# Patient Record
Sex: FEMALE | Race: WHITE
[De-identification: names, ages, dates, MRNs, and addresses within clinical notes are randomized per-mention and may not be internally consistent; named-entity substitution may affect disease eponyms.]

---

## 2018-03-19 ENCOUNTER — HOSPITAL ENCOUNTER (OUTPATIENT)
Dept: HOSPITAL 22 - LAB | Age: 38
End: 2018-03-19
Payer: MEDICAID

## 2018-03-19 DIAGNOSIS — R53.83: Primary | ICD-10-CM

## 2018-03-19 DIAGNOSIS — Z79.899: ICD-10-CM

## 2018-03-19 LAB
ALBUMIN LEVEL: 3.7 GM/DL (ref 3.4–5)
ALBUMIN/GLOB SERPL: 1 {RATIO} (ref 1.1–1.8)
ALP ISO SERPL-ACNC: 77 U/L (ref 46–116)
ALT SERPLBLD-CCNC: 54 U/L (ref 12–78)
ANION GAP SERPL CALC-SCNC: 14.5 MEQ/L (ref 5–15)
AST SERPL QL: 40 U/L (ref 15–37)
BILIRUBIN,TOTAL: 0.2 MG/DL (ref 0.2–1)
BUN SERPL-MCNC: 3 MG/DL (ref 7–18)
CALCIUM SPEC-MCNC: 9.1 MG/DL (ref 8.5–10.1)
CHLORIDE SPEC-SCNC: 107 MMOL/L (ref 98–107)
CO2 SERPL-SCNC: 24 MMOL/L (ref 21–32)
CREAT BLD-SCNC: 0.56 MG/DL (ref 0.55–1.02)
ESTIMATED GLOMERULAR FILT RATE: 121 ML/MIN (ref 60–?)
GFR (AFRICAN AMERICAN): 147 ML/MIN (ref 60–?)
GLOBULIN SER CALC-MCNC: 3.8 GM/DL (ref 1.3–3.2)
GLUCOSE: 88 MG/DL (ref 74–106)
HCT VFR BLD CALC: 38.4 % (ref 37–47)
HGB BLD-MCNC: 11.8 G/DL (ref 12.2–16.2)
MCHC RBC-ENTMCNC: 30.9 G/DL (ref 31.8–35.4)
MCV RBC: 94.4 FL (ref 81–99)
MEAN CORPUSCULAR HEMOGLOBIN: 29.1 PG (ref 27–31.2)
PLATELET # BLD: 333 K/MM3 (ref 142–424)
POTASSIUM: 4.5 MMOL/L (ref 3.5–5.1)
PROT SERPL-MCNC: 7.5 GM/DL (ref 6.4–8.2)
RBC # BLD AUTO: 4.06 M/MM3 (ref 4.2–5.4)
SODIUM SPEC-SCNC: 141 MMOL/L (ref 136–145)
T4 FREE SERPL-MCNC: 1.09 NG/DL (ref 0.76–1.46)
TSH SERPL-ACNC: 0.92 UIU/ML (ref 0.36–3.74)
WBC # BLD AUTO: 6 K/MM3 (ref 4.8–10.8)

## 2018-03-19 PROCEDURE — 80305 DRUG TEST PRSMV DIR OPT OBS: CPT

## 2018-03-19 PROCEDURE — 84439 ASSAY OF FREE THYROXINE: CPT

## 2018-03-19 PROCEDURE — 80053 COMPREHEN METABOLIC PANEL: CPT

## 2018-03-19 PROCEDURE — 84443 ASSAY THYROID STIM HORMONE: CPT

## 2018-03-19 PROCEDURE — 85025 COMPLETE CBC W/AUTO DIFF WBC: CPT

## 2018-03-19 PROCEDURE — 80074 ACUTE HEPATITIS PANEL: CPT

## 2018-03-22 LAB — HCV AB SER IA-ACNC: >11 S/CO RATIO (ref 0–0.9)

## 2018-07-16 ENCOUNTER — APPOINTMENT (OUTPATIENT)
Dept: GENERAL RADIOLOGY | Facility: HOSPITAL | Age: 38
End: 2018-07-16

## 2018-07-16 ENCOUNTER — APPOINTMENT (OUTPATIENT)
Dept: CARDIOLOGY | Facility: HOSPITAL | Age: 38
End: 2018-07-16
Attending: INTERNAL MEDICINE

## 2018-07-16 ENCOUNTER — APPOINTMENT (OUTPATIENT)
Dept: NEUROLOGY | Facility: HOSPITAL | Age: 38
End: 2018-07-16

## 2018-07-16 ENCOUNTER — APPOINTMENT (OUTPATIENT)
Dept: MRI IMAGING | Facility: HOSPITAL | Age: 38
End: 2018-07-16
Attending: INTERNAL MEDICINE

## 2018-07-16 ENCOUNTER — HOSPITAL ENCOUNTER (INPATIENT)
Facility: HOSPITAL | Age: 38
LOS: 39 days | Discharge: NURSING, REHAB OR LONG-TERM CARE | End: 2018-08-24
Attending: INTERNAL MEDICINE | Admitting: INTERNAL MEDICINE

## 2018-07-16 ENCOUNTER — APPOINTMENT (OUTPATIENT)
Dept: MRI IMAGING | Facility: HOSPITAL | Age: 38
End: 2018-07-16

## 2018-07-16 DIAGNOSIS — Z74.09 IMPAIRED MOBILITY AND ADLS: ICD-10-CM

## 2018-07-16 DIAGNOSIS — Z74.09 IMPAIRED FUNCTIONAL MOBILITY, BALANCE, GAIT, AND ENDURANCE: ICD-10-CM

## 2018-07-16 DIAGNOSIS — R77.8 ELEVATED TROPONIN: ICD-10-CM

## 2018-07-16 DIAGNOSIS — R09.2 RESPIRATORY ARREST (HCC): Primary | ICD-10-CM

## 2018-07-16 DIAGNOSIS — Z78.9 IMPAIRED MOBILITY AND ADLS: ICD-10-CM

## 2018-07-16 DIAGNOSIS — G93.40 ENCEPHALOPATHY: ICD-10-CM

## 2018-07-16 PROBLEM — F10.929 ALCOHOLIC INTOXICATION WITH COMPLICATION (HCC): Status: ACTIVE | Noted: 2018-07-16

## 2018-07-16 PROBLEM — G93.1 ANOXIC BRAIN DAMAGE (HCC): Status: ACTIVE | Noted: 2018-07-16

## 2018-07-16 PROBLEM — F10.10 ETOH ABUSE: Status: ACTIVE | Noted: 2018-07-16

## 2018-07-16 PROBLEM — F19.11 H/O DRUG ABUSE (HCC): Status: ACTIVE | Noted: 2018-07-16

## 2018-07-16 PROBLEM — R91.8 PULMONARY INFILTRATES: Status: ACTIVE | Noted: 2018-07-16

## 2018-07-16 LAB
ALBUMIN SERPL-MCNC: 2.86 G/DL (ref 3.2–4.8)
ALBUMIN SERPL-MCNC: 3.92 G/DL (ref 3.2–4.8)
ALBUMIN/GLOB SERPL: 1.4 G/DL (ref 1.5–2.5)
ALP SERPL-CCNC: 109 U/L (ref 25–100)
ALT SERPL W P-5'-P-CCNC: 39 U/L (ref 7–40)
AMPHET+METHAMPHET UR QL: NEGATIVE
AMPHETAMINES UR QL: NEGATIVE
ANION GAP SERPL CALCULATED.3IONS-SCNC: 6 MMOL/L (ref 3–11)
ANION GAP SERPL CALCULATED.3IONS-SCNC: 9 MMOL/L (ref 3–11)
APPEARANCE CSF: CLEAR
ARTERIAL PATENCY WRIST A: ABNORMAL
AST SERPL-CCNC: 72 U/L (ref 0–33)
ATMOSPHERIC PRESS: ABNORMAL MMHG
BARBITURATES UR QL SCN: NEGATIVE
BASE EXCESS BLDA CALC-SCNC: -7.2 MMOL/L (ref 0–2)
BASOPHILS # BLD AUTO: 0.01 10*3/MM3 (ref 0–0.2)
BASOPHILS NFR BLD AUTO: 0 % (ref 0–1)
BDY SITE: ABNORMAL
BENZODIAZ UR QL SCN: NEGATIVE
BH CV ECHO MEAS - AO MAX PG (FULL): 0.11 MMHG
BH CV ECHO MEAS - AO MAX PG: 3 MMHG
BH CV ECHO MEAS - AO MEAN PG (FULL): 0.24 MMHG
BH CV ECHO MEAS - AO MEAN PG: 1.7 MMHG
BH CV ECHO MEAS - AO ROOT AREA (BSA CORRECTED): 2.5
BH CV ECHO MEAS - AO ROOT AREA: 12.4 CM^2
BH CV ECHO MEAS - AO ROOT DIAM: 4 CM
BH CV ECHO MEAS - AO V2 MAX: 87.3 CM/SEC
BH CV ECHO MEAS - AO V2 MEAN: 61.9 CM/SEC
BH CV ECHO MEAS - AO V2 VTI: 18.2 CM
BH CV ECHO MEAS - BSA(HAYCOCK): 1.6 M^2
BH CV ECHO MEAS - BSA: 1.6 M^2
BH CV ECHO MEAS - BZI_BMI: 30.9 KILOGRAMS/M^2
BH CV ECHO MEAS - BZI_METRIC_HEIGHT: 144.8 CM
BH CV ECHO MEAS - BZI_METRIC_WEIGHT: 64.9 KG
BH CV ECHO MEAS - CONTRAST EF (2CH): 47.9 ML/M^2
BH CV ECHO MEAS - CONTRAST EF 4CH: 50.5 ML/M^2
BH CV ECHO MEAS - EDV(CUBED): 111.7 ML
BH CV ECHO MEAS - EDV(MOD-SP2): 94 ML
BH CV ECHO MEAS - EDV(MOD-SP4): 107 ML
BH CV ECHO MEAS - EDV(TEICH): 108.4 ML
BH CV ECHO MEAS - EF(CUBED): 73.6 %
BH CV ECHO MEAS - EF(MOD-BP): 50 %
BH CV ECHO MEAS - EF(MOD-SP2): 47.9 %
BH CV ECHO MEAS - EF(MOD-SP4): 50.5 %
BH CV ECHO MEAS - EF(TEICH): 65.2 %
BH CV ECHO MEAS - ESV(CUBED): 29.5 ML
BH CV ECHO MEAS - ESV(MOD-SP2): 49 ML
BH CV ECHO MEAS - ESV(MOD-SP4): 53 ML
BH CV ECHO MEAS - ESV(TEICH): 37.7 ML
BH CV ECHO MEAS - FS: 35.8 %
BH CV ECHO MEAS - IVS/LVPW: 1.1
BH CV ECHO MEAS - IVSD: 0.78 CM
BH CV ECHO MEAS - LA DIMENSION: 3.1 CM
BH CV ECHO MEAS - LA/AO: 0.79
BH CV ECHO MEAS - LAT PEAK E' VEL: 8.2 CM/SEC
BH CV ECHO MEAS - LV DIASTOLIC VOL/BSA (35-75): 68.6 ML/M^2
BH CV ECHO MEAS - LV MASS(C)D: 117.9 GRAMS
BH CV ECHO MEAS - LV MASS(C)DI: 75.6 GRAMS/M^2
BH CV ECHO MEAS - LV MAX PG: 2.9 MMHG
BH CV ECHO MEAS - LV MEAN PG: 1.5 MMHG
BH CV ECHO MEAS - LV SYSTOLIC VOL/BSA (12-30): 34 ML/M^2
BH CV ECHO MEAS - LV V1 MAX: 85.7 CM/SEC
BH CV ECHO MEAS - LV V1 MEAN: 55.3 CM/SEC
BH CV ECHO MEAS - LV V1 VTI: 16.5 CM
BH CV ECHO MEAS - LVIDD: 4.8 CM
BH CV ECHO MEAS - LVIDS: 3.1 CM
BH CV ECHO MEAS - LVLD AP2: 8.5 CM
BH CV ECHO MEAS - LVLD AP4: 7.8 CM
BH CV ECHO MEAS - LVLS AP2: 6.9 CM
BH CV ECHO MEAS - LVLS AP4: 6.4 CM
BH CV ECHO MEAS - LVPWD: 0.73 CM
BH CV ECHO MEAS - MED PEAK E' VEL: 6.35 CM/SEC
BH CV ECHO MEAS - MV A MAX VEL: 68.9 CM/SEC
BH CV ECHO MEAS - MV DEC TIME: 0.14 SEC
BH CV ECHO MEAS - MV E MAX VEL: 79.2 CM/SEC
BH CV ECHO MEAS - MV E/A: 1.1
BH CV ECHO MEAS - MV MAX PG: 2.5 MMHG
BH CV ECHO MEAS - MV MEAN PG: 1 MMHG
BH CV ECHO MEAS - MV V2 MAX: 77.9 CM/SEC
BH CV ECHO MEAS - MV V2 MEAN: 48.6 CM/SEC
BH CV ECHO MEAS - MV V2 VTI: 14.9 CM
BH CV ECHO MEAS - PA ACC SLOPE: 478.7 CM/SEC^2
BH CV ECHO MEAS - PA ACC TIME: 0.11 SEC
BH CV ECHO MEAS - PA MAX PG: 2.3 MMHG
BH CV ECHO MEAS - PA PR(ACCEL): 30.3 MMHG
BH CV ECHO MEAS - PA V2 MAX: 75.4 CM/SEC
BH CV ECHO MEAS - RAP SYSTOLE: 8 MMHG
BH CV ECHO MEAS - RVSP: 27.1 MMHG
BH CV ECHO MEAS - SI(AO): 144.1 ML/M^2
BH CV ECHO MEAS - SI(CUBED): 52.7 ML/M^2
BH CV ECHO MEAS - SI(MOD-SP2): 28.9 ML/M^2
BH CV ECHO MEAS - SI(MOD-SP4): 34.6 ML/M^2
BH CV ECHO MEAS - SI(TEICH): 45.3 ML/M^2
BH CV ECHO MEAS - SV(AO): 224.7 ML
BH CV ECHO MEAS - SV(CUBED): 82.2 ML
BH CV ECHO MEAS - SV(MOD-SP2): 45 ML
BH CV ECHO MEAS - SV(MOD-SP4): 54 ML
BH CV ECHO MEAS - SV(TEICH): 70.7 ML
BH CV ECHO MEAS - TAPSE (>1.6): 1.5 CM2
BH CV ECHO MEAS - TR MAX VEL: 218.7 CM/SEC
BH CV ECHO MEASUREMENTS AVERAGE E/E' RATIO: 10.89
BH CV VAS BP LEFT ARM: NORMAL MMHG
BH CV XLRA - RV BASE: 3.5 CM
BH CV XLRA - RV LENGTH: 5.5 CM
BH CV XLRA - RV MID: 2.6 CM
BH CV XLRA - TDI S': 9.53 CM/SEC
BILIRUB SERPL-MCNC: 0.2 MG/DL (ref 0.3–1.2)
BUN BLD-MCNC: 10 MG/DL (ref 9–23)
BUN BLD-MCNC: 7 MG/DL (ref 9–23)
BUN/CREAT SERPL: 11.1 (ref 7–25)
BUN/CREAT SERPL: 14.9 (ref 7–25)
BUPRENORPHINE SERPL-MCNC: NEGATIVE NG/ML
C GATTII+NEOFOR DNA CSF QL NAA+NON-PROBE: NOT DETECTED
CA-I SERPL ISE-MCNC: 1.16 MMOL/L (ref 1.12–1.32)
CALCIUM SPEC-SCNC: 7.1 MG/DL (ref 8.7–10.4)
CALCIUM SPEC-SCNC: 7.7 MG/DL (ref 8.7–10.4)
CANNABINOIDS SERPL QL: NEGATIVE
CHLORIDE SERPL-SCNC: 110 MMOL/L (ref 99–109)
CHLORIDE SERPL-SCNC: 112 MMOL/L (ref 99–109)
CMV DNA CSF QL NAA+PROBE: NOT DETECTED
CO2 BLDA-SCNC: 18.6 MMOL/L (ref 22–33)
CO2 SERPL-SCNC: 20 MMOL/L (ref 20–31)
CO2 SERPL-SCNC: 23 MMOL/L (ref 20–31)
COCAINE UR QL: NEGATIVE
COHGB MFR BLD: 0.3 % (ref 0–2)
COLOR CSF: COLORLESS
COLOR SPUN CSF: COLORLESS
CREAT BLD-MCNC: 0.63 MG/DL (ref 0.6–1.3)
CREAT BLD-MCNC: 0.67 MG/DL (ref 0.6–1.3)
D-LACTATE SERPL-SCNC: 1.3 MMOL/L (ref 0.5–2)
D-LACTATE SERPL-SCNC: 4.1 MMOL/L (ref 0.5–2)
DEPRECATED RDW RBC AUTO: 55.2 FL (ref 37–54)
E COLI K1 DNA CSF QL NAA+NON-PROBE: NOT DETECTED
EOSINOPHIL # BLD AUTO: 0 10*3/MM3 (ref 0–0.3)
EOSINOPHIL NFR BLD AUTO: 0 % (ref 0–3)
ERYTHROCYTE [DISTWIDTH] IN BLOOD BY AUTOMATED COUNT: 15.4 % (ref 11.3–14.5)
EV RNA CSF QL NAA+PROBE: NOT DETECTED
GFR SERPL CREATININE-BSD FRML MDRD: 106 ML/MIN/1.73
GFR SERPL CREATININE-BSD FRML MDRD: 99 ML/MIN/1.73
GLOBULIN UR ELPH-MCNC: 2.8 GM/DL
GLUCOSE BLD-MCNC: 118 MG/DL (ref 70–100)
GLUCOSE BLD-MCNC: 130 MG/DL (ref 70–100)
GLUCOSE BLDC GLUCOMTR-MCNC: 117 MG/DL (ref 70–130)
GLUCOSE BLDC GLUCOMTR-MCNC: 119 MG/DL (ref 70–130)
GLUCOSE BLDC GLUCOMTR-MCNC: 119 MG/DL (ref 70–130)
GLUCOSE BLDC GLUCOMTR-MCNC: 126 MG/DL (ref 70–130)
GLUCOSE BLDC GLUCOMTR-MCNC: 134 MG/DL (ref 70–130)
GLUCOSE BLDC GLUCOMTR-MCNC: 136 MG/DL (ref 70–130)
GLUCOSE BLDC GLUCOMTR-MCNC: 144 MG/DL (ref 70–130)
GLUCOSE CSF-MCNC: 113 MG/DL (ref 40–70)
GP B STREP DNA SPEC QL NAA+PROBE: NOT DETECTED
HAEM INFLU SEROTYP DNA SPEC NAA+PROBE: NOT DETECTED
HAV IGM SERPL QL IA: ABNORMAL
HBV CORE IGM SERPL QL IA: ABNORMAL
HBV SURFACE AG SERPL QL IA: ABNORMAL
HCG INTACT+B SERPL-ACNC: <5 MIU/ML
HCO3 BLDA-SCNC: 17.6 MMOL/L (ref 20–26)
HCT VFR BLD AUTO: 39.6 % (ref 34.5–44)
HCT VFR BLD CALC: 38.9 %
HCV AB SER DONR QL: REACTIVE
HGB BLD-MCNC: 12.7 G/DL (ref 11.5–15.5)
HGB BLDA-MCNC: 12.7 G/DL (ref 14–18)
HHV6 DNA CSF QL NAA+PROBE: NOT DETECTED
HOLD SPECIMEN: NORMAL
HOROWITZ INDEX BLD+IHG-RTO: 40 %
HSV1 DNA CSF QL NAA+PROBE: NOT DETECTED
HSV2 DNA CSF QL NAA+PROBE: NOT DETECTED
IMM GRANULOCYTES # BLD: 0.08 10*3/MM3 (ref 0–0.03)
IMM GRANULOCYTES NFR BLD: 0.3 % (ref 0–0.6)
L MONOCYTOG RRNA SPEC QL PROBE: NOT DETECTED
LEFT ATRIUM VOLUME INDEX: 19.2 ML/M2
LV EF 2D ECHO EST: 50 %
LYMPHOCYTES # BLD AUTO: 1.05 10*3/MM3 (ref 0.6–4.8)
LYMPHOCYTES NFR BLD AUTO: 4.4 % (ref 24–44)
MAGNESIUM SERPL-MCNC: 1.6 MG/DL (ref 1.3–2.7)
MAGNESIUM SERPL-MCNC: 2.4 MG/DL (ref 1.3–2.7)
MCH RBC QN AUTO: 31.3 PG (ref 27–31)
MCHC RBC AUTO-ENTMCNC: 32.1 G/DL (ref 32–36)
MCV RBC AUTO: 97.5 FL (ref 80–99)
METHADONE UR QL SCN: NEGATIVE
METHGB BLD QL: 1.4 % (ref 0–1.5)
MODALITY: ABNORMAL
MONOCYTES # BLD AUTO: 1.47 10*3/MM3 (ref 0–1)
MONOCYTES NFR BLD AUTO: 6.2 % (ref 0–12)
N MEN DNA SPEC QL NAA+PROBE: NOT DETECTED
NEUTROPHILS # BLD AUTO: 21.2 10*3/MM3 (ref 1.5–8.3)
NEUTROPHILS NFR BLD AUTO: 89.1 % (ref 41–71)
OPIATES UR QL: NEGATIVE
OXYCODONE UR QL SCN: NEGATIVE
OXYHGB MFR BLDV: 96.6 % (ref 94–99)
PARECHOVIRUS A RNA CSF QL NAA+NON-PROBE: NOT DETECTED
PCO2 BLDA: 32.7 MM HG (ref 35–45)
PCP UR QL SCN: NEGATIVE
PH BLDA: 7.34 PH UNITS (ref 7.35–7.45)
PHOSPHATE SERPL-MCNC: 1.8 MG/DL (ref 2.4–5.1)
PHOSPHATE SERPL-MCNC: 1.8 MG/DL (ref 2.4–5.1)
PLATELET # BLD AUTO: 372 10*3/MM3 (ref 150–450)
PMV BLD AUTO: 9.8 FL (ref 6–12)
PO2 BLDA: 125 MM HG (ref 83–108)
POTASSIUM BLD-SCNC: 4 MMOL/L (ref 3.5–5.5)
POTASSIUM BLD-SCNC: 4.1 MMOL/L (ref 3.5–5.5)
PROCALCITONIN SERPL-MCNC: 0.07 NG/ML
PROPOXYPH UR QL: NEGATIVE
PROT CSF-MCNC: 26 MG/DL (ref 15–45)
PROT SERPL-MCNC: 6.7 G/DL (ref 5.7–8.2)
RBC # BLD AUTO: 4.06 10*6/MM3 (ref 3.89–5.14)
RBC # CSF MANUAL: 0 /MM3 (ref 0–5)
S PNEUM DNA CSF QL NAA+NON-PROBE: NOT DETECTED
SODIUM BLD-SCNC: 139 MMOL/L (ref 132–146)
SODIUM BLD-SCNC: 141 MMOL/L (ref 132–146)
SPECIMEN VOL CSF: 17 ML
TRICYCLICS UR QL SCN: NEGATIVE
TSH SERPL DL<=0.05 MIU/L-ACNC: 0.42 MIU/ML (ref 0.35–5.35)
TUBE # CSF: 4
VZV DNA SPEC QL NAA+PROBE: NOT DETECTED
WBC # CSF MANUAL: 0 /MM3 (ref 0–5)
WBC NRBC COR # BLD: 23.81 10*3/MM3 (ref 3.5–10.8)

## 2018-07-16 PROCEDURE — 80307 DRUG TEST PRSMV CHEM ANLYZR: CPT | Performed by: NURSE PRACTITIONER

## 2018-07-16 PROCEDURE — 02HV33Z INSERTION OF INFUSION DEVICE INTO SUPERIOR VENA CAVA, PERCUTANEOUS APPROACH: ICD-10-PCS | Performed by: NURSE PRACTITIONER

## 2018-07-16 PROCEDURE — 83735 ASSAY OF MAGNESIUM: CPT | Performed by: NURSE PRACTITIONER

## 2018-07-16 PROCEDURE — 87483 CNS DNA AMP PROBE TYPE 12-25: CPT | Performed by: PSYCHIATRY & NEUROLOGY

## 2018-07-16 PROCEDURE — 93005 ELECTROCARDIOGRAM TRACING: CPT | Performed by: INTERNAL MEDICINE

## 2018-07-16 PROCEDURE — 80074 ACUTE HEPATITIS PANEL: CPT | Performed by: NURSE PRACTITIONER

## 2018-07-16 PROCEDURE — 87040 BLOOD CULTURE FOR BACTERIA: CPT | Performed by: INTERNAL MEDICINE

## 2018-07-16 PROCEDURE — 25010000003 CEFTRIAXONE PER 250 MG: Performed by: INTERNAL MEDICINE

## 2018-07-16 PROCEDURE — 94799 UNLISTED PULMONARY SVC/PX: CPT

## 2018-07-16 PROCEDURE — 93010 ELECTROCARDIOGRAM REPORT: CPT | Performed by: INTERNAL MEDICINE

## 2018-07-16 PROCEDURE — 80069 RENAL FUNCTION PANEL: CPT | Performed by: INTERNAL MEDICINE

## 2018-07-16 PROCEDURE — 99222 1ST HOSP IP/OBS MODERATE 55: CPT | Performed by: PSYCHIATRY & NEUROLOGY

## 2018-07-16 PROCEDURE — 87070 CULTURE OTHR SPECIMN AEROBIC: CPT | Performed by: INTERNAL MEDICINE

## 2018-07-16 PROCEDURE — 80306 DRUG TEST PRSMV INSTRMNT: CPT | Performed by: INTERNAL MEDICINE

## 2018-07-16 PROCEDURE — 84145 PROCALCITONIN (PCT): CPT | Performed by: NURSE PRACTITIONER

## 2018-07-16 PROCEDURE — 25010000002 THIAMINE PER 100 MG: Performed by: INTERNAL MEDICINE

## 2018-07-16 PROCEDURE — 71045 X-RAY EXAM CHEST 1 VIEW: CPT

## 2018-07-16 PROCEDURE — 25010000003 LEVETIRACETAM IN NACL 0.75% 1000 MG/100ML SOLUTION: Performed by: INTERNAL MEDICINE

## 2018-07-16 PROCEDURE — 31500 INSERT EMERGENCY AIRWAY: CPT | Performed by: INTERNAL MEDICINE

## 2018-07-16 PROCEDURE — 70553 MRI BRAIN STEM W/O & W/DYE: CPT

## 2018-07-16 PROCEDURE — 87147 CULTURE TYPE IMMUNOLOGIC: CPT | Performed by: INTERNAL MEDICINE

## 2018-07-16 PROCEDURE — 25010000002 PIPERACILLIN SOD-TAZOBACTAM PER 1 G: Performed by: INTERNAL MEDICINE

## 2018-07-16 PROCEDURE — 82330 ASSAY OF CALCIUM: CPT | Performed by: INTERNAL MEDICINE

## 2018-07-16 PROCEDURE — 25810000003 SODIUM CHLORIDE 0.9 % WITH KCL 20 MEQ 20-0.9 MEQ/L-% SOLUTION: Performed by: NURSE PRACTITIONER

## 2018-07-16 PROCEDURE — 89050 BODY FLUID CELL COUNT: CPT | Performed by: PSYCHIATRY & NEUROLOGY

## 2018-07-16 PROCEDURE — 25010000002 THIAMINE PER 100 MG: Performed by: PSYCHIATRY & NEUROLOGY

## 2018-07-16 PROCEDURE — 82945 GLUCOSE OTHER FLUID: CPT | Performed by: PSYCHIATRY & NEUROLOGY

## 2018-07-16 PROCEDURE — 87070 CULTURE OTHR SPECIMN AEROBIC: CPT | Performed by: PSYCHIATRY & NEUROLOGY

## 2018-07-16 PROCEDURE — 70548 MR ANGIOGRAPHY NECK W/DYE: CPT

## 2018-07-16 PROCEDURE — 0 GADOBENATE DIMEGLUMINE 529 MG/ML SOLUTION: Performed by: INTERNAL MEDICINE

## 2018-07-16 PROCEDURE — 36556 INSERT NON-TUNNEL CV CATH: CPT | Performed by: NURSE PRACTITIONER

## 2018-07-16 PROCEDURE — 83605 ASSAY OF LACTIC ACID: CPT | Performed by: NURSE PRACTITIONER

## 2018-07-16 PROCEDURE — 25010000002 VANCOMYCIN 10 G RECONSTITUTED SOLUTION: Performed by: INTERNAL MEDICINE

## 2018-07-16 PROCEDURE — 009U3ZX DRAINAGE OF SPINAL CANAL, PERCUTANEOUS APPROACH, DIAGNOSTIC: ICD-10-PCS | Performed by: INTERNAL MEDICINE

## 2018-07-16 PROCEDURE — 84443 ASSAY THYROID STIM HORMONE: CPT | Performed by: NURSE PRACTITIONER

## 2018-07-16 PROCEDURE — 87205 SMEAR GRAM STAIN: CPT | Performed by: INTERNAL MEDICINE

## 2018-07-16 PROCEDURE — 84157 ASSAY OF PROTEIN OTHER: CPT | Performed by: PSYCHIATRY & NEUROLOGY

## 2018-07-16 PROCEDURE — 86592 SYPHILIS TEST NON-TREP QUAL: CPT | Performed by: PSYCHIATRY & NEUROLOGY

## 2018-07-16 PROCEDURE — 99291 CRITICAL CARE FIRST HOUR: CPT | Performed by: INTERNAL MEDICINE

## 2018-07-16 PROCEDURE — 25010000002 LORAZEPAM PER 2 MG: Performed by: INTERNAL MEDICINE

## 2018-07-16 PROCEDURE — 25010000002 LORAZEPAM PER 2 MG

## 2018-07-16 PROCEDURE — 82805 BLOOD GASES W/O2 SATURATION: CPT | Performed by: NURSE PRACTITIONER

## 2018-07-16 PROCEDURE — 83605 ASSAY OF LACTIC ACID: CPT | Performed by: INTERNAL MEDICINE

## 2018-07-16 PROCEDURE — 84702 CHORIONIC GONADOTROPIN TEST: CPT | Performed by: INTERNAL MEDICINE

## 2018-07-16 PROCEDURE — 87522 HEPATITIS C REVRS TRNSCRPJ: CPT | Performed by: NURSE PRACTITIONER

## 2018-07-16 PROCEDURE — 85025 COMPLETE CBC W/AUTO DIFF WBC: CPT | Performed by: NURSE PRACTITIONER

## 2018-07-16 PROCEDURE — 25010000002 PROPOFOL 1000 MG/ML EMULSION: Performed by: INTERNAL MEDICINE

## 2018-07-16 PROCEDURE — 70544 MR ANGIOGRAPHY HEAD W/O DYE: CPT

## 2018-07-16 PROCEDURE — 25010000002 ACYCLOVIR PER 5 MG: Performed by: INTERNAL MEDICINE

## 2018-07-16 PROCEDURE — 82962 GLUCOSE BLOOD TEST: CPT

## 2018-07-16 PROCEDURE — 87205 SMEAR GRAM STAIN: CPT | Performed by: PSYCHIATRY & NEUROLOGY

## 2018-07-16 PROCEDURE — 0BH17EZ INSERTION OF ENDOTRACHEAL AIRWAY INTO TRACHEA, VIA NATURAL OR ARTIFICIAL OPENING: ICD-10-PCS | Performed by: INTERNAL MEDICINE

## 2018-07-16 PROCEDURE — 94002 VENT MGMT INPAT INIT DAY: CPT

## 2018-07-16 PROCEDURE — 25010000002 ENOXAPARIN PER 10 MG: Performed by: NURSE PRACTITIONER

## 2018-07-16 PROCEDURE — 87015 SPECIMEN INFECT AGNT CONCNTJ: CPT | Performed by: PSYCHIATRY & NEUROLOGY

## 2018-07-16 PROCEDURE — 5A1955Z RESPIRATORY VENTILATION, GREATER THAN 96 CONSECUTIVE HOURS: ICD-10-PCS | Performed by: INTERNAL MEDICINE

## 2018-07-16 PROCEDURE — 80053 COMPREHEN METABOLIC PANEL: CPT | Performed by: NURSE PRACTITIONER

## 2018-07-16 PROCEDURE — 36600 WITHDRAWAL OF ARTERIAL BLOOD: CPT | Performed by: NURSE PRACTITIONER

## 2018-07-16 PROCEDURE — 62270 DX LMBR SPI PNXR: CPT | Performed by: INTERNAL MEDICINE

## 2018-07-16 PROCEDURE — 95819 EEG AWAKE AND ASLEEP: CPT

## 2018-07-16 PROCEDURE — 83735 ASSAY OF MAGNESIUM: CPT | Performed by: INTERNAL MEDICINE

## 2018-07-16 PROCEDURE — 84100 ASSAY OF PHOSPHORUS: CPT | Performed by: NURSE PRACTITIONER

## 2018-07-16 PROCEDURE — A9577 INJ MULTIHANCE: HCPCS | Performed by: INTERNAL MEDICINE

## 2018-07-16 PROCEDURE — 93306 TTE W/DOPPLER COMPLETE: CPT | Performed by: INTERNAL MEDICINE

## 2018-07-16 PROCEDURE — 93306 TTE W/DOPPLER COMPLETE: CPT

## 2018-07-16 RX ORDER — VANCOMYCIN HYDROCHLORIDE 1 G/200ML
15 INJECTION, SOLUTION INTRAVENOUS EVERY 12 HOURS
Status: DISCONTINUED | OUTPATIENT
Start: 2018-07-17 | End: 2018-07-16

## 2018-07-16 RX ORDER — LEVETIRACETAM 10 MG/ML
1000 INJECTION INTRAVASCULAR EVERY 12 HOURS SCHEDULED
Status: DISCONTINUED | OUTPATIENT
Start: 2018-07-16 | End: 2018-07-23

## 2018-07-16 RX ORDER — CHLORHEXIDINE GLUCONATE 0.12 MG/ML
15 RINSE ORAL EVERY 12 HOURS SCHEDULED
Status: DISCONTINUED | OUTPATIENT
Start: 2018-07-16 | End: 2018-08-01

## 2018-07-16 RX ORDER — SODIUM CHLORIDE, SODIUM LACTATE, POTASSIUM CHLORIDE, CALCIUM CHLORIDE 600; 310; 30; 20 MG/100ML; MG/100ML; MG/100ML; MG/100ML
1000 INJECTION, SOLUTION INTRAVENOUS ONCE
Status: COMPLETED | OUTPATIENT
Start: 2018-07-16 | End: 2018-07-16

## 2018-07-16 RX ORDER — POTASSIUM CHLORIDE 1.5 G/1.77G
40 POWDER, FOR SOLUTION ORAL AS NEEDED
Status: DISCONTINUED | OUTPATIENT
Start: 2018-07-16 | End: 2018-08-25 | Stop reason: HOSPADM

## 2018-07-16 RX ORDER — MAGNESIUM SULFATE HEPTAHYDRATE 40 MG/ML
2 INJECTION, SOLUTION INTRAVENOUS AS NEEDED
Status: DISCONTINUED | OUTPATIENT
Start: 2018-07-16 | End: 2018-08-25 | Stop reason: HOSPADM

## 2018-07-16 RX ORDER — FAMOTIDINE 10 MG/ML
20 INJECTION, SOLUTION INTRAVENOUS EVERY 12 HOURS SCHEDULED
Status: DISCONTINUED | OUTPATIENT
Start: 2018-07-16 | End: 2018-07-16

## 2018-07-16 RX ORDER — POTASSIUM CHLORIDE 29.8 MG/ML
20 INJECTION INTRAVENOUS
Status: DISCONTINUED | OUTPATIENT
Start: 2018-07-16 | End: 2018-07-31

## 2018-07-16 RX ORDER — IPRATROPIUM BROMIDE AND ALBUTEROL SULFATE 2.5; .5 MG/3ML; MG/3ML
3 SOLUTION RESPIRATORY (INHALATION)
Status: DISCONTINUED | OUTPATIENT
Start: 2018-07-16 | End: 2018-07-25

## 2018-07-16 RX ORDER — LORAZEPAM 2 MG/ML
INJECTION INTRAMUSCULAR
Status: COMPLETED
Start: 2018-07-16 | End: 2018-07-16

## 2018-07-16 RX ORDER — SODIUM CHLORIDE AND POTASSIUM CHLORIDE 150; 900 MG/100ML; MG/100ML
125 INJECTION, SOLUTION INTRAVENOUS CONTINUOUS
Status: DISCONTINUED | OUTPATIENT
Start: 2018-07-16 | End: 2018-07-16

## 2018-07-16 RX ORDER — LEVETIRACETAM 10 MG/ML
1000 INJECTION INTRAVASCULAR ONCE
Status: COMPLETED | OUTPATIENT
Start: 2018-07-16 | End: 2018-07-16

## 2018-07-16 RX ORDER — MAGNESIUM SULFATE HEPTAHYDRATE 40 MG/ML
4 INJECTION, SOLUTION INTRAVENOUS AS NEEDED
Status: DISCONTINUED | OUTPATIENT
Start: 2018-07-16 | End: 2018-08-25 | Stop reason: HOSPADM

## 2018-07-16 RX ORDER — FOLIC ACID 1 MG/1
1 TABLET ORAL DAILY
Status: DISCONTINUED | OUTPATIENT
Start: 2018-07-16 | End: 2018-07-16

## 2018-07-16 RX ORDER — LORAZEPAM 2 MG/ML
4 INJECTION INTRAMUSCULAR ONCE
Status: DISCONTINUED | OUTPATIENT
Start: 2018-07-16 | End: 2018-07-18

## 2018-07-16 RX ORDER — SODIUM CHLORIDE 0.9 % (FLUSH) 0.9 %
1-10 SYRINGE (ML) INJECTION AS NEEDED
Status: DISCONTINUED | OUTPATIENT
Start: 2018-07-16 | End: 2018-08-25 | Stop reason: HOSPADM

## 2018-07-16 RX ORDER — LORAZEPAM 2 MG/ML
1 INJECTION INTRAMUSCULAR
Status: DISCONTINUED | OUTPATIENT
Start: 2018-07-16 | End: 2018-07-19

## 2018-07-16 RX ORDER — VECURONIUM BROMIDE 1 MG/ML
10 INJECTION, POWDER, LYOPHILIZED, FOR SOLUTION INTRAVENOUS ONCE
Status: COMPLETED | OUTPATIENT
Start: 2018-07-16 | End: 2018-07-16

## 2018-07-16 RX ORDER — VANCOMYCIN HYDROCHLORIDE 1 G/200ML
15 INJECTION, SOLUTION INTRAVENOUS EVERY 12 HOURS
Status: DISCONTINUED | OUTPATIENT
Start: 2018-07-17 | End: 2018-07-17 | Stop reason: ALTCHOICE

## 2018-07-16 RX ORDER — SODIUM CHLORIDE, SODIUM LACTATE, POTASSIUM CHLORIDE, CALCIUM CHLORIDE 600; 310; 30; 20 MG/100ML; MG/100ML; MG/100ML; MG/100ML
150 INJECTION, SOLUTION INTRAVENOUS CONTINUOUS
Status: DISCONTINUED | OUTPATIENT
Start: 2018-07-16 | End: 2018-07-17

## 2018-07-16 RX ORDER — POTASSIUM CHLORIDE 750 MG/1
40 CAPSULE, EXTENDED RELEASE ORAL AS NEEDED
Status: DISCONTINUED | OUTPATIENT
Start: 2018-07-16 | End: 2018-08-25 | Stop reason: HOSPADM

## 2018-07-16 RX ORDER — LIDOCAINE HYDROCHLORIDE 20 MG/ML
10 INJECTION, SOLUTION INFILTRATION; PERINEURAL ONCE
Status: COMPLETED | OUTPATIENT
Start: 2018-07-16 | End: 2018-07-16

## 2018-07-16 RX ORDER — CEFTRIAXONE SODIUM 2 G/50ML
2 INJECTION, SOLUTION INTRAVENOUS EVERY 12 HOURS SCHEDULED
Status: DISCONTINUED | OUTPATIENT
Start: 2018-07-16 | End: 2018-07-17 | Stop reason: ALTCHOICE

## 2018-07-16 RX ORDER — FAMOTIDINE 10 MG/ML
20 INJECTION, SOLUTION INTRAVENOUS EVERY 12 HOURS SCHEDULED
Status: DISCONTINUED | OUTPATIENT
Start: 2018-07-16 | End: 2018-07-26

## 2018-07-16 RX ORDER — FAMOTIDINE 20 MG/1
20 TABLET, FILM COATED ORAL 2 TIMES DAILY
Status: DISCONTINUED | OUTPATIENT
Start: 2018-07-16 | End: 2018-07-16

## 2018-07-16 RX ADMIN — PROPOFOL 70 MCG/KG/MIN: 10 INJECTION, EMULSION INTRAVENOUS at 09:26

## 2018-07-16 RX ADMIN — THIAMINE HYDROCHLORIDE: 100 INJECTION, SOLUTION INTRAMUSCULAR; INTRAVENOUS at 21:45

## 2018-07-16 RX ADMIN — ENOXAPARIN SODIUM 40 MG: 100 INJECTION SUBCUTANEOUS at 08:47

## 2018-07-16 RX ADMIN — IPRATROPIUM BROMIDE AND ALBUTEROL SULFATE 3 ML: 2.5; .5 SOLUTION RESPIRATORY (INHALATION) at 19:37

## 2018-07-16 RX ADMIN — NOREPINEPHRINE BITARTRATE 0.02 MCG/KG/MIN: 8 SOLUTION at 10:42

## 2018-07-16 RX ADMIN — SODIUM BICARBONATE 50 MEQ: 84 INJECTION INTRAVENOUS at 07:52

## 2018-07-16 RX ADMIN — FOLIC ACID 1 MG: 5 INJECTION, SOLUTION INTRAMUSCULAR; INTRAVENOUS; SUBCUTANEOUS at 15:57

## 2018-07-16 RX ADMIN — TAZOBACTAM SODIUM AND PIPERACILLIN SODIUM 4.5 G: 500; 4 INJECTION, SOLUTION INTRAVENOUS at 08:48

## 2018-07-16 RX ADMIN — CEFTRIAXONE SODIUM 2 G: 2 INJECTION, SOLUTION INTRAVENOUS at 20:31

## 2018-07-16 RX ADMIN — METRONIDAZOLE 500 MG: 500 INJECTION, SOLUTION INTRAVENOUS at 20:30

## 2018-07-16 RX ADMIN — LORAZEPAM 1 MG: 2 INJECTION INTRAMUSCULAR; INTRAVENOUS at 16:42

## 2018-07-16 RX ADMIN — LORAZEPAM 1 MG: 2 INJECTION INTRAMUSCULAR; INTRAVENOUS at 18:29

## 2018-07-16 RX ADMIN — MAGNESIUM SULFATE IN WATER 4 G: 40 INJECTION, SOLUTION INTRAVENOUS at 07:52

## 2018-07-16 RX ADMIN — VANCOMYCIN HYDROCHLORIDE 1750 MG: 10 INJECTION, POWDER, LYOPHILIZED, FOR SOLUTION INTRAVENOUS at 14:30

## 2018-07-16 RX ADMIN — CEFTRIAXONE SODIUM 2 G: 2 INJECTION, SOLUTION INTRAVENOUS at 10:21

## 2018-07-16 RX ADMIN — ACYCLOVIR SODIUM 465 MG: 50 INJECTION, SOLUTION INTRAVENOUS at 11:24

## 2018-07-16 RX ADMIN — VECURONIUM BROMIDE 10 MG: 1 INJECTION, POWDER, LYOPHILIZED, FOR SOLUTION INTRAVENOUS at 11:29

## 2018-07-16 RX ADMIN — SODIUM CHLORIDE, POTASSIUM CHLORIDE, SODIUM LACTATE AND CALCIUM CHLORIDE 150 ML/HR: 600; 310; 30; 20 INJECTION, SOLUTION INTRAVENOUS at 15:44

## 2018-07-16 RX ADMIN — PROPOFOL 65 MCG/KG/MIN: 10 INJECTION, EMULSION INTRAVENOUS at 15:45

## 2018-07-16 RX ADMIN — CHLORHEXIDINE GLUCONATE 15 ML: 1.2 RINSE ORAL at 21:31

## 2018-07-16 RX ADMIN — LORAZEPAM 1 MG: 2 INJECTION INTRAMUSCULAR; INTRAVENOUS at 21:32

## 2018-07-16 RX ADMIN — LORAZEPAM 4 MG: 2 INJECTION INTRAMUSCULAR; INTRAVENOUS at 08:31

## 2018-07-16 RX ADMIN — POTASSIUM CHLORIDE AND SODIUM CHLORIDE 125 ML/HR: 900; 150 INJECTION, SOLUTION INTRAVENOUS at 07:48

## 2018-07-16 RX ADMIN — POTASSIUM PHOSPHATE, MONOBASIC AND POTASSIUM PHOSPHATE, DIBASIC 15 MMOL: 224; 236 INJECTION, SOLUTION INTRAVENOUS at 09:59

## 2018-07-16 RX ADMIN — PROPOFOL 30 MCG/KG/MIN: 10 INJECTION, EMULSION INTRAVENOUS at 21:46

## 2018-07-16 RX ADMIN — LEVETIRACETAM 1000 MG: 10 INJECTION INTRAVENOUS at 20:31

## 2018-07-16 RX ADMIN — LEVETIRACETAM 1000 MG: 10 INJECTION INTRAVENOUS at 08:48

## 2018-07-16 RX ADMIN — CHLORHEXIDINE GLUCONATE 15 ML: 1.2 RINSE ORAL at 08:48

## 2018-07-16 RX ADMIN — SODIUM CHLORIDE, POTASSIUM CHLORIDE, SODIUM LACTATE AND CALCIUM CHLORIDE 1000 ML/HR: 600; 310; 30; 20 INJECTION, SOLUTION INTRAVENOUS at 14:31

## 2018-07-16 RX ADMIN — PROPOFOL 65 MCG/KG/MIN: 10 INJECTION, EMULSION INTRAVENOUS at 12:28

## 2018-07-16 RX ADMIN — THIAMINE HYDROCHLORIDE 200 MG: 100 INJECTION, SOLUTION INTRAMUSCULAR; INTRAVENOUS at 12:00

## 2018-07-16 RX ADMIN — GADOBENATE DIMEGLUMINE 13 ML: 529 INJECTION, SOLUTION INTRAVENOUS at 14:30

## 2018-07-16 RX ADMIN — LIDOCAINE HYDROCHLORIDE 10 ML: 20 INJECTION, SOLUTION INFILTRATION; PERINEURAL at 11:35

## 2018-07-16 RX ADMIN — METRONIDAZOLE 500 MG: 500 INJECTION, SOLUTION INTRAVENOUS at 12:02

## 2018-07-16 RX ADMIN — FAMOTIDINE 20 MG: 10 INJECTION INTRAVENOUS at 10:23

## 2018-07-16 RX ADMIN — FAMOTIDINE 20 MG: 10 INJECTION INTRAVENOUS at 21:31

## 2018-07-16 RX ADMIN — ACYCLOVIR SODIUM 465 MG: 50 INJECTION, SOLUTION INTRAVENOUS at 20:32

## 2018-07-17 ENCOUNTER — APPOINTMENT (OUTPATIENT)
Dept: GENERAL RADIOLOGY | Facility: HOSPITAL | Age: 38
End: 2018-07-17

## 2018-07-17 ENCOUNTER — APPOINTMENT (OUTPATIENT)
Dept: NEUROLOGY | Facility: HOSPITAL | Age: 38
End: 2018-07-17
Attending: PSYCHIATRY & NEUROLOGY

## 2018-07-17 LAB
ALBUMIN SERPL-MCNC: 2.96 G/DL (ref 3.2–4.8)
ALBUMIN/GLOB SERPL: 1.4 G/DL (ref 1.5–2.5)
ALP SERPL-CCNC: 82 U/L (ref 25–100)
ALT SERPL W P-5'-P-CCNC: 33 U/L (ref 7–40)
ANION GAP SERPL CALCULATED.3IONS-SCNC: 7 MMOL/L (ref 3–11)
APTT PPP: 55.5 SECONDS (ref 55–70)
ARTERIAL PATENCY WRIST A: ABNORMAL
AST SERPL-CCNC: 108 U/L (ref 0–33)
ATMOSPHERIC PRESS: ABNORMAL MMHG
BASE EXCESS BLDA CALC-SCNC: -1.9 MMOL/L (ref 0–2)
BASOPHILS # BLD AUTO: 0.04 10*3/MM3 (ref 0–0.2)
BASOPHILS NFR BLD AUTO: 0.2 % (ref 0–1)
BDY SITE: ABNORMAL
BILIRUB SERPL-MCNC: 0.4 MG/DL (ref 0.3–1.2)
BUN BLD-MCNC: 8 MG/DL (ref 9–23)
BUN/CREAT SERPL: 14.5 (ref 7–25)
CALCIUM SPEC-SCNC: 7 MG/DL (ref 8.7–10.4)
CHLORIDE SERPL-SCNC: 111 MMOL/L (ref 99–109)
CO2 BLDA-SCNC: 23.5 MMOL/L (ref 22–33)
CO2 SERPL-SCNC: 22 MMOL/L (ref 20–31)
COHGB MFR BLD: 1 % (ref 0–2)
CREAT BLD-MCNC: 0.55 MG/DL (ref 0.6–1.3)
DEPRECATED RDW RBC AUTO: 59.5 FL (ref 37–54)
EOSINOPHIL # BLD AUTO: 0.01 10*3/MM3 (ref 0–0.3)
EOSINOPHIL NFR BLD AUTO: 0 % (ref 0–3)
ERYTHROCYTE [DISTWIDTH] IN BLOOD BY AUTOMATED COUNT: 16.3 % (ref 11.3–14.5)
GFR SERPL CREATININE-BSD FRML MDRD: 124 ML/MIN/1.73
GLOBULIN UR ELPH-MCNC: 2.1 GM/DL
GLUCOSE BLD-MCNC: 142 MG/DL (ref 70–100)
GLUCOSE BLDC GLUCOMTR-MCNC: 105 MG/DL (ref 70–130)
GLUCOSE BLDC GLUCOMTR-MCNC: 116 MG/DL (ref 70–130)
GLUCOSE BLDC GLUCOMTR-MCNC: 118 MG/DL (ref 70–130)
GLUCOSE BLDC GLUCOMTR-MCNC: 87 MG/DL (ref 70–130)
GLUCOSE BLDC GLUCOMTR-MCNC: 96 MG/DL (ref 70–130)
HCO3 BLDA-SCNC: 22.4 MMOL/L (ref 20–26)
HCT VFR BLD AUTO: 33.3 % (ref 34.5–44)
HCT VFR BLD CALC: 31.7 %
HGB BLD-MCNC: 10.4 G/DL (ref 11.5–15.5)
HGB BLDA-MCNC: 10.3 G/DL (ref 14–18)
HOROWITZ INDEX BLD+IHG-RTO: 40 %
IMM GRANULOCYTES # BLD: 0.09 10*3/MM3 (ref 0–0.03)
IMM GRANULOCYTES NFR BLD: 0.4 % (ref 0–0.6)
LYMPHOCYTES # BLD AUTO: 2.13 10*3/MM3 (ref 0.6–4.8)
LYMPHOCYTES NFR BLD AUTO: 9.3 % (ref 24–44)
MAGNESIUM SERPL-MCNC: 2.3 MG/DL (ref 1.3–2.7)
MCH RBC QN AUTO: 31.1 PG (ref 27–31)
MCHC RBC AUTO-ENTMCNC: 31.2 G/DL (ref 32–36)
MCV RBC AUTO: 99.7 FL (ref 80–99)
METHGB BLD QL: 1.2 % (ref 0–1.5)
MODALITY: ABNORMAL
MONOCYTES # BLD AUTO: 1.53 10*3/MM3 (ref 0–1)
MONOCYTES NFR BLD AUTO: 6.7 % (ref 0–12)
NEUTROPHILS # BLD AUTO: 19.1 10*3/MM3 (ref 1.5–8.3)
NEUTROPHILS NFR BLD AUTO: 83.8 % (ref 41–71)
OXYHGB MFR BLDV: 96.8 % (ref 94–99)
PCO2 BLDA: 35.6 MM HG (ref 35–45)
PH BLDA: 7.41 PH UNITS (ref 7.35–7.45)
PHOSPHATE SERPL-MCNC: 2.5 MG/DL (ref 2.4–5.1)
PLATELET # BLD AUTO: 260 10*3/MM3 (ref 150–450)
PMV BLD AUTO: 10.7 FL (ref 6–12)
PO2 BLDA: 119 MM HG (ref 83–108)
POTASSIUM BLD-SCNC: 4 MMOL/L (ref 3.5–5.5)
PROT SERPL-MCNC: 5.1 G/DL (ref 5.7–8.2)
RBC # BLD AUTO: 3.34 10*6/MM3 (ref 3.89–5.14)
SODIUM BLD-SCNC: 140 MMOL/L (ref 132–146)
TROPONIN I SERPL-MCNC: 1.58 NG/ML
TROPONIN I SERPL-MCNC: 2.64 NG/ML
TROPONIN I SERPL-MCNC: 3.29 NG/ML
WBC NRBC COR # BLD: 22.81 10*3/MM3 (ref 3.5–10.8)

## 2018-07-17 PROCEDURE — 82465 ASSAY BLD/SERUM CHOLESTEROL: CPT

## 2018-07-17 PROCEDURE — 25010000003 AMPICILLIN-SULBACTAM PER 1.5 G: Performed by: INTERNAL MEDICINE

## 2018-07-17 PROCEDURE — 99232 SBSQ HOSP IP/OBS MODERATE 35: CPT | Performed by: PSYCHIATRY & NEUROLOGY

## 2018-07-17 PROCEDURE — 25010000003 CEFTRIAXONE PER 250 MG: Performed by: INTERNAL MEDICINE

## 2018-07-17 PROCEDURE — 80053 COMPREHEN METABOLIC PANEL: CPT

## 2018-07-17 PROCEDURE — 94799 UNLISTED PULMONARY SVC/PX: CPT

## 2018-07-17 PROCEDURE — 80053 COMPREHEN METABOLIC PANEL: CPT | Performed by: INTERNAL MEDICINE

## 2018-07-17 PROCEDURE — 99223 1ST HOSP IP/OBS HIGH 75: CPT | Performed by: INTERNAL MEDICINE

## 2018-07-17 PROCEDURE — 95819 EEG AWAKE AND ASLEEP: CPT

## 2018-07-17 PROCEDURE — 25010000002 PROPOFOL 1000 MG/ML EMULSION: Performed by: INTERNAL MEDICINE

## 2018-07-17 PROCEDURE — 93005 ELECTROCARDIOGRAM TRACING: CPT | Performed by: INTERNAL MEDICINE

## 2018-07-17 PROCEDURE — 83735 ASSAY OF MAGNESIUM: CPT | Performed by: INTERNAL MEDICINE

## 2018-07-17 PROCEDURE — 25010000002 VANCOMYCIN PER 500 MG

## 2018-07-17 PROCEDURE — 84478 ASSAY OF TRIGLYCERIDES: CPT

## 2018-07-17 PROCEDURE — 84100 ASSAY OF PHOSPHORUS: CPT

## 2018-07-17 PROCEDURE — 94640 AIRWAY INHALATION TREATMENT: CPT

## 2018-07-17 PROCEDURE — 93010 ELECTROCARDIOGRAM REPORT: CPT | Performed by: INTERNAL MEDICINE

## 2018-07-17 PROCEDURE — 25010000002 HEPARIN (PORCINE) PER 1000 UNITS

## 2018-07-17 PROCEDURE — 85730 THROMBOPLASTIN TIME PARTIAL: CPT

## 2018-07-17 PROCEDURE — 83735 ASSAY OF MAGNESIUM: CPT

## 2018-07-17 PROCEDURE — 84484 ASSAY OF TROPONIN QUANT: CPT | Performed by: INTERNAL MEDICINE

## 2018-07-17 PROCEDURE — 25010000002 LORAZEPAM PER 2 MG: Performed by: INTERNAL MEDICINE

## 2018-07-17 PROCEDURE — 82962 GLUCOSE BLOOD TEST: CPT

## 2018-07-17 PROCEDURE — 85730 THROMBOPLASTIN TIME PARTIAL: CPT | Performed by: INTERNAL MEDICINE

## 2018-07-17 PROCEDURE — 84100 ASSAY OF PHOSPHORUS: CPT | Performed by: INTERNAL MEDICINE

## 2018-07-17 PROCEDURE — 71045 X-RAY EXAM CHEST 1 VIEW: CPT

## 2018-07-17 PROCEDURE — 25010000003 LEVETIRACETAM IN NACL 0.75% 1000 MG/100ML SOLUTION: Performed by: INTERNAL MEDICINE

## 2018-07-17 PROCEDURE — 25010000002 ACYCLOVIR PER 5 MG: Performed by: INTERNAL MEDICINE

## 2018-07-17 PROCEDURE — 25010000002 ENOXAPARIN PER 10 MG: Performed by: NURSE PRACTITIONER

## 2018-07-17 PROCEDURE — 36600 WITHDRAWAL OF ARTERIAL BLOOD: CPT | Performed by: NURSE PRACTITIONER

## 2018-07-17 PROCEDURE — 99291 CRITICAL CARE FIRST HOUR: CPT | Performed by: INTERNAL MEDICINE

## 2018-07-17 PROCEDURE — 82805 BLOOD GASES W/O2 SATURATION: CPT | Performed by: NURSE PRACTITIONER

## 2018-07-17 PROCEDURE — 25010000002 THIAMINE PER 100 MG: Performed by: INTERNAL MEDICINE

## 2018-07-17 PROCEDURE — 85025 COMPLETE CBC W/AUTO DIFF WBC: CPT | Performed by: INTERNAL MEDICINE

## 2018-07-17 RX ORDER — ACETAMINOPHEN 325 MG/1
650 TABLET ORAL EVERY 4 HOURS PRN
Status: DISCONTINUED | OUTPATIENT
Start: 2018-07-17 | End: 2018-08-14

## 2018-07-17 RX ORDER — HEPARIN SODIUM 1000 [USP'U]/ML
3900 INJECTION, SOLUTION INTRAVENOUS; SUBCUTANEOUS ONCE
Status: COMPLETED | OUTPATIENT
Start: 2018-07-17 | End: 2018-07-17

## 2018-07-17 RX ORDER — CLOPIDOGREL BISULFATE 75 MG/1
75 TABLET ORAL DAILY
Status: DISCONTINUED | OUTPATIENT
Start: 2018-07-18 | End: 2018-08-18

## 2018-07-17 RX ORDER — ATORVASTATIN CALCIUM 40 MG/1
80 TABLET, FILM COATED ORAL NIGHTLY
Status: DISCONTINUED | OUTPATIENT
Start: 2018-07-18 | End: 2018-08-18

## 2018-07-17 RX ORDER — ATORVASTATIN CALCIUM 40 MG/1
80 TABLET, FILM COATED ORAL ONCE
Status: COMPLETED | OUTPATIENT
Start: 2018-07-17 | End: 2018-07-17

## 2018-07-17 RX ORDER — NICOTINE 21 MG/24HR
1 PATCH, TRANSDERMAL 24 HOURS TRANSDERMAL EVERY 24 HOURS
Status: DISCONTINUED | OUTPATIENT
Start: 2018-07-17 | End: 2018-07-20

## 2018-07-17 RX ORDER — ASPIRIN 325 MG
325 TABLET ORAL DAILY
Status: DISCONTINUED | OUTPATIENT
Start: 2018-07-17 | End: 2018-07-21

## 2018-07-17 RX ORDER — CLOPIDOGREL BISULFATE 75 MG/1
600 TABLET ORAL ONCE
Status: COMPLETED | OUTPATIENT
Start: 2018-07-17 | End: 2018-07-17

## 2018-07-17 RX ADMIN — IPRATROPIUM BROMIDE AND ALBUTEROL SULFATE 3 ML: 2.5; .5 SOLUTION RESPIRATORY (INHALATION) at 19:31

## 2018-07-17 RX ADMIN — IPRATROPIUM BROMIDE AND ALBUTEROL SULFATE 3 ML: 2.5; .5 SOLUTION RESPIRATORY (INHALATION) at 12:41

## 2018-07-17 RX ADMIN — HEPARIN SODIUM 3900 UNITS: 1000 INJECTION, SOLUTION INTRAVENOUS; SUBCUTANEOUS at 10:46

## 2018-07-17 RX ADMIN — THIAMINE HYDROCHLORIDE: 100 INJECTION, SOLUTION INTRAMUSCULAR; INTRAVENOUS at 22:07

## 2018-07-17 RX ADMIN — ENOXAPARIN SODIUM 40 MG: 100 INJECTION SUBCUTANEOUS at 05:06

## 2018-07-17 RX ADMIN — IPRATROPIUM BROMIDE AND ALBUTEROL SULFATE 3 ML: 2.5; .5 SOLUTION RESPIRATORY (INHALATION) at 00:22

## 2018-07-17 RX ADMIN — LEVETIRACETAM 1000 MG: 10 INJECTION INTRAVENOUS at 21:20

## 2018-07-17 RX ADMIN — AMPICILLIN SODIUM AND SULBACTAM SODIUM 3 G: 2; 1 INJECTION, POWDER, FOR SOLUTION INTRAMUSCULAR; INTRAVENOUS at 17:21

## 2018-07-17 RX ADMIN — ATORVASTATIN CALCIUM 80 MG: 40 TABLET, FILM COATED ORAL at 10:48

## 2018-07-17 RX ADMIN — AMPICILLIN SODIUM AND SULBACTAM SODIUM 3 G: 2; 1 INJECTION, POWDER, FOR SOLUTION INTRAMUSCULAR; INTRAVENOUS at 10:45

## 2018-07-17 RX ADMIN — CHLORHEXIDINE GLUCONATE 15 ML: 1.2 RINSE ORAL at 21:20

## 2018-07-17 RX ADMIN — NOREPINEPHRINE BITARTRATE 0.08 MCG/KG/MIN: 8 SOLUTION at 06:46

## 2018-07-17 RX ADMIN — LEVETIRACETAM 1000 MG: 10 INJECTION INTRAVENOUS at 08:20

## 2018-07-17 RX ADMIN — ASPIRIN 325 MG ORAL TABLET 325 MG: 325 PILL ORAL at 10:47

## 2018-07-17 RX ADMIN — HEPARIN SODIUM 12 UNITS/KG/HR: 10000 INJECTION, SOLUTION INTRAVENOUS at 10:46

## 2018-07-17 RX ADMIN — FAMOTIDINE 20 MG: 10 INJECTION INTRAVENOUS at 21:20

## 2018-07-17 RX ADMIN — CHLORHEXIDINE GLUCONATE 15 ML: 1.2 RINSE ORAL at 08:20

## 2018-07-17 RX ADMIN — CEFTRIAXONE SODIUM 2 G: 2 INJECTION, SOLUTION INTRAVENOUS at 08:20

## 2018-07-17 RX ADMIN — VANCOMYCIN HYDROCHLORIDE 1000 MG: 1 INJECTION, SOLUTION INTRAVENOUS at 02:39

## 2018-07-17 RX ADMIN — FOLIC ACID 1 MG: 5 INJECTION, SOLUTION INTRAMUSCULAR; INTRAVENOUS; SUBCUTANEOUS at 08:20

## 2018-07-17 RX ADMIN — ACYCLOVIR SODIUM 465 MG: 50 INJECTION, SOLUTION INTRAVENOUS at 04:00

## 2018-07-17 RX ADMIN — CLOPIDOGREL BISULFATE 600 MG: 75 TABLET ORAL at 10:45

## 2018-07-17 RX ADMIN — ACETAMINOPHEN 650 MG: 325 TABLET, FILM COATED ORAL at 14:36

## 2018-07-17 RX ADMIN — THIAMINE HYDROCHLORIDE: 100 INJECTION, SOLUTION INTRAMUSCULAR; INTRAVENOUS at 13:05

## 2018-07-17 RX ADMIN — LORAZEPAM 1 MG: 2 INJECTION INTRAMUSCULAR; INTRAVENOUS at 01:50

## 2018-07-17 RX ADMIN — IPRATROPIUM BROMIDE AND ALBUTEROL SULFATE 3 ML: 2.5; .5 SOLUTION RESPIRATORY (INHALATION) at 06:32

## 2018-07-17 RX ADMIN — THIAMINE HYDROCHLORIDE: 100 INJECTION, SOLUTION INTRAMUSCULAR; INTRAVENOUS at 05:06

## 2018-07-17 RX ADMIN — SODIUM PHOSPHATE, MONOBASIC, MONOHYDRATE AND SODIUM PHOSPHATE, DIBASIC, ANHYDROUS 15 MMOL: 276; 142 INJECTION, SOLUTION INTRAVENOUS at 02:16

## 2018-07-17 RX ADMIN — METRONIDAZOLE 500 MG: 500 INJECTION, SOLUTION INTRAVENOUS at 04:00

## 2018-07-17 RX ADMIN — METOPROLOL TARTRATE 12.5 MG: 25 TABLET, FILM COATED ORAL at 10:46

## 2018-07-17 RX ADMIN — NICOTINE 1 PATCH: 21 PATCH, EXTENDED RELEASE TRANSDERMAL at 10:48

## 2018-07-17 RX ADMIN — FAMOTIDINE 20 MG: 10 INJECTION INTRAVENOUS at 08:20

## 2018-07-18 ENCOUNTER — APPOINTMENT (OUTPATIENT)
Dept: GENERAL RADIOLOGY | Facility: HOSPITAL | Age: 38
End: 2018-07-18

## 2018-07-18 LAB
ALBUMIN SERPL-MCNC: 2.85 G/DL (ref 3.2–4.8)
ALBUMIN SERPL-MCNC: 2.9 G/DL (ref 3.2–4.8)
ALBUMIN/GLOB SERPL: 1.3 G/DL (ref 1.5–2.5)
ALP SERPL-CCNC: 74 U/L (ref 25–100)
ALP SERPL-CCNC: 76 U/L (ref 25–100)
ALT SERPL W P-5'-P-CCNC: 34 U/L (ref 7–40)
ALT SERPL W P-5'-P-CCNC: 35 U/L (ref 7–40)
ANION GAP SERPL CALCULATED.3IONS-SCNC: 3 MMOL/L (ref 3–11)
ANION GAP SERPL CALCULATED.3IONS-SCNC: 4 MMOL/L (ref 3–11)
APTT PPP: 44.9 SECONDS (ref 55–70)
APTT PPP: 46.4 SECONDS (ref 55–70)
AST SERPL-CCNC: 85 U/L (ref 0–33)
AST SERPL-CCNC: 99 U/L (ref 0–33)
BACTERIA SPEC RESP CULT: ABNORMAL
BACTERIA SPEC RESP CULT: ABNORMAL
BASOPHILS # BLD AUTO: 0.03 10*3/MM3 (ref 0–0.2)
BASOPHILS NFR BLD AUTO: 0.2 % (ref 0–1)
BILIRUB SERPL-MCNC: 0.4 MG/DL (ref 0.3–1.2)
BILIRUB SERPL-MCNC: 0.6 MG/DL (ref 0.3–1.2)
BNP SERPL-MCNC: 357 PG/ML (ref 0–100)
BUN BLD-MCNC: 6 MG/DL (ref 9–23)
BUN BLD-MCNC: 7 MG/DL (ref 9–23)
BUN/CREAT SERPL: 13.3 (ref 7–25)
CALCIUM SPEC-SCNC: 7.7 MG/DL (ref 8.7–10.4)
CALCIUM SPEC-SCNC: 7.8 MG/DL (ref 8.7–10.4)
CHLORIDE SERPL-SCNC: 107 MMOL/L (ref 99–109)
CHLORIDE SERPL-SCNC: 110 MMOL/L (ref 99–109)
CHOLEST SERPL-MCNC: 134 MG/DL (ref 0–200)
CO2 SERPL-SCNC: 24 MMOL/L (ref 20–31)
CO2 SERPL-SCNC: 24 MMOL/L (ref 20–31)
CREAT BLD-MCNC: 0.43 MG/DL (ref 0.6–1.3)
CREAT BLD-MCNC: 0.45 MG/DL (ref 0.6–1.3)
DEPRECATED RDW RBC AUTO: 59.1 FL (ref 37–54)
EOSINOPHIL # BLD AUTO: 0.04 10*3/MM3 (ref 0–0.3)
EOSINOPHIL NFR BLD AUTO: 0.2 % (ref 0–3)
ERYTHROCYTE [DISTWIDTH] IN BLOOD BY AUTOMATED COUNT: 16.2 % (ref 11.3–14.5)
GFR SERPL CREATININE-BSD FRML MDRD: >150 ML/MIN/1.73
GLOBULIN UR ELPH-MCNC: 2.3 GM/DL
GLUCOSE BLD-MCNC: 112 MG/DL (ref 70–100)
GLUCOSE BLD-MCNC: 136 MG/DL (ref 70–100)
GLUCOSE BLDC GLUCOMTR-MCNC: 102 MG/DL (ref 70–130)
GLUCOSE BLDC GLUCOMTR-MCNC: 103 MG/DL (ref 70–130)
GLUCOSE BLDC GLUCOMTR-MCNC: 108 MG/DL (ref 70–130)
GLUCOSE BLDC GLUCOMTR-MCNC: 115 MG/DL (ref 70–130)
GRAM STN SPEC: ABNORMAL
HCT VFR BLD AUTO: 29.3 % (ref 34.5–44)
HCV RNA SERPL NAA+PROBE-ACNC: NORMAL IU/ML
HCV RNA SERPL NAA+PROBE-LOG IU: 6.34 LOG10 IU/ML
HGB BLD-MCNC: 9.3 G/DL (ref 11.5–15.5)
IMM GRANULOCYTES # BLD: 0.06 10*3/MM3 (ref 0–0.03)
IMM GRANULOCYTES NFR BLD: 0.4 % (ref 0–0.6)
LYMPHOCYTES # BLD AUTO: 2.26 10*3/MM3 (ref 0.6–4.8)
LYMPHOCYTES NFR BLD AUTO: 13.7 % (ref 24–44)
MAGNESIUM SERPL-MCNC: 2 MG/DL (ref 1.3–2.7)
MAGNESIUM SERPL-MCNC: 2.1 MG/DL (ref 1.3–2.7)
MCH RBC QN AUTO: 31.4 PG (ref 27–31)
MCHC RBC AUTO-ENTMCNC: 31.7 G/DL (ref 32–36)
MCV RBC AUTO: 99 FL (ref 80–99)
MONOCYTES # BLD AUTO: 1.31 10*3/MM3 (ref 0–1)
MONOCYTES NFR BLD AUTO: 7.9 % (ref 0–12)
NEUTROPHILS # BLD AUTO: 12.84 10*3/MM3 (ref 1.5–8.3)
NEUTROPHILS NFR BLD AUTO: 78 % (ref 41–71)
PHOSPHATE SERPL-MCNC: 1.1 MG/DL (ref 2.4–5.1)
PHOSPHATE SERPL-MCNC: 1.5 MG/DL (ref 2.4–5.1)
PHOSPHATE SERPL-MCNC: 2.1 MG/DL (ref 2.4–5.1)
PLATELET # BLD AUTO: 195 10*3/MM3 (ref 150–450)
PMV BLD AUTO: 10.3 FL (ref 6–12)
POTASSIUM BLD-SCNC: 3.7 MMOL/L (ref 3.5–5.5)
POTASSIUM BLD-SCNC: 3.8 MMOL/L (ref 3.5–5.5)
PROCALCITONIN SERPL-MCNC: 0.14 NG/ML
PROT SERPL-MCNC: 5.1 G/DL (ref 5.7–8.2)
PROT SERPL-MCNC: 5.4 G/DL (ref 5.7–8.2)
RBC # BLD AUTO: 2.96 10*6/MM3 (ref 3.89–5.14)
SODIUM BLD-SCNC: 135 MMOL/L (ref 132–146)
SODIUM BLD-SCNC: 137 MMOL/L (ref 132–146)
STREP GROUPING: ABNORMAL
TEST INFORMATION: NORMAL
TRIGL SERPL-MCNC: 123 MG/DL (ref 0–150)
TROPONIN I SERPL-MCNC: 0.69 NG/ML
TROPONIN I SERPL-MCNC: 1.05 NG/ML
WBC NRBC COR # BLD: 16.48 10*3/MM3 (ref 3.5–10.8)

## 2018-07-18 PROCEDURE — 93005 ELECTROCARDIOGRAM TRACING: CPT | Performed by: INTERNAL MEDICINE

## 2018-07-18 PROCEDURE — 83735 ASSAY OF MAGNESIUM: CPT | Performed by: INTERNAL MEDICINE

## 2018-07-18 PROCEDURE — 84100 ASSAY OF PHOSPHORUS: CPT | Performed by: INTERNAL MEDICINE

## 2018-07-18 PROCEDURE — 94640 AIRWAY INHALATION TREATMENT: CPT

## 2018-07-18 PROCEDURE — 99291 CRITICAL CARE FIRST HOUR: CPT | Performed by: INTERNAL MEDICINE

## 2018-07-18 PROCEDURE — 94799 UNLISTED PULMONARY SVC/PX: CPT

## 2018-07-18 PROCEDURE — 25010000002 ENOXAPARIN PER 10 MG

## 2018-07-18 PROCEDURE — 25010000002 THIAMINE PER 100 MG: Performed by: INTERNAL MEDICINE

## 2018-07-18 PROCEDURE — 82962 GLUCOSE BLOOD TEST: CPT

## 2018-07-18 PROCEDURE — 85025 COMPLETE CBC W/AUTO DIFF WBC: CPT | Performed by: INTERNAL MEDICINE

## 2018-07-18 PROCEDURE — 71045 X-RAY EXAM CHEST 1 VIEW: CPT

## 2018-07-18 PROCEDURE — 94003 VENT MGMT INPAT SUBQ DAY: CPT

## 2018-07-18 PROCEDURE — 83880 ASSAY OF NATRIURETIC PEPTIDE: CPT | Performed by: INTERNAL MEDICINE

## 2018-07-18 PROCEDURE — 25010000002 LORAZEPAM PER 2 MG: Performed by: INTERNAL MEDICINE

## 2018-07-18 PROCEDURE — 85730 THROMBOPLASTIN TIME PARTIAL: CPT | Performed by: INTERNAL MEDICINE

## 2018-07-18 PROCEDURE — 99232 SBSQ HOSP IP/OBS MODERATE 35: CPT | Performed by: INTERNAL MEDICINE

## 2018-07-18 PROCEDURE — 84145 PROCALCITONIN (PCT): CPT | Performed by: INTERNAL MEDICINE

## 2018-07-18 PROCEDURE — 25010000003 LEVETIRACETAM IN NACL 0.75% 1000 MG/100ML SOLUTION: Performed by: INTERNAL MEDICINE

## 2018-07-18 PROCEDURE — 25010000002 HEPARIN (PORCINE) PER 1000 UNITS: Performed by: INTERNAL MEDICINE

## 2018-07-18 PROCEDURE — 80053 COMPREHEN METABOLIC PANEL: CPT | Performed by: INTERNAL MEDICINE

## 2018-07-18 PROCEDURE — 93010 ELECTROCARDIOGRAM REPORT: CPT | Performed by: INTERNAL MEDICINE

## 2018-07-18 PROCEDURE — 84484 ASSAY OF TROPONIN QUANT: CPT | Performed by: INTERNAL MEDICINE

## 2018-07-18 PROCEDURE — 25010000003 AMPICILLIN-SULBACTAM PER 1.5 G: Performed by: INTERNAL MEDICINE

## 2018-07-18 RX ORDER — HEPARIN SODIUM 1000 [USP'U]/ML
2000 INJECTION, SOLUTION INTRAVENOUS; SUBCUTANEOUS ONCE
Status: COMPLETED | OUTPATIENT
Start: 2018-07-18 | End: 2018-07-18

## 2018-07-18 RX ORDER — OXYMETAZOLINE HYDROCHLORIDE 0.05 G/100ML
2 SPRAY NASAL ONCE
Status: COMPLETED | OUTPATIENT
Start: 2018-07-18 | End: 2018-07-18

## 2018-07-18 RX ADMIN — CHLORHEXIDINE GLUCONATE 15 ML: 1.2 RINSE ORAL at 21:26

## 2018-07-18 RX ADMIN — OXYMETAZOLINE HYDROCHLORIDE 2 SPRAY: 5 SPRAY NASAL at 15:30

## 2018-07-18 RX ADMIN — AMPICILLIN SODIUM AND SULBACTAM SODIUM 3 G: 2; 1 INJECTION, POWDER, FOR SOLUTION INTRAMUSCULAR; INTRAVENOUS at 05:48

## 2018-07-18 RX ADMIN — IPRATROPIUM BROMIDE AND ALBUTEROL SULFATE 3 ML: 2.5; .5 SOLUTION RESPIRATORY (INHALATION) at 18:47

## 2018-07-18 RX ADMIN — THIAMINE HYDROCHLORIDE: 100 INJECTION, SOLUTION INTRAMUSCULAR; INTRAVENOUS at 05:48

## 2018-07-18 RX ADMIN — ENOXAPARIN SODIUM 70 MG: 80 INJECTION SUBCUTANEOUS at 11:31

## 2018-07-18 RX ADMIN — CLOPIDOGREL BISULFATE 75 MG: 75 TABLET ORAL at 08:05

## 2018-07-18 RX ADMIN — LORAZEPAM 1 MG: 2 INJECTION INTRAMUSCULAR; INTRAVENOUS at 01:15

## 2018-07-18 RX ADMIN — POTASSIUM PHOSPHATE, MONOBASIC AND POTASSIUM PHOSPHATE, DIBASIC 45 MMOL: 224; 236 INJECTION, SOLUTION INTRAVENOUS at 08:42

## 2018-07-18 RX ADMIN — FOLIC ACID 1 MG: 5 INJECTION, SOLUTION INTRAMUSCULAR; INTRAVENOUS; SUBCUTANEOUS at 08:04

## 2018-07-18 RX ADMIN — LORAZEPAM 1 MG: 2 INJECTION INTRAMUSCULAR; INTRAVENOUS at 17:33

## 2018-07-18 RX ADMIN — AMPICILLIN SODIUM AND SULBACTAM SODIUM 3 G: 2; 1 INJECTION, POWDER, FOR SOLUTION INTRAMUSCULAR; INTRAVENOUS at 00:00

## 2018-07-18 RX ADMIN — METOPROLOL TARTRATE 12.5 MG: 25 TABLET, FILM COATED ORAL at 21:27

## 2018-07-18 RX ADMIN — AMPICILLIN SODIUM AND SULBACTAM SODIUM 3 G: 2; 1 INJECTION, POWDER, FOR SOLUTION INTRAMUSCULAR; INTRAVENOUS at 17:03

## 2018-07-18 RX ADMIN — METOPROLOL TARTRATE 12.5 MG: 25 TABLET, FILM COATED ORAL at 08:04

## 2018-07-18 RX ADMIN — ASPIRIN 325 MG ORAL TABLET 325 MG: 325 PILL ORAL at 08:04

## 2018-07-18 RX ADMIN — THIAMINE HYDROCHLORIDE: 100 INJECTION, SOLUTION INTRAMUSCULAR; INTRAVENOUS at 14:03

## 2018-07-18 RX ADMIN — IPRATROPIUM BROMIDE AND ALBUTEROL SULFATE 3 ML: 2.5; .5 SOLUTION RESPIRATORY (INHALATION) at 00:25

## 2018-07-18 RX ADMIN — IPRATROPIUM BROMIDE AND ALBUTEROL SULFATE 3 ML: 2.5; .5 SOLUTION RESPIRATORY (INHALATION) at 12:02

## 2018-07-18 RX ADMIN — LORAZEPAM 1 MG: 2 INJECTION INTRAMUSCULAR; INTRAVENOUS at 22:27

## 2018-07-18 RX ADMIN — CHLORHEXIDINE GLUCONATE 15 ML: 1.2 RINSE ORAL at 08:05

## 2018-07-18 RX ADMIN — ATORVASTATIN CALCIUM 80 MG: 40 TABLET, FILM COATED ORAL at 21:26

## 2018-07-18 RX ADMIN — FAMOTIDINE 20 MG: 10 INJECTION INTRAVENOUS at 08:05

## 2018-07-18 RX ADMIN — LEVETIRACETAM 1000 MG: 10 INJECTION INTRAVENOUS at 08:04

## 2018-07-18 RX ADMIN — FAMOTIDINE 20 MG: 10 INJECTION INTRAVENOUS at 21:26

## 2018-07-18 RX ADMIN — HEPARIN SODIUM 2000 UNITS: 1000 INJECTION, SOLUTION INTRAVENOUS; SUBCUTANEOUS at 01:59

## 2018-07-18 RX ADMIN — IPRATROPIUM BROMIDE AND ALBUTEROL SULFATE 3 ML: 2.5; .5 SOLUTION RESPIRATORY (INHALATION) at 06:20

## 2018-07-18 RX ADMIN — AMPICILLIN SODIUM AND SULBACTAM SODIUM 3 G: 2; 1 INJECTION, POWDER, FOR SOLUTION INTRAMUSCULAR; INTRAVENOUS at 11:41

## 2018-07-18 RX ADMIN — NICOTINE 1 PATCH: 21 PATCH, EXTENDED RELEASE TRANSDERMAL at 11:30

## 2018-07-19 ENCOUNTER — APPOINTMENT (OUTPATIENT)
Dept: GENERAL RADIOLOGY | Facility: HOSPITAL | Age: 38
End: 2018-07-19

## 2018-07-19 ENCOUNTER — APPOINTMENT (OUTPATIENT)
Dept: NEUROLOGY | Facility: HOSPITAL | Age: 38
End: 2018-07-19
Attending: INTERNAL MEDICINE

## 2018-07-19 LAB
ALBUMIN SERPL-MCNC: 2.69 G/DL (ref 3.2–4.8)
ALBUMIN/GLOB SERPL: 1.3 G/DL (ref 1.5–2.5)
ALP SERPL-CCNC: 75 U/L (ref 25–100)
ALT SERPL W P-5'-P-CCNC: 29 U/L (ref 7–40)
ANION GAP SERPL CALCULATED.3IONS-SCNC: 6 MMOL/L (ref 3–11)
ARTERIAL PATENCY WRIST A: ABNORMAL
AST SERPL-CCNC: 60 U/L (ref 0–33)
ATMOSPHERIC PRESS: ABNORMAL MMHG
BASE EXCESS BLDA CALC-SCNC: 1.5 MMOL/L (ref 0–2)
BASOPHILS # BLD AUTO: 0.02 10*3/MM3 (ref 0–0.2)
BASOPHILS NFR BLD AUTO: 0.2 % (ref 0–1)
BDY SITE: ABNORMAL
BILIRUB SERPL-MCNC: 0.3 MG/DL (ref 0.3–1.2)
BUN BLD-MCNC: 6 MG/DL (ref 9–23)
BUN/CREAT SERPL: 17.1 (ref 7–25)
CALCIUM SPEC-SCNC: 7.6 MG/DL (ref 8.7–10.4)
CHLORIDE SERPL-SCNC: 108 MMOL/L (ref 99–109)
CO2 BLDA-SCNC: 26.1 MMOL/L (ref 22–33)
CO2 SERPL-SCNC: 23 MMOL/L (ref 20–31)
COHGB MFR BLD: 0.8 % (ref 0–2)
CREAT BLD-MCNC: 0.35 MG/DL (ref 0.6–1.3)
DEPRECATED RDW RBC AUTO: 59.2 FL (ref 37–54)
EOSINOPHIL # BLD AUTO: 0.05 10*3/MM3 (ref 0–0.3)
EOSINOPHIL NFR BLD AUTO: 0.4 % (ref 0–3)
ERYTHROCYTE [DISTWIDTH] IN BLOOD BY AUTOMATED COUNT: 15.8 % (ref 11.3–14.5)
GFR SERPL CREATININE-BSD FRML MDRD: >150 ML/MIN/1.73
GLOBULIN UR ELPH-MCNC: 2.1 GM/DL
GLUCOSE BLD-MCNC: 147 MG/DL (ref 70–100)
GLUCOSE BLDC GLUCOMTR-MCNC: 110 MG/DL (ref 70–130)
HCO3 BLDA-SCNC: 25.1 MMOL/L (ref 20–26)
HCT VFR BLD AUTO: 30.4 % (ref 34.5–44)
HCT VFR BLD CALC: 26.8 %
HGB BLD-MCNC: 9.3 G/DL (ref 11.5–15.5)
HGB BLDA-MCNC: 8.8 G/DL (ref 14–18)
HOROWITZ INDEX BLD+IHG-RTO: 30 %
IMM GRANULOCYTES # BLD: 0.03 10*3/MM3 (ref 0–0.03)
IMM GRANULOCYTES NFR BLD: 0.3 % (ref 0–0.6)
LYMPHOCYTES # BLD AUTO: 1.3 10*3/MM3 (ref 0.6–4.8)
LYMPHOCYTES NFR BLD AUTO: 11.4 % (ref 24–44)
MAGNESIUM SERPL-MCNC: 1.9 MG/DL (ref 1.3–2.7)
MCH RBC QN AUTO: 31.3 PG (ref 27–31)
MCHC RBC AUTO-ENTMCNC: 30.6 G/DL (ref 32–36)
MCV RBC AUTO: 102.4 FL (ref 80–99)
METHGB BLD QL: 1 % (ref 0–1.5)
MODALITY: ABNORMAL
MONOCYTES # BLD AUTO: 0.94 10*3/MM3 (ref 0–1)
MONOCYTES NFR BLD AUTO: 8.2 % (ref 0–12)
NEUTROPHILS # BLD AUTO: 9.1 10*3/MM3 (ref 1.5–8.3)
NEUTROPHILS NFR BLD AUTO: 79.5 % (ref 41–71)
OXYHGB MFR BLDV: 96.8 % (ref 94–99)
PCO2 BLDA: 34.3 MM HG (ref 35–45)
PH BLDA: 7.47 PH UNITS (ref 7.35–7.45)
PHOSPHATE SERPL-MCNC: 2.5 MG/DL (ref 2.4–5.1)
PLATELET # BLD AUTO: 192 10*3/MM3 (ref 150–450)
PMV BLD AUTO: 10 FL (ref 6–12)
PO2 BLDA: 98.6 MM HG (ref 83–108)
POTASSIUM BLD-SCNC: 3.8 MMOL/L (ref 3.5–5.5)
PROT SERPL-MCNC: 4.8 G/DL (ref 5.7–8.2)
RBC # BLD AUTO: 2.97 10*6/MM3 (ref 3.89–5.14)
REAGIN AB CSF QL: NON REACTIVE
SODIUM BLD-SCNC: 137 MMOL/L (ref 132–146)
WBC NRBC COR # BLD: 11.44 10*3/MM3 (ref 3.5–10.8)

## 2018-07-19 PROCEDURE — 25010000002 ENOXAPARIN PER 10 MG

## 2018-07-19 PROCEDURE — 94799 UNLISTED PULMONARY SVC/PX: CPT

## 2018-07-19 PROCEDURE — 80053 COMPREHEN METABOLIC PANEL: CPT | Performed by: INTERNAL MEDICINE

## 2018-07-19 PROCEDURE — 36600 WITHDRAWAL OF ARTERIAL BLOOD: CPT | Performed by: NURSE PRACTITIONER

## 2018-07-19 PROCEDURE — 25010000002 THIAMINE PER 100 MG: Performed by: INTERNAL MEDICINE

## 2018-07-19 PROCEDURE — 82805 BLOOD GASES W/O2 SATURATION: CPT | Performed by: NURSE PRACTITIONER

## 2018-07-19 PROCEDURE — 82962 GLUCOSE BLOOD TEST: CPT

## 2018-07-19 PROCEDURE — 71045 X-RAY EXAM CHEST 1 VIEW: CPT

## 2018-07-19 PROCEDURE — 99291 CRITICAL CARE FIRST HOUR: CPT | Performed by: INTERNAL MEDICINE

## 2018-07-19 PROCEDURE — 25010000003 LEVETIRACETAM IN NACL 0.75% 1000 MG/100ML SOLUTION: Performed by: INTERNAL MEDICINE

## 2018-07-19 PROCEDURE — 94003 VENT MGMT INPAT SUBQ DAY: CPT

## 2018-07-19 PROCEDURE — 95951 HC EEG WITH VIDEO RECORDING EACH 24 HRS: CPT

## 2018-07-19 PROCEDURE — 84100 ASSAY OF PHOSPHORUS: CPT | Performed by: INTERNAL MEDICINE

## 2018-07-19 PROCEDURE — 94640 AIRWAY INHALATION TREATMENT: CPT

## 2018-07-19 PROCEDURE — 25010000003 AMPICILLIN-SULBACTAM PER 1.5 G: Performed by: INTERNAL MEDICINE

## 2018-07-19 PROCEDURE — 83735 ASSAY OF MAGNESIUM: CPT | Performed by: INTERNAL MEDICINE

## 2018-07-19 PROCEDURE — 25010000002 LORAZEPAM PER 2 MG: Performed by: INTERNAL MEDICINE

## 2018-07-19 PROCEDURE — 85025 COMPLETE CBC W/AUTO DIFF WBC: CPT | Performed by: INTERNAL MEDICINE

## 2018-07-19 RX ORDER — LORAZEPAM 2 MG/ML
2 INJECTION INTRAMUSCULAR
Status: DISCONTINUED | OUTPATIENT
Start: 2018-07-19 | End: 2018-07-19

## 2018-07-19 RX ORDER — LORAZEPAM 2 MG/ML
2 INJECTION INTRAMUSCULAR
Status: DISCONTINUED | OUTPATIENT
Start: 2018-07-19 | End: 2018-07-23

## 2018-07-19 RX ADMIN — CHLORHEXIDINE GLUCONATE 15 ML: 1.2 RINSE ORAL at 09:27

## 2018-07-19 RX ADMIN — LEVETIRACETAM 1000 MG: 10 INJECTION INTRAVENOUS at 00:23

## 2018-07-19 RX ADMIN — LEVETIRACETAM 1000 MG: 10 INJECTION INTRAVENOUS at 09:34

## 2018-07-19 RX ADMIN — THIAMINE HYDROCHLORIDE: 100 INJECTION, SOLUTION INTRAMUSCULAR; INTRAVENOUS at 00:23

## 2018-07-19 RX ADMIN — LORAZEPAM 1 MG: 2 INJECTION INTRAMUSCULAR; INTRAVENOUS at 11:28

## 2018-07-19 RX ADMIN — LEVETIRACETAM 1000 MG: 10 INJECTION INTRAVENOUS at 20:12

## 2018-07-19 RX ADMIN — THIAMINE HYDROCHLORIDE: 100 INJECTION, SOLUTION INTRAMUSCULAR; INTRAVENOUS at 05:48

## 2018-07-19 RX ADMIN — FAMOTIDINE 20 MG: 10 INJECTION INTRAVENOUS at 09:27

## 2018-07-19 RX ADMIN — IPRATROPIUM BROMIDE AND ALBUTEROL SULFATE 3 ML: 2.5; .5 SOLUTION RESPIRATORY (INHALATION) at 00:05

## 2018-07-19 RX ADMIN — IPRATROPIUM BROMIDE AND ALBUTEROL SULFATE 3 ML: 2.5; .5 SOLUTION RESPIRATORY (INHALATION) at 18:53

## 2018-07-19 RX ADMIN — METOPROLOL TARTRATE 12.5 MG: 25 TABLET, FILM COATED ORAL at 20:12

## 2018-07-19 RX ADMIN — AMPICILLIN SODIUM AND SULBACTAM SODIUM 3 G: 2; 1 INJECTION, POWDER, FOR SOLUTION INTRAMUSCULAR; INTRAVENOUS at 12:24

## 2018-07-19 RX ADMIN — IPRATROPIUM BROMIDE AND ALBUTEROL SULFATE 3 ML: 2.5; .5 SOLUTION RESPIRATORY (INHALATION) at 07:55

## 2018-07-19 RX ADMIN — ASPIRIN 325 MG ORAL TABLET 325 MG: 325 PILL ORAL at 09:27

## 2018-07-19 RX ADMIN — LORAZEPAM 1 MG: 2 INJECTION INTRAMUSCULAR; INTRAVENOUS at 00:19

## 2018-07-19 RX ADMIN — IPRATROPIUM BROMIDE AND ALBUTEROL SULFATE 3 ML: 2.5; .5 SOLUTION RESPIRATORY (INHALATION) at 13:04

## 2018-07-19 RX ADMIN — AMPICILLIN SODIUM AND SULBACTAM SODIUM 3 G: 2; 1 INJECTION, POWDER, FOR SOLUTION INTRAMUSCULAR; INTRAVENOUS at 18:01

## 2018-07-19 RX ADMIN — AMPICILLIN SODIUM AND SULBACTAM SODIUM 3 G: 2; 1 INJECTION, POWDER, FOR SOLUTION INTRAMUSCULAR; INTRAVENOUS at 00:24

## 2018-07-19 RX ADMIN — NICOTINE 1 PATCH: 21 PATCH, EXTENDED RELEASE TRANSDERMAL at 10:26

## 2018-07-19 RX ADMIN — AMPICILLIN SODIUM AND SULBACTAM SODIUM 3 G: 2; 1 INJECTION, POWDER, FOR SOLUTION INTRAMUSCULAR; INTRAVENOUS at 05:48

## 2018-07-19 RX ADMIN — ENOXAPARIN SODIUM 70 MG: 80 INJECTION SUBCUTANEOUS at 09:34

## 2018-07-19 RX ADMIN — ENOXAPARIN SODIUM 70 MG: 80 INJECTION SUBCUTANEOUS at 20:12

## 2018-07-19 RX ADMIN — CLOPIDOGREL BISULFATE 75 MG: 75 TABLET ORAL at 09:27

## 2018-07-19 RX ADMIN — POTASSIUM PHOSPHATE, MONOBASIC AND POTASSIUM PHOSPHATE, DIBASIC 15 MMOL: 224; 236 INJECTION, SOLUTION INTRAVENOUS at 10:22

## 2018-07-19 RX ADMIN — METOPROLOL TARTRATE 12.5 MG: 25 TABLET, FILM COATED ORAL at 09:27

## 2018-07-19 RX ADMIN — FOLIC ACID 1 MG: 5 INJECTION, SOLUTION INTRAMUSCULAR; INTRAVENOUS; SUBCUTANEOUS at 10:22

## 2018-07-19 RX ADMIN — ATORVASTATIN CALCIUM 80 MG: 40 TABLET, FILM COATED ORAL at 20:12

## 2018-07-19 RX ADMIN — FAMOTIDINE 20 MG: 10 INJECTION INTRAVENOUS at 20:13

## 2018-07-19 RX ADMIN — CHLORHEXIDINE GLUCONATE 15 ML: 1.2 RINSE ORAL at 20:12

## 2018-07-19 RX ADMIN — MAGNESIUM SULFATE IN WATER 4 G: 40 INJECTION, SOLUTION INTRAVENOUS at 05:48

## 2018-07-20 ENCOUNTER — APPOINTMENT (OUTPATIENT)
Dept: GENERAL RADIOLOGY | Facility: HOSPITAL | Age: 38
End: 2018-07-20

## 2018-07-20 ENCOUNTER — APPOINTMENT (OUTPATIENT)
Dept: MRI IMAGING | Facility: HOSPITAL | Age: 38
End: 2018-07-20

## 2018-07-20 LAB
ALBUMIN SERPL-MCNC: 3.03 G/DL (ref 3.2–4.8)
ALBUMIN/GLOB SERPL: 1.2 G/DL (ref 1.5–2.5)
ALP SERPL-CCNC: 75 U/L (ref 25–100)
ALT SERPL W P-5'-P-CCNC: 33 U/L (ref 7–40)
ANION GAP SERPL CALCULATED.3IONS-SCNC: 6 MMOL/L (ref 3–11)
ARTERIAL PATENCY WRIST A: POSITIVE
AST SERPL-CCNC: 51 U/L (ref 0–33)
ATMOSPHERIC PRESS: ABNORMAL MMHG
BASE EXCESS BLDA CALC-SCNC: 0.5 MMOL/L (ref 0–2)
BASOPHILS # BLD AUTO: 0.03 10*3/MM3 (ref 0–0.2)
BASOPHILS NFR BLD AUTO: 0.3 % (ref 0–1)
BDY SITE: ABNORMAL
BILIRUB SERPL-MCNC: 0.4 MG/DL (ref 0.3–1.2)
BUN BLD-MCNC: 6 MG/DL (ref 9–23)
BUN/CREAT SERPL: 14.6 (ref 7–25)
CALCIUM SPEC-SCNC: 8.1 MG/DL (ref 8.7–10.4)
CHLORIDE SERPL-SCNC: 108 MMOL/L (ref 99–109)
CO2 BLDA-SCNC: 25.3 MMOL/L (ref 22–33)
CO2 SERPL-SCNC: 25 MMOL/L (ref 20–31)
COHGB MFR BLD: 0.8 % (ref 0–2)
CREAT BLD-MCNC: 0.41 MG/DL (ref 0.6–1.3)
DEPRECATED RDW RBC AUTO: 57.8 FL (ref 37–54)
EOSINOPHIL # BLD AUTO: 0.25 10*3/MM3 (ref 0–0.3)
EOSINOPHIL NFR BLD AUTO: 2.7 % (ref 0–3)
ERYTHROCYTE [DISTWIDTH] IN BLOOD BY AUTOMATED COUNT: 15.8 % (ref 11.3–14.5)
GFR SERPL CREATININE-BSD FRML MDRD: >150 ML/MIN/1.73
GLOBULIN UR ELPH-MCNC: 2.5 GM/DL
GLUCOSE BLD-MCNC: 108 MG/DL (ref 70–100)
HCO3 BLDA-SCNC: 24.2 MMOL/L (ref 20–26)
HCT VFR BLD AUTO: 28.5 % (ref 34.5–44)
HCT VFR BLD CALC: 28 %
HGB BLD-MCNC: 9.1 G/DL (ref 11.5–15.5)
HGB BLDA-MCNC: 9.1 G/DL (ref 14–18)
HOROWITZ INDEX BLD+IHG-RTO: 30 %
IMM GRANULOCYTES # BLD: 0.02 10*3/MM3 (ref 0–0.03)
IMM GRANULOCYTES NFR BLD: 0.2 % (ref 0–0.6)
LYMPHOCYTES # BLD AUTO: 2.16 10*3/MM3 (ref 0.6–4.8)
LYMPHOCYTES NFR BLD AUTO: 23.4 % (ref 24–44)
MAGNESIUM SERPL-MCNC: 2.1 MG/DL (ref 1.3–2.7)
MCH RBC QN AUTO: 31.8 PG (ref 27–31)
MCHC RBC AUTO-ENTMCNC: 31.9 G/DL (ref 32–36)
MCV RBC AUTO: 99.7 FL (ref 80–99)
METHGB BLD QL: 1 % (ref 0–1.5)
MODALITY: ABNORMAL
MONOCYTES # BLD AUTO: 0.91 10*3/MM3 (ref 0–1)
MONOCYTES NFR BLD AUTO: 9.8 % (ref 0–12)
NEUTROPHILS # BLD AUTO: 5.9 10*3/MM3 (ref 1.5–8.3)
NEUTROPHILS NFR BLD AUTO: 63.8 % (ref 41–71)
OXYHGB MFR BLDV: 96 % (ref 94–99)
PCO2 BLDA: 34.3 MM HG (ref 35–45)
PH BLDA: 7.46 PH UNITS (ref 7.35–7.45)
PHOSPHATE SERPL-MCNC: 3 MG/DL (ref 2.4–5.1)
PLATELET # BLD AUTO: 228 10*3/MM3 (ref 150–450)
PMV BLD AUTO: 10.3 FL (ref 6–12)
PO2 BLDA: 92.5 MM HG (ref 83–108)
POTASSIUM BLD-SCNC: 3.6 MMOL/L (ref 3.5–5.5)
PROT SERPL-MCNC: 5.5 G/DL (ref 5.7–8.2)
RBC # BLD AUTO: 2.86 10*6/MM3 (ref 3.89–5.14)
SODIUM BLD-SCNC: 139 MMOL/L (ref 132–146)
WBC NRBC COR # BLD: 9.25 10*3/MM3 (ref 3.5–10.8)

## 2018-07-20 PROCEDURE — 25010000003 LEVETIRACETAM IN NACL 0.75% 1000 MG/100ML SOLUTION: Performed by: INTERNAL MEDICINE

## 2018-07-20 PROCEDURE — 80053 COMPREHEN METABOLIC PANEL: CPT | Performed by: INTERNAL MEDICINE

## 2018-07-20 PROCEDURE — 25010000003 AMPICILLIN-SULBACTAM PER 1.5 G: Performed by: INTERNAL MEDICINE

## 2018-07-20 PROCEDURE — 71045 X-RAY EXAM CHEST 1 VIEW: CPT

## 2018-07-20 PROCEDURE — 36600 WITHDRAWAL OF ARTERIAL BLOOD: CPT | Performed by: INTERNAL MEDICINE

## 2018-07-20 PROCEDURE — 85025 COMPLETE CBC W/AUTO DIFF WBC: CPT | Performed by: INTERNAL MEDICINE

## 2018-07-20 PROCEDURE — 94640 AIRWAY INHALATION TREATMENT: CPT

## 2018-07-20 PROCEDURE — 84100 ASSAY OF PHOSPHORUS: CPT | Performed by: INTERNAL MEDICINE

## 2018-07-20 PROCEDURE — 74018 RADEX ABDOMEN 1 VIEW: CPT

## 2018-07-20 PROCEDURE — 94003 VENT MGMT INPAT SUBQ DAY: CPT

## 2018-07-20 PROCEDURE — 25010000002 ENOXAPARIN PER 10 MG

## 2018-07-20 PROCEDURE — 82805 BLOOD GASES W/O2 SATURATION: CPT | Performed by: INTERNAL MEDICINE

## 2018-07-20 PROCEDURE — 94799 UNLISTED PULMONARY SVC/PX: CPT

## 2018-07-20 PROCEDURE — 25010000002 LORAZEPAM PER 2 MG: Performed by: INTERNAL MEDICINE

## 2018-07-20 PROCEDURE — 99232 SBSQ HOSP IP/OBS MODERATE 35: CPT | Performed by: PSYCHIATRY & NEUROLOGY

## 2018-07-20 PROCEDURE — 83735 ASSAY OF MAGNESIUM: CPT | Performed by: INTERNAL MEDICINE

## 2018-07-20 PROCEDURE — 99291 CRITICAL CARE FIRST HOUR: CPT | Performed by: INTERNAL MEDICINE

## 2018-07-20 PROCEDURE — 25010000003 POTASSIUM CHLORIDE PER 2 MEQ: Performed by: NURSE PRACTITIONER

## 2018-07-20 PROCEDURE — 25010000002 METOCLOPRAMIDE PER 10 MG: Performed by: INTERNAL MEDICINE

## 2018-07-20 RX ORDER — METOCLOPRAMIDE HYDROCHLORIDE 5 MG/ML
10 INJECTION INTRAMUSCULAR; INTRAVENOUS EVERY 6 HOURS
Status: DISPENSED | OUTPATIENT
Start: 2018-07-20 | End: 2018-07-22

## 2018-07-20 RX ADMIN — ASPIRIN 325 MG ORAL TABLET 325 MG: 325 PILL ORAL at 09:54

## 2018-07-20 RX ADMIN — METOPROLOL TARTRATE 12.5 MG: 25 TABLET, FILM COATED ORAL at 09:54

## 2018-07-20 RX ADMIN — LEVETIRACETAM 1000 MG: 10 INJECTION INTRAVENOUS at 20:32

## 2018-07-20 RX ADMIN — FAMOTIDINE 20 MG: 10 INJECTION INTRAVENOUS at 20:32

## 2018-07-20 RX ADMIN — IPRATROPIUM BROMIDE AND ALBUTEROL SULFATE 3 ML: 2.5; .5 SOLUTION RESPIRATORY (INHALATION) at 07:52

## 2018-07-20 RX ADMIN — CHLORHEXIDINE GLUCONATE 15 ML: 1.2 RINSE ORAL at 09:54

## 2018-07-20 RX ADMIN — AMPICILLIN SODIUM AND SULBACTAM SODIUM 3 G: 2; 1 INJECTION, POWDER, FOR SOLUTION INTRAMUSCULAR; INTRAVENOUS at 02:26

## 2018-07-20 RX ADMIN — AMPICILLIN SODIUM AND SULBACTAM SODIUM 3 G: 2; 1 INJECTION, POWDER, FOR SOLUTION INTRAMUSCULAR; INTRAVENOUS at 23:46

## 2018-07-20 RX ADMIN — IPRATROPIUM BROMIDE AND ALBUTEROL SULFATE 3 ML: 2.5; .5 SOLUTION RESPIRATORY (INHALATION) at 12:17

## 2018-07-20 RX ADMIN — ATORVASTATIN CALCIUM 80 MG: 40 TABLET, FILM COATED ORAL at 20:32

## 2018-07-20 RX ADMIN — LORAZEPAM 2 MG: 2 INJECTION INTRAMUSCULAR; INTRAVENOUS at 05:42

## 2018-07-20 RX ADMIN — METOCLOPRAMIDE 10 MG: 5 INJECTION, SOLUTION INTRAMUSCULAR; INTRAVENOUS at 21:22

## 2018-07-20 RX ADMIN — POTASSIUM CHLORIDE 20 MEQ: 400 INJECTION, SOLUTION INTRAVENOUS at 05:42

## 2018-07-20 RX ADMIN — CLOPIDOGREL BISULFATE 75 MG: 75 TABLET ORAL at 09:54

## 2018-07-20 RX ADMIN — AMPICILLIN SODIUM AND SULBACTAM SODIUM 3 G: 2; 1 INJECTION, POWDER, FOR SOLUTION INTRAMUSCULAR; INTRAVENOUS at 18:34

## 2018-07-20 RX ADMIN — IPRATROPIUM BROMIDE AND ALBUTEROL SULFATE 3 ML: 2.5; .5 SOLUTION RESPIRATORY (INHALATION) at 19:09

## 2018-07-20 RX ADMIN — FAMOTIDINE 20 MG: 10 INJECTION INTRAVENOUS at 09:54

## 2018-07-20 RX ADMIN — IPRATROPIUM BROMIDE AND ALBUTEROL SULFATE 3 ML: 2.5; .5 SOLUTION RESPIRATORY (INHALATION) at 00:11

## 2018-07-20 RX ADMIN — POTASSIUM CHLORIDE 20 MEQ: 400 INJECTION, SOLUTION INTRAVENOUS at 09:54

## 2018-07-20 RX ADMIN — METOCLOPRAMIDE 10 MG: 5 INJECTION, SOLUTION INTRAMUSCULAR; INTRAVENOUS at 12:04

## 2018-07-20 RX ADMIN — AMPICILLIN SODIUM AND SULBACTAM SODIUM 3 G: 2; 1 INJECTION, POWDER, FOR SOLUTION INTRAMUSCULAR; INTRAVENOUS at 06:56

## 2018-07-20 RX ADMIN — METOPROLOL TARTRATE 12.5 MG: 25 TABLET, FILM COATED ORAL at 20:32

## 2018-07-20 RX ADMIN — LEVETIRACETAM 1000 MG: 10 INJECTION INTRAVENOUS at 09:54

## 2018-07-20 RX ADMIN — ENOXAPARIN SODIUM 70 MG: 80 INJECTION SUBCUTANEOUS at 20:32

## 2018-07-20 RX ADMIN — CHLORHEXIDINE GLUCONATE 15 ML: 1.2 RINSE ORAL at 20:32

## 2018-07-20 RX ADMIN — ENOXAPARIN SODIUM 70 MG: 80 INJECTION SUBCUTANEOUS at 09:54

## 2018-07-20 RX ADMIN — AMPICILLIN SODIUM AND SULBACTAM SODIUM 3 G: 2; 1 INJECTION, POWDER, FOR SOLUTION INTRAMUSCULAR; INTRAVENOUS at 11:59

## 2018-07-21 ENCOUNTER — APPOINTMENT (OUTPATIENT)
Dept: CT IMAGING | Facility: HOSPITAL | Age: 38
End: 2018-07-21

## 2018-07-21 LAB
ALBUMIN SERPL-MCNC: 3.22 G/DL (ref 3.2–4.8)
ALBUMIN/GLOB SERPL: 1.2 G/DL (ref 1.5–2.5)
ALP SERPL-CCNC: 83 U/L (ref 25–100)
ALT SERPL W P-5'-P-CCNC: 34 U/L (ref 7–40)
AMMONIA BLD-SCNC: 23 UMOL/L (ref 19–60)
ANION GAP SERPL CALCULATED.3IONS-SCNC: 6 MMOL/L (ref 3–11)
AST SERPL-CCNC: 48 U/L (ref 0–33)
BACTERIA SPEC AEROBE CULT: NORMAL
BACTERIA SPEC AEROBE CULT: NORMAL
BASOPHILS # BLD AUTO: 0.05 10*3/MM3 (ref 0–0.2)
BASOPHILS NFR BLD AUTO: 0.4 % (ref 0–1)
BILIRUB SERPL-MCNC: 0.3 MG/DL (ref 0.3–1.2)
BUN BLD-MCNC: 8 MG/DL (ref 9–23)
BUN/CREAT SERPL: 16.3 (ref 7–25)
CALCIUM SPEC-SCNC: 8.6 MG/DL (ref 8.7–10.4)
CHLORIDE SERPL-SCNC: 107 MMOL/L (ref 99–109)
CO2 SERPL-SCNC: 25 MMOL/L (ref 20–31)
CREAT BLD-MCNC: 0.49 MG/DL (ref 0.6–1.3)
DEPRECATED RDW RBC AUTO: 56.2 FL (ref 37–54)
EOSINOPHIL # BLD AUTO: 0.26 10*3/MM3 (ref 0–0.3)
EOSINOPHIL NFR BLD AUTO: 2.2 % (ref 0–3)
ERYTHROCYTE [DISTWIDTH] IN BLOOD BY AUTOMATED COUNT: 15.4 % (ref 11.3–14.5)
GFR SERPL CREATININE-BSD FRML MDRD: 141 ML/MIN/1.73
GLOBULIN UR ELPH-MCNC: 2.8 GM/DL
GLUCOSE BLD-MCNC: 143 MG/DL (ref 70–100)
HCT VFR BLD AUTO: 30.4 % (ref 34.5–44)
HGB BLD-MCNC: 9.5 G/DL (ref 11.5–15.5)
IMM GRANULOCYTES # BLD: 0.03 10*3/MM3 (ref 0–0.03)
IMM GRANULOCYTES NFR BLD: 0.2 % (ref 0–0.6)
LYMPHOCYTES # BLD AUTO: 2.16 10*3/MM3 (ref 0.6–4.8)
LYMPHOCYTES NFR BLD AUTO: 18 % (ref 24–44)
MAGNESIUM SERPL-MCNC: 2.1 MG/DL (ref 1.3–2.7)
MCH RBC QN AUTO: 31.1 PG (ref 27–31)
MCHC RBC AUTO-ENTMCNC: 31.3 G/DL (ref 32–36)
MCV RBC AUTO: 99.7 FL (ref 80–99)
MONOCYTES # BLD AUTO: 1.24 10*3/MM3 (ref 0–1)
MONOCYTES NFR BLD AUTO: 10.3 % (ref 0–12)
NEUTROPHILS # BLD AUTO: 8.3 10*3/MM3 (ref 1.5–8.3)
NEUTROPHILS NFR BLD AUTO: 69.1 % (ref 41–71)
PHOSPHATE SERPL-MCNC: 3.4 MG/DL (ref 2.4–5.1)
PLATELET # BLD AUTO: 269 10*3/MM3 (ref 150–450)
PMV BLD AUTO: 10.5 FL (ref 6–12)
POTASSIUM BLD-SCNC: 4 MMOL/L (ref 3.5–5.5)
PROT SERPL-MCNC: 6 G/DL (ref 5.7–8.2)
RBC # BLD AUTO: 3.05 10*6/MM3 (ref 3.89–5.14)
SODIUM BLD-SCNC: 138 MMOL/L (ref 132–146)
WBC NRBC COR # BLD: 12.01 10*3/MM3 (ref 3.5–10.8)

## 2018-07-21 PROCEDURE — 94003 VENT MGMT INPAT SUBQ DAY: CPT

## 2018-07-21 PROCEDURE — 94640 AIRWAY INHALATION TREATMENT: CPT

## 2018-07-21 PROCEDURE — 94799 UNLISTED PULMONARY SVC/PX: CPT

## 2018-07-21 PROCEDURE — 25010000002 ENOXAPARIN PER 10 MG

## 2018-07-21 PROCEDURE — 99233 SBSQ HOSP IP/OBS HIGH 50: CPT | Performed by: PSYCHIATRY & NEUROLOGY

## 2018-07-21 PROCEDURE — 99291 CRITICAL CARE FIRST HOUR: CPT | Performed by: INTERNAL MEDICINE

## 2018-07-21 PROCEDURE — 70450 CT HEAD/BRAIN W/O DYE: CPT

## 2018-07-21 PROCEDURE — 25010000002 LORAZEPAM PER 2 MG: Performed by: INTERNAL MEDICINE

## 2018-07-21 PROCEDURE — 80053 COMPREHEN METABOLIC PANEL: CPT | Performed by: INTERNAL MEDICINE

## 2018-07-21 PROCEDURE — 84100 ASSAY OF PHOSPHORUS: CPT | Performed by: INTERNAL MEDICINE

## 2018-07-21 PROCEDURE — 85025 COMPLETE CBC W/AUTO DIFF WBC: CPT | Performed by: INTERNAL MEDICINE

## 2018-07-21 PROCEDURE — 25010000003 AMPICILLIN-SULBACTAM PER 1.5 G: Performed by: INTERNAL MEDICINE

## 2018-07-21 PROCEDURE — 25010000002 THIAMINE PER 100 MG: Performed by: PSYCHIATRY & NEUROLOGY

## 2018-07-21 PROCEDURE — 25010000003 LEVETIRACETAM IN NACL 0.75% 1000 MG/100ML SOLUTION: Performed by: INTERNAL MEDICINE

## 2018-07-21 PROCEDURE — 83735 ASSAY OF MAGNESIUM: CPT | Performed by: INTERNAL MEDICINE

## 2018-07-21 PROCEDURE — 25010000002 METOCLOPRAMIDE PER 10 MG: Performed by: INTERNAL MEDICINE

## 2018-07-21 PROCEDURE — 82140 ASSAY OF AMMONIA: CPT | Performed by: PSYCHIATRY & NEUROLOGY

## 2018-07-21 RX ORDER — ASPIRIN 81 MG/1
81 TABLET, CHEWABLE ORAL DAILY
Status: DISCONTINUED | OUTPATIENT
Start: 2018-07-22 | End: 2018-08-18

## 2018-07-21 RX ADMIN — ENOXAPARIN SODIUM 70 MG: 80 INJECTION SUBCUTANEOUS at 09:10

## 2018-07-21 RX ADMIN — IPRATROPIUM BROMIDE AND ALBUTEROL SULFATE 3 ML: 2.5; .5 SOLUTION RESPIRATORY (INHALATION) at 07:06

## 2018-07-21 RX ADMIN — LORAZEPAM 2 MG: 2 INJECTION INTRAMUSCULAR; INTRAVENOUS at 16:13

## 2018-07-21 RX ADMIN — THIAMINE HYDROCHLORIDE 100 MG: 100 INJECTION, SOLUTION INTRAMUSCULAR; INTRAVENOUS at 18:24

## 2018-07-21 RX ADMIN — FAMOTIDINE 20 MG: 10 INJECTION INTRAVENOUS at 20:11

## 2018-07-21 RX ADMIN — LORAZEPAM 2 MG: 2 INJECTION INTRAMUSCULAR; INTRAVENOUS at 12:20

## 2018-07-21 RX ADMIN — FAMOTIDINE 20 MG: 10 INJECTION INTRAVENOUS at 09:07

## 2018-07-21 RX ADMIN — METOCLOPRAMIDE 10 MG: 5 INJECTION, SOLUTION INTRAMUSCULAR; INTRAVENOUS at 09:07

## 2018-07-21 RX ADMIN — AMPICILLIN SODIUM AND SULBACTAM SODIUM 3 G: 2; 1 INJECTION, POWDER, FOR SOLUTION INTRAMUSCULAR; INTRAVENOUS at 11:53

## 2018-07-21 RX ADMIN — LORAZEPAM 2 MG: 2 INJECTION INTRAMUSCULAR; INTRAVENOUS at 19:45

## 2018-07-21 RX ADMIN — CHLORHEXIDINE GLUCONATE 15 ML: 1.2 RINSE ORAL at 20:11

## 2018-07-21 RX ADMIN — AMPICILLIN SODIUM AND SULBACTAM SODIUM 3 G: 2; 1 INJECTION, POWDER, FOR SOLUTION INTRAMUSCULAR; INTRAVENOUS at 05:26

## 2018-07-21 RX ADMIN — CHLORHEXIDINE GLUCONATE 15 ML: 1.2 RINSE ORAL at 09:07

## 2018-07-21 RX ADMIN — LORAZEPAM 2 MG: 2 INJECTION INTRAMUSCULAR; INTRAVENOUS at 23:44

## 2018-07-21 RX ADMIN — LORAZEPAM 2 MG: 2 INJECTION INTRAMUSCULAR; INTRAVENOUS at 22:29

## 2018-07-21 RX ADMIN — METOPROLOL TARTRATE 12.5 MG: 25 TABLET, FILM COATED ORAL at 20:10

## 2018-07-21 RX ADMIN — IPRATROPIUM BROMIDE AND ALBUTEROL SULFATE 3 ML: 2.5; .5 SOLUTION RESPIRATORY (INHALATION) at 12:27

## 2018-07-21 RX ADMIN — AMPICILLIN SODIUM AND SULBACTAM SODIUM 3 G: 2; 1 INJECTION, POWDER, FOR SOLUTION INTRAMUSCULAR; INTRAVENOUS at 18:24

## 2018-07-21 RX ADMIN — LORAZEPAM 2 MG: 2 INJECTION INTRAMUSCULAR; INTRAVENOUS at 20:18

## 2018-07-21 RX ADMIN — LORAZEPAM 2 MG: 2 INJECTION INTRAMUSCULAR; INTRAVENOUS at 00:45

## 2018-07-21 RX ADMIN — ASPIRIN 325 MG ORAL TABLET 325 MG: 325 PILL ORAL at 09:07

## 2018-07-21 RX ADMIN — CLOPIDOGREL BISULFATE 75 MG: 75 TABLET ORAL at 09:07

## 2018-07-21 RX ADMIN — LORAZEPAM 2 MG: 2 INJECTION INTRAMUSCULAR; INTRAVENOUS at 21:58

## 2018-07-21 RX ADMIN — IPRATROPIUM BROMIDE AND ALBUTEROL SULFATE 3 ML: 2.5; .5 SOLUTION RESPIRATORY (INHALATION) at 19:04

## 2018-07-21 RX ADMIN — METOPROLOL TARTRATE 12.5 MG: 25 TABLET, FILM COATED ORAL at 09:07

## 2018-07-21 RX ADMIN — IPRATROPIUM BROMIDE AND ALBUTEROL SULFATE 3 ML: 2.5; .5 SOLUTION RESPIRATORY (INHALATION) at 00:47

## 2018-07-21 RX ADMIN — LEVETIRACETAM 1000 MG: 10 INJECTION INTRAVENOUS at 09:11

## 2018-07-21 RX ADMIN — LEVETIRACETAM 1000 MG: 10 INJECTION INTRAVENOUS at 20:10

## 2018-07-21 RX ADMIN — METOCLOPRAMIDE 10 MG: 5 INJECTION, SOLUTION INTRAMUSCULAR; INTRAVENOUS at 04:12

## 2018-07-21 RX ADMIN — ATORVASTATIN CALCIUM 80 MG: 40 TABLET, FILM COATED ORAL at 20:10

## 2018-07-22 LAB
ALBUMIN SERPL-MCNC: 3.2 G/DL (ref 3.2–4.8)
ALBUMIN/GLOB SERPL: 1.4 G/DL (ref 1.5–2.5)
ALP SERPL-CCNC: 80 U/L (ref 25–100)
ALT SERPL W P-5'-P-CCNC: 49 U/L (ref 7–40)
ANION GAP SERPL CALCULATED.3IONS-SCNC: 8 MMOL/L (ref 3–11)
AST SERPL-CCNC: 66 U/L (ref 0–33)
BASOPHILS # BLD AUTO: 0.05 10*3/MM3 (ref 0–0.2)
BASOPHILS NFR BLD AUTO: 0.4 % (ref 0–1)
BILIRUB SERPL-MCNC: 0.2 MG/DL (ref 0.3–1.2)
BUN BLD-MCNC: 10 MG/DL (ref 9–23)
BUN/CREAT SERPL: 23.8 (ref 7–25)
CALCIUM SPEC-SCNC: 7.9 MG/DL (ref 8.7–10.4)
CHLORIDE SERPL-SCNC: 107 MMOL/L (ref 99–109)
CO2 SERPL-SCNC: 24 MMOL/L (ref 20–31)
CREAT BLD-MCNC: 0.42 MG/DL (ref 0.6–1.3)
DEPRECATED RDW RBC AUTO: 53.9 FL (ref 37–54)
EOSINOPHIL # BLD AUTO: 0.23 10*3/MM3 (ref 0–0.3)
EOSINOPHIL NFR BLD AUTO: 1.8 % (ref 0–3)
ERYTHROCYTE [DISTWIDTH] IN BLOOD BY AUTOMATED COUNT: 15 % (ref 11.3–14.5)
GFR SERPL CREATININE-BSD FRML MDRD: >150 ML/MIN/1.73
GLOBULIN UR ELPH-MCNC: 2.3 GM/DL
GLUCOSE BLD-MCNC: 110 MG/DL (ref 70–100)
HCT VFR BLD AUTO: 30.2 % (ref 34.5–44)
HGB BLD-MCNC: 9.6 G/DL (ref 11.5–15.5)
IMM GRANULOCYTES # BLD: 0.03 10*3/MM3 (ref 0–0.03)
IMM GRANULOCYTES NFR BLD: 0.2 % (ref 0–0.6)
LYMPHOCYTES # BLD AUTO: 1.86 10*3/MM3 (ref 0.6–4.8)
LYMPHOCYTES NFR BLD AUTO: 14.4 % (ref 24–44)
MAGNESIUM SERPL-MCNC: 1.9 MG/DL (ref 1.3–2.7)
MCH RBC QN AUTO: 31.3 PG (ref 27–31)
MCHC RBC AUTO-ENTMCNC: 31.8 G/DL (ref 32–36)
MCV RBC AUTO: 98.4 FL (ref 80–99)
MONOCYTES # BLD AUTO: 1.55 10*3/MM3 (ref 0–1)
MONOCYTES NFR BLD AUTO: 12 % (ref 0–12)
NEUTROPHILS # BLD AUTO: 9.24 10*3/MM3 (ref 1.5–8.3)
NEUTROPHILS NFR BLD AUTO: 71.4 % (ref 41–71)
PHOSPHATE SERPL-MCNC: 4.1 MG/DL (ref 2.4–5.1)
PLATELET # BLD AUTO: 305 10*3/MM3 (ref 150–450)
PMV BLD AUTO: 10.5 FL (ref 6–12)
POTASSIUM BLD-SCNC: 4 MMOL/L (ref 3.5–5.5)
PROT SERPL-MCNC: 5.5 G/DL (ref 5.7–8.2)
RBC # BLD AUTO: 3.07 10*6/MM3 (ref 3.89–5.14)
SODIUM BLD-SCNC: 139 MMOL/L (ref 132–146)
VIT B12 BLD-MCNC: 242 PG/ML (ref 211–911)
WBC NRBC COR # BLD: 12.93 10*3/MM3 (ref 3.5–10.8)

## 2018-07-22 PROCEDURE — 25010000003 LEVETIRACETAM IN NACL 0.75% 1000 MG/100ML SOLUTION: Performed by: INTERNAL MEDICINE

## 2018-07-22 PROCEDURE — 99231 SBSQ HOSP IP/OBS SF/LOW 25: CPT | Performed by: PSYCHIATRY & NEUROLOGY

## 2018-07-22 PROCEDURE — 83735 ASSAY OF MAGNESIUM: CPT | Performed by: INTERNAL MEDICINE

## 2018-07-22 PROCEDURE — 94640 AIRWAY INHALATION TREATMENT: CPT

## 2018-07-22 PROCEDURE — 80053 COMPREHEN METABOLIC PANEL: CPT | Performed by: INTERNAL MEDICINE

## 2018-07-22 PROCEDURE — 99291 CRITICAL CARE FIRST HOUR: CPT | Performed by: INTERNAL MEDICINE

## 2018-07-22 PROCEDURE — 85025 COMPLETE CBC W/AUTO DIFF WBC: CPT | Performed by: INTERNAL MEDICINE

## 2018-07-22 PROCEDURE — 25010000002 ENOXAPARIN PER 10 MG: Performed by: INTERNAL MEDICINE

## 2018-07-22 PROCEDURE — 25010000003 AMPICILLIN-SULBACTAM PER 1.5 G: Performed by: INTERNAL MEDICINE

## 2018-07-22 PROCEDURE — 25010000002 THIAMINE PER 100 MG: Performed by: PSYCHIATRY & NEUROLOGY

## 2018-07-22 PROCEDURE — 82607 VITAMIN B-12: CPT | Performed by: PSYCHIATRY & NEUROLOGY

## 2018-07-22 PROCEDURE — 94799 UNLISTED PULMONARY SVC/PX: CPT

## 2018-07-22 PROCEDURE — 84100 ASSAY OF PHOSPHORUS: CPT | Performed by: INTERNAL MEDICINE

## 2018-07-22 PROCEDURE — 25010000002 LORAZEPAM PER 2 MG: Performed by: INTERNAL MEDICINE

## 2018-07-22 PROCEDURE — 94003 VENT MGMT INPAT SUBQ DAY: CPT

## 2018-07-22 RX ADMIN — AMPICILLIN SODIUM AND SULBACTAM SODIUM 3 G: 2; 1 INJECTION, POWDER, FOR SOLUTION INTRAMUSCULAR; INTRAVENOUS at 06:03

## 2018-07-22 RX ADMIN — CHLORHEXIDINE GLUCONATE 15 ML: 1.2 RINSE ORAL at 20:23

## 2018-07-22 RX ADMIN — ATORVASTATIN CALCIUM 80 MG: 40 TABLET, FILM COATED ORAL at 20:23

## 2018-07-22 RX ADMIN — IPRATROPIUM BROMIDE AND ALBUTEROL SULFATE 3 ML: 2.5; .5 SOLUTION RESPIRATORY (INHALATION) at 18:46

## 2018-07-22 RX ADMIN — LORAZEPAM 2 MG: 2 INJECTION INTRAMUSCULAR; INTRAVENOUS at 22:30

## 2018-07-22 RX ADMIN — IPRATROPIUM BROMIDE AND ALBUTEROL SULFATE 3 ML: 2.5; .5 SOLUTION RESPIRATORY (INHALATION) at 06:55

## 2018-07-22 RX ADMIN — LEVETIRACETAM 1000 MG: 10 INJECTION INTRAVENOUS at 20:23

## 2018-07-22 RX ADMIN — ENOXAPARIN SODIUM 40 MG: 100 INJECTION SUBCUTANEOUS at 08:52

## 2018-07-22 RX ADMIN — CLOPIDOGREL BISULFATE 75 MG: 75 TABLET ORAL at 08:51

## 2018-07-22 RX ADMIN — LORAZEPAM 2 MG: 2 INJECTION INTRAMUSCULAR; INTRAVENOUS at 23:31

## 2018-07-22 RX ADMIN — LORAZEPAM 2 MG: 2 INJECTION INTRAMUSCULAR; INTRAVENOUS at 16:44

## 2018-07-22 RX ADMIN — LORAZEPAM 2 MG: 2 INJECTION INTRAMUSCULAR; INTRAVENOUS at 06:03

## 2018-07-22 RX ADMIN — CHLORHEXIDINE GLUCONATE 15 ML: 1.2 RINSE ORAL at 08:51

## 2018-07-22 RX ADMIN — FAMOTIDINE 20 MG: 10 INJECTION INTRAVENOUS at 08:51

## 2018-07-22 RX ADMIN — METOPROLOL TARTRATE 12.5 MG: 25 TABLET, FILM COATED ORAL at 20:23

## 2018-07-22 RX ADMIN — FAMOTIDINE 20 MG: 10 INJECTION INTRAVENOUS at 20:23

## 2018-07-22 RX ADMIN — AMPICILLIN SODIUM AND SULBACTAM SODIUM 3 G: 2; 1 INJECTION, POWDER, FOR SOLUTION INTRAMUSCULAR; INTRAVENOUS at 11:52

## 2018-07-22 RX ADMIN — IPRATROPIUM BROMIDE AND ALBUTEROL SULFATE 3 ML: 2.5; .5 SOLUTION RESPIRATORY (INHALATION) at 00:58

## 2018-07-22 RX ADMIN — MAGNESIUM SULFATE IN WATER 4 G: 40 INJECTION, SOLUTION INTRAVENOUS at 07:09

## 2018-07-22 RX ADMIN — ASPIRIN 81 MG CHEWABLE TABLET 81 MG: 81 TABLET CHEWABLE at 08:51

## 2018-07-22 RX ADMIN — LEVETIRACETAM 1000 MG: 10 INJECTION INTRAVENOUS at 08:51

## 2018-07-22 RX ADMIN — AMPICILLIN SODIUM AND SULBACTAM SODIUM 3 G: 2; 1 INJECTION, POWDER, FOR SOLUTION INTRAMUSCULAR; INTRAVENOUS at 00:53

## 2018-07-22 RX ADMIN — LORAZEPAM 2 MG: 2 INJECTION INTRAMUSCULAR; INTRAVENOUS at 00:53

## 2018-07-22 RX ADMIN — METOPROLOL TARTRATE 12.5 MG: 25 TABLET, FILM COATED ORAL at 08:51

## 2018-07-22 RX ADMIN — AMPICILLIN SODIUM AND SULBACTAM SODIUM 3 G: 2; 1 INJECTION, POWDER, FOR SOLUTION INTRAMUSCULAR; INTRAVENOUS at 17:55

## 2018-07-22 RX ADMIN — THIAMINE HYDROCHLORIDE 100 MG: 100 INJECTION, SOLUTION INTRAMUSCULAR; INTRAVENOUS at 08:51

## 2018-07-22 RX ADMIN — LORAZEPAM 2 MG: 2 INJECTION INTRAMUSCULAR; INTRAVENOUS at 16:05

## 2018-07-23 ENCOUNTER — APPOINTMENT (OUTPATIENT)
Dept: NUCLEAR MEDICINE | Facility: HOSPITAL | Age: 38
End: 2018-07-23

## 2018-07-23 LAB
ALBUMIN SERPL-MCNC: 3.74 G/DL (ref 3.2–4.8)
ALP SERPL-CCNC: 87 U/L (ref 25–100)
ALT SERPL W P-5'-P-CCNC: 50 U/L (ref 7–40)
ANION GAP SERPL CALCULATED.3IONS-SCNC: 8 MMOL/L (ref 3–11)
AST SERPL-CCNC: 50 U/L (ref 0–33)
BACTERIA SPEC AEROBE CULT: NORMAL
BILIRUB SERPL-MCNC: 0.3 MG/DL (ref 0.3–1.2)
BUN BLD-MCNC: 10 MG/DL (ref 9–23)
CALCIUM SPEC-SCNC: 9 MG/DL (ref 8.7–10.4)
CHLORIDE SERPL-SCNC: 104 MMOL/L (ref 99–109)
CHOLEST SERPL-MCNC: 141 MG/DL (ref 0–200)
CO2 SERPL-SCNC: 25 MMOL/L (ref 20–31)
CREAT BLD-MCNC: 0.47 MG/DL (ref 0.6–1.3)
CRP SERPL-MCNC: 0.53 MG/DL (ref 0–1)
GLUCOSE BLD-MCNC: 104 MG/DL (ref 70–100)
GRAM STN SPEC: NORMAL
MAGNESIUM SERPL-MCNC: 2.3 MG/DL (ref 1.3–2.7)
PHOSPHATE SERPL-MCNC: 3.9 MG/DL (ref 2.4–5.1)
POTASSIUM BLD-SCNC: 4.1 MMOL/L (ref 3.5–5.5)
PREALB SERPL-MCNC: 13.4 MG/DL (ref 10–40)
PROT SERPL-MCNC: 6.7 G/DL (ref 5.7–8.2)
SODIUM BLD-SCNC: 137 MMOL/L (ref 132–146)
TRIGL SERPL-MCNC: 111 MG/DL (ref 0–150)

## 2018-07-23 PROCEDURE — 25010000002 ENOXAPARIN PER 10 MG: Performed by: INTERNAL MEDICINE

## 2018-07-23 PROCEDURE — 25010000002 LORAZEPAM PER 2 MG: Performed by: INTERNAL MEDICINE

## 2018-07-23 PROCEDURE — 25010000003 AMPICILLIN-SULBACTAM PER 1.5 G: Performed by: INTERNAL MEDICINE

## 2018-07-23 PROCEDURE — 94003 VENT MGMT INPAT SUBQ DAY: CPT

## 2018-07-23 PROCEDURE — 0 TECHNETIUM EXAMETAZIME KIT: Performed by: INTERNAL MEDICINE

## 2018-07-23 PROCEDURE — 83735 ASSAY OF MAGNESIUM: CPT

## 2018-07-23 PROCEDURE — A9521 TC99M EXAMETAZIME: HCPCS | Performed by: INTERNAL MEDICINE

## 2018-07-23 PROCEDURE — 99291 CRITICAL CARE FIRST HOUR: CPT | Performed by: INTERNAL MEDICINE

## 2018-07-23 PROCEDURE — 94799 UNLISTED PULMONARY SVC/PX: CPT

## 2018-07-23 PROCEDURE — 84134 ASSAY OF PREALBUMIN: CPT

## 2018-07-23 PROCEDURE — 78606 BRAIN IMAGE W/FLOW 4 + VIEWS: CPT

## 2018-07-23 PROCEDURE — 82465 ASSAY BLD/SERUM CHOLESTEROL: CPT

## 2018-07-23 PROCEDURE — 25010000002 THIAMINE PER 100 MG: Performed by: PSYCHIATRY & NEUROLOGY

## 2018-07-23 PROCEDURE — 84478 ASSAY OF TRIGLYCERIDES: CPT

## 2018-07-23 PROCEDURE — 25010000002 LEVETIRACETAM IN NACL 0.82% 500 MG/100ML SOLUTION: Performed by: PSYCHIATRY & NEUROLOGY

## 2018-07-23 PROCEDURE — 84100 ASSAY OF PHOSPHORUS: CPT

## 2018-07-23 PROCEDURE — 80053 COMPREHEN METABOLIC PANEL: CPT

## 2018-07-23 PROCEDURE — 94640 AIRWAY INHALATION TREATMENT: CPT

## 2018-07-23 PROCEDURE — 25010000003 LEVETIRACETAM IN NACL 0.75% 1000 MG/100ML SOLUTION: Performed by: INTERNAL MEDICINE

## 2018-07-23 PROCEDURE — 86140 C-REACTIVE PROTEIN: CPT

## 2018-07-23 RX ORDER — LEVETIRACETAM 5 MG/ML
500 INJECTION INTRAVASCULAR EVERY 12 HOURS SCHEDULED
Status: DISCONTINUED | OUTPATIENT
Start: 2018-07-23 | End: 2018-07-25

## 2018-07-23 RX ORDER — LORAZEPAM 2 MG/ML
2 INJECTION INTRAMUSCULAR
Status: DISCONTINUED | OUTPATIENT
Start: 2018-07-23 | End: 2018-07-27

## 2018-07-23 RX ADMIN — CLOPIDOGREL BISULFATE 75 MG: 75 TABLET ORAL at 08:29

## 2018-07-23 RX ADMIN — LORAZEPAM 2 MG: 2 INJECTION INTRAMUSCULAR; INTRAVENOUS at 02:37

## 2018-07-23 RX ADMIN — FAMOTIDINE 20 MG: 10 INJECTION INTRAVENOUS at 20:08

## 2018-07-23 RX ADMIN — LORAZEPAM 2 MG: 2 INJECTION INTRAMUSCULAR; INTRAVENOUS at 20:09

## 2018-07-23 RX ADMIN — AMPICILLIN SODIUM AND SULBACTAM SODIUM 3 G: 2; 1 INJECTION, POWDER, FOR SOLUTION INTRAMUSCULAR; INTRAVENOUS at 05:31

## 2018-07-23 RX ADMIN — METOPROLOL TARTRATE 12.5 MG: 25 TABLET, FILM COATED ORAL at 08:29

## 2018-07-23 RX ADMIN — LEVETIRACETAM 1000 MG: 10 INJECTION INTRAVENOUS at 08:29

## 2018-07-23 RX ADMIN — ASPIRIN 81 MG CHEWABLE TABLET 81 MG: 81 TABLET CHEWABLE at 08:29

## 2018-07-23 RX ADMIN — ENOXAPARIN SODIUM 40 MG: 100 INJECTION SUBCUTANEOUS at 08:29

## 2018-07-23 RX ADMIN — FAMOTIDINE 20 MG: 10 INJECTION INTRAVENOUS at 08:29

## 2018-07-23 RX ADMIN — LORAZEPAM 2 MG: 2 INJECTION INTRAMUSCULAR; INTRAVENOUS at 03:49

## 2018-07-23 RX ADMIN — LEVETIRACETAM 500 MG: 5 INJECTION INTRAVENOUS at 20:25

## 2018-07-23 RX ADMIN — AMPICILLIN SODIUM AND SULBACTAM SODIUM 3 G: 2; 1 INJECTION, POWDER, FOR SOLUTION INTRAMUSCULAR; INTRAVENOUS at 01:09

## 2018-07-23 RX ADMIN — IPRATROPIUM BROMIDE AND ALBUTEROL SULFATE 3 ML: 2.5; .5 SOLUTION RESPIRATORY (INHALATION) at 19:06

## 2018-07-23 RX ADMIN — METOPROLOL TARTRATE 12.5 MG: 25 TABLET, FILM COATED ORAL at 20:08

## 2018-07-23 RX ADMIN — CHLORHEXIDINE GLUCONATE 15 ML: 1.2 RINSE ORAL at 08:29

## 2018-07-23 RX ADMIN — IPRATROPIUM BROMIDE AND ALBUTEROL SULFATE 3 ML: 2.5; .5 SOLUTION RESPIRATORY (INHALATION) at 07:18

## 2018-07-23 RX ADMIN — EXAMETAZIME 1 DOSE: KIT at 09:20

## 2018-07-23 RX ADMIN — THIAMINE HYDROCHLORIDE 100 MG: 100 INJECTION, SOLUTION INTRAMUSCULAR; INTRAVENOUS at 08:29

## 2018-07-23 RX ADMIN — LORAZEPAM 2 MG: 2 INJECTION INTRAMUSCULAR; INTRAVENOUS at 09:05

## 2018-07-23 RX ADMIN — IPRATROPIUM BROMIDE AND ALBUTEROL SULFATE 3 ML: 2.5; .5 SOLUTION RESPIRATORY (INHALATION) at 01:23

## 2018-07-23 RX ADMIN — ATORVASTATIN CALCIUM 80 MG: 40 TABLET, FILM COATED ORAL at 20:09

## 2018-07-23 RX ADMIN — LORAZEPAM 2 MG: 2 INJECTION INTRAMUSCULAR; INTRAVENOUS at 15:57

## 2018-07-23 RX ADMIN — CHLORHEXIDINE GLUCONATE 15 ML: 1.2 RINSE ORAL at 20:09

## 2018-07-23 RX ADMIN — IPRATROPIUM BROMIDE AND ALBUTEROL SULFATE 3 ML: 2.5; .5 SOLUTION RESPIRATORY (INHALATION) at 13:16

## 2018-07-24 PROCEDURE — 99291 CRITICAL CARE FIRST HOUR: CPT | Performed by: INTERNAL MEDICINE

## 2018-07-24 PROCEDURE — 25010000002 ENOXAPARIN PER 10 MG: Performed by: INTERNAL MEDICINE

## 2018-07-24 PROCEDURE — 94799 UNLISTED PULMONARY SVC/PX: CPT

## 2018-07-24 PROCEDURE — 94640 AIRWAY INHALATION TREATMENT: CPT

## 2018-07-24 PROCEDURE — 25010000002 THIAMINE PER 100 MG: Performed by: PSYCHIATRY & NEUROLOGY

## 2018-07-24 PROCEDURE — 94003 VENT MGMT INPAT SUBQ DAY: CPT

## 2018-07-24 PROCEDURE — 25010000002 LORAZEPAM PER 2 MG: Performed by: INTERNAL MEDICINE

## 2018-07-24 PROCEDURE — 25010000002 LEVETIRACETAM IN NACL 0.82% 500 MG/100ML SOLUTION: Performed by: PSYCHIATRY & NEUROLOGY

## 2018-07-24 RX ORDER — VECURONIUM BROMIDE 1 MG/ML
10 INJECTION, POWDER, LYOPHILIZED, FOR SOLUTION INTRAVENOUS ONCE
Status: DISCONTINUED | OUTPATIENT
Start: 2018-07-24 | End: 2018-07-25

## 2018-07-24 RX ADMIN — LEVETIRACETAM 500 MG: 5 INJECTION INTRAVENOUS at 21:10

## 2018-07-24 RX ADMIN — FAMOTIDINE 20 MG: 10 INJECTION INTRAVENOUS at 08:02

## 2018-07-24 RX ADMIN — FAMOTIDINE 20 MG: 10 INJECTION INTRAVENOUS at 21:06

## 2018-07-24 RX ADMIN — IPRATROPIUM BROMIDE AND ALBUTEROL SULFATE 3 ML: 2.5; .5 SOLUTION RESPIRATORY (INHALATION) at 19:26

## 2018-07-24 RX ADMIN — METOPROLOL TARTRATE 12.5 MG: 25 TABLET, FILM COATED ORAL at 08:02

## 2018-07-24 RX ADMIN — LORAZEPAM 2 MG: 2 INJECTION INTRAMUSCULAR; INTRAVENOUS at 21:15

## 2018-07-24 RX ADMIN — ATORVASTATIN CALCIUM 80 MG: 40 TABLET, FILM COATED ORAL at 21:06

## 2018-07-24 RX ADMIN — IPRATROPIUM BROMIDE AND ALBUTEROL SULFATE 3 ML: 2.5; .5 SOLUTION RESPIRATORY (INHALATION) at 06:34

## 2018-07-24 RX ADMIN — IPRATROPIUM BROMIDE AND ALBUTEROL SULFATE 3 ML: 2.5; .5 SOLUTION RESPIRATORY (INHALATION) at 13:26

## 2018-07-24 RX ADMIN — ENOXAPARIN SODIUM 40 MG: 100 INJECTION SUBCUTANEOUS at 08:02

## 2018-07-24 RX ADMIN — CHLORHEXIDINE GLUCONATE 15 ML: 1.2 RINSE ORAL at 21:06

## 2018-07-24 RX ADMIN — LORAZEPAM 2 MG: 2 INJECTION INTRAMUSCULAR; INTRAVENOUS at 14:06

## 2018-07-24 RX ADMIN — CHLORHEXIDINE GLUCONATE 15 ML: 1.2 RINSE ORAL at 08:03

## 2018-07-24 RX ADMIN — THIAMINE HYDROCHLORIDE 100 MG: 100 INJECTION, SOLUTION INTRAMUSCULAR; INTRAVENOUS at 08:05

## 2018-07-24 RX ADMIN — LORAZEPAM 2 MG: 2 INJECTION INTRAMUSCULAR; INTRAVENOUS at 00:25

## 2018-07-24 RX ADMIN — LORAZEPAM 2 MG: 2 INJECTION INTRAMUSCULAR; INTRAVENOUS at 15:55

## 2018-07-24 RX ADMIN — ASPIRIN 81 MG CHEWABLE TABLET 81 MG: 81 TABLET CHEWABLE at 08:02

## 2018-07-24 RX ADMIN — IPRATROPIUM BROMIDE AND ALBUTEROL SULFATE 3 ML: 2.5; .5 SOLUTION RESPIRATORY (INHALATION) at 00:52

## 2018-07-24 RX ADMIN — LEVETIRACETAM 500 MG: 5 INJECTION INTRAVENOUS at 08:02

## 2018-07-24 RX ADMIN — CLOPIDOGREL BISULFATE 75 MG: 75 TABLET ORAL at 08:02

## 2018-07-25 ENCOUNTER — ANESTHESIA EVENT (OUTPATIENT)
Dept: PERIOP | Facility: HOSPITAL | Age: 38
End: 2018-07-25

## 2018-07-25 ENCOUNTER — APPOINTMENT (OUTPATIENT)
Dept: MRI IMAGING | Facility: HOSPITAL | Age: 38
End: 2018-07-25

## 2018-07-25 ENCOUNTER — APPOINTMENT (OUTPATIENT)
Dept: GENERAL RADIOLOGY | Facility: HOSPITAL | Age: 38
End: 2018-07-25

## 2018-07-25 LAB
ALBUMIN SERPL-MCNC: 4.3 G/DL (ref 3.2–4.8)
ALBUMIN/GLOB SERPL: 1.2 G/DL (ref 1.5–2.5)
ALP SERPL-CCNC: 96 U/L (ref 25–100)
ALT SERPL W P-5'-P-CCNC: 57 U/L (ref 7–40)
ANION GAP SERPL CALCULATED.3IONS-SCNC: 10 MMOL/L (ref 3–11)
AST SERPL-CCNC: 50 U/L (ref 0–33)
BASOPHILS # BLD AUTO: 0.1 10*3/MM3 (ref 0–0.2)
BASOPHILS NFR BLD AUTO: 0.6 % (ref 0–1)
BILIRUB SERPL-MCNC: 0.4 MG/DL (ref 0.3–1.2)
BUN BLD-MCNC: 16 MG/DL (ref 9–23)
BUN/CREAT SERPL: 29.6 (ref 7–25)
CALCIUM SPEC-SCNC: 10.2 MG/DL (ref 8.7–10.4)
CHLORIDE SERPL-SCNC: 104 MMOL/L (ref 99–109)
CO2 SERPL-SCNC: 25 MMOL/L (ref 20–31)
CREAT BLD-MCNC: 0.54 MG/DL (ref 0.6–1.3)
CRP SERPL-MCNC: 1.1 MG/DL (ref 0–1)
DEPRECATED RDW RBC AUTO: 53.9 FL (ref 37–54)
EOSINOPHIL # BLD AUTO: 0.35 10*3/MM3 (ref 0–0.3)
EOSINOPHIL NFR BLD AUTO: 1.9 % (ref 0–3)
ERYTHROCYTE [DISTWIDTH] IN BLOOD BY AUTOMATED COUNT: 14.8 % (ref 11.3–14.5)
ERYTHROCYTE [SEDIMENTATION RATE] IN BLOOD: 80 MM/HR (ref 0–20)
GFR SERPL CREATININE-BSD FRML MDRD: 126 ML/MIN/1.73
GLOBULIN UR ELPH-MCNC: 3.5 GM/DL
GLUCOSE BLD-MCNC: 134 MG/DL (ref 70–100)
HCT VFR BLD AUTO: 37.2 % (ref 34.5–44)
HGB BLD-MCNC: 11.7 G/DL (ref 11.5–15.5)
IMM GRANULOCYTES # BLD: 0.04 10*3/MM3 (ref 0–0.03)
IMM GRANULOCYTES NFR BLD: 0.2 % (ref 0–0.6)
LYMPHOCYTES # BLD AUTO: 2.59 10*3/MM3 (ref 0.6–4.8)
LYMPHOCYTES NFR BLD AUTO: 14.4 % (ref 24–44)
MAGNESIUM SERPL-MCNC: 2.1 MG/DL (ref 1.3–2.7)
MCH RBC QN AUTO: 31 PG (ref 27–31)
MCHC RBC AUTO-ENTMCNC: 31.5 G/DL (ref 32–36)
MCV RBC AUTO: 98.4 FL (ref 80–99)
MONOCYTES # BLD AUTO: 1.5 10*3/MM3 (ref 0–1)
MONOCYTES NFR BLD AUTO: 8.3 % (ref 0–12)
NEUTROPHILS # BLD AUTO: 13.45 10*3/MM3 (ref 1.5–8.3)
NEUTROPHILS NFR BLD AUTO: 74.8 % (ref 41–71)
PHOSPHATE SERPL-MCNC: 4.2 MG/DL (ref 2.4–5.1)
PLATELET # BLD AUTO: 566 10*3/MM3 (ref 150–450)
PMV BLD AUTO: 10 FL (ref 6–12)
POTASSIUM BLD-SCNC: 4 MMOL/L (ref 3.5–5.5)
PROT SERPL-MCNC: 7.8 G/DL (ref 5.7–8.2)
RBC # BLD AUTO: 3.78 10*6/MM3 (ref 3.89–5.14)
SODIUM BLD-SCNC: 139 MMOL/L (ref 132–146)
WBC NRBC COR # BLD: 17.99 10*3/MM3 (ref 3.5–10.8)

## 2018-07-25 PROCEDURE — 83735 ASSAY OF MAGNESIUM: CPT | Performed by: INTERNAL MEDICINE

## 2018-07-25 PROCEDURE — 94799 UNLISTED PULMONARY SVC/PX: CPT

## 2018-07-25 PROCEDURE — 83520 IMMUNOASSAY QUANT NOS NONAB: CPT | Performed by: INTERNAL MEDICINE

## 2018-07-25 PROCEDURE — 70553 MRI BRAIN STEM W/O & W/DYE: CPT

## 2018-07-25 PROCEDURE — 25010000002 LEVETIRACETAM IN NACL 0.82% 500 MG/100ML SOLUTION: Performed by: PSYCHIATRY & NEUROLOGY

## 2018-07-25 PROCEDURE — 86256 FLUORESCENT ANTIBODY TITER: CPT | Performed by: INTERNAL MEDICINE

## 2018-07-25 PROCEDURE — 25010000002 THIAMINE PER 100 MG: Performed by: PSYCHIATRY & NEUROLOGY

## 2018-07-25 PROCEDURE — 80053 COMPREHEN METABOLIC PANEL: CPT | Performed by: INTERNAL MEDICINE

## 2018-07-25 PROCEDURE — 99291 CRITICAL CARE FIRST HOUR: CPT | Performed by: INTERNAL MEDICINE

## 2018-07-25 PROCEDURE — 86200 CCP ANTIBODY: CPT | Performed by: INTERNAL MEDICINE

## 2018-07-25 PROCEDURE — 25010000003 LEVETIRACETAM IN NACL 0.75% 1000 MG/100ML SOLUTION: Performed by: INTERNAL MEDICINE

## 2018-07-25 PROCEDURE — 25010000002 LORAZEPAM PER 2 MG: Performed by: INTERNAL MEDICINE

## 2018-07-25 PROCEDURE — 25010000002 PROPOFOL 1000 MG/ML EMULSION: Performed by: INTERNAL MEDICINE

## 2018-07-25 PROCEDURE — A9577 INJ MULTIHANCE: HCPCS | Performed by: INTERNAL MEDICINE

## 2018-07-25 PROCEDURE — 86140 C-REACTIVE PROTEIN: CPT | Performed by: INTERNAL MEDICINE

## 2018-07-25 PROCEDURE — 84100 ASSAY OF PHOSPHORUS: CPT | Performed by: INTERNAL MEDICINE

## 2018-07-25 PROCEDURE — 86038 ANTINUCLEAR ANTIBODIES: CPT | Performed by: INTERNAL MEDICINE

## 2018-07-25 PROCEDURE — 94640 AIRWAY INHALATION TREATMENT: CPT

## 2018-07-25 PROCEDURE — 94760 N-INVAS EAR/PLS OXIMETRY 1: CPT

## 2018-07-25 PROCEDURE — 25010000002 ENOXAPARIN PER 10 MG: Performed by: INTERNAL MEDICINE

## 2018-07-25 PROCEDURE — 94003 VENT MGMT INPAT SUBQ DAY: CPT

## 2018-07-25 PROCEDURE — 0 GADOBENATE DIMEGLUMINE 529 MG/ML SOLUTION: Performed by: INTERNAL MEDICINE

## 2018-07-25 PROCEDURE — 86431 RHEUMATOID FACTOR QUANT: CPT | Performed by: INTERNAL MEDICINE

## 2018-07-25 PROCEDURE — 85025 COMPLETE CBC W/AUTO DIFF WBC: CPT | Performed by: INTERNAL MEDICINE

## 2018-07-25 PROCEDURE — 71045 X-RAY EXAM CHEST 1 VIEW: CPT

## 2018-07-25 PROCEDURE — 86430 RHEUMATOID FACTOR TEST QUAL: CPT | Performed by: INTERNAL MEDICINE

## 2018-07-25 RX ORDER — LORAZEPAM 2 MG/ML
2 INJECTION INTRAMUSCULAR
Status: DISPENSED | OUTPATIENT
Start: 2018-07-25 | End: 2018-08-04

## 2018-07-25 RX ORDER — CEFAZOLIN SODIUM 2 G/100ML
2 INJECTION, SOLUTION INTRAVENOUS ONCE
Status: COMPLETED | OUTPATIENT
Start: 2018-07-26 | End: 2018-07-26

## 2018-07-25 RX ORDER — LEVETIRACETAM 10 MG/ML
1000 INJECTION INTRAVASCULAR EVERY 12 HOURS SCHEDULED
Status: DISCONTINUED | OUTPATIENT
Start: 2018-07-25 | End: 2018-07-27

## 2018-07-25 RX ORDER — LIDOCAINE HYDROCHLORIDE 10 MG/ML
0.5 INJECTION, SOLUTION EPIDURAL; INFILTRATION; INTRACAUDAL; PERINEURAL ONCE AS NEEDED
Status: DISCONTINUED | OUTPATIENT
Start: 2018-07-25 | End: 2018-07-26 | Stop reason: HOSPADM

## 2018-07-25 RX ORDER — SODIUM CHLORIDE, SODIUM LACTATE, POTASSIUM CHLORIDE, CALCIUM CHLORIDE 600; 310; 30; 20 MG/100ML; MG/100ML; MG/100ML; MG/100ML
9 INJECTION, SOLUTION INTRAVENOUS CONTINUOUS
Status: DISCONTINUED | OUTPATIENT
Start: 2018-07-25 | End: 2018-07-26

## 2018-07-25 RX ORDER — IPRATROPIUM BROMIDE AND ALBUTEROL SULFATE 2.5; .5 MG/3ML; MG/3ML
3 SOLUTION RESPIRATORY (INHALATION) EVERY 6 HOURS PRN
Status: DISCONTINUED | OUTPATIENT
Start: 2018-07-25 | End: 2018-08-12

## 2018-07-25 RX ORDER — FAMOTIDINE 10 MG/ML
20 INJECTION, SOLUTION INTRAVENOUS ONCE
Status: COMPLETED | OUTPATIENT
Start: 2018-07-25 | End: 2018-07-26

## 2018-07-25 RX ORDER — LORAZEPAM 2 MG/ML
4 INJECTION INTRAMUSCULAR ONCE
Status: COMPLETED | OUTPATIENT
Start: 2018-07-25 | End: 2018-07-25

## 2018-07-25 RX ORDER — FAMOTIDINE 20 MG/1
20 TABLET, FILM COATED ORAL ONCE
Status: DISCONTINUED | OUTPATIENT
Start: 2018-07-25 | End: 2018-07-26

## 2018-07-25 RX ORDER — SODIUM CHLORIDE 0.9 % (FLUSH) 0.9 %
1-10 SYRINGE (ML) INJECTION AS NEEDED
Status: DISCONTINUED | OUTPATIENT
Start: 2018-07-25 | End: 2018-07-26 | Stop reason: HOSPADM

## 2018-07-25 RX ADMIN — CLOPIDOGREL BISULFATE 75 MG: 75 TABLET ORAL at 09:18

## 2018-07-25 RX ADMIN — PROPOFOL 50 MCG/KG/MIN: 10 INJECTION, EMULSION INTRAVENOUS at 15:18

## 2018-07-25 RX ADMIN — LORAZEPAM 2 MG: 2 INJECTION INTRAMUSCULAR; INTRAVENOUS at 15:16

## 2018-07-25 RX ADMIN — LORAZEPAM 2 MG: 2 INJECTION INTRAMUSCULAR; INTRAVENOUS at 11:56

## 2018-07-25 RX ADMIN — LEVETIRACETAM 1000 MG: 10 INJECTION INTRAVENOUS at 20:13

## 2018-07-25 RX ADMIN — IPRATROPIUM BROMIDE AND ALBUTEROL SULFATE 3 ML: 2.5; .5 SOLUTION RESPIRATORY (INHALATION) at 07:03

## 2018-07-25 RX ADMIN — LORAZEPAM 2 MG: 2 INJECTION INTRAMUSCULAR; INTRAVENOUS at 21:20

## 2018-07-25 RX ADMIN — ATORVASTATIN CALCIUM 80 MG: 40 TABLET, FILM COATED ORAL at 20:14

## 2018-07-25 RX ADMIN — THIAMINE HYDROCHLORIDE 100 MG: 100 INJECTION, SOLUTION INTRAMUSCULAR; INTRAVENOUS at 09:18

## 2018-07-25 RX ADMIN — VALPROATE SODIUM 500 MG: 100 INJECTION, SOLUTION INTRAVENOUS at 18:07

## 2018-07-25 RX ADMIN — LEVETIRACETAM 500 MG: 5 INJECTION INTRAVENOUS at 09:18

## 2018-07-25 RX ADMIN — CHLORHEXIDINE GLUCONATE 15 ML: 1.2 RINSE ORAL at 21:30

## 2018-07-25 RX ADMIN — FAMOTIDINE 20 MG: 10 INJECTION INTRAVENOUS at 09:18

## 2018-07-25 RX ADMIN — ENOXAPARIN SODIUM 40 MG: 100 INJECTION SUBCUTANEOUS at 09:17

## 2018-07-25 RX ADMIN — IPRATROPIUM BROMIDE AND ALBUTEROL SULFATE 3 ML: 2.5; .5 SOLUTION RESPIRATORY (INHALATION) at 01:27

## 2018-07-25 RX ADMIN — LORAZEPAM 2 MG: 2 INJECTION INTRAMUSCULAR; INTRAVENOUS at 13:01

## 2018-07-25 RX ADMIN — FAMOTIDINE 20 MG: 10 INJECTION INTRAVENOUS at 20:14

## 2018-07-25 RX ADMIN — ASPIRIN 81 MG CHEWABLE TABLET 81 MG: 81 TABLET CHEWABLE at 09:18

## 2018-07-25 RX ADMIN — GADOBENATE DIMEGLUMINE 13 ML: 529 INJECTION, SOLUTION INTRAVENOUS at 13:26

## 2018-07-25 RX ADMIN — CHLORHEXIDINE GLUCONATE 15 ML: 1.2 RINSE ORAL at 09:18

## 2018-07-25 RX ADMIN — METOPROLOL TARTRATE 25 MG: 25 TABLET ORAL at 20:13

## 2018-07-25 RX ADMIN — METOPROLOL TARTRATE 12.5 MG: 25 TABLET, FILM COATED ORAL at 11:03

## 2018-07-25 RX ADMIN — LORAZEPAM 4 MG: 2 INJECTION INTRAMUSCULAR; INTRAVENOUS at 12:14

## 2018-07-26 ENCOUNTER — ANESTHESIA (OUTPATIENT)
Dept: PERIOP | Facility: HOSPITAL | Age: 38
End: 2018-07-26

## 2018-07-26 ENCOUNTER — APPOINTMENT (OUTPATIENT)
Dept: NEUROLOGY | Facility: HOSPITAL | Age: 38
End: 2018-07-26
Attending: PSYCHIATRY & NEUROLOGY

## 2018-07-26 LAB
ANION GAP SERPL CALCULATED.3IONS-SCNC: 10 MMOL/L (ref 3–11)
APTT PPP: 26.3 SECONDS (ref 24–31)
BUN BLD-MCNC: 22 MG/DL (ref 9–23)
BUN/CREAT SERPL: 37.9 (ref 7–25)
CALCIUM SPEC-SCNC: 10 MG/DL (ref 8.7–10.4)
CHLORIDE SERPL-SCNC: 104 MMOL/L (ref 99–109)
CO2 SERPL-SCNC: 26 MMOL/L (ref 20–31)
CORTIS SERPL-MCNC: 24.1 MCG/DL
CREAT BLD-MCNC: 0.58 MG/DL (ref 0.6–1.3)
DEPRECATED RDW RBC AUTO: 53.6 FL (ref 37–54)
ERYTHROCYTE [DISTWIDTH] IN BLOOD BY AUTOMATED COUNT: 14.7 % (ref 11.3–14.5)
GFR SERPL CREATININE-BSD FRML MDRD: 116 ML/MIN/1.73
GLUCOSE BLD-MCNC: 94 MG/DL (ref 70–100)
HCT VFR BLD AUTO: 35.4 % (ref 34.5–44)
HGB BLD-MCNC: 11.1 G/DL (ref 11.5–15.5)
INR PPP: 1.05 (ref 0.91–1.09)
MCH RBC QN AUTO: 31 PG (ref 27–31)
MCHC RBC AUTO-ENTMCNC: 31.4 G/DL (ref 32–36)
MCV RBC AUTO: 98.9 FL (ref 80–99)
PLATELET # BLD AUTO: 581 10*3/MM3 (ref 150–450)
PMV BLD AUTO: 10.1 FL (ref 6–12)
POTASSIUM BLD-SCNC: 3.9 MMOL/L (ref 3.5–5.5)
PROTHROMBIN TIME: 11 SECONDS (ref 9.6–11.5)
RBC # BLD AUTO: 3.58 10*6/MM3 (ref 3.89–5.14)
RHEUMATOID FACT SERPL-ACNC: POSITIVE [IU]/ML
RHEUMATOID FACT TITR SER: NORMAL IU/ML
SODIUM BLD-SCNC: 140 MMOL/L (ref 132–146)
TSH SERPL DL<=0.05 MIU/L-ACNC: 1.14 MIU/ML (ref 0.35–5.35)
WBC NRBC COR # BLD: 12.65 10*3/MM3 (ref 3.5–10.8)

## 2018-07-26 PROCEDURE — 94799 UNLISTED PULMONARY SVC/PX: CPT

## 2018-07-26 PROCEDURE — 25010000002 PROPOFOL 1000 MG/ML EMULSION: Performed by: INTERNAL MEDICINE

## 2018-07-26 PROCEDURE — 80048 BASIC METABOLIC PNL TOTAL CA: CPT | Performed by: SURGERY

## 2018-07-26 PROCEDURE — 85730 THROMBOPLASTIN TIME PARTIAL: CPT | Performed by: SURGERY

## 2018-07-26 PROCEDURE — 25010000003 CEFAZOLIN IN DEXTROSE 2-4 GM/100ML-% SOLUTION: Performed by: SURGERY

## 2018-07-26 PROCEDURE — 94760 N-INVAS EAR/PLS OXIMETRY 1: CPT

## 2018-07-26 PROCEDURE — 25010000002 THIAMINE PER 100 MG: Performed by: PSYCHIATRY & NEUROLOGY

## 2018-07-26 PROCEDURE — 25010000002 ONDANSETRON PER 1 MG: Performed by: NURSE ANESTHETIST, CERTIFIED REGISTERED

## 2018-07-26 PROCEDURE — 85027 COMPLETE CBC AUTOMATED: CPT | Performed by: SURGERY

## 2018-07-26 PROCEDURE — 25010000003 LEVETIRACETAM IN NACL 0.75% 1000 MG/100ML SOLUTION: Performed by: INTERNAL MEDICINE

## 2018-07-26 PROCEDURE — 25010000002 ENOXAPARIN PER 10 MG: Performed by: INTERNAL MEDICINE

## 2018-07-26 PROCEDURE — 25010000002 LORAZEPAM PER 2 MG: Performed by: INTERNAL MEDICINE

## 2018-07-26 PROCEDURE — 85610 PROTHROMBIN TIME: CPT | Performed by: SURGERY

## 2018-07-26 PROCEDURE — 0B110F4 BYPASS TRACHEA TO CUTANEOUS WITH TRACHEOSTOMY DEVICE, OPEN APPROACH: ICD-10-PCS | Performed by: SURGERY

## 2018-07-26 PROCEDURE — 0DH63UZ INSERTION OF FEEDING DEVICE INTO STOMACH, PERCUTANEOUS APPROACH: ICD-10-PCS | Performed by: SURGERY

## 2018-07-26 PROCEDURE — 25010000002 PHENYLEPHRINE PER 1 ML: Performed by: NURSE ANESTHETIST, CERTIFIED REGISTERED

## 2018-07-26 PROCEDURE — 99231 SBSQ HOSP IP/OBS SF/LOW 25: CPT | Performed by: PSYCHIATRY & NEUROLOGY

## 2018-07-26 PROCEDURE — 25010000003 CEFAZOLIN IN DEXTROSE 2-4 GM/100ML-% SOLUTION: Performed by: NURSE ANESTHETIST, CERTIFIED REGISTERED

## 2018-07-26 PROCEDURE — 25010000002 THIAMINE PER 100 MG: Performed by: INTERNAL MEDICINE

## 2018-07-26 PROCEDURE — 25010000002 MIDAZOLAM PER 1 MG: Performed by: NURSE ANESTHETIST, CERTIFIED REGISTERED

## 2018-07-26 PROCEDURE — 94003 VENT MGMT INPAT SUBQ DAY: CPT

## 2018-07-26 PROCEDURE — 84443 ASSAY THYROID STIM HORMONE: CPT | Performed by: INTERNAL MEDICINE

## 2018-07-26 PROCEDURE — 82533 TOTAL CORTISOL: CPT | Performed by: INTERNAL MEDICINE

## 2018-07-26 PROCEDURE — 25010000002 FENTANYL CITRATE (PF) 100 MCG/2ML SOLUTION: Performed by: NURSE ANESTHETIST, CERTIFIED REGISTERED

## 2018-07-26 PROCEDURE — 99291 CRITICAL CARE FIRST HOUR: CPT | Performed by: INTERNAL MEDICINE

## 2018-07-26 PROCEDURE — 95819 EEG AWAKE AND ASLEEP: CPT

## 2018-07-26 RX ORDER — SODIUM CHLORIDE 0.9 % (FLUSH) 0.9 %
1-10 SYRINGE (ML) INJECTION AS NEEDED
Status: DISCONTINUED | OUTPATIENT
Start: 2018-07-26 | End: 2018-07-28

## 2018-07-26 RX ORDER — SODIUM CHLORIDE, SODIUM LACTATE, POTASSIUM CHLORIDE, CALCIUM CHLORIDE 600; 310; 30; 20 MG/100ML; MG/100ML; MG/100ML; MG/100ML
INJECTION, SOLUTION INTRAVENOUS CONTINUOUS PRN
Status: DISCONTINUED | OUTPATIENT
Start: 2018-07-26 | End: 2018-07-26 | Stop reason: SURG

## 2018-07-26 RX ORDER — FAMOTIDINE 20 MG/1
20 TABLET, FILM COATED ORAL 2 TIMES DAILY
Status: DISCONTINUED | OUTPATIENT
Start: 2018-07-26 | End: 2018-08-18

## 2018-07-26 RX ORDER — FAMOTIDINE 10 MG/ML
20 INJECTION, SOLUTION INTRAVENOUS ONCE
Status: DISCONTINUED | OUTPATIENT
Start: 2018-07-26 | End: 2018-07-26

## 2018-07-26 RX ORDER — LIDOCAINE HYDROCHLORIDE 10 MG/ML
0.5 INJECTION, SOLUTION EPIDURAL; INFILTRATION; INTRACAUDAL; PERINEURAL ONCE AS NEEDED
Status: DISCONTINUED | OUTPATIENT
Start: 2018-07-26 | End: 2018-07-28

## 2018-07-26 RX ORDER — GLYCOPYRROLATE 1 MG/1
2 TABLET ORAL 3 TIMES DAILY
Status: DISCONTINUED | OUTPATIENT
Start: 2018-07-26 | End: 2018-08-05

## 2018-07-26 RX ORDER — CEFAZOLIN SODIUM 2 G/100ML
INJECTION, SOLUTION INTRAVENOUS AS NEEDED
Status: DISCONTINUED | OUTPATIENT
Start: 2018-07-26 | End: 2018-07-26 | Stop reason: SURG

## 2018-07-26 RX ORDER — MIDAZOLAM HYDROCHLORIDE 1 MG/ML
INJECTION INTRAMUSCULAR; INTRAVENOUS AS NEEDED
Status: DISCONTINUED | OUTPATIENT
Start: 2018-07-26 | End: 2018-07-26 | Stop reason: SURG

## 2018-07-26 RX ORDER — FAMOTIDINE 20 MG/1
20 TABLET, FILM COATED ORAL ONCE
Status: DISCONTINUED | OUTPATIENT
Start: 2018-07-26 | End: 2018-07-26

## 2018-07-26 RX ORDER — VECURONIUM BROMIDE 1 MG/ML
INJECTION, POWDER, LYOPHILIZED, FOR SOLUTION INTRAVENOUS AS NEEDED
Status: DISCONTINUED | OUTPATIENT
Start: 2018-07-26 | End: 2018-07-26 | Stop reason: SURG

## 2018-07-26 RX ORDER — THIAMINE MONONITRATE (VIT B1) 100 MG
100 TABLET ORAL DAILY
Status: DISCONTINUED | OUTPATIENT
Start: 2018-07-26 | End: 2018-07-26

## 2018-07-26 RX ORDER — BUPIVACAINE HYDROCHLORIDE AND EPINEPHRINE 2.5; 5 MG/ML; UG/ML
INJECTION, SOLUTION EPIDURAL; INFILTRATION; INTRACAUDAL; PERINEURAL AS NEEDED
Status: DISCONTINUED | OUTPATIENT
Start: 2018-07-26 | End: 2018-07-26 | Stop reason: HOSPADM

## 2018-07-26 RX ORDER — FENTANYL CITRATE 50 UG/ML
INJECTION, SOLUTION INTRAMUSCULAR; INTRAVENOUS AS NEEDED
Status: DISCONTINUED | OUTPATIENT
Start: 2018-07-26 | End: 2018-07-26 | Stop reason: SURG

## 2018-07-26 RX ORDER — SODIUM CHLORIDE, SODIUM LACTATE, POTASSIUM CHLORIDE, CALCIUM CHLORIDE 600; 310; 30; 20 MG/100ML; MG/100ML; MG/100ML; MG/100ML
9 INJECTION, SOLUTION INTRAVENOUS CONTINUOUS
Status: DISCONTINUED | OUTPATIENT
Start: 2018-07-26 | End: 2018-07-26

## 2018-07-26 RX ORDER — THIAMINE MONONITRATE (VIT B1) 100 MG
100 TABLET ORAL DAILY
Status: DISCONTINUED | OUTPATIENT
Start: 2018-07-27 | End: 2018-07-26

## 2018-07-26 RX ORDER — ONDANSETRON 2 MG/ML
INJECTION INTRAMUSCULAR; INTRAVENOUS AS NEEDED
Status: DISCONTINUED | OUTPATIENT
Start: 2018-07-26 | End: 2018-07-26 | Stop reason: SURG

## 2018-07-26 RX ADMIN — SODIUM CHLORIDE, POTASSIUM CHLORIDE, SODIUM LACTATE AND CALCIUM CHLORIDE: 600; 310; 30; 20 INJECTION, SOLUTION INTRAVENOUS at 11:24

## 2018-07-26 RX ADMIN — CEFAZOLIN SODIUM 2 G: 2 INJECTION, SOLUTION INTRAVENOUS at 09:28

## 2018-07-26 RX ADMIN — PHENYLEPHRINE HYDROCHLORIDE 200 MCG: 10 INJECTION INTRAVENOUS at 11:32

## 2018-07-26 RX ADMIN — LEVETIRACETAM 1000 MG: 10 INJECTION INTRAVENOUS at 09:20

## 2018-07-26 RX ADMIN — ATORVASTATIN CALCIUM 80 MG: 40 TABLET, FILM COATED ORAL at 20:18

## 2018-07-26 RX ADMIN — PHENYLEPHRINE HYDROCHLORIDE 200 MCG: 10 INJECTION INTRAVENOUS at 11:28

## 2018-07-26 RX ADMIN — FAMOTIDINE 20 MG: 10 INJECTION INTRAVENOUS at 09:27

## 2018-07-26 RX ADMIN — VALPROATE SODIUM 500 MG: 100 INJECTION, SOLUTION INTRAVENOUS at 23:08

## 2018-07-26 RX ADMIN — MIDAZOLAM HYDROCHLORIDE 2 MG: 1 INJECTION, SOLUTION INTRAMUSCULAR; INTRAVENOUS at 11:19

## 2018-07-26 RX ADMIN — LEVETIRACETAM 1000 MG: 10 INJECTION INTRAVENOUS at 20:18

## 2018-07-26 RX ADMIN — ENOXAPARIN SODIUM 40 MG: 100 INJECTION SUBCUTANEOUS at 09:28

## 2018-07-26 RX ADMIN — CEFAZOLIN SODIUM 2 G: 2 INJECTION, SOLUTION INTRAVENOUS at 11:31

## 2018-07-26 RX ADMIN — PHENYLEPHRINE HYDROCHLORIDE 200 MCG: 10 INJECTION INTRAVENOUS at 11:45

## 2018-07-26 RX ADMIN — THIAMINE HYDROCHLORIDE 500 MG: 100 INJECTION, SOLUTION INTRAMUSCULAR; INTRAVENOUS at 20:18

## 2018-07-26 RX ADMIN — FAMOTIDINE 20 MG: 10 INJECTION INTRAVENOUS at 09:00

## 2018-07-26 RX ADMIN — METOPROLOL TARTRATE 25 MG: 25 TABLET ORAL at 09:00

## 2018-07-26 RX ADMIN — CHLORHEXIDINE GLUCONATE 15 ML: 1.2 RINSE ORAL at 09:27

## 2018-07-26 RX ADMIN — LEVETIRACETAM 1000 MG: 10 INJECTION INTRAVENOUS at 18:23

## 2018-07-26 RX ADMIN — LORAZEPAM 2 MG: 2 INJECTION INTRAMUSCULAR; INTRAVENOUS at 18:23

## 2018-07-26 RX ADMIN — THIAMINE HYDROCHLORIDE 100 MG: 100 INJECTION, SOLUTION INTRAMUSCULAR; INTRAVENOUS at 09:27

## 2018-07-26 RX ADMIN — LORAZEPAM 2 MG: 2 INJECTION INTRAMUSCULAR; INTRAVENOUS at 16:42

## 2018-07-26 RX ADMIN — ONDANSETRON 4 MG: 2 INJECTION INTRAMUSCULAR; INTRAVENOUS at 12:00

## 2018-07-26 RX ADMIN — PROPOFOL 10 MCG/KG/MIN: 10 INJECTION, EMULSION INTRAVENOUS at 05:51

## 2018-07-26 RX ADMIN — PHENYLEPHRINE HYDROCHLORIDE 200 MCG: 10 INJECTION INTRAVENOUS at 11:48

## 2018-07-26 RX ADMIN — LORAZEPAM 2 MG: 2 INJECTION INTRAMUSCULAR; INTRAVENOUS at 01:40

## 2018-07-26 RX ADMIN — FENTANYL CITRATE 100 MCG: 50 INJECTION, SOLUTION INTRAMUSCULAR; INTRAVENOUS at 11:19

## 2018-07-26 RX ADMIN — GLYCOPYRROLATE 2 MG: 1 TABLET ORAL at 20:18

## 2018-07-26 RX ADMIN — ASPIRIN 81 MG CHEWABLE TABLET 81 MG: 81 TABLET CHEWABLE at 09:00

## 2018-07-26 RX ADMIN — LORAZEPAM 2 MG: 2 INJECTION INTRAMUSCULAR; INTRAVENOUS at 23:11

## 2018-07-26 RX ADMIN — METOPROLOL TARTRATE 25 MG: 25 TABLET ORAL at 20:25

## 2018-07-26 RX ADMIN — CHLORHEXIDINE GLUCONATE 15 ML: 1.2 RINSE ORAL at 20:18

## 2018-07-26 RX ADMIN — FAMOTIDINE 20 MG: 20 TABLET ORAL at 20:18

## 2018-07-26 RX ADMIN — PHENYLEPHRINE HYDROCHLORIDE 200 MCG: 10 INJECTION INTRAVENOUS at 11:37

## 2018-07-26 RX ADMIN — GLYCOPYRROLATE 2 MG: 1 TABLET ORAL at 18:23

## 2018-07-26 RX ADMIN — VALPROATE SODIUM 500 MG: 100 INJECTION, SOLUTION INTRAVENOUS at 05:52

## 2018-07-26 RX ADMIN — VECURONIUM BROMIDE 5 MG: 1 INJECTION, POWDER, LYOPHILIZED, FOR SOLUTION INTRAVENOUS at 11:30

## 2018-07-27 ENCOUNTER — APPOINTMENT (OUTPATIENT)
Dept: NEUROLOGY | Facility: HOSPITAL | Age: 38
End: 2018-07-27
Attending: PSYCHIATRY & NEUROLOGY

## 2018-07-27 ENCOUNTER — APPOINTMENT (OUTPATIENT)
Dept: GENERAL RADIOLOGY | Facility: HOSPITAL | Age: 38
End: 2018-07-27

## 2018-07-27 LAB
ALBUMIN SERPL-MCNC: 3.79 G/DL (ref 3.2–4.8)
ALBUMIN/GLOB SERPL: 1.1 G/DL (ref 1.5–2.5)
ALP SERPL-CCNC: 82 U/L (ref 25–100)
ALT SERPL W P-5'-P-CCNC: 56 U/L (ref 7–40)
AMMONIA BLD-SCNC: 40 UMOL/L (ref 19–60)
ANA SER QL IA: NEGATIVE
ANION GAP SERPL CALCULATED.3IONS-SCNC: 11 MMOL/L (ref 3–11)
ARTERIAL PATENCY WRIST A: ABNORMAL
AST SERPL-CCNC: 65 U/L (ref 0–33)
ATMOSPHERIC PRESS: ABNORMAL MMHG
BASE EXCESS BLDA CALC-SCNC: 5.3 MMOL/L (ref 0–2)
BASOPHILS # BLD AUTO: 0.1 10*3/MM3 (ref 0–0.2)
BASOPHILS NFR BLD AUTO: 0.7 % (ref 0–1)
BDY SITE: ABNORMAL
BILIRUB SERPL-MCNC: 0.5 MG/DL (ref 0.3–1.2)
BUN BLD-MCNC: 27 MG/DL (ref 9–23)
BUN/CREAT SERPL: 37 (ref 7–25)
C-ANCA TITR SER IF: NORMAL TITER
CALCIUM SPEC-SCNC: 9.6 MG/DL (ref 8.7–10.4)
CCP IGA+IGG SERPL IA-ACNC: 10 UNITS (ref 0–19)
CHLORIDE SERPL-SCNC: 106 MMOL/L (ref 99–109)
CO2 BLDA-SCNC: 28.4 MMOL/L (ref 22–33)
CO2 SERPL-SCNC: 24 MMOL/L (ref 20–31)
COHGB MFR BLD: 0.9 % (ref 0–2)
CREAT BLD-MCNC: 0.73 MG/DL (ref 0.6–1.3)
DEPRECATED RDW RBC AUTO: 53.1 FL (ref 37–54)
EOSINOPHIL # BLD AUTO: 0.13 10*3/MM3 (ref 0–0.3)
EOSINOPHIL NFR BLD AUTO: 0.9 % (ref 0–3)
ERYTHROCYTE [DISTWIDTH] IN BLOOD BY AUTOMATED COUNT: 14.8 % (ref 11.3–14.5)
GFR SERPL CREATININE-BSD FRML MDRD: 89 ML/MIN/1.73
GLOBULIN UR ELPH-MCNC: 3.3 GM/DL
GLUCOSE BLD-MCNC: 119 MG/DL (ref 70–100)
HCO3 BLDA-SCNC: 27.5 MMOL/L (ref 20–26)
HCT VFR BLD AUTO: 32.9 % (ref 34.5–44)
HCT VFR BLD CALC: 33.1 %
HGB BLD-MCNC: 10.3 G/DL (ref 11.5–15.5)
HGB BLDA-MCNC: 10.8 G/DL (ref 14–18)
HIV1+2 AB SER QL: NORMAL
HOROWITZ INDEX BLD+IHG-RTO: 30 %
IMM GRANULOCYTES # BLD: 0.03 10*3/MM3 (ref 0–0.03)
IMM GRANULOCYTES NFR BLD: 0.2 % (ref 0–0.6)
LYMPHOCYTES # BLD AUTO: 2.06 10*3/MM3 (ref 0.6–4.8)
LYMPHOCYTES NFR BLD AUTO: 14.7 % (ref 24–44)
MAGNESIUM SERPL-MCNC: 2.3 MG/DL (ref 1.3–2.7)
MCH RBC QN AUTO: 30.7 PG (ref 27–31)
MCHC RBC AUTO-ENTMCNC: 31.3 G/DL (ref 32–36)
MCV RBC AUTO: 98.2 FL (ref 80–99)
METHGB BLD QL: 1 % (ref 0–1.5)
MODALITY: ABNORMAL
MONOCYTES # BLD AUTO: 0.88 10*3/MM3 (ref 0–1)
MONOCYTES NFR BLD AUTO: 6.3 % (ref 0–12)
MYELOPEROXIDASE AB SER-ACNC: <9 U/ML (ref 0–9)
NEUTROPHILS # BLD AUTO: 10.85 10*3/MM3 (ref 1.5–8.3)
NEUTROPHILS NFR BLD AUTO: 77.4 % (ref 41–71)
OXYHGB MFR BLDV: 95.9 % (ref 94–99)
P-ANCA ATYPICAL TITR SER IF: NORMAL TITER
P-ANCA TITR SER IF: NORMAL TITER
PCO2 BLDA: 31.1 MM HG (ref 35–45)
PH BLDA: 7.55 PH UNITS (ref 7.35–7.45)
PHOSPHATE SERPL-MCNC: 4.7 MG/DL (ref 2.4–5.1)
PLATELET # BLD AUTO: 595 10*3/MM3 (ref 150–450)
PMV BLD AUTO: 9.9 FL (ref 6–12)
PO2 BLDA: 91.3 MM HG (ref 83–108)
POTASSIUM BLD-SCNC: 3.5 MMOL/L (ref 3.5–5.5)
PROT SERPL-MCNC: 7.1 G/DL (ref 5.7–8.2)
PROTEINASE3 AB SER IA-ACNC: <3.5 U/ML (ref 0–3.5)
RBC # BLD AUTO: 3.35 10*6/MM3 (ref 3.89–5.14)
SODIUM BLD-SCNC: 141 MMOL/L (ref 132–146)
WBC NRBC COR # BLD: 14.02 10*3/MM3 (ref 3.5–10.8)

## 2018-07-27 PROCEDURE — 94799 UNLISTED PULMONARY SVC/PX: CPT

## 2018-07-27 PROCEDURE — 25010000002 THIAMINE PER 100 MG: Performed by: INTERNAL MEDICINE

## 2018-07-27 PROCEDURE — 82805 BLOOD GASES W/O2 SATURATION: CPT | Performed by: INTERNAL MEDICINE

## 2018-07-27 PROCEDURE — 80053 COMPREHEN METABOLIC PANEL: CPT | Performed by: INTERNAL MEDICINE

## 2018-07-27 PROCEDURE — 25010000003 LEVETIRACETAM IN NACL 0.75% 1000 MG/100ML SOLUTION: Performed by: INTERNAL MEDICINE

## 2018-07-27 PROCEDURE — 71045 X-RAY EXAM CHEST 1 VIEW: CPT

## 2018-07-27 PROCEDURE — 94760 N-INVAS EAR/PLS OXIMETRY 1: CPT

## 2018-07-27 PROCEDURE — 94640 AIRWAY INHALATION TREATMENT: CPT

## 2018-07-27 PROCEDURE — 85025 COMPLETE CBC W/AUTO DIFF WBC: CPT | Performed by: INTERNAL MEDICINE

## 2018-07-27 PROCEDURE — 36600 WITHDRAWAL OF ARTERIAL BLOOD: CPT | Performed by: INTERNAL MEDICINE

## 2018-07-27 PROCEDURE — G0432 EIA HIV-1/HIV-2 SCREEN: HCPCS | Performed by: INTERNAL MEDICINE

## 2018-07-27 PROCEDURE — 84100 ASSAY OF PHOSPHORUS: CPT | Performed by: INTERNAL MEDICINE

## 2018-07-27 PROCEDURE — 82140 ASSAY OF AMMONIA: CPT | Performed by: INTERNAL MEDICINE

## 2018-07-27 PROCEDURE — 99291 CRITICAL CARE FIRST HOUR: CPT | Performed by: INTERNAL MEDICINE

## 2018-07-27 PROCEDURE — 94003 VENT MGMT INPAT SUBQ DAY: CPT

## 2018-07-27 PROCEDURE — 25010000002 ENOXAPARIN PER 10 MG: Performed by: INTERNAL MEDICINE

## 2018-07-27 PROCEDURE — 83735 ASSAY OF MAGNESIUM: CPT | Performed by: INTERNAL MEDICINE

## 2018-07-27 RX ORDER — METHYLPHENIDATE HYDROCHLORIDE 10 MG/1
10 TABLET ORAL 2 TIMES DAILY
Status: DISCONTINUED | OUTPATIENT
Start: 2018-07-27 | End: 2018-08-04

## 2018-07-27 RX ORDER — VALPROIC ACID 250 MG/5ML
250 SOLUTION ORAL EVERY 6 HOURS SCHEDULED
Status: DISCONTINUED | OUTPATIENT
Start: 2018-07-27 | End: 2018-08-18

## 2018-07-27 RX ORDER — IPRATROPIUM BROMIDE AND ALBUTEROL SULFATE 2.5; .5 MG/3ML; MG/3ML
3 SOLUTION RESPIRATORY (INHALATION)
Status: DISCONTINUED | OUTPATIENT
Start: 2018-07-27 | End: 2018-08-01

## 2018-07-27 RX ORDER — LEVETIRACETAM 100 MG/ML
1000 SOLUTION ORAL EVERY 12 HOURS SCHEDULED
Status: DISCONTINUED | OUTPATIENT
Start: 2018-07-27 | End: 2018-08-07

## 2018-07-27 RX ADMIN — ACETAMINOPHEN 650 MG: 325 TABLET, FILM COATED ORAL at 02:14

## 2018-07-27 RX ADMIN — POTASSIUM CHLORIDE 40 MEQ: 1.5 POWDER, FOR SOLUTION ORAL at 06:04

## 2018-07-27 RX ADMIN — IPRATROPIUM BROMIDE AND ALBUTEROL SULFATE 3 ML: 2.5; .5 SOLUTION RESPIRATORY (INHALATION) at 23:54

## 2018-07-27 RX ADMIN — IPRATROPIUM BROMIDE AND ALBUTEROL SULFATE 3 ML: 2.5; .5 SOLUTION RESPIRATORY (INHALATION) at 13:00

## 2018-07-27 RX ADMIN — FAMOTIDINE 20 MG: 20 TABLET ORAL at 20:03

## 2018-07-27 RX ADMIN — CLOPIDOGREL BISULFATE 75 MG: 75 TABLET ORAL at 08:37

## 2018-07-27 RX ADMIN — ATORVASTATIN CALCIUM 80 MG: 40 TABLET, FILM COATED ORAL at 20:03

## 2018-07-27 RX ADMIN — LEVETIRACETAM 1000 MG: 10 INJECTION INTRAVENOUS at 08:21

## 2018-07-27 RX ADMIN — METOPROLOL TARTRATE 25 MG: 25 TABLET ORAL at 08:37

## 2018-07-27 RX ADMIN — IPRATROPIUM BROMIDE AND ALBUTEROL SULFATE 3 ML: 2.5; .5 SOLUTION RESPIRATORY (INHALATION) at 19:42

## 2018-07-27 RX ADMIN — VALPROIC ACID 250 MG: 250 SOLUTION ORAL at 18:53

## 2018-07-27 RX ADMIN — ASPIRIN 81 MG CHEWABLE TABLET 81 MG: 81 TABLET CHEWABLE at 08:37

## 2018-07-27 RX ADMIN — METHYLPHENIDATE HYDROCHLORIDE 10 MG: 10 TABLET ORAL at 11:30

## 2018-07-27 RX ADMIN — THIAMINE HYDROCHLORIDE 500 MG: 100 INJECTION, SOLUTION INTRAMUSCULAR; INTRAVENOUS at 04:24

## 2018-07-27 RX ADMIN — FAMOTIDINE 20 MG: 20 TABLET ORAL at 08:37

## 2018-07-27 RX ADMIN — METHYLPHENIDATE HYDROCHLORIDE 10 MG: 10 TABLET ORAL at 20:03

## 2018-07-27 RX ADMIN — GLYCOPYRROLATE 2 MG: 1 TABLET ORAL at 16:13

## 2018-07-27 RX ADMIN — VALPROATE SODIUM 500 MG: 100 INJECTION, SOLUTION INTRAVENOUS at 08:21

## 2018-07-27 RX ADMIN — CHLORHEXIDINE GLUCONATE 15 ML: 1.2 RINSE ORAL at 08:37

## 2018-07-27 RX ADMIN — VALPROIC ACID 250 MG: 250 SOLUTION ORAL at 23:03

## 2018-07-27 RX ADMIN — GLYCOPYRROLATE 2 MG: 1 TABLET ORAL at 20:03

## 2018-07-27 RX ADMIN — ENOXAPARIN SODIUM 40 MG: 100 INJECTION SUBCUTANEOUS at 08:37

## 2018-07-27 RX ADMIN — GLYCOPYRROLATE 2 MG: 1 TABLET ORAL at 08:37

## 2018-07-27 RX ADMIN — METOPROLOL TARTRATE 25 MG: 25 TABLET ORAL at 20:03

## 2018-07-27 RX ADMIN — CHLORHEXIDINE GLUCONATE 15 ML: 1.2 RINSE ORAL at 20:03

## 2018-07-27 RX ADMIN — LEVETIRACETAM 1000 MG: 100 SOLUTION ORAL at 20:03

## 2018-07-28 ENCOUNTER — APPOINTMENT (OUTPATIENT)
Dept: GENERAL RADIOLOGY | Facility: HOSPITAL | Age: 38
End: 2018-07-28

## 2018-07-28 LAB
ALBUMIN SERPL-MCNC: 4.13 G/DL (ref 3.2–4.8)
ALBUMIN/GLOB SERPL: 1.3 G/DL (ref 1.5–2.5)
ALP SERPL-CCNC: 91 U/L (ref 25–100)
ALT SERPL W P-5'-P-CCNC: 71 U/L (ref 7–40)
ANION GAP SERPL CALCULATED.3IONS-SCNC: 15 MMOL/L (ref 3–11)
ARTERIAL PATENCY WRIST A: POSITIVE
AST SERPL-CCNC: 90 U/L (ref 0–33)
ATMOSPHERIC PRESS: ABNORMAL MMHG
BASE EXCESS BLDA CALC-SCNC: 5.9 MMOL/L (ref 0–2)
BASOPHILS # BLD AUTO: 0.08 10*3/MM3 (ref 0–0.2)
BASOPHILS NFR BLD AUTO: 0.6 % (ref 0–1)
BDY SITE: ABNORMAL
BILIRUB SERPL-MCNC: 0.4 MG/DL (ref 0.3–1.2)
BUN BLD-MCNC: 22 MG/DL (ref 9–23)
BUN/CREAT SERPL: 36.1 (ref 7–25)
CALCIUM SPEC-SCNC: 9.8 MG/DL (ref 8.7–10.4)
CHLORIDE SERPL-SCNC: 108 MMOL/L (ref 99–109)
CO2 BLDA-SCNC: 30.6 MMOL/L (ref 22–33)
CO2 SERPL-SCNC: 23 MMOL/L (ref 20–31)
COHGB MFR BLD: 0.7 % (ref 0–2)
CREAT BLD-MCNC: 0.61 MG/DL (ref 0.6–1.3)
DEPRECATED RDW RBC AUTO: 54.4 FL (ref 37–54)
EOSINOPHIL # BLD AUTO: 0.26 10*3/MM3 (ref 0–0.3)
EOSINOPHIL NFR BLD AUTO: 2.1 % (ref 0–3)
ERYTHROCYTE [DISTWIDTH] IN BLOOD BY AUTOMATED COUNT: 14.7 % (ref 11.3–14.5)
GFR SERPL CREATININE-BSD FRML MDRD: 110 ML/MIN/1.73
GLOBULIN UR ELPH-MCNC: 3.3 GM/DL
GLUCOSE BLD-MCNC: 109 MG/DL (ref 70–100)
HCO3 BLDA-SCNC: 29.4 MMOL/L (ref 20–26)
HCT VFR BLD AUTO: 35.9 % (ref 34.5–44)
HCT VFR BLD CALC: 34.5 %
HGB BLD-MCNC: 10.9 G/DL (ref 11.5–15.5)
HGB BLDA-MCNC: 11.3 G/DL (ref 14–18)
HOROWITZ INDEX BLD+IHG-RTO: 30 %
IMM GRANULOCYTES # BLD: 0.02 10*3/MM3 (ref 0–0.03)
IMM GRANULOCYTES NFR BLD: 0.2 % (ref 0–0.6)
LYMPHOCYTES # BLD AUTO: 3.02 10*3/MM3 (ref 0.6–4.8)
LYMPHOCYTES NFR BLD AUTO: 23.9 % (ref 24–44)
MAGNESIUM SERPL-MCNC: 2.5 MG/DL (ref 1.3–2.7)
MCH RBC QN AUTO: 30.2 PG (ref 27–31)
MCHC RBC AUTO-ENTMCNC: 30.4 G/DL (ref 32–36)
MCV RBC AUTO: 99.4 FL (ref 80–99)
METHGB BLD QL: 1.1 % (ref 0–1.5)
MODALITY: ABNORMAL
MONOCYTES # BLD AUTO: 0.98 10*3/MM3 (ref 0–1)
MONOCYTES NFR BLD AUTO: 7.8 % (ref 0–12)
NEUTROPHILS # BLD AUTO: 8.28 10*3/MM3 (ref 1.5–8.3)
NEUTROPHILS NFR BLD AUTO: 65.6 % (ref 41–71)
OXYHGB MFR BLDV: 96.3 % (ref 94–99)
PCO2 BLDA: 37.8 MM HG (ref 35–45)
PH BLDA: 7.5 PH UNITS (ref 7.35–7.45)
PHOSPHATE SERPL-MCNC: 4.5 MG/DL (ref 2.4–5.1)
PLATELET # BLD AUTO: 670 10*3/MM3 (ref 150–450)
PMV BLD AUTO: 9.9 FL (ref 6–12)
PO2 BLDA: 102 MM HG (ref 83–108)
POTASSIUM BLD-SCNC: 4.6 MMOL/L (ref 3.5–5.5)
PROT SERPL-MCNC: 7.4 G/DL (ref 5.7–8.2)
RBC # BLD AUTO: 3.61 10*6/MM3 (ref 3.89–5.14)
SODIUM BLD-SCNC: 146 MMOL/L (ref 132–146)
WBC NRBC COR # BLD: 12.62 10*3/MM3 (ref 3.5–10.8)

## 2018-07-28 PROCEDURE — 80053 COMPREHEN METABOLIC PANEL: CPT | Performed by: INTERNAL MEDICINE

## 2018-07-28 PROCEDURE — 25010000002 ENOXAPARIN PER 10 MG: Performed by: INTERNAL MEDICINE

## 2018-07-28 PROCEDURE — 85025 COMPLETE CBC W/AUTO DIFF WBC: CPT | Performed by: INTERNAL MEDICINE

## 2018-07-28 PROCEDURE — 83735 ASSAY OF MAGNESIUM: CPT | Performed by: INTERNAL MEDICINE

## 2018-07-28 PROCEDURE — 36600 WITHDRAWAL OF ARTERIAL BLOOD: CPT | Performed by: INTERNAL MEDICINE

## 2018-07-28 PROCEDURE — 94760 N-INVAS EAR/PLS OXIMETRY 1: CPT

## 2018-07-28 PROCEDURE — 94799 UNLISTED PULMONARY SVC/PX: CPT

## 2018-07-28 PROCEDURE — 71045 X-RAY EXAM CHEST 1 VIEW: CPT

## 2018-07-28 PROCEDURE — 82805 BLOOD GASES W/O2 SATURATION: CPT | Performed by: INTERNAL MEDICINE

## 2018-07-28 PROCEDURE — 99233 SBSQ HOSP IP/OBS HIGH 50: CPT | Performed by: INTERNAL MEDICINE

## 2018-07-28 PROCEDURE — 94640 AIRWAY INHALATION TREATMENT: CPT

## 2018-07-28 PROCEDURE — 84100 ASSAY OF PHOSPHORUS: CPT | Performed by: INTERNAL MEDICINE

## 2018-07-28 RX ORDER — METOPROLOL TARTRATE 50 MG/1
50 TABLET, FILM COATED ORAL EVERY 12 HOURS SCHEDULED
Status: DISCONTINUED | OUTPATIENT
Start: 2018-07-28 | End: 2018-07-29

## 2018-07-28 RX ADMIN — ASPIRIN 81 MG CHEWABLE TABLET 81 MG: 81 TABLET CHEWABLE at 08:27

## 2018-07-28 RX ADMIN — CLOPIDOGREL BISULFATE 75 MG: 75 TABLET ORAL at 08:27

## 2018-07-28 RX ADMIN — LEVETIRACETAM 1000 MG: 100 SOLUTION ORAL at 19:51

## 2018-07-28 RX ADMIN — VALPROIC ACID 250 MG: 250 SOLUTION ORAL at 05:26

## 2018-07-28 RX ADMIN — CHLORHEXIDINE GLUCONATE 15 ML: 1.2 RINSE ORAL at 19:50

## 2018-07-28 RX ADMIN — IPRATROPIUM BROMIDE AND ALBUTEROL SULFATE 3 ML: 2.5; .5 SOLUTION RESPIRATORY (INHALATION) at 19:47

## 2018-07-28 RX ADMIN — ATORVASTATIN CALCIUM 80 MG: 40 TABLET, FILM COATED ORAL at 19:52

## 2018-07-28 RX ADMIN — GLYCOPYRROLATE 2 MG: 1 TABLET ORAL at 19:51

## 2018-07-28 RX ADMIN — METHYLPHENIDATE HYDROCHLORIDE 10 MG: 10 TABLET ORAL at 08:27

## 2018-07-28 RX ADMIN — METHYLPHENIDATE HYDROCHLORIDE 10 MG: 10 TABLET ORAL at 19:51

## 2018-07-28 RX ADMIN — METOPROLOL TARTRATE 25 MG: 25 TABLET ORAL at 08:27

## 2018-07-28 RX ADMIN — ENOXAPARIN SODIUM 40 MG: 100 INJECTION SUBCUTANEOUS at 08:27

## 2018-07-28 RX ADMIN — VALPROIC ACID 250 MG: 250 SOLUTION ORAL at 18:05

## 2018-07-28 RX ADMIN — VALPROIC ACID 250 MG: 250 SOLUTION ORAL at 12:04

## 2018-07-28 RX ADMIN — METOPROLOL TARTRATE 50 MG: 50 TABLET ORAL at 19:52

## 2018-07-28 RX ADMIN — GLYCOPYRROLATE 2 MG: 1 TABLET ORAL at 16:54

## 2018-07-28 RX ADMIN — GLYCOPYRROLATE 2 MG: 1 TABLET ORAL at 08:27

## 2018-07-28 RX ADMIN — FAMOTIDINE 20 MG: 20 TABLET ORAL at 08:27

## 2018-07-28 RX ADMIN — FAMOTIDINE 20 MG: 20 TABLET ORAL at 19:51

## 2018-07-28 RX ADMIN — ACETAMINOPHEN 650 MG: 325 TABLET, FILM COATED ORAL at 20:21

## 2018-07-28 RX ADMIN — VALPROIC ACID 250 MG: 250 SOLUTION ORAL at 19:51

## 2018-07-28 RX ADMIN — LEVETIRACETAM 1000 MG: 100 SOLUTION ORAL at 08:27

## 2018-07-28 RX ADMIN — IPRATROPIUM BROMIDE AND ALBUTEROL SULFATE 3 ML: 2.5; .5 SOLUTION RESPIRATORY (INHALATION) at 12:29

## 2018-07-28 RX ADMIN — IPRATROPIUM BROMIDE AND ALBUTEROL SULFATE 3 ML: 2.5; .5 SOLUTION RESPIRATORY (INHALATION) at 07:27

## 2018-07-28 RX ADMIN — CHLORHEXIDINE GLUCONATE 15 ML: 1.2 RINSE ORAL at 08:27

## 2018-07-29 ENCOUNTER — APPOINTMENT (OUTPATIENT)
Dept: GENERAL RADIOLOGY | Facility: HOSPITAL | Age: 38
End: 2018-07-29

## 2018-07-29 PROBLEM — A41.9 SEPSIS (HCC): Status: ACTIVE | Noted: 2018-07-29

## 2018-07-29 LAB
ALBUMIN SERPL-MCNC: 3.81 G/DL (ref 3.2–4.8)
ANION GAP SERPL CALCULATED.3IONS-SCNC: 11 MMOL/L (ref 3–11)
ANION GAP SERPL CALCULATED.3IONS-SCNC: 8 MMOL/L (ref 3–11)
BACTERIA UR QL AUTO: ABNORMAL /HPF
BASOPHILS # BLD AUTO: 0.05 10*3/MM3 (ref 0–0.2)
BASOPHILS NFR BLD AUTO: 0.5 % (ref 0–1)
BILIRUB UR QL STRIP: NEGATIVE
BUN BLD-MCNC: 21 MG/DL (ref 9–23)
BUN BLD-MCNC: 22 MG/DL (ref 9–23)
BUN/CREAT SERPL: 32.4 (ref 7–25)
BUN/CREAT SERPL: 32.8 (ref 7–25)
CALCIUM SPEC-SCNC: 10.3 MG/DL (ref 8.7–10.4)
CALCIUM SPEC-SCNC: 9.5 MG/DL (ref 8.7–10.4)
CHLORIDE SERPL-SCNC: 110 MMOL/L (ref 99–109)
CHLORIDE SERPL-SCNC: 115 MMOL/L (ref 99–109)
CK SERPL-CCNC: 299 U/L (ref 26–174)
CLARITY UR: CLEAR
CO2 SERPL-SCNC: 27 MMOL/L (ref 20–31)
CO2 SERPL-SCNC: 27 MMOL/L (ref 20–31)
COLOR UR: YELLOW
CREAT BLD-MCNC: 0.64 MG/DL (ref 0.6–1.3)
CREAT BLD-MCNC: 0.68 MG/DL (ref 0.6–1.3)
DEPRECATED RDW RBC AUTO: 55 FL (ref 37–54)
DEPRECATED RDW RBC AUTO: 55.1 FL (ref 37–54)
EOSINOPHIL # BLD AUTO: 0.19 10*3/MM3 (ref 0–0.3)
EOSINOPHIL NFR BLD AUTO: 1.7 % (ref 0–3)
ERYTHROCYTE [DISTWIDTH] IN BLOOD BY AUTOMATED COUNT: 14.8 % (ref 11.3–14.5)
ERYTHROCYTE [DISTWIDTH] IN BLOOD BY AUTOMATED COUNT: 14.9 % (ref 11.3–14.5)
GFR SERPL CREATININE-BSD FRML MDRD: 104 ML/MIN/1.73
GFR SERPL CREATININE-BSD FRML MDRD: 97 ML/MIN/1.73
GLUCOSE BLD-MCNC: 138 MG/DL (ref 70–100)
GLUCOSE BLD-MCNC: 156 MG/DL (ref 70–100)
GLUCOSE BLDC GLUCOMTR-MCNC: 112 MG/DL (ref 70–130)
GLUCOSE BLDC GLUCOMTR-MCNC: 114 MG/DL (ref 70–130)
GLUCOSE BLDC GLUCOMTR-MCNC: 120 MG/DL (ref 70–130)
GLUCOSE BLDC GLUCOMTR-MCNC: 150 MG/DL (ref 70–130)
GLUCOSE UR STRIP-MCNC: NEGATIVE MG/DL
HCT VFR BLD AUTO: 33.5 % (ref 34.5–44)
HCT VFR BLD AUTO: 36.7 % (ref 34.5–44)
HGB BLD-MCNC: 10.1 G/DL (ref 11.5–15.5)
HGB BLD-MCNC: 10.9 G/DL (ref 11.5–15.5)
HGB UR QL STRIP.AUTO: ABNORMAL
HYALINE CASTS UR QL AUTO: ABNORMAL /LPF
HYPOCHROMIA BLD QL: NORMAL
IMM GRANULOCYTES # BLD: 0.03 10*3/MM3 (ref 0–0.03)
IMM GRANULOCYTES NFR BLD: 0.3 % (ref 0–0.6)
KETONES UR QL STRIP: ABNORMAL
LEUKOCYTE ESTERASE UR QL STRIP.AUTO: ABNORMAL
LYMPHOCYTES # BLD AUTO: 2.12 10*3/MM3 (ref 0.6–4.8)
LYMPHOCYTES NFR BLD AUTO: 19.4 % (ref 24–44)
MACROCYTES BLD QL SMEAR: NORMAL
MAGNESIUM SERPL-MCNC: 2.6 MG/DL (ref 1.3–2.7)
MCH RBC QN AUTO: 30.3 PG (ref 27–31)
MCH RBC QN AUTO: 30.6 PG (ref 27–31)
MCHC RBC AUTO-ENTMCNC: 29.7 G/DL (ref 32–36)
MCHC RBC AUTO-ENTMCNC: 30.1 G/DL (ref 32–36)
MCV RBC AUTO: 101.5 FL (ref 80–99)
MCV RBC AUTO: 101.9 FL (ref 80–99)
MONOCYTES # BLD AUTO: 0.89 10*3/MM3 (ref 0–1)
MONOCYTES NFR BLD AUTO: 8.1 % (ref 0–12)
MRSA DNA SPEC QL NAA+PROBE: NEGATIVE
NEUTROPHILS # BLD AUTO: 7.7 10*3/MM3 (ref 1.5–8.3)
NEUTROPHILS NFR BLD AUTO: 70.3 % (ref 41–71)
NITRITE UR QL STRIP: NEGATIVE
PH UR STRIP.AUTO: 6 [PH] (ref 5–8)
PHOSPHATE SERPL-MCNC: 3.5 MG/DL (ref 2.4–5.1)
PLAT MORPH BLD: NORMAL
PLATELET # BLD AUTO: 614 10*3/MM3 (ref 150–450)
PLATELET # BLD AUTO: 680 10*3/MM3 (ref 150–450)
PMV BLD AUTO: 10.3 FL (ref 6–12)
PMV BLD AUTO: 9.8 FL (ref 6–12)
POTASSIUM BLD-SCNC: 3.5 MMOL/L (ref 3.5–5.5)
POTASSIUM BLD-SCNC: 3.7 MMOL/L (ref 3.5–5.5)
PROCALCITONIN SERPL-MCNC: 0.15 NG/ML
PROT UR QL STRIP: ABNORMAL
RBC # BLD AUTO: 3.3 10*6/MM3 (ref 3.89–5.14)
RBC # BLD AUTO: 3.6 10*6/MM3 (ref 3.89–5.14)
RBC # UR: ABNORMAL /HPF
REF LAB TEST METHOD: ABNORMAL
SODIUM BLD-SCNC: 148 MMOL/L (ref 132–146)
SODIUM BLD-SCNC: 150 MMOL/L (ref 132–146)
SP GR UR STRIP: 1.02 (ref 1–1.03)
SQUAMOUS #/AREA URNS HPF: ABNORMAL /HPF
UROBILINOGEN UR QL STRIP: ABNORMAL
WBC MORPH BLD: NORMAL
WBC NRBC COR # BLD: 10.95 10*3/MM3 (ref 3.5–10.8)
WBC NRBC COR # BLD: 13.86 10*3/MM3 (ref 3.5–10.8)
WBC UR QL AUTO: ABNORMAL /HPF

## 2018-07-29 PROCEDURE — 85007 BL SMEAR W/DIFF WBC COUNT: CPT | Performed by: INTERNAL MEDICINE

## 2018-07-29 PROCEDURE — 25010000002 ONDANSETRON PER 1 MG: Performed by: FAMILY MEDICINE

## 2018-07-29 PROCEDURE — 94799 UNLISTED PULMONARY SVC/PX: CPT

## 2018-07-29 PROCEDURE — 99233 SBSQ HOSP IP/OBS HIGH 50: CPT | Performed by: HOSPITALIST

## 2018-07-29 PROCEDURE — 81001 URINALYSIS AUTO W/SCOPE: CPT | Performed by: HOSPITALIST

## 2018-07-29 PROCEDURE — 82550 ASSAY OF CK (CPK): CPT | Performed by: HOSPITALIST

## 2018-07-29 PROCEDURE — 84145 PROCALCITONIN (PCT): CPT | Performed by: NURSE PRACTITIONER

## 2018-07-29 PROCEDURE — 93005 ELECTROCARDIOGRAM TRACING: CPT | Performed by: NURSE PRACTITIONER

## 2018-07-29 PROCEDURE — 85027 COMPLETE CBC AUTOMATED: CPT | Performed by: HOSPITALIST

## 2018-07-29 PROCEDURE — 87641 MR-STAPH DNA AMP PROBE: CPT | Performed by: HOSPITALIST

## 2018-07-29 PROCEDURE — 87040 BLOOD CULTURE FOR BACTERIA: CPT | Performed by: NURSE PRACTITIONER

## 2018-07-29 PROCEDURE — 87205 SMEAR GRAM STAIN: CPT | Performed by: NURSE PRACTITIONER

## 2018-07-29 PROCEDURE — 82962 GLUCOSE BLOOD TEST: CPT

## 2018-07-29 PROCEDURE — 25010000002 PIPERACILLIN SOD-TAZOBACTAM PER 1 G: Performed by: NURSE PRACTITIONER

## 2018-07-29 PROCEDURE — 80069 RENAL FUNCTION PANEL: CPT | Performed by: HOSPITALIST

## 2018-07-29 PROCEDURE — 25010000002 ENOXAPARIN PER 10 MG: Performed by: INTERNAL MEDICINE

## 2018-07-29 PROCEDURE — 94640 AIRWAY INHALATION TREATMENT: CPT

## 2018-07-29 PROCEDURE — 25010000002 VANCOMYCIN 10 G RECONSTITUTED SOLUTION: Performed by: NURSE PRACTITIONER

## 2018-07-29 PROCEDURE — 80048 BASIC METABOLIC PNL TOTAL CA: CPT | Performed by: INTERNAL MEDICINE

## 2018-07-29 PROCEDURE — 93010 ELECTROCARDIOGRAM REPORT: CPT | Performed by: INTERNAL MEDICINE

## 2018-07-29 PROCEDURE — 71045 X-RAY EXAM CHEST 1 VIEW: CPT

## 2018-07-29 PROCEDURE — 83735 ASSAY OF MAGNESIUM: CPT | Performed by: INTERNAL MEDICINE

## 2018-07-29 PROCEDURE — 85025 COMPLETE CBC W/AUTO DIFF WBC: CPT | Performed by: INTERNAL MEDICINE

## 2018-07-29 PROCEDURE — 87070 CULTURE OTHR SPECIMN AEROBIC: CPT | Performed by: NURSE PRACTITIONER

## 2018-07-29 PROCEDURE — 25010000002 PIPERACILLIN SOD-TAZOBACTAM PER 1 G: Performed by: HOSPITALIST

## 2018-07-29 RX ORDER — SODIUM CHLORIDE 450 MG/100ML
100 INJECTION, SOLUTION INTRAVENOUS CONTINUOUS
Status: DISCONTINUED | OUTPATIENT
Start: 2018-07-29 | End: 2018-08-03

## 2018-07-29 RX ORDER — ACETAMINOPHEN 500 MG
1000 TABLET ORAL EVERY 4 HOURS
Status: COMPLETED | OUTPATIENT
Start: 2018-07-29 | End: 2018-07-29

## 2018-07-29 RX ORDER — VANCOMYCIN HYDROCHLORIDE 1 G/200ML
15 INJECTION, SOLUTION INTRAVENOUS EVERY 8 HOURS
Status: DISCONTINUED | OUTPATIENT
Start: 2018-07-30 | End: 2018-07-30 | Stop reason: SDUPTHER

## 2018-07-29 RX ORDER — ONDANSETRON 2 MG/ML
4 INJECTION INTRAMUSCULAR; INTRAVENOUS EVERY 6 HOURS PRN
Status: DISCONTINUED | OUTPATIENT
Start: 2018-07-29 | End: 2018-08-25 | Stop reason: HOSPADM

## 2018-07-29 RX ORDER — SODIUM CHLORIDE 9 MG/ML
125 INJECTION, SOLUTION INTRAVENOUS CONTINUOUS
Status: DISCONTINUED | OUTPATIENT
Start: 2018-07-29 | End: 2018-07-29

## 2018-07-29 RX ADMIN — SODIUM CHLORIDE 1000 ML: 9 INJECTION, SOLUTION INTRAVENOUS at 13:29

## 2018-07-29 RX ADMIN — ENOXAPARIN SODIUM 40 MG: 100 INJECTION SUBCUTANEOUS at 09:56

## 2018-07-29 RX ADMIN — GLYCOPYRROLATE 2 MG: 1 TABLET ORAL at 20:10

## 2018-07-29 RX ADMIN — ONDANSETRON 4 MG: 2 INJECTION INTRAMUSCULAR; INTRAVENOUS at 22:38

## 2018-07-29 RX ADMIN — VALPROIC ACID 250 MG: 250 SOLUTION ORAL at 00:10

## 2018-07-29 RX ADMIN — METOPROLOL TARTRATE 50 MG: 50 TABLET ORAL at 09:55

## 2018-07-29 RX ADMIN — IBUPROFEN 400 MG: 100 SUSPENSION ORAL at 20:11

## 2018-07-29 RX ADMIN — ACETAMINOPHEN 650 MG: 325 TABLET, FILM COATED ORAL at 00:22

## 2018-07-29 RX ADMIN — GLYCOPYRROLATE 2 MG: 1 TABLET ORAL at 09:59

## 2018-07-29 RX ADMIN — VALPROIC ACID 250 MG: 250 SOLUTION ORAL at 18:17

## 2018-07-29 RX ADMIN — SODIUM CHLORIDE 125 ML/HR: 9 INJECTION, SOLUTION INTRAVENOUS at 14:27

## 2018-07-29 RX ADMIN — CHLORHEXIDINE GLUCONATE 15 ML: 1.2 RINSE ORAL at 10:00

## 2018-07-29 RX ADMIN — METHYLPHENIDATE HYDROCHLORIDE 10 MG: 10 TABLET ORAL at 09:56

## 2018-07-29 RX ADMIN — ACETAMINOPHEN 1000 MG: 500 TABLET, FILM COATED ORAL at 14:04

## 2018-07-29 RX ADMIN — CLOPIDOGREL BISULFATE 75 MG: 75 TABLET ORAL at 09:55

## 2018-07-29 RX ADMIN — GLYCOPYRROLATE 2 MG: 1 TABLET ORAL at 17:07

## 2018-07-29 RX ADMIN — METHYLPHENIDATE HYDROCHLORIDE 10 MG: 10 TABLET ORAL at 20:10

## 2018-07-29 RX ADMIN — ATORVASTATIN CALCIUM 80 MG: 40 TABLET, FILM COATED ORAL at 20:10

## 2018-07-29 RX ADMIN — FAMOTIDINE 20 MG: 20 TABLET ORAL at 09:55

## 2018-07-29 RX ADMIN — SODIUM CHLORIDE 100 ML/HR: 4.5 INJECTION, SOLUTION INTRAVENOUS at 17:07

## 2018-07-29 RX ADMIN — IBUPROFEN 600 MG: 100 SUSPENSION ORAL at 04:27

## 2018-07-29 RX ADMIN — VANCOMYCIN HYDROCHLORIDE 1500 MG: 10 INJECTION, POWDER, LYOPHILIZED, FOR SOLUTION INTRAVENOUS at 20:26

## 2018-07-29 RX ADMIN — IPRATROPIUM BROMIDE AND ALBUTEROL SULFATE 3 ML: 2.5; .5 SOLUTION RESPIRATORY (INHALATION) at 00:37

## 2018-07-29 RX ADMIN — TAZOBACTAM SODIUM AND PIPERACILLIN SODIUM 4.5 G: 500; 4 INJECTION, SOLUTION INTRAVENOUS at 11:40

## 2018-07-29 RX ADMIN — LEVETIRACETAM 1000 MG: 100 SOLUTION ORAL at 20:10

## 2018-07-29 RX ADMIN — VALPROIC ACID 250 MG: 250 SOLUTION ORAL at 05:35

## 2018-07-29 RX ADMIN — ACETAMINOPHEN 650 MG: 325 TABLET, FILM COATED ORAL at 10:05

## 2018-07-29 RX ADMIN — ACETAMINOPHEN 650 MG: 325 TABLET, FILM COATED ORAL at 05:48

## 2018-07-29 RX ADMIN — VALPROIC ACID 250 MG: 250 SOLUTION ORAL at 14:05

## 2018-07-29 RX ADMIN — ASPIRIN 81 MG CHEWABLE TABLET 81 MG: 81 TABLET CHEWABLE at 09:55

## 2018-07-29 RX ADMIN — IPRATROPIUM BROMIDE AND ALBUTEROL SULFATE 3 ML: 2.5; .5 SOLUTION RESPIRATORY (INHALATION) at 18:56

## 2018-07-29 RX ADMIN — LEVETIRACETAM 1000 MG: 100 SOLUTION ORAL at 09:59

## 2018-07-29 RX ADMIN — IPRATROPIUM BROMIDE AND ALBUTEROL SULFATE 3 ML: 2.5; .5 SOLUTION RESPIRATORY (INHALATION) at 07:23

## 2018-07-29 RX ADMIN — TAZOBACTAM SODIUM AND PIPERACILLIN SODIUM 4.5 G: 500; 4 INJECTION, SOLUTION INTRAVENOUS at 06:37

## 2018-07-29 RX ADMIN — CHLORHEXIDINE GLUCONATE 15 ML: 1.2 RINSE ORAL at 20:10

## 2018-07-29 RX ADMIN — ACETAMINOPHEN 1000 MG: 500 TABLET, FILM COATED ORAL at 18:18

## 2018-07-29 RX ADMIN — IPRATROPIUM BROMIDE AND ALBUTEROL SULFATE 3 ML: 2.5; .5 SOLUTION RESPIRATORY (INHALATION) at 14:20

## 2018-07-29 RX ADMIN — FAMOTIDINE 20 MG: 20 TABLET ORAL at 20:10

## 2018-07-29 RX ADMIN — TAZOBACTAM SODIUM AND PIPERACILLIN SODIUM 3.38 G: 375; 3 INJECTION, SOLUTION INTRAVENOUS at 21:20

## 2018-07-30 LAB
ALBUMIN SERPL-MCNC: 3.73 G/DL (ref 3.2–4.8)
ALBUMIN SERPL-MCNC: 3.81 G/DL (ref 3.2–4.8)
ALP SERPL-CCNC: 69 U/L (ref 25–100)
ALT SERPL W P-5'-P-CCNC: 56 U/L (ref 7–40)
AMPHETAMINES UR QL SCN: NEGATIVE NG/ML
ANION GAP SERPL CALCULATED.3IONS-SCNC: 11 MMOL/L (ref 3–11)
ANION GAP SERPL CALCULATED.3IONS-SCNC: 8 MMOL/L (ref 3–11)
AST SERPL-CCNC: 79 U/L (ref 0–33)
BARBITURATES UR QL SCN: NEGATIVE NG/ML
BASOPHILS # BLD AUTO: 0.09 10*3/MM3 (ref 0–0.2)
BASOPHILS NFR BLD AUTO: 0.8 % (ref 0–1)
BENZODIAZ UR QL SCN: NEGATIVE NG/ML
BILIRUB SERPL-MCNC: 0.6 MG/DL (ref 0.3–1.2)
BUN BLD-MCNC: 17 MG/DL (ref 9–23)
BUN BLD-MCNC: 18 MG/DL (ref 9–23)
BUN/CREAT SERPL: 34 (ref 7–25)
BZE UR QL SCN: NEGATIVE NG/ML
CALCIUM SPEC-SCNC: 9.1 MG/DL (ref 8.7–10.4)
CALCIUM SPEC-SCNC: 9.3 MG/DL (ref 8.7–10.4)
CANNABINOIDS UR QL SCN: NEGATIVE NG/ML
CHLORIDE SERPL-SCNC: 112 MMOL/L (ref 99–109)
CHLORIDE SERPL-SCNC: 112 MMOL/L (ref 99–109)
CHOLEST SERPL-MCNC: 143 MG/DL (ref 0–200)
CK SERPL-CCNC: 500 U/L (ref 26–174)
CO2 SERPL-SCNC: 24 MMOL/L (ref 20–31)
CO2 SERPL-SCNC: 26 MMOL/L (ref 20–31)
CREAT 24H UR-MCNC: 173.9 MG/DL (ref 20–300)
CREAT BLD-MCNC: 0.52 MG/DL (ref 0.6–1.3)
CREAT BLD-MCNC: 0.53 MG/DL (ref 0.6–1.3)
CRP SERPL-MCNC: 0.66 MG/DL (ref 0–1)
DEPRECATED RDW RBC AUTO: 54.1 FL (ref 37–54)
EOSINOPHIL # BLD AUTO: 0.17 10*3/MM3 (ref 0–0.3)
EOSINOPHIL NFR BLD AUTO: 1.5 % (ref 0–3)
ERYTHROCYTE [DISTWIDTH] IN BLOOD BY AUTOMATED COUNT: 14.6 % (ref 11.3–14.5)
FENTANYL+NORFENTANYL UR QL SCN: POSITIVE
GFR SERPL CREATININE-BSD FRML MDRD: 129 ML/MIN/1.73
GLUCOSE BLD-MCNC: 84 MG/DL (ref 70–100)
GLUCOSE BLD-MCNC: 97 MG/DL (ref 70–100)
GLUCOSE BLDC GLUCOMTR-MCNC: 102 MG/DL (ref 70–130)
GLUCOSE BLDC GLUCOMTR-MCNC: 151 MG/DL (ref 70–130)
GLUCOSE BLDC GLUCOMTR-MCNC: 91 MG/DL (ref 70–130)
GLUCOSE BLDC GLUCOMTR-MCNC: 94 MG/DL (ref 70–130)
GLUCOSE BLDC GLUCOMTR-MCNC: 96 MG/DL (ref 70–130)
HCT VFR BLD AUTO: 33.2 % (ref 34.5–44)
HGB BLD-MCNC: 10 G/DL (ref 11.5–15.5)
IMM GRANULOCYTES # BLD: 0.03 10*3/MM3 (ref 0–0.03)
IMM GRANULOCYTES NFR BLD: 0.3 % (ref 0–0.6)
LYMPHOCYTES # BLD AUTO: 2.84 10*3/MM3 (ref 0.6–4.8)
LYMPHOCYTES NFR BLD AUTO: 25.6 % (ref 24–44)
Lab: ABNORMAL
MAGNESIUM SERPL-MCNC: 2.3 MG/DL (ref 1.3–2.7)
MCH RBC QN AUTO: 30.4 PG (ref 27–31)
MCHC RBC AUTO-ENTMCNC: 30.1 G/DL (ref 32–36)
MCV RBC AUTO: 100.9 FL (ref 80–99)
MEPERIDINE UR CFM-MCNC: NEGATIVE NG/ML
METHADONE UR QL SCN: NEGATIVE NG/ML
MONOCYTES # BLD AUTO: 0.86 10*3/MM3 (ref 0–1)
MONOCYTES NFR BLD AUTO: 7.8 % (ref 0–12)
NEUTROPHILS # BLD AUTO: 7.13 10*3/MM3 (ref 1.5–8.3)
NEUTROPHILS NFR BLD AUTO: 64.3 % (ref 41–71)
OPIATES TESTED UR SCN: NEGATIVE NG/ML
OXYCODONE/OXYMORPHONE, URINE: NEGATIVE NG/ML
PCP UR QL: NEGATIVE NG/ML
PH UR STRIP.AUTO: 6.1 [PH] (ref 4.5–8.9)
PHOSPHATE SERPL-MCNC: 3.3 MG/DL (ref 2.4–5.1)
PHOSPHATE SERPL-MCNC: 3.5 MG/DL (ref 2.4–5.1)
PLATELET # BLD AUTO: 622 10*3/MM3 (ref 150–450)
PMV BLD AUTO: 10.2 FL (ref 6–12)
POTASSIUM BLD-SCNC: 3.6 MMOL/L (ref 3.5–5.5)
POTASSIUM BLD-SCNC: 3.8 MMOL/L (ref 3.5–5.5)
PREALB SERPL-MCNC: 15.4 MG/DL (ref 10–40)
PROPOXYPH UR QL SCN: NEGATIVE NG/ML
PROT SERPL-MCNC: 7 G/DL (ref 5.7–8.2)
RBC # BLD AUTO: 3.29 10*6/MM3 (ref 3.89–5.14)
SODIUM BLD-SCNC: 146 MMOL/L (ref 132–146)
SODIUM BLD-SCNC: 147 MMOL/L (ref 132–146)
SP GR UR: 1.02
TRAMADOL UR QL SCN: NEGATIVE NG/ML
TRIGL SERPL-MCNC: 87 MG/DL (ref 0–150)
VANCOMYCIN TROUGH SERPL-MCNC: 21.2 MCG/ML (ref 10–20)
WBC NRBC COR # BLD: 11.09 10*3/MM3 (ref 3.5–10.8)

## 2018-07-30 PROCEDURE — 94799 UNLISTED PULMONARY SVC/PX: CPT

## 2018-07-30 PROCEDURE — 25010000002 VANCOMYCIN PER 500 MG: Performed by: NURSE PRACTITIONER

## 2018-07-30 PROCEDURE — 25010000002 ENOXAPARIN PER 10 MG: Performed by: INTERNAL MEDICINE

## 2018-07-30 PROCEDURE — 85025 COMPLETE CBC W/AUTO DIFF WBC: CPT | Performed by: HOSPITALIST

## 2018-07-30 PROCEDURE — 84134 ASSAY OF PREALBUMIN: CPT

## 2018-07-30 PROCEDURE — 25010000002 LORAZEPAM PER 2 MG: Performed by: INTERNAL MEDICINE

## 2018-07-30 PROCEDURE — 86140 C-REACTIVE PROTEIN: CPT

## 2018-07-30 PROCEDURE — 82962 GLUCOSE BLOOD TEST: CPT

## 2018-07-30 PROCEDURE — 82550 ASSAY OF CK (CPK): CPT | Performed by: HOSPITALIST

## 2018-07-30 PROCEDURE — 25010000002 VANCOMYCIN PER 500 MG

## 2018-07-30 PROCEDURE — 84478 ASSAY OF TRIGLYCERIDES: CPT

## 2018-07-30 PROCEDURE — 83735 ASSAY OF MAGNESIUM: CPT

## 2018-07-30 PROCEDURE — 99233 SBSQ HOSP IP/OBS HIGH 50: CPT | Performed by: INTERNAL MEDICINE

## 2018-07-30 PROCEDURE — 80202 ASSAY OF VANCOMYCIN: CPT | Performed by: NURSE PRACTITIONER

## 2018-07-30 PROCEDURE — 94640 AIRWAY INHALATION TREATMENT: CPT

## 2018-07-30 PROCEDURE — 25010000002 PIPERACILLIN SOD-TAZOBACTAM PER 1 G: Performed by: HOSPITALIST

## 2018-07-30 PROCEDURE — 80053 COMPREHEN METABOLIC PANEL: CPT

## 2018-07-30 PROCEDURE — 84100 ASSAY OF PHOSPHORUS: CPT

## 2018-07-30 PROCEDURE — 25010000002 ONDANSETRON PER 1 MG: Performed by: FAMILY MEDICINE

## 2018-07-30 PROCEDURE — 82465 ASSAY BLD/SERUM CHOLESTEROL: CPT

## 2018-07-30 RX ADMIN — METHYLPHENIDATE HYDROCHLORIDE 10 MG: 10 TABLET ORAL at 20:13

## 2018-07-30 RX ADMIN — VALPROIC ACID 250 MG: 250 SOLUTION ORAL at 01:22

## 2018-07-30 RX ADMIN — VANCOMYCIN HYDROCHLORIDE 1000 MG: 1 INJECTION, SOLUTION INTRAVENOUS at 15:38

## 2018-07-30 RX ADMIN — LEVETIRACETAM 1000 MG: 100 SOLUTION ORAL at 20:12

## 2018-07-30 RX ADMIN — GLYCOPYRROLATE 2 MG: 1 TABLET ORAL at 17:10

## 2018-07-30 RX ADMIN — LORAZEPAM 2 MG: 2 INJECTION INTRAMUSCULAR; INTRAVENOUS at 11:45

## 2018-07-30 RX ADMIN — IPRATROPIUM BROMIDE AND ALBUTEROL SULFATE 3 ML: 2.5; .5 SOLUTION RESPIRATORY (INHALATION) at 19:07

## 2018-07-30 RX ADMIN — TAZOBACTAM SODIUM AND PIPERACILLIN SODIUM 3.38 G: 375; 3 INJECTION, SOLUTION INTRAVENOUS at 05:16

## 2018-07-30 RX ADMIN — ACETAMINOPHEN 650 MG: 325 TABLET, FILM COATED ORAL at 02:16

## 2018-07-30 RX ADMIN — CHLORHEXIDINE GLUCONATE 15 ML: 1.2 RINSE ORAL at 21:58

## 2018-07-30 RX ADMIN — VANCOMYCIN HYDROCHLORIDE 750 MG: 750 INJECTION, SOLUTION INTRAVENOUS at 22:14

## 2018-07-30 RX ADMIN — SODIUM CHLORIDE 100 ML/HR: 4.5 INJECTION, SOLUTION INTRAVENOUS at 04:16

## 2018-07-30 RX ADMIN — VALPROIC ACID 250 MG: 250 SOLUTION ORAL at 17:10

## 2018-07-30 RX ADMIN — CHLORHEXIDINE GLUCONATE 15 ML: 1.2 RINSE ORAL at 09:25

## 2018-07-30 RX ADMIN — METHYLPHENIDATE HYDROCHLORIDE 10 MG: 10 TABLET ORAL at 09:26

## 2018-07-30 RX ADMIN — ACETAMINOPHEN 650 MG: 325 TABLET, FILM COATED ORAL at 11:48

## 2018-07-30 RX ADMIN — TAZOBACTAM SODIUM AND PIPERACILLIN SODIUM 3.38 G: 375; 3 INJECTION, SOLUTION INTRAVENOUS at 20:12

## 2018-07-30 RX ADMIN — IPRATROPIUM BROMIDE AND ALBUTEROL SULFATE 3 ML: 2.5; .5 SOLUTION RESPIRATORY (INHALATION) at 00:37

## 2018-07-30 RX ADMIN — FAMOTIDINE 20 MG: 20 TABLET ORAL at 20:13

## 2018-07-30 RX ADMIN — VALPROIC ACID 250 MG: 250 SOLUTION ORAL at 11:45

## 2018-07-30 RX ADMIN — ENOXAPARIN SODIUM 40 MG: 100 INJECTION SUBCUTANEOUS at 09:24

## 2018-07-30 RX ADMIN — SODIUM CHLORIDE 100 ML/HR: 4.5 INJECTION, SOLUTION INTRAVENOUS at 15:39

## 2018-07-30 RX ADMIN — IPRATROPIUM BROMIDE AND ALBUTEROL SULFATE 3 ML: 2.5; .5 SOLUTION RESPIRATORY (INHALATION) at 07:44

## 2018-07-30 RX ADMIN — GLYCOPYRROLATE 2 MG: 1 TABLET ORAL at 09:25

## 2018-07-30 RX ADMIN — FAMOTIDINE 20 MG: 20 TABLET ORAL at 09:26

## 2018-07-30 RX ADMIN — ACETAMINOPHEN 650 MG: 325 TABLET, FILM COATED ORAL at 23:28

## 2018-07-30 RX ADMIN — ONDANSETRON 4 MG: 2 INJECTION INTRAMUSCULAR; INTRAVENOUS at 04:16

## 2018-07-30 RX ADMIN — IPRATROPIUM BROMIDE AND ALBUTEROL SULFATE 3 ML: 2.5; .5 SOLUTION RESPIRATORY (INHALATION) at 13:41

## 2018-07-30 RX ADMIN — LEVETIRACETAM 1000 MG: 100 SOLUTION ORAL at 09:25

## 2018-07-30 RX ADMIN — LORAZEPAM 2 MG: 2 INJECTION INTRAMUSCULAR; INTRAVENOUS at 23:29

## 2018-07-30 RX ADMIN — VALPROIC ACID 250 MG: 250 SOLUTION ORAL at 05:16

## 2018-07-30 RX ADMIN — CLOPIDOGREL BISULFATE 75 MG: 75 TABLET ORAL at 09:26

## 2018-07-30 RX ADMIN — GLYCOPYRROLATE 2 MG: 1 TABLET ORAL at 20:12

## 2018-07-30 RX ADMIN — ATORVASTATIN CALCIUM 80 MG: 40 TABLET, FILM COATED ORAL at 20:12

## 2018-07-30 RX ADMIN — VANCOMYCIN HYDROCHLORIDE 1000 MG: 1 INJECTION, SOLUTION INTRAVENOUS at 06:36

## 2018-07-30 RX ADMIN — ASPIRIN 81 MG CHEWABLE TABLET 81 MG: 81 TABLET CHEWABLE at 09:25

## 2018-07-30 RX ADMIN — ONDANSETRON 4 MG: 2 INJECTION INTRAMUSCULAR; INTRAVENOUS at 11:45

## 2018-07-30 RX ADMIN — TAZOBACTAM SODIUM AND PIPERACILLIN SODIUM 3.38 G: 375; 3 INJECTION, SOLUTION INTRAVENOUS at 11:45

## 2018-07-30 RX ADMIN — LORAZEPAM 2 MG: 2 INJECTION INTRAMUSCULAR; INTRAVENOUS at 18:31

## 2018-07-31 LAB
ANION GAP SERPL CALCULATED.3IONS-SCNC: 9 MMOL/L (ref 3–11)
BACTERIA SPEC RESP CULT: NORMAL
BUN BLD-MCNC: 20 MG/DL (ref 9–23)
BUN/CREAT SERPL: 46.5 (ref 7–25)
CALCIUM SPEC-SCNC: 8.4 MG/DL (ref 8.7–10.4)
CHLORIDE SERPL-SCNC: 107 MMOL/L (ref 99–109)
CO2 SERPL-SCNC: 25 MMOL/L (ref 20–31)
CREAT BLD-MCNC: 0.43 MG/DL (ref 0.6–1.3)
DEPRECATED RDW RBC AUTO: 49.9 FL (ref 37–54)
ERYTHROCYTE [DISTWIDTH] IN BLOOD BY AUTOMATED COUNT: 13.8 % (ref 11.3–14.5)
GFR SERPL CREATININE-BSD FRML MDRD: >150 ML/MIN/1.73
GLUCOSE BLD-MCNC: 102 MG/DL (ref 70–100)
GLUCOSE BLDC GLUCOMTR-MCNC: 103 MG/DL (ref 70–130)
GLUCOSE BLDC GLUCOMTR-MCNC: 87 MG/DL (ref 70–130)
GRAM STN SPEC: NORMAL
HCT VFR BLD AUTO: 29.1 % (ref 34.5–44)
HGB BLD-MCNC: 9 G/DL (ref 11.5–15.5)
MCH RBC QN AUTO: 30.6 PG (ref 27–31)
MCHC RBC AUTO-ENTMCNC: 30.9 G/DL (ref 32–36)
MCV RBC AUTO: 99 FL (ref 80–99)
PLATELET # BLD AUTO: 535 10*3/MM3 (ref 150–450)
PMV BLD AUTO: 10.2 FL (ref 6–12)
POTASSIUM BLD-SCNC: 3.2 MMOL/L (ref 3.5–5.5)
POTASSIUM BLD-SCNC: 3.8 MMOL/L (ref 3.5–5.5)
RBC # BLD AUTO: 2.94 10*6/MM3 (ref 3.89–5.14)
SODIUM BLD-SCNC: 141 MMOL/L (ref 132–146)
WBC NRBC COR # BLD: 8.62 10*3/MM3 (ref 3.5–10.8)

## 2018-07-31 PROCEDURE — 93010 ELECTROCARDIOGRAM REPORT: CPT | Performed by: INTERNAL MEDICINE

## 2018-07-31 PROCEDURE — 85027 COMPLETE CBC AUTOMATED: CPT | Performed by: INTERNAL MEDICINE

## 2018-07-31 PROCEDURE — 94799 UNLISTED PULMONARY SVC/PX: CPT

## 2018-07-31 PROCEDURE — 82962 GLUCOSE BLOOD TEST: CPT

## 2018-07-31 PROCEDURE — 25010000002 ENOXAPARIN PER 10 MG: Performed by: INTERNAL MEDICINE

## 2018-07-31 PROCEDURE — 93005 ELECTROCARDIOGRAM TRACING: CPT | Performed by: INTERNAL MEDICINE

## 2018-07-31 PROCEDURE — 99233 SBSQ HOSP IP/OBS HIGH 50: CPT | Performed by: INTERNAL MEDICINE

## 2018-07-31 PROCEDURE — 80048 BASIC METABOLIC PNL TOTAL CA: CPT | Performed by: INTERNAL MEDICINE

## 2018-07-31 PROCEDURE — 25010000002 LORAZEPAM PER 2 MG: Performed by: INTERNAL MEDICINE

## 2018-07-31 PROCEDURE — 25010000002 PIPERACILLIN SOD-TAZOBACTAM PER 1 G: Performed by: HOSPITALIST

## 2018-07-31 PROCEDURE — 84132 ASSAY OF SERUM POTASSIUM: CPT | Performed by: INTERNAL MEDICINE

## 2018-07-31 PROCEDURE — 25010000002 VANCOMYCIN PER 500 MG

## 2018-07-31 PROCEDURE — 94640 AIRWAY INHALATION TREATMENT: CPT

## 2018-07-31 RX ADMIN — VALPROIC ACID 250 MG: 250 SOLUTION ORAL at 17:22

## 2018-07-31 RX ADMIN — METHYLPHENIDATE HYDROCHLORIDE 10 MG: 10 TABLET ORAL at 20:36

## 2018-07-31 RX ADMIN — TAZOBACTAM SODIUM AND PIPERACILLIN SODIUM 3.38 G: 375; 3 INJECTION, SOLUTION INTRAVENOUS at 11:45

## 2018-07-31 RX ADMIN — CHLORHEXIDINE GLUCONATE 15 ML: 1.2 RINSE ORAL at 11:45

## 2018-07-31 RX ADMIN — FAMOTIDINE 20 MG: 20 TABLET ORAL at 08:10

## 2018-07-31 RX ADMIN — LORAZEPAM 2 MG: 2 INJECTION INTRAMUSCULAR; INTRAVENOUS at 06:30

## 2018-07-31 RX ADMIN — ACETAMINOPHEN 650 MG: 325 TABLET, FILM COATED ORAL at 22:34

## 2018-07-31 RX ADMIN — CLOPIDOGREL BISULFATE 75 MG: 75 TABLET ORAL at 08:10

## 2018-07-31 RX ADMIN — POTASSIUM CHLORIDE 40 MEQ: 1.5 POWDER, FOR SOLUTION ORAL at 08:11

## 2018-07-31 RX ADMIN — CHLORHEXIDINE GLUCONATE 15 ML: 1.2 RINSE ORAL at 20:36

## 2018-07-31 RX ADMIN — LORAZEPAM 2 MG: 2 INJECTION INTRAMUSCULAR; INTRAVENOUS at 22:34

## 2018-07-31 RX ADMIN — ACETAMINOPHEN 650 MG: 325 TABLET, FILM COATED ORAL at 08:42

## 2018-07-31 RX ADMIN — ENOXAPARIN SODIUM 40 MG: 100 INJECTION SUBCUTANEOUS at 08:10

## 2018-07-31 RX ADMIN — LORAZEPAM 2 MG: 2 INJECTION INTRAMUSCULAR; INTRAVENOUS at 08:10

## 2018-07-31 RX ADMIN — VALPROIC ACID 250 MG: 250 SOLUTION ORAL at 06:30

## 2018-07-31 RX ADMIN — IPRATROPIUM BROMIDE AND ALBUTEROL SULFATE 3 ML: 2.5; .5 SOLUTION RESPIRATORY (INHALATION) at 12:00

## 2018-07-31 RX ADMIN — LEVETIRACETAM 1000 MG: 100 SOLUTION ORAL at 20:35

## 2018-07-31 RX ADMIN — VALPROIC ACID 250 MG: 250 SOLUTION ORAL at 01:42

## 2018-07-31 RX ADMIN — FAMOTIDINE 20 MG: 20 TABLET ORAL at 20:36

## 2018-07-31 RX ADMIN — TAZOBACTAM SODIUM AND PIPERACILLIN SODIUM 3.38 G: 375; 3 INJECTION, SOLUTION INTRAVENOUS at 20:36

## 2018-07-31 RX ADMIN — VANCOMYCIN HYDROCHLORIDE 750 MG: 750 INJECTION, SOLUTION INTRAVENOUS at 06:30

## 2018-07-31 RX ADMIN — SODIUM CHLORIDE 100 ML/HR: 4.5 INJECTION, SOLUTION INTRAVENOUS at 16:19

## 2018-07-31 RX ADMIN — LORAZEPAM 2 MG: 2 INJECTION INTRAMUSCULAR; INTRAVENOUS at 16:18

## 2018-07-31 RX ADMIN — GLYCOPYRROLATE 2 MG: 1 TABLET ORAL at 20:36

## 2018-07-31 RX ADMIN — SODIUM CHLORIDE 100 ML/HR: 4.5 INJECTION, SOLUTION INTRAVENOUS at 03:07

## 2018-07-31 RX ADMIN — IPRATROPIUM BROMIDE AND ALBUTEROL SULFATE 3 ML: 2.5; .5 SOLUTION RESPIRATORY (INHALATION) at 00:18

## 2018-07-31 RX ADMIN — ATORVASTATIN CALCIUM 80 MG: 40 TABLET, FILM COATED ORAL at 20:36

## 2018-07-31 RX ADMIN — POTASSIUM CHLORIDE 40 MEQ: 1.5 POWDER, FOR SOLUTION ORAL at 16:18

## 2018-07-31 RX ADMIN — ASPIRIN 81 MG CHEWABLE TABLET 81 MG: 81 TABLET CHEWABLE at 08:10

## 2018-07-31 RX ADMIN — LEVETIRACETAM 1000 MG: 100 SOLUTION ORAL at 08:10

## 2018-07-31 RX ADMIN — IPRATROPIUM BROMIDE AND ALBUTEROL SULFATE 3 ML: 2.5; .5 SOLUTION RESPIRATORY (INHALATION) at 19:20

## 2018-07-31 RX ADMIN — LORAZEPAM 2 MG: 2 INJECTION INTRAMUSCULAR; INTRAVENOUS at 13:02

## 2018-07-31 RX ADMIN — METHYLPHENIDATE HYDROCHLORIDE 10 MG: 10 TABLET ORAL at 08:11

## 2018-07-31 RX ADMIN — VALPROIC ACID 250 MG: 250 SOLUTION ORAL at 11:45

## 2018-07-31 RX ADMIN — TAZOBACTAM SODIUM AND PIPERACILLIN SODIUM 3.38 G: 375; 3 INJECTION, SOLUTION INTRAVENOUS at 05:37

## 2018-07-31 RX ADMIN — LORAZEPAM 2 MG: 2 INJECTION INTRAMUSCULAR; INTRAVENOUS at 01:10

## 2018-07-31 RX ADMIN — GLYCOPYRROLATE 2 MG: 1 TABLET ORAL at 08:10

## 2018-07-31 RX ADMIN — VANCOMYCIN HYDROCHLORIDE 750 MG: 750 INJECTION, SOLUTION INTRAVENOUS at 13:03

## 2018-07-31 RX ADMIN — IPRATROPIUM BROMIDE AND ALBUTEROL SULFATE 3 ML: 2.5; .5 SOLUTION RESPIRATORY (INHALATION) at 06:12

## 2018-08-01 LAB
ANION GAP SERPL CALCULATED.3IONS-SCNC: 10 MMOL/L (ref 3–11)
BUN BLD-MCNC: 15 MG/DL (ref 9–23)
BUN/CREAT SERPL: 30.6 (ref 7–25)
CALCIUM SPEC-SCNC: 8.9 MG/DL (ref 8.7–10.4)
CHLORIDE SERPL-SCNC: 106 MMOL/L (ref 99–109)
CO2 SERPL-SCNC: 26 MMOL/L (ref 20–31)
CREAT BLD-MCNC: 0.49 MG/DL (ref 0.6–1.3)
DEPRECATED RDW RBC AUTO: 49.5 FL (ref 37–54)
ERYTHROCYTE [DISTWIDTH] IN BLOOD BY AUTOMATED COUNT: 13.9 % (ref 11.3–14.5)
GFR SERPL CREATININE-BSD FRML MDRD: 141 ML/MIN/1.73
GLUCOSE BLD-MCNC: 98 MG/DL (ref 70–100)
GLUCOSE BLDC GLUCOMTR-MCNC: 100 MG/DL (ref 70–130)
GLUCOSE BLDC GLUCOMTR-MCNC: 84 MG/DL (ref 70–130)
GLUCOSE BLDC GLUCOMTR-MCNC: 97 MG/DL (ref 70–130)
GLUCOSE BLDC GLUCOMTR-MCNC: 98 MG/DL (ref 70–130)
HCT VFR BLD AUTO: 33 % (ref 34.5–44)
HGB BLD-MCNC: 10.3 G/DL (ref 11.5–15.5)
MCH RBC QN AUTO: 30.7 PG (ref 27–31)
MCHC RBC AUTO-ENTMCNC: 31.2 G/DL (ref 32–36)
MCV RBC AUTO: 98.2 FL (ref 80–99)
PLATELET # BLD AUTO: 534 10*3/MM3 (ref 150–450)
PMV BLD AUTO: 10.4 FL (ref 6–12)
POTASSIUM BLD-SCNC: 3.9 MMOL/L (ref 3.5–5.5)
RBC # BLD AUTO: 3.36 10*6/MM3 (ref 3.89–5.14)
SODIUM BLD-SCNC: 142 MMOL/L (ref 132–146)
WBC NRBC COR # BLD: 9.16 10*3/MM3 (ref 3.5–10.8)

## 2018-08-01 PROCEDURE — 25010000002 ENOXAPARIN PER 10 MG: Performed by: INTERNAL MEDICINE

## 2018-08-01 PROCEDURE — 25010000002 PIPERACILLIN SOD-TAZOBACTAM PER 1 G: Performed by: HOSPITALIST

## 2018-08-01 PROCEDURE — 82962 GLUCOSE BLOOD TEST: CPT

## 2018-08-01 PROCEDURE — 85027 COMPLETE CBC AUTOMATED: CPT | Performed by: INTERNAL MEDICINE

## 2018-08-01 PROCEDURE — 80048 BASIC METABOLIC PNL TOTAL CA: CPT | Performed by: INTERNAL MEDICINE

## 2018-08-01 PROCEDURE — 94760 N-INVAS EAR/PLS OXIMETRY 1: CPT

## 2018-08-01 PROCEDURE — 94799 UNLISTED PULMONARY SVC/PX: CPT

## 2018-08-01 PROCEDURE — 93010 ELECTROCARDIOGRAM REPORT: CPT | Performed by: INTERNAL MEDICINE

## 2018-08-01 PROCEDURE — 25010000002 LORAZEPAM PER 2 MG: Performed by: INTERNAL MEDICINE

## 2018-08-01 PROCEDURE — 99232 SBSQ HOSP IP/OBS MODERATE 35: CPT | Performed by: INTERNAL MEDICINE

## 2018-08-01 PROCEDURE — 93005 ELECTROCARDIOGRAM TRACING: CPT | Performed by: NURSE PRACTITIONER

## 2018-08-01 PROCEDURE — 93005 ELECTROCARDIOGRAM TRACING: CPT | Performed by: INTERNAL MEDICINE

## 2018-08-01 RX ADMIN — VALPROIC ACID 250 MG: 250 SOLUTION ORAL at 05:48

## 2018-08-01 RX ADMIN — SODIUM CHLORIDE 100 ML/HR: 4.5 INJECTION, SOLUTION INTRAVENOUS at 03:21

## 2018-08-01 RX ADMIN — GLYCOPYRROLATE 2 MG: 1 TABLET ORAL at 22:16

## 2018-08-01 RX ADMIN — VALPROIC ACID 250 MG: 250 SOLUTION ORAL at 13:06

## 2018-08-01 RX ADMIN — VALPROIC ACID 250 MG: 250 SOLUTION ORAL at 18:58

## 2018-08-01 RX ADMIN — ACETAMINOPHEN 650 MG: 325 TABLET, FILM COATED ORAL at 16:05

## 2018-08-01 RX ADMIN — ENOXAPARIN SODIUM 40 MG: 100 INJECTION SUBCUTANEOUS at 09:21

## 2018-08-01 RX ADMIN — METHYLPHENIDATE HYDROCHLORIDE 10 MG: 10 TABLET ORAL at 09:21

## 2018-08-01 RX ADMIN — FAMOTIDINE 20 MG: 20 TABLET ORAL at 22:17

## 2018-08-01 RX ADMIN — SODIUM CHLORIDE 100 ML/HR: 4.5 INJECTION, SOLUTION INTRAVENOUS at 22:22

## 2018-08-01 RX ADMIN — CLOPIDOGREL BISULFATE 75 MG: 75 TABLET ORAL at 09:21

## 2018-08-01 RX ADMIN — ACETAMINOPHEN 650 MG: 325 TABLET, FILM COATED ORAL at 09:20

## 2018-08-01 RX ADMIN — ATORVASTATIN CALCIUM 80 MG: 40 TABLET, FILM COATED ORAL at 22:17

## 2018-08-01 RX ADMIN — LORAZEPAM 2 MG: 2 INJECTION INTRAMUSCULAR; INTRAVENOUS at 22:16

## 2018-08-01 RX ADMIN — TAZOBACTAM SODIUM AND PIPERACILLIN SODIUM 3.38 G: 375; 3 INJECTION, SOLUTION INTRAVENOUS at 03:26

## 2018-08-01 RX ADMIN — GLYCOPYRROLATE 2 MG: 1 TABLET ORAL at 16:05

## 2018-08-01 RX ADMIN — LEVETIRACETAM 1000 MG: 100 SOLUTION ORAL at 22:17

## 2018-08-01 RX ADMIN — SODIUM CHLORIDE 100 ML/HR: 4.5 INJECTION, SOLUTION INTRAVENOUS at 13:05

## 2018-08-01 RX ADMIN — METOPROLOL TARTRATE 25 MG: 25 TABLET ORAL at 05:48

## 2018-08-01 RX ADMIN — ASPIRIN 81 MG CHEWABLE TABLET 81 MG: 81 TABLET CHEWABLE at 09:21

## 2018-08-01 RX ADMIN — IPRATROPIUM BROMIDE AND ALBUTEROL SULFATE 3 ML: 2.5; .5 SOLUTION RESPIRATORY (INHALATION) at 00:07

## 2018-08-01 RX ADMIN — GLYCOPYRROLATE 2 MG: 1 TABLET ORAL at 09:31

## 2018-08-01 RX ADMIN — METHYLPHENIDATE HYDROCHLORIDE 10 MG: 10 TABLET ORAL at 22:16

## 2018-08-01 RX ADMIN — LORAZEPAM 2 MG: 2 INJECTION INTRAMUSCULAR; INTRAVENOUS at 22:54

## 2018-08-01 RX ADMIN — LEVETIRACETAM 1000 MG: 100 SOLUTION ORAL at 09:21

## 2018-08-01 RX ADMIN — VALPROIC ACID 250 MG: 250 SOLUTION ORAL at 00:47

## 2018-08-01 RX ADMIN — FAMOTIDINE 20 MG: 20 TABLET ORAL at 09:21

## 2018-08-01 NOTE — PROGRESS NOTES
Continued Stay Note  [facility]     Patient Name: Otilia Holm  MRN: [id number]  Today's Date: 8/1/2018    Admit Date: 7/16/2018          Discharge Plan     Row Name 08/01/18 1110       Plan    Plan skilled nursing facility    Patient/Family in Agreement with Plan yes    Plan Comments Referrals were made to multiple facilities in [place] and surrounding [facility] yesterday. I called referral to Syeda with [facility], and asked her to look for bed availability at [facility] facilities in [place] that accepted new trachs. I also called referral to Marisa with [facility] here in [facility] as they also accept patients with trachs.              Discharge Codes    No documentation.       Expected Discharge Date and Time     Expected Discharge Date Expected Discharge Time    Aug 10, 2018             Bubba Varma

## 2018-08-01 NOTE — PROGRESS NOTES
[facility] Medicine Services  PROGRESS NOTE    Patient Name: Otilia Holm  : 1980  MRN: [id number]    Date of Admission: 2018  Length of Stay: 16  Primary Care Physician: No Known Provider    Subjective   Subjective     CC:  FU respiratory arrest    HPI:  Pt afebrile last 24 hours. No other issues.     Review of Systems  Unable to obtain due to encephalopathy    Objective   Objective     Vital Signs:   Temp:  [98.8 °F (37.1 °C)-99 °F (37.2 °C)] 98.9 °F (37.2 °C)  Heart Rate:  [] 120  Resp:  [15-26] 18  BP: ()/() 112/74        Physical Exam:  Constitutional: No acute distress, non-responsive - baseline per ICU notes  Eyes: PERRLA, sclerae anicteric, no conjunctival injection  HENT: NCAT, mucous membranes moist  Neck: Supple, no thyromegaly, no lymphadenopathy, trachea midline  Respiratory: Trached, decreased BS diffusely, nonlabored respirations   Cardiovascular: RRR, no murmurs, rubs, or gallops, palpable pedal pulses bilaterally  Gastrointestinal: Positive bowel sounds, soft, nontender, nondistended  Musculoskeletal: No bilateral ankle edema, no clubbing or cyanosis to extremities  Psychiatric: unable to assess due to mental status  Neurologic: Contracted bilateral UE - not new since transfer from ICU  Skin: No rashes    Results Reviewed:  I have personally reviewed current lab, radiology, and data and agree.      Results from last 7 days  Lab Units 18  0618  035   WBC 10*3/mm3 9.16 8.62 11.09*  < > 12.65*   HEMOGLOBIN g/dL 10.3* 9.0* 10.0*  < > 11.1*   HEMATOCRIT % 33.0* 29.1* 33.2*  < > 35.4   PLATELETS 10*3/mm3 534* 535* 622*  < > 581*   INR   --   --   --   --  1.05   < > = values in this interval not displayed.    Results from last 7 days  Lab Units 18  0606 18  1925 182 188  18  0326 18  0358   SODIUM mmol/L 142  --  141 146  < > 146 141   POTASSIUM mmol/L  3.9 3.8 3.2* 3.6  < > 4.6 3.5   CHLORIDE mmol/L 106  --  107 112*  < > 108 106   CO2 mmol/L 26.0  --  25.0 26.0  < > 23.0 24.0   BUN mg/dL 15  --  20 17  < > 22 27*   CREATININE mg/dL 0.49*  --  0.43* 0.52*  < > 0.61 0.73   GLUCOSE mg/dL 98  --  102* 84  < > 109* 119*   CALCIUM mg/dL 8.9  --  8.4* 9.3  < > 9.8 9.6   ALT (SGPT) U/L  --   --   --  56*  --  71* 56*   AST (SGOT) U/L  --   --   --  79*  --  90* 65*   < > = values in this interval not displayed.  No results found for: BNP  No results found for: PHART    Microbiology Results Abnormal     Procedure Component Value - Date/Time    Blood Culture - Blood, Blood, Venous Line [[id number]]  (Normal) Collected:  07/29/18 0350    Lab Status:  Preliminary result Specimen:  Blood from Arm, Left Updated:  08/01/18 0501     Blood Culture No growth at 3 days    Blood Culture - Blood, Blood, Venous Line [[id number]]  (Normal) Collected:  07/29/18 0355    Lab Status:  Preliminary result Specimen:  Blood from Hand, Left Updated:  08/01/18 0501     Blood Culture No growth at 3 days    Narrative:       Aerobic bottle only    Respiratory Culture - Sputum, ET Suction [[id number]] Collected:  07/29/18 0755    Lab Status:  Final result Specimen:  Sputum from ET Suction Updated:  07/31/18 0754     Respiratory Culture Light growth (2+) Normal Respiratory Sima     Gram Stain Result Moderate (3+) WBCs per low power field      Few (2+) Epithelial cells per low power field      Few (2+) Gram positive bacilli      Few (2+) Gram positive cocci in pairs    MRSA Screen, PCR - Swab, Nares [[id number]]  (Normal) Collected:  07/29/18 7944    Lab Status:  Final result Specimen:  Swab from Nares Updated:  07/29/18 1271     MRSA, PCR Negative    Narrative:         MRSA Negative    Culture, CSF - Cerebrospinal Fluid, Lumbar Puncture [[id number]]  (Normal) Collected:  07/16/18 1140    Lab Status:  Final result Specimen:  Cerebrospinal Fluid from Lumbar Puncture Updated:  07/23/18 1007     CSF Culture  No growth at 7 days     Gram Stain Result No WBCs or organisms seen    Blood Culture - Blood, Blood, Venous Line [[id number]]  (Normal) Collected:  07/16/18 0817    Lab Status:  Final result Specimen:  Blood from Hand, Left Updated:  07/21/18 0830     Blood Culture No growth at 5 days    Blood Culture - Blood, Blood, Venous Line [[id number]]  (Normal) Collected:  07/16/18 0817    Lab Status:  Final result Specimen:  Blood from Arm, Right Updated:  07/21/18 0830     Blood Culture No growth at 5 days    Respiratory Culture - Aspirate, ET Suction [[id number]]  (Abnormal) Collected:  07/16/18 1606    Lab Status:  Final result Specimen:  Aspirate from ET Suction Updated:  07/18/18 1450     Respiratory Culture Scant growth (1+) Streptococcus, Beta Hemolytic, Group G (A)     Comment: This organism is considered to be universally susceptible to penicillin.  No further antibiotic testing will be performed.        STREP GROUPING G     Respiratory Culture Scant growth (1+) Normal Respiratory Sima     Gram Stain Result Many (4+) WBCs per low power field      Rare (1+) Epithelial cells per low power field      Few (2+) Gram positive cocci in pairs    Meningitis / Encephalitis Panel, PCR - Cerebrospinal Fluid, Lumbar Puncture [[id number]]  (Normal) Collected:  07/16/18 1140    Lab Status:  Final result Specimen:  Cerebrospinal Fluid from Lumbar Puncture Updated:  07/16/18 1419     ESCHERICHIA COLI K1, PCR Not Detected     HAEMOPHILUS INFLUENZAE, PCR Not Detected     LISTERIA MONOCYTOGENES, PCR Not Detected     NEISSERIA MENINGITIDIS, PCR Not Detected     STREPTOCOCCUS AGALACTIAE, PCR Not Detected     STREPTOCOCCUS PNEUMONIAE, PCR Not Detected     CYTOMEGALOVIRUS (CMV), PCR Not Detected     ENTEROVIRUS, PCR Not Detected     HERPES SIMPLEX VIRUS 1 (HSV-1), PCR Not Detected     HERPES SIMPLEX VIRUS 2 (HSV-2), PCR Not Detected     HUMAN PARECHOVIRUS, PCR Not Detected     VARICELLA ZOSTER VIRUS (VZV), PCR Not Detected     CRYPTOCOCCUS  "NEOFORMANS / GATTII, PCR Not Detected     HUMAN HERPES VIRUS 6 PCR Not Detected          Imaging Results (last 24 hours)     ** No results found for the last 24 hours. **        Results for orders placed during the hospital encounter of 07/16/18   Adult Transthoracic Echo Complete W/ Cont if Necessary Per Protocol    Addendum · Estimated LVEF = 50%. · The following left ventricular wall segments are hypokinetic: mid  anterior, apical septal and mid anteroseptal. · The cardiac valves are anatomically and functionally normal.        Aubrey Jiménez MD 7/16/2018  6:05 PM          Narrative · Left ventricular systolic function is normal. Estimated EF = 50%.  · The cardiac valves are anatomically and functionally normal.          I have reviewed the medications.    Assessment/Plan   Assessment / Plan     Hospital Problem List     * (Principal)Respiratory arrest (CMS/HCC)    ETOH abuse    Suspected Anoxic brain damage     H/O drug abuse    Alcoholic intoxication with complication (CMS/HCC)    Pulmonary infiltrates - pneumonia vs. aspiration pneumonitis vs. pulmonary edema.     Overview Signed 7/16/2018  7:42 AM by Ruslan Estrella MD     - pneumonia vs. aspiration pneumonitis vs. pulmonary edema.          Sepsis (CMS/HCC)           Brief Hospital Course to date:  Otilia Holm is a 37 y.o. female with relevant PMH of Alcoholism (drinks four \"4-Locos\" or 12 beers a day), drug abuse, who apparently stopped by a \"known drug dealers house\" on the way to work.  Became unresponsive.  Found unconscious and unresponsive in her car in [facility] parking lot.  Received narcan with no response.  EMS nasally intubated in field.  On arrival to [facility] found to have Bicarb 17 w/ AG 20, Ethanol level of 218.  Per [facility] ER note there was a period of 8-10 minutes when patient stopped talking before security / EMS began administering treatment.  Reportedly had prolonged seizure activity at OSH.  Transferred to [facility] on 7/16/2018 for further " management. Since admission at [facility], pt had undergone tracheostomy and PEG.     Assessment & Plan:    - Sepsis: Fevers, tachycardia, leukocytosis from ? pulm source. Stopped Zosyn. CXR unremarkable. Hold BB. Limbs contracted however, that is not new. No new medications to suspect NMS, malignant hyperthermia, serotonin synd. Resumed TFs   - Continue AEDs     DVT Prophylaxis:  pLOV    CODE STATUS:   Code Status and Medical Interventions:   Ordered at: 07/16/18 0620     Code Status:    CPR     Medical Interventions (Level of Support Prior to Arrest):    Full       Disposition: I expect the patient to be discharged TBD    Niurka Singleton MD  08/01/18  3:08 PM

## 2018-08-01 NOTE — PAYOR COMM NOTE
"Updated clinicals for continued hospitalization  auth # [id number]     Thank You,  Deepti Vasquez, RN  Utilization Review  [phone]  Fax [phone]    Clint Holm  (37 y.o. Female)     Date of Birth Social Security Number Address Home Phone MRN    1980  [address]  [facility] [phone] [id number]    Tenriism Marital Status          None Single       Admission Date Admission Type Admitting Provider Attending Provider Department, Room/Bed    7/16/18 Urgent Niurka Singleton MD [place], Niurka Barragan MD [address], [address]/1    Discharge Date Discharge Disposition Discharge Destination                       Attending Provider:  Niurka Singleton MD    Allergies:  No Known Allergies    Isolation:  None   Infection:  None   Code Status:  CPR    Ht:  144.8 cm (57.01\")   Wt:  62.8 kg (138 lb 7.2 oz)    Admission Cmt:  None   Principal Problem:  Respiratory arrest (CMS/[facility]) [R09.2]                 Active Insurance as of 7/16/2018     Primary Coverage     Payor Plan Insurance Group Employer/Plan Group    [employer] [place] [employer] [place]      Payor Plan Address Payor Plan Phone Number Effective From Effective To    [address] [address] [phone] 7/16/2018     [facility] [id number]       Subscriber Name Subscriber Birth Date Member ID       CLINT HOLM 1980 [id number]                 Emergency Contacts      (Rel.) Home Phone Work Phone Mobile Phone    Eric Holm (Son) -- -- [phone]                  ICU Vital Signs     Row Name 08/01/18 1556 08/01/18 1430 08/01/18 1245 08/01/18 1035 08/01/18 0947       Vitals    Temp 99.6 °F (37.6 °C)  --  --  --  --    Temp src Axillary  --  --  --  --    Pulse 104 112 120 110 100    Heart Rate Source Monitor Monitor Monitor Monitor  --    Resp 24 25 24 28 (!)  30    Resp Rate Source  -- Visual Visual Visual Visual    /78  --  --  -- 112/74   BP rechecked after pt repositioned    " Noninvasive MAP (mmHg) 96  --  --  -- 84    BP Location Right arm  --  --  -- Left arm    BP Method Automatic  --  --  -- Automatic    Patient Position Lying  --  --  -- Lying       Oxygen Therapy    SpO2 100 % 98 % 99 % 94 % 98 %    Pulse Oximetry Type Continuous Continuous Continuous Continuous  --    Device (Oxygen Therapy) tracheostomy collar tracheostomy collar tracheostomy collar tracheostomy collar  --    Oxygen Concentration (%) 28 28 28 28  --    Row Name 08/01/18 0921 08/01/18 0856 08/01/18 0849 08/01/18 0740 08/01/18 0600       Vitals    Temp 98.9 °F (37.2 °C)  --  -- 98.9 °F (37.2 °C)  --    Temp src Axillary  --  -- Axillary  --    Pulse 93 109 110 113 96    Heart Rate Source Monitor Monitor Monitor Monitor  --    Resp 20  -- 15 16  --    Resp Rate Source Visual  -- Visual Visual  --    BP (!)  85/47  --  -- 116/83  --    Noninvasive MAP (mmHg) 59  --  -- 90  --    BP Location Right arm  --  -- Left arm  --    BP Method Automatic  --  -- Automatic  --    Patient Position Lying  --  -- Lying  --       Oxygen Therapy    SpO2 98 % 99 %   collar was not over trach so essentially room air sat 98 % 98 % 98 %    Pulse Oximetry Type Continuous  -- Continuous  --  --    Device (Oxygen Therapy) tracheostomy collar tracheostomy collar   replaced over trach tracheostomy collar  -- tracheostomy collar    Oxygen Concentration (%) 28  -- 28  -- 28    Row Name 08/01/18 0548 08/01/18 0400 08/01/18 0359 08/01/18 0245 08/01/18 0230       Vitals    Temp  --  -- 98.8 °F (37.1 °C)  --  --    Temp src  --  -- Axillary  --  --    Pulse (!)  135   metoprolol administered 97 94 108 120    Heart Rate Source  --  -- Monitor  --  --    Resp  --  -- 18  --  --    Resp Rate Source  --  -- Visual  --  --    /65 110/65 110/65  -- 123/75    Noninvasive MAP (mmHg)  -- 80  --  -- 89    BP Location  --  -- Right arm  --  --    BP Method  --  -- Automatic  --  --    Patient Position  --  -- Lying  --  --       Oxygen Therapy    SpO2  98 % 100 % 100 % 100 % 99 %    Device (Oxygen Therapy)  -- tracheostomy collar tracheostomy collar  --  --    Oxygen Concentration (%)  -- 28  --  --  --    Row Name 08/01/18 0205 08/01/18 0200 08/01/18 0130 08/01/18 0030 08/01/18 0012       Vitals    Temp  --  --  --  -- 98.8 °F (37.1 °C)    Temp src  --  --  --  -- Axillary    Pulse (!)  127  -- (!)  134 (!)  137 (!)  122    Heart Rate Source  --  --  --  -- Monitor    Resp  --  --  --  -- 22    Resp Rate Source  --  --  --  -- Visual    BP  --  --  --  -- 151/87    Noninvasive MAP (mmHg)  --  --  --  -- 129    BP Location  --  --  --  -- Right arm    BP Method  --  --  --  -- Automatic    Patient Position  --  --  --  -- Lying       Oxygen Therapy    SpO2 98 %  -- 97 % 94 % 100 %    Device (Oxygen Therapy)  -- tracheostomy collar  --  -- tracheostomy collar    Oxygen Concentration (%)  -- 28  --  --  --    Row Name 08/01/18 0007 08/01/18 0000 07/31/18 2234 07/31/18 2200 07/31/18 2036       Vitals    Temp  --  -- 99 °F (37.2 °C)  --  --    Temp src  --  -- Axillary  --  --    Pulse (!)  128 120  --  -- (!)  133    Heart Rate Source Monitor  --  --  --  --    Resp 22  --  --  --  --    Resp Rate Source Visual  --  --  --  --    BP  --  -- 138/85  --  --    Noninvasive MAP (mmHg)  --  -- 97  --  --       Oxygen Therapy    SpO2 95 % 99 % 98 %  -- 98 %    Pulse Oximetry Type Continuous  --  --  --  --    Device (Oxygen Therapy) tracheostomy collar transtracheal catheter  -- transtracheal catheter transtracheal catheter    Flow (L/min) 6  --  --  --  --    Oxygen Concentration (%) 28 28  -- 28 28    Row Name 07/31/18 1947 07/31/18 1920 07/31/18 1910 07/31/18 1900 07/31/18 1800       Vitals    Temp 98.9 °F (37.2 °C)  --  --  --  --    Temp src Axillary  --  --  --  --    Pulse (!)  133 (!)  125  -- 114 (!)  128    Heart Rate Source Monitor Monitor  --  -- Monitor    Resp 22 22  --  -- 24    Resp Rate Source Visual Visual  --  -- Visual    /84  --  --  --  --    BP  Location Right arm  --  --  --  --    BP Method Automatic  --  --  --  --    Patient Position Lying  --  --  --  --       Oxygen Therapy    SpO2 98 % 98 %  -- 98 % 99 %    Pulse Oximetry Type  -- Continuous  --  -- Continuous    Device (Oxygen Therapy) tracheostomy collar tracheostomy collar tracheostomy collar  -- tracheostomy collar    Flow (L/min)  -- 6  --  --  --    Oxygen Concentration (%)  -- 28 28  -- 28    Row Name 07/31/18 1730 07/31/18 1700 07/31/18 1600 07/31/18 1530 07/31/18 1430       Vitals    Temp  --  -- 99 °F (37.2 °C)  --  --    Temp src  --  -- Oral  --  --    Pulse (!)  128 (!)  126 (!)  122 114 113    Heart Rate Source  --  -- Monitor Monitor  --    Resp  --  -- 24 26  --    Resp Rate Source  --  -- Visual Visual  --    BP  --  -- (!)  124/102  --  --    Noninvasive MAP (mmHg)  --  -- 104  --  --       Oxygen Therapy    SpO2 99 % 99 % 100 % 100 % 99 %    Pulse Oximetry Type  --  --  -- Continuous Continuous    Device (Oxygen Therapy)  --  -- tracheostomy collar tracheostomy collar tracheostomy collar    Oxygen Concentration (%)  --  -- 28 28  --    Row Name 07/31/18 1400 07/31/18 1330 07/31/18 1300 07/31/18 1230 07/31/18 1200       Vitals    Pulse (!)  130 105 (!)  128 (!)  121 99    Heart Rate Source  --  -- Monitor  --  --       Oxygen Therapy    SpO2 100 % 100 % 100 % 99 % 100 %    Pulse Oximetry Type  --  -- Continuous  -- Continuous    Device (Oxygen Therapy)  --  -- tracheostomy collar  -- tracheostomy collar    Oxygen Concentration (%)  --  -- 28 -- 28    Row Name 07/31/18 1130 07/31/18 1100 07/31/18 1030 07/31/18 1000 07/31/18 0930       Vitals    Pulse 79 97 95 118 119       Oxygen Therapy    SpO2 100 % 100 % 100 % 99 % 100 %    Device (Oxygen Therapy)  -- tracheostomy collar  -- tracheostomy collar  --    Oxygen Concentration (%)  -- 28  -- 28  --    Row Name 07/31/18 0900 07/31/18 0830 07/31/18 0800 07/31/18 0730 07/31/18 0700       Vitals    Temp  --  --  -- 98.5 °F (36.9 °C)  --     Temp src  --  --  -- Axillary  --    Pulse (!)  130 103 (!)  123 119 116    Heart Rate Source  --  --  -- Monitor  --    Resp  --  --  -- 24  --    Resp Rate Source  --  --  -- Visual  --    BP  --  --  -- (!)  135/106  --    Noninvasive MAP (mmHg)  --  --  -- 121  --       Oxygen Therapy    SpO2 99 % 98 % 100 % 100 % 98 %    Pulse Oximetry Type  --  --  -- Continuous  --    Device (Oxygen Therapy)  --  -- tracheostomy collar tracheostomy collar  --    Oxygen Concentration (%)  --  -- 28 28  --    Row Name 07/31/18 0630 07/31/18 0610 07/31/18 0600 07/31/18 0500 07/31/18 0459       Vitals    Temp  --  --  --  -- --    Temp src  --  --  --  -- --    Pulse  -- 110  -- 84 --       Oxygen Therapy    SpO2  -- 97 %  -- 100 %  --    Pulse Oximetry Type  -- Continuous  --  --  --    Device (Oxygen Therapy) tracheostomy collar tracheostomy collar tracheostomy collar  --  --    Oxygen Concentration (%) 28 28 28  --  --    Row Name 07/31/18 0420 07/31/18 0400 07/31/18 0200 07/31/18 0018 07/31/18 0000       Vitals    Temp 98.6 °F (37 °C)  -- 97.8 °F (36.6 °C)  --  --    [facility]  --  --  --    Pulse 83  --  -- (!)  129  --    Heart Rate Source Monitor  --  -- Monitor  --    Resp 22  --  -- 24  --    Resp Rate Source Visual  --  -- Visual  --    BP 99/65  --  --  --  --    BP Location Left arm  --  --  --  --    BP Method Automatic  --  --  --  --    Patient Position Lying  --  --  --  --       Oxygen Therapy    SpO2 100 %  --  -- 100 %  --    Pulse Oximetry Type  --  --  -- Continuous  --    Device (Oxygen Therapy)  -- tracheostomy collar tracheostomy collar tracheostomy collar tracheostomy collar    Flow (L/min)  --  -- -- 6  --    Oxygen Concentration (%)  -- 28 28 28 28    Row Name 07/30/18 2348 07/30/18 2328 07/30/18 2210 07/30/18 2200 07/30/18 2012       Vitals    Temp 98.8 °F (37.1 °C) 99 °F (37.2 °C) 99 °F (37.2 °C)  -- 98.7 °F (37.1 °C)    Temp src Axillary Axillary Axillary  -- Axillary    Pulse (!)  121 119   --  -- 98    Heart Rate Source Monitor  --  --  -- Monitor    Resp 22  --  --  -- 23    Resp Rate Source Visual  --  --  --  --    /77  -- 119/70  -- 92/60    Noninvasive MAP (mmHg)  --  -- 81  -- 70    BP Location Left arm  -- Left arm  --  --    BP Method Automatic  -- Automatic  --  --    Patient Position Lying  -- Lying  --  --       Oxygen Therapy    SpO2 99 % 98 %  --  -- 100 %    Device (Oxygen Therapy)  --  --  -- tracheostomy collar tracheostomy collar    Oxygen Concentration (%)  --  --  -- 28 28    Row Name 07/30/18 1954 07/30/18 1953 07/30/18 1910 07/30/18 1907 07/30/18 1818       Vitals    Temp 98.7 °F (37.1 °C) 99.2 °F (37.3 °C)  --  -- 99.2 °F (37.3 °C)    Temp src Axillary Axillary  --  -- Axillary    Pulse  -- 97  -- 113  --    Heart Rate Source  -- Monitor  -- Monitor  --    Resp  -- 22  -- 22  --    Resp Rate Source  -- Visual  -- Visual  --    BP  -- 96/54   notified nurse  --  --  --    BP Location  -- Left arm  --  --  --    BP Method  -- Automatic  --  --  --    Patient Position  -- Lying  --  --  --       Oxygen Therapy    SpO2  -- 100 %  -- 100 % 100 %    Pulse Oximetry Type  --  --  -- Continuous  --    Device (Oxygen Therapy)  --  -- tracheostomy collar tracheostomy collar  --    Flow (L/min)  --  --  -- 6  --    Oxygen Concentration (%)  --  -- 28 28  --    Row Name 07/30/18 1759 07/30/18 1600 07/30/18 1536 07/30/18 1420 07/30/18 1242       Vitals    Temp  --  -- 99 °F (37.2 °C)  -- 99.6 °F (37.6 °C)    Temp src  --  -- Axillary  -- Axillary    Pulse  --  -- 90  -- 93    Heart Rate Source  --  -- Monitor  --  --    Resp  --  -- 20  --  --    Resp Rate Source  --  -- Visual  --  --    BP  --  -- 98/57  --  --    BP Location  --  -- Left arm  --  --    BP Method  --  -- Automatic  --  --    Patient Position  --  -- Lying  --  --       Oxygen Therapy    SpO2  --  -- 100 %  -- 99 %    Pulse Oximetry Type  --  -- Continuous  --  --    Device (Oxygen Therapy) tracheostomy collar  tracheostomy collar tracheostomy collar tracheostomy collar  --    Oxygen Concentration (%) 28 28 28 28  --    Row Name 07/30/18 1213 07/30/18 1141 07/30/18 0949 07/30/18 0918 07/30/18 0800       Vitals    Temp  -- (!)  100.6 °F (38.1 °C)   cooling blanket off/medicated 97.7 °F (36.5 °C)   turned cooling/heating blanket back on joe Escamilla 97.9 °F (36.6 °C)   turning cooling blanket off   --    Temp src  -- Axillary Core Core  --    Pulse 104 107  --  --  --    Heart Rate Source Monitor  --  --  --  --    /64  --  --  --  --    Noninvasive MAP (mmHg) 74  --  --  --  --    BP Location Left arm  --  --  --  --    BP Method Automatic  --  --  --  --    Patient Position Lying  --  --  --  --       Oxygen Therapy    SpO2 99 % 99 %  --  --  --    Pulse Oximetry Type Continuous  --  --  --  --    Device (Oxygen Therapy) tracheostomy collar  --  --  -- tracheostomy collar    Oxygen Concentration (%) 28  --  --  --  --    Row Name 07/30/18 0755 07/30/18 0744 07/30/18 0615 07/30/18 0602 07/30/18 0520       Height and Weight    Weight  --  --  --  -- 62.8 kg (138 lb 7.2 oz)       Vitals    Temp 99.1 °F (37.3 °C)  -- 99.7 °F (37.6 °C) 100.1 °F (37.8 °C)  --    Temp src Core  -- Core Core  --    Pulse 86  -- 118 120  --    Heart Rate Source Monitor  -- Monitor Monitor  --    Resp 22  --  -- 26  --    Resp Rate Source Visual  --  -- Visual  --    /68  --  -- 154/94  --    Noninvasive MAP (mmHg) 81  --  -- 107  --    BP Location Left arm  --  -- Right arm  --    BP Method Automatic  --  -- Automatic  --    Patient Position Lying  --  -- Lying  --       Oxygen Therapy    SpO2 97 %  -- 100 % 98 %  --    Pulse Oximetry Type  --  -- Continuous  --  --    Device (Oxygen Therapy)  -- tracheostomy collar tracheostomy collar  --  --    Oxygen Concentration (%)  -- 28 28  --  --    Row Name 07/30/18 0415 07/30/18 0335 07/30/18 0215 07/30/18 0115 07/30/18 0037       Vitals    Temp  -- 99.1 °F (37.3 °C) 100 °F (37.8 °C) 98.7  °F (37.1 °C)  --    Temp src  -- Core Core Core  --    Pulse (!)  126  -- 106  -- 102    Heart Rate Source Monitor  -- Monitor  -- Monitor    Resp  --  -- 28  -- 20    Resp Rate Source  --  -- Visual  -- Visual       Oxygen Therapy    SpO2 98 %  -- 97 %  -- 99 %    Pulse Oximetry Type Continuous  -- Continuous  -- Continuous    Device (Oxygen Therapy) tracheostomy collar  -- tracheostomy collar  -- tracheostomy collar    Flow (L/min)  --  --  --  -- 6    Oxygen Concentration (%) 28  -- 28  -- 28    Row Name 07/30/18 0016 07/30/18 0015 07/29/18 2339 07/29/18 2318 07/29/18 2215       Vitals    Temp 99.4 °F (37.4 °C)  -- (!)  100.6 °F (38.1 °C) (!)  101.4 °F (38.6 °C)  --    Temp src Core  -- Core Core  --    Pulse (!)  121 120  --  -- (!)  127    Heart Rate Source Monitor Monitor  --  -- Monitor    Resp 28 (!)  30  --  --  --    Resp Rate Source Visual Visual  --  --  --    /83 138/83  --  --  --    Noninvasive MAP (mmHg)  -- 100  --  --  --    BP Location Right arm Right arm  --  --  --    BP Method Automatic Automatic  --  --  --    Patient Position Lying Lying  --  --  --       Oxygen Therapy    SpO2 99 % 99 %  --  -- 99 %    Pulse Oximetry Type  --  --  --  -- Continuous    Device (Oxygen Therapy)  -- tracheostomy collar  --  -- tracheostomy collar    Oxygen Concentration (%)  -- 28  --  -- 28    Row Name 07/29/18 2038 07/29/18 2010 07/29/18 1915 07/29/18 1856 07/29/18 1827       Vitals    Temp (!)  102.1 °F (38.9 °C)  --  --  -- (!)  102.1 °F (38.9 °C)    Temp src Axillary  --  --  -- Axillary    Pulse 104 (!)  130  -- (!)  134 111    Heart Rate Source Monitor Monitor  -- Monitor  --    Resp 20  --  -- 20  --    Resp Rate Source Visual  --  -- Visual  --    /68  --  --  --  --    Noninvasive MAP (mmHg) 83  --  --  --  --    BP Location Right arm  --  --  --  --    BP Method Automatic  --  --  --  --    Patient Position Lying  --  --  --  --       Oxygen Therapy    SpO2 98 % 94 %  -- 96 % 96 %    Pulse  Oximetry Type  -- Continuous  -- Continuous  --    Device (Oxygen Therapy)  -- tracheostomy collar tracheostomy collar tracheostomy collar  --    Oxygen Concentration (%)  -- 28 28 28  --    Row Name 07/29/18 1814 07/29/18 1706 07/29/18 1702             Vitals    Temp  --  -- 100.5 °F (38.1 °C)      Temp src  --  -- Axillary      Pulse  -- 97 103      Heart Rate Source  --  -- Monitor      BP  -- 109/64  --      Noninvasive MAP (mmHg)  -- 74  --         Oxygen Therapy    SpO2  -- 99 % 99 %      Device (Oxygen Therapy) tracheostomy collar  --  --      Oxygen Concentration (%) 28  --  --          Intake & Output (last 3 days)       07/29 0701 - 07/30 0700 07/30 0701 - 07/31 0700 07/31 0701 - 08/01 0700 08/01 0701 - 08/02 0700    P.O. 0 0      I.V. (mL/kg) 1704 (27.1) 1757 (28) 929 (14.8)     Other 250 851 472 30    NG/GT  1073 2902     IV Piggyback 2200 250      Total Intake(mL/kg) 4154 (66.1) 3931 (62.6) 4303 (68.5) 30 (0.5)    Urine (mL/kg/hr) 1125 (0.7) 950 (0.6) 500 (0.3)     Total Output 1125 950 500      Net +3029 +2981 +3803 +30            Unmeasured Urine Occurrence   1 x 1 x    Unmeasured Emesis Occurrence 1 x           Lines, Drains & Airways    Active LDAs     Name:   Placement date:   Placement time:   Site:   Days:    Peripheral IV 08/01/18 0200 Left Wrist  08/01/18 0200    Wrist    less than 1    Gastrostomy/Enterostomy Percutaneous endoscopic gastrostomy (PEG) 2 20 Fr. LUQ          LUQ        Surgical Airway Portex 9          9                    Hospital Medications (active)       Dose Frequency Start End    acetaminophen (TYLENOL) tablet 650 mg 650 mg Every 4 Hours PRN 7/17/2018     Sig - Route: Take 2 tablets by mouth Every 4 (Four) Hours As Needed for Fever. - Oral    aspirin chewable tablet 81 mg 81 mg Daily 7/22/2018     Sig - Route: Chew 1 tablet Daily. - Oral    atorvastatin (LIPITOR) tablet 80 mg 80 mg Nightly 7/18/2018     Sig - Route: Take 2 tablets by mouth Every Night. - Oral     "clopidogrel (PLAVIX) tablet 75 mg 75 mg Daily 7/18/2018     Sig - Route: Take 1 tablet by mouth Daily. - Oral    enoxaparin (LOVENOX) syringe 40 mg 40 mg Every 24 Hours 7/22/2018     Sig - Route: Inject 0.4 mL under the skin into the appropriate area as directed Daily. - Subcutaneous    famotidine (PEPCID) tablet 20 mg 20 mg 2 Times Daily 7/26/2018     Sig - Route: Take 1 tablet by mouth 2 (Two) Times a Day. - Oral    glycopyrrolate (ROBINUL) tablet 2 mg 2 mg 3 Times Daily 7/26/2018     Sig - Route: Take 2 tablets by mouth 3 (Three) Times a Day. - Oral    ipratropium-albuterol (DUO-NEB) nebulizer solution 3 mL 3 mL Every 6 Hours PRN 7/25/2018     Sig - Route: Take 3 mL by nebulization Every 6 (Six) Hours As Needed for Shortness of Air. - Nebulization    levETIRAcetam (KEPPRA) 100 MG/ML solution 1,000 mg 1,000 mg Every 12 Hours Scheduled 7/27/2018     Sig - Route: 10 mL by Nasogastric route Every 12 (Twelve) Hours. - Nasogastric    LORazepam (ATIVAN) injection 2 mg 2 mg Every 30 Minutes PRN 7/25/2018 8/4/2018    Sig - Route: Infuse 1 mL into a venous catheter Every 30 (Thirty) Minutes As Needed for Anxiety, Seizures or Agitation. - Intravenous    Magnesium Sulfate 2 gram / 50mL Infusion (GIVE X 3 BAGS TO EQUAL 6GM TOTAL DOSE) - Mg 1.1 - 1/5 mg/dl 2 g As Needed 7/16/2018     Sig - Route: Infuse 50 mL into a venous catheter As Needed (See Administration Instructions). - Intravenous    Linked Group 1:  \"Or\" Linked Group Details        Magnesium Sulfate 2 gram Bolus, followed by 8 gram infusion (total Mg dose 10 grams)- Mg less than or equal to 1mg/dL 2 g As Needed 7/16/2018     Sig - Route: Infuse 50 mL into a venous catheter As Needed (See Administration Instructions). - Intravenous    Linked Group 1:  \"Or\" Linked Group Details        Magnesium Sulfate 4 gram infusion- Mg 1.6-1.9 mg/dL 4 g As Needed 7/16/2018     Sig - Route: Infuse 100 mL into a venous catheter As Needed (See Administration Instructions). - " "Intravenous    Linked Group 1:  \"Or\" Linked Group Details        methylphenidate (RITALIN) tablet 10 mg 10 mg 2 Times Daily 7/27/2018 8/6/2018    Sig - Route: Take 1 tablet by mouth 2 (Two) Times a Day. - Oral    metoprolol tartrate (LOPRESSOR) tablet 25 mg 25 mg Once 8/1/2018 8/1/2018    Sig - Route: Take 1 tablet by mouth 1 (One) Time. - Oral    ondansetron (ZOFRAN) injection 4 mg 4 mg Every 6 Hours PRN 7/29/2018     Sig - Route: Infuse 2 mL into a venous catheter Every 6 (Six) Hours As Needed for Nausea or Vomiting. - Intravenous    Pharmacy Consult - [facility]  Daily 7/17/2018     Sig - Route: Daily. - Does not apply    potassium chloride (KLOR-CON) packet 40 mEq 40 mEq As Needed 7/16/2018     Sig - Route: Take 40 mEq by mouth As Needed (potassium replacement, see admin instructions). - Oral    Linked Group 2:  \"Or\" Linked Group Details        potassium chloride (MICRO-K) CR capsule 40 mEq 40 mEq As Needed 7/16/2018     Sig - Route: Take 4 capsules by mouth As Needed (potassium replacement.  see admin instructions). - Oral    Linked Group 2:  \"Or\" Linked Group Details        sodium chloride 0.45 % infusion 100 mL/hr Continuous 7/29/2018     Sig - Route: Infuse 100 mL/hr into a venous catheter Continuous. - Intravenous    sodium chloride 0.9 % flush 1-10 mL 1-10 mL As Needed 7/16/2018     Sig - Route: Infuse 1-10 mL into a venous catheter As Needed for Line Care. - Intravenous    Valproic Acid (DEPAKENE) syrup 250 mg 250 mg Every 6 Hours Scheduled 7/27/2018     Sig - Route: 5 mL by Nasogastric route Every 6 (Six) Hours. - Nasogastric    chlorhexidine (PERIDEX) 0.12 % solution 15 mL (Discontinued) 15 mL Every 12 Hours Scheduled 7/16/2018 8/1/2018    Sig - Route: Apply 15 mL to the mouth or throat Every 12 (Twelve) Hours. - Mouth/Throat    ipratropium-albuterol (DUO-NEB) nebulizer solution 3 mL (Discontinued) 3 mL Every 6 Hours - RT 7/27/2018 8/1/2018    Sig - Route: Take 3 mL by nebulization Every 6 (Six) Hours. - " Nebulization    piperacillin-tazobactam (ZOSYN) 3.375 g in iso-osmotic dextrose 50 ml (premix) (Discontinued) 3.375 g Every 8 Hours 7/29/2018 8/1/2018    Sig - Route: Infuse 50 mL into a venous catheter Every 8 (Eight) Hours. - Intravenous          Blood Administration Record     None        Lab Results (last 72 hours)     Procedure Component Value Units Date/Time    POC Glucose Once [[id number]]  (Normal) Collected:  08/01/18 1129    Specimen:  Blood Updated:  08/01/18 1143     Glucose 97 mg/dL     Narrative:       Meter: [id number] : [id number] Stephanie Lomax    Basic Metabolic Panel [[id number]]  (Abnormal) Collected:  08/01/18 0606    Specimen:  Blood Updated:  08/01/18 0658     Glucose 98 mg/dL      BUN 15 mg/dL      Creatinine 0.49 (L) mg/dL      Sodium 142 mmol/L      Potassium 3.9 mmol/L      Chloride 106 mmol/L      CO2 26.0 mmol/L      Calcium 8.9 mg/dL      eGFR Non African Amer 141 mL/min/1.73      BUN/Creatinine Ratio 30.6 (H)     Anion Gap 10.0 mmol/L     Narrative:       [employer] Guidelines    Stage     Description        GFR  1         Normal or High     90+  2         Mild decrease      60-89  3         Moderate decrease  30-59  4         Severe decrease    15-29  5         Kidney failure     <15    CBC (No Diff) [[id number]]  (Abnormal) Collected:  08/01/18 0606    Specimen:  Blood Updated:  08/01/18 0641     WBC 9.16 10*3/mm3      RBC 3.36 (L) 10*6/mm3      Hemoglobin 10.3 (L) g/dL      Hematocrit 33.0 (L) %      MCV 98.2 fL      MCH 30.7 pg      MCHC 31.2 (L) g/dL      RDW 13.9 %      RDW-SD 49.5 fl      MPV 10.4 fL      Platelets 534 (H) 10*3/mm3     POC Glucose Once [[id number]]  (Normal) Collected:  08/01/18 0621    Specimen:  Blood Updated:  08/01/18 0623     Glucose 84 mg/dL     Narrative:       Verify with Lab Meter: [id number] : [id number] Deonte Mayers    Blood Culture - Blood, Blood, Venous Line [[id number]]  (Normal) Collected:  07/29/18 0350    Specimen:  Blood  from Arm, Left Updated:  08/01/18 0501     Blood Culture No growth at 3 days    Blood Culture - Blood, Blood, Venous Line [[id number]]  (Normal) Collected:  07/29/18 0355    Specimen:  Blood from Hand, Left Updated:  08/01/18 0501     Blood Culture No growth at 3 days    Narrative:       Aerobic bottle only    POC Glucose Once [[id number]]  (Normal) Collected:  08/01/18 0011    Specimen:  Blood Updated:  08/01/18 0012     Glucose 100 mg/dL     Narrative:       Verify with Lab Meter: [id number] : [id number] Deonte Mayers    Potassium [[id number]]  (Normal) Collected:  07/31/18 1925    Specimen:  Blood Updated:  07/31/18 1949     Potassium 3.8 mmol/L     POC Glucose Once [[id number]]  (Normal) Collected:  07/31/18 1143    Specimen:  Blood Updated:  07/31/18 1145     Glucose 103 mg/dL     Narrative:       Meter: [id number] : [id number] Ganesh Rios    Respiratory Culture - Sputum, ET Suction [[id number]] Collected:  07/29/18 0755    Specimen:  Sputum from ET Suction Updated:  07/31/18 0754     Respiratory Culture Light growth (2+) Normal Respiratory Sima     Gram Stain Result Moderate (3+) WBCs per low power field      Few (2+) Epithelial cells per low power field      Few (2+) Gram positive bacilli      Few (2+) Gram positive cocci in pairs    POC Glucose Once [[id number]]  (Normal) Collected:  07/31/18 0612    Specimen:  Blood Updated:  07/31/18 0613     Glucose 87 mg/dL     Narrative:       Meter: [id number] : [id number] [facility]    Basic Metabolic Panel [[id number]]  (Abnormal) Collected:  07/31/18 0352    Specimen:  Blood Updated:  07/31/18 0539     Glucose 102 (H) mg/dL      BUN 20 mg/dL      Creatinine 0.43 (L) mg/dL      Sodium 141 mmol/L      Potassium 3.2 (L) mmol/L      Chloride 107 mmol/L      CO2 25.0 mmol/L      Calcium 8.4 (L) mg/dL      eGFR Non African Amer >150 mL/min/1.73      BUN/Creatinine Ratio 46.5 (H)     Anion Gap 9.0 mmol/L     Narrative:       [employer]  Guidelines    Stage     Description        GFR  1         Normal or High     90+  2         Mild decrease      60-89  3         Moderate decrease  30-59  4         Severe decrease    15-29  5         Kidney failure     <15    CBC (No Diff) [[id number]]  (Abnormal) Collected:  07/31/18 0352    Specimen:  Blood Updated:  07/31/18 0511     WBC 8.62 10*3/mm3      RBC 2.94 (L) 10*6/mm3      Hemoglobin 9.0 (L) g/dL      Hematocrit 29.1 (L) %      MCV 99.0 fL      MCH 30.6 pg      MCHC 30.9 (L) g/dL      RDW 13.8 %      RDW-SD 49.9 fl      MPV 10.2 fL      Platelets 535 (H) 10*3/mm3     POC Glucose Once [[id number]]  (Normal) Collected:  07/30/18 2352    Specimen:  Blood Updated:  07/30/18 2353     Glucose 102 mg/dL     Narrative:       Meter: [id number] : [id number] Jackson Canales    Vancomycin, Trough [[id number]]  (Abnormal) Collected:  07/30/18 2011    Specimen:  Blood Updated:  07/30/18 2045     Vancomycin Trough 21.20 (H) mcg/mL     POC Glucose Once [[id number]]  (Abnormal) Collected:  07/30/18 1800    Specimen:  Blood Updated:  07/30/18 1807     Glucose 151 (H) mg/dL     Narrative:       Meter: [id number] : [id number] Stephanie Lomax    Pain Management Profile (13 Drugs) Urine - Urine, Clean Catch [[id number]]  (Abnormal) Collected:  07/16/18 0732    Specimen:  Urine from Urine, Clean Catch Updated:  07/30/18 1309     Amphetamine, Urine Qual Negative ng/mL      Barbiturates Screen, Urine Negative ng/mL      Benzodiazepine Screen, Urine Negative ng/mL      THC Screen, Urine Negative ng/mL      Cocaine Screen, Urine Negative ng/mL      Opiate Screen, Urine Negative ng/mL      Comment: Opiate test includes Codeine, Morphine, Hydromorphone, Hydrocodone.        Oxycodone/Oxymorphone, Urine Negative ng/mL      Comment: Test includes Oxycodone and Oxymorphone        Phencyclidine (PCP), Urine Negative ng/mL      Methadone Screen, Urine Negative ng/mL      Propoxyphene Screen Negative ng/mL      Meperidine, Urine Negative  ng/mL      Comment: This test was developed and its performance characteristics  determined by [employer]. It has not been cleared or approved  by the Food and Drug Administration.        Fentanyl, Urine Positive (A)     Comment: Test includes Fentanyl and Norfentanyl  Fentanyl                    Positive        A                         01  Fentanyl Confirm            >15636             pg/mL Yickpm=642       01  Norfentanyl                 Positive        A                         01  Norfentanyl Confirm         >10626             pg/mL Fgmrxq=276       01  Norfentanyl detected; this finding can be consistent with the use of  medications that include Actiq, Duragesic, Fentora, Sublimaze, or  generic formulations. Drugs listed are representative of common  sources of the compound detected and are not intended to include all  possible sources.        Tramadol Screen, Urine Negative ng/mL      Creatinine, Urine 173.9 mg/dL      Specific Gravity, UA 1.021     pH, UA 6.1     Please note Comment     Comment: Drug-test results should be interpreted in the context of clinical  information. Patient metabolic variables, specific drug chemistry, and  specimen characteristics can affect test outcome. Technical  consultation is available if a test result is inconsistent with an  expected outcome. (email-[email or web address] or call toll-free  [phone])  Drug brands, if listed herein, are trademarks of their respective  owners.       Narrative:       Performed at:  [facility]  [address], [place]  [id number]  : Gallo Pride MD, Phone:  [phone]    POC Glucose Once [[id number]]  (Normal) Collected:  07/30/18 0601    Specimen:  Blood Updated:  07/30/18 0603     Glucose 91 mg/dL     Narrative:       Meter: [id number] : [id number] Octavia Bower    Nutrition Panel [[id number]]  (Abnormal) Collected:  07/30/18 0418    Specimen:  Blood Updated:  07/30/18 0551     Glucose 84 mg/dL      BUN 17  mg/dL      Creatinine 0.52 (L) mg/dL      Sodium 146 mmol/L      Potassium 3.6 mmol/L      Chloride 112 (H) mmol/L      CO2 26.0 mmol/L      Calcium 9.3 mg/dL      Alkaline Phosphatase 69 U/L      ALT (SGPT) 56 (H) U/L      AST (SGOT) 79 (H) U/L      Total Cholesterol 143 mg/dL      Triglycerides 87 mg/dL      Total Protein 7.0 g/dL      Albumin 3.81 g/dL      Phosphorus 3.5 mg/dL      Total Bilirubin 0.6 mg/dL      Magnesium 2.3 mg/dL      Anion Gap 8.0 mmol/L     Narrative:       Cholesterol Reference Ranges:   Desirable       < 200 mg/dL   Borderline    200-239 mg/dL   High Risk       > 239 mg/dL    Triglyceride Reference Ranges:   Normal          < 150 mg/dL   Borderline    150-199 mg/dL   High          200-499 mg/dL   Very High       > 499 mg/dL    CK [[id number]]  (Abnormal) Collected:  07/30/18 0418    Specimen:  Blood Updated:  07/30/18 0551     Creatine Kinase 500 (H) U/L     Prealbumin [[id number]]  (Normal) Collected:  07/30/18 0418    Specimen:  Blood Updated:  07/30/18 0543     Prealbumin 15.4 mg/dL     C-reactive Protein [[id number]]  (Normal) Collected:  07/30/18 0418    Specimen:  Blood Updated:  07/30/18 0543     C-Reactive Protein 0.66 mg/dL     CBC & Differential [[id number]] Collected:  07/30/18 0418    Specimen:  Blood Updated:  07/30/18 0528    Narrative:       The following orders were created for panel order CBC & Differential.  Procedure                               Abnormality         Status                     ---------                               -----------         ------                     CBC Auto Differential[[id number]]        Abnormal            Final result                 Please view results for these tests on the individual orders.    CBC Auto Differential [[id number]]  (Abnormal) Collected:  07/30/18 0418    Specimen:  Blood Updated:  07/30/18 0528     WBC 11.09 (H) 10*3/mm3      RBC 3.29 (L) 10*6/mm3      Hemoglobin 10.0 (L) g/dL      Hematocrit 33.2 (L) %      .9 (H) fL       MCH 30.4 pg      MCHC 30.1 (L) g/dL      RDW 14.6 (H) %      RDW-SD 54.1 (H) fl      MPV 10.2 fL      Platelets 622 (H) 10*3/mm3      Neutrophil % 64.3 %      Lymphocyte % 25.6 %      Monocyte % 7.8 %      Eosinophil % 1.5 %      Basophil % 0.8 %      Immature Grans % 0.3 %      Neutrophils, Absolute 7.13 10*3/mm3      Lymphocytes, Absolute 2.84 10*3/mm3      Monocytes, Absolute 0.86 10*3/mm3      Eosinophils, Absolute 0.17 10*3/mm3      Basophils, Absolute 0.09 10*3/mm3      Immature Grans, Absolute 0.03 10*3/mm3     Renal Function Panel [[id number]]  (Abnormal) Collected:  07/29/18 2034    Specimen:  Blood Updated:  07/30/18 0309     Glucose 97 mg/dL      BUN 18 mg/dL      Creatinine 0.53 (L) mg/dL      Sodium 147 (H) mmol/L      Potassium 3.8 mmol/L      Chloride 112 (H) mmol/L      CO2 24.0 mmol/L      Calcium 9.1 mg/dL      Albumin 3.73 g/dL      Phosphorus 3.3 mg/dL      Anion Gap 11.0 mmol/L      BUN/Creatinine Ratio 34.0 (H)     eGFR Non African Amer 129 mL/min/1.73     Narrative:       National Kidney Foundation Guidelines    Stage     Description        GFR  1         Normal or High     90+  2         Mild decrease      60-89  3         Moderate decrease  30-59  4         Severe decrease    15-29  5         Kidney failure     <15    POC Glucose Once [[id number]]  (Normal) Collected:  07/30/18 0147    Specimen:  Blood Updated:  07/30/18 0159     Glucose 94 mg/dL     Narrative:       Meter: [id number] : [id number] Compelloni    POC Glucose Once [[id number]]  (Normal) Collected:  07/30/18 0048    Specimen:  Blood Updated:  07/30/18 0048     Glucose 96 mg/dL     Narrative:       Meter: [id number] : [id number] Octavia Cheli    MRSA Screen, PCR - Swab, Nares [[id number]]  (Normal) Collected:  07/29/18 1434    Specimen:  Swab from Nares Updated:  07/29/18 2149     MRSA, PCR Negative    Narrative:         MRSA Negative    Procalcitonin [[id number]]  (Normal) Collected:  07/29/18 2034    Specimen:  Blood Updated:   07/29/18 2111     Procalcitonin 0.15 ng/mL     Narrative:       As a Marker for Sepsis (Non-Neonates):   1. <0.5 ng/mL represents a low risk of severe sepsis and/or septic shock.  2. >2 ng/mL represents a high risk of severe sepsis and/or septic shock.    As a Marker for Lower Respiratory Tract Infections that require antibiotic therapy:    PCT on Admission     Antibiotic Therapy       6-12 Hrs later  > 0.5                Strongly Recommended             >0.25 - <0.5         Recommended  0.1 - 0.25           Discouraged              Remeasure/reassess PCT  <0.1                 Strongly Discouraged     Remeasure/reassess PCT                     PCT values of < 0.5 ng/mL do not exclude an infection, because localized infections (without systemic signs) may be associated with such low concentrations, or a systemic infection in its initial stages (< 6 hours). Furthermore, increased PCT can occur without infection. PCT concentrations between 0.5 and 2.0 ng/mL should be interpreted taking into account the patient's history. It is recommended to retest PCT within 6-24 hours if any concentrations < 2 ng/mL are obtained.    POC Glucose Once [[id number]]  (Normal) Collected:  07/29/18 2037    Specimen:  Blood Updated:  07/29/18 2039     Glucose 120 mg/dL     Narrative:       Meter: [id number] : [id number] Octavia Bower          Imaging Results (last 72 hours)     ** No results found for the last 72 hours. **        ECG/EMG Results (last 72 hours)     Procedure Component Value Units Date/Time    ECG 12 Lead [[id number]] Collected:  07/29/18 1957     Updated:  07/31/18 2141    Narrative:       Test Reason : tachycardia, sepsis  Blood Pressure : **/** mmHG  Vent. Rate : 116 BPM     Atrial Rate : 116 BPM     P-R Int : 128 ms          QRS Dur : 074 ms      QT Int : 356 ms       P-R-T Axes : 060 -13 090 degrees     QTc Int : 494 ms    Sinus tachycardia  Anterior infarct (cited on or before 29-JUL-2018)  Abnormal ECG  When compared  with ECG of 29-JUL-2018 19:56, (Unconfirmed)  premature ventricular complexes are no longer present  Serial changes of Anterior infarct present  Confirmed by CAMILLA SHIELDS MD ([id number]) on 7/31/2018 9:41:09 PM    Referred By:  ESTHER           Confirmed By:CAMILLA SHIELDS MD    ECG 12 Lead [[id number]] Collected:  07/31/18 0109     Updated:  07/31/18 2142    Narrative:       Test Reason : Rythum confirmation  Blood Pressure : **/** mmHG  Vent. Rate : 115 BPM     Atrial Rate : 115 BPM     P-R Int : 132 ms          QRS Dur : 070 ms      QT Int : 384 ms       P-R-T Axes : 055 -04 089 degrees     QTc Int : 531 ms    Sinus tachycardia  Otherwise normal ECG  When compared with ECG of 29-JUL-2018 19:57, (Unconfirmed)  No significant change was found  Confirmed by CAMILLA SHIELDS MD ([id number]) on 7/31/2018 9:41:43 PM    Referred By:             Confirmed By:CAMILLA SHIELDS MD    ECG 12 Lead [[id number]] Collected:  08/01/18 0203     Updated:  08/01/18 0646    Narrative:       Test Reason : tachycarida  Blood Pressure : **/** mmHG  Vent. Rate : 126 BPM     Atrial Rate : 126 BPM     P-R Int : 126 ms          QRS Dur : 066 ms      QT Int : 350 ms       P-R-T Axes : 039 -18 084 degrees     QTc Int : 506 ms    Sinus tachycardia  Septal infarct , age undetermined  Possible Lateral infarct , age undetermined  Inferior infarct , age undetermined  Abnormal ECG  When compared with ECG of 31-JUL-2018 01:09,  No significant change was found    Referred By:  SAMINA MCQUEEN            Confirmed By:     ECG 12 Lead [[id number]] Collected:  08/01/18 1601     Updated:  08/01/18 1618    Narrative:       Test Reason : rhythm change  Blood Pressure : **/** mmHG  Vent. Rate : 108 BPM     Atrial Rate : 108 BPM     P-R Int : 130 ms          QRS Dur : 070 ms      QT Int : 384 ms       P-R-T Axes : 050 -05 084 degrees     QTc Int : 514 ms    Sinus tachycardia  Otherwise normal ECG  When compared with ECG of 01-AUG-2018 02:03, (Unconfirmed)  No significant change was  found    Referred By:             Confirmed By:           Operative/Procedure Notes (last 72 hours) (Notes from 2018  4:35 PM through 2018  4:35 PM)     No notes of this type exist for this encounter.           Physician Progress Notes (last 72 hours) (Notes from 2018  4:35 PM through 2018  4:35 PM)      Niurka Singleton MD at 2018 10:08 AM              [facility]  PROGRESS NOTE    Patient Name: Otilia Holm  : 1980  MRN: [id number]    Date of Admission: 2018  Length of Stay: 16  Primary Care Physician: No Known Provider    Subjective   Subjective     CC:  FU respiratory arrest    HPI:  Pt afebrile last 24 hours. No other issues.     Review of Systems  Unable to obtain due to encephalopathy    Objective   Objective     Vital Signs:   Temp:  [98.8 °F (37.1 °C)-99 °F (37.2 °C)] 98.9 °F (37.2 °C)  Heart Rate:  [] 120  Resp:  [15-26] 18  BP: ()/() 112/74        Physical Exam:  Constitutional: No acute distress, non-responsive - baseline per ICU notes  Eyes: PERRLA, sclerae anicteric, no conjunctival injection  HENT: NCAT, mucous membranes moist  Neck: Supple, no thyromegaly, no lymphadenopathy, trachea midline  Respiratory: Trached, decreased BS diffusely, nonlabored respirations   Cardiovascular: RRR, no murmurs, rubs, or gallops, palpable pedal pulses bilaterally  Gastrointestinal: Positive bowel sounds, soft, nontender, nondistended  Musculoskeletal: No bilateral ankle edema, no clubbing or cyanosis to extremities  Psychiatric: unable to assess due to mental status  Neurologic: Contracted bilateral UE - not new since transfer from ICU  Skin: No rashes    Results Reviewed:  I have personally reviewed current lab, radiology, and data and agree.      Results from last 7 days  Lab Units 18  0606 18  0352 18  0418  18  0351   WBC 10*3/mm3 9.16 8.62 11.09*  < > 12.65*   HEMOGLOBIN g/dL 10.3* 9.0*  10.0*  < > 11.1*   HEMATOCRIT % 33.0* 29.1* 33.2*  < > 35.4   PLATELETS 10*3/mm3 534* 535* 622*  < > 581*   INR   --   --   --   --  1.05   < > = values in this interval not displayed.    Results from last 7 days  Lab Units 08/01/18  0606 07/31/18  1925 07/31/18  0352 07/30/18  0418  07/28/18  0326 07/27/18  0358   SODIUM mmol/L 142  --  141 146  < > 146 141   POTASSIUM mmol/L 3.9 3.8 3.2* 3.6  < > 4.6 3.5   CHLORIDE mmol/L 106  --  107 112*  < > 108 106   CO2 mmol/L 26.0  --  25.0 26.0  < > 23.0 24.0   BUN mg/dL 15  --  20 17  < > 22 27*   CREATININE mg/dL 0.49*  --  0.43* 0.52*  < > 0.61 0.73   GLUCOSE mg/dL 98  --  102* 84  < > 109* 119*   CALCIUM mg/dL 8.9  --  8.4* 9.3  < > 9.8 9.6   ALT (SGPT) U/L  --   --   --  56*  --  71* 56*   AST (SGOT) U/L  --   --   --  79*  --  90* 65*   < > = values in this interval not displayed.  No results found for: BNP  No results found for: PHART    Microbiology Results Abnormal     Procedure Component Value - Date/Time    Blood Culture - Blood, Blood, Venous Line [[id number]]  (Normal) Collected:  07/29/18 0350    Lab Status:  Preliminary result Specimen:  Blood from Arm, Left Updated:  08/01/18 0501     Blood Culture No growth at 3 days    Blood Culture - Blood, Blood, Venous Line [[id number]]  (Normal) Collected:  07/29/18 0355    Lab Status:  Preliminary result Specimen:  Blood from Hand, Left Updated:  08/01/18 0501     Blood Culture No growth at 3 days    Narrative:       Aerobic bottle only    Respiratory Culture - Sputum, ET Suction [[id number]] Collected:  07/29/18 0755    Lab Status:  Final result Specimen:  Sputum from ET Suction Updated:  07/31/18 0754     Respiratory Culture Light growth (2+) Normal Respiratory Sima     Gram Stain Result Moderate (3+) WBCs per low power field      Few (2+) Epithelial cells per low power field      Few (2+) Gram positive bacilli      Few (2+) Gram positive cocci in pairs    MRSA Screen, PCR - Swab, Nares [[id number]]  (Normal)  Collected:  07/29/18 1434    Lab Status:  Final result Specimen:  Swab from Nares Updated:  07/29/18 2149     MRSA, PCR Negative    Narrative:         MRSA Negative    Culture, CSF - Cerebrospinal Fluid, Lumbar Puncture [[id number]]  (Normal) Collected:  07/16/18 1140    Lab Status:  Final result Specimen:  Cerebrospinal Fluid from Lumbar Puncture Updated:  07/23/18 1007     CSF Culture No growth at 7 days     Gram Stain Result No WBCs or organisms seen    Blood Culture - Blood, Blood, Venous Line [[id number]]  (Normal) Collected:  07/16/18 0817    Lab Status:  Final result Specimen:  Blood from Hand, Left Updated:  07/21/18 0830     Blood Culture No growth at 5 days    Blood Culture - Blood, Blood, Venous Line [[id number]]  (Normal) Collected:  07/16/18 0817    Lab Status:  Final result Specimen:  Blood from Arm, Right Updated:  07/21/18 0830     Blood Culture No growth at 5 days    Respiratory Culture - Aspirate, ET Suction [[id number]]  (Abnormal) Collected:  07/16/18 1606    Lab Status:  Final result Specimen:  Aspirate from ET Suction Updated:  07/18/18 1450     Respiratory Culture Scant growth (1+) Streptococcus, Beta Hemolytic, Group G (A)     Comment: This organism is considered to be universally susceptible to penicillin.  No further antibiotic testing will be performed.        STREP GROUPING G     Respiratory Culture Scant growth (1+) Normal Respiratory Sima     Gram Stain Result Many (4+) WBCs per low power field      Rare (1+) Epithelial cells per low power field      Few (2+) Gram positive cocci in pairs    Meningitis / Encephalitis Panel, PCR - Cerebrospinal Fluid, Lumbar Puncture [[id number]]  (Normal) Collected:  07/16/18 1140    Lab Status:  Final result Specimen:  Cerebrospinal Fluid from Lumbar Puncture Updated:  07/16/18 1419     ESCHERICHIA COLI K1, PCR Not Detected     HAEMOPHILUS INFLUENZAE, PCR Not Detected     LISTERIA MONOCYTOGENES, PCR Not Detected     NEISSERIA MENINGITIDIS, PCR Not Detected  "    STREPTOCOCCUS AGALACTIAE, PCR Not Detected     STREPTOCOCCUS PNEUMONIAE, PCR Not Detected     CYTOMEGALOVIRUS (CMV), PCR Not Detected     ENTEROVIRUS, PCR Not Detected     HERPES SIMPLEX VIRUS 1 (HSV-1), PCR Not Detected     HERPES SIMPLEX VIRUS 2 (HSV-2), PCR Not Detected     HUMAN PARECHOVIRUS, PCR Not Detected     VARICELLA ZOSTER VIRUS (VZV), PCR Not Detected     CRYPTOCOCCUS NEOFORMANS / GATTII, PCR Not Detected     HUMAN HERPES VIRUS 6 PCR Not Detected          Imaging Results (last 24 hours)     ** No results found for the last 24 hours. **        Results for orders placed during the hospital encounter of 07/16/18   Adult Transthoracic Echo Complete W/ Cont if Necessary Per Protocol    Addendum · Estimated LVEF = 50%. · The following left ventricular wall segments are hypokinetic: mid  anterior, apical septal and mid anteroseptal. · The cardiac valves are anatomically and functionally normal.        Aubrey Jiménez MD 7/16/2018  6:05 PM          Narrative · Left ventricular systolic function is normal. Estimated EF = 50%.  · The cardiac valves are anatomically and functionally normal.          I have reviewed the medications.    Assessment/Plan   Assessment / Plan     Hospital Problem List     * (Principal)Respiratory arrest (CMS/HCC)    ETOH abuse    Suspected Anoxic brain damage     H/O drug abuse    Alcoholic intoxication with complication (CMS/HCC)    Pulmonary infiltrates - pneumonia vs. aspiration pneumonitis vs. pulmonary edema.     Overview Signed 7/16/2018  7:42 AM by Ruslan Estrella MD     - pneumonia vs. aspiration pneumonitis vs. pulmonary edema.          Sepsis (CMS/HCC)           Brief Hospital Course to date:  Otilia Holm is a 37 y.o. female with relevant PMH of Alcoholism (drinks four \"4-Locos\" or 12 beers a day), drug abuse, who apparently stopped by a \"known drug dealers house\" on the way to work.  Became unresponsive.  Found unconscious and unresponsive in her car in [facility] " parking lot.  Received narcan with no response.  EMS nasally intubated in field.  On arrival to [facility] found to have Bicarb 17 w/ AG 20, Ethanol level of 218.  Per [facility] ER note there was a period of 8-10 minutes when patient stopped talking before security / EMS began administering treatment.  Reportedly had prolonged seizure activity at OSH.  Transferred to [facility] on 2018 for further management. Since admission at [facility], pt had undergone tracheostomy and PEG.     Assessment & Plan:    - Sepsis: Fevers, tachycardia, leukocytosis from ? pulm source. Stopped Zosyn. CXR unremarkable. Hold BB. Limbs contracted however, that is not new. No new medications to suspect NMS, malignant hyperthermia, serotonin synd. Resumed TFs   - Continue AEDs     DVT Prophylaxis:  pLOV    CODE STATUS:   Code Status and Medical Interventions:   Ordered at: 18 0620     Code Status:    CPR     Medical Interventions (Level of Support Prior to Arrest):    Full       Disposition: I expect the patient to be discharged TBD    Niurka Singleton MD  18  3:08 PM            Electronically signed by Niurka Singleton MD at 2018  3:09 PM     Niurka Singleton MD at 2018  9:50 AM              [facility] Medicine Services  PROGRESS NOTE    Patient Name: Otilia Holm  : 1980  MRN: [id number]    Date of Admission: 2018  Length of Stay: 15  Primary Care Physician: No Known Provider    Subjective   Subjective     CC:  FU respiratory arrest    HPI:  Pt afebrile last 24 hours. No other issues.     Review of Systems  Unable to obtain due to encephalopathy    Objective   Objective     Vital Signs:   Temp:  [97.8 °F (36.6 °C)-99.2 °F (37.3 °C)] 98.5 °F (36.9 °C)  Heart Rate:  [] 99  Resp:  [20-24] 24  BP: ()/() 135/106        Physical Exam:  Constitutional: No acute distress, non-responsive - baseline per ICU notes  Eyes: PERRLA, sclerae anicteric, no conjunctival  injection  HENT: NCAT, mucous membranes moist  Neck: Supple, no thyromegaly, no lymphadenopathy, trachea midline  Respiratory: Trached, decreased BS diffusely, nonlabored respirations   Cardiovascular: RRR, no murmurs, rubs, or gallops, palpable pedal pulses bilaterally  Gastrointestinal: Positive bowel sounds, soft, nontender, nondistended  Musculoskeletal: No bilateral ankle edema, no clubbing or cyanosis to extremities  Psychiatric: unable to assess due to mental status  Neurologic: Contracted bilateral UE - not new since transfer from ICU  Skin: No rashes    Results Reviewed:  I have personally reviewed current lab, radiology, and data and agree.      Results from last 7 days  Lab Units 07/31/18  0352 07/30/18  0418 07/29/18  1445  07/26/18  0351   WBC 10*3/mm3 8.62 11.09* 13.86*  < > 12.65*   HEMOGLOBIN g/dL 9.0* 10.0* 10.1*  < > 11.1*   HEMATOCRIT % 29.1* 33.2* 33.5*  < > 35.4   PLATELETS 10*3/mm3 535* 622* 614*  < > 581*   INR   --   --   --   --  1.05   < > = values in this interval not displayed.    Results from last 7 days  Lab Units 07/31/18  0352 07/30/18 0418 07/29/18 2034  07/28/18  0326 07/27/18  0358   SODIUM mmol/L 141 146 147*  < > 146 141   POTASSIUM mmol/L 3.2* 3.6 3.8  < > 4.6 3.5   CHLORIDE mmol/L 107 112* 112*  < > 108 106   CO2 mmol/L 25.0 26.0 24.0  < > 23.0 24.0   BUN mg/dL 20 17 18  < > 22 27*   CREATININE mg/dL 0.43* 0.52* 0.53*  < > 0.61 0.73   GLUCOSE mg/dL 102* 84 97  < > 109* 119*   CALCIUM mg/dL 8.4* 9.3 9.1  < > 9.8 9.6   ALT (SGPT) U/L  --  56*  --   --  71* 56*   AST (SGOT) U/L  --  79*  --   --  90* 65*   < > = values in this interval not displayed.  No results found for: BNP  No results found for: PHART    Microbiology Results Abnormal     Procedure Component Value - Date/Time    Respiratory Culture - Sputum, ET Suction [[id number]] Collected:  07/29/18 0755    Lab Status:  Final result Specimen:  Sputum from ET Suction Updated:  07/31/18 0754     Respiratory Culture Light  growth (2+) Normal Respiratory Sima     Gram Stain Result Moderate (3+) WBCs per low power field      Few (2+) Epithelial cells per low power field      Few (2+) Gram positive bacilli      Few (2+) Gram positive cocci in pairs    Blood Culture - Blood, Blood, Venous Line [[id number]]  (Normal) Collected:  07/29/18 0350    Lab Status:  Preliminary result Specimen:  Blood from Arm, Left Updated:  07/31/18 0500     Blood Culture No growth at 2 days    Blood Culture - Blood, Blood, Venous Line [[id number]]  (Normal) Collected:  07/29/18 0355    Lab Status:  Preliminary result Specimen:  Blood from Hand, Left Updated:  07/31/18 0500     Blood Culture No growth at 2 days    Narrative:       Aerobic bottle only    MRSA Screen, PCR - Swab, Nares [[id number]]  (Normal) Collected:  07/29/18 1434    Lab Status:  Final result Specimen:  Swab from Nares Updated:  07/29/18 2149     MRSA, PCR Negative    Narrative:         MRSA Negative    Culture, CSF - Cerebrospinal Fluid, Lumbar Puncture [[id number]]  (Normal) Collected:  07/16/18 1140    Lab Status:  Final result Specimen:  Cerebrospinal Fluid from Lumbar Puncture Updated:  07/23/18 1007     CSF Culture No growth at 7 days     Gram Stain Result No WBCs or organisms seen    Blood Culture - Blood, Blood, Venous Line [[id number]]  (Normal) Collected:  07/16/18 0817    Lab Status:  Final result Specimen:  Blood from Hand, Left Updated:  07/21/18 0830     Blood Culture No growth at 5 days    Blood Culture - Blood, Blood, Venous Line [[id number]]  (Normal) Collected:  07/16/18 0817    Lab Status:  Final result Specimen:  Blood from Arm, Right Updated:  07/21/18 0830     Blood Culture No growth at 5 days    Respiratory Culture - Aspirate, ET Suction [[id number]]  (Abnormal) Collected:  07/16/18 1606    Lab Status:  Final result Specimen:  Aspirate from ET Suction Updated:  07/18/18 1450     Respiratory Culture Scant growth (1+) Streptococcus, Beta Hemolytic, Group G (A)     Comment: This  organism is considered to be universally susceptible to penicillin.  No further antibiotic testing will be performed.        STREP GROUPING G     Respiratory Culture Scant growth (1+) Normal Respiratory Sima     Gram Stain Result Many (4+) WBCs per low power field      Rare (1+) Epithelial cells per low power field      Few (2+) Gram positive cocci in pairs    Meningitis / Encephalitis Panel, PCR - Cerebrospinal Fluid, Lumbar Puncture [[id number]]  (Normal) Collected:  07/16/18 1140    Lab Status:  Final result Specimen:  Cerebrospinal Fluid from Lumbar Puncture Updated:  07/16/18 1419     ESCHERICHIA COLI K1, PCR Not Detected     HAEMOPHILUS INFLUENZAE, PCR Not Detected     LISTERIA MONOCYTOGENES, PCR Not Detected     NEISSERIA MENINGITIDIS, PCR Not Detected     STREPTOCOCCUS AGALACTIAE, PCR Not Detected     STREPTOCOCCUS PNEUMONIAE, PCR Not Detected     CYTOMEGALOVIRUS (CMV), PCR Not Detected     ENTEROVIRUS, PCR Not Detected     HERPES SIMPLEX VIRUS 1 (HSV-1), PCR Not Detected     HERPES SIMPLEX VIRUS 2 (HSV-2), PCR Not Detected     HUMAN PARECHOVIRUS, PCR Not Detected     VARICELLA ZOSTER VIRUS (VZV), PCR Not Detected     CRYPTOCOCCUS NEOFORMANS / GATTII, PCR Not Detected     HUMAN HERPES VIRUS 6 PCR Not Detected          Imaging Results (last 24 hours)     ** No results found for the last 24 hours. **        Results for orders placed during the hospital encounter of 07/16/18   Adult Transthoracic Echo Complete W/ Cont if Necessary Per Protocol    Addendum · Estimated LVEF = 50%. · The following left ventricular wall segments are hypokinetic: mid  anterior, apical septal and mid anteroseptal. · The cardiac valves are anatomically and functionally normal.        Aubrey Jiménez MD 7/16/2018  6:05 PM          Narrative · Left ventricular systolic function is normal. Estimated EF = 50%.  · The cardiac valves are anatomically and functionally normal.          I have reviewed the medications.    Assessment/Plan  "  Assessment / Plan     Hospital Problem List     * (Principal)Respiratory arrest (CMS/HCC)    ETOH abuse    Suspected Anoxic brain damage     H/O drug abuse    Alcoholic intoxication with complication (CMS/[facility])    Pulmonary infiltrates - pneumonia vs. aspiration pneumonitis vs. pulmonary edema.     Overview Signed 7/16/2018  7:42 AM by Ruslan Estrella MD     - pneumonia vs. aspiration pneumonitis vs. pulmonary edema.          Sepsis (CMS/[facility])           Brief Hospital Course to date:  Otilia Holm is a 37 y.o. female with relevant PMH of Alcoholism (drinks four \"4-Locos\" or 12 beers a day), drug abuse, who apparently stopped by a \"known drug dealers house\" on the way to work.  Became unresponsive.  Found unconscious and unresponsive in her car [facility] parking lot.  Received narcan with no response.  EMS nasally intubated in field.  On arrival to [facility] found to have Bicarb 17 w/ AG 20, Ethanol level of 218.  Per [facility] ER note there was a period of 8-10 minutes when patient stopped talking before security / EMS began administering treatment.  Reportedly had prolonged seizure activity at OSH.  Transferred to [facility] on 7/16/2018 for further management. Since admission at [facility], pt had undergone tracheostomy and PEG.     Assessment & Plan:    - Sepsis: Fevers, tachycardia, leukocytosis from ? pulm source. Continue Zosyn for likely aspiration PNA with witnessed episode of emesis. CXR unremarkable. Hold BB. Limbs contracted however, that is not new. No new medications to suspect NMS, malignant hyperthermia, serotonin synd. Resume TFs today.   - Continue AEDs     DVT Prophylaxis:  pLOV    CODE STATUS:   Code Status and Medical Interventions:   Ordered at: 07/16/18 0620     Code Status:    CPR     Medical Interventions (Level of Support Prior to Arrest):    Full       Disposition: I expect the patient to be discharged TBD    Niurka Singleton MD  07/31/18  2:50 PM            Electronically signed by Niurka Barragan " MD Mani at 2018  2:52 PM     Niurka Singleton MD at 2018 10:03 AM              [facility] Medicine Services  PROGRESS NOTE    Patient Name: Otilia Holm  : 1980  MRN: [id number]    Date of Admission: 2018  Length of Stay: 14  Primary Care Physician: No Known Provider    Subjective   Subjective     CC:  FU respiratory arrest    HPI:  Pt remained febrile overnight, required cooling blanket. Now afebrile.  RN at bedside.  Pt having several jerking motions of her UEs as well as LEs (which is new today).     Review of Systems  Unable to obtain due to encephalopathy    Objective   Objective     Vital Signs:   Temp:  [97.7 °F (36.5 °C)-102.1 °F (38.9 °C)] 99.6 °F (37.6 °C)  Heart Rate:  [] 93  Resp:  [20-30] 22  BP: (108-166)/(64-98) 108/64        Physical Exam:  Constitutional: No acute distress, non-responsive - baseline per ICU notes  Eyes: PERRLA, sclerae anicteric, no conjunctival injection  HENT: NCAT, mucous membranes moist  Neck: Supple, no thyromegaly, no lymphadenopathy, trachea midline  Respiratory: Trached, decreased BS diffusely, nonlabored respirations   Cardiovascular: RRR, no murmurs, rubs, or gallops, palpable pedal pulses bilaterally  Gastrointestinal: Positive bowel sounds, soft, nontender, nondistended  Musculoskeletal: No bilateral ankle edema, no clubbing or cyanosis to extremities  Psychiatric: unable to assess due to mental status  Neurologic: Contracted bilateral UE - not new since transfer from ICU  Skin: No rashes    Results Reviewed:  I have personally reviewed current lab, radiology, and data and agree.      Results from last 7 days  Lab Units 18  0418 18  1445 18  0400  18  0351   WBC 10*3/mm3 11.09* 13.86* 10.95*  < > 12.65*   HEMOGLOBIN g/dL 10.0* 10.1* 10.9*  < > 11.1*   HEMATOCRIT % 33.2* 33.5* 36.7  < > 35.4   PLATELETS 10*3/mm3 622* 614* 680*  < > 581*   INR   --   --   --   --  1.05   < > = values in  this interval not displayed.    Results from last 7 days  Lab Units 07/30/18  0418 07/29/18  2034 07/29/18  1447  07/28/18  0326 07/27/18  0358   SODIUM mmol/L 146 147* 150*  < > 146 141   POTASSIUM mmol/L 3.6 3.8 3.5  < > 4.6 3.5   CHLORIDE mmol/L 112* 112* 115*  < > 108 106   CO2 mmol/L 26.0 24.0 27.0  < > 23.0 24.0   BUN mg/dL 17 18 21  < > 22 27*   CREATININE mg/dL 0.52* 0.53* 0.64  < > 0.61 0.73   GLUCOSE mg/dL 84 97 156*  < > 109* 119*   CALCIUM mg/dL 9.3 9.1 9.5  < > 9.8 9.6   ALT (SGPT) U/L 56*  --   --   --  71* 56*   AST (SGOT) U/L 79*  --   --   --  90* 65*   < > = values in this interval not displayed.  No results found for: BNP  pH, Arterial   Date Value Ref Range Status   07/28/2018 7.499 (H) 7.350 - 7.450 pH units Final       Microbiology Results Abnormal     Procedure Component Value - Date/Time    Respiratory Culture - Sputum, ET Suction [[id number]] Collected:  07/29/18 0751    Lab Status:  Preliminary result Specimen:  Sputum from ET Suction Updated:  07/30/18 1021     Respiratory Culture Culture in progress     Gram Stain Result Moderate (3+) WBCs per low power field      Few (2+) Epithelial cells per low power field      Few (2+) Gram positive bacilli      Few (2+) Gram positive cocci in pairs    Blood Culture - Blood, Blood, Venous Line [[id number]]  (Normal) Collected:  07/29/18 0350    Lab Status:  Preliminary result Specimen:  Blood from Arm, Left Updated:  07/30/18 0500     Blood Culture No growth at 24 hours    Blood Culture - Blood, Blood, Venous Line [[id number]]  (Normal) Collected:  07/29/18 0355    Lab Status:  Preliminary result Specimen:  Blood from Hand, Left Updated:  07/30/18 0500     Blood Culture No growth at 24 hours    Narrative:       Aerobic bottle only    MRSA Screen, PCR - Swab, Nares [[id number]]  (Normal) Collected:  07/29/18 1434    Lab Status:  Final result Specimen:  Swab from Nares Updated:  07/29/18 9569     MRSA, PCR Negative    Narrative:         MRSA Negative     Culture, CSF - Cerebrospinal Fluid, Lumbar Puncture [[id number]]  (Normal) Collected:  07/16/18 1140    Lab Status:  Final result Specimen:  Cerebrospinal Fluid from Lumbar Puncture Updated:  07/23/18 1007     CSF Culture No growth at 7 days     Gram Stain Result No WBCs or organisms seen    Blood Culture - Blood, Blood, Venous Line [[id number]]  (Normal) Collected:  07/16/18 0817    Lab Status:  Final result Specimen:  Blood from Hand, Left Updated:  07/21/18 0830     Blood Culture No growth at 5 days    Blood Culture - Blood, Blood, Venous Line [[id number]]  (Normal) Collected:  07/16/18 0817    Lab Status:  Final result Specimen:  Blood from Arm, Right Updated:  07/21/18 0830     Blood Culture No growth at 5 days    Respiratory Culture - Aspirate, ET Suction [[id number]]  (Abnormal) Collected:  07/16/18 1606    Lab Status:  Final result Specimen:  Aspirate from ET Suction Updated:  07/18/18 1450     Respiratory Culture Scant growth (1+) Streptococcus, Beta Hemolytic, Group G (A)     Comment: This organism is considered to be universally susceptible to penicillin.  No further antibiotic testing will be performed.        STREP GROUPING G     Respiratory Culture Scant growth (1+) Normal Respiratory Sima     Gram Stain Result Many (4+) WBCs per low power field      Rare (1+) Epithelial cells per low power field      Few (2+) Gram positive cocci in pairs    Meningitis / Encephalitis Panel, PCR - Cerebrospinal Fluid, Lumbar Puncture [[id number]]  (Normal) Collected:  07/16/18 1140    Lab Status:  Final result Specimen:  Cerebrospinal Fluid from Lumbar Puncture Updated:  07/16/18 1419     ESCHERICHIA COLI K1, PCR Not Detected     HAEMOPHILUS INFLUENZAE, PCR Not Detected     LISTERIA MONOCYTOGENES, PCR Not Detected     NEISSERIA MENINGITIDIS, PCR Not Detected     STREPTOCOCCUS AGALACTIAE, PCR Not Detected     STREPTOCOCCUS PNEUMONIAE, PCR Not Detected     CYTOMEGALOVIRUS (CMV), PCR Not Detected     ENTEROVIRUS, PCR Not  "Detected     HERPES SIMPLEX VIRUS 1 (HSV-1), PCR Not Detected     HERPES SIMPLEX VIRUS 2 (HSV-2), PCR Not Detected     HUMAN PARECHOVIRUS, PCR Not Detected     VARICELLA ZOSTER VIRUS (VZV), PCR Not Detected     CRYPTOCOCCUS NEOFORMANS / GATTII, PCR Not Detected     HUMAN HERPES VIRUS 6 PCR Not Detected          Imaging Results (last 24 hours)     ** No results found for the last 24 hours. **        Results for orders placed during the hospital encounter of 07/16/18   Adult Transthoracic Echo Complete W/ Cont if Necessary Per Protocol    Addendum · Estimated LVEF = 50%. · The following left ventricular wall segments are hypokinetic: mid  anterior, apical septal and mid anteroseptal. · The cardiac valves are anatomically and functionally normal.        Aubrey Jiménez MD 7/16/2018  6:05 PM          Narrative · Left ventricular systolic function is normal. Estimated EF = 50%.  · The cardiac valves are anatomically and functionally normal.          I have reviewed the medications.    Assessment/Plan   Assessment / Plan     Hospital Problem List     * (Principal)Respiratory arrest (CMS/HCC)    ETOH abuse    Suspected Anoxic brain damage     H/O drug abuse    Alcoholic intoxication with complication (CMS/HCC)    Pulmonary infiltrates - pneumonia vs. aspiration pneumonitis vs. pulmonary edema.     Overview Signed 7/16/2018  7:42 AM by Ruslan Estrella MD     - pneumonia vs. aspiration pneumonitis vs. pulmonary edema.          Sepsis (CMS/HCC)           Brief Hospital Course to date:  Otilia Holm is a 37 y.o. female with relevant PMH of Alcoholism (drinks four \"4-Locos\" or 12 beers a day), drug abuse, who apparently stopped by a \"known drug dealers house\" on the way to work.  Became unresponsive.  Found unconscious and unresponsive in her car in [facility] parking lot.  Received narcan with no response.  EMS nasally intubated in field.  On arrival to [facility] found to have Bicarb 17 w/ AG 20, Ethanol level of 218.  Per " [facility] ER note there was a period of 8-10 minutes when patient stopped talking before security / EMS began administering treatment.  Reportedly had prolonged seizure activity at OSH.  Transferred to [facility] on 7/16/2018 for further management. Since admission at [facility], pt had undergone tracheostomy and PEG.     Assessment & Plan:    - Sepsis: Fevers, tachycardia, leukocytosis from pulm source. Continue Zosyn for likely aspiration PNA with witnessed episode of emesis, may need to expand ABX coverage if remains febrile. CXR unremarkable. Hold BB. Limbs contracted however, that is not new. No new medications to suspect NMS, malignant hyperthermia, serotonin synd. Resume TFs today.   - Continue AEDs     DVT Prophylaxis:  pLOV    CODE STATUS:   Code Status and Medical Interventions:   Ordered at: 07/16/18 0620     Code Status:    CPR     Medical Interventions (Level of Support Prior to Arrest):    Full       Disposition: I expect the patient to be discharged TBD    Niurka Singleton MD  07/30/18  3:03 PM            Electronically signed by Niurka Singleton MD at 7/30/2018  3:10 PM       Consult Notes (last 72 hours) (Notes from 7/29/2018  4:35 PM through 8/1/2018  4:35 PM)     No notes of this type exist for this encounter.

## 2018-08-02 LAB
GLUCOSE BLDC GLUCOMTR-MCNC: 100 MG/DL (ref 70–130)
GLUCOSE BLDC GLUCOMTR-MCNC: 104 MG/DL (ref 70–130)
GLUCOSE BLDC GLUCOMTR-MCNC: 124 MG/DL (ref 70–130)
GLUCOSE BLDC GLUCOMTR-MCNC: 127 MG/DL (ref 70–130)
GLUCOSE BLDC GLUCOMTR-MCNC: 135 MG/DL (ref 70–130)

## 2018-08-02 PROCEDURE — 82962 GLUCOSE BLOOD TEST: CPT

## 2018-08-02 PROCEDURE — 93010 ELECTROCARDIOGRAM REPORT: CPT | Performed by: INTERNAL MEDICINE

## 2018-08-02 PROCEDURE — 99233 SBSQ HOSP IP/OBS HIGH 50: CPT | Performed by: INTERNAL MEDICINE

## 2018-08-02 PROCEDURE — 25010000002 LORAZEPAM PER 2 MG: Performed by: INTERNAL MEDICINE

## 2018-08-02 PROCEDURE — 93005 ELECTROCARDIOGRAM TRACING: CPT | Performed by: NURSE PRACTITIONER

## 2018-08-02 PROCEDURE — 25010000002 ENOXAPARIN PER 10 MG: Performed by: INTERNAL MEDICINE

## 2018-08-02 RX ORDER — ACETAMINOPHEN 650 MG/1
650 SUPPOSITORY RECTAL EVERY 4 HOURS PRN
Status: DISCONTINUED | OUTPATIENT
Start: 2018-08-02 | End: 2018-08-14

## 2018-08-02 RX ORDER — METOPROLOL TARTRATE 5 MG/5ML
2.5 INJECTION INTRAVENOUS ONCE
Status: COMPLETED | OUTPATIENT
Start: 2018-08-03 | End: 2018-08-03

## 2018-08-02 RX ORDER — METOPROLOL TARTRATE 5 MG/5ML
2.5 INJECTION INTRAVENOUS ONCE
Status: COMPLETED | OUTPATIENT
Start: 2018-08-02 | End: 2018-08-02

## 2018-08-02 RX ADMIN — VALPROIC ACID 250 MG: 250 SOLUTION ORAL at 23:03

## 2018-08-02 RX ADMIN — METOROPROLOL TARTRATE 2.5 MG: 5 INJECTION, SOLUTION INTRAVENOUS at 01:31

## 2018-08-02 RX ADMIN — LORAZEPAM 2 MG: 2 INJECTION INTRAMUSCULAR; INTRAVENOUS at 00:13

## 2018-08-02 RX ADMIN — FAMOTIDINE 20 MG: 20 TABLET ORAL at 08:20

## 2018-08-02 RX ADMIN — LORAZEPAM 2 MG: 2 INJECTION INTRAMUSCULAR; INTRAVENOUS at 21:30

## 2018-08-02 RX ADMIN — SODIUM CHLORIDE 500 ML: 9 INJECTION, SOLUTION INTRAVENOUS at 01:31

## 2018-08-02 RX ADMIN — VALPROIC ACID 250 MG: 250 SOLUTION ORAL at 05:41

## 2018-08-02 RX ADMIN — LORAZEPAM 2 MG: 2 INJECTION INTRAMUSCULAR; INTRAVENOUS at 23:39

## 2018-08-02 RX ADMIN — SODIUM CHLORIDE 100 ML/HR: 4.5 INJECTION, SOLUTION INTRAVENOUS at 18:41

## 2018-08-02 RX ADMIN — ACETAMINOPHEN 650 MG: 650 SUPPOSITORY RECTAL at 12:16

## 2018-08-02 RX ADMIN — ENOXAPARIN SODIUM 40 MG: 100 INJECTION SUBCUTANEOUS at 08:21

## 2018-08-02 RX ADMIN — VALPROIC ACID 250 MG: 250 SOLUTION ORAL at 12:16

## 2018-08-02 RX ADMIN — FAMOTIDINE 20 MG: 20 TABLET ORAL at 21:31

## 2018-08-02 RX ADMIN — CLOPIDOGREL BISULFATE 75 MG: 75 TABLET ORAL at 08:20

## 2018-08-02 RX ADMIN — LORAZEPAM 2 MG: 2 INJECTION INTRAMUSCULAR; INTRAVENOUS at 22:59

## 2018-08-02 RX ADMIN — ATORVASTATIN CALCIUM 80 MG: 40 TABLET, FILM COATED ORAL at 21:30

## 2018-08-02 RX ADMIN — METHYLPHENIDATE HYDROCHLORIDE 10 MG: 10 TABLET ORAL at 08:20

## 2018-08-02 RX ADMIN — LEVETIRACETAM 1000 MG: 100 SOLUTION ORAL at 08:21

## 2018-08-02 RX ADMIN — GLYCOPYRROLATE 2 MG: 1 TABLET ORAL at 16:49

## 2018-08-02 RX ADMIN — LORAZEPAM 2 MG: 2 INJECTION INTRAMUSCULAR; INTRAVENOUS at 05:41

## 2018-08-02 RX ADMIN — VALPROIC ACID 250 MG: 250 SOLUTION ORAL at 00:13

## 2018-08-02 RX ADMIN — GLYCOPYRROLATE 2 MG: 1 TABLET ORAL at 21:31

## 2018-08-02 RX ADMIN — METHYLPHENIDATE HYDROCHLORIDE 10 MG: 10 TABLET ORAL at 21:31

## 2018-08-02 RX ADMIN — LEVETIRACETAM 1000 MG: 100 SOLUTION ORAL at 21:30

## 2018-08-02 RX ADMIN — VALPROIC ACID 250 MG: 250 SOLUTION ORAL at 18:41

## 2018-08-02 RX ADMIN — LORAZEPAM 2 MG: 2 INJECTION INTRAMUSCULAR; INTRAVENOUS at 09:10

## 2018-08-02 RX ADMIN — SODIUM CHLORIDE 100 ML/HR: 4.5 INJECTION, SOLUTION INTRAVENOUS at 09:21

## 2018-08-02 RX ADMIN — GLYCOPYRROLATE 2 MG: 1 TABLET ORAL at 08:23

## 2018-08-02 RX ADMIN — LORAZEPAM 2 MG: 2 INJECTION INTRAMUSCULAR; INTRAVENOUS at 01:31

## 2018-08-02 RX ADMIN — LORAZEPAM 2 MG: 2 INJECTION INTRAMUSCULAR; INTRAVENOUS at 16:49

## 2018-08-02 RX ADMIN — ASPIRIN 81 MG CHEWABLE TABLET 81 MG: 81 TABLET CHEWABLE at 08:21
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
## 2018-08-02 NOTE — PROGRESS NOTES
Continued Stay Note  [facility]     Patient Name: Otilia Holm  MRN: [id number]  Today's Date: 8/2/2018    Admit Date: 7/16/2018          Discharge Plan     Row Name 08/02/18 1124       Plan    Plan Comments Per Syeda with [facility], they cannot accept Mrs. Holm into any of their facilities that accept trachs due to Mrs. Holm's insurance. I called Vicki with  to discuss the options for helping Mrs. Holm obtain disability. This can take an extended amount of time and a family member would have to obtain guardianship in order to complete this.      I followed up on referral made to [facility]. Per Marisa they do not currently have any beds available at this time, but due to her age and payor source they would have to have an  evaluate. She stated that she would send this referral to administration and let me know if they would consider accepting her.    3:43 PM faxed additional referrals to facilities all over [place].               Discharge Codes    No documentation.       Expected Discharge Date and Time     Expected Discharge Date Expected Discharge Time    Aug 10, 2018             Bubba Varma

## 2018-08-02 NOTE — PROGRESS NOTES
[facility] Medicine Services  PROGRESS NOTE    Patient Name: Otilia Holm  : 1980  MRN: [id number]    Date of Admission: 2018  Length of Stay: 17  Primary Care Physician: Provider, No Known    Subjective   Subjective     CC:  FU respiratory arrest    HPI:  Pt afebrile last 24 hours. Apparently has been sinus tachy and was treated with IV Metoprolol overnight.     Review of Systems  Unable to obtain due to encephalopathy    Objective   Objective     Vital Signs:   Temp:  [98.2 °F (36.8 °C)-99.6 °F (37.6 °C)] 98.2 °F (36.8 °C)  Heart Rate:  [] 98  Resp:  [18-36] 36  BP: (111-127)/(54-99) 111/54        Physical Exam:  Constitutional: No acute distress, non-responsive - baseline per ICU notes  Eyes: PERRLA, sclerae anicteric, no conjunctival injection  HENT: NCAT, mucous membranes moist  Neck: Supple, no thyromegaly, no lymphadenopathy, trachea midline  Respiratory: Trached, decreased BS diffusely, nonlabored respirations   Cardiovascular: RRR, no murmurs, rubs, or gallops, palpable pedal pulses bilaterally  Gastrointestinal: Positive bowel sounds, soft, nontender, nondistended  Musculoskeletal: No bilateral ankle edema, no clubbing or cyanosis to extremities  Psychiatric: unable to assess due to mental status  Neurologic: Contracted bilateral UE - not new since transfer from ICU  Skin: No rashes    Results Reviewed:  I have personally reviewed current lab, radiology, and data and agree.      Results from last 7 days  Lab Units 18  0606 18  0352 18  0418   WBC 10*3/mm3 9.16 8.62 11.09*   HEMOGLOBIN g/dL 10.3* 9.0* 10.0*   HEMATOCRIT % 33.0* 29.1* 33.2*   PLATELETS 10*3/mm3 534* 535* 622*       Results from last 7 days  Lab Units 18  0606 18  1925 18  0352 18  0418  18  0326 18  0358   SODIUM mmol/L 142  --  141 146  < > 146 141   POTASSIUM mmol/L 3.9 3.8 3.2* 3.6  < > 4.6 3.5   CHLORIDE mmol/L 106  --  107 112*  < >  108 106   CO2 mmol/L 26.0  --  25.0 26.0  < > 23.0 24.0   BUN mg/dL 15  --  20 17  < > 22 27*   CREATININE mg/dL 0.49*  --  0.43* 0.52*  < > 0.61 0.73   GLUCOSE mg/dL 98  --  102* 84  < > 109* 119*   CALCIUM mg/dL 8.9  --  8.4* 9.3  < > 9.8 9.6   ALT (SGPT) U/L  --   --   --  56*  --  71* 56*   AST (SGOT) U/L  --   --   --  79*  --  90* 65*   < > = values in this interval not displayed.  No results found for: BNP  No results found for: PHART    Microbiology Results Abnormal     Procedure Component Value - Date/Time    Blood Culture - Blood, Blood, Venous Line [[id number]]  (Normal) Collected:  07/29/18 0350    Lab Status:  Preliminary result Specimen:  Blood from Arm, Left Updated:  08/02/18 0501     Blood Culture No growth at 4 days    Blood Culture - Blood, Blood, Venous Line [[id number]]  (Normal) Collected:  07/29/18 0355    Lab Status:  Preliminary result Specimen:  Blood from Hand, Left Updated:  08/02/18 0501     Blood Culture No growth at 4 days    Narrative:       Aerobic bottle only    Respiratory Culture - Sputum, ET Suction [[id number]] Collected:  07/29/18 0755    Lab Status:  Final result Specimen:  Sputum from ET Suction Updated:  07/31/18 0754     Respiratory Culture Light growth (2+) Normal Respiratory Sima     Gram Stain Result Moderate (3+) WBCs per low power field      Few (2+) Epithelial cells per low power field      Few (2+) Gram positive bacilli      Few (2+) Gram positive cocci in pairs    MRSA Screen, PCR - Swab, Nares [[id number]]  (Normal) Collected:  07/29/18 1434    Lab Status:  Final result Specimen:  Swab from Nares Updated:  07/29/18 7589     MRSA, PCR Negative    Narrative:         MRSA Negative    Culture, CSF - Cerebrospinal Fluid, Lumbar Puncture [[id number]]  (Normal) Collected:  07/16/18 1140    Lab Status:  Final result Specimen:  Cerebrospinal Fluid from Lumbar Puncture Updated:  07/23/18 1007     CSF Culture No growth at 7 days     Gram Stain Result No WBCs or organisms seen     Blood Culture - Blood, Blood, Venous Line [[id number]]  (Normal) Collected:  07/16/18 0817    Lab Status:  Final result Specimen:  Blood from Hand, Left Updated:  07/21/18 0830     Blood Culture No growth at 5 days    Blood Culture - Blood, Blood, Venous Line [[id number]]  (Normal) Collected:  07/16/18 0817    Lab Status:  Final result Specimen:  Blood from Arm, Right Updated:  07/21/18 0830     Blood Culture No growth at 5 days    Respiratory Culture - Aspirate, ET Suction [[id number]]  (Abnormal) Collected:  07/16/18 1606    Lab Status:  Final result Specimen:  Aspirate from ET Suction Updated:  07/18/18 1450     Respiratory Culture Scant growth (1+) Streptococcus, Beta Hemolytic, Group G (A)     Comment: This organism is considered to be universally susceptible to penicillin.  No further antibiotic testing will be performed.        STREP GROUPING G     Respiratory Culture Scant growth (1+) Normal Respiratory Sima     Gram Stain Result Many (4+) WBCs per low power field      Rare (1+) Epithelial cells per low power field      Few (2+) Gram positive cocci in pairs    Meningitis / Encephalitis Panel, PCR - Cerebrospinal Fluid, Lumbar Puncture [[id number]]  (Normal) Collected:  07/16/18 1140    Lab Status:  Final result Specimen:  Cerebrospinal Fluid from Lumbar Puncture Updated:  07/16/18 1419     ESCHERICHIA COLI K1, PCR Not Detected     HAEMOPHILUS INFLUENZAE, PCR Not Detected     LISTERIA MONOCYTOGENES, PCR Not Detected     NEISSERIA MENINGITIDIS, PCR Not Detected     STREPTOCOCCUS AGALACTIAE, PCR Not Detected     STREPTOCOCCUS PNEUMONIAE, PCR Not Detected     CYTOMEGALOVIRUS (CMV), PCR Not Detected     ENTEROVIRUS, PCR Not Detected     HERPES SIMPLEX VIRUS 1 (HSV-1), PCR Not Detected     HERPES SIMPLEX VIRUS 2 (HSV-2), PCR Not Detected     HUMAN PARECHOVIRUS, PCR Not Detected     VARICELLA ZOSTER VIRUS (VZV), PCR Not Detected     CRYPTOCOCCUS NEOFORMANS / GATTII, PCR Not Detected     HUMAN HERPES VIRUS 6 PCR Not  "Detected          Imaging Results (last 24 hours)     ** No results found for the last 24 hours. **        Results for orders placed during the hospital encounter of 07/16/18   Adult Transthoracic Echo Complete W/ Cont if Necessary Per Protocol    Addendum · Estimated LVEF = 50%. · The following left ventricular wall segments are hypokinetic: mid  anterior, apical septal and mid anteroseptal. · The cardiac valves are anatomically and functionally normal.        Aubrey Jiménez MD 7/16/2018  6:05 PM          Narrative · Left ventricular systolic function is normal. Estimated EF = 50%.  · The cardiac valves are anatomically and functionally normal.          I have reviewed the medications.    Assessment/Plan   Assessment / Plan     Hospital Problem List     * (Principal)Respiratory arrest (CMS/HCC)    ETOH abuse    Suspected Anoxic brain damage     H/O drug abuse    Alcoholic intoxication with complication (CMS/HCC)    Pulmonary infiltrates - pneumonia vs. aspiration pneumonitis vs. pulmonary edema.     Overview Signed 7/16/2018  7:42 AM by Ruslan Estrella MD     - pneumonia vs. aspiration pneumonitis vs. pulmonary edema.          Sepsis (CMS/[facility], located within [facility])           Brief Hospital Course to date:  Otilia Holm is a 37 y.o. female with relevant PMH of Alcoholism (drinks four \"4-Locos\" or 12 beers a day), drug abuse, who apparently stopped by a \"known drug dealers house\" on the way to work.  Became unresponsive.  Found unconscious and unresponsive in her car in [facility] parking lot.  Received narcan with no response.  EMS nasally intubated in field.  On arrival to [facility] found to have Bicarb 17 w/ AG 20, Ethanol level of 218.  Per [facility] ER note there was a period of 8-10 minutes when patient stopped talking before security / EMS began administering treatment.  Reportedly had prolonged seizure activity at OSH.  Transferred to [facility] on 7/16/2018 for further management. Since admission at [facility], pt had undergone tracheostomy and " PEG.     Assessment & Plan:    - Sepsis:Resolved, Stopped ABX.  --sinus tachycardia- no need to treat with IV beta blocker as she is otherwise stable.  Will d/c tele.   -- Resumed TFs .   - Continue AEDs     DVT Prophylaxis:  pLOV    CODE STATUS:   Code Status and Medical Interventions:   Ordered at: 07/16/18 0620     Code Status:    CPR     Medical Interventions (Level of Support Prior to Arrest):    Full       Disposition: I expect the patient to be discharged TBD    Niurka Singleton MD  08/02/18  2:30 PM

## 2018-08-02 NOTE — PROGRESS NOTES
"Adult Nutrition  Assessment/PES    Patient Name:  Otilia Holm  YOB: 1980  MRN: [id number]  Admit Date:  7/16/2018    Assessment Date:  8/2/2018    Comments:            Reason for Assessment     Row Name 08/02/18 1045          Reason for Assessment    Reason For Assessment follow-up protocol;TF/PN   30\"     Diagnosis other (see comments)   SEE PREVIOUS NUTRITION NOTES, SEPSIS             Nutrition/Diet History     Row Name 08/02/18 1046          Nutrition/Diet History    Typical Food/Fluid Intake NURSING REPORTS TOLERATING TF.               Labs/Tests/Procedures/Meds     Row Name 08/02/18 1053          Labs/Procedures/Meds    Lab Results Reviewed reviewed             Physical Findings     Row Name 08/02/18 1053          Physical Findings    Gastrointestinal other (see comments)   WDL PER NURSING DOCUMENTATION     Skin other (see comments)   WDL PER NURSING DOCUMENTATION             Estimated/Assessed Needs     Row Name 08/02/18 1054          Estimated/Assessed Needs    Additional Documentation Fluid Requirements (Group)   CALORIE NEEDS: EST 8055-9940/DAY. PROTEIN NEEDS: EST 75-94 GRAMS/DAY        Fluid Requirements    Estimated Fluid Requirement Method RDA Method   30 ML/KG/WMU=1284 ML             Nutrition Prescription Ordered     Row Name 08/02/18 1059          Nutrition Prescription PO    Current PO Diet NPO        Nutrition Prescription EN    Enteral Route PEG     Product [employer] HN     TF Delivery Method Continuous     Continuous TF Goal Rate (mL/hr) 65 mL/hr     Continuous TF Current Rate (mL/hr) 65 mL/hr     Continuous TF Goal Volume (mL) 1430 mL   BASED ON 22 HR/DAY DELIVERY     Water flush (mL)  20 mL     Water Flush Frequency Per hour             Evaluation of Received Nutrient/Fluid Intake     Row Name 08/02/18 1100          Nutrient/Fluid Evaluation    Number of Days Evaluated 1 day        EN Evaluation    Number of Days EN Intake Evaluated Insufficient Data   APPEARS INCOMPLETELY " DOCUMENTED             Evaluation of Prescribed Nutrient/Fluid Intake     Row Name 08/02/18 1100          Evaluation of Prescribed Nutrient/Fluid Intake    Nutrition Prescribed Calorie Evaluation;Protein Evaluation;Fluid Evaluation;Fiber Evaluation;RDI        Calories at Prescribed Goal    Enteral Calories (kcal) 1716   BASED ON 22 HR/DAY DELIVERY     Total Calories (kcal) 1716        Protein at Prescribed Goal    Enteral Protein (gm) 77   BASED ON 22 HR/DAY DELIVERY     Total Protein (gm) 77        Fluid at Prescribed Goal    Enteral  Fluid (mL) 1158   BASED ON 22 HR/DAY DELIVERY     Free Water Flush Fluid (mL) 440   BASED ON 22 HR/DAY DELIVERY     Total Fluid Intake (mL) 1598        Fiber at Prescribed Goal    Fiber BASED ON 22 HR/DAY DELIVERY        Recommended Daily Intake at Prescribed Goal    RDI Met   BASED ON 22 HR/DAY DELIVERY           Problem/Interventions:        Problem 1     Row Name 08/02/18 1103          Nutrition Diagnoses Problem 1    Problem 1 Inadequate Nutrient Intake     Resolved? Yes             Problem 2     Row Name 08/02/18 1104          Nutrition Diagnoses Problem 2    Problem 2 Nutrition Appropriate for Condition at this Time     Etiology (related to) MNT for Treatment/Condition     Signs/Symptoms (evidenced by) Other (comment)   CURRENT TF RX MEETING EST ENERGY, PROTEIN, & MVM NEEDS. FLUID NEEDS BEING MET WITH IVFS                   Intervention Goal     Row Name 08/02/18 1105          Intervention Goal    General Nutrition support treatment     TF/PN Maintain TF/PN             Nutrition Intervention     Row Name 08/02/18 1105          Nutrition Intervention    RD/Tech Action Care plan reviewd;Follow Tx progress             Nutrition Prescription     Row Name 08/02/18 1106          Nutrition Prescription EN    Enteral Prescription Continue same protocol             Education/Evaluation     Row Name 08/02/18 1106          Monitor/Evaluation    Monitor Per protocol;I&O;Pertinent labs;TF  delivery/tolerance;Weight;Skin status;Symptoms         Electronically signed by:  Precious Castro RD  08/02/18 11:06 AM

## 2018-08-02 NOTE — SIGNIFICANT NOTE
RN reports pt tachycardic, not new, but now sustaining HR 130s+. Appears ST.    Did EKG, ST.    500 ml bolus, metoprolol 2.5 mg inj.    Pt remains in her same unfortunate unresponsive state, yet full code/full support. Has IV fluids, on tube feeding, trach. RN reports no overload.    Etiology? Med rxn, ex ritalin?    Please f/u.

## 2018-08-03 LAB
BACTERIA SPEC AEROBE CULT: NORMAL
BACTERIA SPEC AEROBE CULT: NORMAL
GLUCOSE BLDC GLUCOMTR-MCNC: 102 MG/DL (ref 70–130)
GLUCOSE BLDC GLUCOMTR-MCNC: 111 MG/DL (ref 70–130)
GLUCOSE BLDC GLUCOMTR-MCNC: 126 MG/DL (ref 70–130)

## 2018-08-03 PROCEDURE — 25010000002 ENOXAPARIN PER 10 MG: Performed by: INTERNAL MEDICINE

## 2018-08-03 PROCEDURE — 99233 SBSQ HOSP IP/OBS HIGH 50: CPT | Performed by: INTERNAL MEDICINE

## 2018-08-03 PROCEDURE — 94760 N-INVAS EAR/PLS OXIMETRY 1: CPT

## 2018-08-03 PROCEDURE — 94799 UNLISTED PULMONARY SVC/PX: CPT

## 2018-08-03 PROCEDURE — 82962 GLUCOSE BLOOD TEST: CPT

## 2018-08-03 PROCEDURE — 25010000002 LORAZEPAM PER 2 MG: Performed by: INTERNAL MEDICINE

## 2018-08-03 RX ORDER — METOPROLOL TARTRATE 5 MG/5ML
2.5 INJECTION INTRAVENOUS ONCE
Status: COMPLETED | OUTPATIENT
Start: 2018-08-04 | End: 2018-08-03

## 2018-08-03 RX ADMIN — VALPROIC ACID 250 MG: 250 SOLUTION ORAL at 11:51

## 2018-08-03 RX ADMIN — LORAZEPAM 2 MG: 2 INJECTION INTRAMUSCULAR; INTRAVENOUS at 05:09

## 2018-08-03 RX ADMIN — LEVETIRACETAM 1000 MG: 100 SOLUTION ORAL at 08:14

## 2018-08-03 RX ADMIN — VALPROIC ACID 250 MG: 250 SOLUTION ORAL at 05:09

## 2018-08-03 RX ADMIN — ATORVASTATIN CALCIUM 80 MG: 40 TABLET, FILM COATED ORAL at 20:19

## 2018-08-03 RX ADMIN — METHYLPHENIDATE HYDROCHLORIDE 10 MG: 10 TABLET ORAL at 08:15

## 2018-08-03 RX ADMIN — LEVETIRACETAM 1000 MG: 100 SOLUTION ORAL at 20:18

## 2018-08-03 RX ADMIN — VALPROIC ACID 250 MG: 250 SOLUTION ORAL at 18:03

## 2018-08-03 RX ADMIN — GLYCOPYRROLATE 2 MG: 1 TABLET ORAL at 20:18

## 2018-08-03 RX ADMIN — METHYLPHENIDATE HYDROCHLORIDE 10 MG: 10 TABLET ORAL at 20:19

## 2018-08-03 RX ADMIN — GLYCOPYRROLATE 2 MG: 1 TABLET ORAL at 17:04

## 2018-08-03 RX ADMIN — SODIUM CHLORIDE 100 ML/HR: 4.5 INJECTION, SOLUTION INTRAVENOUS at 05:09

## 2018-08-03 RX ADMIN — GLYCOPYRROLATE 2 MG: 1 TABLET ORAL at 08:15

## 2018-08-03 RX ADMIN — ASPIRIN 81 MG CHEWABLE TABLET 81 MG: 81 TABLET CHEWABLE at 08:14

## 2018-08-03 RX ADMIN — CLOPIDOGREL BISULFATE 75 MG: 75 TABLET ORAL at 08:15

## 2018-08-03 RX ADMIN — FAMOTIDINE 20 MG: 20 TABLET ORAL at 20:19

## 2018-08-03 RX ADMIN — ENOXAPARIN SODIUM 40 MG: 100 INJECTION SUBCUTANEOUS at 08:15

## 2018-08-03 RX ADMIN — FAMOTIDINE 20 MG: 20 TABLET ORAL at 08:15

## 2018-08-03 RX ADMIN — METOPROLOL TARTRATE 2.5 MG: 5 INJECTION, SOLUTION INTRAVENOUS at 00:10

## 2018-08-03 RX ADMIN — METOPROLOL TARTRATE 2.5 MG: 5 INJECTION, SOLUTION INTRAVENOUS at 23:31

## 2018-08-03 RX ADMIN — LORAZEPAM 2 MG: 2 INJECTION INTRAMUSCULAR; INTRAVENOUS at 00:10

## 2018-08-03 RX ADMIN — VALPROIC ACID 250 MG: 250 SOLUTION ORAL at 23:31

## 2018-08-03 NOTE — PLAN OF CARE
Problem: Patient Care Overview  Goal: Plan of Care Review  Outcome: Ongoing (interventions implemented as appropriate)    Goal: Individualization and Mutuality  Outcome: Ongoing (interventions implemented as appropriate)    Goal: Discharge Needs Assessment  Outcome: Ongoing (interventions implemented as appropriate)      Problem: Skin Injury Risk (Adult)  Goal: Skin Health and Integrity  Outcome: Ongoing (interventions implemented as appropriate)      Problem: Palliative Care (Adult)  Goal: Identify Related Risk Factors and Signs and Symptoms  Outcome: Ongoing (interventions implemented as appropriate)    Goal: Maximized Comfort  Outcome: Ongoing (interventions implemented as appropriate)    Goal: Enhanced Quality of Life  Outcome: Ongoing (interventions implemented as appropriate)

## 2018-08-03 NOTE — PROGRESS NOTES
Continued Stay Note  [facility]     Patient Name: Otilia Holm  MRN: [id number]  Today's Date: 8/3/2018    Admit Date: 7/16/2018          Discharge Plan     Row Name 08/03/18 0857       Plan    Plan undetermined    Plan Comments Complex Care following patient for discharge planning. Please contact Goldie at [phone] or Zahraa at [phone].              Discharge Codes    No documentation.       Expected Discharge Date and Time     Expected Discharge Date Expected Discharge Time    Aug 10, 2018             Bubba Varma

## 2018-08-03 NOTE — PLAN OF CARE
Problem: Patient Care Overview  Goal: Plan of Care Review  Outcome: Ongoing (interventions implemented as appropriate)   08/03/18 9954   Coping/Psychosocial   Plan of Care Reviewed With patient   Plan of Care Review   Progress no change   OTHER   Outcome Summary Pt remains in s.tach in the low 110s to 120s. BP stable. Tachypneic. TF tolerated. Began coughing blood clots this AM, resolved by afternoon. No new issues. No family present. ESTELA Segal Rn.

## 2018-08-03 NOTE — PROGRESS NOTES
[facility] Medicine Services  PROGRESS NOTE    Patient Name: Otilia Holm  : 1980  MRN: [id number]    Date of Admission: 2018  Length of Stay: 18  Primary Care Physician: Provider, No Known    Subjective   Subjective     CC:  FU respiratory arrest    HPI:  No issues overnight.     Review of Systems  Unable to obtain due to encephalopathy    Objective   Objective     Vital Signs:   Temp:  [98.9 °F (37.2 °C)-99.2 °F (37.3 °C)] 99 °F (37.2 °C)  Heart Rate:  [] 111  Resp:  [20-28] 28  BP: (111-124)/(69-71) 124/71        Physical Exam:  Constitutional: No acute distress, non-responsive - baseline per ICU notes  Eyes: PERRLA, sclerae anicteric, no conjunctival injection  HENT: NCAT, mucous membranes moist  Neck: Supple, no thyromegaly, no lymphadenopathy, trachea midline  Respiratory: Trached, decreased BS diffusely, nonlabored respirations   Cardiovascular: RRR, no murmurs, rubs, or gallops, palpable pedal pulses bilaterally  Gastrointestinal: Positive bowel sounds, soft, nontender, nondistended  Musculoskeletal: LLE 1+ edema to ankle, no RLE edema, no clubbing or cyanosis to extremities  Psychiatric: unable to assess due to mental status  Neurologic: Contracted bilateral UE - not new since transfer from ICU  Skin: No rashes    Results Reviewed:  I have personally reviewed current lab, radiology, and data and agree.      Results from last 7 days  Lab Units 18  0606 18  0352 18  0418   WBC 10*3/mm3 9.16 8.62 11.09*   HEMOGLOBIN g/dL 10.3* 9.0* 10.0*   HEMATOCRIT % 33.0* 29.1* 33.2*   PLATELETS 10*3/mm3 534* 535* 622*       Results from last 7 days  Lab Units 18  0606 18  1925 18  0352 18  0418  18  0326   SODIUM mmol/L 142  --  141 146  < > 146   POTASSIUM mmol/L 3.9 3.8 3.2* 3.6  < > 4.6   CHLORIDE mmol/L 106  --  107 112*  < > 108   CO2 mmol/L 26.0  --  25.0 26.0  < > 23.0   BUN mg/dL 15  --  20 17  < > 22   CREATININE  mg/dL 0.49*  --  0.43* 0.52*  < > 0.61   GLUCOSE mg/dL 98  --  102* 84  < > 109*   CALCIUM mg/dL 8.9  --  8.4* 9.3  < > 9.8   ALT (SGPT) U/L  --   --   --  56*  --  71*   AST (SGOT) U/L  --   --   --  79*  --  90*   < > = values in this interval not displayed.  No results found for: BNP  No results found for: PHART    Microbiology Results Abnormal     Procedure Component Value - Date/Time    Blood Culture - Blood, Blood, Venous Line [[id number]]  (Normal) Collected:  07/29/18 0350    Lab Status:  Final result Specimen:  Blood from Arm, Left Updated:  08/03/18 0501     Blood Culture No growth at 5 days    Blood Culture - Blood, Blood, Venous Line [[id number]]  (Normal) Collected:  07/29/18 0355    Lab Status:  Final result Specimen:  Blood from Hand, Left Updated:  08/03/18 0501     Blood Culture No growth at 5 days    Narrative:       Aerobic bottle only    Respiratory Culture - Sputum, ET Suction [[id number]] Collected:  07/29/18 0755    Lab Status:  Final result Specimen:  Sputum from ET Suction Updated:  07/31/18 0754     Respiratory Culture Light growth (2+) Normal Respiratory Sima     Gram Stain Result Moderate (3+) WBCs per low power field      Few (2+) Epithelial cells per low power field      Few (2+) Gram positive bacilli      Few (2+) Gram positive cocci in pairs    MRSA Screen, PCR - Swab, Nares [[id number]]  (Normal) Collected:  07/29/18 1434    Lab Status:  Final result Specimen:  Swab from Nares Updated:  07/29/18 2149     MRSA, PCR Negative    Narrative:         MRSA Negative    Culture, CSF - Cerebrospinal Fluid, Lumbar Puncture [[id number]]  (Normal) Collected:  07/16/18 1140    Lab Status:  Final result Specimen:  Cerebrospinal Fluid from Lumbar Puncture Updated:  07/23/18 1007     CSF Culture No growth at 7 days     Gram Stain Result No WBCs or organisms seen    Blood Culture - Blood, Blood, Venous Line [[id number]]  (Normal) Collected:  07/16/18 0817    Lab Status:  Final result Specimen:  Blood from  Hand, Left Updated:  07/21/18 0830     Blood Culture No growth at 5 days    Blood Culture - Blood, Blood, Venous Line [[id number]]  (Normal) Collected:  07/16/18 0817    Lab Status:  Final result Specimen:  Blood from Arm, Right Updated:  07/21/18 0830     Blood Culture No growth at 5 days    Respiratory Culture - Aspirate, ET Suction [[id number]]  (Abnormal) Collected:  07/16/18 1606    Lab Status:  Final result Specimen:  Aspirate from ET Suction Updated:  07/18/18 1450     Respiratory Culture Scant growth (1+) Streptococcus, Beta Hemolytic, Group G (A)     Comment: This organism is considered to be universally susceptible to penicillin.  No further antibiotic testing will be performed.        STREP GROUPING G     Respiratory Culture Scant growth (1+) Normal Respiratory Sima     Gram Stain Result Many (4+) WBCs per low power field      Rare (1+) Epithelial cells per low power field      Few (2+) Gram positive cocci in pairs    Meningitis / Encephalitis Panel, PCR - Cerebrospinal Fluid, Lumbar Puncture [[id number]]  (Normal) Collected:  07/16/18 1140    Lab Status:  Final result Specimen:  Cerebrospinal Fluid from Lumbar Puncture Updated:  07/16/18 1419     ESCHERICHIA COLI K1, PCR Not Detected     HAEMOPHILUS INFLUENZAE, PCR Not Detected     LISTERIA MONOCYTOGENES, PCR Not Detected     NEISSERIA MENINGITIDIS, PCR Not Detected     STREPTOCOCCUS AGALACTIAE, PCR Not Detected     STREPTOCOCCUS PNEUMONIAE, PCR Not Detected     CYTOMEGALOVIRUS (CMV), PCR Not Detected     ENTEROVIRUS, PCR Not Detected     HERPES SIMPLEX VIRUS 1 (HSV-1), PCR Not Detected     HERPES SIMPLEX VIRUS 2 (HSV-2), PCR Not Detected     HUMAN PARECHOVIRUS, PCR Not Detected     VARICELLA ZOSTER VIRUS (VZV), PCR Not Detected     CRYPTOCOCCUS NEOFORMANS / GATTII, PCR Not Detected     HUMAN HERPES VIRUS 6 PCR Not Detected          Imaging Results (last 24 hours)     ** No results found for the last 24 hours. **        Results for orders placed during the  "hospital encounter of 07/16/18   Adult Transthoracic Echo Complete W/ Cont if Necessary Per Protocol    Addendum · Estimated LVEF = 50%. · The following left ventricular wall segments are hypokinetic: mid  anterior, apical septal and mid anteroseptal. · The cardiac valves are anatomically and functionally normal.        Aubrey Jiménez MD 7/16/2018  6:05 PM          Narrative · Left ventricular systolic function is normal. Estimated EF = 50%.  · The cardiac valves are anatomically and functionally normal.          I have reviewed the medications.    Assessment/Plan   Assessment / Plan     Hospital Problem List     * (Principal)Respiratory arrest (CMS/HCC)    ETOH abuse    Suspected Anoxic brain damage     H/O drug abuse    Alcoholic intoxication with complication (CMS/HCC)    Pulmonary infiltrates - pneumonia vs. aspiration pneumonitis vs. pulmonary edema.     Overview Signed 7/16/2018  7:42 AM by Ruslan Estrella MD     - pneumonia vs. aspiration pneumonitis vs. pulmonary edema.          Sepsis (CMS/[facility])           Brief Hospital Course to date:  Otilia Holm is a 37 y.o. female with relevant PMH of Alcoholism (drinks four \"4-Locos\" or 12 beers a day), drug abuse, who apparently stopped by a \"known drug dealers house\" on the way to work.  Became unresponsive.  Found unconscious and unresponsive in her car in [facility] parking lot.  Received narcan with no response.  EMS nasally intubated in field.  On arrival to [facility] found to have Bicarb 17 w/ AG 20, Ethanol level of 218.  Per [facility] ER note there was a period of 8-10 minutes when patient stopped talking before security / EMS began administering treatment.  Reportedly had prolonged seizure activity at OSH.  Transferred to [facility] on 7/16/2018 for further management. Since admission at [facility], pt had undergone tracheostomy and PEG.     Assessment & Plan:    - Sepsis:Resolved, Stopped ABX.  --sinus tachycardia- no need to treat with IV beta blocker as she is otherwise " stable.  Will d/c tele.   -- Resumed TFs .   - Continue AEDs   - although pt has been on LMWH, her LLE appears more edematous today. Could possible be dependent edema based on her positioning in bed, but will check LE duplex to r/o DVT.      DVT Prophylaxis:  pLOV    CODE STATUS:   Code Status and Medical Interventions:   Ordered at: 07/16/18 0620     Code Status:    CPR     Medical Interventions (Level of Support Prior to Arrest):    Full       Disposition: I expect the patient to be discharged TBD    Niurka Singleton MD  08/03/18  2:23 PM

## 2018-08-04 ENCOUNTER — APPOINTMENT (OUTPATIENT)
Dept: GENERAL RADIOLOGY | Facility: HOSPITAL | Age: 38
End: 2018-08-04

## 2018-08-04 ENCOUNTER — APPOINTMENT (OUTPATIENT)
Dept: CARDIOLOGY | Facility: HOSPITAL | Age: 38
End: 2018-08-04
Attending: INTERNAL MEDICINE

## 2018-08-04 LAB
ALBUMIN SERPL-MCNC: 3.97 G/DL (ref 3.2–4.8)
ALBUMIN/GLOB SERPL: 1.2 G/DL (ref 1.5–2.5)
ALP SERPL-CCNC: 92 U/L (ref 25–100)
ALT SERPL W P-5'-P-CCNC: 81 U/L (ref 7–40)
ANION GAP SERPL CALCULATED.3IONS-SCNC: 11 MMOL/L (ref 3–11)
AST SERPL-CCNC: 100 U/L (ref 0–33)
BACTERIA UR QL AUTO: ABNORMAL /HPF
BASOPHILS # BLD AUTO: 0.05 10*3/MM3 (ref 0–0.2)
BASOPHILS NFR BLD AUTO: 0.4 % (ref 0–1)
BILIRUB SERPL-MCNC: 0.3 MG/DL (ref 0.3–1.2)
BILIRUB UR QL STRIP: NEGATIVE
BUN BLD-MCNC: 11 MG/DL (ref 9–23)
BUN/CREAT SERPL: 19 (ref 7–25)
CALCIUM SPEC-SCNC: 10.1 MG/DL (ref 8.7–10.4)
CHLORIDE SERPL-SCNC: 105 MMOL/L (ref 99–109)
CLARITY UR: ABNORMAL
CO2 SERPL-SCNC: 27 MMOL/L (ref 20–31)
COARSE GRAN CASTS URNS QL MICRO: ABNORMAL /LPF
COLOR UR: YELLOW
CREAT BLD-MCNC: 0.58 MG/DL (ref 0.6–1.3)
DEPRECATED RDW RBC AUTO: 51.7 FL (ref 37–54)
EOSINOPHIL # BLD AUTO: 0.28 10*3/MM3 (ref 0–0.3)
EOSINOPHIL NFR BLD AUTO: 2 % (ref 0–3)
ERYTHROCYTE [DISTWIDTH] IN BLOOD BY AUTOMATED COUNT: 14.5 % (ref 11.3–14.5)
GFR SERPL CREATININE-BSD FRML MDRD: 116 ML/MIN/1.73
GLOBULIN UR ELPH-MCNC: 3.3 GM/DL
GLUCOSE BLD-MCNC: 108 MG/DL (ref 70–100)
GLUCOSE BLDC GLUCOMTR-MCNC: 101 MG/DL (ref 70–130)
GLUCOSE BLDC GLUCOMTR-MCNC: 101 MG/DL (ref 70–130)
GLUCOSE BLDC GLUCOMTR-MCNC: 105 MG/DL (ref 70–130)
GLUCOSE BLDC GLUCOMTR-MCNC: 108 MG/DL (ref 70–130)
GLUCOSE UR STRIP-MCNC: NEGATIVE MG/DL
HCT VFR BLD AUTO: 34.2 % (ref 34.5–44)
HGB BLD-MCNC: 10.8 G/DL (ref 11.5–15.5)
HGB UR QL STRIP.AUTO: NEGATIVE
HYALINE CASTS UR QL AUTO: ABNORMAL /LPF
IMM GRANULOCYTES # BLD: 0.04 10*3/MM3 (ref 0–0.03)
IMM GRANULOCYTES NFR BLD: 0.3 % (ref 0–0.6)
KETONES UR QL STRIP: NEGATIVE
LEUKOCYTE ESTERASE UR QL STRIP.AUTO: NEGATIVE
LYMPHOCYTES # BLD AUTO: 2.2 10*3/MM3 (ref 0.6–4.8)
LYMPHOCYTES NFR BLD AUTO: 15.5 % (ref 24–44)
MAGNESIUM SERPL-MCNC: 2.2 MG/DL (ref 1.3–2.7)
MCH RBC QN AUTO: 30.9 PG (ref 27–31)
MCHC RBC AUTO-ENTMCNC: 31.6 G/DL (ref 32–36)
MCV RBC AUTO: 98 FL (ref 80–99)
MONOCYTES # BLD AUTO: 1.12 10*3/MM3 (ref 0–1)
MONOCYTES NFR BLD AUTO: 7.9 % (ref 0–12)
NEUTROPHILS # BLD AUTO: 10.52 10*3/MM3 (ref 1.5–8.3)
NEUTROPHILS NFR BLD AUTO: 74.2 % (ref 41–71)
NITRITE UR QL STRIP: NEGATIVE
PH UR STRIP.AUTO: 7.5 [PH] (ref 5–8)
PLATELET # BLD AUTO: 651 10*3/MM3 (ref 150–450)
PMV BLD AUTO: 10.7 FL (ref 6–12)
POTASSIUM BLD-SCNC: 4 MMOL/L (ref 3.5–5.5)
PROT SERPL-MCNC: 7.3 G/DL (ref 5.7–8.2)
PROT UR QL STRIP: NEGATIVE
RBC # BLD AUTO: 3.49 10*6/MM3 (ref 3.89–5.14)
RBC # UR: ABNORMAL /HPF
REF LAB TEST METHOD: ABNORMAL
SODIUM BLD-SCNC: 143 MMOL/L (ref 132–146)
SP GR UR STRIP: 1.01 (ref 1–1.03)
SQUAMOUS #/AREA URNS HPF: ABNORMAL /HPF
UNIDENT CRYS URNS QL MICRO: ABNORMAL /HPF
UROBILINOGEN UR QL STRIP: ABNORMAL
WBC NRBC COR # BLD: 14.17 10*3/MM3 (ref 3.5–10.8)
WBC UR QL AUTO: ABNORMAL /HPF

## 2018-08-04 PROCEDURE — 94760 N-INVAS EAR/PLS OXIMETRY 1: CPT

## 2018-08-04 PROCEDURE — 85025 COMPLETE CBC W/AUTO DIFF WBC: CPT | Performed by: INTERNAL MEDICINE

## 2018-08-04 PROCEDURE — 71045 X-RAY EXAM CHEST 1 VIEW: CPT

## 2018-08-04 PROCEDURE — 25010000002 ENOXAPARIN PER 10 MG: Performed by: INTERNAL MEDICINE

## 2018-08-04 PROCEDURE — 93971 EXTREMITY STUDY: CPT

## 2018-08-04 PROCEDURE — 25010000002 LORAZEPAM PER 2 MG: Performed by: INTERNAL MEDICINE

## 2018-08-04 PROCEDURE — 81001 URINALYSIS AUTO W/SCOPE: CPT | Performed by: INTERNAL MEDICINE

## 2018-08-04 PROCEDURE — 74018 RADEX ABDOMEN 1 VIEW: CPT

## 2018-08-04 PROCEDURE — 94799 UNLISTED PULMONARY SVC/PX: CPT

## 2018-08-04 PROCEDURE — 93005 ELECTROCARDIOGRAM TRACING: CPT | Performed by: INTERNAL MEDICINE

## 2018-08-04 PROCEDURE — 82962 GLUCOSE BLOOD TEST: CPT

## 2018-08-04 PROCEDURE — 93971 EXTREMITY STUDY: CPT | Performed by: INTERNAL MEDICINE

## 2018-08-04 PROCEDURE — 83735 ASSAY OF MAGNESIUM: CPT | Performed by: INTERNAL MEDICINE

## 2018-08-04 PROCEDURE — 93010 ELECTROCARDIOGRAM REPORT: CPT | Performed by: INTERNAL MEDICINE

## 2018-08-04 PROCEDURE — 80053 COMPREHEN METABOLIC PANEL: CPT | Performed by: INTERNAL MEDICINE

## 2018-08-04 PROCEDURE — 99233 SBSQ HOSP IP/OBS HIGH 50: CPT | Performed by: INTERNAL MEDICINE

## 2018-08-04 PROCEDURE — 25010000002 ONDANSETRON PER 1 MG: Performed by: FAMILY MEDICINE

## 2018-08-04 RX ORDER — THIAMINE MONONITRATE (VIT B1) 100 MG
100 TABLET ORAL DAILY
Status: DISCONTINUED | OUTPATIENT
Start: 2018-08-04 | End: 2018-08-18

## 2018-08-04 RX ORDER — METOPROLOL TARTRATE 5 MG/5ML
2.5 INJECTION INTRAVENOUS ONCE
Status: COMPLETED | OUTPATIENT
Start: 2018-08-04 | End: 2018-08-04

## 2018-08-04 RX ORDER — LORAZEPAM 2 MG/ML
1 INJECTION INTRAMUSCULAR
Status: DISCONTINUED | OUTPATIENT
Start: 2018-08-04 | End: 2018-08-04

## 2018-08-04 RX ORDER — LORAZEPAM 2 MG/ML
2 INJECTION INTRAMUSCULAR
Status: DISCONTINUED | OUTPATIENT
Start: 2018-08-04 | End: 2018-08-13

## 2018-08-04 RX ADMIN — LORAZEPAM 2 MG: 2 INJECTION INTRAMUSCULAR; INTRAVENOUS at 12:16

## 2018-08-04 RX ADMIN — LORAZEPAM 2 MG: 2 INJECTION INTRAMUSCULAR; INTRAVENOUS at 18:50

## 2018-08-04 RX ADMIN — LEVETIRACETAM 1000 MG: 100 SOLUTION ORAL at 20:24

## 2018-08-04 RX ADMIN — GLYCOPYRROLATE 2 MG: 1 TABLET ORAL at 08:55

## 2018-08-04 RX ADMIN — Medication 100 MG: at 10:51

## 2018-08-04 RX ADMIN — ONDANSETRON 4 MG: 2 INJECTION INTRAMUSCULAR; INTRAVENOUS at 00:54

## 2018-08-04 RX ADMIN — CLOPIDOGREL BISULFATE 75 MG: 75 TABLET ORAL at 08:55

## 2018-08-04 RX ADMIN — LEVETIRACETAM 1000 MG: 100 SOLUTION ORAL at 08:56

## 2018-08-04 RX ADMIN — ATORVASTATIN CALCIUM 80 MG: 40 TABLET, FILM COATED ORAL at 20:24

## 2018-08-04 RX ADMIN — GLYCOPYRROLATE 2 MG: 1 TABLET ORAL at 16:55

## 2018-08-04 RX ADMIN — ONDANSETRON 4 MG: 2 INJECTION INTRAMUSCULAR; INTRAVENOUS at 08:57

## 2018-08-04 RX ADMIN — METOPROLOL TARTRATE 2.5 MG: 5 INJECTION, SOLUTION INTRAVENOUS at 02:48

## 2018-08-04 RX ADMIN — GLYCOPYRROLATE 2 MG: 1 TABLET ORAL at 20:24

## 2018-08-04 RX ADMIN — ENOXAPARIN SODIUM 40 MG: 100 INJECTION SUBCUTANEOUS at 08:55

## 2018-08-04 RX ADMIN — ASPIRIN 81 MG CHEWABLE TABLET 81 MG: 81 TABLET CHEWABLE at 08:55

## 2018-08-04 RX ADMIN — METHYLPHENIDATE HYDROCHLORIDE 10 MG: 10 TABLET ORAL at 08:55

## 2018-08-04 RX ADMIN — FAMOTIDINE 20 MG: 20 TABLET ORAL at 10:51

## 2018-08-04 RX ADMIN — VALPROIC ACID 250 MG: 250 SOLUTION ORAL at 17:29

## 2018-08-04 RX ADMIN — LORAZEPAM 2 MG: 2 INJECTION INTRAMUSCULAR; INTRAVENOUS at 08:55

## 2018-08-04 RX ADMIN — FAMOTIDINE 20 MG: 20 TABLET ORAL at 20:34

## 2018-08-04 RX ADMIN — VALPROIC ACID 250 MG: 250 SOLUTION ORAL at 12:15

## 2018-08-04 RX ADMIN — VALPROIC ACID 250 MG: 250 SOLUTION ORAL at 06:00

## 2018-08-04 NOTE — PLAN OF CARE
Problem: Patient Care Overview  Goal: Plan of Care Review  Outcome: Ongoing (interventions implemented as appropriate)   08/04/18 0501   Coping/Psychosocial   Plan of Care Reviewed With patient   OTHER   Outcome Summary remains tachy, 110s-140s, lopressor given x2, bp stable, 28%trach collar, tachypnea, 2 episodes of emesis so TF on hold, incontinent, 2 BMs. opened eyes once, continues to posture.       Problem: Skin Injury Risk (Adult)  Goal: Skin Health and Integrity  Outcome: Ongoing (interventions implemented as appropriate)   08/04/18 0501   Skin Injury Risk (Adult)   Skin Health and Integrity making progress toward outcome

## 2018-08-04 NOTE — PLAN OF CARE
Problem: Patient Care Overview  Goal: Interprofessional Rounds/Family Conf  Outcome: Ongoing (interventions implemented as appropriate)      Problem: Skin Injury Risk (Adult)  Goal: Skin Health and Integrity  Outcome: Ongoing (interventions implemented as appropriate)

## 2018-08-04 NOTE — PROGRESS NOTES
[facility] Medicine Services  PROGRESS NOTE    Patient Name: Otilia Holm  : 1980  MRN: [id number]    Date of Admission: 2018  Length of Stay: 19  Primary Care Physician: Provider, No Known    Subjective   Subjective     CC:  FU respiratory arrest    HPI:  Vomiting overnight, tube feeds held temporarily.     Review of Systems  Unable to obtain due to encephalopathy    Objective   Objective     Vital Signs:   Temp:  [97.8 °F (36.6 °C)-98.1 °F (36.7 °C)] 98.1 °F (36.7 °C)  Heart Rate:  [119-145] 120  Resp:  [18-24] 20  BP: (106-147)/() 130/96        Physical Exam:  Constitutional -non toxic, in bed  HEENT-NCAT, mucous membranes moist; trach  CV-tachycardic, regular, no murmur  Resp-grossly clear, exam difficult due to poor effort  Abd-soft, non-tender, non-distended, normo active bowel sounds; PEG site CDI  Ext-No lower extremity cyanosis, clubbing or edema bilaterally  Neuro-eyes open but unresponsive  Psych-flat affect   Skin- No rash on exposed UE or LE bilaterally      Results Reviewed:  I have personally reviewed current lab, radiology, and data and agree.      Results from last 7 days  Lab Units 18  06188   WBC 10*3/mm3 9.16 8.62 11.09*   HEMOGLOBIN g/dL 10.3* 9.0* 10.0*   HEMATOCRIT % 33.0* 29.1* 33.2*   PLATELETS 10*3/mm3 534* 535* 622*       Results from last 7 days  Lab Units 18  0606 18  192188   SODIUM mmol/L 142  --  141 146   POTASSIUM mmol/L 3.9 3.8 3.2* 3.6   CHLORIDE mmol/L 106  --  107 112*   CO2 mmol/L 26.0  --  25.0 26.0   BUN mg/dL 15  --  20 17   CREATININE mg/dL 0.49*  --  0.43* 0.52*   GLUCOSE mg/dL 98  --  102* 84   CALCIUM mg/dL 8.9  --  8.4* 9.3   ALT (SGPT) U/L  --   --   --  56*   AST (SGOT) U/L  --   --   --  79*     No results found for: BNP  No results found for: PHART    Microbiology Results Abnormal     Procedure Component Value - Date/Time    Blood Culture -  Blood, Blood, Venous Line [[id number]]  (Normal) Collected:  07/29/18 0350    Lab Status:  Final result Specimen:  Blood from Arm, Left Updated:  08/03/18 0501     Blood Culture No growth at 5 days    Blood Culture - Blood, Blood, Venous Line [[id number]]  (Normal) Collected:  07/29/18 0355    Lab Status:  Final result Specimen:  Blood from Hand, Left Updated:  08/03/18 0501     Blood Culture No growth at 5 days    Narrative:       Aerobic bottle only    Respiratory Culture - Sputum, ET Suction [[id number]] Collected:  07/29/18 0755    Lab Status:  Final result Specimen:  Sputum from ET Suction Updated:  07/31/18 0754     Respiratory Culture Light growth (2+) Normal Respiratory Sima     Gram Stain Result Moderate (3+) WBCs per low power field      Few (2+) Epithelial cells per low power field      Few (2+) Gram positive bacilli      Few (2+) Gram positive cocci in pairs    MRSA Screen, PCR - Swab, Nares [[id number]]  (Normal) Collected:  07/29/18 1434    Lab Status:  Final result Specimen:  Swab from Nares Updated:  07/29/18 2149     MRSA, PCR Negative    Narrative:         MRSA Negative    Culture, CSF - Cerebrospinal Fluid, Lumbar Puncture [[id number]]  (Normal) Collected:  07/16/18 1140    Lab Status:  Final result Specimen:  Cerebrospinal Fluid from Lumbar Puncture Updated:  07/23/18 1007     CSF Culture No growth at 7 days     Gram Stain Result No WBCs or organisms seen    Blood Culture - Blood, Blood, Venous Line [[id number]]  (Normal) Collected:  07/16/18 0817    Lab Status:  Final result Specimen:  Blood from Hand, Left Updated:  07/21/18 0830     Blood Culture No growth at 5 days    Blood Culture - Blood, Blood, Venous Line [[id number]]  (Normal) Collected:  07/16/18 0817    Lab Status:  Final result Specimen:  Blood from Arm, Right Updated:  07/21/18 0830     Blood Culture No growth at 5 days    Respiratory Culture - Aspirate, ET Suction [[id number]]  (Abnormal) Collected:  07/16/18 1606    Lab Status:  Final  result Specimen:  Aspirate from ET Suction Updated:  07/18/18 1450     Respiratory Culture Scant growth (1+) Streptococcus, Beta Hemolytic, Group G (A)     Comment: This organism is considered to be universally susceptible to penicillin.  No further antibiotic testing will be performed.        STREP GROUPING G     Respiratory Culture Scant growth (1+) Normal Respiratory Sima     Gram Stain Result Many (4+) WBCs per low power field      Rare (1+) Epithelial cells per low power field      Few (2+) Gram positive cocci in pairs    Meningitis / Encephalitis Panel, PCR - Cerebrospinal Fluid, Lumbar Puncture [[id number]]  (Normal) Collected:  07/16/18 1140    Lab Status:  Final result Specimen:  Cerebrospinal Fluid from Lumbar Puncture Updated:  07/16/18 1419     ESCHERICHIA COLI K1, PCR Not Detected     HAEMOPHILUS INFLUENZAE, PCR Not Detected     LISTERIA MONOCYTOGENES, PCR Not Detected     NEISSERIA MENINGITIDIS, PCR Not Detected     STREPTOCOCCUS AGALACTIAE, PCR Not Detected     STREPTOCOCCUS PNEUMONIAE, PCR Not Detected     CYTOMEGALOVIRUS (CMV), PCR Not Detected     ENTEROVIRUS, PCR Not Detected     HERPES SIMPLEX VIRUS 1 (HSV-1), PCR Not Detected     HERPES SIMPLEX VIRUS 2 (HSV-2), PCR Not Detected     HUMAN PARECHOVIRUS, PCR Not Detected     VARICELLA ZOSTER VIRUS (VZV), PCR Not Detected     CRYPTOCOCCUS NEOFORMANS / GATTII, PCR Not Detected     HUMAN HERPES VIRUS 6 PCR Not Detected          Imaging Results (last 24 hours)     ** No results found for the last 24 hours. **        Results for orders placed during the hospital encounter of 07/16/18   Adult Transthoracic Echo Complete W/ Cont if Necessary Per Protocol    Addendum · Estimated LVEF = 50%. · The following left ventricular wall segments are hypokinetic: mid  anterior, apical septal and mid anteroseptal. · The cardiac valves are anatomically and functionally normal.        Aubrey Jiménez MD 7/16/2018  6:05 PM          Narrative · Left ventricular systolic  "function is normal. Estimated EF = 50%.  · The cardiac valves are anatomically and functionally normal.          I have reviewed the medications.    aspirin 81 mg Oral Daily   atorvastatin 80 mg Oral Nightly   clopidogrel 75 mg Oral Daily   enoxaparin 40 mg Subcutaneous Q24H   famotidine 20 mg Oral BID   glycopyrrolate 2 mg Oral TID   levETIRAcetam 1,000 mg Nasogastric Q12H   methylphenidate 10 mg Oral BID   Valproic Acid 250 mg Nasogastric Q6H         Assessment/Plan   Assessment / Plan     Hospital Problem List     * (Principal)Respiratory arrest (CMS/HCC)    ETOH abuse    Suspected Anoxic brain damage     H/O drug abuse    Alcoholic intoxication with complication (CMS/[facility])    Pulmonary infiltrates - pneumonia vs. aspiration pneumonitis vs. pulmonary edema.     Overview Signed 7/16/2018  7:42 AM by Ruslan Estrella MD     - pneumonia vs. aspiration pneumonitis vs. pulmonary edema.          Sepsis (CMS/[facility])           Brief Hospital Course to date:  Otilia Holm is a 37 y.o. female with relevant PMH of Alcoholism (drinks four \"4-Locos\" or 12 beers a day), drug abuse, who apparently stopped by a \"known drug dealers house\" on the way to work.  Became unresponsive.  Found unconscious and unresponsive in her car in [facility] parking lot.  Received narcan with no response.  EMS nasally intubated in field.  On arrival to [facility] found to have Bicarb 17 w/ AG 20, Ethanol level of 218.  Per [facility] ER note there was a period of 8-10 minutes when patient stopped talking before security / EMS began administering treatment.  Reportedly had prolonged seizure activity at OSH.  Transferred to [facility] on 7/16/2018 for further management. Since admission at [facility], pt had undergone tracheostomy and PEG.     Assessment & Plan:    - Sepsis:Resolved, Stopped ABX.  - Hypoxic encephalopathy - persists. No s/p trach and PEG  - h/o drug and alcohol abuse  --sinus tachycardia- persists, secondary to ritalin?   -- N/V - will check CXR for possible " aspiration as well as KUB. Checking cbc and cmp  -- Resumed TFs as possible  .   - Continue AEDs   - although pt has been on LMWH, her LLE appears more edematous today. Could possible be dependent edema based on her positioning in bed, but will check LE duplex to r/o DVT - results still pending.      DVT Prophylaxis:  pLOV    CODE STATUS:   Code Status and Medical Interventions:   Ordered at: 07/16/18 0620     Code Status:    CPR     Medical Interventions (Level of Support Prior to Arrest):    Full       Disposition: I expect the patient to be discharged TBD    Dayday Jacobs MD  08/04/18  8:57 AM

## 2018-08-05 ENCOUNTER — APPOINTMENT (OUTPATIENT)
Dept: CT IMAGING | Facility: HOSPITAL | Age: 38
End: 2018-08-05

## 2018-08-05 PROBLEM — R33.8 ACUTE URINARY RETENTION: Status: ACTIVE | Noted: 2018-08-05

## 2018-08-05 LAB
ANION GAP SERPL CALCULATED.3IONS-SCNC: 9 MMOL/L (ref 3–11)
B-HCG UR QL: NEGATIVE
BH CV ECHO MEAS - BSA(HAYCOCK): 1.6 M^2
BH CV ECHO MEAS - BSA: 1.5 M^2
BH CV ECHO MEAS - BZI_BMI: 29.9 KILOGRAMS/M^2
BH CV ECHO MEAS - BZI_METRIC_HEIGHT: 144.8 CM
BH CV ECHO MEAS - BZI_METRIC_WEIGHT: 62.6 KG
BH CV LOWER VASCULAR LEFT COMMON FEMORAL AUGMENT: NORMAL
BH CV LOWER VASCULAR LEFT COMMON FEMORAL COMPRESS: NORMAL
BH CV LOWER VASCULAR LEFT COMMON FEMORAL PHASIC: NORMAL
BH CV LOWER VASCULAR LEFT COMMON FEMORAL SPONT: NORMAL
BH CV LOWER VASCULAR LEFT DISTAL FEMORAL COMPRESS: NORMAL
BH CV LOWER VASCULAR LEFT GASTRONEMIUS COMPRESS: NORMAL
BH CV LOWER VASCULAR LEFT GREATER SAPH AK COMPRESS: NORMAL
BH CV LOWER VASCULAR LEFT GREATER SAPH BK COMPRESS: NORMAL
BH CV LOWER VASCULAR LEFT LESSER SAPH COMPRESS: NORMAL
BH CV LOWER VASCULAR LEFT MID FEMORAL AUGMENT: NORMAL
BH CV LOWER VASCULAR LEFT MID FEMORAL COMPRESS: NORMAL
BH CV LOWER VASCULAR LEFT MID FEMORAL PHASIC: NORMAL
BH CV LOWER VASCULAR LEFT MID FEMORAL SPONT: NORMAL
BH CV LOWER VASCULAR LEFT PERONEAL COMPRESS: NORMAL
BH CV LOWER VASCULAR LEFT POPLITEAL AUGMENT: NORMAL
BH CV LOWER VASCULAR LEFT POPLITEAL COMPRESS: NORMAL
BH CV LOWER VASCULAR LEFT POPLITEAL PHASIC: NORMAL
BH CV LOWER VASCULAR LEFT POPLITEAL SPONT: NORMAL
BH CV LOWER VASCULAR LEFT POSTERIOR TIBIAL COMPRESS: NORMAL
BH CV LOWER VASCULAR LEFT PROFUNDA FEMORAL AUGMENT: NORMAL
BH CV LOWER VASCULAR LEFT PROFUNDA FEMORAL COMPRESS: NORMAL
BH CV LOWER VASCULAR LEFT PROFUNDA FEMORAL PHASIC: NORMAL
BH CV LOWER VASCULAR LEFT PROFUNDA FEMORAL SPONT: NORMAL
BH CV LOWER VASCULAR LEFT PROXIMAL FEMORAL COMPRESS: NORMAL
BH CV LOWER VASCULAR LEFT SAPHENOFEMORAL JUNCTION AUGMENT: NORMAL
BH CV LOWER VASCULAR LEFT SAPHENOFEMORAL JUNCTION COMPRESS: NORMAL
BH CV LOWER VASCULAR LEFT SAPHENOFEMORAL JUNCTION PHASIC: NORMAL
BH CV LOWER VASCULAR LEFT SAPHENOFEMORAL JUNCTION SPONT: NORMAL
BH CV LOWER VASCULAR RIGHT COMMON FEMORAL AUGMENT: NORMAL
BH CV LOWER VASCULAR RIGHT COMMON FEMORAL COMPRESS: NORMAL
BH CV LOWER VASCULAR RIGHT COMMON FEMORAL PHASIC: NORMAL
BH CV LOWER VASCULAR RIGHT COMMON FEMORAL SPONT: NORMAL
BNP SERPL-MCNC: 119 PG/ML (ref 0–100)
BUN BLD-MCNC: 16 MG/DL (ref 9–23)
BUN/CREAT SERPL: 26.7 (ref 7–25)
CALCIUM SPEC-SCNC: 9.7 MG/DL (ref 8.7–10.4)
CHLORIDE SERPL-SCNC: 104 MMOL/L (ref 99–109)
CO2 SERPL-SCNC: 30 MMOL/L (ref 20–31)
CREAT BLD-MCNC: 0.6 MG/DL (ref 0.6–1.3)
CRP SERPL-MCNC: 1.76 MG/DL (ref 0–1)
D DIMER PPP FEU-MCNC: 0.81 MG/L (FEU) (ref 0–0.5)
D-LACTATE SERPL-SCNC: 1.1 MMOL/L (ref 0.5–2)
DEPRECATED RDW RBC AUTO: 52.7 FL (ref 37–54)
ERYTHROCYTE [DISTWIDTH] IN BLOOD BY AUTOMATED COUNT: 14.8 % (ref 11.3–14.5)
GFR SERPL CREATININE-BSD FRML MDRD: 112 ML/MIN/1.73
GLUCOSE BLD-MCNC: 112 MG/DL (ref 70–100)
GLUCOSE BLDC GLUCOMTR-MCNC: 112 MG/DL (ref 70–130)
GLUCOSE BLDC GLUCOMTR-MCNC: 121 MG/DL (ref 70–130)
GLUCOSE BLDC GLUCOMTR-MCNC: 142 MG/DL (ref 70–130)
GLUCOSE BLDC GLUCOMTR-MCNC: 157 MG/DL (ref 70–130)
HCT VFR BLD AUTO: 33.3 % (ref 34.5–44)
HGB BLD-MCNC: 10.4 G/DL (ref 11.5–15.5)
MCH RBC QN AUTO: 31 PG (ref 27–31)
MCHC RBC AUTO-ENTMCNC: 31.2 G/DL (ref 32–36)
MCV RBC AUTO: 99.1 FL (ref 80–99)
PLATELET # BLD AUTO: 625 10*3/MM3 (ref 150–450)
PMV BLD AUTO: 10.7 FL (ref 6–12)
POTASSIUM BLD-SCNC: 4 MMOL/L (ref 3.5–5.5)
PROCALCITONIN SERPL-MCNC: 0.06 NG/ML
RBC # BLD AUTO: 3.36 10*6/MM3 (ref 3.89–5.14)
SODIUM BLD-SCNC: 143 MMOL/L (ref 132–146)
TROPONIN I SERPL-MCNC: 0.04 NG/ML
WBC NRBC COR # BLD: 10.87 10*3/MM3 (ref 3.5–10.8)

## 2018-08-05 PROCEDURE — 25010000002 PIPERACILLIN SOD-TAZOBACTAM PER 1 G: Performed by: INTERNAL MEDICINE

## 2018-08-05 PROCEDURE — 84484 ASSAY OF TROPONIN QUANT: CPT | Performed by: INTERNAL MEDICINE

## 2018-08-05 PROCEDURE — 85379 FIBRIN DEGRADATION QUANT: CPT | Performed by: INTERNAL MEDICINE

## 2018-08-05 PROCEDURE — 0 IOPAMIDOL PER 1 ML: Performed by: INTERNAL MEDICINE

## 2018-08-05 PROCEDURE — 83880 ASSAY OF NATRIURETIC PEPTIDE: CPT | Performed by: INTERNAL MEDICINE

## 2018-08-05 PROCEDURE — 82962 GLUCOSE BLOOD TEST: CPT

## 2018-08-05 PROCEDURE — 84145 PROCALCITONIN (PCT): CPT | Performed by: INTERNAL MEDICINE

## 2018-08-05 PROCEDURE — 25010000002 LORAZEPAM PER 2 MG: Performed by: INTERNAL MEDICINE

## 2018-08-05 PROCEDURE — 86140 C-REACTIVE PROTEIN: CPT | Performed by: INTERNAL MEDICINE

## 2018-08-05 PROCEDURE — 87040 BLOOD CULTURE FOR BACTERIA: CPT | Performed by: INTERNAL MEDICINE

## 2018-08-05 PROCEDURE — 80048 BASIC METABOLIC PNL TOTAL CA: CPT | Performed by: INTERNAL MEDICINE

## 2018-08-05 PROCEDURE — 25010000002 VANCOMYCIN

## 2018-08-05 PROCEDURE — 25010000002 ENOXAPARIN PER 10 MG: Performed by: INTERNAL MEDICINE

## 2018-08-05 PROCEDURE — 83605 ASSAY OF LACTIC ACID: CPT | Performed by: INTERNAL MEDICINE

## 2018-08-05 PROCEDURE — 85027 COMPLETE CBC AUTOMATED: CPT | Performed by: INTERNAL MEDICINE

## 2018-08-05 PROCEDURE — 87147 CULTURE TYPE IMMUNOLOGIC: CPT | Performed by: INTERNAL MEDICINE

## 2018-08-05 PROCEDURE — 87070 CULTURE OTHR SPECIMN AEROBIC: CPT | Performed by: INTERNAL MEDICINE

## 2018-08-05 PROCEDURE — 99233 SBSQ HOSP IP/OBS HIGH 50: CPT | Performed by: INTERNAL MEDICINE

## 2018-08-05 PROCEDURE — 87205 SMEAR GRAM STAIN: CPT | Performed by: INTERNAL MEDICINE

## 2018-08-05 PROCEDURE — 81025 URINE PREGNANCY TEST: CPT | Performed by: INTERNAL MEDICINE

## 2018-08-05 PROCEDURE — 71275 CT ANGIOGRAPHY CHEST: CPT

## 2018-08-05 RX ORDER — GLYCOPYRROLATE 1 MG/1
1 TABLET ORAL 3 TIMES DAILY
Status: DISCONTINUED | OUTPATIENT
Start: 2018-08-05 | End: 2018-08-13

## 2018-08-05 RX ORDER — ACETAMINOPHEN 160 MG/5ML
650 SOLUTION ORAL EVERY 4 HOURS PRN
Status: DISCONTINUED | OUTPATIENT
Start: 2018-08-05 | End: 2018-08-25 | Stop reason: HOSPADM

## 2018-08-05 RX ADMIN — LEVETIRACETAM 1000 MG: 100 SOLUTION ORAL at 08:09

## 2018-08-05 RX ADMIN — LORAZEPAM 2 MG: 2 INJECTION INTRAMUSCULAR; INTRAVENOUS at 11:04

## 2018-08-05 RX ADMIN — VALPROIC ACID 250 MG: 250 SOLUTION ORAL at 17:27

## 2018-08-05 RX ADMIN — ACETAMINOPHEN 650 MG: 325 TABLET, FILM COATED ORAL at 23:30

## 2018-08-05 RX ADMIN — LEVETIRACETAM 1000 MG: 100 SOLUTION ORAL at 21:40

## 2018-08-05 RX ADMIN — LORAZEPAM 2 MG: 2 INJECTION INTRAMUSCULAR; INTRAVENOUS at 23:30

## 2018-08-05 RX ADMIN — ACETAMINOPHEN 649.6 MG: 650 SOLUTION ORAL at 16:10

## 2018-08-05 RX ADMIN — ATORVASTATIN CALCIUM 80 MG: 40 TABLET, FILM COATED ORAL at 21:40

## 2018-08-05 RX ADMIN — VANCOMYCIN HYDROCHLORIDE 1250 MG: 10 INJECTION, POWDER, LYOPHILIZED, FOR SOLUTION INTRAVENOUS at 15:58

## 2018-08-05 RX ADMIN — FAMOTIDINE 20 MG: 20 TABLET ORAL at 21:40

## 2018-08-05 RX ADMIN — GLYCOPYRROLATE 1 MG: 1 TABLET ORAL at 15:59

## 2018-08-05 RX ADMIN — ACETAMINOPHEN 649.6 MG: 650 SOLUTION ORAL at 11:50

## 2018-08-05 RX ADMIN — VALPROIC ACID 250 MG: 250 SOLUTION ORAL at 05:21

## 2018-08-05 RX ADMIN — ASPIRIN 81 MG CHEWABLE TABLET 81 MG: 81 TABLET CHEWABLE at 08:09

## 2018-08-05 RX ADMIN — ACETAMINOPHEN 650 MG: 650 SUPPOSITORY RECTAL at 08:10

## 2018-08-05 RX ADMIN — SODIUM CHLORIDE 250 ML: 9 INJECTION, SOLUTION INTRAVENOUS at 23:49

## 2018-08-05 RX ADMIN — VALPROIC ACID 250 MG: 250 SOLUTION ORAL at 11:50

## 2018-08-05 RX ADMIN — GLYCOPYRROLATE 1 MG: 1 TABLET ORAL at 21:40

## 2018-08-05 RX ADMIN — VALPROIC ACID 250 MG: 250 SOLUTION ORAL at 23:30

## 2018-08-05 RX ADMIN — IOPAMIDOL 100 ML: 755 INJECTION, SOLUTION INTRAVENOUS at 20:15

## 2018-08-05 RX ADMIN — FAMOTIDINE 20 MG: 20 TABLET ORAL at 08:10

## 2018-08-05 RX ADMIN — Medication 100 MG: at 08:09

## 2018-08-05 RX ADMIN — TAZOBACTAM SODIUM AND PIPERACILLIN SODIUM 4.5 G: 500; 4 INJECTION, SOLUTION INTRAVENOUS at 21:40

## 2018-08-05 RX ADMIN — GLYCOPYRROLATE 1 MG: 1 TABLET ORAL at 08:10

## 2018-08-05 RX ADMIN — ENOXAPARIN SODIUM 40 MG: 100 INJECTION SUBCUTANEOUS at 08:09

## 2018-08-05 RX ADMIN — VALPROIC ACID 250 MG: 250 SOLUTION ORAL at 00:20

## 2018-08-05 RX ADMIN — TAZOBACTAM SODIUM AND PIPERACILLIN SODIUM 4.5 G: 500; 4 INJECTION, SOLUTION INTRAVENOUS at 15:06

## 2018-08-05 RX ADMIN — CLOPIDOGREL BISULFATE 75 MG: 75 TABLET ORAL at 08:09

## 2018-08-05 NOTE — PROGRESS NOTES
[facility] Medicine Services  PROGRESS NOTE    Patient Name: Otilia Holm  : 1980  MRN: [id number]    Date of Admission: 2018  Length of Stay: 20  Primary Care Physician: Provider, No Known    Subjective   Subjective     CC:  FU respiratory arrest    HPI:  Vomiting overnight, tube feeds held temporarily.     Review of Systems  Unable to obtain due to encephalopathy    Objective   Objective     Vital Signs:   Temp:  [99.1 °F (37.3 °C)-100.5 °F (38.1 °C)] 100.4 °F (38 °C)  Heart Rate:  [127-144] 144  Resp:  [24-32] 32  BP: (117-145)/(72-96) 145/96        Physical Exam:  Constitutional -non toxic, in bed  HEENT-NCAT, mucous membranes moist; trach  CV-tachycardic, regular, no murmur  Resp-grossly clear, exam difficult due to poor effort  Abd-soft, non-tender, non-distended, normo active bowel sounds; PEG site CDI  Ext-No lower extremity cyanosis, clubbing or edema bilaterally  Neuro-eyes open but unresponsive  Psych-flat affect   Skin- No rash on exposed UE or LE bilaterally      Results Reviewed:  I have personally reviewed current lab, radiology, and data and agree.      Results from last 7 days  Lab Units 18  0904 18  0606   WBC 10*3/mm3 10.87* 14.17* 9.16   HEMOGLOBIN g/dL 10.4* 10.8* 10.3*   HEMATOCRIT % 33.3* 34.2* 33.0*   PLATELETS 10*3/mm3 625* 651* 534*       Results from last 7 days  Lab Units 18  0535 18  0904 18  0606  18  0418   SODIUM mmol/L 143 143 142  < > 146   POTASSIUM mmol/L 4.0 4.0 3.9  < > 3.6   CHLORIDE mmol/L 104 105 106  < > 112*   CO2 mmol/L 30.0 27.0 26.0  < > 26.0   BUN mg/dL 16 11 15  < > 17   CREATININE mg/dL 0.60 0.58* 0.49*  < > 0.52*   GLUCOSE mg/dL 112* 108* 98  < > 84   CALCIUM mg/dL 9.7 10.1 8.9  < > 9.3   ALT (SGPT) U/L  --  81*  --   --  56*   AST (SGOT) U/L  --  100*  --   --  79*   < > = values in this interval not displayed.  No results found for: BNP  No results found for:  PHART    Microbiology Results Abnormal     Procedure Component Value - Date/Time    Blood Culture - Blood, Blood, Venous Line [[id number]]  (Normal) Collected:  07/29/18 0350    Lab Status:  Final result Specimen:  Blood from Arm, Left Updated:  08/03/18 0501     Blood Culture No growth at 5 days    Blood Culture - Blood, Blood, Venous Line [[id number]]  (Normal) Collected:  07/29/18 0355    Lab Status:  Final result Specimen:  Blood from Hand, Left Updated:  08/03/18 0501     Blood Culture No growth at 5 days    Narrative:       Aerobic bottle only    Respiratory Culture - Sputum, ET Suction [[id number]] Collected:  07/29/18 0755    Lab Status:  Final result Specimen:  Sputum from ET Suction Updated:  07/31/18 0754     Respiratory Culture Light growth (2+) Normal Respiratory Sima     Gram Stain Result Moderate (3+) WBCs per low power field      Few (2+) Epithelial cells per low power field      Few (2+) Gram positive bacilli      Few (2+) Gram positive cocci in pairs    MRSA Screen, PCR - Swab, Nares [[id number]]  (Normal) Collected:  07/29/18 1434    Lab Status:  Final result Specimen:  Swab from Nares Updated:  07/29/18 2149     MRSA, PCR Negative    Narrative:         MRSA Negative    Culture, CSF - Cerebrospinal Fluid, Lumbar Puncture [[id number]]  (Normal) Collected:  07/16/18 1140    Lab Status:  Final result Specimen:  Cerebrospinal Fluid from Lumbar Puncture Updated:  07/23/18 1007     CSF Culture No growth at 7 days     Gram Stain Result No WBCs or organisms seen    Blood Culture - Blood, Blood, Venous Line [[id number]]  (Normal) Collected:  07/16/18 0817    Lab Status:  Final result Specimen:  Blood from Hand, Left Updated:  07/21/18 0830     Blood Culture No growth at 5 days    Blood Culture - Blood, Blood, Venous Line [[id number]]  (Normal) Collected:  07/16/18 0817    Lab Status:  Final result Specimen:  Blood from Arm, Right Updated:  07/21/18 0830     Blood Culture No growth at 5 days    Respiratory  Culture - Aspirate, ET Suction [[id number]]  (Abnormal) Collected:  07/16/18 1606    Lab Status:  Final result Specimen:  Aspirate from ET Suction Updated:  07/18/18 1450     Respiratory Culture Scant growth (1+) Streptococcus, Beta Hemolytic, Group G (A)     Comment: This organism is considered to be universally susceptible to penicillin.  No further antibiotic testing will be performed.        STREP GROUPING G     Respiratory Culture Scant growth (1+) Normal Respiratory Sima     Gram Stain Result Many (4+) WBCs per low power field      Rare (1+) Epithelial cells per low power field      Few (2+) Gram positive cocci in pairs    Meningitis / Encephalitis Panel, PCR - Cerebrospinal Fluid, Lumbar Puncture [[id number]]  (Normal) Collected:  07/16/18 1140    Lab Status:  Final result Specimen:  Cerebrospinal Fluid from Lumbar Puncture Updated:  07/16/18 1419     ESCHERICHIA COLI K1, PCR Not Detected     HAEMOPHILUS INFLUENZAE, PCR Not Detected     LISTERIA MONOCYTOGENES, PCR Not Detected     NEISSERIA MENINGITIDIS, PCR Not Detected     STREPTOCOCCUS AGALACTIAE, PCR Not Detected     STREPTOCOCCUS PNEUMONIAE, PCR Not Detected     CYTOMEGALOVIRUS (CMV), PCR Not Detected     ENTEROVIRUS, PCR Not Detected     HERPES SIMPLEX VIRUS 1 (HSV-1), PCR Not Detected     HERPES SIMPLEX VIRUS 2 (HSV-2), PCR Not Detected     HUMAN PARECHOVIRUS, PCR Not Detected     VARICELLA ZOSTER VIRUS (VZV), PCR Not Detected     CRYPTOCOCCUS NEOFORMANS / GATTII, PCR Not Detected     HUMAN HERPES VIRUS 6 PCR Not Detected          Imaging Results (last 24 hours)     Procedure Component Value Units Date/Time    XR Chest 1 View [[id number]] Collected:  08/04/18 1031     Updated:  08/04/18 1820    Narrative:          EXAMINATION: XR CHEST 1 VW - 8/4/2018     INDICATION: R09.2-Respiratory arrest; G93.40-Encephalopathy,  unspecified; R74.8-Abnormal levels of other serum enzymes.      COMPARISON: 7/29/2018.     FINDINGS: Postsurgical changes overlying the lung  apices similar to  prior. Tracheostomy in place. No pneumothorax or pleural effusion. No  focal consolidation. Cardiac silhouette within normal limits and  unchanged. Degenerative changes of the spine.           Impression:       No acute cardiopulmonary process.     DICTATED:   8/4/2018  EDITED/ls :   8/4/2018      This report was finalized on 8/4/2018 6:18 PM by Dr. Fernando Zuluaga.       XR Abdomen KUB [[id number]] Collected:  08/04/18 1032     Updated:  08/04/18 1820    Narrative:          EXAMINATION: XR ABDOMEN KUB - 8/4/2018     INDICATION: R09.2-Respiratory arrest; G93.40-Encephalopathy,  unspecified; R74.8-Abnormal levels of other serum enzymes.      COMPARISON: None.     FINDINGS: Nonspecific nonobstructive bowel gas pattern with gas and  stool to the level of the rectum. Mild colonic stool burden. No abnormal  calcifications overlie the abdomen or kidneys specifically with  calcified phlebolith right hemipelvis. No acute osseous abnormality.           Impression:       Nonobstructive bowel gas pattern with only mild colonic  stool burden in the proximal ascending and rectal region. No gross  intraperitoneal free air.     DICTATED:   8/4/2018  EDITED/ls :   8/4/2018      This report was finalized on 8/4/2018 6:18 PM by Dr. Fernando Zuluaga.           Results for orders placed during the hospital encounter of 07/16/18   Adult Transthoracic Echo Complete W/ Cont if Necessary Per Protocol    Addendum · Estimated LVEF = 50%. · The following left ventricular wall segments are hypokinetic: mid  anterior, apical septal and mid anteroseptal. · The cardiac valves are anatomically and functionally normal.        Aubrey Jiménez MD 7/16/2018  6:05 PM          Narrative · Left ventricular systolic function is normal. Estimated EF = 50%.  · The cardiac valves are anatomically and functionally normal.          I have reviewed the medications.    aspirin 81 mg Oral Daily   atorvastatin 80 mg Oral Nightly   clopidogrel 75 mg  "Oral Daily   enoxaparin 40 mg Subcutaneous Q24H   famotidine 20 mg Oral BID   glycopyrrolate 1 mg Oral TID   levETIRAcetam 1,000 mg Nasogastric Q12H   thiamine 100 mg Oral Daily   Valproic Acid 250 mg Nasogastric Q6H         Assessment/Plan   Assessment / Plan     Hospital Problem List     * (Principal)Respiratory arrest (CMS/HCC)    ETOH abuse    Suspected Anoxic brain damage     H/O drug abuse    Alcoholic intoxication with complication (CMS/HCC)    Pulmonary infiltrates - pneumonia vs. aspiration pneumonitis vs. pulmonary edema.     Overview Signed 7/16/2018  7:42 AM by Ruslan Estrella MD     - pneumonia vs. aspiration pneumonitis vs. pulmonary edema.          Sepsis (CMS/HCC)    Acute urinary retention           Brief Hospital Course to date:  Otilia Holm is a 37 y.o. female with relevant PMH of Alcoholism (drinks four \"4-Locos\" or 12 beers a day), drug abuse, who apparently stopped by a \"known drug dealers house\" on the way to work.  Became unresponsive.  Found unconscious and unresponsive in her car in [facility] parking lot.  Received narcan with no response.  EMS nasally intubated in field.  On arrival to [facility] found to have Bicarb 17 w/ AG 20, Ethanol level of 218.  Per [facility] ER note there was a period of 8-10 minutes when patient stopped talking before security / EMS began administering treatment.  Reportedly had prolonged seizure activity at OSH.  Transferred to [facility] on 7/16/2018 for further management. Since admission at [facility], pt had undergone tracheostomy and PEG.     Assessment & Plan:    Fever  - temp 100.5 axillary this afternoon  - checked CXR yesterday but no infiltrate, and UA checked yesterday as well and remains bland. Despite normal studies, suspect possible aspiration pneumonia, particularly given persistent tachycardia and mild leukocytosis.  - check stat blood cultures x 2, sputum cx, procalcitonin, CRP, d-dimer, troponin, lactic acid  - start empiric vancomycin and zosyn  - repeat cxr in " am    Tachycardia  - sinus, secondary to infection?  - ritalin and robinul also may contribute, weaning these meds back  - recent duplex negative by preliminary report for VTE (all veins compressible), but will check d dimer today and obtain further imaging as needed.    Hypoxic encephalopathy  - patient remains unresponsive and not interactive  - on ativan PRN for agitation, attempting to slowly wean    H/o drug and alcohol abuse  - continue thiamine    Dysphagia  - tube feeds    Hepatitis C    DVT Prophylaxis:  pLOV    CODE STATUS:   Code Status and Medical Interventions:   Ordered at: 07/16/18 0620     Code Status:    CPR     Medical Interventions (Level of Support Prior to Arrest):    Full       Disposition: I expect the patient to be discharged TBD    Called son and discussed/updated him on patient's condition today, including fever with plan for antibiotics, as well as patient's continued lack of responsiveness.    Dayday Jacobs MD  08/05/18  2:26 PM

## 2018-08-05 NOTE — SIGNIFICANT NOTE
Acute urinary retention + cath for urine specimen. >1400 ml in bladder. Schedule bladder scans, I&O cath PRN.    Risk for neurogenic bladder as well.

## 2018-08-05 NOTE — PROGRESS NOTES
Pharmacy Consult-Vancomycin Dosing  Otilia Holm is a  37 y.o. female receiving vancomycin therapy.  Patient with recent admissions, history of drug and etoh abuse.    Temp 100.5 this afternoon.  Chest XR no infiltrate, suspect possible aspiration pneumonia. Stat blood cultures x 2, sputum cx,   procalcitonin, CRP, d-dimer, troponin, lactic acid ordered per MD.    Indication: r/o Pneumonia  Consulting Provider: Dr Jacobs  ID Consult:   no  Goal Trough:15-20    Current Antimicrobial Therapy  Anti-Infectives       Ordered     Dose/Rate Route Frequency Start Stop    08/05/18 1444  vancomycin in dextrose 5% 150 mL (VANCOCIN) IVPB 750 mg     Ordering Provider:  Soifa Hardy RPH    10 mg/kg × 62.8 kg  over 60 Minutes Intravenous Every 8 Hours 08/06/18 0000 08/15/18 2359    08/05/18 1436  piperacillin-tazobactam (ZOSYN) 4.5 g in iso-osmotic dextrose 100 mL IVPB (premix)     Ordering Provider:  Dayday Jacobs MD    4.5 g  over 4 Hours Intravenous Every 8 Hours 08/05/18 2130 08/12/18 2129    08/05/18 1436  piperacillin-tazobactam (ZOSYN) 4.5 g in iso-osmotic dextrose 100 mL IVPB (premix)     Ordering Provider:  Dayday Jacobs MD    4.5 g  over 30 Minutes Intravenous Once 08/05/18 1530      08/05/18 1441  vancomycin 1250 mg/250 mL 0.9% NS IVPB ([facility])     Ordering Provider:  Sofia Hardy RPH    20 mg/kg × 62.8 kg  over 90 Minutes Intravenous Once 08/05/18 1530      08/05/18 1423  Pharmacy to dose vancomycin     Ordering Provider:  Dayday Jacobs MD     Does not apply Continuous PRN 08/05/18 1423 08/12/18 1422    07/29/18 1946  vancomycin 1500 mg/500 mL 0.9% NS IVPB ([facility])     Ordering Provider:  Elisabeth Benites APRN    25 mg/kg × 64.9 kg  333.3 mL/hr over 90 Minutes Intravenous Once 07/29/18 2100 07/29/18 2156    07/29/18 0527  piperacillin-tazobactam (ZOSYN) 4.5 g in iso-osmotic dextrose 100 mL IVPB (premix)     Ordering Provider:  Gloria Ochoa APRN    4.5 g  over 30 Minutes Intravenous Once 07/29/18  "0615 07/29/18 0707    07/25/18 1653  ceFAZolin in dextrose (ANCEF) IVPB solution 2 g     Ordering Provider:  Segundo Galicia MD    2 g  over 30 Minutes Intravenous Once 07/26/18 0930 07/26/18 0958    07/17/18 1008  ampicillin-sulbactam (UNASYN) 3 g in sodium chloride 0.9 % 100 mL IVPB-MBP     Ordering Provider:  Jani Sutton MD    3 g  over 60 Minutes Intravenous Every 6 Hours Scheduled 07/17/18 1100 07/23/18 0631    07/16/18 1034  vancomycin 1750 mg/500 mL 0.9% NS IVPB ([facility])     Ordering Provider:  Jani Sutton MD    25 mg/kg × 65 kg  over 120 Minutes Intravenous Once 07/16/18 1200 07/16/18 1630    07/16/18 0737  piperacillin-tazobactam (ZOSYN) 4.5 g in iso-osmotic dextrose 100 mL IVPB (premix)     Ordering Provider:  Ruslan Estrella MD    4.5 g  over 30 Minutes Intravenous Once 07/16/18 0815 07/16/18 0918          Allergies  Allergies as of 07/16/2018   • (No Known Allergies)     Labs    Results from last 7 days     Lab Units 08/05/18  0535 08/04/18  0904 08/01/18  0606   BUN mg/dL 16 11 15   CREATININE mg/dL 0.60 0.58* 0.49*   Serum creatinine: 0.6 mg/dL 08/05/18 0535  Estimated creatinine clearance: 105.2 mL/min     Results from last 7 days     Lab Units 08/05/18  0535 08/04/18  0904 08/01/18  0606   WBC 10*3/mm3 10.87* 14.17* 9.16   Evaluation of Dosing     Last Dose Received in the ED/Outside Facility: last admission, dose received 7/29    Ht - 144.8 cm (57.01\")  Wt - 62.8 kg (138 lb 7.2 oz)    Estimated Creatinine Clearance: 105.2 mL/min (by C-G formula based on SCr of 0.6 mg/dL).    I/O last 3 completed shifts:  In: 2601 [Other:542; NG/GT:2059]  Out: 2600 [Urine:2600]    Microbiology and Radiology  Microbiology Results (last 10 days)       Procedure Component Value - Date/Time    MRSA Screen, PCR - Swab, Nares [[id number]]  (Normal) Collected:  07/29/18 1434    Lab Status:  Final result Specimen:  Swab from Nares Updated:  07/29/18 2149     MRSA, PCR Negative    " Narrative:         MRSA Negative    Respiratory Culture - Sputum, ET Suction [[id number]] Collected:  07/29/18 0755    Lab Status:  Final result Specimen:  Sputum from ET Suction Updated:  07/31/18 0754     Respiratory Culture Light growth (2+) Normal Respiratory Sima     Gram Stain Result Moderate (3+) WBCs per low power field      Few (2+) Epithelial cells per low power field      Few (2+) Gram positive bacilli      Few (2+) Gram positive cocci in pairs    Blood Culture - Blood, Blood, Venous Line [[id number]]  (Normal) Collected:  07/29/18 0355    Lab Status:  Final result Specimen:  Blood from Hand, Left Updated:  08/03/18 0501     Blood Culture No growth at 5 days    Narrative:       Aerobic bottle only    Blood Culture - Blood, Blood, Venous Line [[id number]]  (Normal) Collected:  07/29/18 0350    Lab Status:  Final result Specimen:  Blood from Arm, Left Updated:  08/03/18 0501     Blood Culture No growth at 5 days          Evaluation of Level  Vanc trough 8/6 at 1500    Assessment/Plan:    1. Recommend vancomycin 20 mg/kg IV x 1 (1250 mg), then 10 mg/kg IV Q8H for possible pneumonia per  vancomycin dosing protocol.      Good renal function, UOP  2. Recommend vancomycin level prior to 4th dose, should be close to steady state  3. Pharmacy will continue to follow and make adjustments as needed.      Thank you for consult,  Sofia Hardy, PharmD

## 2018-08-06 ENCOUNTER — APPOINTMENT (OUTPATIENT)
Dept: GENERAL RADIOLOGY | Facility: HOSPITAL | Age: 38
End: 2018-08-06

## 2018-08-06 ENCOUNTER — APPOINTMENT (OUTPATIENT)
Dept: NEUROLOGY | Facility: HOSPITAL | Age: 38
End: 2018-08-06
Attending: INTERNAL MEDICINE

## 2018-08-06 LAB
ALBUMIN SERPL-MCNC: 4 G/DL (ref 3.2–4.8)
ALP SERPL-CCNC: 88 U/L (ref 25–100)
ALT SERPL W P-5'-P-CCNC: 69 U/L (ref 7–40)
ANION GAP SERPL CALCULATED.3IONS-SCNC: 9 MMOL/L (ref 3–11)
AST SERPL-CCNC: 80 U/L (ref 0–33)
BACTERIA UR QL AUTO: ABNORMAL /HPF
BASOPHILS # BLD AUTO: 0.04 10*3/MM3 (ref 0–0.2)
BASOPHILS NFR BLD AUTO: 0.3 % (ref 0–1)
BILIRUB SERPL-MCNC: 0.3 MG/DL (ref 0.3–1.2)
BILIRUB UR QL STRIP: NEGATIVE
BUN BLD-MCNC: 13 MG/DL (ref 9–23)
CALCIUM SPEC-SCNC: 9.7 MG/DL (ref 8.7–10.4)
CHLORIDE SERPL-SCNC: 109 MMOL/L (ref 99–109)
CHOLEST SERPL-MCNC: 125 MG/DL (ref 0–200)
CLARITY UR: ABNORMAL
CO2 SERPL-SCNC: 26 MMOL/L (ref 20–31)
COLOR UR: YELLOW
CREAT BLD-MCNC: 0.61 MG/DL (ref 0.6–1.3)
CRP SERPL-MCNC: 3.27 MG/DL (ref 0–1)
DEPRECATED RDW RBC AUTO: 53.5 FL (ref 37–54)
EOSINOPHIL # BLD AUTO: 0.12 10*3/MM3 (ref 0–0.3)
EOSINOPHIL NFR BLD AUTO: 0.9 % (ref 0–3)
ERYTHROCYTE [DISTWIDTH] IN BLOOD BY AUTOMATED COUNT: 14.8 % (ref 11.3–14.5)
GLUCOSE BLD-MCNC: 128 MG/DL (ref 70–100)
GLUCOSE BLDC GLUCOMTR-MCNC: 111 MG/DL (ref 70–130)
GLUCOSE BLDC GLUCOMTR-MCNC: 128 MG/DL (ref 70–130)
GLUCOSE BLDC GLUCOMTR-MCNC: 137 MG/DL (ref 70–130)
GLUCOSE BLDC GLUCOMTR-MCNC: 142 MG/DL (ref 70–130)
GLUCOSE UR STRIP-MCNC: NEGATIVE MG/DL
HCT VFR BLD AUTO: 33.3 % (ref 34.5–44)
HGB BLD-MCNC: 10.4 G/DL (ref 11.5–15.5)
HGB UR QL STRIP.AUTO: NEGATIVE
HYALINE CASTS UR QL AUTO: ABNORMAL /LPF
IMM GRANULOCYTES # BLD: 0.03 10*3/MM3 (ref 0–0.03)
IMM GRANULOCYTES NFR BLD: 0.2 % (ref 0–0.6)
KETONES UR QL STRIP: ABNORMAL
LEUKOCYTE ESTERASE UR QL STRIP.AUTO: ABNORMAL
LIPASE SERPL-CCNC: 139 U/L (ref 6–51)
LYMPHOCYTES # BLD AUTO: 2.64 10*3/MM3 (ref 0.6–4.8)
LYMPHOCYTES NFR BLD AUTO: 18.9 % (ref 24–44)
MAGNESIUM SERPL-MCNC: 2 MG/DL (ref 1.3–2.7)
MCH RBC QN AUTO: 31 PG (ref 27–31)
MCHC RBC AUTO-ENTMCNC: 31.2 G/DL (ref 32–36)
MCV RBC AUTO: 99.4 FL (ref 80–99)
MONOCYTES # BLD AUTO: 1.96 10*3/MM3 (ref 0–1)
MONOCYTES NFR BLD AUTO: 14 % (ref 0–12)
NEUTROPHILS # BLD AUTO: 9.2 10*3/MM3 (ref 1.5–8.3)
NEUTROPHILS NFR BLD AUTO: 65.9 % (ref 41–71)
NITRITE UR QL STRIP: NEGATIVE
PH UR STRIP.AUTO: 7.5 [PH] (ref 5–8)
PHOSPHATE SERPL-MCNC: 4.1 MG/DL (ref 2.4–5.1)
PLATELET # BLD AUTO: 611 10*3/MM3 (ref 150–450)
PMV BLD AUTO: 10.5 FL (ref 6–12)
POTASSIUM BLD-SCNC: 3.8 MMOL/L (ref 3.5–5.5)
PREALB SERPL-MCNC: 13.8 MG/DL (ref 10–40)
PROCALCITONIN SERPL-MCNC: 0.08 NG/ML
PROT SERPL-MCNC: 7.4 G/DL (ref 5.7–8.2)
PROT UR QL STRIP: ABNORMAL
RBC # BLD AUTO: 3.35 10*6/MM3 (ref 3.89–5.14)
RBC # UR: ABNORMAL /HPF
REF LAB TEST METHOD: ABNORMAL
SODIUM BLD-SCNC: 144 MMOL/L (ref 132–146)
SP GR UR STRIP: 1.02 (ref 1–1.03)
SQUAMOUS #/AREA URNS HPF: ABNORMAL /HPF
TRIGL SERPL-MCNC: 76 MG/DL (ref 0–150)
UROBILINOGEN UR QL STRIP: ABNORMAL
VANCOMYCIN TROUGH SERPL-MCNC: 14.3 MCG/ML (ref 10–20)
WBC NRBC COR # BLD: 13.96 10*3/MM3 (ref 3.5–10.8)
WBC UR QL AUTO: ABNORMAL /HPF

## 2018-08-06 PROCEDURE — 83735 ASSAY OF MAGNESIUM: CPT

## 2018-08-06 PROCEDURE — 25010000002 PIPERACILLIN SOD-TAZOBACTAM PER 1 G: Performed by: INTERNAL MEDICINE

## 2018-08-06 PROCEDURE — 84100 ASSAY OF PHOSPHORUS: CPT

## 2018-08-06 PROCEDURE — 80202 ASSAY OF VANCOMYCIN: CPT

## 2018-08-06 PROCEDURE — 25010000002 ENOXAPARIN PER 10 MG: Performed by: INTERNAL MEDICINE

## 2018-08-06 PROCEDURE — 82962 GLUCOSE BLOOD TEST: CPT

## 2018-08-06 PROCEDURE — 71045 X-RAY EXAM CHEST 1 VIEW: CPT

## 2018-08-06 PROCEDURE — 94799 UNLISTED PULMONARY SVC/PX: CPT

## 2018-08-06 PROCEDURE — 84134 ASSAY OF PREALBUMIN: CPT

## 2018-08-06 PROCEDURE — 93005 ELECTROCARDIOGRAM TRACING: CPT | Performed by: NURSE PRACTITIONER

## 2018-08-06 PROCEDURE — 25010000002 VANCOMYCIN PER 500 MG

## 2018-08-06 PROCEDURE — 83690 ASSAY OF LIPASE: CPT | Performed by: INTERNAL MEDICINE

## 2018-08-06 PROCEDURE — 80053 COMPREHEN METABOLIC PANEL: CPT

## 2018-08-06 PROCEDURE — 86140 C-REACTIVE PROTEIN: CPT

## 2018-08-06 PROCEDURE — 82465 ASSAY BLD/SERUM CHOLESTEROL: CPT

## 2018-08-06 PROCEDURE — 85025 COMPLETE CBC W/AUTO DIFF WBC: CPT | Performed by: INTERNAL MEDICINE

## 2018-08-06 PROCEDURE — 84478 ASSAY OF TRIGLYCERIDES: CPT

## 2018-08-06 PROCEDURE — 99233 SBSQ HOSP IP/OBS HIGH 50: CPT | Performed by: INTERNAL MEDICINE

## 2018-08-06 PROCEDURE — 93010 ELECTROCARDIOGRAM REPORT: CPT | Performed by: INTERNAL MEDICINE

## 2018-08-06 PROCEDURE — 84145 PROCALCITONIN (PCT): CPT | Performed by: INTERNAL MEDICINE

## 2018-08-06 PROCEDURE — 87081 CULTURE SCREEN ONLY: CPT | Performed by: INTERNAL MEDICINE

## 2018-08-06 PROCEDURE — 81001 URINALYSIS AUTO W/SCOPE: CPT | Performed by: INTERNAL MEDICINE

## 2018-08-06 PROCEDURE — 87086 URINE CULTURE/COLONY COUNT: CPT | Performed by: INTERNAL MEDICINE

## 2018-08-06 PROCEDURE — 95816 EEG AWAKE AND DROWSY: CPT

## 2018-08-06 RX ORDER — DEXTROSE, SODIUM CHLORIDE, AND POTASSIUM CHLORIDE 5; .45; .15 G/100ML; G/100ML; G/100ML
50 INJECTION INTRAVENOUS CONTINUOUS
Status: ACTIVE | OUTPATIENT
Start: 2018-08-06 | End: 2018-08-07

## 2018-08-06 RX ORDER — MAGNESIUM CARB/ALUMINUM HYDROX 105-160MG
150 TABLET,CHEWABLE ORAL ONCE
Status: COMPLETED | OUTPATIENT
Start: 2018-08-06 | End: 2018-08-06

## 2018-08-06 RX ORDER — LACTULOSE 10 G/15ML
10 SOLUTION ORAL DAILY
Status: DISCONTINUED | OUTPATIENT
Start: 2018-08-06 | End: 2018-08-18

## 2018-08-06 RX ADMIN — GLYCOPYRROLATE 1 MG: 1 TABLET ORAL at 09:10

## 2018-08-06 RX ADMIN — MICAFUNGIN SODIUM 100 MG: 20 INJECTION, POWDER, LYOPHILIZED, FOR SOLUTION INTRAVENOUS at 18:39

## 2018-08-06 RX ADMIN — LACTULOSE 10 G: 10 SOLUTION ORAL at 14:24

## 2018-08-06 RX ADMIN — ACETAMINOPHEN 650 MG: 325 TABLET, FILM COATED ORAL at 09:10

## 2018-08-06 RX ADMIN — VALPROIC ACID 250 MG: 250 SOLUTION ORAL at 23:50

## 2018-08-06 RX ADMIN — ENOXAPARIN SODIUM 40 MG: 100 INJECTION SUBCUTANEOUS at 09:10

## 2018-08-06 RX ADMIN — TAZOBACTAM SODIUM AND PIPERACILLIN SODIUM 4.5 G: 500; 4 INJECTION, SOLUTION INTRAVENOUS at 21:37

## 2018-08-06 RX ADMIN — VALPROIC ACID 250 MG: 250 SOLUTION ORAL at 14:20

## 2018-08-06 RX ADMIN — ACETAMINOPHEN 650 MG: 325 TABLET, FILM COATED ORAL at 14:20

## 2018-08-06 RX ADMIN — FAMOTIDINE 20 MG: 20 TABLET ORAL at 09:10

## 2018-08-06 RX ADMIN — METOPROLOL TARTRATE 25 MG: 25 TABLET ORAL at 21:32

## 2018-08-06 RX ADMIN — GLYCOPYRROLATE 1 MG: 1 TABLET ORAL at 16:57

## 2018-08-06 RX ADMIN — ACETAMINOPHEN 650 MG: 325 TABLET, FILM COATED ORAL at 17:38

## 2018-08-06 RX ADMIN — LEVETIRACETAM 1000 MG: 100 SOLUTION ORAL at 21:27

## 2018-08-06 RX ADMIN — TAZOBACTAM SODIUM AND PIPERACILLIN SODIUM 4.5 G: 500; 4 INJECTION, SOLUTION INTRAVENOUS at 14:24

## 2018-08-06 RX ADMIN — TAZOBACTAM SODIUM AND PIPERACILLIN SODIUM 4.5 G: 500; 4 INJECTION, SOLUTION INTRAVENOUS at 05:33

## 2018-08-06 RX ADMIN — Medication 150 ML: at 14:24

## 2018-08-06 RX ADMIN — CLOPIDOGREL BISULFATE 75 MG: 75 TABLET ORAL at 09:10

## 2018-08-06 RX ADMIN — POTASSIUM CHLORIDE, DEXTROSE MONOHYDRATE AND SODIUM CHLORIDE 100 ML/HR: 150; 5; 450 INJECTION, SOLUTION INTRAVENOUS at 17:38

## 2018-08-06 RX ADMIN — ACETAMINOPHEN 650 MG: 325 TABLET, FILM COATED ORAL at 21:37

## 2018-08-06 RX ADMIN — GLYCOPYRROLATE 1 MG: 1 TABLET ORAL at 21:32

## 2018-08-06 RX ADMIN — VANCOMYCIN HYDROCHLORIDE 750 MG: 750 INJECTION, SOLUTION INTRAVENOUS at 01:05

## 2018-08-06 RX ADMIN — SODIUM CHLORIDE 500 ML: 9 INJECTION, SOLUTION INTRAVENOUS at 03:16

## 2018-08-06 RX ADMIN — LEVETIRACETAM 1000 MG: 100 SOLUTION ORAL at 09:10

## 2018-08-06 RX ADMIN — METOPROLOL TARTRATE 25 MG: 25 TABLET ORAL at 14:21

## 2018-08-06 RX ADMIN — ASPIRIN 81 MG CHEWABLE TABLET 81 MG: 81 TABLET CHEWABLE at 09:10

## 2018-08-06 RX ADMIN — Medication 100 MG: at 09:10

## 2018-08-06 RX ADMIN — FAMOTIDINE 20 MG: 20 TABLET ORAL at 21:32

## 2018-08-06 RX ADMIN — ATORVASTATIN CALCIUM 80 MG: 40 TABLET, FILM COATED ORAL at 21:32

## 2018-08-06 RX ADMIN — VALPROIC ACID 250 MG: 250 SOLUTION ORAL at 05:33

## 2018-08-06 RX ADMIN — VANCOMYCIN HYDROCHLORIDE 750 MG: 750 INJECTION, SOLUTION INTRAVENOUS at 09:13

## 2018-08-06 NOTE — PLAN OF CARE
Problem: Patient Care Overview  Goal: Plan of Care Review  Outcome: Ongoing (interventions implemented as appropriate)   08/06/18 0708   Coping/Psychosocial   Plan of Care Reviewed With patient   OTHER   Outcome Summary patient tachycardic for majority of shit, recieved 750 total bolus of normal saline and IV antibioitics, was on cooling blanket for unstable temperature

## 2018-08-06 NOTE — PROGRESS NOTES
"Adult Nutrition  Assessment/PES    Patient Name:  Otilia Holm  YOB: 1980  MRN: [id number]  Admit Date:  7/16/2018    Assessment Date:  8/6/2018    Comments:            Reason for Assessment     Row Name 08/06/18 1038          Reason for Assessment    Reason For Assessment follow-up protocol;TF/PN   45\"     Diagnosis --   SEE PREVIOUS NUTRITION NOTES. URINARY RETENTION, FEVER/? ASP PNA, TACHY, NEW STAGE 2 P.I.             Nutrition/Diet History     Row Name 08/06/18 1039          Nutrition/Diet History    Typical Food/Fluid Intake TOLERATING TF NOW PER NURSING DOCUMENTATION. PER MD NOTES, TF HELD 2 NIGHTS AGO DUE TO VOMITING. HAS SINCE BEEN RESTARTED.               Labs/Tests/Procedures/Meds     Row Name 08/06/18 1040          Labs/Procedures/Meds    Lab Results Reviewed reviewed        Medications    Pertinent Medications Reviewed reviewed             Physical Findings     Row Name 08/06/18 1041          Physical Findings    Gastrointestinal other (see comments)   WDL PER NURSING DOCUMENTATION     Skin other (see comments)   STAGE 2 P.I. TRACH SITE             Estimated/Assessed Needs     Row Name 08/06/18 1041          Estimated/Assessed Needs    Additional Documentation Calorie Requirements (Group);Protein Requirements (Group)        Calorie Requirements    Estimated Calorie Requirement Comment [id number] KCALS/DAY         Protein Requirements    Est Protein Requirement Amount (gms/kg) --   75-94 G/DAY        Fluid Requirements    Estimated Fluid Requirements (mL/day) 1974     RDA Method (mL) 1974             Nutrition Prescription Ordered     Row Name 08/06/18 1043          Nutrition Prescription PO    Current PO Diet NPO        Nutrition Prescription EN    Enteral Route PEG     Product Fibersource HN     Continuous TF Goal Rate (mL/hr) 65 mL/hr     Continuous TF Current Rate (mL/hr) 65 mL/hr     Continuous TF Goal Volume (mL) 1430 mL   BASED ON 22 HR/DAY DELIVERY     Water flush (mL)  20 mL  "    Water Flush Frequency Per hour             Evaluation of Received Nutrient/Fluid Intake     Row Name 08/06/18 1044          EN Evaluation    Number of Days EN Intake Evaluated Insufficient Data   APPEARS INCOMPLETELY DOCUMENTED             Problem/Interventions:        Problem 1     Row Name 08/06/18 1044          Nutrition Diagnoses Problem 1    Problem 1 Inadequate Nutrient Intake     Resolved? Yes             Problem 2     Row Name 08/06/18 1045          Nutrition Diagnoses Problem 2    Problem 2 Inadequate Intake/Infusion   POSSIBLE     Inadequate Intake Type Enteral     Macronutrient Protein     Etiology (related to) Other (comment)   SKIN INTEGRITY     Signs/Symptoms (evidenced by) Other (comment)   NEW STAGE 2 P.I.                   Intervention Goal     Row Name 08/06/18 1047          Intervention Goal    General Nutrition support treatment     TF/PN Adjust TF/PN   TO PROVIDE UPPER END OF PROTEIN NEEDS             Nutrition Intervention     Row Name 08/06/18 1047          Nutrition Intervention    RD/Tech Action Care plan reviewd;Follow Tx progress;Recommend/ordered     Recommended/Ordered EN             Nutrition Prescription     Row Name 08/06/18 1047          Nutrition Prescription EN    Enteral Prescription Enteral begin/change;Enteral to supply     Enteral Route PEG     Product Replete with Fiber     TF Delivery Method Continuous     Continuous TF Goal Rate (mL/hr) 70 mL/hr     Continuous TF Starting Rate (mL/hr) 70 mL/hr     Continuous TF Goal Volume (mL) 1540 mL     Water flush (mL)  20 mL     Water Flush Frequency Per hour     New EN Prescription Ordered? Yes        EN to Supply    Kcal/Day 1540 Kcal/Day   BASED ON 22 HR/DAY DELIVERY     Protein (gm/day) 96 gm/day   BASED ON 22 HR/DAY DELIVERY     TF Free H2O (mL) 1294 mL   BASED ON 22 HR/DAY DELIVERY     Total Free H2O (mL/day) 1734 mL/day   BASED ON 22 HR/DAY DELIVERY     Fiber Per Day (gm/day) 22 gm/day   BASED ON 22 HR/DAY DELIVERY              Education/Evaluation     Row Name 08/06/18 8693          Monitor/Evaluation    Monitor Per protocol;I&O;Pertinent labs;TF delivery/tolerance;Weight;Skin status;Symptoms         Electronically signed by:  Precious Castro RD  08/06/18 10:50 AM

## 2018-08-06 NOTE — PLAN OF CARE
Problem: Patient Care Overview  Goal: Plan of Care Review  Outcome: Ongoing (interventions implemented as appropriate)   08/06/18 1142   Coping/Psychosocial   Plan of Care Reviewed With patient   Plan of Care Review   Progress no change   OTHER   Outcome Summary Patient presents with left trach site skin/tear laceration r/t sutures. Area is crusted and shallow. No pressure related. Will treat with Hydrofera blue ready for drainage and site care. Can leave in place for three days. WOC nurse will continue to follow at this time. Please contact WOC nurse if needs arise. Thanks

## 2018-08-06 NOTE — PROGRESS NOTES
Continued Stay Note  [facility]     Patient Name: Otilia Holm  MRN: [id number]  Today's Date: 8/6/2018    Admit Date: 7/16/2018          Discharge Plan     Row Name 08/06/18 1359       Plan    Plan Placement     Plan Comments Complex Care following for long term care bed as  Medicaid pending.  Currently, patient not medically  appropriate for placement. A barrier to placement is the amount of times she needs to be suctioned.  Mariam, nurse suctioning every 2 hours, needs to be once per shift. Complex Care to follow for medical readiness. Marialuisa @ [id number]               Discharge Codes    No documentation.       Expected Discharge Date and Time     Expected Discharge Date Expected Discharge Time    Aug 10, 2018             Zahraa Jay RN

## 2018-08-06 NOTE — PROGRESS NOTES
[facility]  PROGRESS NOTE    Patient Name: Otilia Holm  : 1980  MRN: [id number]    Date of Admission: 2018  Length of Stay: 21  Primary Care Physician: Provider, No Known    Subjective   Subjective     CC:  FU respiratory arrest    HPI:  Vomiting overnight again with persistent fever and tachycardia, one small BM    Review of Systems  Unable to obtain due to encephalopathy    Objective   Objective     Vital Signs:   Temp:  [99.7 °F (37.6 °C)-101.8 °F (38.8 °C)] 101.5 °F (38.6 °C)  Heart Rate:  [114-151] 141  Resp:  [20-32] 20  BP: (102-153)/(54-95) 124/78        Physical Exam:  Patient does not respond to any stimuli- increased tone throughout  Neck is without mass or JVD  Heart is Reg wo murmur  Lungs are clear wo wheeze or crackle  Abd is soft without HSM or mass, not tender or distended  Skin is without rash  Neurologic exam in nonfocal - some hand and foot contractures      Results Reviewed:  I have personally reviewed current lab, radiology, and data and agree.      Results from last 7 days  Lab Units 18  0410 18  0535 18  0904   WBC 10*3/mm3 13.96* 10.87* 14.17*   HEMOGLOBIN g/dL 10.4* 10.4* 10.8*   HEMATOCRIT % 33.3* 33.3* 34.2*   PLATELETS 10*3/mm3 611* 625* 651*       Results from last 7 days  Lab Units 18  0410 18  1437 18  0535 18  0904   SODIUM mmol/L 144  --  143 143   POTASSIUM mmol/L 3.8  --  4.0 4.0   CHLORIDE mmol/L 109  --  104 105   CO2 mmol/L 26.0  --  30.0 27.0   BUN mg/dL 13  --  16 11   CREATININE mg/dL 0.61  --  0.60 0.58*   GLUCOSE mg/dL 128*  --  112* 108*   CALCIUM mg/dL 9.7  --  9.7 10.1   ALT (SGPT) U/L 69*  --   --  81*   AST (SGOT) U/L 80*  --   --  100*   TROPONIN I ng/mL  --  0.039  --   --      BNP   Date Value Ref Range Status   2018 119.0 (H) 0.0 - 100.0 pg/mL Final     Comment:     Results may be falsely decreased if patient taking Biotin.       Imaging Results (last 24  hours)     Procedure Component Value Units Date/Time    XR Chest 1 View [[id number]] Collected:  08/06/18 0907     Updated:  08/06/18 0946    Narrative:       EXAMINATION: XR CHEST 1 VW-08/06/2018:      INDICATION: Fever, suspect aspiration (trach/peg); R09.2-Respiratory  arrest; G93.40-Encephalopathy, unspecified; R74.8-Abnormal levels of  other serum enzymes.      COMPARISON: 08/04/2018.     FINDINGS: The cardiac silhouette is normal. A tracheostomy tube is well  positioned. There is no acute inflammatory process. There is no mass or  effusion.           Impression:       No active disease.     D:  08/06/2018  E:  08/06/2018     This report was finalized on 8/6/2018 9:44 AM by Dr. Eric Luis MD.       CT Angiogram Chest With & Without Contrast [[id number]] Collected:  08/05/18 1559     Updated:  08/05/18 1956    Narrative:       EXAM:  CT Angiography Chest With Intravenous Contrast    CLINICAL HISTORY:  37 years old, female; Encephalopathy, unspecified; Respiratory arrest; Abnormal   levels of other serum enzymes; Signs and symptoms; Cough and shortness of   breath; Additional info: Tachycardia, elevated d-dimer, tracheostomy, concern   for aspiration as well    TECHNIQUE:  Axial computed tomographic angiography images of the chest with intravenous   contrast using pulmonary embolism protocol.  All CT scans at this facility use   at least one of these dose optimization techniques: automated exposure control;   mA and/or kV adjustment per patient size (includes targeted exams where dose is   matched to clinical indication); or iterative reconstruction.  MIP reconstructed images were created and reviewed.    CONTRAST:  150 mL of isovue 370 administered intravenously.      COMPARISON:  CR - XR CHEST 1 VW 2018-08-04 10:12    FINDINGS:  Pulmonary arteries:  No pulmonary embolism.  Aorta:  No acute findings.  Lungs:  Small cluster of tree in bud nodules within the posterior medial aspect   of the superior segment right  lower lobe, likely minimal bronchopneumonia or   aspiration pneumonitis.  Mild upper lobe predominant paraseptal and   centrilobular emphysema.  Pleural space:  Small bilateral pleural effusions, with associated posterior   atelectasis.  No pneumothorax.  Heart:  Normal.  Bones/joints:  No acute fracture.  No dislocation.  Soft tissues:  Normal.  Lymph nodes:  Calcified right hilar lymph nodes, compatible with prior   granulomatous disease.  Spleen:  Splenic calcification, compatible with prior granulomatous disease.  Tubes, lines and devices:  Tracheostomy tube tip in midthoracic trachea.      Impression:         1.  No pulmonary embolism.    2.  Small cluster of tree in bud nodules within the posterior medial aspect of   the superior segment right lower lobe, likely minimal bronchopneumonia or   aspiration pneumonitis.    3.  Incidental/non-acute findings are described above.    THIS DOCUMENT HAS BEEN ELECTRONICALLY SIGNED BY MERCY JERONIMO MD        Results for orders placed during the hospital encounter of 07/16/18   Adult Transthoracic Echo Complete W/ Cont if Necessary Per Protocol    Addendum · Estimated LVEF = 50%. · The following left ventricular wall segments are hypokinetic: mid  anterior, apical septal and mid anteroseptal. · The cardiac valves are anatomically and functionally normal.        Aubrey Jiménez MD 7/16/2018  6:05 PM          Narrative · Left ventricular systolic function is normal. Estimated EF = 50%.  · The cardiac valves are anatomically and functionally normal.          I have reviewed the medications.    aspirin 81 mg Oral Daily   atorvastatin 80 mg Oral Nightly   clopidogrel 75 mg Oral Daily   enoxaparin 40 mg Subcutaneous Q24H   famotidine 20 mg Oral BID   glycopyrrolate 1 mg Oral TID   lactulose 10 g Oral Daily   levETIRAcetam 1,000 mg Nasogastric Q12H   metoprolol tartrate 25 mg Oral Q12H   micafungin (MYCAMINE)  mg Intravenous Q24H   piperacillin-tazobactam 4.5 g Intravenous  "Q8H   thiamine 100 mg Oral Daily   Valproic Acid 250 mg Nasogastric Q6H   vancomycin 10 mg/kg Intravenous Q8H         Assessment/Plan   Assessment / Plan     Hospital Problem List     * (Principal)Respiratory arrest (CMS/HCC)    ETOH abuse    Suspected Anoxic brain damage     H/O drug abuse    Alcoholic intoxication with complication (CMS/HCC)    Pulmonary infiltrates - pneumonia vs. aspiration pneumonitis vs. pulmonary edema.     Overview Signed 7/16/2018  7:42 AM by Ruslan Estrella MD     - pneumonia vs. aspiration pneumonitis vs. pulmonary edema.          Sepsis (CMS/HCC)    Acute urinary retention           Brief Hospital Course to date:  Otilia Holm is a 37 y.o. female with relevant PMH of Alcoholism (drinks four \"4-Locos\" or 12 beers a day), drug abuse, who apparently stopped by a \"known drug dealers house\" on the way to work.  Became unresponsive.  Found unconscious and unresponsive in her car in [facility] parking lot.  Received narcan with no response.  EMS nasally intubated in field.  On arrival to [facility] found to have Bicarb 17 w/ AG 20, Ethanol level of 218.  Per [facility] ER note there was a period of 8-10 minutes when patient stopped talking before security / EMS began administering treatment.  Reportedly had prolonged seizure activity at OSH.  Transferred to [facility] on 7/16/2018 for further management. Since admission at [facility], pt had undergone tracheostomy and PEG.     Assessment & Plan:    Fever  - temp persists today despite Vanc and Zosyn  - cultures are negative- still with urinary retention and vomiting today-  Also cont mild increase in LFTs.  CT does show possible pneumonia  Discussed with Dr. Arredondo on the phone- suggested possibly neuro fever- and or fungal source- will stop vanc- cont zosyn, add mycamine (prolonged QT)  Normal procalcitonin--- may need to reduce neuro meds... On Keppra and Depakote and will ask pharmacy to take at look at her meds as source of fever    Tachycardia  - sinus, " secondary to infection?  - ritalin and robinul also may contribute, weaning these meds back- add metoprolol- since evidence of cardiac ischemia on admission  - recent duplex negative by preliminary report for VTE (all veins compressible), but will check d dimer today and obtain further imaging as needed.    Hypoxic encephalopathy  - patient remains unresponsive and not interactive  - on ativan PRN for agitation, attempting to slowly wean    H/o drug and alcohol abuse  - continue thiamine    Dysphagia  - tube feeds-- will hold today for vomiting- give IVFs-  Also increased bowel regimen for constipation.    Hepatitis C    DVT Prophylaxis:  pLOV    CODE STATUS:   Code Status and Medical Interventions:   Ordered at: 07/16/18 0620     Code Status:    CPR     Medical Interventions (Level of Support Prior to Arrest):    Full       Disposition: I expect the patient to be discharged TBD  Jennifer Edwards MD  08/06/18  5:27 PM

## 2018-08-06 NOTE — PLAN OF CARE
Problem: Patient Care Overview  Goal: Plan of Care Review  Outcome: Ongoing (interventions implemented as appropriate)   08/06/18 1782   Coping/Psychosocial   Plan of Care Reviewed With patient   Plan of Care Review   Progress no change   OTHER   Outcome Summary Pt remains ST, moderate amount of secreations, continue suctioning, IV ABX, TF changed, WOC eval, continue current care, added lopressor       Problem: Skin Injury Risk (Adult)  Goal: Skin Health and Integrity  Outcome: Ongoing (interventions implemented as appropriate)      Problem: Palliative Care (Adult)  Goal: Identify Related Risk Factors and Signs and Symptoms  Outcome: Outcome(s) achieved Date Met: 08/06/18    Goal: Maximized Comfort  Outcome: Ongoing (interventions implemented as appropriate)    Goal: Enhanced Quality of Life  Outcome: Ongoing (interventions implemented as appropriate)      Problem: Cardiac Output Decreased (Adult)  Goal: Identify Related Risk Factors and Signs and Symptoms  Outcome: Outcome(s) achieved Date Met: 08/06/18    Goal: Effective Tissue Perfusion  Outcome: Ongoing (interventions implemented as appropriate)      Problem: Nutrition, Enteral (Adult)  Goal: Signs and Symptoms of Listed Potential Problems Will be Absent, Minimized or Managed (Nutrition, Enteral)  Outcome: Ongoing (interventions implemented as appropriate)

## 2018-08-07 ENCOUNTER — APPOINTMENT (OUTPATIENT)
Dept: GENERAL RADIOLOGY | Facility: HOSPITAL | Age: 38
End: 2018-08-07

## 2018-08-07 ENCOUNTER — APPOINTMENT (OUTPATIENT)
Dept: MRI IMAGING | Facility: HOSPITAL | Age: 38
End: 2018-08-07

## 2018-08-07 LAB
ALBUMIN SERPL-MCNC: 4.06 G/DL (ref 3.2–4.8)
ALBUMIN/GLOB SERPL: 1.1 G/DL (ref 1.5–2.5)
ALP SERPL-CCNC: 74 U/L (ref 25–100)
ALT SERPL W P-5'-P-CCNC: 65 U/L (ref 7–40)
ANION GAP SERPL CALCULATED.3IONS-SCNC: 8 MMOL/L (ref 3–11)
AST SERPL-CCNC: 86 U/L (ref 0–33)
BACTERIA SPEC RESP CULT: ABNORMAL
BACTERIA SPEC RESP CULT: ABNORMAL
BASOPHILS # BLD AUTO: 0.06 10*3/MM3 (ref 0–0.2)
BASOPHILS NFR BLD AUTO: 0.6 % (ref 0–1)
BILIRUB SERPL-MCNC: 0.4 MG/DL (ref 0.3–1.2)
BUN BLD-MCNC: 12 MG/DL (ref 9–23)
BUN/CREAT SERPL: 21.8 (ref 7–25)
CALCIUM SPEC-SCNC: 9.9 MG/DL (ref 8.7–10.4)
CHLORIDE SERPL-SCNC: 111 MMOL/L (ref 99–109)
CK SERPL-CCNC: 355 U/L (ref 26–174)
CO2 SERPL-SCNC: 25 MMOL/L (ref 20–31)
CREAT BLD-MCNC: 0.55 MG/DL (ref 0.6–1.3)
DEPRECATED RDW RBC AUTO: 53.9 FL (ref 37–54)
EOSINOPHIL # BLD AUTO: 0.11 10*3/MM3 (ref 0–0.3)
EOSINOPHIL NFR BLD AUTO: 1.1 % (ref 0–3)
ERYTHROCYTE [DISTWIDTH] IN BLOOD BY AUTOMATED COUNT: 14.4 % (ref 11.3–14.5)
GFR SERPL CREATININE-BSD FRML MDRD: 124 ML/MIN/1.73
GLOBULIN UR ELPH-MCNC: 3.5 GM/DL
GLUCOSE BLD-MCNC: 103 MG/DL (ref 70–100)
GLUCOSE BLDC GLUCOMTR-MCNC: 107 MG/DL (ref 70–130)
GLUCOSE BLDC GLUCOMTR-MCNC: 130 MG/DL (ref 70–130)
GLUCOSE BLDC GLUCOMTR-MCNC: 84 MG/DL (ref 70–130)
GLUCOSE BLDC GLUCOMTR-MCNC: 96 MG/DL (ref 70–130)
GRAM STN SPEC: ABNORMAL
HCT VFR BLD AUTO: 35.7 % (ref 34.5–44)
HGB BLD-MCNC: 11.1 G/DL (ref 11.5–15.5)
IMM GRANULOCYTES # BLD: 0.01 10*3/MM3 (ref 0–0.03)
IMM GRANULOCYTES NFR BLD: 0.1 % (ref 0–0.6)
LYMPHOCYTES # BLD AUTO: 2.69 10*3/MM3 (ref 0.6–4.8)
LYMPHOCYTES NFR BLD AUTO: 26 % (ref 24–44)
MCH RBC QN AUTO: 31.5 PG (ref 27–31)
MCHC RBC AUTO-ENTMCNC: 31.1 G/DL (ref 32–36)
MCV RBC AUTO: 101.4 FL (ref 80–99)
MONOCYTES # BLD AUTO: 1.2 10*3/MM3 (ref 0–1)
MONOCYTES NFR BLD AUTO: 11.6 % (ref 0–12)
NEUTROPHILS # BLD AUTO: 6.26 10*3/MM3 (ref 1.5–8.3)
NEUTROPHILS NFR BLD AUTO: 60.6 % (ref 41–71)
PLATELET # BLD AUTO: 563 10*3/MM3 (ref 150–450)
PMV BLD AUTO: 10.5 FL (ref 6–12)
POTASSIUM BLD-SCNC: 4.3 MMOL/L (ref 3.5–5.5)
PROT SERPL-MCNC: 7.6 G/DL (ref 5.7–8.2)
RBC # BLD AUTO: 3.52 10*6/MM3 (ref 3.89–5.14)
SODIUM BLD-SCNC: 144 MMOL/L (ref 132–146)
STREP GROUPING: ABNORMAL
WBC NRBC COR # BLD: 10.33 10*3/MM3 (ref 3.5–10.8)

## 2018-08-07 PROCEDURE — 25010000002 ENOXAPARIN PER 10 MG: Performed by: INTERNAL MEDICINE

## 2018-08-07 PROCEDURE — 97530 THERAPEUTIC ACTIVITIES: CPT

## 2018-08-07 PROCEDURE — 94760 N-INVAS EAR/PLS OXIMETRY 1: CPT

## 2018-08-07 PROCEDURE — 94799 UNLISTED PULMONARY SVC/PX: CPT

## 2018-08-07 PROCEDURE — 70551 MRI BRAIN STEM W/O DYE: CPT

## 2018-08-07 PROCEDURE — 99231 SBSQ HOSP IP/OBS SF/LOW 25: CPT | Performed by: PSYCHIATRY & NEUROLOGY

## 2018-08-07 PROCEDURE — 97166 OT EVAL MOD COMPLEX 45 MIN: CPT

## 2018-08-07 PROCEDURE — 74018 RADEX ABDOMEN 1 VIEW: CPT

## 2018-08-07 PROCEDURE — 99233 SBSQ HOSP IP/OBS HIGH 50: CPT | Performed by: INTERNAL MEDICINE

## 2018-08-07 PROCEDURE — 82550 ASSAY OF CK (CPK): CPT | Performed by: INTERNAL MEDICINE

## 2018-08-07 PROCEDURE — 85025 COMPLETE CBC W/AUTO DIFF WBC: CPT | Performed by: INTERNAL MEDICINE

## 2018-08-07 PROCEDURE — 25010000002 PIPERACILLIN SOD-TAZOBACTAM PER 1 G: Performed by: INTERNAL MEDICINE

## 2018-08-07 PROCEDURE — 71045 X-RAY EXAM CHEST 1 VIEW: CPT

## 2018-08-07 PROCEDURE — 80053 COMPREHEN METABOLIC PANEL: CPT | Performed by: INTERNAL MEDICINE

## 2018-08-07 PROCEDURE — 82962 GLUCOSE BLOOD TEST: CPT

## 2018-08-07 PROCEDURE — 97162 PT EVAL MOD COMPLEX 30 MIN: CPT

## 2018-08-07 RX ORDER — DANTROLENE SODIUM 25 MG/1
50 CAPSULE ORAL EVERY 6 HOURS
Status: COMPLETED | OUTPATIENT
Start: 2018-08-07 | End: 2018-08-10

## 2018-08-07 RX ORDER — L.ACID,PARA/B.BIFIDUM/S.THERM 8B CELL
1 CAPSULE ORAL DAILY
Status: DISCONTINUED | OUTPATIENT
Start: 2018-08-08 | End: 2018-08-18

## 2018-08-07 RX ORDER — LEVETIRACETAM 100 MG/ML
500 SOLUTION ORAL EVERY 12 HOURS SCHEDULED
Status: DISCONTINUED | OUTPATIENT
Start: 2018-08-07 | End: 2018-08-08

## 2018-08-07 RX ORDER — METRONIDAZOLE 500 MG/1
500 TABLET ORAL EVERY 8 HOURS SCHEDULED
Status: COMPLETED | OUTPATIENT
Start: 2018-08-08 | End: 2018-08-14

## 2018-08-07 RX ADMIN — DANTROLENE SODIUM 50 MG: 25 CAPSULE ORAL at 17:42

## 2018-08-07 RX ADMIN — ENOXAPARIN SODIUM 40 MG: 100 INJECTION SUBCUTANEOUS at 09:19

## 2018-08-07 RX ADMIN — MICAFUNGIN SODIUM 100 MG: 20 INJECTION, POWDER, LYOPHILIZED, FOR SOLUTION INTRAVENOUS at 11:47

## 2018-08-07 RX ADMIN — FAMOTIDINE 20 MG: 20 TABLET ORAL at 21:47

## 2018-08-07 RX ADMIN — POTASSIUM CHLORIDE, DEXTROSE MONOHYDRATE AND SODIUM CHLORIDE 100 ML/HR: 150; 5; 450 INJECTION, SOLUTION INTRAVENOUS at 17:42

## 2018-08-07 RX ADMIN — LEVETIRACETAM 500 MG: 500 SOLUTION ORAL at 21:48

## 2018-08-07 RX ADMIN — METRONIDAZOLE 500 MG: 500 INJECTION, SOLUTION INTRAVENOUS at 17:43

## 2018-08-07 RX ADMIN — FAMOTIDINE 20 MG: 20 TABLET ORAL at 09:19

## 2018-08-07 RX ADMIN — Medication 100 MG: at 09:19

## 2018-08-07 RX ADMIN — ASPIRIN 81 MG CHEWABLE TABLET 81 MG: 81 TABLET CHEWABLE at 09:18

## 2018-08-07 RX ADMIN — LACTULOSE 10 G: 10 SOLUTION ORAL at 09:18

## 2018-08-07 RX ADMIN — ATORVASTATIN CALCIUM 80 MG: 40 TABLET, FILM COATED ORAL at 21:48

## 2018-08-07 RX ADMIN — TAZOBACTAM SODIUM AND PIPERACILLIN SODIUM 4.5 G: 500; 4 INJECTION, SOLUTION INTRAVENOUS at 23:00

## 2018-08-07 RX ADMIN — VALPROIC ACID 250 MG: 250 SOLUTION ORAL at 11:47

## 2018-08-07 RX ADMIN — DANTROLENE SODIUM 50 MG: 25 CAPSULE ORAL at 09:19

## 2018-08-07 RX ADMIN — DANTROLENE SODIUM 50 MG: 25 CAPSULE ORAL at 21:48

## 2018-08-07 RX ADMIN — TAZOBACTAM SODIUM AND PIPERACILLIN SODIUM 4.5 G: 500; 4 INJECTION, SOLUTION INTRAVENOUS at 06:55

## 2018-08-07 RX ADMIN — METOPROLOL TARTRATE 25 MG: 25 TABLET ORAL at 09:19

## 2018-08-07 RX ADMIN — CLOPIDOGREL BISULFATE 75 MG: 75 TABLET ORAL at 09:19

## 2018-08-07 RX ADMIN — METOPROLOL TARTRATE 25 MG: 25 TABLET ORAL at 21:47

## 2018-08-07 RX ADMIN — DANTROLENE SODIUM 157.5 MG: 250 INJECTION, SUSPENSION INTRAVENOUS at 02:23

## 2018-08-07 RX ADMIN — VALPROIC ACID 250 MG: 250 SOLUTION ORAL at 06:56

## 2018-08-07 RX ADMIN — LEVETIRACETAM 1000 MG: 100 SOLUTION ORAL at 09:18

## 2018-08-07 RX ADMIN — VALPROIC ACID 250 MG: 250 SOLUTION ORAL at 23:00

## 2018-08-07 RX ADMIN — GLYCOPYRROLATE 1 MG: 1 TABLET ORAL at 17:43

## 2018-08-07 RX ADMIN — GLYCOPYRROLATE 1 MG: 1 TABLET ORAL at 21:47

## 2018-08-07 RX ADMIN — METRONIDAZOLE 500 MG: 500 INJECTION, SOLUTION INTRAVENOUS at 21:48

## 2018-08-07 RX ADMIN — TAZOBACTAM SODIUM AND PIPERACILLIN SODIUM 4.5 G: 500; 4 INJECTION, SOLUTION INTRAVENOUS at 13:41

## 2018-08-07 RX ADMIN — GLYCOPYRROLATE 1 MG: 1 TABLET ORAL at 09:19

## 2018-08-07 RX ADMIN — VALPROIC ACID 250 MG: 250 SOLUTION ORAL at 17:42

## 2018-08-07 NOTE — PROGRESS NOTES
Daily Progress Note  Neurology     LOS: 22 days     Subjective     Chief Complaint: respiratory arrest    Interval History:  Patient remains fairly obtunded and not following commands. S/p trach/PEG. Neurology re-consulted by hospitalist service    ROS: positive for fever    Objective     Vital signs in last 24 hours:  Temp:  [98.9 °F (37.2 °C)-101.7 °F (38.7 °C)] 99.3 °F (37.4 °C)  Heart Rate:  [102-141] 108  Resp:  [20] 20  BP: (115-134)/() 115/77      Physical Exam:   General: Lying in bed with eyes closed.      Respiratory: Respirations unlabored   CV: RRR       Neurologic Exam:   Mental status: obtunded, not following commands   CN: PERRL   Motor: underlying tone normal   Reflexes: 2+ biceps, brachioradialis, and patellar reflexes bilaterally                         Results from last 7 days  Lab Units 08/07/18  0532   WBC 10*3/mm3 10.33   HEMOGLOBIN g/dL 11.1*   HEMATOCRIT % 35.7   PLATELETS 10*3/mm3 563*           Results Review:    Labs reviewed  Repeat EEG report reviewed    Assessment/Plan     Principal Problem:    Respiratory arrest (CMS/HCC)  Active Problems:    ETOH abuse    Suspected Anoxic brain damage     H/O drug abuse    Alcoholic intoxication with complication (CMS/[facility])    Pulmonary infiltrates - pneumonia vs. aspiration pneumonitis vs. pulmonary edema.     Sepsis (CMS/[facility])    Acute urinary retention        1.  Hypoxic encephalopathy = patient remains grossly unresponsive. She continues to have poor movement of the LUE and LLE. She exhibits semi-rhythmic flexion and relaxation of the RUE and RLE. No evidence of hyperreflexia or spasticity in the extremities. Previous MRIs and CSF analysis have been unremakrable. Recent repeat EEG again negative for epileptiform activity. Recommend weaning off her Keppra and Depakene, as no seizures have been documented since her admission. Will repeat her MRI brain once more. Outside of severe anoxic injury, any other potential etiology is  unclear.          Ute Hines MD  08/07/18  12:43 PM
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
## 2018-08-07 NOTE — SIGNIFICANT NOTE
After discussion w/ Dr. Edwards who is seeing Ms. Holm, will treat w/ dantrolene for the possibility of malignant hyperthermia in this 37 year old female who was admitted 3 weeks ago after being found down w/o response to narcan and with a reported hx of etoh/drug abuse.  Pt is now s/p peg and trach.  Pt has had negative mri; eeg only shows mild-mod slowing but no sz activity.  Has had negative LP, UA, cultures.  Pt w/ refractory fever now w/ rigidity and tachycardia.  Potential benefits of dantrolene outweigh risk so will administer tonight.

## 2018-08-07 NOTE — PLAN OF CARE
Problem: Patient Care Overview  Goal: Plan of Care Review  Outcome: Ongoing (interventions implemented as appropriate)   08/07/18 1125   Coping/Psychosocial   Plan of Care Reviewed With patient   Plan of Care Review   Progress improving   OTHER   Outcome Summary Presents w/ evolving sympt, incl. temp spike to 102 last PM d/t malig hyperthermia, elev BP to 134/107, dec. HGB (11.1), inc. chlor, anoxic brain injury w/ abn. posturing/rigidity, UE & ankle PF contractures, impaired cognit (unable to follow commands), non-purposeful gaze (only able to open eyes a few x's to TC's), dec. strength, & impaired funct mobil; able to heath. PROM/stretching BLE's & fitting w/ B MP foothold boots to address PF contract.; will see 3x/wk & consider inc. to daily when able to participate more in rx.
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
## 2018-08-07 NOTE — PLAN OF CARE
Problem: Patient Care Overview  Goal: Plan of Care Review  Outcome: Ongoing (interventions implemented as appropriate)   08/07/18 3887   Coping/Psychosocial   Plan of Care Reviewed With patient   Plan of Care Review   Progress improving   OTHER   Outcome Summary OT eval complete. Pt is unable to follow commands or respond. OT completed BUE PROM x5 and donned R UE resting hand splint. L UE resting hand splint ordered and awaiting arrival. Recommend discharge to LTACH when medically ready.

## 2018-08-07 NOTE — PROGRESS NOTES
[facility] Medicine Services  PROGRESS NOTE    Patient Name: Otilia Holm  : 1980  MRN: [id number]    Date of Admission: 2018  Length of Stay: 22  Primary Care Physician: Provider, No Known    Subjective   Subjective     CC:  Unresponsive with fever    HPI:  Patient was given dantrolene last night for possible neuromalignant syndrome. HEfever has improved    Review of Systems  Unable to obtain due to encephalopathy    Objective   Objective     Vital Signs:   Temp:  [98.9 °F (37.2 °C)-101.7 °F (38.7 °C)] 99.3 °F (37.4 °C)  Heart Rate:  [] 70  Resp:  [20] 20  BP: ()/() 99/64        Physical Exam:  Patient does not respond to any stimuli- increased tone throughout  Neck is without mass or JVD  Skin warm but not hot  Heart is Reg wo murmur less tachycardia  Lungs are clear wo wheeze or crackle  Abd is soft without HSM or mass, not tender or distended  Skin is without rash  Neurologic exam in nonfocal - some hand and foot contractures      Results Reviewed:  I have personally reviewed current lab, radiology, and data and agree.      Results from last 7 days  Lab Units 18  0532 18  0410 18  0535   WBC 10*3/mm3 10.33 13.96* 10.87*   HEMOGLOBIN g/dL 11.1* 10.4* 10.4*   HEMATOCRIT % 35.7 33.3* 33.3*   PLATELETS 10*3/mm3 563* 611* 625*       Results from last 7 days  Lab Units 18  0532 18  0410 18  1437 18  0535 18  0904   SODIUM mmol/L 144 144  --  143 143   POTASSIUM mmol/L 4.3 3.8  --  4.0 4.0   CHLORIDE mmol/L 111* 109  --  104 105   CO2 mmol/L 25.0 26.0  --  30.0 27.0   BUN mg/dL 12 13  --  16 11   CREATININE mg/dL 0.55* 0.61  --  0.60 0.58*   GLUCOSE mg/dL 103* 128*  --  112* 108*   CALCIUM mg/dL 9.9 9.7  --  9.7 10.1   ALT (SGPT) U/L 65* 69*  --   --  81*   AST (SGOT) U/L 86* 80*  --   --  100*   TROPONIN I ng/mL  --   --  0.039  --   --      BNP   Date Value Ref Range Status   2018 119.0 (H) 0.0 - 100.0  pg/mL Final     Comment:     Results may be falsely decreased if patient taking Biotin.       Imaging Results (last 24 hours)     Procedure Component Value Units Date/Time    MRI Brain Without Contrast [[id number]] Collected:  08/07/18 1731     Updated:  08/07/18 1734    Narrative:       EXAMINATION: MRI BRAIN WO CONTRAST-     INDICATION: encephalopathy; R09.2-Respiratory arrest;  G93.40-Encephalopathy, unspecified; R74.8-Abnormal levels of other serum  enzymes; Z74.09-Other reduced mobility; Z74.09-Other reduced mobility      TECHNIQUE: Multiplanar MRI of the brain without intravenous contrast  administration     COMPARISON: MRI dated 7/25/2018     FINDINGS: No restriction on diffusion-weighted imaging. Midline  structures are symmetric without evidence of mass, mass effect or  midline shift however motion degraded axial T2 and FLAIR imaging  sequences Limited evaluation. Small amount of T2 and FLAIR increased  signal abnormalities within the periventricular and deep white matter  consistent with chronic small vessel ischemic disease. Pituitary and  sella within normal limits. Cervicomedullary junction widely patent.  Globes and orbits retain normal T2 signal characteristics. Visualized  paranasal sinuses and mastoid air cells demonstrate mucus retention  cysts and minimal mucosal edema right maxillary sinus otherwise grossly  clear well-pneumatized. No cerebellopontine angle mass lesion.          Impression:       No acute intracranial abnormality specifically no evidence  for acute infarction or restricted diffusion. Minimal chronic small  vessel ischemic changes of the supratentorial white matter however may  represent sequela of migraines or demyelinating white matter disease in  a patient of this age, unchanged from prior exam. Mucous retention cyst  and mild mucosal thickening of the right maxillary sinus.           XR Chest 1 View [[id number]] Collected:  08/07/18 1036     Updated:  08/07/18 1107    Narrative:        EXAMINATION: XR CHEST 1 VW- 08/07/2018     INDICATION: fever, vomiting     TECHNIQUE:  Single view frontal chest.     COMPARISONS: 08/06/2018     FINDINGS:  Lungs are without focal abnormality. No pleural effusion or  pneumothorax. Cardiomediastinal silhouette is within normal limits.  Upper chest surgical clips again noted.       Impression:       Stable exam.     D:  08/07/2018  E:  08/07/2018     This report was finalized on 8/7/2018 11:04 AM by Cassius White.       XR Abdomen KUB [[id number]] Collected:  08/07/18 1038     Updated:  08/07/18 1107    Narrative:       EXAMINATION: XR ABDOMEN KUB-      INDICATION: Fever and vomiting.     TECHNIQUE:  KUB.     COMPARISONS:  08/04/2018.     FINDINGS:  Moderate stool burden. No evidence of bowel obstruction. No  acutely concerning calcifications. Gastrostomy tube in place.       Impression:       No acute finding.     D:  08/07/2018  E:  08/07/2018     This report was finalized on 8/7/2018 11:04 AM by Cassius White.           Results for orders placed during the hospital encounter of 07/16/18   Adult Transthoracic Echo Complete W/ Cont if Necessary Per Protocol    Addendum · Estimated LVEF = 50%. · The following left ventricular wall segments are hypokinetic: mid  anterior, apical septal and mid anteroseptal. · The cardiac valves are anatomically and functionally normal.        Aubrey Jiménez MD 7/16/2018  6:05 PM          Narrative · Left ventricular systolic function is normal. Estimated EF = 50%.  · The cardiac valves are anatomically and functionally normal.          I have reviewed the medications.    aspirin 81 mg Oral Daily   atorvastatin 80 mg Oral Nightly   clopidogrel 75 mg Oral Daily   dantrolene 50 mg Oral Q6H   enoxaparin 40 mg Subcutaneous Q24H   famotidine 20 mg Oral BID   glycopyrrolate 1 mg Oral TID   lactulose 10 g Oral Daily   levETIRAcetam 500 mg Nasogastric Q12H   metoprolol tartrate 25 mg Oral Q12H   metroNIDAZOLE 500 mg Intravenous Q8H  "  micafungin (MYCAMINE)  mg Intravenous Q24H   piperacillin-tazobactam 4.5 g Intravenous Q8H   thiamine 100 mg Oral Daily   Valproic Acid 250 mg Nasogastric Q6H         Assessment/Plan   Assessment / Plan     Hospital Problem List     * (Principal)Respiratory arrest (CMS/HCC)    ETOH abuse    Suspected Anoxic brain damage     H/O drug abuse    Alcoholic intoxication with complication (CMS/HCC)    Pulmonary infiltrates - pneumonia vs. aspiration pneumonitis vs. pulmonary edema.     Overview Signed 7/16/2018  7:42 AM by Ruslan Estrella MD     - pneumonia vs. aspiration pneumonitis vs. pulmonary edema.          Sepsis (CMS/HCC)    Acute urinary retention           Brief Hospital Course to date:  Otilia Holm is a 37 y.o. female with relevant PMH of Alcoholism (drinks four \"4-Locos\" or 12 beers a day), drug abuse, who apparently stopped by a \"known drug dealers house\" on the way to work.  Became unresponsive.  Found unconscious and unresponsive in her car in [facility] parking lot.  Received narcan with no response.  EMS nasally intubated in field.  On arrival to [facility] found to have Bicarb 17 w/ AG 20, Ethanol level of 218.  Per [facility] ER note there was a period of 8-10 minutes when patient stopped talking before security / EMS began administering treatment.  Reportedly had prolonged seizure activity at OSH.  Transferred to [facility] on 7/16/2018 for further management. Since admission at [facility], pt had undergone tracheostomy and PEG.     Assessment & Plan:    Fever  - temp is improved after dantrolene will conitnue for another day or so  Continuing zosyn and mycamine- cultured NTDpersists today despite Vanc and Zosyn  - cultures are negative- still with urinary retent    Also cont mild increase in LFTs.  CT does show possible pneumonia  Discussed with Dr. Arredondo on the phone- suggested possibly neuro fever- and or fungal source- will stop vanc- cont zosyn, add mycamine (prolonged QT)    Normal procalcitonin--- may need " to reduce neuro meds... On Keppra and Depakote weaning per neurology recommendations    added metoprolol- since evidence of cardiac ischemia on admission    - recent duplex negative by preliminary report for VTE (all veins compressible), but will check d dimer today and obtain further imaging as needed.    Hypoxic encephalopathy  - patient remains unresponsive and not interactive  - on ativan PRN for agitation, attempting to slowly wean    H/o drug and alcohol abuse  - continue thiamine    Dysphagia  - tube feeds-- will hold today for vomiting- give IVFs-  Also increased bowel regimen for constipation.    Hepatitis C    DVT Prophylaxis:  pLOV    CODE STATUS:   Code Status and Medical Interventions:   Ordered at: 07/16/18 0620     Code Status:    CPR     Medical Interventions (Level of Support Prior to Arrest):    Full       Disposition: I expect the patient to be discharged TBD  Jennifer Edwards MD  08/07/18  6:11 PM
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
## 2018-08-08 PROBLEM — G21.0 NMS (NEUROLEPTIC MALIGNANT SYNDROME): Status: ACTIVE | Noted: 2018-08-06

## 2018-08-08 PROBLEM — A41.9 SEPSIS (HCC): Status: RESOLVED | Noted: 2018-07-29 | Resolved: 2018-08-08

## 2018-08-08 PROBLEM — F10.929 ALCOHOLIC INTOXICATION WITH COMPLICATION (HCC): Status: RESOLVED | Noted: 2018-07-16 | Resolved: 2018-08-08

## 2018-08-08 PROBLEM — R09.2 RESPIRATORY ARREST (HCC): Status: RESOLVED | Noted: 2018-07-16 | Resolved: 2018-08-08

## 2018-08-08 LAB
BACTERIA SPEC AEROBE CULT: NORMAL
CORTIS SERPL-MCNC: 22.2 MCG/DL
CRP SERPL-MCNC: 1.33 MG/DL (ref 0–1)
GLUCOSE BLDC GLUCOMTR-MCNC: 105 MG/DL (ref 70–130)
GLUCOSE BLDC GLUCOMTR-MCNC: 93 MG/DL (ref 70–130)
LIPASE SERPL-CCNC: 92 U/L (ref 6–51)
MRSA SPEC QL CULT: NORMAL
TSH SERPL DL<=0.05 MIU/L-ACNC: 1.91 MIU/ML (ref 0.35–5.35)
VALPROATE SERPL-MCNC: 45 MCG/ML (ref 50–150)

## 2018-08-08 PROCEDURE — 84443 ASSAY THYROID STIM HORMONE: CPT | Performed by: PSYCHIATRY & NEUROLOGY

## 2018-08-08 PROCEDURE — 83690 ASSAY OF LIPASE: CPT | Performed by: INTERNAL MEDICINE

## 2018-08-08 PROCEDURE — 82962 GLUCOSE BLOOD TEST: CPT

## 2018-08-08 PROCEDURE — 25010000002 PIPERACILLIN SOD-TAZOBACTAM PER 1 G: Performed by: INTERNAL MEDICINE

## 2018-08-08 PROCEDURE — 99231 SBSQ HOSP IP/OBS SF/LOW 25: CPT | Performed by: PSYCHIATRY & NEUROLOGY

## 2018-08-08 PROCEDURE — 97110 THERAPEUTIC EXERCISES: CPT

## 2018-08-08 PROCEDURE — 99233 SBSQ HOSP IP/OBS HIGH 50: CPT | Performed by: INTERNAL MEDICINE

## 2018-08-08 PROCEDURE — 82533 TOTAL CORTISOL: CPT | Performed by: PSYCHIATRY & NEUROLOGY

## 2018-08-08 PROCEDURE — 25010000002 ENOXAPARIN PER 10 MG: Performed by: INTERNAL MEDICINE

## 2018-08-08 PROCEDURE — 80164 ASSAY DIPROPYLACETIC ACD TOT: CPT | Performed by: PSYCHIATRY & NEUROLOGY

## 2018-08-08 PROCEDURE — 86140 C-REACTIVE PROTEIN: CPT | Performed by: INTERNAL MEDICINE

## 2018-08-08 RX ORDER — DEXTROSE, SODIUM CHLORIDE, AND POTASSIUM CHLORIDE 5; .45; .15 G/100ML; G/100ML; G/100ML
75 INJECTION INTRAVENOUS CONTINUOUS
Status: DISCONTINUED | OUTPATIENT
Start: 2018-08-08 | End: 2018-08-13

## 2018-08-08 RX ORDER — DEXTROSE, SODIUM CHLORIDE, AND POTASSIUM CHLORIDE 5; .9; .15 G/100ML; G/100ML; G/100ML
75 INJECTION INTRAVENOUS CONTINUOUS
Status: DISCONTINUED | OUTPATIENT
Start: 2018-08-08 | End: 2018-08-08

## 2018-08-08 RX ORDER — LEVETIRACETAM 100 MG/ML
250 SOLUTION ORAL EVERY 12 HOURS SCHEDULED
Status: DISCONTINUED | OUTPATIENT
Start: 2018-08-08 | End: 2018-08-12

## 2018-08-08 RX ORDER — BISACODYL 10 MG
10 SUPPOSITORY, RECTAL RECTAL DAILY PRN
Status: DISCONTINUED | OUTPATIENT
Start: 2018-08-08 | End: 2018-08-25 | Stop reason: HOSPADM

## 2018-08-08 RX ADMIN — Medication 100 MG: at 09:08

## 2018-08-08 RX ADMIN — POTASSIUM CHLORIDE, DEXTROSE MONOHYDRATE AND SODIUM CHLORIDE 75 ML/HR: 150; 5; 450 INJECTION, SOLUTION INTRAVENOUS at 12:00

## 2018-08-08 RX ADMIN — GLYCOPYRROLATE 1 MG: 1 TABLET ORAL at 09:09

## 2018-08-08 RX ADMIN — DANTROLENE SODIUM 50 MG: 25 CAPSULE ORAL at 22:10

## 2018-08-08 RX ADMIN — TAZOBACTAM SODIUM AND PIPERACILLIN SODIUM 4.5 G: 500; 4 INJECTION, SOLUTION INTRAVENOUS at 06:25

## 2018-08-08 RX ADMIN — METRONIDAZOLE 500 MG: 500 INJECTION, SOLUTION INTRAVENOUS at 05:06

## 2018-08-08 RX ADMIN — METRONIDAZOLE 500 MG: 500 TABLET ORAL at 14:40

## 2018-08-08 RX ADMIN — ACETAMINOPHEN 650 MG: 325 TABLET, FILM COATED ORAL at 22:26

## 2018-08-08 RX ADMIN — FAMOTIDINE 20 MG: 20 TABLET ORAL at 09:08

## 2018-08-08 RX ADMIN — VALPROIC ACID 250 MG: 250 SOLUTION ORAL at 17:12

## 2018-08-08 RX ADMIN — DANTROLENE SODIUM 50 MG: 25 CAPSULE ORAL at 09:08

## 2018-08-08 RX ADMIN — Medication 1 CAPSULE: at 09:07

## 2018-08-08 RX ADMIN — MICAFUNGIN SODIUM 100 MG: 20 INJECTION, POWDER, LYOPHILIZED, FOR SOLUTION INTRAVENOUS at 12:17

## 2018-08-08 RX ADMIN — LEVETIRACETAM 500 MG: 500 SOLUTION ORAL at 09:08

## 2018-08-08 RX ADMIN — ENOXAPARIN SODIUM 40 MG: 100 INJECTION SUBCUTANEOUS at 09:08

## 2018-08-08 RX ADMIN — LEVETIRACETAM 250 MG: 100 SOLUTION ORAL at 22:10

## 2018-08-08 RX ADMIN — LACTULOSE 10 G: 10 SOLUTION ORAL at 09:07

## 2018-08-08 RX ADMIN — TAZOBACTAM SODIUM AND PIPERACILLIN SODIUM 4.5 G: 500; 4 INJECTION, SOLUTION INTRAVENOUS at 22:10

## 2018-08-08 RX ADMIN — GLYCOPYRROLATE 1 MG: 1 TABLET ORAL at 15:07

## 2018-08-08 RX ADMIN — CLOPIDOGREL BISULFATE 75 MG: 75 TABLET ORAL at 09:07

## 2018-08-08 RX ADMIN — ASPIRIN 81 MG CHEWABLE TABLET 81 MG: 81 TABLET CHEWABLE at 09:07

## 2018-08-08 RX ADMIN — VALPROIC ACID 250 MG: 250 SOLUTION ORAL at 05:07

## 2018-08-08 RX ADMIN — TAZOBACTAM SODIUM AND PIPERACILLIN SODIUM 4.5 G: 500; 4 INJECTION, SOLUTION INTRAVENOUS at 13:26

## 2018-08-08 RX ADMIN — ATORVASTATIN CALCIUM 80 MG: 40 TABLET, FILM COATED ORAL at 22:10

## 2018-08-08 RX ADMIN — METRONIDAZOLE 500 MG: 500 TABLET ORAL at 22:10

## 2018-08-08 RX ADMIN — VALPROIC ACID 250 MG: 250 SOLUTION ORAL at 12:50

## 2018-08-08 RX ADMIN — DANTROLENE SODIUM 50 MG: 25 CAPSULE ORAL at 05:06

## 2018-08-08 RX ADMIN — DANTROLENE SODIUM 50 MG: 25 CAPSULE ORAL at 14:40

## 2018-08-08 RX ADMIN — FAMOTIDINE 20 MG: 20 TABLET ORAL at 22:10

## 2018-08-08 RX ADMIN — GLYCOPYRROLATE 1 MG: 1 TABLET ORAL at 22:10
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
## 2018-08-08 NOTE — PROGRESS NOTES
"Continued Stay Note  [facility]     Patient Name: Otilia Holm  MRN: [id number]  Today's Date: 8/8/2018    Admit Date: 7/16/2018          Discharge Plan     Row Name 08/08/18 5017       Plan    Plan Comments Complex Care CM/SW continue to follow for post hospital disposition.  Spoke with patient's mother by phone-she is requesting transfer to  or [facility] for rehab.  Discussed with mother that patient's son is her legal next of kin/decision maker at this point and all discharge plans will be discussed with him.  Mother angry/argumentative and very difficult to understand on telephone-reports she feels she should make decisions, we are letting her daughter \"just lay in the bed,\" will be calling a , states repeatedly son is \"not POA\", does not seem to have clear understanding of POA vs. legal next of kin vs. guardianship.  Plan will be LTC nursing facility placement-no bed offers to date, however [facility] in [place] has voiced some interest.  Will discuss with son.  Goldie Salmon, Ext. [phone]      Final Discharge Disposition Code 30 - still a patient              Discharge Codes    No documentation.       Expected Discharge Date and Time     Expected Discharge Date Expected Discharge Time    Aug 10, 2018             THEO Harrell    "

## 2018-08-08 NOTE — PROGRESS NOTES
[facility] Medicine Services  PROGRESS NOTE    Patient Name: Otilia Holm  : 1980  MRN: [id number]    Date of Admission: 2018  Length of Stay: 23  Primary Care Physician: Provider, No Known    Subjective   Subjective     CC:  Unresponsive with fever    HPI:  Patient was given dantrolene now for two days and is still febrile but looks more awake today.  She vomited again when tube feeds were restarted    Review of Systems  Unable to obtain due to encephalopathy    Objective   Objective     Vital Signs:   Temp:  [98.4 °F (36.9 °C)-100.9 °F (38.3 °C)] 100.9 °F (38.3 °C)  Heart Rate:  [] 114  Resp:  [18-20] 18  BP: ()/(57-95) 135/95        Physical Exam:  Patient does not respond to any stimuli- increased tone throughout  Eyes are open some today and moving some- she responds to no stimuli  Neck is without mass or JVD  Skin warm  Heart is Reg wo murmur less tachycardia  Lungs are clear wo wheeze or crackle  Abd is soft without HSM or mass, not tender or distended  Skin is without rash  Neurologic exam in nonfocal - some hand and foot contractures    Results Reviewed:  I have personally reviewed current lab, radiology, and data and agree.      Results from last 7 days  Lab Units 18  0532 18  0410 18  0535   WBC 10*3/mm3 10.33 13.96* 10.87*   HEMOGLOBIN g/dL 11.1* 10.4* 10.4*   HEMATOCRIT % 35.7 33.3* 33.3*   PLATELETS 10*3/mm3 563* 611* 625*       Results from last 7 days  Lab Units 18  0532 18  0410 18  1437 18  0535 18  0904   SODIUM mmol/L 144 144  --  143 143   POTASSIUM mmol/L 4.3 3.8  --  4.0 4.0   CHLORIDE mmol/L 111* 109  --  104 105   CO2 mmol/L 25.0 26.0  --  30.0 27.0   BUN mg/dL 12 13  --  16 11   CREATININE mg/dL 0.55* 0.61  --  0.60 0.58*   GLUCOSE mg/dL 103* 128*  --  112* 108*   CALCIUM mg/dL 9.9 9.7  --  9.7 10.1   ALT (SGPT) U/L 65* 69*  --   --  81*   AST (SGOT) U/L 86* 80*  --   --  100*   TROPONIN  I ng/mL  --   --  0.039  --   --          Imaging Results (last 24 hours)     Procedure Component Value Units Date/Time    MRI Brain Without Contrast [[id number]] Collected:  08/07/18 1731     Updated:  08/08/18 1345    Narrative:       EXAMINATION: MRI BRAIN WO CONTRAST- 08/07/2018     INDICATION: encephalopathy; R09.2-Respiratory arrest;  G93.40-Encephalopathy, unspecified; R74.8-Abnormal levels of other serum  enzymes; Z74.09-Other reduced mobility      TECHNIQUE: Multiplanar MRI of the brain without intravenous contrast  administration.     COMPARISON: MRI dated 07/25/2018     FINDINGS: No restriction on diffusion-weighted imaging. Midline  structures are symmetric without evidence of mass, mass effect or  midline shift however motion degraded axial T2 and FLAIR imaging  sequences limit evaluation. Small amount of T2 and FLAIR increased  signal abnormalities within the periventricular and deep white matter  consistent with chronic small vessel ischemic disease. Pituitary and  sella within normal limits. Cervicomedullary junction widely patent.  Globes and orbits retain normal T2 signal characteristics. Visualized  paranasal sinuses and mastoid air cells demonstrate mucus retention  cysts and minimal mucosal edema right maxillary sinus otherwise grossly  clear and well-pneumatized. No cerebellopontine angle mass lesion.       Impression:       No acute intracranial abnormality specifically no evidence  for acute infarction or restricted diffusion. Minimal chronic small  vessel ischemic changes of the supratentorial white matter however may  represent sequela of migraines or demyelinating white matter disease in  a patient of this age, unchanged from prior exam. Mucous retention cyst  and mild mucosal thickening of the right maxillary sinus.         D:  08/07/2018  E:  08/08/2018     This report was finalized on 8/8/2018 1:43 PM by Dr. Fernando Zuluaga.           Results for orders placed during the hospital encounter  "of 07/16/18   Adult Transthoracic Echo Complete W/ Cont if Necessary Per Protocol    Addendum · Estimated LVEF = 50%. · The following left ventricular wall segments are hypokinetic: mid  anterior, apical septal and mid anteroseptal. · The cardiac valves are anatomically and functionally normal.        Aubrey Jiménez MD 7/16/2018  6:05 PM          Narrative · Left ventricular systolic function is normal. Estimated EF = 50%.  · The cardiac valves are anatomically and functionally normal.          I have reviewed the medications.    aspirin 81 mg Oral Daily   atorvastatin 80 mg Oral Nightly   clopidogrel 75 mg Oral Daily   dantrolene 50 mg Oral Q6H   enoxaparin 40 mg Subcutaneous Q24H   famotidine 20 mg Oral BID   glycopyrrolate 1 mg Oral TID   lactobacillus acidophilus 1 capsule Oral Daily   lactulose 10 g Oral Daily   levETIRAcetam 250 mg Nasogastric Q12H   metroNIDAZOLE 500 mg Nasogastric Q8H   micafungin (MYCAMINE)  mg Intravenous Q24H   piperacillin-tazobactam 4.5 g Intravenous Q8H   thiamine 100 mg Oral Daily   Valproic Acid 250 mg Nasogastric Q6H         Assessment/Plan   Assessment / Plan     Hospital Problem List     Anoxic brain damage     NMS (neuroleptic malignant syndrome)    ETOH abuse    H/O drug abuse    Pulmonary infiltrates - pneumonia vs. aspiration pneumonitis vs. pulmonary edema.     Overview Signed 7/16/2018  7:42 AM by Ruslan Estrella MD     - pneumonia vs. aspiration pneumonitis vs. pulmonary edema.          Acute urinary retention           Brief Hospital Course to date:  Otilia Holm is a 37 y.o. female with relevant PMH of Alcoholism (drinks four \"4-Locos\" or 12 beers a day), drug abuse, who apparently stopped by a \"known drug dealers house\" on the way to work.  Became unresponsive.  Found unconscious and unresponsive in her car in [facility] parking lot.  Received narcan with no response.  EMS nasally intubated in field.  On arrival to [facility] found to have Bicarb 17 w/ AG 20, " Ethanol level of 218.  Per [facility] ER note there was a period of 8-10 minutes when patient stopped talking before security / EMS began administering treatment.  Reportedly had prolonged seizure activity at OSH.  Transferred to [facility] on 7/16/2018 for further management. Since admission at [facility], pt had undergone tracheostomy and PEG.     Assessment & Plan:    Fever  - temp is improved after dantrolene will continue for another day or so  Continuing zosyn and mycamine- cultured NTD    Also cont mild increase in LFTs.  CT does show possible pneumonia  Will check C A/P  Appreciate Dr Vickers's help    Normal procalcitonin--- may need to reduce neuro meds... On Keppra and Depakote weaning per neurology recommendations    Stopped metoprolol- as she is hypotensive today    - recent duplex negative by preliminary report for VTE (all veins compressible), but will check d dimer today and obtain further imaging as needed.    Hypoxic encephalopathy  - patient remains unresponsive and not interactive  - on ativan PRN for agitation, attempting to slowly wean  Recheck ammonia    H/o drug and alcohol abuse  - continue thiamine    Dysphagia  - tube feeds-- will hold today for vomiting- give IVFs-  Also increased bowel regimen for constipation.  COnt IVFs cont to hold tube feeds  Hepatitis C    DVT Prophylaxis:  pLOV    CODE STATUS:   Code Status and Medical Interventions:   Ordered at: 07/16/18 0620     Code Status:    CPR     Medical Interventions (Level of Support Prior to Arrest):    Full       Disposition: I expect the patient to be discharged TBD  Jennifer Edwards MD  08/08/18  4:18 PM

## 2018-08-08 NOTE — PROGRESS NOTES
"Adult Nutrition  Assessment/PES    Patient Name:  Otilia Holm  YOB: 1980  MRN: [id number]  Admit Date:  7/16/2018    Assessment Date:  8/8/2018    Comments:            Reason for Assessment     Row Name 08/08/18 1132          Reason for Assessment    Reason For Assessment follow-up protocol;TF/PN   30\"     Diagnosis --   SEE PREVIOUS NUTRITION NOTES. NEUROLEPTIC MALIGNANT SYNDROME.              Nutrition/Diet History     Row Name 08/08/18 1134          Nutrition/Diet History    Typical Food/Fluid Intake PT STARTING DRY HEAVING EARLY THIS AM & TF WAS AGAIN HELD PER NURSING REPORT. MD WANTS TO RX FOR CONSTIPATION PER DISCUSSION TO SEE IF THIS IMPROVES GI TOLERANCE OF TF. SHE WANTS TO CONTINUE HOLD OF TF FOR NOW.                Labs/Tests/Procedures/Meds     Row Name 08/08/18 1136          Labs/Procedures/Meds    Lab Results Reviewed reviewed        Medications    Pertinent Medications Reviewed reviewed             Physical Findings     Row Name 08/08/18 1137          Physical Findings    Gastrointestinal constipation   PER NURSING DOCUMENTATION     Skin other (see comments)   NOW JUST SKIN TEAR INSTEAD OF STAGE 2 P.I. TRACH SITE             Estimated/Assessed Needs     Row Name 08/08/18 1138          Calorie Requirements    Estimated Calorie Requirement Comment [id number]        Protein Requirements    Weight Used For Protein Calculations --   75-94 G/DAY-CAN LEAN TOWARDS LOWER END OF EST PRO NEEDS NOW      Est Protein Requirement Amount (gms/kg) --   THAT TRACH SITE A SKIN TEAR INSTEAD OF STAGE 2 P.I.        Fluid Requirements    Estimated Fluid Requirements (mL/day) 1974     RDA Method (mL) 1974             Nutrition Prescription Ordered     Row Name 08/08/18 1138          Nutrition Prescription PO    Current PO Diet NPO        Nutrition Prescription EN    Enteral Route PEG     Product Replete with Fiber     TF Delivery Method Continuous     Continuous TF Goal Rate (mL/hr) 60 mL/hr     Continuous " TF Current Rate (mL/hr) --   TF CURRENTLY ON HOLD     Continuous TF Goal Volume (mL) 1320 mL   BASED ON 22 HR/DAY DELIVERY     Water flush (mL)  20 mL     Water Flush Frequency Per hour             Evaluation of Received Nutrient/Fluid Intake     Row Name 08/08/18 1142          EN Evaluation    Number of Days EN Intake Evaluated Insufficient Data   NO TF INTAKE DOCUMENTED             Evaluation of Prescribed Nutrient/Fluid Intake     Row Name 08/08/18 1143          Calories at Prescribed Goal    Enteral Calories (kcal) 1320   BASED ON 22 HR/DAY DELIVERY     Total Calories (kcal) 1320        Protein at Prescribed Goal    Enteral Protein (gm) 82   BASED ON 22 HR/DAY DELIVERY     Total Protein (gm) 82        Fluid at Prescribed Goal    Enteral  Fluid (mL) 1109   BASED ON 22 HR/DAY DELIVERY     Free Water Flush Fluid (mL) 440   BASED ON 22 HR/DAY DELIVERY     Total Fluid Intake (mL) 1549        Fiber at Prescribed Goal    Fiber 18   BASED ON 22 HR/DAY DELIVERY        Recommended Daily Intake at Prescribed Goal    RDI Met   BASED ON 22 HR/DAY DELIVERY           Problem/Interventions:        Problem 1     Row Name 08/08/18 1145          Nutrition Diagnoses Problem 1    Problem 1 Inadequate Nutrient Intake     Resolved? Yes             Problem 2     Row Name 08/08/18 1145          Nutrition Diagnoses Problem 2    Problem 2 Inadequate Intake/Infusion   POSSIBLE     Inadequate Intake Type Enteral     Etiology (related to) Other (comment)   TF TOLERANCE     Signs/Symptoms (evidenced by) --   TF HELD DUE TO DRY HEAVING. MD WANTS TO TREAT CONSTIPATION PRIOR TO RESTARTING TF.                   Intervention Goal     Row Name 08/08/18 1147          Intervention Goal    General Nutrition support treatment     TF/PN Tolerate TF at goal             Nutrition Intervention     Row Name 08/08/18 1147          Nutrition Intervention    RD/Tech Action Follow Tx progress;Care plan reviewd;Recommend/ordered     Recommended/Ordered EN              Nutrition Prescription     Row Name 08/08/18 1148          Nutrition Prescription EN    Enteral Prescription Enteral begin/change   ONCE TF RESTARTED, SUGGEST CHANGE TO FIBERSOURCE HN TO GOAL OF 65 ML/HR & CONTINUE H2O @ 20 ML/HR VIA PEG        EN to Supply    Kcal/Day 1716 Kcal/Day   BASED ON 22 HR/DAY DELIVERY     Protein (gm/day) 77 gm/day   BASED ON 22 HR/DAY DELIVERY     TF Free H2O (mL) 1158 mL   BASED ON 22 HR/DAY DELIVERY     Total Free H2O (mL/day) 1598 mL/day   BASED ON 22 HR/DAY DELIVERY     Fiber Per Day (gm/day) 21 gm/day   BASED ON 22 HR/DAY DELIVERY             Education/Evaluation     Row Name 08/08/18 1155          Monitor/Evaluation    Monitor Per protocol;I&O;Pertinent labs;TF delivery/tolerance;Weight;Skin status         Electronically signed by:  Precious Castro RD  08/08/18 11:59 AM

## 2018-08-08 NOTE — PLAN OF CARE
Problem: Patient Care Overview  Goal: Plan of Care Review  Outcome: Ongoing (interventions implemented as appropriate)   08/08/18 6125   Coping/Psychosocial   Plan of Care Reviewed With patient   Plan of Care Review   Progress no change   OTHER   Outcome Summary No change in patient condition. Remained febrile throughout day, less than 101F/Core. Unresponsive, GCS=3. No response to deep stimuli. +pupil reaction, +gag. Roving eyes. TF continued to be held. IVF reordered. PICC consult/placement scheduled for tomorrow.

## 2018-08-08 NOTE — PLAN OF CARE
Problem: Patient Care Overview  Goal: Plan of Care Review  Outcome: Ongoing (interventions implemented as appropriate)   08/08/18 0386   Coping/Psychosocial   Plan of Care Reviewed With patient   Plan of Care Review   Progress no change   OTHER   Outcome Summary OT assessed skin integrity, Pt dependent to doff splint on R hand, no redness noted and good skin integrity. L UE resting hand orthosis ordered. Cont IPOT prepare for d/c from OT services pending progress

## 2018-08-09 ENCOUNTER — APPOINTMENT (OUTPATIENT)
Dept: CT IMAGING | Facility: HOSPITAL | Age: 38
End: 2018-08-09

## 2018-08-09 PROBLEM — B18.2 CHRONIC HEPATITIS C WITHOUT HEPATIC COMA (HCC): Status: ACTIVE | Noted: 2018-08-09

## 2018-08-09 LAB
GLUCOSE BLDC GLUCOMTR-MCNC: 93 MG/DL (ref 70–130)
GLUCOSE BLDC GLUCOMTR-MCNC: 98 MG/DL (ref 70–130)

## 2018-08-09 PROCEDURE — C1751 CATH, INF, PER/CENT/MIDLINE: HCPCS

## 2018-08-09 PROCEDURE — 25010000002 ENOXAPARIN PER 10 MG: Performed by: INTERNAL MEDICINE

## 2018-08-09 PROCEDURE — 25010000002 PIPERACILLIN SOD-TAZOBACTAM PER 1 G: Performed by: INTERNAL MEDICINE

## 2018-08-09 PROCEDURE — C1894 INTRO/SHEATH, NON-LASER: HCPCS

## 2018-08-09 PROCEDURE — 94760 N-INVAS EAR/PLS OXIMETRY 1: CPT

## 2018-08-09 PROCEDURE — 99233 SBSQ HOSP IP/OBS HIGH 50: CPT | Performed by: INTERNAL MEDICINE

## 2018-08-09 PROCEDURE — 82962 GLUCOSE BLOOD TEST: CPT

## 2018-08-09 PROCEDURE — 25010000002 LORAZEPAM PER 2 MG: Performed by: INTERNAL MEDICINE

## 2018-08-09 PROCEDURE — 02HV33Z INSERTION OF INFUSION DEVICE INTO SUPERIOR VENA CAVA, PERCUTANEOUS APPROACH: ICD-10-PCS | Performed by: INTERNAL MEDICINE

## 2018-08-09 PROCEDURE — 74176 CT ABD & PELVIS W/O CONTRAST: CPT

## 2018-08-09 RX ORDER — SODIUM CHLORIDE 0.9 % (FLUSH) 0.9 %
10 SYRINGE (ML) INJECTION AS NEEDED
Status: DISCONTINUED | OUTPATIENT
Start: 2018-08-09 | End: 2018-08-25 | Stop reason: HOSPADM

## 2018-08-09 RX ADMIN — VALPROIC ACID 250 MG: 250 SOLUTION ORAL at 12:53

## 2018-08-09 RX ADMIN — LORAZEPAM 2 MG: 2 INJECTION INTRAMUSCULAR; INTRAVENOUS at 22:39

## 2018-08-09 RX ADMIN — TAZOBACTAM SODIUM AND PIPERACILLIN SODIUM 4.5 G: 500; 4 INJECTION, SOLUTION INTRAVENOUS at 13:47

## 2018-08-09 RX ADMIN — LORAZEPAM 2 MG: 2 INJECTION INTRAMUSCULAR; INTRAVENOUS at 03:28

## 2018-08-09 RX ADMIN — ACETAMINOPHEN 650 MG: 325 TABLET, FILM COATED ORAL at 15:50

## 2018-08-09 RX ADMIN — TAZOBACTAM SODIUM AND PIPERACILLIN SODIUM 4.5 G: 500; 4 INJECTION, SOLUTION INTRAVENOUS at 05:32

## 2018-08-09 RX ADMIN — ASPIRIN 81 MG CHEWABLE TABLET 81 MG: 81 TABLET CHEWABLE at 08:35

## 2018-08-09 RX ADMIN — METRONIDAZOLE 500 MG: 500 TABLET ORAL at 05:32

## 2018-08-09 RX ADMIN — ATORVASTATIN CALCIUM 80 MG: 40 TABLET, FILM COATED ORAL at 21:41

## 2018-08-09 RX ADMIN — VALPROIC ACID 250 MG: 250 SOLUTION ORAL at 18:29

## 2018-08-09 RX ADMIN — VALPROIC ACID 250 MG: 250 SOLUTION ORAL at 00:49

## 2018-08-09 RX ADMIN — GLYCOPYRROLATE 1 MG: 1 TABLET ORAL at 21:41

## 2018-08-09 RX ADMIN — TAZOBACTAM SODIUM AND PIPERACILLIN SODIUM 4.5 G: 500; 4 INJECTION, SOLUTION INTRAVENOUS at 21:41

## 2018-08-09 RX ADMIN — DANTROLENE SODIUM 50 MG: 25 CAPSULE ORAL at 09:36

## 2018-08-09 RX ADMIN — ACETAMINOPHEN 650 MG: 325 TABLET, FILM COATED ORAL at 03:28

## 2018-08-09 RX ADMIN — FAMOTIDINE 20 MG: 20 TABLET ORAL at 08:36

## 2018-08-09 RX ADMIN — CLOPIDOGREL BISULFATE 75 MG: 75 TABLET ORAL at 08:36

## 2018-08-09 RX ADMIN — METRONIDAZOLE 500 MG: 500 TABLET ORAL at 15:26

## 2018-08-09 RX ADMIN — DANTROLENE SODIUM 50 MG: 25 CAPSULE ORAL at 03:34

## 2018-08-09 RX ADMIN — GLYCOPYRROLATE 1 MG: 1 TABLET ORAL at 08:35

## 2018-08-09 RX ADMIN — GLYCOPYRROLATE 1 MG: 1 TABLET ORAL at 15:50

## 2018-08-09 RX ADMIN — DANTROLENE SODIUM 50 MG: 25 CAPSULE ORAL at 21:41

## 2018-08-09 RX ADMIN — LEVETIRACETAM 250 MG: 100 SOLUTION ORAL at 21:41

## 2018-08-09 RX ADMIN — ENOXAPARIN SODIUM 40 MG: 100 INJECTION SUBCUTANEOUS at 08:39

## 2018-08-09 RX ADMIN — VALPROIC ACID 250 MG: 250 SOLUTION ORAL at 05:32

## 2018-08-09 RX ADMIN — DANTROLENE SODIUM 50 MG: 25 CAPSULE ORAL at 15:26

## 2018-08-09 RX ADMIN — Medication 1 CAPSULE: at 08:36

## 2018-08-09 RX ADMIN — MICAFUNGIN SODIUM 100 MG: 20 INJECTION, POWDER, LYOPHILIZED, FOR SOLUTION INTRAVENOUS at 12:53

## 2018-08-09 RX ADMIN — POTASSIUM CHLORIDE, DEXTROSE MONOHYDRATE AND SODIUM CHLORIDE 75 ML/HR: 150; 5; 450 INJECTION, SOLUTION INTRAVENOUS at 13:47

## 2018-08-09 RX ADMIN — LACTULOSE 10 G: 10 SOLUTION ORAL at 08:40

## 2018-08-09 RX ADMIN — LEVETIRACETAM 250 MG: 100 SOLUTION ORAL at 08:41

## 2018-08-09 RX ADMIN — Medication 100 MG: at 08:35

## 2018-08-09 RX ADMIN — FAMOTIDINE 20 MG: 20 TABLET ORAL at 21:41

## 2018-08-09 RX ADMIN — METRONIDAZOLE 500 MG: 500 TABLET ORAL at 21:41

## 2018-08-09 RX ADMIN — ACETAMINOPHEN 650 MG: 325 TABLET, FILM COATED ORAL at 22:39

## 2018-08-09 NOTE — PROGRESS NOTES
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 syrup 250 mg, 250 mg, Nasogastric, Q6H, Jani Sutton MD, 250 mg at 08/09/18 1253  Antibiotics:  Anti-Infectives     Ordered     Dose/Rate Route Frequency Start Stop    08/07/18 2311  metroNIDAZOLE (FLAGYL) tablet 500 mg     Ordering Provider:  Rachell Vickers MD    500 mg Nasogastric Every 8 Hours Scheduled 08/08/18 1400 08/14/18 1359    08/07/18 1221  metroNIDAZOLE (FLAGYL) IVPB 500 mg     Chino Rivera, [facility] reviewed the order on 08/07/18 2311.   Ordering Provider:  Rachell Vickers MD    500 mg  over 60 Minutes Intravenous Every 8 Hours 08/07/18 1400 08/08/18 0606    08/06/18  1724  micafungin 100 mg/100 mL 0.9% NS IVPB (mbp)     Rachell Vickers MD let the order  on 18 1611.   Ordering Provider:  Jennifer Edwards MD    100 mg  over 60 Minutes Intravenous Every 24 Hours Scheduled 18 1800 18 1159    18 1436  piperacillin-tazobactam (ZOSYN) 4.5 g in iso-osmotic dextrose 100 mL IVPB (premix)     Ordering Provider:  Dayday Jacobs MD    4.5 g  over 4 Hours Intravenous Every 8 Hours 18 2130 18 2129    18 1436  piperacillin-tazobactam (ZOSYN) 4.5 g in iso-osmotic dextrose 100 mL IVPB (premix)     Ordering Provider:  Dayday Jacobs MD    4.5 g  over 30 Minutes Intravenous Once 18 1530 18 1536    18 1441  vancomycin 1250 mg/250 mL 0.9% NS IVPB ([facility])     Ordering Provider:  Sofia Hardy, RPH    20 mg/kg × 62.8 kg  over 90 Minutes Intravenous Once 18 1530 18 1728    18 1946  vancomycin 1500 mg/500 mL 0.9% NS IVPB ([facility])     Ordering Provider:  Elisabeth Benites APRN    25 mg/kg × 64.9 kg  333.3 mL/hr over 90 Minutes Intravenous Once 18 2100 18 2156    18 0527  piperacillin-tazobactam (ZOSYN) 4.5 g in iso-osmotic dextrose 100 mL IVPB (premix)     Ordering Provider:  Gloria Ochoa APRN    4.5 g  over 30 Minutes Intravenous Once 18 0615 18 0707    18 1653  ceFAZolin in dextrose (ANCEF) IVPB solution 2 g     Ordering Provider:  Segundo Galicia MD    2 g  over 30 Minutes Intravenous Once 18 0930 18 0958    18 1008  ampicillin-sulbactam (UNASYN) 3 g in sodium chloride 0.9 % 100 mL IVPB-MBP     Ordering Provider:  Jani Sutton MD    3 g  over 60 Minutes Intravenous Every 6 Hours Scheduled 18 1100 18 0631    18 1034  vancomycin 1750 mg/500 mL 0.9% NS IVPB ([facility])     Ordering Provider:  Jani Sutton MD    25 mg/kg × 65 kg  over 120 Minutes Intravenous Once 18 1200 18 1630    18 0737   "piperacillin-tazobactam (ZOSYN) 4.5 g in iso-osmotic dextrose 100 mL IVPB (premix)     Ordering Provider:  Ruslan Estrella MD    4.5 g  over 30 Minutes Intravenous Once 07/16/18 0815 07/16/18 0918          Allergies:  has No Known Allergies.      Review of Systems: All other reviewed and negative except as per HPI    /69   Pulse 105   Temp 98.9 °F (37.2 °C) (Core)   Resp 20   Ht 144.8 cm (57.01\")   Wt 65.8 kg (145 lb)   LMP 07/16/2018   SpO2 98%   BMI 31.37 kg/m²   Temp:  [97.6 °F (36.4 °C)-101.9 °F (38.8 °C)] 98.9 °F (37.2 °C)    GENERAL:  Unresponsive, s/p trach and peg  HEENT: Oropharynx without thrush. .. No cervical adenopathy. No neck masses Neck supple  EYES: pupils poorly reactive to light. No conjunctival injection. No icterus.   LYMPHATICS: No lymphadenopathy of the neck or axillary or inguinal regions.   HEART: No murmur, gallop, or pericardial friction rub.   LUNGS:decreased bs at bases. No respiratory distress  ABDOMEN: Soft, nontender, nondistended. PEG site w/o erythema or drainage No appreciable HSM.    SKIN: Warm and dry without cutaneous eruptions.    Neuro:unresponsive; muscle tone stiff  EXT:  No cellulitic change       DIAGNOSTICS:  Lab Results   Component Value Date    WBC 10.33 08/07/2018    HGB 11.1 (L) 08/07/2018    HCT 35.7 08/07/2018     (H) 08/07/2018     Lab Results   Component Value Date    CRP 1.33 (H) 08/08/2018     Lab Results   Component Value Date    SEDRATE 80 (H) 07/25/2018     Lab Results   Component Value Date    GLUCOSE 103 (H) 08/07/2018    BUN 12 08/07/2018    CREATININE 0.55 (L) 08/07/2018    EGFRIFNONA 124 08/07/2018    BCR 21.8 08/07/2018    CO2 25.0 08/07/2018    CALCIUM 9.9 08/07/2018    ALBUMIN 4.06 08/07/2018    AST 86 (H) 08/07/2018    ALT 65 (H) 08/07/2018       Microbiology:  Blood Culture   Date Value Ref Range Status   08/05/2018 No growth at 3 days  Preliminary   08/05/2018 No growth at 3 days  Preliminary              MRSA SCREEN CX "   Date Value Ref Range Status   08/06/2018   Final    No Methicillin Resistant Staphylococcus aureus isolated        Urine Culture   Date Value Ref Range Status   08/06/2018 No growth at 2 days  Final        Lab Results   Component Value Date    BLOODCX No growth at 4 days 08/05/2018    BLOODCX No growth at 4 days 08/05/2018     Lab Results   Component Value Date    URINECX No growth at 2 days 08/06/2018       RADIOLOGY:  Imaging Results (last 72 hours)     Procedure Component Value Units Date/Time    MRI Brain Without Contrast [[id number]] Collected:  08/07/18 1731     Updated:  08/08/18 1345    Narrative:       EXAMINATION: MRI BRAIN WO CONTRAST- 08/07/2018     INDICATION: encephalopathy; R09.2-Respiratory arrest;  G93.40-Encephalopathy, unspecified; R74.8-Abnormal levels of other serum  enzymes; Z74.09-Other reduced mobility      TECHNIQUE: Multiplanar MRI of the brain without intravenous contrast  administration.     COMPARISON: MRI dated 07/25/2018     FINDINGS: No restriction on diffusion-weighted imaging. Midline  structures are symmetric without evidence of mass, mass effect or  midline shift however motion degraded axial T2 and FLAIR imaging  sequences limit evaluation. Small amount of T2 and FLAIR increased  signal abnormalities within the periventricular and deep white matter  consistent with chronic small vessel ischemic disease. Pituitary and  sella within normal limits. Cervicomedullary junction widely patent.  Globes and orbits retain normal T2 signal characteristics. Visualized  paranasal sinuses and mastoid air cells demonstrate mucus retention  cysts and minimal mucosal edema right maxillary sinus otherwise grossly  clear and well-pneumatized. No cerebellopontine angle mass lesion.       Impression:       No acute intracranial abnormality specifically no evidence  for acute infarction or restricted diffusion. Minimal chronic small  vessel ischemic changes of the supratentorial white matter however  may  represent sequela of migraines or demyelinating white matter disease in  a patient of this age, unchanged from prior exam. Mucous retention cyst  and mild mucosal thickening of the right maxillary sinus.         D:  08/07/2018  E:  08/08/2018     This report was finalized on 8/8/2018 1:43 PM by Dr. Fernando Zuluaga.       XR Chest 1 View [[id number]] Collected:  08/07/18 1036     Updated:  08/07/18 1107    Narrative:       EXAMINATION: XR CHEST 1 VW- 08/07/2018     INDICATION: fever, vomiting     TECHNIQUE:  Single view frontal chest.     COMPARISONS: 08/06/2018     FINDINGS:  Lungs are without focal abnormality. No pleural effusion or  pneumothorax. Cardiomediastinal silhouette is within normal limits.  Upper chest surgical clips again noted.       Impression:       Stable exam.     D:  08/07/2018  E:  08/07/2018     This report was finalized on 8/7/2018 11:04 AM by Cassius White.       XR Abdomen KUB [[id number]] Collected:  08/07/18 1038     Updated:  08/07/18 1107    Narrative:       EXAMINATION: XR ABDOMEN KUB-      INDICATION: Fever and vomiting.     TECHNIQUE:  KUB.     COMPARISONS:  08/04/2018.     FINDINGS:  Moderate stool burden. No evidence of bowel obstruction. No  acutely concerning calcifications. Gastrostomy tube in place.       Impression:       No acute finding.     D:  08/07/2018  E:  08/07/2018     This report was finalized on 8/7/2018 11:04 AM by Cassius White.       XR Chest 1 View [[id number]] Collected:  08/06/18 0907     Updated:  08/06/18 0946    Narrative:       EXAMINATION: XR CHEST 1 VW-08/06/2018:      INDICATION: Fever, suspect aspiration (trach/peg); R09.2-Respiratory  arrest; G93.40-Encephalopathy, unspecified; R74.8-Abnormal levels of  other serum enzymes.      COMPARISON: 08/04/2018.     FINDINGS: The cardiac silhouette is normal. A tracheostomy tube is well  positioned. There is no acute inflammatory process. There is no mass or  effusion.           Impression:       No active disease.      D:  08/06/2018  E:  08/06/2018     This report was finalized on 8/6/2018 9:44 AM by Dr. Eric Luis MD.       CT Angiogram Chest With & Without Contrast [[id number]] Collected:  08/05/18 1559     Updated:  08/05/18 1956    Narrative:       EXAM:  CT Angiography Chest With Intravenous Contrast    CLINICAL HISTORY:  37 years old, female; Encephalopathy, unspecified; Respiratory arrest; Abnormal   levels of other serum enzymes; Signs and symptoms; Cough and shortness of   breath; Additional info: Tachycardia, elevated d-dimer, tracheostomy, concern   for aspiration as well    TECHNIQUE:  Axial computed tomographic angiography images of the chest with intravenous   contrast using pulmonary embolism protocol.  All CT scans at this facility use   at least one of these dose optimization techniques: automated exposure control;   mA and/or kV adjustment per patient size (includes targeted exams where dose is   matched to clinical indication); or iterative reconstruction.  MIP reconstructed images were created and reviewed.    CONTRAST:  150 mL of isovue 370 administered intravenously.      COMPARISON:  CR - XR CHEST 1 VW 2018-08-04 10:12    FINDINGS:  Pulmonary arteries:  No pulmonary embolism.  Aorta:  No acute findings.  Lungs:  Small cluster of tree in bud nodules within the posterior medial aspect   of the superior segment right lower lobe, likely minimal bronchopneumonia or   aspiration pneumonitis.  Mild upper lobe predominant paraseptal and   centrilobular emphysema.  Pleural space:  Small bilateral pleural effusions, with associated posterior   atelectasis.  No pneumothorax.  Heart:  Normal.  Bones/joints:  No acute fracture.  No dislocation.  Soft tissues:  Normal.  Lymph nodes:  Calcified right hilar lymph nodes, compatible with prior   granulomatous disease.  Spleen:  Splenic calcification, compatible with prior granulomatous disease.  Tubes, lines and devices:  Tracheostomy tube tip in midthoracic trachea.       Impression:         1.  No pulmonary embolism.    2.  Small cluster of tree in bud nodules within the posterior medial aspect of   the superior segment right lower lobe, likely minimal bronchopneumonia or   aspiration pneumonitis.    3.  Incidental/non-acute findings are described above.    THIS DOCUMENT HAS BEEN ELECTRONICALLY SIGNED BY MERCY JERONIMO MD             ASSESSMENT     --Intermittent fever- improved  Aspiration related to vomiting is a possible etiology  The purulent trach secretions yet strep as the  only potential pathogen from culture suggests possible anaerobic infection.  R/O other nosomial infection such as uti  The elevated CPK and possible response to dantrolene also suggest malignant hyperthermia     -- Suspected anoxic encephalopathy     -- Vomiting, etiology unclear  ?pancreatitis- lipase modestly elevated on 8/6 and improved today  She is being treated for constipation     -- Polysubstance abuse--Hepatitis C infection with + RNA pcr     -- Seizure disorder     -- elevated cpk w/o obvious explanation raising ? Of malignant hyperthermia     PLAN     -- Agree with zosyn     -- add flagyl     -- recheck respiratory culture     --agree with course of dantrolene- will defer to Dr Edwards re: continuing it     --probiotic     -- recheck lipase     -- hold cooling blanket for T < 102 to more accurately follow fever         I discussed the patients findings and my recommendations with RN     Thank you for this consult.  Our group would be pleased to follow this patient over the course of their hospitalization and assist with outpatient antimicrobial therapy, as indicated.        Rachell Vickers MD  8/9/2018  5:14 PM

## 2018-08-09 NOTE — PROGRESS NOTES
Continued Stay Note  [facility]     Patient Name: Otilia Holm  MRN: [id number]  Today's Date: 8/9/2018    Admit Date: 7/16/2018          Discharge Plan     Row Name 08/09/18 1133       Plan    Plan Comments Spoke with [facility] Liaison late yesterday afternoon to discuss appropriateness for [facility] rehab program.  [facility] to review medical record.  Discussed with Dr. Edwards.  Goldie Salmon, Ext. [phone]     Final Discharge Disposition Code 30 - still a patient              Discharge Codes    No documentation.       Expected Discharge Date and Time     Expected Discharge Date Expected Discharge Time    Aug 10, 2018             THEO Harrell

## 2018-08-09 NOTE — PLAN OF CARE
Problem: Patient Care Overview  Goal: Plan of Care Review  Outcome: Ongoing (interventions implemented as appropriate)   08/09/18 0408   Coping/Psychosocial   Plan of Care Reviewed With patient   Plan of Care Review   Progress no change   OTHER   Outcome Summary No change in pt condition. Febrile all through the night. GCS 3. PICC placement today. Will continue to monitor.     Goal: Individualization and Mutuality  Outcome: Ongoing (interventions implemented as appropriate)    Goal: Discharge Needs Assessment  Outcome: Ongoing (interventions implemented as appropriate)

## 2018-08-10 LAB
BACTERIA SPEC AEROBE CULT: NORMAL
BACTERIA SPEC AEROBE CULT: NORMAL
GLUCOSE BLDC GLUCOMTR-MCNC: 101 MG/DL (ref 70–130)
GLUCOSE BLDC GLUCOMTR-MCNC: 111 MG/DL (ref 70–130)
GLUCOSE BLDC GLUCOMTR-MCNC: 123 MG/DL (ref 70–130)
GLUCOSE BLDC GLUCOMTR-MCNC: 130 MG/DL (ref 70–130)

## 2018-08-10 PROCEDURE — 25010000002 ENOXAPARIN PER 10 MG: Performed by: INTERNAL MEDICINE

## 2018-08-10 PROCEDURE — 82962 GLUCOSE BLOOD TEST: CPT

## 2018-08-10 PROCEDURE — 25010000002 LORAZEPAM PER 2 MG: Performed by: INTERNAL MEDICINE

## 2018-08-10 PROCEDURE — 99232 SBSQ HOSP IP/OBS MODERATE 35: CPT | Performed by: HOSPITALIST

## 2018-08-10 PROCEDURE — 87205 SMEAR GRAM STAIN: CPT | Performed by: INTERNAL MEDICINE

## 2018-08-10 PROCEDURE — 97530 THERAPEUTIC ACTIVITIES: CPT

## 2018-08-10 PROCEDURE — 97110 THERAPEUTIC EXERCISES: CPT

## 2018-08-10 PROCEDURE — 87070 CULTURE OTHR SPECIMN AEROBIC: CPT | Performed by: INTERNAL MEDICINE

## 2018-08-10 PROCEDURE — 25010000002 PIPERACILLIN SOD-TAZOBACTAM PER 1 G: Performed by: INTERNAL MEDICINE

## 2018-08-10 RX ADMIN — TAZOBACTAM SODIUM AND PIPERACILLIN SODIUM 4.5 G: 500; 4 INJECTION, SOLUTION INTRAVENOUS at 20:40

## 2018-08-10 RX ADMIN — DANTROLENE SODIUM 50 MG: 25 CAPSULE ORAL at 03:42

## 2018-08-10 RX ADMIN — FAMOTIDINE 20 MG: 20 TABLET ORAL at 20:40

## 2018-08-10 RX ADMIN — ACETAMINOPHEN 650 MG: 325 TABLET, FILM COATED ORAL at 20:40

## 2018-08-10 RX ADMIN — GLYCOPYRROLATE 1 MG: 1 TABLET ORAL at 08:50

## 2018-08-10 RX ADMIN — VALPROIC ACID 250 MG: 250 SOLUTION ORAL at 05:20

## 2018-08-10 RX ADMIN — LORAZEPAM 2 MG: 2 INJECTION INTRAMUSCULAR; INTRAVENOUS at 16:56

## 2018-08-10 RX ADMIN — ATORVASTATIN CALCIUM 80 MG: 40 TABLET, FILM COATED ORAL at 20:40

## 2018-08-10 RX ADMIN — ASPIRIN 81 MG CHEWABLE TABLET 81 MG: 81 TABLET CHEWABLE at 08:50

## 2018-08-10 RX ADMIN — LORAZEPAM 2 MG: 2 INJECTION INTRAMUSCULAR; INTRAVENOUS at 20:41

## 2018-08-10 RX ADMIN — ACETAMINOPHEN 650 MG: 325 TABLET, FILM COATED ORAL at 23:25

## 2018-08-10 RX ADMIN — LORAZEPAM 2 MG: 2 INJECTION INTRAMUSCULAR; INTRAVENOUS at 11:52

## 2018-08-10 RX ADMIN — LORAZEPAM 2 MG: 2 INJECTION INTRAMUSCULAR; INTRAVENOUS at 23:25

## 2018-08-10 RX ADMIN — LACTULOSE 10 G: 10 SOLUTION ORAL at 08:49

## 2018-08-10 RX ADMIN — TAZOBACTAM SODIUM AND PIPERACILLIN SODIUM 4.5 G: 500; 4 INJECTION, SOLUTION INTRAVENOUS at 13:23

## 2018-08-10 RX ADMIN — VALPROIC ACID 250 MG: 250 SOLUTION ORAL at 23:25

## 2018-08-10 RX ADMIN — GLYCOPYRROLATE 1 MG: 1 TABLET ORAL at 20:40

## 2018-08-10 RX ADMIN — TAZOBACTAM SODIUM AND PIPERACILLIN SODIUM 4.5 G: 500; 4 INJECTION, SOLUTION INTRAVENOUS at 05:20

## 2018-08-10 RX ADMIN — METRONIDAZOLE 500 MG: 500 TABLET ORAL at 13:23

## 2018-08-10 RX ADMIN — VALPROIC ACID 250 MG: 250 SOLUTION ORAL at 17:56

## 2018-08-10 RX ADMIN — CLOPIDOGREL BISULFATE 75 MG: 75 TABLET ORAL at 08:50

## 2018-08-10 RX ADMIN — MICAFUNGIN SODIUM 100 MG: 20 INJECTION, POWDER, LYOPHILIZED, FOR SOLUTION INTRAVENOUS at 11:52

## 2018-08-10 RX ADMIN — Medication 1 CAPSULE: at 08:50

## 2018-08-10 RX ADMIN — METRONIDAZOLE 500 MG: 500 TABLET ORAL at 20:40

## 2018-08-10 RX ADMIN — VALPROIC ACID 250 MG: 250 SOLUTION ORAL at 11:52

## 2018-08-10 RX ADMIN — LEVETIRACETAM 250 MG: 100 SOLUTION ORAL at 08:50

## 2018-08-10 RX ADMIN — LORAZEPAM 2 MG: 2 INJECTION INTRAMUSCULAR; INTRAVENOUS at 08:49

## 2018-08-10 RX ADMIN — ACETAMINOPHEN 650 MG: 650 SUPPOSITORY RECTAL at 08:50

## 2018-08-10 RX ADMIN — FAMOTIDINE 20 MG: 20 TABLET ORAL at 08:50

## 2018-08-10 RX ADMIN — ENOXAPARIN SODIUM 40 MG: 100 INJECTION SUBCUTANEOUS at 08:50

## 2018-08-10 RX ADMIN — POTASSIUM CHLORIDE, DEXTROSE MONOHYDRATE AND SODIUM CHLORIDE 75 ML/HR: 150; 5; 450 INJECTION, SOLUTION INTRAVENOUS at 11:52

## 2018-08-10 RX ADMIN — LEVETIRACETAM 250 MG: 100 SOLUTION ORAL at 20:40

## 2018-08-10 RX ADMIN — Medication 100 MG: at 08:50

## 2018-08-10 RX ADMIN — VALPROIC ACID 250 MG: 250 SOLUTION ORAL at 00:05

## 2018-08-10 RX ADMIN — METRONIDAZOLE 500 MG: 500 TABLET ORAL at 05:20

## 2018-08-10 NOTE — PLAN OF CARE
Problem: Patient Care Overview  Goal: Plan of Care Review  Outcome: Ongoing (interventions implemented as appropriate)   08/10/18 1600 08/10/18 1720   Coping/Psychosocial   Plan of Care Reviewed With patient --    Plan of Care Review   Progress --  improving   OTHER   Outcome Summary --  Pt remains at baseline non-verbal state. She has had decreased need for suctioning today. Fever has been adequately controlled today. VSS. Will continue to monitor.       Problem: Skin Injury Risk (Adult)  Goal: Skin Health and Integrity  Outcome: Ongoing (interventions implemented as appropriate)      Problem: Cardiac Output Decreased (Adult)  Goal: Effective Tissue Perfusion  Outcome: Ongoing (interventions implemented as appropriate)      Problem: Nutrition, Enteral (Adult)  Goal: Signs and Symptoms of Listed Potential Problems Will be Absent, Minimized or Managed (Nutrition, Enteral)  Outcome: Ongoing (interventions implemented as appropriate)

## 2018-08-10 NOTE — PROGRESS NOTES
"Adult Nutrition  Assessment/PES    Patient Name:  Otilia Holm  YOB: 1980  MRN: [id number]  Admit Date:  7/16/2018    Assessment Date:  8/10/2018    Comments:            Reason for Assessment     Row Name 08/10/18 1237          Reason for Assessment    Reason For Assessment follow-up protocol   30\"     Diagnosis --   SEE PREVIOUS NUTRITION NOTES. FEVER IMPROVING, CHRONIC HEP C             Nutrition/Diet History     Row Name 08/10/18 1238          Nutrition/Diet History    Typical Food/Fluid Intake NURSING REPORTS NO RESIDUAL & TOLERATING TF @ 30 ML/HR. SHE WAS ASKING HOW TO TITRATE TF BACK UP TO GOAL.             Anthropometrics     Row Name 08/10/18 0614          Anthropometrics    Weight 65.9 kg (145 lb 3.2 oz)             Labs/Tests/Procedures/Meds     Row Name 08/10/18 1238          Labs/Procedures/Meds    Lab Results Reviewed reviewed        Medications    Pertinent Medications Reviewed reviewed     Pertinent Medications Comments LACTULOSE             Physical Findings     Row Name 08/10/18 1239          Physical Findings    Gastrointestinal other (see comments)   WDL PER NURSING DOCUMENTATION     Skin other (see comments)   SKIN TEAR PER NURSING DOCUMENTATION             Estimated/Assessed Needs     Row Name 08/10/18 1241          Calorie Requirements    Estimated Calorie Requirement Comment [id number]        Protein Requirements    Weight Used For Protein Calculations --   75-94        Fluid Requirements    Estimated Fluid Requirements (mL/day) 1974     RDA Method (mL) 1974             Nutrition Prescription Ordered     Row Name 08/10/18 1241          Nutrition Prescription PO    Current PO Diet NPO        Nutrition Prescription EN    Enteral Route PEG     Product Replete with Fiber     TF Delivery Method Continuous     Continuous TF Goal Rate (mL/hr) 70 mL/hr     Continuous TF Current Rate (mL/hr) 30 mL/hr     Continuous TF Goal Volume (mL) 1320 mL     Water flush (mL)  20 mL     Water " Flush Frequency Per hour             Evaluation of Received Nutrient/Fluid Intake     Row Name 08/10/18 1242          Nutrient/Fluid Evaluation    Number of Days Evaluated 1 day        Calories Evaluation    Enteral Calories (kcal) 482        Protein Evaluation    Enteral Protein (gm) 30        Fluid Intake Evaluation    Enteral (Free Water) Fluid (mL) 405     Free Water Flush Fluid (mL) 131     Total Free Water Intake (mL) 536 mL        Fiber Intake Evaluation    Fiber 7 G             Evaluation of Prescribed Nutrient/Fluid Intake     Row Name 08/10/18 1244          Calories at Prescribed Goal    Enteral Calories (kcal) 1540   BASED ON 22 HR/DAY DELIVERY     Total Calories (kcal) 1540        Protein at Prescribed Goal    Enteral Protein (gm) 96   BASED ON 22 HR/DAY DELIVERY     Total Protein (gm) 96        Fluid at Prescribed Goal    Enteral  Fluid (mL) 1294   BASED ON 22 HR/DAY DELIVERY     Free Water Flush Fluid (mL) 440   BASED ON 22 HR/DAY DELIVERY     Total Fluid Intake (mL) 1734        Fiber at Prescribed Goal    Fiber 22   BASED ON 22 HR/DAY DELIVERY        Recommended Daily Intake at Prescribed Goal    RDI Met           Problem/Interventions:          Problem 2     Row Name 08/10/18 1247          Nutrition Diagnoses Problem 2    Problem 2 Inadequate Intake/Infusion   POSSIBLE     Inadequate Intake Type Enteral     Etiology (related to) Other (comment)   TF TOLERANCE     Signs/Symptoms (evidenced by) Other (comment)   TF NOT @ GOAL RATE AFTER HOLDING FOR TF INTOLERANCE.             Problem 3     Row Name 08/10/18 1248          Nutrition Diagnoses Problem 3    Problem 3 Needs Alternate Composition     Macronutrient Kcal;Protein     Etiology (related to) Other (comment)   FORMULA NEEDS RE-EST IN VIEW OF SKIN INTEGRITY NOT AS SEVERE AS INITIALLY DOCUMENTED     Signs/Symptoms (evidenced by) Other (comment)   PT DOES NOT REQUIRE AS HIGH A PROTEIN FORMULA AS IS CURRENTLY ON.                  Intervention Goal      Row Name 08/10/18 1250          Intervention Goal    General Nutrition support treatment     TF/PN Adjust TF/PN             Nutrition Intervention     Row Name 08/10/18 1250          Nutrition Intervention    RD/Tech Action Recommend/ordered;Follow Tx progress;Care plan reviewd     Recommended/Ordered EN             Nutrition Prescription     Row Name 08/10/18 1250          Nutrition Prescription EN    Enteral Prescription Enteral begin/change     Enteral Route PEG     Product Fibersource HN     TF Delivery Method Continuous     Continuous TF Goal Rate (mL/hr) 65 mL/hr     Continuous TF Starting Rate (mL/hr) 55 mL/hr     Continuous TF Goal Volume (mL) 1430 mL     Water flush (mL)  20 mL     Water Flush Frequency Per hour     New EN Prescription Ordered? Yes        EN to Supply    Kcal/Day 1716 Kcal/Day   BASED ON 22 HR/DAY DELIVERY     Protein (gm/day) 77 gm/day   BASED ON 22 HR/DAY DELIVERY     TF Free H2O (mL) 1158 mL   BASED ON 22 HR/DAY DELIVERY     Total Free H2O (mL/day) 1598 mL/day   BASED ON 22 HR/DAY DELIVERY     Fiber Per Day (gm/day) 21 gm/day   BASED ON 22 HR/DAY DELIVERY             Education/Evaluation     Row Name 08/10/18 1252          Monitor/Evaluation    Monitor Per protocol;I&O;Pertinent labs;TF delivery/tolerance;Weight;Skin status;Symptoms         Electronically signed by:  Precious Castro RD  08/10/18 12:53 PM
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
## 2018-08-10 NOTE — PROGRESS NOTES
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 08/10/18 1152  Antibiotics:  Anti-Infectives     Ordered     Dose/Rate Route Frequency Start Stop    08/07/18 2311  metroNIDAZOLE (FLAGYL) tablet 500 mg     Ordering Provider:  Rachell Vickers MD    500 mg Nasogastric Every 8 Hours Scheduled 08/08/18 1400 08/14/18 1359    08/07/18 1221  metroNIDAZOLE (FLAGYL) IVPB 500 mg     Chino Rivera, [facility] reviewed the order on 08/07/18 2311.   Ordering Provider:  Rachell Vickers MD    500 mg  over 60 Minutes Intravenous Every 8 Hours 08/07/18 1400 08/08/18 0606    08/06/18 1724  micafungin 100 mg/100 mL 0.9% NS IVPB (mbp)     Rachell Vickers MD reviewed the order on 08/09/18 1611.   Ordering Provider:  Jennifer Edwards MD    100 mg  over 60 Minutes Intravenous Every 24 Hours Scheduled 08/06/18 1800 08/10/18 1252    08/05/18 1436  piperacillin-tazobactam (ZOSYN) 4.5 g in  iso-osmotic dextrose 100 mL IVPB (premix)     Ordering Provider:  Dayday Jacobs MD    4.5 g  over 4 Hours Intravenous Every 8 Hours 08/05/18 2130 08/12/18 2129    08/05/18 1436  piperacillin-tazobactam (ZOSYN) 4.5 g in iso-osmotic dextrose 100 mL IVPB (premix)     Ordering Provider:  Dayday Jacobs MD    4.5 g  over 30 Minutes Intravenous Once 08/05/18 1530 08/05/18 1536    08/05/18 1441  vancomycin 1250 mg/250 mL 0.9% NS IVPB ([facility])     Ordering Provider:  Sofia Hardy, RPH    20 mg/kg × 62.8 kg  over 90 Minutes Intravenous Once 08/05/18 1530 08/05/18 1728    07/29/18 1946  vancomycin 1500 mg/500 mL 0.9% NS IVPB ([facility])     Ordering Provider:  Elisabeth Benites APRN    25 mg/kg × 64.9 kg  333.3 mL/hr over 90 Minutes Intravenous Once 07/29/18 2100 07/29/18 2156    07/29/18 0527  piperacillin-tazobactam (ZOSYN) 4.5 g in iso-osmotic dextrose 100 mL IVPB (premix)     Ordering Provider:  Gloria Ochoa APRN    4.5 g  over 30 Minutes Intravenous Once 07/29/18 0615 07/29/18 0707    07/25/18 1653  ceFAZolin in dextrose (ANCEF) IVPB solution 2 g     Ordering Provider:  Segundo Galicia MD    2 g  over 30 Minutes Intravenous Once 07/26/18 0930 07/26/18 0958    07/17/18 1008  ampicillin-sulbactam (UNASYN) 3 g in sodium chloride 0.9 % 100 mL IVPB-MBP     Ordering Provider:  Jani Sutton MD    3 g  over 60 Minutes Intravenous Every 6 Hours Scheduled 07/17/18 1100 07/23/18 0631    07/16/18 1034  vancomycin 1750 mg/500 mL 0.9% NS IVPB ([facility])     Ordering Provider:  Jani Sutton MD    25 mg/kg × 65 kg  over 120 Minutes Intravenous Once 07/16/18 1200 07/16/18 1630    07/16/18 0737  piperacillin-tazobactam (ZOSYN) 4.5 g in iso-osmotic dextrose 100 mL IVPB (premix)     Ordering Provider:  Ruslan Estrella MD    4.5 g  over 30 Minutes Intravenous Once 07/16/18 0815 07/16/18 0918          Allergies:  has No Known Allergies.      Review of Systems: All other reviewed and negative except as  "per HPI    /72 (BP Location: Left arm, Patient Position: Lying)   Pulse 78   Temp 99.5 °F (37.5 °C) (Core)   Resp 22   Ht 144.8 cm (57.01\")   Wt 65.9 kg (145 lb 3.2 oz)   LMP 07/16/2018   SpO2 100%   BMI 31.41 kg/m²   Temp:  [98.9 °F (37.2 °C)-101 °F (38.3 °C)] 99.5 °F (37.5 °C)    GENERAL:  Unresponsive, s/p trach and peg  HEENT: Oropharynx without thrush. .. No cervical adenopathy. No neck masses Neck supple  EYES: pupils poorly reactive to light. No conjunctival injection. No icterus.   LYMPHATICS: No lymphadenopathy of the neck or axillary or inguinal regions.   HEART: No murmur, gallop, or pericardial friction rub.   LUNGS:decreased bs at bases. No respiratory distress  ABDOMEN: Soft, nontender, nondistended. PEG site w/o erythema or drainage No appreciable HSM.    SKIN: Warm and dry without cutaneous eruptions.    Neuro:unresponsive; muscle tone stiff  EXT:  No cellulitic change       DIAGNOSTICS:  Lab Results   Component Value Date    WBC 10.33 08/07/2018    HGB 11.1 (L) 08/07/2018    HCT 35.7 08/07/2018     (H) 08/07/2018     Lab Results   Component Value Date    CRP 1.33 (H) 08/08/2018     Lab Results   Component Value Date    SEDRATE 80 (H) 07/25/2018     Lab Results   Component Value Date    GLUCOSE 103 (H) 08/07/2018    BUN 12 08/07/2018    CREATININE 0.55 (L) 08/07/2018    EGFRIFNONA 124 08/07/2018    BCR 21.8 08/07/2018    CO2 25.0 08/07/2018    CALCIUM 9.9 08/07/2018    ALBUMIN 4.06 08/07/2018    AST 86 (H) 08/07/2018    ALT 65 (H) 08/07/2018       Microbiology:  Blood Culture   Date Value Ref Range Status   08/05/2018 No growth at 3 days  Preliminary   08/05/2018 No growth at 3 days  Preliminary              MRSA SCREEN CX   Date Value Ref Range Status   08/06/2018   Final    No Methicillin Resistant Staphylococcus aureus isolated        Urine Culture   Date Value Ref Range Status   08/06/2018 No growth at 2 days  Final        Lab Results   Component Value Date    BLOODCX No " growth at 4 days 08/05/2018    BLOODCX No growth at 4 days 08/05/2018     Lab Results   Component Value Date    URINECX No growth at 2 days 08/06/2018       RADIOLOGY:  Imaging Results (last 72 hours)     Procedure Component Value Units Date/Time    MRI Brain Without Contrast [[id number]] Collected:  08/07/18 1731     Updated:  08/08/18 1345    Narrative:       EXAMINATION: MRI BRAIN WO CONTRAST- 08/07/2018     INDICATION: encephalopathy; R09.2-Respiratory arrest;  G93.40-Encephalopathy, unspecified; R74.8-Abnormal levels of other serum  enzymes; Z74.09-Other reduced mobility      TECHNIQUE: Multiplanar MRI of the brain without intravenous contrast  administration.     COMPARISON: MRI dated 07/25/2018     FINDINGS: No restriction on diffusion-weighted imaging. Midline  structures are symmetric without evidence of mass, mass effect or  midline shift however motion degraded axial T2 and FLAIR imaging  sequences limit evaluation. Small amount of T2 and FLAIR increased  signal abnormalities within the periventricular and deep white matter  consistent with chronic small vessel ischemic disease. Pituitary and  sella within normal limits. Cervicomedullary junction widely patent.  Globes and orbits retain normal T2 signal characteristics. Visualized  paranasal sinuses and mastoid air cells demonstrate mucus retention  cysts and minimal mucosal edema right maxillary sinus otherwise grossly  clear and well-pneumatized. No cerebellopontine angle mass lesion.       Impression:       No acute intracranial abnormality specifically no evidence  for acute infarction or restricted diffusion. Minimal chronic small  vessel ischemic changes of the supratentorial white matter however may  represent sequela of migraines or demyelinating white matter disease in  a patient of this age, unchanged from prior exam. Mucous retention cyst  and mild mucosal thickening of the right maxillary sinus.         D:  08/07/2018  E:  08/08/2018     This  report was finalized on 8/8/2018 1:43 PM by Dr. Fernando Zuluaga.       XR Chest 1 View [[id number]] Collected:  08/07/18 1036     Updated:  08/07/18 1107    Narrative:       EXAMINATION: XR CHEST 1 VW- 08/07/2018     INDICATION: fever, vomiting     TECHNIQUE:  Single view frontal chest.     COMPARISONS: 08/06/2018     FINDINGS:  Lungs are without focal abnormality. No pleural effusion or  pneumothorax. Cardiomediastinal silhouette is within normal limits.  Upper chest surgical clips again noted.       Impression:       Stable exam.     D:  08/07/2018  E:  08/07/2018     This report was finalized on 8/7/2018 11:04 AM by Cassius White.       XR Abdomen KUB [[id number]] Collected:  08/07/18 1038     Updated:  08/07/18 1107    Narrative:       EXAMINATION: XR ABDOMEN KUB-      INDICATION: Fever and vomiting.     TECHNIQUE:  KUB.     COMPARISONS:  08/04/2018.     FINDINGS:  Moderate stool burden. No evidence of bowel obstruction. No  acutely concerning calcifications. Gastrostomy tube in place.       Impression:       No acute finding.     D:  08/07/2018  E:  08/07/2018     This report was finalized on 8/7/2018 11:04 AM by Cassius White.       XR Chest 1 View [[id number]] Collected:  08/06/18 0907     Updated:  08/06/18 0946    Narrative:       EXAMINATION: XR CHEST 1 VW-08/06/2018:      INDICATION: Fever, suspect aspiration (trach/peg); R09.2-Respiratory  arrest; G93.40-Encephalopathy, unspecified; R74.8-Abnormal levels of  other serum enzymes.      COMPARISON: 08/04/2018.     FINDINGS: The cardiac silhouette is normal. A tracheostomy tube is well  positioned. There is no acute inflammatory process. There is no mass or  effusion.           Impression:       No active disease.     D:  08/06/2018  E:  08/06/2018     This report was finalized on 8/6/2018 9:44 AM by Dr. Eric Luis MD.       CT Angiogram Chest With & Without Contrast [[id number]] Collected:  08/05/18 1559     Updated:  08/05/18 1956    Narrative:       EXAM:  CT  Angiography Chest With Intravenous Contrast    CLINICAL HISTORY:  37 years old, female; Encephalopathy, unspecified; Respiratory arrest; Abnormal   levels of other serum enzymes; Signs and symptoms; Cough and shortness of   breath; Additional info: Tachycardia, elevated d-dimer, tracheostomy, concern   for aspiration as well    TECHNIQUE:  Axial computed tomographic angiography images of the chest with intravenous   contrast using pulmonary embolism protocol.  All CT scans at this facility use   at least one of these dose optimization techniques: automated exposure control;   mA and/or kV adjustment per patient size (includes targeted exams where dose is   matched to clinical indication); or iterative reconstruction.  MIP reconstructed images were created and reviewed.    CONTRAST:  150 mL of isovue 370 administered intravenously.      COMPARISON:  CR - XR CHEST 1 VW 2018-08-04 10:12    FINDINGS:  Pulmonary arteries:  No pulmonary embolism.  Aorta:  No acute findings.  Lungs:  Small cluster of tree in bud nodules within the posterior medial aspect   of the superior segment right lower lobe, likely minimal bronchopneumonia or   aspiration pneumonitis.  Mild upper lobe predominant paraseptal and   centrilobular emphysema.  Pleural space:  Small bilateral pleural effusions, with associated posterior   atelectasis.  No pneumothorax.  Heart:  Normal.  Bones/joints:  No acute fracture.  No dislocation.  Soft tissues:  Normal.  Lymph nodes:  Calcified right hilar lymph nodes, compatible with prior   granulomatous disease.  Spleen:  Splenic calcification, compatible with prior granulomatous disease.  Tubes, lines and devices:  Tracheostomy tube tip in midthoracic trachea.      Impression:         1.  No pulmonary embolism.    2.  Small cluster of tree in bud nodules within the posterior medial aspect of   the superior segment right lower lobe, likely minimal bronchopneumonia or   aspiration pneumonitis.    3.   Incidental/non-acute findings are described above.    THIS DOCUMENT HAS BEEN ELECTRONICALLY SIGNED BY MERCY JERONIMO MD             ASSESSMENT     --Intermittent fever- improved  Aspiration related to vomiting is a possible etiology  The purulent trach secretions yet strep as the  only potential pathogen from culture suggests possible anaerobic infection.  R/O other nosomial infection such as uti  The elevated CPK and possible response to dantrolene also suggest malignant hyperthermia     -- Suspected anoxic encephalopathy     -- Vomiting, etiology unclear  ?pancreatitis- lipase modestly elevated on 8/6 and improved today  She is being treated for constipation     -- Polysubstance abuse--Hepatitis C infection with + RNA pcr     -- Seizure disorder     -- elevated cpk w/o obvious explanation raising ? Of malignant hyperthermia     PLAN     -- Agree with zosyn     -- add flagyl     -- recheck respiratory culture          --probiotic     -- recheck lipase     -- hold cooling blanket for T < 102 to more accurately follow fever         I discussed the patients findings and my recommendations with RN     Thank you for this consult.  Our group would be pleased to follow this patient over the course of their hospitalization and assist with outpatient antimicrobial therapy, as indicated.        Rachell Vickers MD  8/10/2018  2:31 PM

## 2018-08-10 NOTE — PROGRESS NOTES
Continued Stay Note  [facility]     Patient Name: Otilia Holm  MRN: [id number]  Today's Date: 8/10/2018    Admit Date: 7/16/2018          Discharge Plan     Row Name 08/10/18 1347       Plan    Plan LTC    Plan Comments [facility] has declined to accept, do not feel patient with ability to participate in acute level of rehab at this point.  Willing to reassess should patient show improvement.  [facility]--they prefer to take trachs 30 days postop, however will discuss with facility director of nursing; would also need to decrease daily suctioning from 3x daily down to 2x.  Still with fever, plan to reassess with [facility] early next week.  Goldie Salmon, Ext. [phone]      Final Discharge Disposition Code 30 - still a patient              Discharge Codes    No documentation.       Expected Discharge Date and Time     Expected Discharge Date Expected Discharge Time    Aug 17, 2018             THEO Harrell

## 2018-08-10 NOTE — PROGRESS NOTES
[facility] Medicine Services  PROGRESS NOTE    Patient Name: Otilia Holm  : 1980  MRN: [id number]    Date of Admission: 2018  Length of Stay: 24  Primary Care Physician: Provider, No Known    Subjective   Subjective     CC:  Unresponsive with fever    HPI:  Patient was given dantrolene now for three days and is still febrile- less tachycardic continues to be more alert She vomited again when tube feeds were restarted but finally had a BM after an enema    Review of Systems  Unable to obtain due to encephalopathy    Objective   Objective     Vital Signs:   Temp:  [97.6 °F (36.4 °C)-101.9 °F (38.8 °C)] 98.9 °F (37.2 °C)  Heart Rate:  [] 81  Resp:  [18-20] 20  BP: (127-149)/() 127/69        Physical Exam:  Patient is opening her eyes today! More purposeful movement-   Neck is without mass or JVD  Skin warm  Heart is Reg wo murmur   Lungs are clear wo wheeze or crackle  Abd is soft without HSM or mass, not tender or distended peg intact  Skin is without rash  Neurologic exam in nonfocal - some hand and foot contractures  Increased tone in her arms more than her legs.    Results Reviewed:  I have personally reviewed current lab, radiology, and data and agree.      Results from last 7 days  Lab Units 18  0532 18  0410 18  0535   WBC 10*3/mm3 10.33 13.96* 10.87*   HEMOGLOBIN g/dL 11.1* 10.4* 10.4*   HEMATOCRIT % 35.7 33.3* 33.3*   PLATELETS 10*3/mm3 563* 611* 625*       Results from last 7 days  Lab Units 18  0532 18  0410 18  1437 18  0535 18  0904   SODIUM mmol/L 144 144  --  143 143   POTASSIUM mmol/L 4.3 3.8  --  4.0 4.0   CHLORIDE mmol/L 111* 109  --  104 105   CO2 mmol/L 25.0 26.0  --  30.0 27.0   BUN mg/dL 12 13  --  16 11   CREATININE mg/dL 0.55* 0.61  --  0.60 0.58*   GLUCOSE mg/dL 103* 128*  --  112* 108*   CALCIUM mg/dL 9.9 9.7  --  9.7 10.1   ALT (SGPT) U/L 65* 69*  --   --  81*   AST (SGOT) U/L 86* 80*  --    --  100*   TROPONIN I ng/mL  --   --  0.039  --   --          Imaging Results (last 24 hours)     Procedure Component Value Units Date/Time    CT Abdomen Pelvis Without Contrast [[id number]] Collected:  08/09/18 1032     Updated:  08/09/18 1335    Narrative:       EXAMINATION: CT ABDOMEN AND PELVIS WO CONTRAST-08/09/2018:      INDICATION: Bowel obstruction, low-grade; R09.2-Respiratory arrest;  G93.40-Encephalopathy, unspecified; R74.8-Abnormal levels of other serum  enzymes; Z74.09-Other reduced mobility; Z74.09-Other reduced mobility.      TECHNIQUE: CT abdomen and pelvis without intravenous contrast  administration.     The radiation dose reduction device was turned on for each scan per the  ALARA (As Low as Reasonably Achievable) protocol.     COMPARISON: NONE.     FINDINGS: The lung bases demonstrate moderate subsegmental dependent  atelectasis without focal consolidation. Liver without focal lesion.  Gallbladder distended and grossly unremarkable without focal thickening  or biliary dilatation. Pancreas and spleen within normal limits.  Adrenals without distinct nodule. Kidneys without hydronephrosis or  hydroureter. No obstructive uropathy or urolithiasis is evident. No  bulky retroperitoneal adenopathy. GI tract evaluation demonstrates  percutaneous gastrostomy tube well positioned without abnormality. No  disproportionate dilatation of bowel to suggest mechanical obstructive  process despite moderate colonic stool burden including the rectosigmoid  colon. No free fluid or intra-abdominal free air. Pelvic viscera are  grossly unremarkable including Watson catheter in situ within a  decompressed urinary bladder, however, without bulky pelvic adenopathy  or significant free fluid. Degenerative changes of the spine without  aggressive osseous or soft tissue body wall lesions of concern.       Impression:       1. No obstructive pathology of the GI tract with percutaneous  gastrostomy tube in place. Moderate  colonic stool burden including  moderate rectosigmoid stool burden is noted however. No free fluid or  intra-abdominal free air.  2. No loculated intra-abdominal fluid collection.     D:  08/09/2018  E:  08/09/2018           This report was finalized on 8/9/2018 1:33 PM by Dr. Fernando Zuluaga.           Results for orders placed during the hospital encounter of 07/16/18   Adult Transthoracic Echo Complete W/ Cont if Necessary Per Protocol    Addendum · Estimated LVEF = 50%. · The following left ventricular wall segments are hypokinetic: mid  anterior, apical septal and mid anteroseptal. · The cardiac valves are anatomically and functionally normal.        Aubrey Jiménez MD 7/16/2018  6:05 PM          Narrative · Left ventricular systolic function is normal. Estimated EF = 50%.  · The cardiac valves are anatomically and functionally normal.          I have reviewed the medications.    aspirin 81 mg Oral Daily   atorvastatin 80 mg Oral Nightly   clopidogrel 75 mg Oral Daily   dantrolene 50 mg Oral Q6H   enoxaparin 40 mg Subcutaneous Q24H   famotidine 20 mg Oral BID   glycopyrrolate 1 mg Oral TID   lactobacillus acidophilus 1 capsule Oral Daily   lactulose 10 g Oral Daily   levETIRAcetam 250 mg Nasogastric Q12H   metroNIDAZOLE 500 mg Nasogastric Q8H   micafungin (MYCAMINE)  mg Intravenous Q24H   piperacillin-tazobactam 4.5 g Intravenous Q8H   thiamine 100 mg Oral Daily   Valproic Acid 250 mg Nasogastric Q6H         Assessment/Plan   Assessment / Plan     Hospital Problem List     Anoxic brain damage     NMS (neuroleptic malignant syndrome)    ETOH abuse    H/O drug abuse    Pulmonary infiltrates - pneumonia vs. aspiration pneumonitis vs. pulmonary edema.     Overview Signed 7/16/2018  7:42 AM by Ruslan Estrella MD     - pneumonia vs. aspiration pneumonitis vs. pulmonary edema.          Acute urinary retention           Brief Hospital Course to date:  Otilia Holm is a 37 y.o. female with relevant PMH of  "Alcoholism (drinks four \"4-Locos\" or 12 beers a day), drug abuse, who apparently stopped by a \"known drug dealers house\" on the way to work.  Became unresponsive.  Found unconscious and unresponsive in her car in [facility] parking lot.  Received narcan with no response.  EMS nasally intubated in field.  On arrival to [facility] found to have Bicarb 17 w/ AG 20, Ethanol level of 218.  Per [facility] ER note there was a period of 8-10 minutes when patient stopped talking before security / EMS began administering treatment.  Reportedly had prolonged seizure activity at OSH.  Transferred to [facility] on 7/16/2018 for further management. Since admission at [facility], pt had undergone tracheostomy and PEG.     Assessment & Plan:    Fever  - temp is improved after dantrolene will continue for another day   Cultures remain negative-   Except sputum with group G strep  Continuing zosyn, flagyl  cultured NTD    Also cont mild increase in LFTs.- HO Etoh and HCV (normal ammonia)    Normal procalcitonin---   Reducing neuro meds... On Keppra and Depakote weaning per neurology recommendations-- will need to watch    H/o drug and alcohol abuse  - continue thiamine    Dysphagia  - tube feeds-- will hold today for vomiting- give IVFs-  Also increased bowel regimen for constipation.  Retry tube feeds today- may need to eval her GB if vomiting ocntinues    DVT Prophylaxis:  pLOV    CODE STATUS:   Code Status and Medical Interventions:   Ordered at: 07/16/18 0620     Code Status:    CPR     Medical Interventions (Level of Support Prior to Arrest):    Full     I tried to talk to son today- it was very loud where he was- he did consent to a PICC line which was placed today  Disposition: I expect the patient to be discharged TBLOIDA Edwards MD  08/09/18  8:08 PM          "

## 2018-08-10 NOTE — PLAN OF CARE
Problem: Patient Care Overview  Goal: Plan of Care Review  Outcome: Ongoing (interventions implemented as appropriate)   08/10/18 1151   Coping/Psychosocial   Plan of Care Reviewed With patient   Plan of Care Review   Progress no change   OTHER   Outcome Summary PT performed PROM BLEs w/ no active movement noted. Pt. continues to require dep A for bed mobility. PT will focus on caregiver education for PROM and positioning and anticipate imminent D/C from skilled IPPT.

## 2018-08-10 NOTE — PLAN OF CARE
Problem: Patient Care Overview  Goal: Plan of Care Review  Outcome: Ongoing (interventions implemented as appropriate)   08/10/18 1600   Coping/Psychosocial   Plan of Care Reviewed With patient   Plan of Care Review   Progress improving   OTHER   Outcome Summary At this time there is no carry over of wear schedule for patient. OT re-educated RN and PCT on wear schedule and wear schedule is now on dry erase board in room. OT compelted PROM of BUE. Pt reduced to 1 time per week due to awaiting L hand splint.

## 2018-08-10 NOTE — PROGRESS NOTES
[facility] Medicine Services  PROGRESS NOTE    Patient Name: Otilia Holm  : 1980  MRN: [id number]    Date of Admission: 2018  Length of Stay: 25  Primary Care Physician: Provider, No Known    Subjective   Subjective     CC:  Unresponsive with fever    HPI:  Will stop dantrolene.   Fever overnight.  Not communicating.  No family in room.    Review of Systems  Unable to obtain due to encephalopathy    Objective   Objective     Vital Signs:   Temp:  [98.9 °F (37.2 °C)-100.7 °F (38.2 °C)] 99.5 °F (37.5 °C)  Heart Rate:  [] 78  Resp:  [18-28] 22  BP: (104-129)/(72-79) 104/72        Physical Exam:  Patient is opening her eyes today! More purposeful movement-   Neck is without mass or JVD  Skin warm  Heart is Reg wo murmur   Lungs are clear wo wheeze or crackle  Abd is soft without HSM or mass, not tender or distended peg intact  Skin is without rash  Neurologic exam in nonfocal - some hand and foot contractures  Increased tone in her arms more than her legs.    Results Reviewed:  I have personally reviewed current lab, radiology, and data and agree.      Results from last 7 days  Lab Units 18  0532 18  0410 18  0535   WBC 10*3/mm3 10.33 13.96* 10.87*   HEMOGLOBIN g/dL 11.1* 10.4* 10.4*   HEMATOCRIT % 35.7 33.3* 33.3*   PLATELETS 10*3/mm3 563* 611* 625*       Results from last 7 days  Lab Units 18  0532 18  0410 18  1437 18  0535 18  0904   SODIUM mmol/L 144 144  --  143 143   POTASSIUM mmol/L 4.3 3.8  --  4.0 4.0   CHLORIDE mmol/L 111* 109  --  104 105   CO2 mmol/L 25.0 26.0  --  30.0 27.0   BUN mg/dL 12 13  --  16 11   CREATININE mg/dL 0.55* 0.61  --  0.60 0.58*   GLUCOSE mg/dL 103* 128*  --  112* 108*   CALCIUM mg/dL 9.9 9.7  --  9.7 10.1   ALT (SGPT) U/L 65* 69*  --   --  81*   AST (SGOT) U/L 86* 80*  --   --  100*   TROPONIN I ng/mL  --   --  0.039  --   --          Imaging Results (last 24 hours)     ** No results found  "for the last 24 hours. **        Results for orders placed during the hospital encounter of 07/16/18   Adult Transthoracic Echo Complete W/ Cont if Necessary Per Protocol    Addendum · Estimated LVEF = 50%. · The following left ventricular wall segments are hypokinetic: mid  anterior, apical septal and mid anteroseptal. · The cardiac valves are anatomically and functionally normal.        Aubrey Jiménez MD 7/16/2018  6:05 PM          Narrative · Left ventricular systolic function is normal. Estimated EF = 50%.  · The cardiac valves are anatomically and functionally normal.          I have reviewed the medications.    aspirin 81 mg Oral Daily   atorvastatin 80 mg Oral Nightly   clopidogrel 75 mg Oral Daily   enoxaparin 40 mg Subcutaneous Q24H   famotidine 20 mg Oral BID   glycopyrrolate 1 mg Oral TID   lactobacillus acidophilus 1 capsule Oral Daily   lactulose 10 g Oral Daily   levETIRAcetam 250 mg Nasogastric Q12H   metroNIDAZOLE 500 mg Nasogastric Q8H   piperacillin-tazobactam 4.5 g Intravenous Q8H   thiamine 100 mg Oral Daily   Valproic Acid 250 mg Nasogastric Q6H         Assessment/Plan   Assessment / Plan     Hospital Problem List     ETOH abuse    Anoxic brain damage     H/O drug abuse    Pulmonary infiltrates - pneumonia vs. aspiration pneumonitis vs. pulmonary edema.     Overview Signed 7/16/2018  7:42 AM by Ruslan Estrella MD     - pneumonia vs. aspiration pneumonitis vs. pulmonary edema.          Acute urinary retention    NMS (neuroleptic malignant syndrome)    Chronic hepatitis C without hepatic coma (CMS/HCC)           Brief Hospital Course to date:  Otilia Holm is a 37 y.o. female with relevant PMH of Alcoholism (drinks four \"4-Locos\" or 12 beers a day), drug abuse, who apparently stopped by a \"known drug dealers house\" on the way to work.  Became unresponsive.  Found unconscious and unresponsive in her car in [facility] parking lot.  Received narcan with no response.  EMS nasally intubated in " field.  On arrival to [facility] found to have Bicarb 17 w/ AG 20, Ethanol level of 218.  Per [facility] ER note there was a period of 8-10 minutes when patient stopped talking before security / EMS began administering treatment.  Reportedly had prolonged seizure activity at OSH.  Transferred to [facility] on 7/16/2018 for further management. Since admission at [facility], pt had undergone tracheostomy and PEG.     Assessment & Plan:    Fever  - stop dantrolene today  - cultures remain negative  - except sputum with strep  - Continuing zosyn, flagyl    Also cont mild increase in LFTs.- HO Etoh and HCV (normal ammonia)    Normal procalcitonin-  - Reducing neuro meds... On Keppra and Depakote weaning per neurology    H/o drug and alcohol abuse  - continue thiamine    Dysphagia  - tube feeds as tolerated  - increased bowel regimen for constipation    Elevated BUN:Cr ratio - continue IV fluids    Stop Dantroene    DVT Prophylaxis:  pLOV    CODE STATUS:   Code Status and Medical Interventions:   Ordered at: 07/16/18 0620     Code Status:    CPR     Medical Interventions (Level of Support Prior to Arrest):    Full     talke to son - it was very loud where he was- he did consent to a PICC line    Disposition: I expect the patient to be discharged TBLOIDA Santana MD  08/10/18  3:31 PM

## 2018-08-10 NOTE — THERAPY TREATMENT NOTE
Acute Care - Physical Therapy Treatment Note  [facility]     Patient Name: Otilia Holm  : 1980  MRN: [id number]  Today's Date: 8/10/2018  Onset of Illness/Injury or Date of Surgery: 18  Date of Referral to PT: 18  Referring Physician: MD Jerry    Admit Date: 2018    Visit Dx:    ICD-10-CM ICD-9-CM   1. Respiratory arrest (CMS/HCC) R09.2 799.1   2. Encephalopathy G93.40 348.30   3. Elevated troponin R74.8 790.6   4. Impaired functional mobility, balance, gait, and endurance Z74.09 V49.89   5. Impaired mobility and ADLs Z74.09 799.89     Patient Active Problem List   Diagnosis   • ETOH abuse   • Anoxic brain damage    • H/O drug abuse   • Pulmonary infiltrates - pneumonia vs. aspiration pneumonitis vs. pulmonary edema.    • Acute urinary retention   • NMS (neuroleptic malignant syndrome)   • Chronic hepatitis C without hepatic coma (CMS/HCC)       Therapy Treatment          Rehabilitation Treatment Summary     Row Name 08/10/18 1100             Treatment Time/Intention    Discipline physical therapist  -MB      Document Type therapy note (daily note)  -MB      Subjective Information --   Pt. unable to respond.  -MB      Mode of Treatment physical therapy;individual therapy  -MB      Patient/Family Observations Pt. supine, w/ PICC line intact, trach collar, G tube, calderon.  RN consent for PT.  No family present at bedside.  -MB2      Care Plan Review care plan/treatment goals reviewed   RN agreed  -MB3      Therapy Frequency (PT Clinical Impression) 3 times/wk  -MB3      Patient Effort other (see comments)   Pt. not following commands.  -MB3      Existing Precautions/Restrictions fall;oxygen therapy device and L/min   trach collar, B PF contractures, MP foothold boots  -MB3      Recorded by [MB] Rocio Calloway, PT 08/10/18 1130  [MB2] Rocio Calloway, PT 08/10/18 1150  [MB3] Rocio Calloway, PT 08/10/18 1136      Row Name 08/10/18 1100             Vital Signs    Pre Systolic  BP Rehab 141  -MB      Pre Treatment Diastolic BP 64  -MB      Pretreatment Heart Rate (beats/min) 79  -MB      Pre SpO2 (%) 99  -MB      O2 Delivery Pre Treatment trach collar  -MB      Intra SpO2 (%) 99  -MB      O2 Delivery Intra Treatment supplemental O2  -MB      Post SpO2 (%) 99  -MB      O2 Delivery Post Treatment supplemental O2  -MB      Pre Patient Position Supine  -MB      Intra Patient Position Supine  -MB      Post Patient Position Supine  -MB      Recorded by [MB] Rocio Calloway, PT 08/10/18 1150      Row Name 08/10/18 1100             Cognitive Assessment/Intervention- PT/OT    Affect/Mental Status (Cognitive) unresponsive  -MB      Orientation Status (Cognition) unable/difficult to assess  -MB      Follows Commands (Cognition) does not follow one step commands  -MB      Cognitive Function (Cognitive) unable/difficult to assess  -MB      Safety Deficit (Cognitive) unable/difficult to assess  -MB      Personal Safety Interventions fall prevention program maintained;supervised activity  -MB      Recorded by [MB] Rocio Calloway, PT 08/10/18 1150      Row Name 08/10/18 1100             Bed Mobility Assessment/Treatment    Rolling Left Turner (Bed Mobility) dependent (less than 25% patient effort)  -MB      Rolling Right Turner (Bed Mobility) dependent (less than 25% patient effort)  -MB      Scooting/Bridging Turner (Bed Mobility) dependent (less than 25% patient effort);2 person assist  -MB      Bed Mobility, Safety Issues cognitive deficits limit understanding;decreased use of arms for pushing/pulling;decreased use of legs for bridging/pushing;impaired trunk control for bed mobility  -MB      Assistive Device (Bed Mobility) draw sheet  -MB      Comment (Bed Mobility) Pt. required dep A for all bed mobility.  -MB      Recorded by [MB] Rocio Calloway, PT 08/10/18 1150      Row Name 08/10/18 1100             Transfer Assessment/Treatment    Comment (Transfers) Unable to  assess.  -MB      Recorded by [MB] Rocio Calloway, PT 08/10/18 1150      Row Name 08/10/18 1100             Gait/Stairs Assessment/Training    Garfield Level (Gait) unable to assess  -MB      Recorded by [MB] Rocio Calloway, PT 08/10/18 1150      Row Name 08/10/18 1100             Therapeutic Exercise    Therapeutic Exercise supine, lower extremities  -MB      Additional Documentation Therapeutic Exercise (Row)  -MB      Recorded by [MB] Rocio Calloway, PT 08/10/18 1150      Row Name 08/10/18 1100             Lower Extremity Supine Therapeutic Exercise    Performed, Supine Lower Extremity (Therapeutic Exercise) hip flexion/extension;hip abduction/adduction;hip external/internal rotation;knee flexion/extension;dorsiflexor stretch  -MB      Exercise Type, Supine Lower Extremity (Therapeutic Exercise) PROM (passive range of motion);static stretching  -MB      Comment, Supine Lower Extremity (Therapeutic Exercise) no active movement  -MB      Recorded by [MB] Rocio Calloway, PT 08/10/18 1150      Row Name 08/10/18 1100             Positioning and Restraints    Pre-Treatment Position in bed  -MB      Post Treatment Position bed  -MB      In Bed notified nsg;supine;call light within reach;exit alarm on;side rails up x3;R multipodus;L multipodus  -MB      Recorded by [MB] Rocio Calloway, PT 08/10/18 1150      Row Name 08/10/18 1100             Pain Scale: FACES Pre/Post-Treatment    Pain: FACES Scale, Pretreatment 0-->no hurt  -MB      Pain: FACES Scale, Post-Treatment 0-->no hurt  -MB      Recorded by [MB] Rocio Calloway, PT 08/10/18 1150      Row Name                Wound 08/06/18 1005 Left lateral other (see notes) laceration;skin tear    Wound - Properties Group Date first assessed: 08/06/18 [AS] Time first assessed: 1005 [AS] Present On Admission : no [AS] Side: Left [AS] Orientation: lateral [AS] Location: other (see notes) [AS], trach site   Type: laceration;skin tear [AS]  Additional Comments: from sutures  [AS] Recorded by:  [AS] Jayesh Rojas RN 08/06/18 1149    Row Name 08/10/18 1100             Plan of Care Review    Plan of Care Reviewed With patient;other (see comments)   RN  -MB      Recorded by [MB] Rocio Calloway, PT 08/10/18 1150      Row Name 08/10/18 1100             Outcome Summary/Treatment Plan (PT)    Daily Summary of Progress (PT) progress toward functional goals is halina  -MB      Recorded by [MB] Rocio Calloway, PT 08/10/18 1150        User Key  (r) = Recorded By, (t) = Taken By, (c) = Cosigned By    Initials Name Effective Dates Discipline    Rocio Potts, PT 03/14/16 -  PT    AS Jayesh Rojas RN 06/16/16 -  Nurse          Wound 08/06/18 1005 Left lateral other (see notes) laceration;skin tear (Active)   Base dry;scab;moist 8/10/2018  8:50 AM   Periwound dry;intact 8/10/2018  8:50 AM   Periwound Temperature warm 8/10/2018  8:50 AM   Edges irregular 8/10/2018  8:50 AM   Drainage Amount none 8/10/2018  8:50 AM   Dressing Care, Wound hydrofiber 8/9/2018  8:00 PM             Physical Therapy Education     Title: PT OT SLP Therapies (Active)     Topic: Physical Therapy (Active)     Point: Mobility training (Active)    Learning Progress Summary     Learner Status Readiness Method Response Comment Documented by    Patient Active Acceptance E,D NR  DM 08/07/18 1125          Point: Home exercise program (Active)    Learning Progress Summary     Learner Status Readiness Method Response Comment Documented by    Patient Active Acceptance E,D NR  DM 08/07/18 1125          Point: Body mechanics (Active)    Learning Progress Summary     Learner Status Readiness Method Response Comment Documented by    Patient Active Acceptance ED NR  DM 08/07/18 1125          Point: Precautions (Active)    Learning Progress Summary     Learner Status Readiness Method Response Comment Documented by    Patient Active Acceptance ED NR  DM 08/07/18 1125                       User Key     Initials Effective Dates Name Provider Type Discipline    DM 06/19/15 -  Batool Haywood, PT Physical Therapist PT                    PT Recommendation and Plan  Therapy Frequency (PT Clinical Impression): 3 times/wk  Outcome Summary/Treatment Plan (PT)  Daily Summary of Progress (PT): progress toward functional goals is gradual  Plan of Care Reviewed With: patient  Progress: no change  Outcome Summary: PT performed PROM BLEs w/ no active movement noted.  Pt. continues to require dep A for bed mobility.  PT will focus on caregiver education for PROM and positioning and anticipate imminent D/C from skilled IPPT.            Outcome Measures     Row Name 08/10/18 1100 08/08/18 1400 08/07/18 1336       How much help from another person do you currently need...    Turning from your back to your side while in flat bed without using bedrails? 1  -MB  --  --    Moving from lying on back to sitting on the side of a flat bed without bedrails? 1  -MB  --  --    Moving to and from a bed to a chair (including a wheelchair)? 1  -MB  --  --    Standing up from a chair using your arms (e.g., wheelchair, bedside chair)? 1  -MB  --  --    Climbing 3-5 steps with a railing? 1  -MB  --  --    To walk in hospital room? 1  -MB  --  --    AM-PAC 6 Clicks Score 6  -MB  --  --       How much help from another is currently needed...    Putting on and taking off regular lower body clothing?  -- 1  -KF 1  -HK    Bathing (including washing, rinsing, and drying)  -- 1  -KF 1  -HK    Toileting (which includes using toilet bed pan or urinal)  -- 1  -KF 1  -HK    Putting on and taking off regular upper body clothing  -- 1  -KF 1  -HK    Taking care of personal grooming (such as brushing teeth)  -- 1  -KF 1  -HK    Eating meals  -- 1  -KF 1  -HK    Score  -- 6  -KF 6  -HK       Functional Assessment    Outcome Measure Options AM-PAC 6 Clicks Basic Mobility (PT)  -MB AM-PAC 6 Clicks Daily Activity (OT)  -KF AM-PAC 6 Clicks Daily  Activity (OT)  -HK      User Key  (r) = Recorded By, (t) = Taken By, (c) = Cosigned By    Initials Name Provider Type    Rocio Potts, PT Physical Therapist    HK Humaira King, OT Occupational Therapist    KF Penny Wood, OT Occupational Therapist           Time Calculation:         PT Charges     Row Name 08/10/18 1157             Time Calculation    Start Time 1100  -MB      PT Received On 08/10/18  -MB      PT Goal Re-Cert Due Date 08/17/18  -MB         Time Calculation- PT    Total Timed Code Minutes- PT 15 minute(s)  -MB         Timed Charges    08424 - PT Therapeutic Exercise Minutes 15  -MB        User Key  (r) = Recorded By, (t) = Taken By, (c) = Cosigned By    Initials Name Provider Type    Rcoio Potts, PT Physical Therapist        Therapy Suggested Charges     Code   Minutes Charges    86721 (CPT®) Hc Pt Neuromusc Re Education Ea 15 Min      55496 (CPT®) Hc Pt Ther Proc Ea 15 Min 15 1    13505 (CPT®) Hc Gait Training Ea 15 Min      63516 (CPT®) Hc Pt Therapeutic Act Ea 15 Min      63902 (CPT®) Hc Pt Manual Therapy Ea 15 Min      98231 (CPT®) Hc Pt Iontophoresis Ea 15 Min      95496 (CPT®) Hc Pt Elec Stim Ea-Per 15 Min      10227 (CPT®) Hc Pt Ultrasound Ea 15 Min      22732 (CPT®) Hc Pt Self Care/Mgmt/Train Ea 15 Min      Total  15 1        Therapy Charges for Today     Code Description Service Date Service Provider Modifiers Qty    [id number] HC PT THER PROC EA 15 MIN 8/10/2018 Rocio Calloway, PT GP 1          PT G-Codes  Outcome Measure Options: AM-PAC 6 Clicks Basic Mobility (PT)    Rocio Claloway, PT  8/10/2018

## 2018-08-10 NOTE — PLAN OF CARE
Problem: Patient Care Overview  Goal: Plan of Care Review  Outcome: Ongoing (interventions implemented as appropriate)   08/10/18 0249   Coping/Psychosocial   Plan of Care Reviewed With patient   Plan of Care Review   Progress no change   OTHER   Outcome Summary No change in pt condition. Febrile all night. GCS 5. Will continue to monitor.     Goal: Individualization and Mutuality  Outcome: Ongoing (interventions implemented as appropriate)    Goal: Discharge Needs Assessment  Outcome: Ongoing (interventions implemented as appropriate)

## 2018-08-11 LAB
GLUCOSE BLDC GLUCOMTR-MCNC: 102 MG/DL (ref 70–130)
GLUCOSE BLDC GLUCOMTR-MCNC: 106 MG/DL (ref 70–130)
GLUCOSE BLDC GLUCOMTR-MCNC: 128 MG/DL (ref 70–130)

## 2018-08-11 PROCEDURE — 82962 GLUCOSE BLOOD TEST: CPT

## 2018-08-11 PROCEDURE — 99232 SBSQ HOSP IP/OBS MODERATE 35: CPT | Performed by: HOSPITALIST

## 2018-08-11 PROCEDURE — 25010000002 LORAZEPAM PER 2 MG: Performed by: INTERNAL MEDICINE

## 2018-08-11 PROCEDURE — 25010000002 ENOXAPARIN PER 10 MG: Performed by: INTERNAL MEDICINE

## 2018-08-11 PROCEDURE — 25010000002 PIPERACILLIN SOD-TAZOBACTAM PER 1 G: Performed by: INTERNAL MEDICINE

## 2018-08-11 PROCEDURE — 94799 UNLISTED PULMONARY SVC/PX: CPT

## 2018-08-11 PROCEDURE — 94760 N-INVAS EAR/PLS OXIMETRY 1: CPT

## 2018-08-11 RX ADMIN — ENOXAPARIN SODIUM 40 MG: 100 INJECTION SUBCUTANEOUS at 08:21

## 2018-08-11 RX ADMIN — FAMOTIDINE 20 MG: 20 TABLET ORAL at 20:15

## 2018-08-11 RX ADMIN — GLYCOPYRROLATE 1 MG: 1 TABLET ORAL at 17:19

## 2018-08-11 RX ADMIN — ASPIRIN 81 MG CHEWABLE TABLET 81 MG: 81 TABLET CHEWABLE at 08:20

## 2018-08-11 RX ADMIN — LORAZEPAM 2 MG: 2 INJECTION INTRAMUSCULAR; INTRAVENOUS at 05:02

## 2018-08-11 RX ADMIN — ACETAMINOPHEN 650 MG: 325 TABLET, FILM COATED ORAL at 05:02

## 2018-08-11 RX ADMIN — TAZOBACTAM SODIUM AND PIPERACILLIN SODIUM 4.5 G: 500; 4 INJECTION, SOLUTION INTRAVENOUS at 12:34

## 2018-08-11 RX ADMIN — VALPROIC ACID 250 MG: 250 SOLUTION ORAL at 12:33

## 2018-08-11 RX ADMIN — LORAZEPAM 2 MG: 2 INJECTION INTRAMUSCULAR; INTRAVENOUS at 17:21

## 2018-08-11 RX ADMIN — VALPROIC ACID 250 MG: 250 SOLUTION ORAL at 05:02

## 2018-08-11 RX ADMIN — Medication 1 CAPSULE: at 08:20

## 2018-08-11 RX ADMIN — LORAZEPAM 2 MG: 2 INJECTION INTRAMUSCULAR; INTRAVENOUS at 20:16

## 2018-08-11 RX ADMIN — LACTULOSE 10 G: 10 SOLUTION ORAL at 08:20

## 2018-08-11 RX ADMIN — LORAZEPAM 2 MG: 2 INJECTION INTRAMUSCULAR; INTRAVENOUS at 01:44

## 2018-08-11 RX ADMIN — TAZOBACTAM SODIUM AND PIPERACILLIN SODIUM 4.5 G: 500; 4 INJECTION, SOLUTION INTRAVENOUS at 20:15

## 2018-08-11 RX ADMIN — LORAZEPAM 2 MG: 2 INJECTION INTRAMUSCULAR; INTRAVENOUS at 12:39

## 2018-08-11 RX ADMIN — Medication 100 MG: at 08:20

## 2018-08-11 RX ADMIN — VALPROIC ACID 250 MG: 250 SOLUTION ORAL at 17:19

## 2018-08-11 RX ADMIN — ATORVASTATIN CALCIUM 80 MG: 40 TABLET, FILM COATED ORAL at 20:15

## 2018-08-11 RX ADMIN — LORAZEPAM 2 MG: 2 INJECTION INTRAMUSCULAR; INTRAVENOUS at 10:16

## 2018-08-11 RX ADMIN — LORAZEPAM 2 MG: 2 INJECTION INTRAMUSCULAR; INTRAVENOUS at 22:21

## 2018-08-11 RX ADMIN — TAZOBACTAM SODIUM AND PIPERACILLIN SODIUM 4.5 G: 500; 4 INJECTION, SOLUTION INTRAVENOUS at 05:02

## 2018-08-11 RX ADMIN — VALPROIC ACID 250 MG: 250 SOLUTION ORAL at 23:42

## 2018-08-11 RX ADMIN — METRONIDAZOLE 500 MG: 500 TABLET ORAL at 05:02

## 2018-08-11 RX ADMIN — LEVETIRACETAM 250 MG: 100 SOLUTION ORAL at 20:15

## 2018-08-11 RX ADMIN — GLYCOPYRROLATE 1 MG: 1 TABLET ORAL at 08:21

## 2018-08-11 RX ADMIN — FAMOTIDINE 20 MG: 20 TABLET ORAL at 08:20

## 2018-08-11 RX ADMIN — METRONIDAZOLE 500 MG: 500 TABLET ORAL at 14:30

## 2018-08-11 RX ADMIN — LEVETIRACETAM 250 MG: 100 SOLUTION ORAL at 08:20

## 2018-08-11 RX ADMIN — CLOPIDOGREL BISULFATE 75 MG: 75 TABLET ORAL at 08:20

## 2018-08-11 RX ADMIN — POTASSIUM CHLORIDE, DEXTROSE MONOHYDRATE AND SODIUM CHLORIDE 75 ML/HR: 150; 5; 450 INJECTION, SOLUTION INTRAVENOUS at 12:33

## 2018-08-11 RX ADMIN — METRONIDAZOLE 500 MG: 500 TABLET ORAL at 20:15

## 2018-08-11 RX ADMIN — GLYCOPYRROLATE 1 MG: 1 TABLET ORAL at 20:18

## 2018-08-11 NOTE — PLAN OF CARE
Problem: Patient Care Overview  Goal: Plan of Care Review   08/11/18 0840 08/11/18 1549   Coping/Psychosocial   Plan of Care Reviewed With patient --    Plan of Care Review   Progress --  no change   OTHER   Outcome Summary --  Pt restless today. VSS. Pt has been afebrile today. Will continue to monitor.       Problem: Skin Injury Risk (Adult)  Goal: Skin Health and Integrity  Outcome: Ongoing (interventions implemented as appropriate)      Problem: Palliative Care (Adult)  Goal: Maximized Comfort  Outcome: Ongoing (interventions implemented as appropriate)    Goal: Enhanced Quality of Life  Outcome: Ongoing (interventions implemented as appropriate)      Problem: Cardiac Output Decreased (Adult)  Goal: Effective Tissue Perfusion  Outcome: Ongoing (interventions implemented as appropriate)      Problem: Nutrition, Enteral (Adult)  Goal: Signs and Symptoms of Listed Potential Problems Will be Absent, Minimized or Managed (Nutrition, Enteral)  Outcome: Ongoing (interventions implemented as appropriate)

## 2018-08-11 NOTE — PLAN OF CARE
Problem: Patient Care Overview  Goal: Plan of Care Review  Outcome: Ongoing (interventions implemented as appropriate)    Goal: Individualization and Mutuality  Outcome: Ongoing (interventions implemented as appropriate)    Goal: Discharge Needs Assessment  Outcome: Ongoing (interventions implemented as appropriate)    Goal: Interprofessional Rounds/Family Conf  Outcome: Ongoing (interventions implemented as appropriate)      Problem: Skin Injury Risk (Adult)  Goal: Skin Health and Integrity  Outcome: Ongoing (interventions implemented as appropriate)      Problem: Palliative Care (Adult)  Goal: Maximized Comfort  Outcome: Ongoing (interventions implemented as appropriate)    Goal: Enhanced Quality of Life  Outcome: Ongoing (interventions implemented as appropriate)      Problem: Cardiac Output Decreased (Adult)  Goal: Effective Tissue Perfusion  Outcome: Ongoing (interventions implemented as appropriate)      Problem: Nutrition, Enteral (Adult)  Goal: Signs and Symptoms of Listed Potential Problems Will be Absent, Minimized or Managed (Nutrition, Enteral)  Outcome: Ongoing (interventions implemented as appropriate)

## 2018-08-11 NOTE — PROGRESS NOTES
[facility] Medicine Services  PROGRESS NOTE    Patient Name: Otilia Holm  : 1980  MRN: [id number]    Date of Admission: 2018  Length of Stay: 26  Primary Care Physician: Provider, No Known    Subjective   Subjective     CC:  Unresponsive with fever    HPI:  Will stop dantrolene.   Fever overnight.  Not communicating.  No family in room.    Review of Systems  Unable to obtain due to encephalopathy    Objective   Objective     Vital Signs:   Temp:  [98.9 °F (37.2 °C)-99.7 °F (37.6 °C)] (P) 98.9 °F (37.2 °C)  Heart Rate:  [] (P) 88  Resp:  [16-28] (P) 16  BP: ()/(67-79) (P) 111/78        Physical Exam:  Patient is opening her eyes today! More purposeful movement-   Neck is without mass or JVD  Skin warm  Heart is Reg wo murmur   Lungs are clear wo wheeze or crackle  Abd is soft without HSM or mass, not tender or distended peg intact  Skin is without rash  Neurologic exam in nonfocal - some hand and foot contractures  Increased tone in her arms more than her legs.    Results Reviewed:  I have personally reviewed current lab, radiology, and data and agree.      Results from last 7 days  Lab Units 18  0532 18  0410 18  0535   WBC 10*3/mm3 10.33 13.96* 10.87*   HEMOGLOBIN g/dL 11.1* 10.4* 10.4*   HEMATOCRIT % 35.7 33.3* 33.3*   PLATELETS 10*3/mm3 563* 611* 625*       Results from last 7 days  Lab Units 18  0532 18  0410 18  1437 18  0535   SODIUM mmol/L 144 144  --  143   POTASSIUM mmol/L 4.3 3.8  --  4.0   CHLORIDE mmol/L 111* 109  --  104   CO2 mmol/L 25.0 26.0  --  30.0   BUN mg/dL 12 13  --  16   CREATININE mg/dL 0.55* 0.61  --  0.60   GLUCOSE mg/dL 103* 128*  --  112*   CALCIUM mg/dL 9.9 9.7  --  9.7   ALT (SGPT) U/L 65* 69*  --   --    AST (SGOT) U/L 86* 80*  --   --    TROPONIN I ng/mL  --   --  0.039  --          Imaging Results (last 24 hours)     ** No results found for the last 24 hours. **        Results for  "orders placed during the hospital encounter of 07/16/18   Adult Transthoracic Echo Complete W/ Cont if Necessary Per Protocol    Addendum · Estimated LVEF = 50%. · The following left ventricular wall segments are hypokinetic: mid  anterior, apical septal and mid anteroseptal. · The cardiac valves are anatomically and functionally normal.        Aubrey Jiménez MD 7/16/2018  6:05 PM          Narrative · Left ventricular systolic function is normal. Estimated EF = 50%.  · The cardiac valves are anatomically and functionally normal.          I have reviewed the medications.    aspirin 81 mg Oral Daily   atorvastatin 80 mg Oral Nightly   clopidogrel 75 mg Oral Daily   enoxaparin 40 mg Subcutaneous Q24H   famotidine 20 mg Oral BID   glycopyrrolate 1 mg Oral TID   lactobacillus acidophilus 1 capsule Oral Daily   lactulose 10 g Oral Daily   levETIRAcetam 250 mg Nasogastric Q12H   metroNIDAZOLE 500 mg Nasogastric Q8H   piperacillin-tazobactam 4.5 g Intravenous Q8H   thiamine 100 mg Oral Daily   Valproic Acid 250 mg Nasogastric Q6H         Assessment/Plan   Assessment / Plan     Hospital Problem List     ETOH abuse    Anoxic brain damage     H/O drug abuse    Pulmonary infiltrates - pneumonia vs. aspiration pneumonitis vs. pulmonary edema.     Overview Signed 7/16/2018  7:42 AM by Ruslan Estrella MD     - pneumonia vs. aspiration pneumonitis vs. pulmonary edema.          Acute urinary retention    NMS (neuroleptic malignant syndrome)    Chronic hepatitis C without hepatic coma (CMS/HCC)           Brief Hospital Course to date:  Otilia Holm is a 37 y.o. female with relevant PMH of Alcoholism (drinks four \"4-Locos\" or 12 beers a day), drug abuse, who apparently stopped by a \"known drug dealers house\" on the way to work.  Became unresponsive.  Found unconscious and unresponsive in her car in [facility] parking lot.  Received narcan with no response.  EMS nasally intubated in field.  On arrival to [facility] found to have Bicarb " 17 w/ AG 20, Ethanol level of 218.  Per [facility] ER note there was a period of 8-10 minutes when patient stopped talking before security / EMS began administering treatment.  Reportedly had prolonged seizure activity at OSH.  Transferred to [facility] on 7/16/2018 for further management. Since admission at [facility], pt had undergone tracheostomy and PEG.     Assessment & Plan:    Fever  - stop dantrolene today  - cultures remain negative  - except sputum with strep  - Continuing zosyn, flagyl    Also cont mild increase in LFTs.- HO Etoh and HCV (normal ammonia)    Normal procalcitonin  - Reducing neuro meds... On Keppra and Depakote weaning per neurology    H/o drug and alcohol abuse  - continue thiamine    Dysphagia  - tube feeds as tolerated  - increased bowel regimen for constipation    Guarded prognosis.  Does not appear to be improving  Fevers on Abx  Palliative Medicine Evaluation for goals of care    DVT Prophylaxis:  pLOV    CODE STATUS:   Code Status and Medical Interventions:   Ordered at: 07/16/18 0620     Code Status:    CPR     Medical Interventions (Level of Support Prior to Arrest):    Full     Disposition: I expect the patient to be discharged NAMRATA Santana MD  08/11/18  4:36 PM

## 2018-08-12 LAB
BACTERIA SPEC RESP CULT: NORMAL
GLUCOSE BLDC GLUCOMTR-MCNC: 120 MG/DL (ref 70–130)
GLUCOSE BLDC GLUCOMTR-MCNC: 126 MG/DL (ref 70–130)
GLUCOSE BLDC GLUCOMTR-MCNC: 91 MG/DL (ref 70–130)
GRAM STN SPEC: NORMAL

## 2018-08-12 PROCEDURE — 94799 UNLISTED PULMONARY SVC/PX: CPT

## 2018-08-12 PROCEDURE — 25010000002 ENOXAPARIN PER 10 MG: Performed by: INTERNAL MEDICINE

## 2018-08-12 PROCEDURE — 25010000002 ONDANSETRON PER 1 MG: Performed by: FAMILY MEDICINE

## 2018-08-12 PROCEDURE — 94760 N-INVAS EAR/PLS OXIMETRY 1: CPT

## 2018-08-12 PROCEDURE — 97110 THERAPEUTIC EXERCISES: CPT

## 2018-08-12 PROCEDURE — 25010000002 LORAZEPAM PER 2 MG: Performed by: INTERNAL MEDICINE

## 2018-08-12 PROCEDURE — 25010000002 PIPERACILLIN SOD-TAZOBACTAM PER 1 G: Performed by: INTERNAL MEDICINE

## 2018-08-12 PROCEDURE — 82962 GLUCOSE BLOOD TEST: CPT

## 2018-08-12 PROCEDURE — 97530 THERAPEUTIC ACTIVITIES: CPT

## 2018-08-12 PROCEDURE — 99232 SBSQ HOSP IP/OBS MODERATE 35: CPT | Performed by: HOSPITALIST

## 2018-08-12 RX ORDER — IPRATROPIUM BROMIDE AND ALBUTEROL SULFATE 2.5; .5 MG/3ML; MG/3ML
3 SOLUTION RESPIRATORY (INHALATION) EVERY 8 HOURS PRN
Status: DISCONTINUED | OUTPATIENT
Start: 2018-08-12 | End: 2018-08-25 | Stop reason: HOSPADM

## 2018-08-12 RX ORDER — LEVETIRACETAM 100 MG/ML
500 SOLUTION ORAL EVERY 12 HOURS SCHEDULED
Status: DISCONTINUED | OUTPATIENT
Start: 2018-08-12 | End: 2018-08-22

## 2018-08-12 RX ORDER — LEVETIRACETAM 100 MG/ML
1000 SOLUTION ORAL ONCE
Status: COMPLETED | OUTPATIENT
Start: 2018-08-12 | End: 2018-08-12

## 2018-08-12 RX ADMIN — Medication 1 CAPSULE: at 09:31

## 2018-08-12 RX ADMIN — ONDANSETRON 4 MG: 2 INJECTION INTRAMUSCULAR; INTRAVENOUS at 21:08

## 2018-08-12 RX ADMIN — FAMOTIDINE 20 MG: 20 TABLET ORAL at 09:31

## 2018-08-12 RX ADMIN — METRONIDAZOLE 500 MG: 500 TABLET ORAL at 05:37

## 2018-08-12 RX ADMIN — TAZOBACTAM SODIUM AND PIPERACILLIN SODIUM 4.5 G: 500; 4 INJECTION, SOLUTION INTRAVENOUS at 05:37

## 2018-08-12 RX ADMIN — LORAZEPAM 2 MG: 2 INJECTION INTRAMUSCULAR; INTRAVENOUS at 00:32

## 2018-08-12 RX ADMIN — Medication 100 MG: at 09:31

## 2018-08-12 RX ADMIN — VALPROIC ACID 250 MG: 250 SOLUTION ORAL at 17:38

## 2018-08-12 RX ADMIN — LEVETIRACETAM 250 MG: 100 SOLUTION ORAL at 09:30

## 2018-08-12 RX ADMIN — VALPROIC ACID 250 MG: 250 SOLUTION ORAL at 05:37

## 2018-08-12 RX ADMIN — ACETAMINOPHEN 650 MG: 325 TABLET, FILM COATED ORAL at 05:37

## 2018-08-12 RX ADMIN — LORAZEPAM 2 MG: 2 INJECTION INTRAMUSCULAR; INTRAVENOUS at 09:33

## 2018-08-12 RX ADMIN — GLYCOPYRROLATE 1 MG: 1 TABLET ORAL at 09:30

## 2018-08-12 RX ADMIN — VALPROIC ACID 250 MG: 250 SOLUTION ORAL at 12:13

## 2018-08-12 RX ADMIN — GLYCOPYRROLATE 1 MG: 1 TABLET ORAL at 15:31

## 2018-08-12 RX ADMIN — LEVETIRACETAM 1000 MG: 500 SOLUTION ORAL at 15:31

## 2018-08-12 RX ADMIN — LACTULOSE 10 G: 10 SOLUTION ORAL at 09:30

## 2018-08-12 RX ADMIN — POTASSIUM CHLORIDE, DEXTROSE MONOHYDRATE AND SODIUM CHLORIDE 75 ML/HR: 150; 5; 450 INJECTION, SOLUTION INTRAVENOUS at 16:48

## 2018-08-12 RX ADMIN — LORAZEPAM 2 MG: 2 INJECTION INTRAMUSCULAR; INTRAVENOUS at 12:13

## 2018-08-12 RX ADMIN — LORAZEPAM 2 MG: 2 INJECTION INTRAMUSCULAR; INTRAVENOUS at 05:37

## 2018-08-12 RX ADMIN — LEVETIRACETAM 500 MG: 100 SOLUTION ORAL at 21:08

## 2018-08-12 RX ADMIN — ACETAMINOPHEN 650 MG: 650 SOLUTION ORAL at 21:07

## 2018-08-12 RX ADMIN — ATORVASTATIN CALCIUM 80 MG: 40 TABLET, FILM COATED ORAL at 21:08

## 2018-08-12 RX ADMIN — METRONIDAZOLE 500 MG: 500 TABLET ORAL at 21:08

## 2018-08-12 RX ADMIN — ASPIRIN 81 MG CHEWABLE TABLET 81 MG: 81 TABLET CHEWABLE at 09:31

## 2018-08-12 RX ADMIN — ONDANSETRON 4 MG: 2 INJECTION INTRAMUSCULAR; INTRAVENOUS at 05:37

## 2018-08-12 RX ADMIN — ACETAMINOPHEN 650 MG: 650 SUPPOSITORY RECTAL at 16:48

## 2018-08-12 RX ADMIN — ENOXAPARIN SODIUM 40 MG: 100 INJECTION SUBCUTANEOUS at 09:31

## 2018-08-12 RX ADMIN — GLYCOPYRROLATE 1 MG: 1 TABLET ORAL at 21:08

## 2018-08-12 RX ADMIN — CLOPIDOGREL BISULFATE 75 MG: 75 TABLET ORAL at 09:31

## 2018-08-12 RX ADMIN — FAMOTIDINE 20 MG: 20 TABLET ORAL at 21:08

## 2018-08-12 RX ADMIN — METRONIDAZOLE 500 MG: 500 TABLET ORAL at 14:15

## 2018-08-12 RX ADMIN — TAZOBACTAM SODIUM AND PIPERACILLIN SODIUM 4.5 G: 500; 4 INJECTION, SOLUTION INTRAVENOUS at 12:13

## 2018-08-12 RX ADMIN — LORAZEPAM 2 MG: 2 INJECTION INTRAMUSCULAR; INTRAVENOUS at 17:38

## 2018-08-12 RX ADMIN — LORAZEPAM 2 MG: 2 INJECTION INTRAMUSCULAR; INTRAVENOUS at 21:08

## 2018-08-12 NOTE — PROGRESS NOTES
[facility] Medicine Services  PROGRESS NOTE    Patient Name: Otilia Holm  : 1980  MRN: [id number]    Date of Admission: 2018  Length of Stay: 27  Primary Care Physician: Provider, No Known    Subjective   Subjective     CC:  Unresponsive with fever    HPI:  Tachycardic after getting breathing treatment today.  Discussed case with RT who saw on admission to ICU.  No family in room today.  Afebrile in the last 24 hours.    Review of Systems  Unable to obtain due to encephalopathy    Objective   Objective     Vital Signs:   Temp:  [97.9 °F (36.6 °C)-99.4 °F (37.4 °C)] 98.6 °F (37 °C)  Heart Rate:  [] 129  Resp:  [16-22] 20  BP: ()/() 143/107        Physical Exam:  Patient is opening her eyes today! More purposeful movement-   Neck is without mass or JVD  Skin warm  Heart is Reg wo murmur   Lungs are clear wo wheeze or crackle  Abd is soft without HSM or mass, not tender or distended peg intact  Skin is without rash  Neurologic exam in nonfocal - some hand and foot contractures  Increased tone in her arms more than her legs.    Results Reviewed:  I have personally reviewed current lab, radiology, and data and agree.      Results from last 7 days  Lab Units 18  0532 18  0410   WBC 10*3/mm3 10.33 13.96*   HEMOGLOBIN g/dL 11.1* 10.4*   HEMATOCRIT % 35.7 33.3*   PLATELETS 10*3/mm3 563* 611*       Results from last 7 days  Lab Units 18  0532 18  0410 18  1437   SODIUM mmol/L 144 144  --    POTASSIUM mmol/L 4.3 3.8  --    CHLORIDE mmol/L 111* 109  --    CO2 mmol/L 25.0 26.0  --    BUN mg/dL 12 13  --    CREATININE mg/dL 0.55* 0.61  --    GLUCOSE mg/dL 103* 128*  --    CALCIUM mg/dL 9.9 9.7  --    ALT (SGPT) U/L 65* 69*  --    AST (SGOT) U/L 86* 80*  --    TROPONIN I ng/mL  --   --  0.039         Imaging Results (last 24 hours)     ** No results found for the last 24 hours. **        Results for orders placed during the hospital  "encounter of 07/16/18   Adult Transthoracic Echo Complete W/ Cont if Necessary Per Protocol    Addendum · Estimated LVEF = 50%. · The following left ventricular wall segments are hypokinetic: mid  anterior, apical septal and mid anteroseptal. · The cardiac valves are anatomically and functionally normal.        Aubrey Jiménez MD 7/16/2018  6:05 PM          Narrative · Left ventricular systolic function is normal. Estimated EF = 50%.  · The cardiac valves are anatomically and functionally normal.          I have reviewed the medications.    aspirin 81 mg Oral Daily   atorvastatin 80 mg Oral Nightly   clopidogrel 75 mg Oral Daily   enoxaparin 40 mg Subcutaneous Q24H   famotidine 20 mg Oral BID   glycopyrrolate 1 mg Oral TID   lactobacillus acidophilus 1 capsule Oral Daily   lactulose 10 g Oral Daily   levETIRAcetam 250 mg Nasogastric Q12H   metroNIDAZOLE 500 mg Nasogastric Q8H   piperacillin-tazobactam 4.5 g Intravenous Q8H   thiamine 100 mg Oral Daily   Valproic Acid 250 mg Nasogastric Q6H         Assessment/Plan   Assessment / Plan     Hospital Problem List     ETOH abuse    Anoxic brain damage     H/O drug abuse    Pulmonary infiltrates - pneumonia vs. aspiration pneumonitis vs. pulmonary edema.     Overview Signed 7/16/2018  7:42 AM by Ruslan Estrella MD     - pneumonia vs. aspiration pneumonitis vs. pulmonary edema.          Acute urinary retention    NMS (neuroleptic malignant syndrome)    Chronic hepatitis C without hepatic coma (CMS/HCC)           Brief Hospital Course to date:  Otilia Holm is a 37 y.o. female with relevant PMH of Alcoholism (drinks four \"4-Locos\" or 12 beers a day), drug abuse, who apparently stopped by a \"known drug dealers house\" on the way to work.  Became unresponsive.  Found unconscious and unresponsive in her car in [facility] parking lot.  Received narcan with no response.  EMS nasally intubated in field.  On arrival to [facility] found to have Bicarb 17 w/ AG 20, Ethanol level of " 218.  Per [facility] ER note there was a period of 8-10 minutes when patient stopped talking before security / EMS began administering treatment.  Reportedly had prolonged seizure activity at OSH.  Transferred to [facility] on 7/16/2018 for further management. Since admission at [facility], pt had undergone tracheostomy and PEG.     Assessment & Plan:    Fever  - sputum with strep  - Continuing zosyn, flagyl    Also cont mild increase in LFTs.- HO Etoh and HCV (normal ammonia)    Normal procalcitonin  - Reducing neuro meds... On Keppra and Depakote weaning per neurology    H/o drug and alcohol abuse  - continue thiamine    Dysphagia  - tube feeds as tolerated  - increased bowel regimen for constipation    Guarded prognosis.  Does not appear to be improving  Having fevers on Abx   Palliative Medicine Evaluation for goals of care    DVT Prophylaxis:  Lovenox 40 mg daily    CODE STATUS:   Code Status and Medical Interventions:   Ordered at: 07/16/18 0620     Code Status:    CPR     Medical Interventions (Level of Support Prior to Arrest):    Full     Disposition: I expect the patient to be discharged TBD\    Jayesh Santana MD  08/12/18  2:03 PM

## 2018-08-12 NOTE — PLAN OF CARE
Problem: Patient Care Overview  Goal: Plan of Care Review  Outcome: Ongoing (interventions implemented as appropriate)   08/12/18 5720   Coping/Psychosocial   Plan of Care Reviewed With son   Plan of Care Review   Progress no change   OTHER   Outcome Summary palliative care reconsulted for GOC. APRN spoke to son Cortes. discussed code status and whether pt would want to live in current status. discussed tube feeds. He states he would need to talk with his brother. continue goals discussion. pt having spastic restless episodes with elevated heartrate, dialation of pupils.
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
## 2018-08-12 NOTE — PLAN OF CARE
Problem: Patient Care Overview  Goal: Plan of Care Review   08/12/18 9552   Coping/Psychosocial   Plan of Care Reviewed With patient   Plan of Care Review   Progress declining   OTHER   Outcome Summary Patient restless and irritable with increased tachycardia today. Fever elevated toward the end of shift. Discussed with palliative team and neurology today. VSS. Will continue to monitor.

## 2018-08-12 NOTE — PLAN OF CARE
Problem: Patient Care Overview  Goal: Plan of Care Review  Outcome: Ongoing (interventions implemented as appropriate)   08/12/18 9722   Coping/Psychosocial   Plan of Care Reviewed With patient   Plan of Care Review   Progress no change   OTHER   Outcome Summary Pt restless; tolerated BUE PROM. Washcloth rolls placed in pt's hands to reduce risk of skin breakdown. Tolerated tx. Will continue per OT POC.

## 2018-08-12 NOTE — CONSULTS
"Palliative Care Progress Note  Re consult by Dr Jayesh Santana  For ACP, goc discussion  Date of Admission: 7/16/2018    Code Status:   Current Code Status     Date Active Code Status Order ID Comments User Context       7/16/2018  6:20 AM CPR [id number]  Alec Menjivar APRN Inpatient       Questions for Current Code Status     Question Answer Comment    Code Status CPR     Medical Interventions (Level of Support Prior to Arrest) Full         Advance Directive: no living will  Surrogate decision maker: son, Cortes Holm    Subjective:    37 y.o. female with relevant PMH of Alcoholism (drinks four \"4-Locos\" or 12 beers a day), drug abuse, who apparently stopped by a \"known drug dealers house\" on the way to work.  Became unresponsive.  Found unconscious and unresponsive in her car in [facility] parking lot.  Received narcan with no response.  EMS nasally intubated in field.  On arrival to [facility] found to have Bicarb 17 w/ AG 20, Ethanol level of 218.  Per [facility] ER note there was a period of 8-10 minutes when patient stopped talking before security / EMS began administering treatment.  Reportedly had prolonged seizure activity at OSH.  Transferred to [facility] on 7/16/2018 for further management. Since admission at [facility], pt had undergone tracheostomy and PEG, weaned off the ventilator and transferred out of the ICU on 7/28/18.  Patient has had a complicated hospital course with suspected to have hypoxic encephalopathy, became febrile on 8/5/18, patient had been started on dantrolene for possible malignant hyperthermia. Dantrolene was discontinued on 8/10/18, noted increase in LFTs. Neurology tapered dose of keppra from 1000mg to 250mg bid on 8/8/18.   Patient has become more restless with more of autonomic dystonia symptoms of diaphoresis, hyperthermia, posturing, tachypnea and tachycardia. Noted increase of ativan 2mg prn, cumulative dose on 8/11/18 of 14mg.   Patient is known to palliative medicine with initial consult " "earlier in the hospitalization, signed off around 7/29.   Reviewed current scheduled and prn medications for route, type, dose, and frequency.    dextrose 5 % and sodium chloride 0.45 % with KCl 20 mEq/L 75 mL/hr Last Rate: 75 mL/hr (08/11/18 1843)     •  acetaminophen  •  acetaminophen  •  acetaminophen  •  bisacodyl  •  ipratropium-albuterol  •  LORazepam  •  magnesium sulfate **OR** magnesium sulfate **OR** magnesium sulfate  •  ondansetron  •  potassium chloride **OR** potassium chloride **OR** [DISCONTINUED] potassium chloride  •  sodium chloride  •  sodium chloride    Objective:  PPS 20% BP 91/58 (BP Location: Left arm, Patient Position: Lying)   Pulse 115   Temp 98.5 °F (36.9 °C) (Core)   Resp 20   Ht 144.8 cm (57.01\")   Wt 66.4 kg (146 lb 6.4 oz)   LMP 07/16/2018   SpO2 96%   BMI 31.67 kg/m²    Intake & Output (last day)       08/11 0701 - 08/12 0700 08/12 0701 - 08/13 0700    I.V. (mL/kg) 1173 (17.7)     Other 494     NG/GT 1336     Total Intake(mL/kg) 3003 (45.2)     Urine (mL/kg/hr) 2450 (1.5)     Total Output 2450      Net +553                Lab Results (last 24 hours)     Procedure Component Value Units Date/Time    Respiratory Culture - Aspirate, ET Suction [[id number]] Collected:  08/10/18 0919    Specimen:  Aspirate from ET Suction Updated:  08/12/18 0701     Respiratory Culture Light growth (2+) Normal Respiratory Sima     Gram Stain Result Many (4+) WBCs per low power field      Few (2+) Epithelial cells per low power field      Few (2+) Gram positive cocci in pairs      Few (2+) Gram positive cocci in clusters      Rare (1+) Gram positive bacilli    POC Glucose Once [[id number]]  (Normal) Collected:  08/12/18 0545    Specimen:  Blood Updated:  08/12/18 0547     Glucose 91 mg/dL     Narrative:       Meter: [id number] : [id number] Venkata Ayala    POC Glucose Once [[id number]]  (Normal) Collected:  08/12/18 0046    Specimen:  Blood Updated:  08/12/18 0047     Glucose 126 mg/dL     " Narrative:       Meter: [id number] : [id number] Venkata Ayala    POC Glucose Once [[id number]]  (Normal) Collected:  08/11/18 1829    Specimen:  Blood Updated:  08/11/18 1842     Glucose 102 mg/dL     Narrative:       Meter: [id number] : [id number] Gage Moyer    POC Glucose Once [[id number]]  (Normal) Collected:  08/11/18 1228    Specimen:  Blood Updated:  08/11/18 1229     Glucose 128 mg/dL     Narrative:       Meter: [id number] : [id number] Howard Anthonyfer EDIN        Imaging Results (last 24 hours)     ** No results found for the last 24 hours. **          Physical Exam:  Gen: NAD, resting in bed  Skin: warm, dry   Eyes: MIRNA, conjunctiva clear and moist   HEENT: oropharynx clear, trach  Resp/thorax: even effort, non labored, CTA   CV: RRR   ABD: soft, bowel sounds +, non distended  : calderon to bedside drainage with clear light urine  Ext: no edema, no redness, increase flexion with right extremities with increase tone  Neuro: eyes closed, unresponsive to name or touch, interactive, no myoclonus     Reviewed labs and diagnostic results.   No results found for: HGBA1C    Results from last 7 days  Lab Units 08/07/18  0532   WBC 10*3/mm3 10.33   HEMOGLOBIN g/dL 11.1*   HEMATOCRIT % 35.7   PLATELETS 10*3/mm3 563*       Results from last 7 days  Lab Units 08/07/18  0532   SODIUM mmol/L 144   POTASSIUM mmol/L 4.3   CHLORIDE mmol/L 111*   CO2 mmol/L 25.0   BUN mg/dL 12   CREATININE mg/dL 0.55*   CALCIUM mg/dL 9.9   BILIRUBIN mg/dL 0.4   ALK PHOS U/L 74   ALT (SGPT) U/L 65*   AST (SGOT) U/L 86*   GLUCOSE mg/dL 103*       Impression: 37 y.o. female with severe hypoxic encephalopathy    Plan:   Restlessness, increase tone - may consider baclofen scheduled with discontinuance of dantrolene, should help with some restlessness.     Goals of care discussion - called and spoke to son, Cortes, over the phone.  He lives in [facility] and is not able to visit the hospital very often.   Reviewed current clinical status  "and changes with seizure med regimen, symptom management.   Informed the son that he is able to make decisions to stop and start interventions and treatments based on what he would know what would be his mother's wishes and what would be important to her quality of life. What would be an existence that she would want.  He stated that he knew his mother would not want \" to live on machines\"  When asked about stopping tube feed, he stated that he would have to talk to his brother before making that decision.   No decisions made with this conversation over the phone.   No change in current plan of care.       Time: 60 minutes spent reviewing medical and medication records, assessing and examining patient, discussing with patient, family and nursing staff, answering questions, formulating a plan and documentation of care. > 50% time spent face to face       Yue Garcia, KATIA  [phone]  08/12/18  10:04 AM      "

## 2018-08-12 NOTE — NURSING NOTE
Notified Dr. Baig (on call for neuro) that patient has increased restlessness today.  She has had many episodes of rhythmic flexion and relaxation of her RUE and RLE that resembles seizure-like activity which occurs concurrently with increased tachycardia and pupil dilation.  Dr. Baig suggested trial increase of Keppra to see if this helps these symptoms.  Will continue to monitor.   ambulatory

## 2018-08-13 LAB
ALBUMIN SERPL-MCNC: 3.75 G/DL (ref 3.2–4.8)
ALP SERPL-CCNC: 68 U/L (ref 25–100)
ALT SERPL W P-5'-P-CCNC: 59 U/L (ref 7–40)
ANION GAP SERPL CALCULATED.3IONS-SCNC: 9 MMOL/L (ref 3–11)
AST SERPL-CCNC: 86 U/L (ref 0–33)
BILIRUB SERPL-MCNC: 0.2 MG/DL (ref 0.3–1.2)
BUN BLD-MCNC: 11 MG/DL (ref 9–23)
CALCIUM SPEC-SCNC: 9.2 MG/DL (ref 8.7–10.4)
CHLORIDE SERPL-SCNC: 110 MMOL/L (ref 99–109)
CHOLEST SERPL-MCNC: 121 MG/DL (ref 0–200)
CO2 SERPL-SCNC: 24 MMOL/L (ref 20–31)
CREAT BLD-MCNC: 0.52 MG/DL (ref 0.6–1.3)
CRP SERPL-MCNC: 0.66 MG/DL (ref 0–1)
GLUCOSE BLD-MCNC: 130 MG/DL (ref 70–100)
GLUCOSE BLDC GLUCOMTR-MCNC: 105 MG/DL (ref 70–130)
GLUCOSE BLDC GLUCOMTR-MCNC: 115 MG/DL (ref 70–130)
GLUCOSE BLDC GLUCOMTR-MCNC: 137 MG/DL (ref 70–130)
GLUCOSE BLDC GLUCOMTR-MCNC: 96 MG/DL (ref 70–130)
MAGNESIUM SERPL-MCNC: 2.3 MG/DL (ref 1.3–2.7)
PHOSPHATE SERPL-MCNC: 3.1 MG/DL (ref 2.4–5.1)
POTASSIUM BLD-SCNC: 3.8 MMOL/L (ref 3.5–5.5)
PREALB SERPL-MCNC: 23.5 MG/DL (ref 10–40)
PROT SERPL-MCNC: 6.8 G/DL (ref 5.7–8.2)
SODIUM BLD-SCNC: 143 MMOL/L (ref 132–146)
TRIGL SERPL-MCNC: 122 MG/DL (ref 0–150)

## 2018-08-13 PROCEDURE — 25010000002 ENOXAPARIN PER 10 MG: Performed by: INTERNAL MEDICINE

## 2018-08-13 PROCEDURE — 25010000002 LORAZEPAM PER 2 MG: Performed by: HOSPITALIST

## 2018-08-13 PROCEDURE — 82962 GLUCOSE BLOOD TEST: CPT

## 2018-08-13 PROCEDURE — 25010000002 LORAZEPAM PER 2 MG: Performed by: INTERNAL MEDICINE

## 2018-08-13 PROCEDURE — 97110 THERAPEUTIC EXERCISES: CPT

## 2018-08-13 PROCEDURE — 84478 ASSAY OF TRIGLYCERIDES: CPT

## 2018-08-13 PROCEDURE — 25010000002 ONDANSETRON PER 1 MG: Performed by: FAMILY MEDICINE

## 2018-08-13 PROCEDURE — 80053 COMPREHEN METABOLIC PANEL: CPT

## 2018-08-13 PROCEDURE — 84100 ASSAY OF PHOSPHORUS: CPT

## 2018-08-13 PROCEDURE — 99233 SBSQ HOSP IP/OBS HIGH 50: CPT | Performed by: HOSPITALIST

## 2018-08-13 PROCEDURE — 84134 ASSAY OF PREALBUMIN: CPT

## 2018-08-13 PROCEDURE — 94760 N-INVAS EAR/PLS OXIMETRY 1: CPT

## 2018-08-13 PROCEDURE — 83735 ASSAY OF MAGNESIUM: CPT

## 2018-08-13 PROCEDURE — 86140 C-REACTIVE PROTEIN: CPT

## 2018-08-13 PROCEDURE — 82465 ASSAY BLD/SERUM CHOLESTEROL: CPT

## 2018-08-13 PROCEDURE — 94799 UNLISTED PULMONARY SVC/PX: CPT

## 2018-08-13 RX ORDER — SODIUM CHLORIDE 9 MG/ML
50 INJECTION, SOLUTION INTRAVENOUS CONTINUOUS
Status: DISCONTINUED | OUTPATIENT
Start: 2018-08-13 | End: 2018-08-16

## 2018-08-13 RX ORDER — HYDROMORPHONE HYDROCHLORIDE 1 MG/ML
0.5 INJECTION, SOLUTION INTRAMUSCULAR; INTRAVENOUS; SUBCUTANEOUS
Status: DISCONTINUED | OUTPATIENT
Start: 2018-08-13 | End: 2018-08-15

## 2018-08-13 RX ORDER — LORAZEPAM 2 MG/ML
1 INJECTION INTRAMUSCULAR EVERY 4 HOURS PRN
Status: DISCONTINUED | OUTPATIENT
Start: 2018-08-13 | End: 2018-08-25 | Stop reason: HOSPADM

## 2018-08-13 RX ADMIN — FAMOTIDINE 20 MG: 20 TABLET ORAL at 20:31

## 2018-08-13 RX ADMIN — ACETAMINOPHEN 650 MG: 650 SOLUTION ORAL at 10:06

## 2018-08-13 RX ADMIN — ONDANSETRON 4 MG: 2 INJECTION INTRAMUSCULAR; INTRAVENOUS at 01:56

## 2018-08-13 RX ADMIN — LEVETIRACETAM 500 MG: 100 SOLUTION ORAL at 10:07

## 2018-08-13 RX ADMIN — GLYCOPYRROLATE 1 MG: 1 TABLET ORAL at 10:08

## 2018-08-13 RX ADMIN — ATORVASTATIN CALCIUM 80 MG: 40 TABLET, FILM COATED ORAL at 20:31

## 2018-08-13 RX ADMIN — LACTULOSE 10 G: 10 SOLUTION ORAL at 10:06

## 2018-08-13 RX ADMIN — LORAZEPAM 2 MG: 2 INJECTION INTRAMUSCULAR; INTRAVENOUS at 10:10

## 2018-08-13 RX ADMIN — Medication 100 MG: at 10:07

## 2018-08-13 RX ADMIN — Medication 1 CAPSULE: at 10:09

## 2018-08-13 RX ADMIN — VALPROIC ACID 250 MG: 250 SOLUTION ORAL at 18:54

## 2018-08-13 RX ADMIN — LORAZEPAM 1 MG: 2 INJECTION INTRAMUSCULAR; INTRAVENOUS at 20:31

## 2018-08-13 RX ADMIN — VALPROIC ACID 250 MG: 250 SOLUTION ORAL at 06:36

## 2018-08-13 RX ADMIN — POTASSIUM CHLORIDE, DEXTROSE MONOHYDRATE AND SODIUM CHLORIDE 75 ML/HR: 150; 5; 450 INJECTION, SOLUTION INTRAVENOUS at 16:59

## 2018-08-13 RX ADMIN — ONDANSETRON 4 MG: 2 INJECTION INTRAMUSCULAR; INTRAVENOUS at 20:31

## 2018-08-13 RX ADMIN — ACETAMINOPHEN 650 MG: 650 SOLUTION ORAL at 20:30

## 2018-08-13 RX ADMIN — METRONIDAZOLE 500 MG: 500 TABLET ORAL at 20:30

## 2018-08-13 RX ADMIN — CLOPIDOGREL BISULFATE 75 MG: 75 TABLET ORAL at 10:06

## 2018-08-13 RX ADMIN — ONDANSETRON 4 MG: 2 INJECTION INTRAMUSCULAR; INTRAVENOUS at 10:10

## 2018-08-13 RX ADMIN — ACETAMINOPHEN 650 MG: 650 SOLUTION ORAL at 01:56

## 2018-08-13 RX ADMIN — LORAZEPAM 2 MG: 2 INJECTION INTRAMUSCULAR; INTRAVENOUS at 01:56

## 2018-08-13 RX ADMIN — GLYCOPYRROLATE 1 MG: 1 TABLET ORAL at 15:36

## 2018-08-13 RX ADMIN — FAMOTIDINE 20 MG: 20 TABLET ORAL at 10:07

## 2018-08-13 RX ADMIN — ACETAMINOPHEN 650 MG: 650 SOLUTION ORAL at 15:36

## 2018-08-13 RX ADMIN — POTASSIUM CHLORIDE, DEXTROSE MONOHYDRATE AND SODIUM CHLORIDE 75 ML/HR: 150; 5; 450 INJECTION, SOLUTION INTRAVENOUS at 02:49

## 2018-08-13 RX ADMIN — METRONIDAZOLE 500 MG: 500 TABLET ORAL at 06:36

## 2018-08-13 RX ADMIN — METRONIDAZOLE 500 MG: 500 TABLET ORAL at 15:35

## 2018-08-13 RX ADMIN — ENOXAPARIN SODIUM 40 MG: 100 INJECTION SUBCUTANEOUS at 10:05

## 2018-08-13 RX ADMIN — SODIUM CHLORIDE 100 ML/HR: 9 INJECTION, SOLUTION INTRAVENOUS at 18:54

## 2018-08-13 RX ADMIN — LEVETIRACETAM 500 MG: 100 SOLUTION ORAL at 20:31

## 2018-08-13 RX ADMIN — VALPROIC ACID 250 MG: 250 SOLUTION ORAL at 01:56

## 2018-08-13 RX ADMIN — ASPIRIN 81 MG CHEWABLE TABLET 81 MG: 81 TABLET CHEWABLE at 10:07

## 2018-08-13 RX ADMIN — VALPROIC ACID 250 MG: 250 SOLUTION ORAL at 12:43

## 2018-08-13 NOTE — PLAN OF CARE
Problem: Patient Care Overview  Goal: Plan of Care Review  Outcome: Ongoing (interventions implemented as appropriate)   08/13/18 1745   Coping/Psychosocial   Plan of Care Reviewed With patient   Plan of Care Review   Progress no change   OTHER   Outcome Summary Pt unresponsive to commands during PT session. PROM performed with repositioning in bed and yankauer to outside of trach collar by RN. PT to reassess appropriateness of care with progress report.

## 2018-08-13 NOTE — PLAN OF CARE
Problem: Patient Care Overview  Goal: Interprofessional Rounds/Family Conf  Outcome: Ongoing (interventions implemented as appropriate)  Palliative Team Attendance 1300. Stephanie BALDWIN, Yue MONTALVO, Chris Biggs Shellie Hall LCSW, Betsey Garcia RN [facility],    08/13/18 1300   Interdisciplinary Rounds/Family Conf   Summary pt is more relaxed today. having episodes of restlessness. doesn't respond to painful stimuli. continue goals discussion. pt remains tachycardic.

## 2018-08-13 NOTE — THERAPY TREATMENT NOTE
Acute Care - Physical Therapy Treatment Note  [facility]     Patient Name: Otilia Holm  : 1980  MRN: [id number]  Today's Date: 2018  Onset of Illness/Injury or Date of Surgery: 18  Date of Referral to PT: 18  Referring Physician: MD Jerry    Admit Date: 2018    Visit Dx:    ICD-10-CM ICD-9-CM   1. Respiratory arrest (CMS/HCC) R09.2 799.1   2. Encephalopathy G93.40 348.30   3. Elevated troponin R74.8 790.6   4. Impaired functional mobility, balance, gait, and endurance Z74.09 V49.89   5. Impaired mobility and ADLs Z74.09 799.89     Patient Active Problem List   Diagnosis   • ETOH abuse   • Anoxic brain damage    • H/O drug abuse   • Pulmonary infiltrates - pneumonia vs. aspiration pneumonitis vs. pulmonary edema.    • Acute urinary retention   • NMS (neuroleptic malignant syndrome)   • Chronic hepatitis C without hepatic coma (CMS/HCC)       Therapy Treatment          Rehabilitation Treatment Summary     Row Name 18 1338             Treatment Time/Intention    Discipline physical therapist  -      Document Type therapy note (daily note)  -      Mode of Treatment physical therapy  -      Patient/Family Observations Pt in fowlers, had slid down in bed with RLE flexed, LLE extended. no family present  -      Care Plan Review care plan/treatment goals reviewed  -      Patient Effort other (see comments)   pt unable to follow  commands  -      Existing Precautions/Restrictions cardiac;oxygen therapy device and L/min;other (see comments)   trach collar;anox brain injury;feeding tube;multipodus boots  -      Recorded by [] Rachell Stanley, PT 18 1407      Row Name 18 8184             Cognitive Assessment/Intervention- PT/OT    Affect/Mental Status (Cognitive) unresponsive  -      Orientation Status (Cognition) unable/difficult to assess   no verbilzation  -      Cognitive Assessment/Intervention Comment pt has eyes open for ~2-3 seconds when PT  enters, closes eyes after that.  -EH      Recorded by [] Rachell Stanley, PT 08/13/18 1407      Row Name 08/13/18 1338             Safety Issues, Functional Mobility    Safety Issues Affecting Function (Mobility) ability to follow commands  -EH      Recorded by [] Rachell Stanley, PT 08/13/18 1407      Row Name 08/13/18 1338             Bed Mobility Assessment/Treatment    Bed Mobility Assessment/Treatment scooting/bridging  -      Scooting/Bridging Orange (Bed Mobility) maximum assist (25% patient effort);2 person assist  -EH      Recorded by [] Rachell Stanley, PT 08/13/18 1407      Row Name 08/13/18 1338             Transfer Assessment/Treatment    Comment (Transfers) unable to assess  -EH      Recorded by [] Rachell Stanley, PT 08/13/18 1407      Row Name 08/13/18 1338             General Assessment (Manual Muscle Testing)    General Manual Muscle Testing (MMT) Assessment --  -EH      Recorded by [] Rachell Stanley, PT 08/13/18 1407      Row Name 08/13/18 1338             Therapeutic Exercise    86807 - PT Therapeutic Exercise Minutes 15  -EH      60131 - PT Therapeutic Activity Minutes 2  -EH      Recorded by [] Rachell Stanley, PT 08/13/18 1407      Row Name 08/13/18 1338             Lower Extremity Supine Therapeutic Exercise    Performed, Supine Lower Extremity (Therapeutic Exercise) hip flexion/extension;hip external/internal rotation;hip abduction/adduction;knee flexion/extension;ankle dorsiflexion/plantarflexion   gastroc strech  -      Exercise Type, Supine Lower Extremity (Therapeutic Exercise) AROM (active range of motion)  -EH      Recorded by [] Rachell Stanley, PT 08/13/18 1407      Row Name 08/13/18 1338             Positioning and Restraints    Pre-Treatment Position in bed  -EH      Post Treatment Position bed  -EH      In Bed notified nsg;supine;call light within reach;waffle boots/both   LEs positioned with hips/knees straight; wash cloths  BUEs  -EH      Recorded by [] Rachell Stanley, PT 08/13/18 1407      Row Name 08/13/18 1338             Pain Scale: FACES Pre/Post-Treatment    Pain: FACES Scale, Pretreatment 0-->no hurt  -EH      Pain: FACES Scale, Post-Treatment 0-->no hurt  -EH      Pre/Post Treatment Pain Comment tolerated, no grimace  -EH      Recorded by [] Rachell Stanley, PT 08/13/18 1407      Row Name                Wound 08/06/18 1005 Left lateral other (see notes) laceration;skin tear    Wound - Properties Group Date first assessed: 08/06/18 [AS] Time first assessed: 1005 [AS] Present On Admission : no [AS] Side: Left [AS] Orientation: lateral [AS] Location: other (see notes) [AS], trach site   Type: laceration;skin tear [AS] Additional Comments: from sutures  [AS] Recorded by:  [AS] Jayesh Rojas RN 08/06/18 1149    Row Name 08/13/18 1338             Coping    Observed Emotional State flat  -EH      Recorded by [] Rachell Stanley, PT 08/13/18 1407      Row Name 08/13/18 1338             Plan of Care Review    Plan of Care Reviewed With other (see comments)   pt unable to partcipate.  -EH      Recorded by [] Rachell Stanley, PT 08/13/18 1407      Row Name 08/13/18 1338             Outcome Summary/Treatment Plan (PT)    Daily Summary of Progress (PT) progress toward functional goals is gradual  -EH      Anticipated Discharge Disposition (PT) long term acute care facility  -EH      Recorded by [] Rachell Stanley, PT 08/13/18 1407        User Key  (r) = Recorded By, (t) = Taken By, (c) = Cosigned By    Initials Name Effective Dates Discipline     Rachell Stanley, PT 06/19/15 -  PT    AS Jayesh Rojas, RN 06/16/16 -  Nurse          Wound 08/06/18 1005 Left lateral other (see notes) laceration;skin tear (Active)   Dressing Appearance dry 8/13/2018  8:11 AM   Base dry;scab;moist 8/13/2018  8:11 AM   Periwound dry;intact 8/13/2018  8:11 AM   Periwound Temperature warm 8/13/2018  8:11 AM   Periwound Skin  Turgor soft 8/13/2018  8:11 AM   Edges irregular 8/13/2018  8:11 AM   Drainage Amount none 8/13/2018  8:11 AM   Dressing Care, Wound dressing applied 8/13/2018  8:11 AM             Physical Therapy Education     Title: PT OT SLP Therapies (Active)     Topic: Physical Therapy (Active)     Point: Mobility training (Active)    Learning Progress Summary     Learner Status Readiness Method Response Comment Documented by    Patient Active Nonacceptance E,D NL   08/13/18 1411     Active Acceptance E,D NR   08/07/18 1125          Point: Home exercise program (Active)    Learning Progress Summary     Learner Status Readiness Method Response Comment Documented by    Patient Active Nonacceptance E,D [facility] 08/13/18 1411     Active Acceptance E,D NR   08/07/18 1125          Point: Body mechanics (Active)    Learning Progress Summary     Learner Status Readiness Method Response Comment Documented by    Patient Active Nonacceptance E,D [facility] 08/13/18 1411     Active Acceptance E,D NR   08/07/18 1125          Point: Precautions (Active)    Learning Progress Summary     Learner Status Readiness Method Response Comment Documented by    Patient Active Nonacceptance E,D [facility] 08/13/18 1411     Active Acceptance E,D NR   08/07/18 1125                      User Key     Initials Effective Dates Name Provider Type Discipline     06/19/15 -  Rachell Stanley, PT Physical Therapist PT     06/19/15 -  Batool Haywood, PT Physical Therapist PT                    PT Recommendation and Plan  Anticipated Discharge Disposition (PT): long term acute care facility  Outcome Summary/Treatment Plan (PT)  Daily Summary of Progress (PT): progress toward functional goals is gradual  Anticipated Discharge Disposition (PT): long term acute care facility  Plan of Care Reviewed With: patient  Progress: no change  Outcome Summary: Pt unresponsive to commands during PT session. PROM performed with repositioning in bed and  yankauer  to  outside of trach collar by RN. PT to reassess appropriateness of care with progress report.          Outcome Measures     Row Name 08/13/18 1338 08/12/18 0905          How much help from another person do you currently need...    Turning from your back to your side while in flat bed without using bedrails? 1  -EH  --     Moving from lying on back to sitting on the side of a flat bed without bedrails? 1  -EH  --     Moving to and from a bed to a chair (including a wheelchair)? 1  -EH  --     Standing up from a chair using your arms (e.g., wheelchair, bedside chair)? 1  -EH  --     Climbing 3-5 steps with a railing? 1  -EH  --     To walk in hospital room? 1  -EH  --     AM-PAC 6 Clicks Score 6  -EH  --        How much help from another is currently needed...    Putting on and taking off regular lower body clothing?  -- 1  -TA     Bathing (including washing, rinsing, and drying)  -- 1  -TA     Toileting (which includes using toilet bed pan or urinal)  -- 1  -TA     Putting on and taking off regular upper body clothing  -- 1  -TA     Taking care of personal grooming (such as brushing teeth)  -- 1  -TA     Eating meals  -- 1  -TA     Score  -- 6  -TA        Functional Assessment    Outcome Measure Options AM-PAC 6 Clicks Basic Mobility (PT)  -  --       User Key  (r) = Recorded By, (t) = Taken By, (c) = Cosigned By    Initials Name Provider Type     Rachell Stanley, PT Physical Therapist    Eric Gomez, OT Occupational Therapist           Time Calculation:         PT Charges     Row Name 08/13/18 1338             Time Calculation    Start Time 1338  -      PT Received On 08/13/18  -      PT Goal Re-Cert Due Date 08/17/18  -         Time Calculation- PT    Total Timed Code Minutes- PT 17 minute(s)  -         Timed Charges    24106 - PT Therapeutic Exercise Minutes 15  -EH      07761 - PT Therapeutic Activity Minutes 2  -EH        User Key  (r) = Recorded By, (t) = Taken By, (c) = Cosigned By     Initials Name Provider Type     Rachell Stanley, PT Physical Therapist        Therapy Suggested Charges     Code   Minutes Charges    61931 (CPT®) Hc Pt Neuromusc Re Education Ea 15 Min      42709 (CPT®) Hc Pt Ther Proc Ea 15 Min 15 1    81195 (CPT®) Hc Gait Training Ea 15 Min      34498 (CPT®) Hc Pt Therapeutic Act Ea 15 Min 2     02748 (CPT®) Hc Pt Manual Therapy Ea 15 Min      15242 (CPT®) Hc Pt Iontophoresis Ea 15 Min      43617 (CPT®) Hc Pt Elec Stim Ea-Per 15 Min      95868 (CPT®) Hc Pt Ultrasound Ea 15 Min      84597 (CPT®) Hc Pt Self Care/Mgmt/Train Ea 15 Min      Total  17 1        Therapy Charges for Today     Code Description Service Date Service Provider Modifiers Qty    52857679643 HC PT THER PROC EA 15 MIN 8/13/2018 Rachell Stanley, PT GP 1          PT G-Codes  Outcome Measure Options: AM-PAC 6 Clicks Basic Mobility (PT)    Rachell Stanley, PT  8/13/2018

## 2018-08-13 NOTE — PROGRESS NOTES
[facility]  PROGRESS NOTE    Patient Name: Otilia Holm  : 1980  MRN: [id number]    Date of Admission: 2018  Length of Stay: 28  Primary Care Physician: Provider, No Known    Subjective   Subjective     CC:  Unresponsive with fever    HPI:  Tachycardia persists.  Looks dehydrated.  No family in room today.      Review of Systems  Unable to obtain due to encephalopathy    Objective   Objective     Vital Signs:   Temp:  [97.8 °F (36.6 °C)-99.9 °F (37.7 °C)] 99.9 °F (37.7 °C)  Heart Rate:  [105-133] 105  Resp:  [16-24] 24  BP: (111-141)/(69-92) 117/77        Physical Exam:  NAD  Neck is without mass or JVD  Skin warm  Heart is Reg wo murmur   Lungs are clear wo wheeze or crackle  Abd is soft without HSM or mass, not tender or distended peg intact  Neurologic exam in nonfocal - some hand and foot contractures  Increased tone in her arms more than her legs.  Skin:  Dry, + skin tenting    Results Reviewed:  I have personally reviewed current lab, radiology, and data and agree.      Results from last 7 days  Lab Units 18  0532   WBC 10*3/mm3 10.33   HEMOGLOBIN g/dL 11.1*   HEMATOCRIT % 35.7   PLATELETS 10*3/mm3 563*       Results from last 7 days  Lab Units 18  0343 18  0532   SODIUM mmol/L 143 144   POTASSIUM mmol/L 3.8 4.3   CHLORIDE mmol/L 110* 111*   CO2 mmol/L 24.0 25.0   BUN mg/dL 11 12   CREATININE mg/dL 0.52* 0.55*   GLUCOSE mg/dL 130* 103*   CALCIUM mg/dL 9.2 9.9   ALT (SGPT) U/L 59* 65*   AST (SGOT) U/L 86* 86*         Imaging Results (last 24 hours)     ** No results found for the last 24 hours. **        Results for orders placed during the hospital encounter of 18   Adult Transthoracic Echo Complete W/ Cont if Necessary Per Protocol    Addendum · Estimated LVEF = 50%. · The following left ventricular wall segments are hypokinetic: mid  anterior, apical septal and mid anteroseptal. · The cardiac valves are anatomically and  "functionally normal.        Aubrey Jiménez MD 7/16/2018  6:05 PM          Narrative · Left ventricular systolic function is normal. Estimated EF = 50%.  · The cardiac valves are anatomically and functionally normal.          I have reviewed the medications.    aspirin 81 mg Oral Daily   atorvastatin 80 mg Oral Nightly   clopidogrel 75 mg Oral Daily   enoxaparin 40 mg Subcutaneous Q24H   famotidine 20 mg Oral BID   lactobacillus acidophilus 1 capsule Oral Daily   lactulose 10 g Oral Daily   levETIRAcetam 500 mg Per PEG Tube Q12H   metroNIDAZOLE 500 mg Nasogastric Q8H   thiamine 100 mg Oral Daily   Valproic Acid 250 mg Nasogastric Q6H         Assessment/Plan   Assessment / Plan     Hospital Problem List     ETOH abuse    Anoxic brain damage     H/O drug abuse    Pulmonary infiltrates - pneumonia vs. aspiration pneumonitis vs. pulmonary edema.     Overview Signed 7/16/2018  7:42 AM by Ruslan Estrella MD     - pneumonia vs. aspiration pneumonitis vs. pulmonary edema.          Acute urinary retention    NMS (neuroleptic malignant syndrome)    Chronic hepatitis C without hepatic coma (CMS/HCC)           Brief Hospital Course to date:  Otilia Holm is a 37 y.o. female with relevant PMH of Alcoholism (drinks four \"4-Locos\" or 12 beers a day), drug abuse, who apparently stopped by a \"known drug dealers house\" on the way to work.  Became unresponsive.  Found unconscious and unresponsive in her car in [facility] parking lot.  Received narcan with no response.  EMS nasally intubated in field.  On arrival to [facility] found to have Bicarb 17 w/ AG 20, Ethanol level of 218.  Per [facility] ER note there was a period of 8-10 minutes when patient stopped talking before security / EMS began administering treatment.  Reportedly had prolonged seizure activity at OSH.  Transferred to [facility] on 7/16/2018 for further management. Since admission at [facility], pt had undergone tracheostomy and PEG.     Assessment & Plan:    Fever  - sputum with " strep  - Continuing zosyn, flagyl    Also cont mild increase in LFTs.- HO Etoh and HCV (normal ammonia)    Normal procalcitonin  - Reducing neuro meds... On Keppra and Depakote weaning per neurology    H/o drug and alcohol abuse  - continue thiamine    Dysphagia  - tube feeds as tolerated  - increased bowel regimen for constipation    Persistent Tachycardia  - stop standing Robinul (common reaction - tachycardia; arrhythmias)    Guarded prognosis.  Does not appear to be improving  Was having fevers on Abx     Change IV fluids today  Palliative Medicine Evaluation for goals of care    DVT Prophylaxis:  Lovenox 40 mg daily    CODE STATUS:   Code Status and Medical Interventions:   Ordered at: 07/16/18 0620     Code Status:    CPR     Medical Interventions (Level of Support Prior to Arrest):    Full     Disposition: I expect the patient to be discharged to long term care / complex discharge planning    Jayesh Santana MD  08/13/18  5:26 PM

## 2018-08-13 NOTE — PLAN OF CARE
Problem: Patient Care Overview  Goal: Plan of Care Review  Outcome: Ongoing (interventions implemented as appropriate)   08/12/18 7755   Coping/Psychosocial   Plan of Care Reviewed With other (see comments)  (patient unable to participate in education)   Plan of Care Review   Progress no change   OTHER   Outcome Summary patient was restless until trach suctioning and prn medications      Goal: Individualization and Mutuality  Outcome: Ongoing (interventions implemented as appropriate)    Goal: Discharge Needs Assessment  Outcome: Ongoing (interventions implemented as appropriate)    Goal: Interprofessional Rounds/Family Conf  Outcome: Ongoing (interventions implemented as appropriate)

## 2018-08-13 NOTE — PROGRESS NOTES
Continued Stay Note   [place]     Patient Name: Otilia Holm  MRN: [id number]  Today's Date: 8/13/2018    Admit Date: 7/16/2018          Discharge Plan     Row Name 08/13/18 1319       Plan    Plan LTC     Plan Comments Complex Care following for long term care bed as medicaid pending. Currently, no fever and suctioning decreasing to one or two times per shift. Patient still tachycardiac. Plan to contact [facility] in am to see if they are still interested in patient. Marialuisa @ [phone]               Discharge Codes    No documentation.       Expected Discharge Date and Time     Expected Discharge Date Expected Discharge Time    Aug 17, 2018             Zahraa Jay RN

## 2018-08-13 NOTE — PROGRESS NOTES
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 08/06/18 1800 08/10/18 1252    08/05/18 1436  piperacillin-tazobactam (ZOSYN) 4.5 g in iso-osmotic dextrose 100 mL IVPB (premix)     Ordering Provider:  Dayday Jacobs MD    4.5 g  over 4 Hours Intravenous Every 8 Hours 08/05/18 2130 08/12/18 1613    08/05/18 1436  piperacillin-tazobactam (ZOSYN) 4.5 g in iso-osmotic dextrose 100 mL IVPB (premix)     Ordering Provider:  Dayday Jacobs MD    4.5 g  over 30 Minutes Intravenous Once 08/05/18 1530 08/05/18 1536    08/05/18 1441  vancomycin 1250 mg/250 mL 0.9% NS IVPB ([facility])     Ordering Provider:  Sofia Hardy, RPH    20 mg/kg × 62.8 kg  over 90 Minutes Intravenous Once 08/05/18 1530 08/05/18 1728    07/29/18 1946  vancomycin 1500 mg/500 mL 0.9% NS IVPB ([facility])     Ordering Provider:  Elisabeth Benites APRN    25 mg/kg × 64.9 kg  333.3 mL/hr  "over 90 Minutes Intravenous Once 07/29/18 2100 07/29/18 2156    07/29/18 0527  piperacillin-tazobactam (ZOSYN) 4.5 g in iso-osmotic dextrose 100 mL IVPB (premix)     Ordering Provider:  Gloria Ochoa APRN    4.5 g  over 30 Minutes Intravenous Once 07/29/18 0615 07/29/18 0707    07/25/18 1653  ceFAZolin in dextrose (ANCEF) IVPB solution 2 g     Ordering Provider:  Segundo Galicia MD    2 g  over 30 Minutes Intravenous Once 07/26/18 0930 07/26/18 0958    07/17/18 1008  ampicillin-sulbactam (UNASYN) 3 g in sodium chloride 0.9 % 100 mL IVPB-MBP     Ordering Provider:  Jani Sutton MD    3 g  over 60 Minutes Intravenous Every 6 Hours Scheduled 07/17/18 1100 07/23/18 0631    07/16/18 1034  vancomycin 1750 mg/500 mL 0.9% NS IVPB ([facility])     Ordering Provider:  Jani Sutton MD    25 mg/kg × 65 kg  over 120 Minutes Intravenous Once 07/16/18 1200 07/16/18 1630    07/16/18 0737  piperacillin-tazobactam (ZOSYN) 4.5 g in iso-osmotic dextrose 100 mL IVPB (premix)     Ordering Provider:  Ruslan Estrella MD    4.5 g  over 30 Minutes Intravenous Once 07/16/18 0815 07/16/18 0918          Allergies:  has No Known Allergies.      Review of Systems: All other reviewed and negative except as per HPI    /77 (BP Location: Left arm, Patient Position: Lying)   Pulse 105   Temp 99.9 °F (37.7 °C) (Oral)   Resp 24   Ht 144.8 cm (57.01\")   Wt 66.4 kg (146 lb 6.4 oz)   LMP 07/16/2018   SpO2 100%   BMI 31.67 kg/m²   Temp:  [97.8 °F (36.6 °C)-99.9 °F (37.7 °C)] 99.9 °F (37.7 °C)    GENERAL:  Unresponsive, s/p trach and peg  HEENT: Oropharynx without thrush. .. No cervical adenopathy. No neck masses Neck supple  EYES: pupils poorly reactive to light. No conjunctival injection. No icterus.   LYMPHATICS: No lymphadenopathy of the neck or axillary or inguinal regions.   HEART: No murmur, gallop, or pericardial friction rub.   LUNGS:decreased bs at bases. No respiratory distress  ABDOMEN: Soft, " nontender, nondistended. PEG site w/o erythema or drainage No appreciable HSM.    SKIN: Warm and dry without cutaneous eruptions.    Neuro:unresponsive; muscle tone stiff  EXT:  No cellulitic change       DIAGNOSTICS:  Lab Results   Component Value Date    WBC 10.33 08/07/2018    HGB 11.1 (L) 08/07/2018    HCT 35.7 08/07/2018     (H) 08/07/2018     Lab Results   Component Value Date    CRP 0.66 08/13/2018     Lab Results   Component Value Date    SEDRATE 80 (H) 07/25/2018     Lab Results   Component Value Date    GLUCOSE 130 (H) 08/13/2018    BUN 11 08/13/2018    CREATININE 0.52 (L) 08/13/2018    EGFRIFNONA 124 08/07/2018    BCR 21.8 08/07/2018    CO2 24.0 08/13/2018    CALCIUM 9.2 08/13/2018    ALBUMIN 3.75 08/13/2018    AST 86 (H) 08/13/2018    ALT 59 (H) 08/13/2018       Microbiology:  Blood Culture   Date Value Ref Range Status   08/05/2018 No growth at 3 days  Preliminary   08/05/2018 No growth at 3 days  Preliminary              MRSA SCREEN CX   Date Value Ref Range Status   08/06/2018   Final    No Methicillin Resistant Staphylococcus aureus isolated        Urine Culture   Date Value Ref Range Status   08/06/2018 No growth at 2 days  Final        Lab Results   Component Value Date    BLOODCX No growth at 5 days 08/05/2018    BLOODCX No growth at 5 days 08/05/2018     Lab Results   Component Value Date    URINECX No growth at 2 days 08/06/2018       RADIOLOGY:  Imaging Results (last 72 hours)     Procedure Component Value Units Date/Time    MRI Brain Without Contrast [[id number]] Collected:  08/07/18 1731     Updated:  08/08/18 1345    Narrative:       EXAMINATION: MRI BRAIN WO CONTRAST- 08/07/2018     INDICATION: encephalopathy; R09.2-Respiratory arrest;  G93.40-Encephalopathy, unspecified; R74.8-Abnormal levels of other serum  enzymes; Z74.09-Other reduced mobility      TECHNIQUE: Multiplanar MRI of the brain without intravenous contrast  administration.     COMPARISON: MRI dated 07/25/2018      FINDINGS: No restriction on diffusion-weighted imaging. Midline  structures are symmetric without evidence of mass, mass effect or  midline shift however motion degraded axial T2 and FLAIR imaging  sequences limit evaluation. Small amount of T2 and FLAIR increased  signal abnormalities within the periventricular and deep white matter  consistent with chronic small vessel ischemic disease. Pituitary and  sella within normal limits. Cervicomedullary junction widely patent.  Globes and orbits retain normal T2 signal characteristics. Visualized  paranasal sinuses and mastoid air cells demonstrate mucus retention  cysts and minimal mucosal edema right maxillary sinus otherwise grossly  clear and well-pneumatized. No cerebellopontine angle mass lesion.       Impression:       No acute intracranial abnormality specifically no evidence  for acute infarction or restricted diffusion. Minimal chronic small  vessel ischemic changes of the supratentorial white matter however may  represent sequela of migraines or demyelinating white matter disease in  a patient of this age, unchanged from prior exam. Mucous retention cyst  and mild mucosal thickening of the right maxillary sinus.         D:  08/07/2018  E:  08/08/2018     This report was finalized on 8/8/2018 1:43 PM by Dr. Fernando Zuluaga.       XR Chest 1 View [[id number]] Collected:  08/07/18 1036     Updated:  08/07/18 1107    Narrative:       EXAMINATION: XR CHEST 1 VW- 08/07/2018     INDICATION: fever, vomiting     TECHNIQUE:  Single view frontal chest.     COMPARISONS: 08/06/2018     FINDINGS:  Lungs are without focal abnormality. No pleural effusion or  pneumothorax. Cardiomediastinal silhouette is within normal limits.  Upper chest surgical clips again noted.       Impression:       Stable exam.     D:  08/07/2018  E:  08/07/2018     This report was finalized on 8/7/2018 11:04 AM by Cassius White.       XR Abdomen KUB [[id number]] Collected:  08/07/18 1038     Updated:   08/07/18 1107    Narrative:       EXAMINATION: XR ABDOMEN KUB-      INDICATION: Fever and vomiting.     TECHNIQUE:  KUB.     COMPARISONS:  08/04/2018.     FINDINGS:  Moderate stool burden. No evidence of bowel obstruction. No  acutely concerning calcifications. Gastrostomy tube in place.       Impression:       No acute finding.     D:  08/07/2018  E:  08/07/2018     This report was finalized on 8/7/2018 11:04 AM by Cassius White.       XR Chest 1 View [[id number]] Collected:  08/06/18 0907     Updated:  08/06/18 0946    Narrative:       EXAMINATION: XR CHEST 1 VW-08/06/2018:      INDICATION: Fever, suspect aspiration (trach/peg); R09.2-Respiratory  arrest; G93.40-Encephalopathy, unspecified; R74.8-Abnormal levels of  other serum enzymes.      COMPARISON: 08/04/2018.     FINDINGS: The cardiac silhouette is normal. A tracheostomy tube is well  positioned. There is no acute inflammatory process. There is no mass or  effusion.           Impression:       No active disease.     D:  08/06/2018  E:  08/06/2018     This report was finalized on 8/6/2018 9:44 AM by Dr. Eric Luis MD.       CT Angiogram Chest With & Without Contrast [[id number]] Collected:  08/05/18 1559     Updated:  08/05/18 1956    Narrative:       EXAM:  CT Angiography Chest With Intravenous Contrast    CLINICAL HISTORY:  37 years old, female; Encephalopathy, unspecified; Respiratory arrest; Abnormal   levels of other serum enzymes; Signs and symptoms; Cough and shortness of   breath; Additional info: Tachycardia, elevated d-dimer, tracheostomy, concern   for aspiration as well    TECHNIQUE:  Axial computed tomographic angiography images of the chest with intravenous   contrast using pulmonary embolism protocol.  All CT scans at this facility use   at least one of these dose optimization techniques: automated exposure control;   mA and/or kV adjustment per patient size (includes targeted exams where dose is   matched to clinical indication); or iterative  reconstruction.  MIP reconstructed images were created and reviewed.    CONTRAST:  150 mL of isovue 370 administered intravenously.      COMPARISON:  CR - XR CHEST 1 VW 2018-08-04 10:12    FINDINGS:  Pulmonary arteries:  No pulmonary embolism.  Aorta:  No acute findings.  Lungs:  Small cluster of tree in bud nodules within the posterior medial aspect   of the superior segment right lower lobe, likely minimal bronchopneumonia or   aspiration pneumonitis.  Mild upper lobe predominant paraseptal and   centrilobular emphysema.  Pleural space:  Small bilateral pleural effusions, with associated posterior   atelectasis.  No pneumothorax.  Heart:  Normal.  Bones/joints:  No acute fracture.  No dislocation.  Soft tissues:  Normal.  Lymph nodes:  Calcified right hilar lymph nodes, compatible with prior   granulomatous disease.  Spleen:  Splenic calcification, compatible with prior granulomatous disease.  Tubes, lines and devices:  Tracheostomy tube tip in midthoracic trachea.      Impression:         1.  No pulmonary embolism.    2.  Small cluster of tree in bud nodules within the posterior medial aspect of   the superior segment right lower lobe, likely minimal bronchopneumonia or   aspiration pneumonitis.    3.  Incidental/non-acute findings are described above.    THIS DOCUMENT HAS BEEN ELECTRONICALLY SIGNED BY [facility] JERONIMO MD             ASSESSMENT     --Intermittent fever- improved  Aspiration related to vomiting is a possible etiology  The purulent trach secretions yet strep as the  only potential pathogen from culture suggests possible anaerobic infection.  R/O other nosomial infection such as uti  The elevated CPK and possible response to dantrolene also suggest malignant hyperthermia     -- Suspected anoxic encephalopathy     -- Vomiting, etiology unclear  ?pancreatitis- lipase modestly elevated on 8/6 and improved today  She is being treated for constipation     -- Polysubstance abuse--Hepatitis C infection with +  RNA pcr     -- Seizure disorder     -- elevated cpk w/o obvious explanation raising ? Of malignant hyperthermia     PLAN     -- Agree with zosyn     -- add flagyl     -- recheck respiratory culture          --probiotic     -- recheck lipase     -- hold cooling blanket for T < 102 to more accurately follow fever         I discussed the patients findings and my recommendations with RN     Thank you for this consult.  Our group would be pleased to follow this patient over the course of their hospitalization and assist with outpatient antimicrobial therapy, as indicated.        Rachell Vickers MD  8/13/2018  5:22 PM

## 2018-08-14 LAB
GLUCOSE BLDC GLUCOMTR-MCNC: 103 MG/DL (ref 70–130)
GLUCOSE BLDC GLUCOMTR-MCNC: 110 MG/DL (ref 70–130)
GLUCOSE BLDC GLUCOMTR-MCNC: 118 MG/DL (ref 70–130)
GLUCOSE BLDC GLUCOMTR-MCNC: 98 MG/DL (ref 70–130)

## 2018-08-14 PROCEDURE — 25010000002 ONDANSETRON PER 1 MG: Performed by: FAMILY MEDICINE

## 2018-08-14 PROCEDURE — 93010 ELECTROCARDIOGRAM REPORT: CPT | Performed by: INTERNAL MEDICINE

## 2018-08-14 PROCEDURE — 99233 SBSQ HOSP IP/OBS HIGH 50: CPT | Performed by: HOSPITALIST

## 2018-08-14 PROCEDURE — 82962 GLUCOSE BLOOD TEST: CPT

## 2018-08-14 PROCEDURE — 25010000002 LORAZEPAM PER 2 MG: Performed by: HOSPITALIST

## 2018-08-14 PROCEDURE — 25010000002 ENOXAPARIN PER 10 MG: Performed by: INTERNAL MEDICINE

## 2018-08-14 PROCEDURE — 93005 ELECTROCARDIOGRAM TRACING: CPT | Performed by: HOSPITALIST

## 2018-08-14 PROCEDURE — 25010000002 HYDROMORPHONE PER 4 MG: Performed by: INTERNAL MEDICINE

## 2018-08-14 RX ADMIN — VALPROIC ACID 250 MG: 250 SOLUTION ORAL at 13:50

## 2018-08-14 RX ADMIN — HYDROMORPHONE HYDROCHLORIDE 0.5 MG: 1 INJECTION, SOLUTION INTRAMUSCULAR; INTRAVENOUS; SUBCUTANEOUS at 14:01

## 2018-08-14 RX ADMIN — LACTULOSE 10 G: 10 SOLUTION ORAL at 08:28

## 2018-08-14 RX ADMIN — LORAZEPAM 1 MG: 2 INJECTION INTRAMUSCULAR; INTRAVENOUS at 04:55

## 2018-08-14 RX ADMIN — ASPIRIN 81 MG CHEWABLE TABLET 81 MG: 81 TABLET CHEWABLE at 08:29

## 2018-08-14 RX ADMIN — LORAZEPAM 1 MG: 2 INJECTION INTRAMUSCULAR; INTRAVENOUS at 22:50

## 2018-08-14 RX ADMIN — HYDROMORPHONE HYDROCHLORIDE 0.5 MG: 1 INJECTION, SOLUTION INTRAMUSCULAR; INTRAVENOUS; SUBCUTANEOUS at 04:55

## 2018-08-14 RX ADMIN — SODIUM CHLORIDE 100 ML/HR: 9 INJECTION, SOLUTION INTRAVENOUS at 04:54

## 2018-08-14 RX ADMIN — FAMOTIDINE 20 MG: 20 TABLET ORAL at 08:29

## 2018-08-14 RX ADMIN — LEVETIRACETAM 500 MG: 100 SOLUTION ORAL at 08:28

## 2018-08-14 RX ADMIN — LORAZEPAM 1 MG: 2 INJECTION INTRAMUSCULAR; INTRAVENOUS at 00:06

## 2018-08-14 RX ADMIN — HYDROMORPHONE HYDROCHLORIDE 0.5 MG: 1 INJECTION, SOLUTION INTRAMUSCULAR; INTRAVENOUS; SUBCUTANEOUS at 17:49

## 2018-08-14 RX ADMIN — SODIUM CHLORIDE 100 ML/HR: 9 INJECTION, SOLUTION INTRAVENOUS at 15:39

## 2018-08-14 RX ADMIN — ATORVASTATIN CALCIUM 80 MG: 40 TABLET, FILM COATED ORAL at 21:15

## 2018-08-14 RX ADMIN — VALPROIC ACID 250 MG: 250 SOLUTION ORAL at 00:06

## 2018-08-14 RX ADMIN — FAMOTIDINE 20 MG: 20 TABLET ORAL at 21:15

## 2018-08-14 RX ADMIN — METRONIDAZOLE 500 MG: 500 TABLET ORAL at 04:58

## 2018-08-14 RX ADMIN — HYDROMORPHONE HYDROCHLORIDE 0.5 MG: 1 INJECTION, SOLUTION INTRAMUSCULAR; INTRAVENOUS; SUBCUTANEOUS at 11:30

## 2018-08-14 RX ADMIN — ENOXAPARIN SODIUM 40 MG: 100 INJECTION SUBCUTANEOUS at 08:29

## 2018-08-14 RX ADMIN — Medication 100 MG: at 08:38

## 2018-08-14 RX ADMIN — LORAZEPAM 1 MG: 2 INJECTION INTRAMUSCULAR; INTRAVENOUS at 10:52

## 2018-08-14 RX ADMIN — VALPROIC ACID 250 MG: 250 SOLUTION ORAL at 21:15

## 2018-08-14 RX ADMIN — HYDROMORPHONE HYDROCHLORIDE 0.5 MG: 1 INJECTION, SOLUTION INTRAMUSCULAR; INTRAVENOUS; SUBCUTANEOUS at 21:14

## 2018-08-14 RX ADMIN — ONDANSETRON 4 MG: 2 INJECTION INTRAMUSCULAR; INTRAVENOUS at 10:52

## 2018-08-14 RX ADMIN — ONDANSETRON 4 MG: 2 INJECTION INTRAMUSCULAR; INTRAVENOUS at 17:46

## 2018-08-14 RX ADMIN — CLOPIDOGREL BISULFATE 75 MG: 75 TABLET ORAL at 08:29

## 2018-08-14 RX ADMIN — HYDROMORPHONE HYDROCHLORIDE 0.5 MG: 1 INJECTION, SOLUTION INTRAMUSCULAR; INTRAVENOUS; SUBCUTANEOUS at 00:07

## 2018-08-14 RX ADMIN — LEVETIRACETAM 500 MG: 100 SOLUTION ORAL at 21:15

## 2018-08-14 RX ADMIN — VALPROIC ACID 250 MG: 250 SOLUTION ORAL at 04:58

## 2018-08-14 RX ADMIN — ONDANSETRON 4 MG: 2 INJECTION INTRAMUSCULAR; INTRAVENOUS at 04:55

## 2018-08-14 RX ADMIN — HYDROMORPHONE HYDROCHLORIDE 0.5 MG: 1 INJECTION, SOLUTION INTRAMUSCULAR; INTRAVENOUS; SUBCUTANEOUS at 23:34

## 2018-08-14 RX ADMIN — Medication 1 CAPSULE: at 08:29

## 2018-08-14 NOTE — PLAN OF CARE
Problem: Patient Care Overview  Goal: Plan of Care Review  Outcome: Ongoing (interventions implemented as appropriate)  Palliative Team Attendance 1300. Stephanie BALDWIN, Chris Lester Shellie Hall LCSW, Norma Thompson RN, [facility]    08/14/18 1300   Plan of Care Review   Progress no change   OTHER   Outcome Summary pt occasionally opens eyes nonpurposeful. flexing of legs and mild restlessness. continue goals discussion with son

## 2018-08-14 NOTE — PROGRESS NOTES
"Palliative Care Progress Note    Date of Admission: 7/16/2018    Code Status:   Current Code Status     Date Active Code Status Order ID Comments User Context       7/16/2018  6:20 AM CPR [id number]  Alec Menjivar APRN Inpatient       Questions for Current Code Status     Question Answer Comment    Code Status CPR     Medical Interventions (Level of Support Prior to Arrest) Full         Subjective:  Reviewed and updated with nursing  Patient appears to be more restful; however nurses have been administering prn hydromorphone today - x4 doses since midnight, as well as x 3 doses of ativan.        Reviewed current scheduled and prn medications for route, type, dose, and frequency.    sodium chloride 100 mL/hr Last Rate: 100 mL/hr (08/14/18 1539)     •  acetaminophen  •  bisacodyl  •  HYDROcodone-acetaminophen  •  HYDROmorphone  •  ipratropium-albuterol  •  LORazepam  •  magnesium sulfate **OR** magnesium sulfate **OR** magnesium sulfate  •  ondansetron  •  potassium chloride **OR** potassium chloride **OR** [DISCONTINUED] potassium chloride  •  sodium chloride  •  sodium chloride    Objective:  PPS 20%   /80 (BP Location: Left arm, Patient Position: Lying)   Pulse 112   Temp 98.4 °F (36.9 °C) (Core)   Resp 18   Ht 144.8 cm (57.01\")   Wt 66.4 kg (146 lb 6.4 oz)   LMP 07/16/2018   SpO2 99%   BMI 31.67 kg/m²    Intake & Output (last day)       08/13 0701 - 08/14 0700 08/14 0701 - 08/15 0700    I.V. (mL/kg) 2200 (33.1) 1003 (15.1)    Other 720 213    NG/ 526    Total Intake(mL/kg) 3810 (57.4) 1742 (26.2)    Urine (mL/kg/hr) 2850 (1.8) 700 (1)    Total Output 2850 700    Net +960 +1042          Unmeasured Urine Occurrence 1 x     Unmeasured Stool Occurrence 1 x         Lab Results (last 24 hours)     Procedure Component Value Units Date/Time    POC Glucose Once [[id number]]  (Normal) Collected:  08/14/18 1140    Specimen:  Blood Updated:  08/14/18 1143     Glucose 103 mg/dL     Narrative:       " Meter: [id number] : [id number] Iglesia Jackman    POC Glucose Once [[id number]]  (Normal) Collected:  08/14/18 0005    Specimen:  Blood Updated:  08/14/18 0007     Glucose 110 mg/dL     Narrative:       Meter: [id number] : [id number] Jackson Canales    POC Glucose Once [[id number]]  (Normal) Collected:  08/13/18 1759    Specimen:  Blood Updated:  08/13/18 1801     Glucose 115 mg/dL     Narrative:       Meter: [id number] : [id number] Ami Bowman        Imaging Results (last 24 hours)     ** No results found for the last 24 hours. **          Physical Exam:  Gen: NAD, lying in bed resting  Skin: warm, dry   Eyes: MIRNA, conjunctiva clear and moist   HEENT: trach, with oxygen at 6L/min per trach collar  Resp/thorax: even effort, non labored, CTA   CV: tachy  ABD: soft, bowel sounds +, non distended  Ext: no edema, no redness, increase tone in extremities  Neuro: eyes closed, no myoclonus       Reviewed labs and diagnostic results.   No results found for: HGBA1C        Results from last 7 days  Lab Units 08/13/18  0343   SODIUM mmol/L 143   POTASSIUM mmol/L 3.8   CHLORIDE mmol/L 110*   CO2 mmol/L 24.0   BUN mg/dL 11   CREATININE mg/dL 0.52*   CALCIUM mg/dL 9.2   BILIRUBIN mg/dL 0.2*   ALK PHOS U/L 68   ALT (SGPT) U/L 59*   AST (SGOT) U/L 86*   GLUCOSE mg/dL 130*       Impression: 37 y.o. female with hypoxic brain injury    Plan:   Restlessness - recommend rotating meds to route per G tube since plan is facility placement,   Hydrocodone/APAP elixir ordered as prn.     Trach secretions - noted discontinuance of robinul for tachycardia.  Also addition of IV fluids for tachycardia.  Nursing reporting some increase in suctioning.  May consider decrease in IV fluid rate.     Increase muscle tone - consider baclofen 10mg every 8 hours may provide double effect to manage restlessness and assist with less tone and prevent contractures for the future    Goals of care discussion - will follow up with son in next day or so.      Plan - facility placement currently, recommend move plan of care to interventions that will be provided in a facility unsure of oxygen limit for trach collar support.   Recommend continue to taper oxygen support as tolerated, unsure if respiratory able to provide only humidfied air or if patient requires current FiO2 support.       Time: 30 minutes       Yue Garcai, KATIA  [phone]  08/14/18  5:11 PM

## 2018-08-14 NOTE — PROGRESS NOTES
Continued Stay Note   [place]     Patient Name: Otilia Holm  MRN: [id number]  Today's Date: 8/14/2018    Admit Date: 7/16/2018          Discharge Plan     Row Name 08/14/18 1444       Plan    Plan LTC/Update     Plan Comments Spoke with Gissell at [facility] and facility still interested in patient. Updated noted faxed. ANGELINE munoz. Marialuisa @ [phone]               Discharge Codes    No documentation.       Expected Discharge Date and Time     Expected Discharge Date Expected Discharge Time    Aug 17, 2018             Zahraa Jay RN

## 2018-08-14 NOTE — PROGRESS NOTES
"Adult Nutrition  Assessment/PES    Patient Name:  Otilia Holm  YOB: 1980  MRN: [id number]  Admit Date:  7/16/2018    Assessment Date:  8/14/2018    Comments:            Reason for Assessment     Row Name 08/14/18 1430          Reason for Assessment    Reason For Assessment follow-up protocol;TF/PN   30\"     Diagnosis --   SEE PREVIOUS NUTRITION NOTES             Nutrition/Diet History     Row Name 08/14/18 1431          Nutrition/Diet History    Typical Food/Fluid Intake NURSING REPORTS TOLERATING TF. MD NOTES LOOKS DEHYDRATED.                Labs/Tests/Procedures/Meds     Row Name 08/14/18 1431          Labs/Procedures/Meds    Lab Results Reviewed reviewed        Medications    Pertinent Medications Reviewed reviewed     Pertinent Medications Comments --   NOTED IVFs INCREASED             Physical Findings     Row Name 08/14/18 1434          Physical Findings    Gastrointestinal other (see comments)   WDL PER NURSING DOCUMENTATION     Skin other (see comments)   SKIN TEAR PER NURSING DOCUMENTATION             Estimated/Assessed Needs     Row Name 08/14/18 1435          Calorie Requirements    Estimated Calorie Requirement Comment [id number]        Protein Requirements    Est Protein Requirement Amount (gms/kg) --   75-94        Fluid Requirements    Estimated Fluid Requirement Method --   1974             Nutrition Prescription Ordered     Row Name 08/14/18 1436          Nutrition Prescription PO    Current PO Diet NPO        Nutrition Prescription EN    Enteral Route PEG     Product [employer] HN     TF Delivery Method Continuous     Continuous TF Goal Rate (mL/hr) 65 mL/hr     Continuous TF Current Rate (mL/hr) 65 mL/hr     Continuous TF Goal Volume (mL) 1430 mL     Water flush (mL)  20 mL     Water Flush Frequency Per hour             Evaluation of Received Nutrient/Fluid Intake     Row Name 08/14/18 1436          Nutrient/Fluid Evaluation    Number of Days Evaluated 1 day        Calories " Evaluation    Enteral Calories (kcal) 1068        Protein Evaluation    Enteral Protein (gm) 48        Fluid Intake Evaluation    Enteral (Free Water) Fluid (mL) 721     Free Water Flush Fluid (mL) 320     Total Free Water Intake (mL) 1041 mL        Fiber Intake Evaluation    Fiber 13             Evaluation of Prescribed Nutrient/Fluid Intake     Row Name 08/14/18 1443          Calories at Prescribed Goal    Enteral Calories (kcal) 1716   BASED ON 22 HR/DAY DELIVERY     Total Calories (kcal) 1716        Protein at Prescribed Goal    Enteral Protein (gm) 77   BASED ON 22 HR/DAY DELIVERY     Total Protein (gm) 77        Fluid at Prescribed Goal    Enteral  Fluid (mL) 1158   BASED ON 22 HR/DAY [id number]     Free Water Flush Fluid (mL) 440   BASED ON 22 HR/DAY DELIVERY     Total Fluid Intake (mL) 1598        Fiber at Prescribed Goal    Fiber 21   BASED ON 22 HR/DAY DELIVERY        Recommended Daily Intake at Prescribed Goal    RDI Met           Problem/Interventions:        Problem 1     Row Name 08/14/18 1439          Nutrition Diagnoses Problem 1    Problem 1 Inadequate Nutrient Intake             Problem 2     Row Name 08/14/18 1439          Nutrition Diagnoses Problem 2    Problem 2 Inadequate Intake/Infusion   POSSIBLE     Inadequate Intake Type Enteral     Etiology (related to) Other (comment)   PT DID NOT MEET GV OF TF     Signs/Symptoms (evidenced by) Other (comment)   PT ONLY MET 62% OF GOAL VOLUME BASED ON NURSING DOCUMENTATION OF VOLUME OF TF DELIVERED             Problem 3     Row Name 08/14/18 1441          Nutrition Diagnoses Problem 3    Problem 3 Needs Alternate Composition     Macronutrient Kcal;Protein     Etiology (related to) Other (comment)   FORMULA NEEDS RE-EST IN VIEW OF SKIN INTEGRITY NOT AS SEVERE AS INITIALLY DOCUMENTED     Signs/Symptoms (evidenced by) Other (comment)   PT DOES NOT REQUIRE AS HIGH A PROTEIN FORMULA AS IS CURRENTLY ON.      Resolved? Yes             Problem 4     Row Name  08/14/18 1445          Nutrition Diagnoses Problem 4    Problem 4 Inadequate Nutrient Intake     Etiology (related to) Other (comment)   FLUID NEEDS     Signs/Symptoms (evidenced by) Other (comment)   PER MD NOTES, PT APPEARS DEHYDRATED, IVFs STARTED & CURRENT TF RX DOES NOT MEET FULL FLUID NEEDS               Intervention Goal     Row Name 08/14/18 1441          Intervention Goal    General Nutrition support treatment     TF/PN Deliver (%) goal;Other (comment)   INCREASE FREE H2O W TF     Deliver % of Goal 100 %             Nutrition Intervention     Row Name 08/14/18 1441          Nutrition Intervention    RD/Tech Action Recommend/ordered;Other (comment)   RE-ENFORCED WITH NURSING TO DOCUMENT ALL OF TF INFUSED      Recommended/Ordered EN             Nutrition Prescription     Row Name 08/14/18 1442          Nutrition Prescription EN    Enteral Prescription Enteral begin/change     Water flush (mL)  --   40 ML/HR     New EN Prescription Ordered? Yes        EN to Supply    Kcal/Day 1716 Kcal/Day   BASED ON 22 HR/DAY DELIVERY     Protein (gm/day) 77 gm/day   BASED ON 22 HR/DAY DELIVERY     TF Free H2O (mL) 1158 mL   BASED ON 22 HR/DAY DELIVERY     Total Free H2O (mL/day) --   2038     Fiber Per Day (gm/day) 21 gm/day   BASED ON 22 HR/DAY DELIVERY             Education/Evaluation     Row Name 08/14/18 1443          Monitor/Evaluation    Monitor Per protocol;I&O;Pertinent labs;TF delivery/tolerance;Weight;Skin status;Symptoms         Electronically signed by:  Precious Castro RD  08/14/18 2:52 PM

## 2018-08-14 NOTE — PROGRESS NOTES
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 08/06/18 1800 08/10/18 1252    08/05/18 1436  piperacillin-tazobactam (ZOSYN) 4.5 g in iso-osmotic dextrose 100 mL IVPB (premix)     Ordering Provider:  Dayday Jacobs MD    4.5 g  over 4 Hours Intravenous Every 8 Hours 08/05/18 2130 08/12/18 1613    08/05/18 1436  piperacillin-tazobactam (ZOSYN) 4.5 g in iso-osmotic dextrose 100 mL IVPB (premix)     Ordering Provider:  Dayday Jacobs MD    4.5 g  over 30 Minutes Intravenous Once 08/05/18 1530 08/05/18 1536    08/05/18 1441  vancomycin 1250 mg/250 mL 0.9% NS IVPB ([facility])     Ordering Provider:  Sofia Hardy, RPH    20 mg/kg × 62.8 kg  over 90 Minutes Intravenous Once 08/05/18 1530 08/05/18 1728    07/29/18 1946  vancomycin 1500 mg/500 mL 0.9% NS IVPB ([facility])     Ordering Provider:  Elisabeth Benites,  "APRN    25 mg/kg × 64.9 kg  333.3 mL/hr over 90 Minutes Intravenous Once 07/29/18 2100 07/29/18 2156    07/29/18 0527  piperacillin-tazobactam (ZOSYN) 4.5 g in iso-osmotic dextrose 100 mL IVPB (premix)     Ordering Provider:  Gloria Ochoa APRN    4.5 g  over 30 Minutes Intravenous Once 07/29/18 0615 07/29/18 0707    07/25/18 1653  ceFAZolin in dextrose (ANCEF) IVPB solution 2 g     Ordering Provider:  Segundo Galicia MD    2 g  over 30 Minutes Intravenous Once 07/26/18 0930 07/26/18 0958    07/17/18 1008  ampicillin-sulbactam (UNASYN) 3 g in sodium chloride 0.9 % 100 mL IVPB-MBP     Ordering Provider:  Jani Sutton MD    3 g  over 60 Minutes Intravenous Every 6 Hours Scheduled 07/17/18 1100 07/23/18 0631    07/16/18 1034  vancomycin 1750 mg/500 mL 0.9% NS IVPB ([facility])     Ordering Provider:  Jani Sutton MD    25 mg/kg × 65 kg  over 120 Minutes Intravenous Once 07/16/18 1200 07/16/18 1630    07/16/18 0737  piperacillin-tazobactam (ZOSYN) 4.5 g in iso-osmotic dextrose 100 mL IVPB (premix)     Ordering Provider:  Ruslan Estrella MD    4.5 g  over 30 Minutes Intravenous Once 07/16/18 0815 07/16/18 0918          Allergies:  has No Known Allergies.      Review of Systems: All other reviewed and negative except as per HPI    /80 (BP Location: Left arm, Patient Position: Lying)   Pulse (!) 139   Temp 98 °F (36.7 °C) (Core)   Resp 16   Ht 144.8 cm (57.01\")   Wt 66.4 kg (146 lb 6.4 oz)   LMP 07/16/2018   SpO2 99%   BMI 31.67 kg/m²   Temp:  [97.9 °F (36.6 °C)-98.9 °F (37.2 °C)] 98 °F (36.7 °C)    GENERAL:  Unresponsive, s/p trach and peg  HEENT: Oropharynx without thrush. .. No cervical adenopathy. No neck masses Neck supple  EYES: pupils poorly reactive to light. No conjunctival injection. No icterus.   LYMPHATICS: No lymphadenopathy of the neck or axillary or inguinal regions.   HEART: No murmur, gallop, or pericardial friction rub.   LUNGS:decreased bs at bases. No " respiratory distress  ABDOMEN: Soft, nontender, nondistended. PEG site w/o erythema or drainage No appreciable HSM.    SKIN: Warm and dry without cutaneous eruptions.    Neuro:unresponsive; muscle tone stiff  EXT:  No cellulitic change       DIAGNOSTICS:  Lab Results   Component Value Date    WBC 10.33 08/07/2018    HGB 11.1 (L) 08/07/2018    HCT 35.7 08/07/2018     (H) 08/07/2018     Lab Results   Component Value Date    CRP 0.66 08/13/2018     Lab Results   Component Value Date    SEDRATE 80 (H) 07/25/2018     Lab Results   Component Value Date    GLUCOSE 130 (H) 08/13/2018    BUN 11 08/13/2018    CREATININE 0.52 (L) 08/13/2018    EGFRIFNONA 124 08/07/2018    BCR 21.8 08/07/2018    CO2 24.0 08/13/2018    CALCIUM 9.2 08/13/2018    ALBUMIN 3.75 08/13/2018    AST 86 (H) 08/13/2018    ALT 59 (H) 08/13/2018       Microbiology:  Blood Culture   Date Value Ref Range Status   08/05/2018 No growth at 3 days  Preliminary   08/05/2018 No growth at 3 days  Preliminary              MRSA SCREEN CX   Date Value Ref Range Status   08/06/2018   Final    No Methicillin Resistant Staphylococcus aureus isolated        Urine Culture   Date Value Ref Range Status   08/06/2018 No growth at 2 days  Final        Lab Results   Component Value Date    BLOODCX No growth at 5 days 08/05/2018    BLOODCX No growth at 5 days 08/05/2018     Lab Results   Component Value Date    URINECX No growth at 2 days 08/06/2018       RADIOLOGY:  Imaging Results (last 72 hours)     Procedure Component Value Units Date/Time    MRI Brain Without Contrast [[id number]] Collected:  08/07/18 1731     Updated:  08/08/18 1345    Narrative:       EXAMINATION: MRI BRAIN WO CONTRAST- 08/07/2018     INDICATION: encephalopathy; R09.2-Respiratory arrest;  G93.40-Encephalopathy, unspecified; R74.8-Abnormal levels of other serum  enzymes; Z74.09-Other reduced mobility      TECHNIQUE: Multiplanar MRI of the brain without intravenous contrast  administration.      COMPARISON: MRI dated 07/25/2018     FINDINGS: No restriction on diffusion-weighted imaging. Midline  structures are symmetric without evidence of mass, mass effect or  midline shift however motion degraded axial T2 and FLAIR imaging  sequences limit evaluation. Small amount of T2 and FLAIR increased  signal abnormalities within the periventricular and deep white matter  consistent with chronic small vessel ischemic disease. Pituitary and  sella within normal limits. Cervicomedullary junction widely patent.  Globes and orbits retain normal T2 signal characteristics. Visualized  paranasal sinuses and mastoid air cells demonstrate mucus retention  cysts and minimal mucosal edema right maxillary sinus otherwise grossly  clear and well-pneumatized. No cerebellopontine angle mass lesion.       Impression:       No acute intracranial abnormality specifically no evidence  for acute infarction or restricted diffusion. Minimal chronic small  vessel ischemic changes of the supratentorial white matter however may  represent sequela of migraines or demyelinating white matter disease in  a patient of this age, unchanged from prior exam. Mucous retention cyst  and mild mucosal thickening of the right maxillary sinus.         D:  08/07/2018  E:  08/08/2018     This report was finalized on 8/8/2018 1:43 PM by Dr. Fernando Zuluaga.       XR Chest 1 View [[id number]] Collected:  08/07/18 1036     Updated:  08/07/18 1107    Narrative:       EXAMINATION: XR CHEST 1 VW- 08/07/2018     INDICATION: fever, vomiting     TECHNIQUE:  Single view frontal chest.     COMPARISONS: 08/06/2018     FINDINGS:  Lungs are without focal abnormality. No pleural effusion or  pneumothorax. Cardiomediastinal silhouette is within normal limits.  Upper chest surgical clips again noted.       Impression:       Stable exam.     D:  08/07/2018  E:  08/07/2018     This report was finalized on 8/7/2018 11:04 AM by Cassius White.       XR Abdomen KUB [[id number]]  Collected:  08/07/18 1038     Updated:  08/07/18 1107    Narrative:       EXAMINATION: XR ABDOMEN KUB-      INDICATION: Fever and vomiting.     TECHNIQUE:  KUB.     COMPARISONS:  08/04/2018.     FINDINGS:  Moderate stool burden. No evidence of bowel obstruction. No  acutely concerning calcifications. Gastrostomy tube in place.       Impression:       No acute finding.     D:  08/07/2018  E:  08/07/2018     This report was finalized on 8/7/2018 11:04 AM by Cassius White.       XR Chest 1 View [[id number]] Collected:  08/06/18 0907     Updated:  08/06/18 0946    Narrative:       EXAMINATION: XR CHEST 1 VW-08/06/2018:      INDICATION: Fever, suspect aspiration (trach/peg); R09.2-Respiratory  arrest; G93.40-Encephalopathy, unspecified; R74.8-Abnormal levels of  other serum enzymes.      COMPARISON: 08/04/2018.     FINDINGS: The cardiac silhouette is normal. A tracheostomy tube is well  positioned. There is no acute inflammatory process. There is no mass or  effusion.           Impression:       No active disease.     D:  08/06/2018  E:  08/06/2018     This report was finalized on 8/6/2018 9:44 AM by Dr. Eric Luis MD.       CT Angiogram Chest With & Without Contrast [[id number]] Collected:  08/05/18 1559     Updated:  08/05/18 1956    Narrative:       EXAM:  CT Angiography Chest With Intravenous Contrast    CLINICAL HISTORY:  37 years old, female; Encephalopathy, unspecified; Respiratory arrest; Abnormal   levels of other serum enzymes; Signs and symptoms; Cough and shortness of   breath; Additional info: Tachycardia, elevated d-dimer, tracheostomy, concern   for aspiration as well    TECHNIQUE:  Axial computed tomographic angiography images of the chest with intravenous   contrast using pulmonary embolism protocol.  All CT scans at this facility use   at least one of these dose optimization techniques: automated exposure control;   mA and/or kV adjustment per patient size (includes targeted exams where dose is   matched  to clinical indication); or iterative reconstruction.  MIP reconstructed images were created and reviewed.    CONTRAST:  150 mL of isovue 370 administered intravenously.      COMPARISON:  CR - XR CHEST 1 VW 2018-08-04 10:12    FINDINGS:  Pulmonary arteries:  No pulmonary embolism.  Aorta:  No acute findings.  Lungs:  Small cluster of tree in bud nodules within the posterior medial aspect   of the superior segment right lower lobe, likely minimal bronchopneumonia or   aspiration pneumonitis.  Mild upper lobe predominant paraseptal and   centrilobular emphysema.  Pleural space:  Small bilateral pleural effusions, with associated posterior   atelectasis.  No pneumothorax.  Heart:  Normal.  Bones/joints:  No acute fracture.  No dislocation.  Soft tissues:  Normal.  Lymph nodes:  Calcified right hilar lymph nodes, compatible with prior   granulomatous disease.  Spleen:  Splenic calcification, compatible with prior granulomatous disease.  Tubes, lines and devices:  Tracheostomy tube tip in midthoracic trachea.      Impression:         1.  No pulmonary embolism.    2.  Small cluster of tree in bud nodules within the posterior medial aspect of   the superior segment right lower lobe, likely minimal bronchopneumonia or   aspiration pneumonitis.    3.  Incidental/non-acute findings are described above.    THIS DOCUMENT HAS BEEN ELECTRONICALLY SIGNED BY [facility] JERONIMO MD             ASSESSMENT     --Intermittent fever- improved  Aspiration related to vomiting is a possible etiology  The purulent trach secretions yet strep as the  only potential pathogen from culture suggests possible anaerobic infection.  R/O other nosomial infection such as uti  The elevated CPK and possible response to dantrolene also suggest malignant hyperthermia     -- Suspected anoxic encephalopathy     -- Vomiting, etiology unclear  ?pancreatitis- lipase modestly elevated on 8/6 and improved today  She is being treated for constipation     --  Polysubstance abuse--Hepatitis C infection with + RNA pcr     -- Seizure disorder     -- elevated cpk w/o obvious explanation raising ? Of malignant hyperthermia     PLAN     Will see prn     --probiotic          -- hold cooling blanket for T < 102 to more accurately follow fever         I discussed the patients findings and my recommendations with RN     Thank you for this consult.  Our group would be pleased to follow this patient over the course of their hospitalization and assist with outpatient antimicrobial therapy, as indicated.        Rachell Vickers MD  8/14/2018  5:19 PM

## 2018-08-14 NOTE — PLAN OF CARE
Problem: Patient Care Overview  Goal: Plan of Care Review  Outcome: Ongoing (interventions implemented as appropriate)   08/14/18 1601   Coping/Psychosocial   Plan of Care Reviewed With patient   Plan of Care Review   Progress no change   OTHER   Outcome Summary Pt required dilaudid and ativan today, FW increased to 40/hr, continue IVF, monitor, and look for placement.        Problem: Skin Injury Risk (Adult)  Goal: Skin Health and Integrity  Outcome: Ongoing (interventions implemented as appropriate)      Problem: Palliative Care (Adult)  Goal: Maximized Comfort  Outcome: Ongoing (interventions implemented as appropriate)    Goal: Enhanced Quality of Life  Outcome: Ongoing (interventions implemented as appropriate)      Problem: Cardiac Output Decreased (Adult)  Goal: Effective Tissue Perfusion  Outcome: Ongoing (interventions implemented as appropriate)      Problem: Nutrition, Enteral (Adult)  Goal: Signs and Symptoms of Listed Potential Problems Will be Absent, Minimized or Managed (Nutrition, Enteral)  Outcome: Ongoing (interventions implemented as appropriate)

## 2018-08-14 NOTE — PLAN OF CARE
Problem: Patient Care Overview  Goal: Plan of Care Review  Outcome: Ongoing (interventions implemented as appropriate)   08/14/18 6269   Coping/Psychosocial   Plan of Care Reviewed With patient;other (see comments)  (Eufemia ARITA)   Plan of Care Review   Progress improving   OTHER   Outcome Summary WOC nurse f/u to reassess wound at trach site. Wound left side of trach has healed; foam dressing in place to off load area. WOC nurse will s/o. Please contact WOC nurse as needed for concerns.

## 2018-08-15 ENCOUNTER — APPOINTMENT (OUTPATIENT)
Dept: CARDIOLOGY | Facility: HOSPITAL | Age: 38
End: 2018-08-15
Attending: HOSPITALIST

## 2018-08-15 ENCOUNTER — APPOINTMENT (OUTPATIENT)
Dept: GENERAL RADIOLOGY | Facility: HOSPITAL | Age: 38
End: 2018-08-15

## 2018-08-15 PROBLEM — R56.9 SEIZURES (HCC): Status: ACTIVE | Noted: 2018-08-15

## 2018-08-15 PROBLEM — R50.9 FEVER: Status: ACTIVE | Noted: 2018-08-15

## 2018-08-15 LAB
ALBUMIN SERPL-MCNC: 3.73 G/DL (ref 3.2–4.8)
ALBUMIN/GLOB SERPL: 1.2 G/DL (ref 1.5–2.5)
ALP SERPL-CCNC: 70 U/L (ref 25–100)
ALT SERPL W P-5'-P-CCNC: 76 U/L (ref 7–40)
ANION GAP SERPL CALCULATED.3IONS-SCNC: 8 MMOL/L (ref 3–11)
AST SERPL-CCNC: 111 U/L (ref 0–33)
BH CV ECHO MEAS - BSA(HAYCOCK): 1.7 M^2
BH CV ECHO MEAS - BSA(HAYCOCK): 1.7 M^2
BH CV ECHO MEAS - BSA: 1.6 M^2
BH CV ECHO MEAS - BSA: 1.6 M^2
BH CV ECHO MEAS - BZI_BMI: 31.6 KILOGRAMS/M^2
BH CV ECHO MEAS - BZI_BMI: 31.6 KILOGRAMS/M^2
BH CV ECHO MEAS - BZI_METRIC_HEIGHT: 144.8 CM
BH CV ECHO MEAS - BZI_METRIC_HEIGHT: 144.8 CM
BH CV ECHO MEAS - BZI_METRIC_WEIGHT: 66.2 KG
BH CV ECHO MEAS - BZI_METRIC_WEIGHT: 66.2 KG
BH CV LOWER VASCULAR LEFT COMMON FEMORAL AUGMENT: NORMAL
BH CV LOWER VASCULAR LEFT COMMON FEMORAL COMPETENT: NORMAL
BH CV LOWER VASCULAR LEFT COMMON FEMORAL COMPRESS: NORMAL
BH CV LOWER VASCULAR LEFT COMMON FEMORAL PHASIC: NORMAL
BH CV LOWER VASCULAR LEFT COMMON FEMORAL SPONT: NORMAL
BH CV LOWER VASCULAR LEFT DISTAL FEMORAL COMPRESS: NORMAL
BH CV LOWER VASCULAR LEFT GASTRONEMIUS COMPRESS: NORMAL
BH CV LOWER VASCULAR LEFT GREATER SAPH AK COMPRESS: NORMAL
BH CV LOWER VASCULAR LEFT GREATER SAPH BK COMPRESS: NORMAL
BH CV LOWER VASCULAR LEFT LESSER SAPH COMPRESS: NORMAL
BH CV LOWER VASCULAR LEFT MID FEMORAL AUGMENT: NORMAL
BH CV LOWER VASCULAR LEFT MID FEMORAL COMPETENT: NORMAL
BH CV LOWER VASCULAR LEFT MID FEMORAL COMPRESS: NORMAL
BH CV LOWER VASCULAR LEFT MID FEMORAL PHASIC: NORMAL
BH CV LOWER VASCULAR LEFT MID FEMORAL SPONT: NORMAL
BH CV LOWER VASCULAR LEFT PERONEAL COMPRESS: NORMAL
BH CV LOWER VASCULAR LEFT POPLITEAL AUGMENT: NORMAL
BH CV LOWER VASCULAR LEFT POPLITEAL COMPETENT: NORMAL
BH CV LOWER VASCULAR LEFT POPLITEAL COMPRESS: NORMAL
BH CV LOWER VASCULAR LEFT POPLITEAL PHASIC: NORMAL
BH CV LOWER VASCULAR LEFT POPLITEAL SPONT: NORMAL
BH CV LOWER VASCULAR LEFT POSTERIOR TIBIAL COMPRESS: NORMAL
BH CV LOWER VASCULAR LEFT PROXIMAL FEMORAL COMPRESS: NORMAL
BH CV LOWER VASCULAR LEFT SAPHENOFEMORAL JUNCTION AUGMENT: NORMAL
BH CV LOWER VASCULAR LEFT SAPHENOFEMORAL JUNCTION COMPETENT: NORMAL
BH CV LOWER VASCULAR LEFT SAPHENOFEMORAL JUNCTION COMPRESS: NORMAL
BH CV LOWER VASCULAR LEFT SAPHENOFEMORAL JUNCTION PHASIC: NORMAL
BH CV LOWER VASCULAR LEFT SAPHENOFEMORAL JUNCTION SPONT: NORMAL
BH CV LOWER VASCULAR RIGHT COMMON FEMORAL AUGMENT: NORMAL
BH CV LOWER VASCULAR RIGHT COMMON FEMORAL COMPETENT: NORMAL
BH CV LOWER VASCULAR RIGHT COMMON FEMORAL COMPRESS: NORMAL
BH CV LOWER VASCULAR RIGHT COMMON FEMORAL PHASIC: NORMAL
BH CV LOWER VASCULAR RIGHT COMMON FEMORAL SPONT: NORMAL
BH CV LOWER VASCULAR RIGHT DISTAL FEMORAL COMPRESS: NORMAL
BH CV LOWER VASCULAR RIGHT GASTRONEMIUS COMPRESS: NORMAL
BH CV LOWER VASCULAR RIGHT GREATER SAPH AK COMPRESS: NORMAL
BH CV LOWER VASCULAR RIGHT GREATER SAPH BK COMPRESS: NORMAL
BH CV LOWER VASCULAR RIGHT LESSER SAPH COMPRESS: NORMAL
BH CV LOWER VASCULAR RIGHT MID FEMORAL AUGMENT: NORMAL
BH CV LOWER VASCULAR RIGHT MID FEMORAL COMPETENT: NORMAL
BH CV LOWER VASCULAR RIGHT MID FEMORAL COMPRESS: NORMAL
BH CV LOWER VASCULAR RIGHT MID FEMORAL PHASIC: NORMAL
BH CV LOWER VASCULAR RIGHT MID FEMORAL SPONT: NORMAL
BH CV LOWER VASCULAR RIGHT PERONEAL COMPRESS: NORMAL
BH CV LOWER VASCULAR RIGHT POPLITEAL AUGMENT: NORMAL
BH CV LOWER VASCULAR RIGHT POPLITEAL COMPETENT: NORMAL
BH CV LOWER VASCULAR RIGHT POPLITEAL COMPRESS: NORMAL
BH CV LOWER VASCULAR RIGHT POPLITEAL PHASIC: NORMAL
BH CV LOWER VASCULAR RIGHT POPLITEAL SPONT: NORMAL
BH CV LOWER VASCULAR RIGHT POSTERIOR TIBIAL COMPRESS: NORMAL
BH CV LOWER VASCULAR RIGHT PROXIMAL FEMORAL COMPRESS: NORMAL
BH CV LOWER VASCULAR RIGHT SAPHENOFEMORAL JUNCTION AUGMENT: NORMAL
BH CV LOWER VASCULAR RIGHT SAPHENOFEMORAL JUNCTION COMPETENT: NORMAL
BH CV LOWER VASCULAR RIGHT SAPHENOFEMORAL JUNCTION COMPRESS: NORMAL
BH CV LOWER VASCULAR RIGHT SAPHENOFEMORAL JUNCTION PHASIC: NORMAL
BH CV LOWER VASCULAR RIGHT SAPHENOFEMORAL JUNCTION SPONT: NORMAL
BH CV UPPER VENOUS LEFT AXILLARY AUGMENT: NORMAL
BH CV UPPER VENOUS LEFT AXILLARY COMPETENT: NORMAL
BH CV UPPER VENOUS LEFT AXILLARY COMPRESS: NORMAL
BH CV UPPER VENOUS LEFT AXILLARY PHASIC: NORMAL
BH CV UPPER VENOUS LEFT AXILLARY SPONT: NORMAL
BH CV UPPER VENOUS LEFT BASILIC UPPER COMPRESS: NORMAL
BH CV UPPER VENOUS LEFT BRACHIAL COMPRESS: NORMAL
BH CV UPPER VENOUS LEFT CEPHALIC UPPER COMPRESS: NORMAL
BH CV UPPER VENOUS LEFT INTERNAL JUGULAR AUGMENT: NORMAL
BH CV UPPER VENOUS LEFT INTERNAL JUGULAR COMPETENT: NORMAL
BH CV UPPER VENOUS LEFT INTERNAL JUGULAR COMPRESS: NORMAL
BH CV UPPER VENOUS LEFT INTERNAL JUGULAR PHASIC: NORMAL
BH CV UPPER VENOUS LEFT INTERNAL JUGULAR SPONT: NORMAL
BH CV UPPER VENOUS LEFT RADIAL COMPRESS: NORMAL
BH CV UPPER VENOUS LEFT SUBCLAVIAN AUGMENT: NORMAL
BH CV UPPER VENOUS LEFT SUBCLAVIAN COMPETENT: NORMAL
BH CV UPPER VENOUS LEFT SUBCLAVIAN COMPRESS: NORMAL
BH CV UPPER VENOUS LEFT SUBCLAVIAN PHASIC: NORMAL
BH CV UPPER VENOUS LEFT SUBCLAVIAN SPONT: NORMAL
BH CV UPPER VENOUS LEFT ULNAR COMPRESS: NORMAL
BH CV UPPER VENOUS RIGHT AXILLARY AUGMENT: NORMAL
BH CV UPPER VENOUS RIGHT AXILLARY COMPETENT: NORMAL
BH CV UPPER VENOUS RIGHT AXILLARY COMPRESS: NORMAL
BH CV UPPER VENOUS RIGHT AXILLARY PHASIC: NORMAL
BH CV UPPER VENOUS RIGHT AXILLARY SPONT: NORMAL
BH CV UPPER VENOUS RIGHT BASILIC UPPER COMPRESS: NORMAL
BH CV UPPER VENOUS RIGHT BRACHIAL COMPRESS: NORMAL
BH CV UPPER VENOUS RIGHT CEPHALIC UPPER COMPRESS: NORMAL
BH CV UPPER VENOUS RIGHT INTERNAL JUGULAR AUGMENT: NORMAL
BH CV UPPER VENOUS RIGHT INTERNAL JUGULAR COMPETENT: NORMAL
BH CV UPPER VENOUS RIGHT INTERNAL JUGULAR COMPRESS: NORMAL
BH CV UPPER VENOUS RIGHT INTERNAL JUGULAR PHASIC: NORMAL
BH CV UPPER VENOUS RIGHT INTERNAL JUGULAR SPONT: NORMAL
BH CV UPPER VENOUS RIGHT RADIAL COMPRESS: NORMAL
BH CV UPPER VENOUS RIGHT SUBCLAVIAN AUGMENT: NORMAL
BH CV UPPER VENOUS RIGHT SUBCLAVIAN COMPETENT: NORMAL
BH CV UPPER VENOUS RIGHT SUBCLAVIAN COMPRESS: NORMAL
BH CV UPPER VENOUS RIGHT SUBCLAVIAN PHASIC: NORMAL
BH CV UPPER VENOUS RIGHT SUBCLAVIAN SPONT: NORMAL
BH CV UPPER VENOUS RIGHT ULNAR COMPRESS: NORMAL
BILIRUB SERPL-MCNC: 0.4 MG/DL (ref 0.3–1.2)
BUN BLD-MCNC: 10 MG/DL (ref 9–23)
BUN/CREAT SERPL: 22.2 (ref 7–25)
CALCIUM SPEC-SCNC: 9.2 MG/DL (ref 8.7–10.4)
CHLORIDE SERPL-SCNC: 105 MMOL/L (ref 99–109)
CO2 SERPL-SCNC: 25 MMOL/L (ref 20–31)
CREAT BLD-MCNC: 0.45 MG/DL (ref 0.6–1.3)
DEPRECATED RDW RBC AUTO: 53.4 FL (ref 37–54)
ERYTHROCYTE [DISTWIDTH] IN BLOOD BY AUTOMATED COUNT: 15 % (ref 11.3–14.5)
GFR SERPL CREATININE-BSD FRML MDRD: >150 ML/MIN/1.73
GLOBULIN UR ELPH-MCNC: 3.1 GM/DL
GLUCOSE BLD-MCNC: 99 MG/DL (ref 70–100)
GLUCOSE BLDC GLUCOMTR-MCNC: 126 MG/DL (ref 70–130)
GLUCOSE BLDC GLUCOMTR-MCNC: 132 MG/DL (ref 70–130)
GLUCOSE BLDC GLUCOMTR-MCNC: 144 MG/DL (ref 70–130)
HCT VFR BLD AUTO: 34.5 % (ref 34.5–44)
HGB BLD-MCNC: 10.8 G/DL (ref 11.5–15.5)
MCH RBC QN AUTO: 30.4 PG (ref 27–31)
MCHC RBC AUTO-ENTMCNC: 31.3 G/DL (ref 32–36)
MCV RBC AUTO: 97.2 FL (ref 80–99)
PLATELET # BLD AUTO: 402 10*3/MM3 (ref 150–450)
PMV BLD AUTO: 10.6 FL (ref 6–12)
POTASSIUM BLD-SCNC: 3.8 MMOL/L (ref 3.5–5.5)
PROCALCITONIN SERPL-MCNC: 0.06 NG/ML
PROT SERPL-MCNC: 6.8 G/DL (ref 5.7–8.2)
RBC # BLD AUTO: 3.55 10*6/MM3 (ref 3.89–5.14)
SODIUM BLD-SCNC: 138 MMOL/L (ref 132–146)
WBC NRBC COR # BLD: 16.87 10*3/MM3 (ref 3.5–10.8)

## 2018-08-15 PROCEDURE — 84145 PROCALCITONIN (PCT): CPT | Performed by: HOSPITALIST

## 2018-08-15 PROCEDURE — 80053 COMPREHEN METABOLIC PANEL: CPT | Performed by: HOSPITALIST

## 2018-08-15 PROCEDURE — 25010000002 ONDANSETRON PER 1 MG: Performed by: FAMILY MEDICINE

## 2018-08-15 PROCEDURE — 25010000002 HYDROMORPHONE PER 4 MG: Performed by: INTERNAL MEDICINE

## 2018-08-15 PROCEDURE — 87205 SMEAR GRAM STAIN: CPT | Performed by: HOSPITALIST

## 2018-08-15 PROCEDURE — 93970 EXTREMITY STUDY: CPT | Performed by: INTERNAL MEDICINE

## 2018-08-15 PROCEDURE — 87040 BLOOD CULTURE FOR BACTERIA: CPT | Performed by: HOSPITALIST

## 2018-08-15 PROCEDURE — 25010000002 VANCOMYCIN 10 G RECONSTITUTED SOLUTION

## 2018-08-15 PROCEDURE — 71045 X-RAY EXAM CHEST 1 VIEW: CPT

## 2018-08-15 PROCEDURE — 82962 GLUCOSE BLOOD TEST: CPT

## 2018-08-15 PROCEDURE — 94760 N-INVAS EAR/PLS OXIMETRY 1: CPT

## 2018-08-15 PROCEDURE — 87147 CULTURE TYPE IMMUNOLOGIC: CPT | Performed by: HOSPITALIST

## 2018-08-15 PROCEDURE — 93005 ELECTROCARDIOGRAM TRACING: CPT | Performed by: HOSPITALIST

## 2018-08-15 PROCEDURE — 99233 SBSQ HOSP IP/OBS HIGH 50: CPT | Performed by: INTERNAL MEDICINE

## 2018-08-15 PROCEDURE — 25010000002 VANCOMYCIN PER 500 MG

## 2018-08-15 PROCEDURE — 85027 COMPLETE CBC AUTOMATED: CPT | Performed by: HOSPITALIST

## 2018-08-15 PROCEDURE — 93970 EXTREMITY STUDY: CPT

## 2018-08-15 PROCEDURE — 94799 UNLISTED PULMONARY SVC/PX: CPT

## 2018-08-15 PROCEDURE — 87070 CULTURE OTHR SPECIMN AEROBIC: CPT | Performed by: HOSPITALIST

## 2018-08-15 PROCEDURE — 99233 SBSQ HOSP IP/OBS HIGH 50: CPT | Performed by: HOSPITALIST

## 2018-08-15 PROCEDURE — 93010 ELECTROCARDIOGRAM REPORT: CPT | Performed by: INTERNAL MEDICINE

## 2018-08-15 PROCEDURE — 25010000002 PIPERACILLIN SOD-TAZOBACTAM PER 1 G: Performed by: HOSPITALIST

## 2018-08-15 PROCEDURE — 25010000002 LORAZEPAM PER 2 MG: Performed by: HOSPITALIST

## 2018-08-15 PROCEDURE — 25010000002 ENOXAPARIN PER 10 MG: Performed by: INTERNAL MEDICINE

## 2018-08-15 PROCEDURE — 25010000002 MORPHINE SULFATE (PF) 2 MG/ML SOLUTION: Performed by: HOSPITALIST

## 2018-08-15 RX ORDER — QUETIAPINE FUMARATE 25 MG/1
25 TABLET, FILM COATED ORAL ONCE
Status: COMPLETED | OUTPATIENT
Start: 2018-08-15 | End: 2018-08-15

## 2018-08-15 RX ORDER — MORPHINE SULFATE 2 MG/ML
2 INJECTION, SOLUTION INTRAMUSCULAR; INTRAVENOUS EVERY 4 HOURS PRN
Status: DISCONTINUED | OUTPATIENT
Start: 2018-08-15 | End: 2018-08-23

## 2018-08-15 RX ADMIN — HYDROCODONE BITARTRATE AND ACETAMINOPHEN 15 ML: 7.5; 325 SOLUTION ORAL at 01:33

## 2018-08-15 RX ADMIN — LEVETIRACETAM 500 MG: 100 SOLUTION ORAL at 08:28

## 2018-08-15 RX ADMIN — HYDROMORPHONE HYDROCHLORIDE 0.5 MG: 1 INJECTION, SOLUTION INTRAMUSCULAR; INTRAVENOUS; SUBCUTANEOUS at 13:40

## 2018-08-15 RX ADMIN — VALPROIC ACID 250 MG: 250 SOLUTION ORAL at 08:28

## 2018-08-15 RX ADMIN — VALPROIC ACID 250 MG: 250 SOLUTION ORAL at 21:39

## 2018-08-15 RX ADMIN — LEVETIRACETAM 500 MG: 100 SOLUTION ORAL at 21:38

## 2018-08-15 RX ADMIN — MORPHINE SULFATE 2 MG: 2 INJECTION, SOLUTION INTRAMUSCULAR; INTRAVENOUS at 22:48

## 2018-08-15 RX ADMIN — QUETIAPINE FUMARATE 25 MG: 25 TABLET ORAL at 08:28

## 2018-08-15 RX ADMIN — FAMOTIDINE 20 MG: 20 TABLET ORAL at 21:38

## 2018-08-15 RX ADMIN — LORAZEPAM 1 MG: 2 INJECTION INTRAMUSCULAR; INTRAVENOUS at 15:42

## 2018-08-15 RX ADMIN — TAZOBACTAM SODIUM AND PIPERACILLIN SODIUM 4.5 G: 500; 4 INJECTION, SOLUTION INTRAVENOUS at 17:22

## 2018-08-15 RX ADMIN — ATORVASTATIN CALCIUM 80 MG: 40 TABLET, FILM COATED ORAL at 21:38

## 2018-08-15 RX ADMIN — VANCOMYCIN HYDROCHLORIDE 750 MG: 750 INJECTION, SOLUTION INTRAVENOUS at 21:39

## 2018-08-15 RX ADMIN — ENOXAPARIN SODIUM 40 MG: 100 INJECTION SUBCUTANEOUS at 08:28

## 2018-08-15 RX ADMIN — SODIUM CHLORIDE 1000 ML: 9 INJECTION, SOLUTION INTRAVENOUS at 08:29

## 2018-08-15 RX ADMIN — MORPHINE SULFATE 2 MG: 2 INJECTION, SOLUTION INTRAMUSCULAR; INTRAVENOUS at 15:42

## 2018-08-15 RX ADMIN — SODIUM CHLORIDE 50 ML/HR: 9 INJECTION, SOLUTION INTRAVENOUS at 03:44

## 2018-08-15 RX ADMIN — VALPROIC ACID 250 MG: 250 SOLUTION ORAL at 13:45

## 2018-08-15 RX ADMIN — LORAZEPAM 1 MG: 2 INJECTION INTRAMUSCULAR; INTRAVENOUS at 03:42

## 2018-08-15 RX ADMIN — ASPIRIN 81 MG CHEWABLE TABLET 81 MG: 81 TABLET CHEWABLE at 08:28

## 2018-08-15 RX ADMIN — LORAZEPAM 1 MG: 2 INJECTION INTRAMUSCULAR; INTRAVENOUS at 10:33

## 2018-08-15 RX ADMIN — CLOPIDOGREL BISULFATE 75 MG: 75 TABLET ORAL at 08:28

## 2018-08-15 RX ADMIN — VANCOMYCIN HYDROCHLORIDE 1250 MG: 10 INJECTION, POWDER, LYOPHILIZED, FOR SOLUTION INTRAVENOUS at 13:35

## 2018-08-15 RX ADMIN — HYDROMORPHONE HYDROCHLORIDE 0.5 MG: 1 INJECTION, SOLUTION INTRAMUSCULAR; INTRAVENOUS; SUBCUTANEOUS at 06:01

## 2018-08-15 RX ADMIN — VALPROIC ACID 250 MG: 250 SOLUTION ORAL at 03:42

## 2018-08-15 RX ADMIN — HYDROMORPHONE HYDROCHLORIDE 0.5 MG: 1 INJECTION, SOLUTION INTRAMUSCULAR; INTRAVENOUS; SUBCUTANEOUS at 10:33

## 2018-08-15 RX ADMIN — HYDROMORPHONE HYDROCHLORIDE 0.5 MG: 1 INJECTION, SOLUTION INTRAMUSCULAR; INTRAVENOUS; SUBCUTANEOUS at 01:33

## 2018-08-15 RX ADMIN — TAZOBACTAM SODIUM AND PIPERACILLIN SODIUM 4.5 G: 500; 4 INJECTION, SOLUTION INTRAVENOUS at 12:17

## 2018-08-15 RX ADMIN — ONDANSETRON 4 MG: 2 INJECTION INTRAMUSCULAR; INTRAVENOUS at 01:29

## 2018-08-15 RX ADMIN — FAMOTIDINE 20 MG: 20 TABLET ORAL at 08:28

## 2018-08-15 RX ADMIN — Medication 1 CAPSULE: at 08:28

## 2018-08-15 RX ADMIN — SODIUM CHLORIDE 50 ML/HR: 9 INJECTION, SOLUTION INTRAVENOUS at 09:22

## 2018-08-15 RX ADMIN — LORAZEPAM 1 MG: 2 INJECTION INTRAMUSCULAR; INTRAVENOUS at 22:48

## 2018-08-15 RX ADMIN — Medication 100 MG: at 08:28

## 2018-08-15 RX ADMIN — HYDROMORPHONE HYDROCHLORIDE 0.5 MG: 1 INJECTION, SOLUTION INTRAMUSCULAR; INTRAVENOUS; SUBCUTANEOUS at 03:42

## 2018-08-15 RX ADMIN — LACTULOSE 10 G: 10 SOLUTION ORAL at 08:28

## 2018-08-15 NOTE — PROGRESS NOTES
[facility] Medicine Services  PROGRESS NOTE    Patient Name: Otilia Holm  : 1980  MRN: [id number]    Date of Admission: 2018  Length of Stay: 29  Primary Care Physician: Provider, No Known    Subjective   Subjective     CC:  Unresponsive with fever    HPI:  On IV fluids and HR improved today.  No family.  Looks clinically more comfortable today compared to yesterday.     Review of Systems  Unable to obtain due to encephalopathy    Objective   Objective     Vital Signs:   Temp:  [97.9 °F (36.6 °C)-98.9 °F (37.2 °C)] 98 °F (36.7 °C)  Heart Rate:  [100-139] 116  Resp:  [16-20] 16  BP: (117)/(80) 117/80        Physical Exam:  NAD  Neck is without mass or JVD  Skin warm  Heart is Reg wo murmur   Lungs are clear wo wheeze or crackle  Abd is soft without HSM or mass, not tender or distended peg intact  Neurologic exam in nonfocal - some hand and foot contractures  Increased tone in her arms more than her legs.  Skin:  Dry, + skin tenting    Results Reviewed:  I have personally reviewed current lab, radiology, and data and agree.          Results from last 7 days  Lab Units 18  0343   SODIUM mmol/L 143   POTASSIUM mmol/L 3.8   CHLORIDE mmol/L 110*   CO2 mmol/L 24.0   BUN mg/dL 11   CREATININE mg/dL 0.52*   GLUCOSE mg/dL 130*   CALCIUM mg/dL 9.2   ALT (SGPT) U/L 59*   AST (SGOT) U/L 86*         Imaging Results (last 24 hours)     ** No results found for the last 24 hours. **        Results for orders placed during the hospital encounter of 18   Adult Transthoracic Echo Complete W/ Cont if Necessary Per Protocol    Addendum · Estimated LVEF = 50%. · The following left ventricular wall segments are hypokinetic: mid  anterior, apical septal and mid anteroseptal. · The cardiac valves are anatomically and functionally normal.        Aubrey Jiménez MD 2018  6:05 PM          Narrative · Left ventricular systolic function is normal. Estimated EF = 50%.  · The cardiac  "valves are anatomically and functionally normal.          I have reviewed the medications.    aspirin 81 mg Oral Daily   atorvastatin 80 mg Oral Nightly   clopidogrel 75 mg Oral Daily   enoxaparin 40 mg Subcutaneous Q24H   famotidine 20 mg Oral BID   lactobacillus acidophilus 1 capsule Oral Daily   lactulose 10 g Oral Daily   levETIRAcetam 500 mg Per PEG Tube Q12H   thiamine 100 mg Oral Daily   Valproic Acid 250 mg Nasogastric Q6H         Assessment/Plan   Assessment / Plan     Hospital Problem List     ETOH abuse    Anoxic brain damage     H/O drug abuse    Pulmonary infiltrates - pneumonia vs. aspiration pneumonitis vs. pulmonary edema.     Overview Signed 7/16/2018  7:42 AM by Ruslan Estrella MD     - pneumonia vs. aspiration pneumonitis vs. pulmonary edema.          Acute urinary retention    NMS (neuroleptic malignant syndrome)    Chronic hepatitis C without hepatic coma (CMS/HCC)           Brief Hospital Course to date:  Otilia Holm is a 37 y.o. female with relevant PMH of Alcoholism (drinks four \"4-Locos\" or 12 beers a day), drug abuse, who apparently stopped by a \"known drug dealers house\" on the way to work.  Became unresponsive.  Found unconscious and unresponsive in her car in [facility] parking lot.  Received narcan with no response.  EMS nasally intubated in field.  On arrival to [facility] found to have Bicarb 17 w/ AG 20, Ethanol level of 218.  Per [facility] ER note there was a period of 8-10 minutes when patient stopped talking before security / EMS began administering treatment.  Reportedly had prolonged seizure activity at OSH.  Transferred to [facility] on 7/16/2018 for further management. Since admission at [facility], pt had undergone tracheostomy and PEG.     Assessment & Plan:    Fever  - sputum with strep  - Continuing zosyn, flagyl    Also cont mild increase in LFTs.- HO Etoh and HCV (normal ammonia)    Normal procalcitonin  - Reducing neuro meds... On Keppra and Depakote weaning per neurology    H/o drug and " alcohol abuse  - continue thiamine    Dysphagia  - tube feeds as tolerated  - increased bowel regimen for constipation    Persistent Tachycardia  - stop standing Robinul (common reaction - tachycardia; arrhythmias)    Guarded prognosis.  Does not appear to be improving  Was having fevers on Abx     Persistent tachycardia improved with fluids - urine looks concentrated  Decrease IV fluids today  Palliative Medicine Evaluation for goals of care    DVT Prophylaxis:  Lovenox 40 mg daily    CODE STATUS:   Code Status and Medical Interventions:   Ordered at: 07/16/18 0620     Code Status:    CPR     Medical Interventions (Level of Support Prior to Arrest):    Full     Disposition: I expect the patient to be discharged to long term care / complex discharge planning    Jayesh Santana MD  08/14/18  8:05 PM

## 2018-08-15 NOTE — PAYOR COMM NOTE
"Updated clinicals for continued hospitalization  auth # [id number]    Thank You,  Deepti Vasquez, RN  Utilization Review   [phone]  Fax [phone]    Clint Pace  (37 y.o. Female)     Date of Birth Social Security Number Address Home Phone MRN    1980  [address]  [place] [address] [phone] [id number]    Restoration Marital Status          None Single       Admission Date Admission Type Admitting Provider Attending Provider Department, Room/Bed    7/16/18 Urgent Rad Stanton MD Russell, Marc P, MD [facility] 4H, S483/1    Discharge Date Discharge Disposition Discharge Destination                       Attending Provider:  Rad Stanton MD    Allergies:  No Known Allergies    Isolation:  None   Infection:  None   Code Status:  CPR    Ht:  144.8 cm (57.01\")   Wt:  66.4 kg (146 lb 6.4 oz)    Admission Cmt:  None   Principal Problem:  Respiratory arrest (CMS/[facility]) [R09.2]                 Active Insurance as of 7/16/2018     Primary Coverage     Payor Plan Insurance Group Employer/Plan Group    [employer] [place] [employer]      Payor Plan Address Payor Plan Phone Number Effective From Effective To    [address] [address] [phone] 7/16/2018     [facility] [id number]       Subscriber Name Subscriber Birth Date Member ID       CLINT PACE [id number]                 Emergency Contacts      (Rel.) Home Phone Work Phone Mobile Phone    Eric Pace (Son) -- -- [phone]                  ICU Vital Signs     Row Name 08/15/18 1351 08/15/18 1349 08/15/18 1200 08/15/18 1033 08/15/18 0946       Vitals    Temp 100.2 °F (37.9 °C)  --  --  --  --    Temp src Core  --  --  --  --    Pulse (!)  129  -- (!)  142  -- (!)  144   no neb given due to hr    Heart Rate Source  --  --  --  -- Monitor    Resp  --  --  --  -- 20    Resp Rate Source  --  --  --  -- Visual       Oxygen Therapy    SpO2 94 %  -- 94 %  -- 97 %    Pulse Oximetry Type  --  -- "  --  -- Continuous    Device (Oxygen Therapy)  -- tracheostomy collar tracheostomy collar tracheostomy collar humidified;tracheostomy collar;other (see comments)   air flow meter    Oxygen Concentration (%)  -- 21 21 21 21    Row Name 08/15/18 0922 08/15/18 0811 08/15/18 0733 08/15/18 0711 08/15/18 0600       Vitals    Temp 99.4 °F (37.4 °C) 100 °F (37.8 °C) 99.9 °F (37.7 °C)  --  --    Temp src Core Core Axillary  --  --    Pulse (!)  136 (!)  136  -- (!)  147  --    Heart Rate Source Monitor Monitor Monitor  --  --    Resp 18 20 18  --  --    Resp Rate Source Visual Visual Visual  --  --    BP  --  -- 128/70  --  --    BP Location  --  -- Right leg  --  --    BP Method  --  -- Automatic  --  --    Patient Position  --  -- Lying  --  --       Oxygen Therapy    SpO2 95 % 93 %  -- 94 %  --    Pulse Oximetry Type Continuous Continuous  --  --  --    Device (Oxygen Therapy) humidified;transtracheal catheter humidified;tracheostomy collar  -- humidified;tracheostomy collar humidified;tracheostomy collar    Oxygen Concentration (%) 21 21  -- 21 21    Row Name 08/15/18 0350 08/15/18 0200 08/15/18 0135 08/15/18 0110 08/15/18 0000       Vitals    Temp  --  --  -- 98.6 °F (37 °C)  --    Temp src  --  --  -- Core  --    Pulse (!)  123  --  --  -- 120    BP  --  -- 158/85  --  --       Oxygen Therapy    SpO2 97 % 95 %  -- 98 % 96 %    Pulse Oximetry Type  --  --  -- Continuous  --    Device (Oxygen Therapy) humidified;tracheostomy collar humidified;tracheostomy collar  -- humidified;tracheostomy collar humidified;tracheostomy collar    Oxygen Concentration (%) 21 21  --  -- 21    Row Name 08/14/18 2245 08/14/18 2115 08/14/18 1905 08/14/18 1749 08/14/18 1714       Vitals    Pulse (!)  154  -- 93 116 (!)  139       Oxygen Therapy    SpO2 96 % 100 % 98 % 97 % 99 %    Pulse Oximetry Type  --  --  --  -- Continuous    Device (Oxygen Therapy) humidified;tracheostomy collar humidified;tracheostomy collar humidified;tracheostomy  collar humidified;tracheostomy collar tracheostomy collar    Flow (L/min)  --  --  --  -- 6    Oxygen Concentration (%) 21 21 21 21 21    Row Name 08/14/18 1556 08/14/18 1547 08/14/18 1351 08/14/18 1153 08/14/18 0949       Vitals    Temp  -- 98 °F (36.7 °C)  -- 98.4 °F (36.9 °C)  --    Temp src  -- Core  -- Core  --    Pulse 112  -- 100 (!)  134 115    Heart Rate Source  -- Monitor  -- Monitor  --    Resp  -- 16  -- 18  --    Resp Rate Source  -- Visual  -- Visual  --    BP Location  -- Left arm  --  --  --    BP Method  -- Automatic  --  --  --    Patient Position  -- Lying  --  --  --       Oxygen Therapy    SpO2 99 %  -- 100 % 100 % 100 %    Device (Oxygen Therapy) tracheostomy collar  -- tracheostomy collar tracheostomy collar tracheostomy collar    Flow (L/min) 6  --  --  -- 6    Oxygen Concentration (%)  --  -- 28 28  --    Row Name 08/14/18 0829 08/14/18 0743 08/14/18 0718 08/14/18 0600 08/14/18 0413       Vitals    Temp 97.9 °F (36.6 °C) 98.7 °F (37.1 °C)  --  -- 98.9 °F (37.2 °C)    Temp src Core Axillary  --  -- Core    Pulse (!)  122  -- 103  -- 108    Heart Rate Source Monitor Monitor  --  -- Monitor    Resp 18 18  --  -- 20    Resp Rate Source Visual Visual  --  -- Visual    BP  --  --  --  -- 117/80    BP Location  -- Left arm  --  -- Left arm    BP Method  -- Automatic  --  -- Automatic    Patient Position  -- Lying  --  -- Lying       Oxygen Therapy    SpO2 100 %  -- 100 %  -- 100 %    Pulse Oximetry Type Continuous  --  --  --  --    Device (Oxygen Therapy) tracheostomy collar  -- tracheostomy collar tracheostomy collar  --    Flow (L/min)  --  -- 6 6  --    Oxygen Concentration (%) 28  --  -- 28  --    Row Name 08/14/18 0400 08/14/18 0200 08/14/18 0000 08/13/18 2200 08/13/18 2000       Oxygen Therapy    Device (Oxygen Therapy) tracheostomy collar tracheostomy collar tracheostomy collar tracheostomy collar tracheostomy collar    Flow (L/min) 6 6 6 6 6    Oxygen Concentration (%) 28 28 28 28 28     Row Name 08/13/18 1935 08/13/18 1805 08/13/18 1624 08/13/18 1556 08/13/18 1400       Vitals    Temp 98.6 °F (37 °C)  --  -- 99.9 °F (37.7 °C)  --    Temp src Core  --  -- Oral  --    Pulse 115  -- 105 115 116    Heart Rate Source Monitor  -- Monitor Monitor Monitor    Resp 22  -- 24 16 16    Resp Rate Source Visual  -- Visual Visual Visual    /98  --  -- 117/77  --    Noninvasive MAP (mmHg)  --  --  -- 88  --    BP Location Left arm  --  -- Left arm  --    BP Method Automatic  --  -- Automatic  --    Patient Position Lying  --  -- Lying  --       Oxygen Therapy    SpO2 100 %  -- 100 % 100 % 100 %    Pulse Oximetry Type  --  -- Continuous Continuous Continuous    Device (Oxygen Therapy)  -- tracheostomy collar tracheostomy collar tracheostomy collar tracheostomy collar    Oxygen Concentration (%)  -- 28 28 28 28    Dara Name 08/13/18 1200 08/13/18 1137 08/13/18 1005 08/13/18 0811 08/13/18 0700       Vitals    Temp  -- 97.8 °F (36.6 °C)  --  -- 98.4 °F (36.9 °C)    Temp src  -- Oral  --  -- Core    Pulse (!)  133  -- (!)  133  --  --    Heart Rate Source Monitor Monitor Monitor Monitor Monitor    Resp 18 18 20 18 20    Resp Rate Source Visual Visual Visual Visual Visual    BP  -- 127/80  --  -- 141/92    BP Location  -- Left arm  --  -- Left arm    BP Method  -- Automatic  --  -- Automatic    Patient Position  -- Lying  --  -- Lying       Oxygen Therapy    SpO2 99 %  -- 100 %  --  --    Pulse Oximetry Type Continuous  -- Continuous Continuous  --    Device (Oxygen Therapy) tracheostomy collar  -- tracheostomy collar tracheostomy collar  --    Oxygen Concentration (%) 28  -- 28 28  --    Dara Name 08/13/18 0600 08/13/18 0550 08/13/18 0435 08/13/18 0403 08/13/18 0400       Vitals    Temp  --  --  -- 98.2 °F (36.8 °C)  --    Temp src  --  --  -- Core  --    Pulse  -- (!)  124 106 105  --    Heart Rate Source  --  --  -- Monitor  --    Resp  --  --  -- 20  --    Resp Rate Source  --  --  -- Visual  --    BP  --  --   -- 111/69  --    BP Location  --  --  -- Left arm  --    BP Method  --  --  -- Automatic  --    Patient Position  --  --  -- Lying  --       Oxygen Therapy    SpO2  -- 100 % 100 % 100 %  --    Device (Oxygen Therapy) tracheostomy collar  --  --  -- tracheostomy collar    Flow (L/min) 6  --  --  -- 6    Oxygen Concentration (%) 28  --  --  -- 28    Row Name 08/13/18 0200 08/13/18 0000 08/12/18 2200 08/12/18 2000 08/12/18 1935       Vitals    Temp  --  --  --  -- 99.6 °F (37.6 °C)    Temp src  --  --  --  -- Core    Pulse  --  --  --  -- 106    Heart Rate Source  --  --  --  -- Monitor    Resp  --  --  --  -- 20    Resp Rate Source  --  --  --  -- Visual    BP  --  --  --  -- 123/78    BP Location  --  --  --  -- Left arm    BP Method  --  --  --  -- Automatic    Patient Position  --  --  --  -- Lying       Oxygen Therapy    SpO2  --  --  --  -- 97 %    Device (Oxygen Therapy) tracheostomy collar tracheostomy collar tracheostomy collar tracheostomy collar  --    Flow (L/min) 6 6 6 6  --    Oxygen Concentration (%) 28 28 28 28  --    Row Name 08/12/18 1920 08/12/18 1704                Vitals    Temp  -- 99.7 °F (37.6 °C)   notifed nurse       Temp src  -- Core       Pulse  -- 104       Heart Rate Source  -- Monitor       Resp  -- 21       Resp Rate Source  -- Visual       BP  -- 126/80       BP Location  -- Left arm       BP Method  -- Automatic       Patient Position  -- Lying          Oxygen Therapy    SpO2  -- 99 %       Pulse Oximetry Type Continuous  --       Device (Oxygen Therapy) tracheostomy collar  --       Flow (L/min) 6  --       Oxygen Concentration (%) 28  --           Intake & Output (last 3 days)       08/12 0701 - 08/13 0700 08/13 0701 - 08/14 0700 08/14 0701 - 08/15 0700 08/15 0701 - 08/16 0700    I.V. (mL/kg) 1814 (27.3) 2200 (33.1) 1934.8 (29.1)     Other 602 720 645 266    NG/GT 3133 700 1255 451    IV Piggyback    1350    Total Intake(mL/kg) 3794 (57.1) 3810 (57.4) 3763.8 (56.7) 2067 (31.1)     Urine (mL/kg/hr) 1400 (0.9) 2850 (1.8) 1400 (0.9)     Total Output 1400 2850 1400      Net +2394 +960 +2363.8 +2067            Unmeasured Urine Occurrence  1 x      Unmeasured Stool Occurrence  1 x          Lines, Drains & Airways    Active LDAs     Name:   Placement date:   Placement time:   Site:   Days:    PICC Single Lumen 08/09/18 Right Basilic  08/09/18    1307    Basilic    6    Gastrostomy/Enterostomy Percutaneous endoscopic gastrostomy (PEG) 2 20 Fr. LUQ          LUQ        Urethral Catheter Silicone                Surgical Airway Portex 9          9                    Hospital Medications (active)       Dose Frequency Start End    ! Vancomycin trough @1330. Please hold 1400 dose until evaluated by pharmacy.  Once 8/16/2018     Sig - Route: 1 (One) Time. - Does not apply    acetaminophen (TYLENOL) 160 MG/5ML solution 650 mg 650 mg Every 4 Hours PRN 8/5/2018     Sig - Route: Take 20.3 mL by mouth Every 4 (Four) Hours As Needed for Fever. - Oral    aspirin chewable tablet 81 mg 81 mg Daily 7/22/2018     Sig - Route: Chew 1 tablet Daily. - Oral    atorvastatin (LIPITOR) tablet 80 mg 80 mg Nightly 7/18/2018     Sig - Route: Take 2 tablets by mouth Every Night. - Oral    bisacodyl (DULCOLAX) suppository 10 mg 10 mg Daily PRN 8/8/2018     Sig - Route: Insert 1 suppository into the rectum Daily As Needed for Constipation. - Rectal    clopidogrel (PLAVIX) tablet 75 mg 75 mg Daily 7/18/2018     Sig - Route: Take 1 tablet by mouth Daily. - Oral    enoxaparin (LOVENOX) syringe 40 mg 40 mg Every 24 Hours 7/22/2018     Sig - Route: Inject 0.4 mL under the skin into the appropriate area as directed Daily. - Subcutaneous    famotidine (PEPCID) tablet 20 mg 20 mg 2 Times Daily 7/26/2018     Sig - Route: Take 1 tablet by mouth 2 (Two) Times a Day. - Oral    ipratropium-albuterol (DUO-NEB) nebulizer solution 3 mL 3 mL Every 8 Hours PRN 8/12/2018     Sig - Route: Take 3 mL by nebulization Every 8 (Eight) Hours As Needed for  "Shortness of Air. - Nebulization    lactobacillus acidophilus (RISAQUAD) capsule 1 capsule 1 capsule Daily 8/8/2018     Sig - Route: Take 1 capsule by mouth Daily. - Oral    lactulose (CHRONULAC) 10 GM/15ML solution 10 g 10 g Daily 8/6/2018     Sig - Route: Take 15 mL by mouth Daily. - Oral    levETIRAcetam (KEPPRA) 100 MG/ML solution 500 mg 500 mg Every 12 Hours Scheduled 8/12/2018     Sig - Route: 5 mL by Per PEG Tube route Every 12 (Twelve) Hours. - Per PEG Tube    LORazepam (ATIVAN) injection 1 mg 1 mg Every 4 Hours PRN 8/13/2018 8/23/2018    Sig - Route: Infuse 0.5 mL into a venous catheter Every 4 (Four) Hours As Needed for Anxiety, Seizures or Agitation. - Intravenous    Magnesium Sulfate 2 gram / 50mL Infusion (GIVE X 3 BAGS TO EQUAL 6GM TOTAL DOSE) - Mg 1.1 - 1/5 mg/dl 2 g As Needed 7/16/2018     Sig - Route: Infuse 50 mL into a venous catheter As Needed (See Administration Instructions). - Intravenous    Linked Group 1:  \"Or\" Linked Group Details        Magnesium Sulfate 2 gram Bolus, followed by 8 gram infusion (total Mg dose 10 grams)- Mg less than or equal to 1mg/dL 2 g As Needed 7/16/2018     Sig - Route: Infuse 50 mL into a venous catheter As Needed (See Administration Instructions). - Intravenous    Linked Group 1:  \"Or\" Linked Group Details        Magnesium Sulfate 4 gram infusion- Mg 1.6-1.9 mg/dL 4 g As Needed 7/16/2018     Sig - Route: Infuse 100 mL into a venous catheter As Needed (See Administration Instructions). - Intravenous    Linked Group 1:  \"Or\" Linked Group Details        Morphine sulfate (PF) injection 2 mg 2 mg Every 4 Hours PRN 8/15/2018 8/25/2018    Sig - Route: Infuse 1 mL into a venous catheter Every 4 (Four) Hours As Needed for Severe Pain  (agitation/restlessness). - Intravenous    ondansetron (ZOFRAN) injection 4 mg 4 mg Every 6 Hours PRN 7/29/2018     Sig - Route: Infuse 2 mL into a venous catheter Every 6 (Six) Hours As Needed for Nausea or Vomiting. - Intravenous    Pharmacy " "to dose vancomycin  Continuous PRN 8/15/2018 8/22/2018    Sig - Route: Continuous As Needed for Consult. - Does not apply    piperacillin-tazobactam (ZOSYN) 4.5 g in iso-osmotic dextrose 100 mL IVPB (premix) 4.5 g Once 8/15/2018 8/15/2018    Sig - Route: Infuse 100 mL into a venous catheter 1 (One) Time. - Intravenous    piperacillin-tazobactam (ZOSYN) 4.5 g in iso-osmotic dextrose 100 mL IVPB (premix) 4.5 g Every 8 Hours 8/15/2018 8/22/2018    Sig - Route: Infuse 100 mL into a venous catheter Every 8 (Eight) Hours. - Intravenous    potassium chloride (KLOR-CON) packet 40 mEq 40 mEq As Needed 7/16/2018     Sig - Route: Take 40 mEq by mouth As Needed (potassium replacement, see admin instructions). - Oral    Linked Group 2:  \"Or\" Linked Group Details        potassium chloride (MICRO-K) CR capsule 40 mEq 40 mEq As Needed 7/16/2018     Sig - Route: Take 4 capsules by mouth As Needed (potassium replacement.  see admin instructions). - Oral    Linked Group 2:  \"Or\" Linked Group Details        QUEtiapine (SEROquel) tablet 25 mg 25 mg Once 8/15/2018 8/15/2018    Sig - Route: Take 1 tablet by mouth 1 (One) Time. - Oral    sodium chloride 0.9 % bolus 1,000 mL 1,000 mL Once 8/15/2018 8/15/2018    Sig - Route: Infuse 1,000 mL into a venous catheter 1 (One) Time. - Intravenous    sodium chloride 0.9 % flush 1-10 mL 1-10 mL As Needed 7/16/2018     Sig - Route: Infuse 1-10 mL into a venous catheter As Needed for Line Care. - Intravenous    sodium chloride 0.9 % flush 10 mL 10 mL As Needed 8/9/2018     Sig - Route: Infuse 10 mL into a venous catheter As Needed for Line Care. - Intravenous    Cosign for Ordering: Required by Jennifer Edwards MD    sodium chloride 0.9 % infusion 50 mL/hr Continuous 8/13/2018     Sig - Route: Infuse 50 mL/hr into a venous catheter Continuous. - Intravenous    thiamine (VITAMIN B-1) tablet 100 mg 100 mg Daily 8/4/2018     Sig - Route: Take 1 tablet by mouth Daily. - Oral    Valproic Acid (DEPAKENE) " syrup 250 mg 250 mg Every 6 Hours Scheduled 7/27/2018     Sig - Route: 5 mL by Nasogastric route Every 6 (Six) Hours. - Nasogastric    vancomycin 1250 mg/250 mL 0.9% NS IVPB ([facility]) 1,250 mg Once 8/15/2018     Sig - Route: Infuse 250 mL into a venous catheter 1 (One) Time. - Intravenous    vancomycin in dextrose 5% 150 mL (VANCOCIN) IVPB 750 mg 750 mg Every 8 Hours 8/15/2018 8/22/2018    Sig - Route: Infuse 150 mL into a venous catheter Every 8 (Eight) Hours. - Intravenous    acetaminophen (TYLENOL) suppository 650 mg (Discontinued) 650 mg Every 4 Hours PRN 8/2/2018 8/14/2018    Sig - Route: Insert 1 suppository into the rectum Every 4 (Four) Hours As Needed for Fever. - Rectal    Cosign for Ordering: Accepted by Niurka Singleton MD on 8/2/2018  2:45 PM    acetaminophen (TYLENOL) tablet 650 mg (Discontinued) 650 mg Every 4 Hours PRN 7/17/2018 8/14/2018    Sig - Route: Take 2 tablets by mouth Every 4 (Four) Hours As Needed for Fever. - Oral    HYDROcodone-acetaminophen (HYCET) 7.5-325 MG/15ML solution 15 mL (Discontinued) 15 mL Every 6 Hours PRN 8/14/2018 8/15/2018    Sig - Route: 15 mL by Per G Tube route Every 6 (Six) Hours As Needed for Moderate Pain . - Per G Tube    HYDROmorphone (DILAUDID) injection 0.5 mg (Discontinued) 0.5 mg Every 2 Hours PRN 8/13/2018 8/15/2018    Sig - Route: Infuse 0.5 mL into a venous catheter Every 2 (Two) Hours As Needed for Severe Pain . - Intravenous          Blood Administration Record     None        Lab Results (last 72 hours)     Procedure Component Value Units Date/Time    Respiratory Culture - Sputum, Cough [[id number]] Collected:  08/15/18 1113    Specimen:  Sputum from Cough Updated:  08/15/18 1326     Gram Stain Result Many (4+) WBCs per low power field      Moderate (3+) Epithelial cells per low power field      Many (4+) Gram positive cocci in pairs, chains and clusters    Procalcitonin [[id number]]  (Normal) Collected:  08/15/18 1128    Specimen:  Blood Updated:   08/15/18 1241     Procalcitonin 0.06 ng/mL     Narrative:       As a Marker for Sepsis (Non-Neonates):   1. <0.5 ng/mL represents a low risk of severe sepsis and/or septic shock.  2. >2 ng/mL represents a high risk of severe sepsis and/or septic shock.    As a Marker for Lower Respiratory Tract Infections that require antibiotic therapy:    PCT on Admission     Antibiotic Therapy       6-12 Hrs later  > 0.5                Strongly Recommended             >0.25 - <0.5         Recommended  0.1 - 0.25           Discouraged              Remeasure/reassess PCT  <0.1                 Strongly Discouraged     Remeasure/reassess PCT                     PCT values of < 0.5 ng/mL do not exclude an infection, because localized infections (without systemic signs) may be associated with such low concentrations, or a systemic infection in its initial stages (< 6 hours). Furthermore, increased PCT can occur without infection. PCT concentrations between 0.5 and 2.0 ng/mL should be interpreted taking into account the patient's history. It is recommended to retest PCT within 6-24 hours if any concentrations < 2 ng/mL are obtained.    POC Glucose Once [[id number]]  (Abnormal) Collected:  08/15/18 1234    Specimen:  Blood Updated:  08/15/18 1236     Glucose 144 (H) mg/dL     Narrative:       Meter: [id number] : [id number] Reece Morenoony    Comprehensive Metabolic Panel [[id number]]  (Abnormal) Collected:  08/15/18 1128    Specimen:  Blood Updated:  08/15/18 1226     Glucose 99 mg/dL      BUN 10 mg/dL      Creatinine 0.45 (L) mg/dL      Sodium 138 mmol/L      Potassium 3.8 mmol/L      Chloride 105 mmol/L      CO2 25.0 mmol/L      Calcium 9.2 mg/dL      Total Protein 6.8 g/dL      Albumin 3.73 g/dL      ALT (SGPT) 76 (H) U/L      AST (SGOT) 111 (H) U/L      Alkaline Phosphatase 70 U/L      Total Bilirubin 0.4 mg/dL      eGFR Non African Amer >150 mL/min/1.73      Globulin 3.1 gm/dL      A/G Ratio 1.2 (L) g/dL      BUN/Creatinine Ratio 22.2      Anion Gap 8.0 mmol/L     Narrative:       [employer] Kid[employer] Guidelines    Stage     Description        GFR  1         Normal or High     90+  2         Mild decrease      60-89  3         Moderate decrease  30-59  4         Severe decrease    15-29  5         Kidney failure     <15    CBC (No Diff) [[id number]]  (Abnormal) Collected:  08/15/18 1128    Specimen:  Blood Updated:  08/15/18 1153     WBC 16.87 (H) 10*3/mm3      RBC 3.55 (L) 10*6/mm3      Hemoglobin 10.8 (L) g/dL      Hematocrit 34.5 %      MCV 97.2 fL      MCH 30.4 pg      MCHC 31.3 (L) g/dL      RDW 15.0 (H) %      RDW-SD 53.4 fl      MPV 10.6 fL      Platelets 402 10*3/mm3     Blood Culture - Blood, [[id number]] Collected:  08/15/18 1128    Specimen:  Blood from Hand, Left Updated:  08/15/18 1150    Blood Culture - Blood, [[id number]] Collected:  08/15/18 1128    Specimen:  Blood from Hand, Right Updated:  08/15/18 1150    POC Glucose Once [[id number]]  (Normal) Collected:  08/14/18 2335    Specimen:  Blood Updated:  08/14/18 2336     Glucose 98 mg/dL     Narrative:       Meter: [id number] : [id number] Brandon Diaz    POC Glucose Once [[id number]]  (Normal) Collected:  08/14/18 1802    Specimen:  Blood Updated:  08/14/18 1804     Glucose 118 mg/dL     Narrative:       Meter: [id number] : [id number] Parekh Gasper    POC Glucose Once [[id number]]  (Normal) Collected:  08/14/18 1140    Specimen:  Blood Updated:  08/14/18 1143     Glucose 103 mg/dL     Narrative:       Meter: [id number] : [id number] Parekh Gasper    POC Glucose Once [[id number]]  (Normal) Collected:  08/14/18 0005    Specimen:  Blood Updated:  08/14/18 0007     Glucose 110 mg/dL     Narrative:       Meter: [id number] : [id number] Jackson Canales    POC Glucose Once [[id number]]  (Normal) Collected:  08/13/18 1759    Specimen:  Blood Updated:  08/13/18 1801     Glucose 115 mg/dL     Narrative:       Meter: [id number] : [id number] Ami Bowman    POC Glucose Once  [[id number]]  (Abnormal) Collected:  08/13/18 1143    Specimen:  Blood Updated:  08/13/18 1156     Glucose 137 (H) mg/dL     Narrative:       Meter: [id number] : [id number] Ami Bowman    POC Glucose Once [[id number]]  (Normal) Collected:  08/13/18 0619    Specimen:  Blood Updated:  08/13/18 0621     Glucose 96 mg/dL     Narrative:       Meter: [id number] : [id number] Jackson Eleanor    Nutrition Panel [[id number]]  (Abnormal) Collected:  08/13/18 0343    Specimen:  Blood Updated:  08/13/18 0606     Glucose 130 (H) mg/dL      BUN 11 mg/dL      Creatinine 0.52 (L) mg/dL      Sodium 143 mmol/L      Potassium 3.8 mmol/L      Chloride 110 (H) mmol/L      CO2 24.0 mmol/L      Calcium 9.2 mg/dL      Alkaline Phosphatase 68 U/L      ALT (SGPT) 59 (H) U/L      AST (SGOT) 86 (H) U/L      Total Cholesterol 121 mg/dL      Triglycerides 122 mg/dL      Total Protein 6.8 g/dL      Albumin 3.75 g/dL      Phosphorus 3.1 mg/dL      Total Bilirubin 0.2 (L) mg/dL      Magnesium 2.3 mg/dL      Anion Gap 9.0 mmol/L     Narrative:       Cholesterol Reference Ranges:   Desirable       < 200 mg/dL   Borderline    200-239 mg/dL   High Risk       > 239 mg/dL    Triglyceride Reference Ranges:   Normal          < 150 mg/dL   Borderline    150-199 mg/dL   High          200-499 mg/dL   Very High       > 499 mg/dL    Prealbumin [[id number]]  (Normal) Collected:  08/13/18 0343    Specimen:  Blood Updated:  08/13/18 0524     Prealbumin 23.5 mg/dL     C-reactive Protein [[id number]]  (Normal) Collected:  08/13/18 0343    Specimen:  Blood Updated:  08/13/18 0523     C-Reactive Protein 0.66 mg/dL     POC Glucose Once [[id number]]  (Normal) Collected:  08/13/18 0021    Specimen:  Blood Updated:  08/13/18 0034     Glucose 105 mg/dL     Narrative:       Meter: [id number] : [id number] Jackson Canales          Imaging Results (last 72 hours)     Procedure Component Value Units Date/Time    XR Chest 1 View [[id number]] Collected:  08/15/18 2097      Updated:  08/15/18 1345    Narrative:       EXAMINATION: XR CHEST 1 VW- 08/15/2018     INDICATION: R09.2-Respiratory arrest; G93.40-Encephalopathy,  unspecified; R74.8-Abnormal levels of other serum enzymes; Z74.09-Other  reduced mobility     COMPARISON: 08/07/2018     FINDINGS: Portable chest reveals PICC line catheter identified on the  right tip in the SVC. Surgical clips seen in the upper lung fields.  Cardiac and mediastinal silhouettes are within normal limits. The lung  fields are clear. No focal parenchymal opacification present.  No  pleural effusion or pneumothorax. Degenerative changes seen within the  spine.           Impression:       PICC line catheter identified on the right tip in the SVC.  No new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

## 2018-08-15 NOTE — PROGRESS NOTES
[facility] Medicine Services  PROGRESS NOTE    Patient Name: Otilia Holm  : 1980  MRN: [id number]    Date of Admission: 2018  Length of Stay: 30  Primary Care Physician: Provider, No Known    Subjective   Subjective     CC:  Unresponsive with fever    HPI:  Increasing tachycardia. Increased sputum production. No n/v, tolerating TF. No change in UOP. Increased LE edema and warmth on R, RUE edematous, contracted.    Review of Systems  Unable to obtain due to encephalopathy    Objective   Objective     Vital Signs:   Temp:  [98 °F (36.7 °C)-100 °F (37.8 °C)] 99.4 °F (37.4 °C)  Heart Rate:  [] 144  Resp:  [16-20] 20  BP: (128-158)/(70-85) 128/70        Physical Exam:    NAD, alert only  OP clear, dry  Neck supple, trach in place, with purulent secretions  No LAD  Tachy  Coarse B  +BS, FT in place, no redness  No c/c, R LE warmth to touch, with edema, RUE PICC site clean, but R arm warm  Nonverbal    Results Reviewed:  I have personally reviewed current lab, radiology, and data and agree.          Results from last 7 days  Lab Units 18  0343   SODIUM mmol/L 143   POTASSIUM mmol/L 3.8   CHLORIDE mmol/L 110*   CO2 mmol/L 24.0   BUN mg/dL 11   CREATININE mg/dL 0.52*   GLUCOSE mg/dL 130*   CALCIUM mg/dL 9.2   ALT (SGPT) U/L 59*   AST (SGOT) U/L 86*         Imaging Results (last 24 hours)     ** No results found for the last 24 hours. **        Results for orders placed during the hospital encounter of 18   Adult Transthoracic Echo Complete W/ Cont if Necessary Per Protocol    Addendum · Estimated LVEF = 50%. · The following left ventricular wall segments are hypokinetic: mid  anterior, apical septal and mid anteroseptal. · The cardiac valves are anatomically and functionally normal.        Aubrey Jiménez MD 2018  6:05 PM          Narrative · Left ventricular systolic function is normal. Estimated EF = 50%.  · The cardiac valves are anatomically and  "functionally normal.          I have reviewed the medications.    aspirin 81 mg Oral Daily   atorvastatin 80 mg Oral Nightly   clopidogrel 75 mg Oral Daily   enoxaparin 40 mg Subcutaneous Q24H   famotidine 20 mg Oral BID   lactobacillus acidophilus 1 capsule Oral Daily   lactulose 10 g Oral Daily   levETIRAcetam 500 mg Per PEG Tube Q12H   piperacillin-tazobactam 4.5 g Intravenous Once   piperacillin-tazobactam 4.5 g Intravenous Q8H   thiamine 100 mg Oral Daily   Valproic Acid 250 mg Nasogastric Q6H         Assessment/Plan   Assessment / Plan     Hospital Problem List     ETOH abuse    Anoxic brain damage     H/O drug abuse    Pulmonary infiltrates - pneumonia vs. aspiration pneumonitis vs. pulmonary edema.     Overview Signed 7/16/2018  7:42 AM by Ruslan Estrella MD     - pneumonia vs. aspiration pneumonitis vs. pulmonary edema.          Acute urinary retention    NMS (neuroleptic malignant syndrome)    Chronic hepatitis C without hepatic coma (CMS/HCC)           Brief Hospital Course to date:  Otilia Holm is a 37 y.o. female with relevant PMH of Alcoholism (drinks four \"4-Locos\" or 12 beers a day), drug abuse, who apparently stopped by a \"known drug dealers house\" on the way to work.  Became unresponsive.  Found unconscious and unresponsive in her car in [facility] parking lot.  Received narcan with no response.  EMS nasally intubated in field.  On arrival to [facility] found to have Bicarb 17 w/ AG 20, Ethanol level of 218.  Per [facility] ER note there was a period of 8-10 minutes when patient stopped talking before security / EMS began administering treatment.  Reportedly had prolonged seizure activity at OSH.  Transferred to [facility] on 7/16/2018 for further management. Since admission at [facility], pt had undergone tracheostomy and PEG.     Assessment & Plan:      Fevers/tachycardia  --CXR  --sputum/blood cultures  --check urine  --check duplex of B UE/LE  --empiric antibiotics  --check procalcitonin    Tachycardia  --check " CXR, duplex, EKG    H/o drug and alcohol abuse  - continue thiamine    Dysphagia  - tube feeds as tolerated  - increased bowel regimen for constipation    Guarded prognosis.  Does not appear to be improving      DVT Prophylaxis:  Lovenox 40 mg daily    CODE STATUS:   Code Status and Medical Interventions:   Ordered at: 07/16/18 0620     Code Status:    CPR     Medical Interventions (Level of Support Prior to Arrest):    Full     Disposition: I expect the patient to be discharged to long term care / complex discharge planning    Rad Stanton MD  08/15/18  10:39 AM

## 2018-08-15 NOTE — PLAN OF CARE
Problem: Patient Care Overview  Goal: Interprofessional Rounds/Family Conf  Outcome: Ongoing (interventions implemented as appropriate)  13:00 Palliative Team Conference: JOSE ALBERTO Barone RN, CHPN; KAELA Noble DO; LALY Thompson, RN, CHPN; LEONILA Garcia, RN, CHPN; KATIA Fontaine; HUGO Mercer MDiv   08/15/18 1513   Interdisciplinary Rounds/Family Conf   Summary Pt with increased episodes of restlessness, tachycardia, labile body temp, diaphoresis, rigidity. JOSE MONTAVLO spoke with son briefly by phone yesterday, will attempt to contact again today regarding status and signs of possible decline. Continue GOC discussion.       Problem: Palliative Care (Adult)  Intervention: Promote Informed Decision Making and Goal Setting   08/15/18 1513   Coping/Psychosocial Interventions   Life Transition/Adjustment decision-making facilitated  (JOSE MONTALVO in contact with son by phone)

## 2018-08-15 NOTE — PROGRESS NOTES
Continued Stay Note   [place]     Patient Name: Otilia Holm  MRN: [id number]  Today's Date: 8/15/2018    Admit Date: 7/16/2018          Discharge Plan     Row Name 08/15/18 1150       Plan    Plan LTC     Plan Comments Complex Care following for discharge plans. [facility] still interested in patient once medically ready for discharge. Mar[place] @ [id number]               Discharge Codes    No documentation.       Expected Discharge Date and Time     Expected Discharge Date Expected Discharge Time    Aug 17, 2018             Zahraa Jay RN

## 2018-08-15 NOTE — PROGRESS NOTES
"Pharmacy Consult-Vancomycin Dosing  Otilia Holm is a  37 y.o. female receiving vancomycin therapy.     Indication: Sepsis  Consulting Provider: Maximino PERALES Consult: Yes    Goal Trough: 15-20mcg/mL    Current Antimicrobial Therapy  Vancomycin Day 1  Zosyn Day 1    Allergies  Allergies as of 07/16/2018   • (No Known Allergies)       Labs      Results from last 7 days  Lab Units 08/15/18  1128 08/13/18  0343   BUN mg/dL 10 11   CREATININE mg/dL 0.45* 0.52*         Results from last 7 days  Lab Units 08/15/18  1128   WBC 10*3/mm3 16.87*       Evaluation of Dosing    Ht - 144.8 cm (57.01\")  Wt - 66.4 kg (146 lb 6.4 oz)    Estimated Creatinine Clearance: 144.2 mL/min (A) (by C-G formula based on SCr of 0.45 mg/dL (L)).    Intake & Output (last 3 days)       08/12 0701 - 08/13 0700 08/13 0701 - 08/14 0700 08/14 0701 - 08/15 0700 08/15 0701 - 08/16 0700    I.V. (mL/kg) 1814 (27.3) 2200 (33.1) 1934.8 (29.1)     Other 602 720 645 266    NG/GT 0071 038 9363 451    IV Piggyback    1100    Total Intake(mL/kg) 3794 (57.1) 3810 (57.4) 3763.8 (56.7) 1817 (27.4)    Urine (mL/kg/hr) 1400 (0.9) 2850 (1.8) 1400 (0.9)     Total Output 1400 2850 1400      Net +2394 +960 +2363.8 +1817            Unmeasured Urine Occurrence  1 x      Unmeasured Stool Occurrence  1 x            Microbiology and Radiology  Microbiology Results (last 10 days)     Procedure Component Value - Date/Time    Respiratory Culture - Aspirate, ET Suction [[id number]] Collected:  08/10/18 0919    Lab Status:  Final result Specimen:  Aspirate from ET Suction Updated:  08/12/18 0701     Respiratory Culture Light growth (2+) Normal Respiratory Sima     Gram Stain Result Many (4+) WBCs per low power field      Few (2+) Epithelial cells per low power field      Few (2+) Gram positive cocci in pairs      Few (2+) Gram positive cocci in clusters      Rare (1+) Gram positive bacilli    MRSA Screen Culture - Swab, Nares [[id number]]  (Normal) Collected:  08/06/18 1734    Lab " Status:  Final result Specimen:  Swab from Nares Updated:  08/08/18 0717     MRSA SCREEN CX No Methicillin Resistant Staphylococcus aureus isolated    Urine Culture - Urine, [[id number]]  (Normal) Collected:  08/06/18 1723    Lab Status:  Final result Specimen:  Urine from Urine, Catheter Updated:  08/08/18 0833     Urine Culture No growth at 2 days    Respiratory Culture - Sputum, NT Suction [[id number]]  (Abnormal) Collected:  08/05/18 1711    Lab Status:  Final result Specimen:  Sputum from NT Suction Updated:  08/07/18 1412     Respiratory Culture Light growth (2+) Streptococcus, Beta Hemolytic, Group G (A)     Comment: This organism is considered to be universally susceptible to penicillin.  No further antibiotic testing will be performed.        STREP GROUPING G     Respiratory Culture Light growth (2+) Normal Respiratory Sima     Gram Stain Result Many (4+) WBCs per low power field      Many (4+) Epithelial cells per low power field      Many (4+) Gram positive cocci in pairs, chains and clusters      Many (4+) Gram positive bacilli      Few (2+) Gram negative bacilli    Blood Culture - Blood, [[id number]]  (Normal) Collected:  08/05/18 1437    Lab Status:  Final result Specimen:  Blood from Wrist, Right Updated:  08/10/18 1500     Blood Culture No growth at 5 days    Blood Culture - Blood, [[id number]]  (Normal) Collected:  08/05/18 1437    Lab Status:  Final result Specimen:  Blood from Wrist, Left Updated:  08/10/18 1500     Blood Culture No growth at 5 days          Evaluation of Level    Lab Results   Component Value Date    [facility] 14.30 08/06/2018       Assessment/Plan:  1. Patient received a loading dose of vancomycin 1250mg IV x1 on 8/15.  2. Will start on a maintenance dose of 750mg IV Q8H (received this dose previously during admission).  3. Vancomycin trough ordered for 8/16 prior to the 4th dose. (Goal trough = 15-20 mcg/mL).  4. Monitor renal function, cultures and sensitivities, and clinical  status, and adjust regimen as necessary.  Pharmacy will continue to follow.    Diana Smyth, PharmD  Pharmacy Resident  8/15/2018  1:07 PM

## 2018-08-16 LAB
ANION GAP SERPL CALCULATED.3IONS-SCNC: 10 MMOL/L (ref 3–11)
BASOPHILS # BLD AUTO: 0.08 10*3/MM3 (ref 0–0.2)
BASOPHILS NFR BLD AUTO: 0.5 % (ref 0–1)
BUN BLD-MCNC: 11 MG/DL (ref 9–23)
BUN/CREAT SERPL: 21.2 (ref 7–25)
CALCIUM SPEC-SCNC: 9.4 MG/DL (ref 8.7–10.4)
CHLORIDE SERPL-SCNC: 103 MMOL/L (ref 99–109)
CK SERPL-CCNC: 820 U/L (ref 26–174)
CO2 SERPL-SCNC: 26 MMOL/L (ref 20–31)
CREAT BLD-MCNC: 0.52 MG/DL (ref 0.6–1.3)
DEPRECATED RDW RBC AUTO: 53.7 FL (ref 37–54)
EOSINOPHIL # BLD AUTO: 0.11 10*3/MM3 (ref 0–0.3)
EOSINOPHIL NFR BLD AUTO: 0.7 % (ref 0–3)
ERYTHROCYTE [DISTWIDTH] IN BLOOD BY AUTOMATED COUNT: 15.2 % (ref 11.3–14.5)
GFR SERPL CREATININE-BSD FRML MDRD: 132 ML/MIN/1.73
GLUCOSE BLD-MCNC: 134 MG/DL (ref 70–100)
GLUCOSE BLDC GLUCOMTR-MCNC: 115 MG/DL (ref 70–130)
GLUCOSE BLDC GLUCOMTR-MCNC: 130 MG/DL (ref 70–130)
GLUCOSE BLDC GLUCOMTR-MCNC: 138 MG/DL (ref 70–130)
GLUCOSE BLDC GLUCOMTR-MCNC: 156 MG/DL (ref 70–130)
HCT VFR BLD AUTO: 36.3 % (ref 34.5–44)
HGB BLD-MCNC: 11.4 G/DL (ref 11.5–15.5)
IMM GRANULOCYTES # BLD: 0.06 10*3/MM3 (ref 0–0.03)
IMM GRANULOCYTES NFR BLD: 0.4 % (ref 0–0.6)
LYMPHOCYTES # BLD AUTO: 2.41 10*3/MM3 (ref 0.6–4.8)
LYMPHOCYTES NFR BLD AUTO: 15.3 % (ref 24–44)
MCH RBC QN AUTO: 30.5 PG (ref 27–31)
MCHC RBC AUTO-ENTMCNC: 31.4 G/DL (ref 32–36)
MCV RBC AUTO: 97.1 FL (ref 80–99)
MONOCYTES # BLD AUTO: 1.24 10*3/MM3 (ref 0–1)
MONOCYTES NFR BLD AUTO: 7.9 % (ref 0–12)
NEUTROPHILS # BLD AUTO: 11.88 10*3/MM3 (ref 1.5–8.3)
NEUTROPHILS NFR BLD AUTO: 75.6 % (ref 41–71)
PLATELET # BLD AUTO: 420 10*3/MM3 (ref 150–450)
PMV BLD AUTO: 11.2 FL (ref 6–12)
POTASSIUM BLD-SCNC: 4.2 MMOL/L (ref 3.5–5.5)
RBC # BLD AUTO: 3.74 10*6/MM3 (ref 3.89–5.14)
SODIUM BLD-SCNC: 139 MMOL/L (ref 132–146)
VANCOMYCIN TROUGH SERPL-MCNC: 12 MCG/ML (ref 10–20)
WBC NRBC COR # BLD: 15.72 10*3/MM3 (ref 3.5–10.8)

## 2018-08-16 PROCEDURE — 25010000002 VANCOMYCIN PER 500 MG

## 2018-08-16 PROCEDURE — 25010000002 PIPERACILLIN SOD-TAZOBACTAM PER 1 G: Performed by: HOSPITALIST

## 2018-08-16 PROCEDURE — 82962 GLUCOSE BLOOD TEST: CPT

## 2018-08-16 PROCEDURE — 25010000002 CEFTRIAXONE PER 250 MG: Performed by: INTERNAL MEDICINE

## 2018-08-16 PROCEDURE — 85025 COMPLETE CBC W/AUTO DIFF WBC: CPT | Performed by: HOSPITALIST

## 2018-08-16 PROCEDURE — 82550 ASSAY OF CK (CPK): CPT | Performed by: HOSPITALIST

## 2018-08-16 PROCEDURE — 80202 ASSAY OF VANCOMYCIN: CPT

## 2018-08-16 PROCEDURE — 25010000002 LORAZEPAM PER 2 MG: Performed by: HOSPITALIST

## 2018-08-16 PROCEDURE — 94760 N-INVAS EAR/PLS OXIMETRY 1: CPT

## 2018-08-16 PROCEDURE — 80048 BASIC METABOLIC PNL TOTAL CA: CPT | Performed by: HOSPITALIST

## 2018-08-16 PROCEDURE — 99232 SBSQ HOSP IP/OBS MODERATE 35: CPT | Performed by: INTERNAL MEDICINE

## 2018-08-16 PROCEDURE — 25010000002 MORPHINE SULFATE (PF) 2 MG/ML SOLUTION: Performed by: HOSPITALIST

## 2018-08-16 PROCEDURE — 25010000002 ENOXAPARIN PER 10 MG: Performed by: INTERNAL MEDICINE

## 2018-08-16 RX ORDER — CEFTRIAXONE SODIUM 1 G/50ML
1 INJECTION, SOLUTION INTRAVENOUS EVERY 24 HOURS
Status: COMPLETED | OUTPATIENT
Start: 2018-08-16 | End: 2018-08-22

## 2018-08-16 RX ORDER — QUETIAPINE FUMARATE 25 MG/1
25 TABLET, FILM COATED ORAL EVERY 8 HOURS SCHEDULED
Status: DISCONTINUED | OUTPATIENT
Start: 2018-08-16 | End: 2018-08-17

## 2018-08-16 RX ADMIN — CEFTRIAXONE SODIUM 1 G: 1 INJECTION, SOLUTION INTRAVENOUS at 16:17

## 2018-08-16 RX ADMIN — LORAZEPAM 1 MG: 2 INJECTION INTRAMUSCULAR; INTRAVENOUS at 08:37

## 2018-08-16 RX ADMIN — ATORVASTATIN CALCIUM 80 MG: 40 TABLET, FILM COATED ORAL at 21:01

## 2018-08-16 RX ADMIN — TAZOBACTAM SODIUM AND PIPERACILLIN SODIUM 4.5 G: 500; 4 INJECTION, SOLUTION INTRAVENOUS at 10:21

## 2018-08-16 RX ADMIN — TAZOBACTAM SODIUM AND PIPERACILLIN SODIUM 4.5 G: 500; 4 INJECTION, SOLUTION INTRAVENOUS at 02:20

## 2018-08-16 RX ADMIN — LORAZEPAM 1 MG: 2 INJECTION INTRAMUSCULAR; INTRAVENOUS at 21:02

## 2018-08-16 RX ADMIN — VALPROIC ACID 250 MG: 250 SOLUTION ORAL at 02:20

## 2018-08-16 RX ADMIN — QUETIAPINE FUMARATE 25 MG: 25 TABLET ORAL at 21:01

## 2018-08-16 RX ADMIN — LEVETIRACETAM 500 MG: 100 SOLUTION ORAL at 08:18

## 2018-08-16 RX ADMIN — FAMOTIDINE 20 MG: 20 TABLET ORAL at 21:02

## 2018-08-16 RX ADMIN — CLOPIDOGREL BISULFATE 75 MG: 75 TABLET ORAL at 08:18

## 2018-08-16 RX ADMIN — LEVETIRACETAM 500 MG: 100 SOLUTION ORAL at 21:01

## 2018-08-16 RX ADMIN — FAMOTIDINE 20 MG: 20 TABLET ORAL at 08:21

## 2018-08-16 RX ADMIN — LACTULOSE 10 G: 10 SOLUTION ORAL at 08:19

## 2018-08-16 RX ADMIN — VALPROIC ACID 250 MG: 250 SOLUTION ORAL at 21:02

## 2018-08-16 RX ADMIN — VANCOMYCIN HYDROCHLORIDE 750 MG: 750 INJECTION, SOLUTION INTRAVENOUS at 05:50

## 2018-08-16 RX ADMIN — VALPROIC ACID 250 MG: 250 SOLUTION ORAL at 08:17

## 2018-08-16 RX ADMIN — Medication 1 CAPSULE: at 08:18

## 2018-08-16 RX ADMIN — ASPIRIN 81 MG CHEWABLE TABLET 81 MG: 81 TABLET CHEWABLE at 08:18

## 2018-08-16 RX ADMIN — VALPROIC ACID 250 MG: 250 SOLUTION ORAL at 16:22

## 2018-08-16 RX ADMIN — QUETIAPINE FUMARATE 25 MG: 25 TABLET ORAL at 14:49

## 2018-08-16 RX ADMIN — MORPHINE SULFATE 2 MG: 2 INJECTION, SOLUTION INTRAMUSCULAR; INTRAVENOUS at 08:37

## 2018-08-16 RX ADMIN — MORPHINE SULFATE 2 MG: 2 INJECTION, SOLUTION INTRAMUSCULAR; INTRAVENOUS at 21:02

## 2018-08-16 RX ADMIN — ENOXAPARIN SODIUM 40 MG: 100 INJECTION SUBCUTANEOUS at 08:19

## 2018-08-16 RX ADMIN — Medication 100 MG: at 08:19

## 2018-08-16 NOTE — PLAN OF CARE
Problem: Patient Care Overview  Goal: Interprofessional Rounds/Family Conf  Outcome: Ongoing (interventions implemented as appropriate)  13:00 Palliative Team Conference: JOSE ALBERTO Barone RN, CHPN; KAELA Noble DO; LALY Thompson RN, CHPN; JOSE Garcia APRN; HUGO Mercer MDiv   08/16/18 1510   Interdisciplinary Rounds/Family Conf   Summary Pt to ICU overnight. Remains tachycardic, appears less restless and irritable than yesterday. Remains in rigid decorticate posture. JOSE Garcia APRN to contact son by phone for continuing status and GOC discussion.       Problem: Palliative Care (Adult)  Goal: Maximized Comfort  Outcome: Ongoing (interventions implemented as appropriate)   08/16/18 1510   Palliative Care (Adult)   Maximized Comfort making progress toward outcome

## 2018-08-16 NOTE — PROGRESS NOTES
Intensive Care Follow-up      LOS: 31 days     Ms. Otilia Holm, 37 y.o. female is followed for: Anoxic brain damage (CMS/HCC)     Subjective - Interval History     Patient moved to the intensive care unit last evening due to need for frequent suctioning  Currently, no respiratory distress  Doesn't follow commands.  Appears to be at her baseline neurologically    The patient's relevant past medical, surgical and social history were reviewed and updated in Epic as appropriate.     Objective     Infusions:    Pharmacy to dose vancomycin      Medications:    aspirin 81 mg Oral Daily   atorvastatin 80 mg Oral Nightly   ceftriaxone 1 g Intravenous Q24H   clopidogrel 75 mg Oral Daily   enoxaparin 40 mg Subcutaneous Q24H   famotidine 20 mg Oral BID   lactobacillus acidophilus 1 capsule Oral Daily   lactulose 10 g Oral Daily   levETIRAcetam 500 mg Per PEG Tube Q12H   QUEtiapine 25 mg Oral Q8H   thiamine 100 mg Oral Daily   Valproic Acid 250 mg Nasogastric Q6H     Intake/Output       08/15/18 0700 - 08/16/18 0659 08/16/18 0700 - 08/17/18 0659    Intake (ml) 3601.1 827    Output (ml) 2060 230    Net (ml) 1541.1 597        Vital Sign Min/Max for last 24 hours  Temp  Min: 99.1 °F (37.3 °C)  Max: 100.6 °F (38.1 °C)   BP  Min: 99/81  Max: 146/90   Pulse  Min: 126  Max: 152   Resp  Min: 22  Max: 32   SpO2  Min: 95 %  Max: 99 %   Flow (L/min)  Min: 6  Max: 6        Physical Exam:   GENERAL: No distress.  Off vent support   HEENT: Tracheostomy site looks good.  No adenopathy   LUNGS: A few rhonchi, no wheezes   HEART: Regular tachycardia without murmurs   ABDOMEN: Soft.  PEG site looks good   EXTREMITIES: Trace edema.  Some contractures of her upper extremities at the wrist primarily   NEURO/PSYCH: Opens eyes.  Moves her upper and lower extremity spontaneously.  At times thrashes about.  Doesn't follow commands      Results from last 7 days  Lab Units 08/16/18  0342 08/15/18  1128   WBC 10*3/mm3 15.72* 16.87*   HEMOGLOBIN g/dL  11.4* 10.8*   PLATELETS 10*3/mm3 420 402       Results from last 7 days  Lab Units 08/16/18  0342 08/15/18  1128 08/13/18  0343   SODIUM mmol/L 139 138 143   POTASSIUM mmol/L 4.2 3.8 3.8   CO2 mmol/L 26.0 25.0 24.0   BUN mg/dL 11 10 11   CREATININE mg/dL 0.52* 0.45* 0.52*   MAGNESIUM mg/dL  --   --  2.3   PHOSPHORUS mg/dL  --   --  3.1   GLUCOSE mg/dL 134* 99 130*     Estimated Creatinine Clearance: 124.8 mL/min (A) (by C-G formula based on SCr of 0.52 mg/dL (L)).              Lab Results   Component Value Date    LACTATE 1.1 08/05/2018          Images: Upper and lower extremity venous duplex negative for DVT.    Chest x-ray done 8/15/2018 reveals no infiltrates or effusions    I reviewed the patient's results and images.     Impression      Hospital Problem List     * (Principal)Anoxic brain damage     Alcohol abuse    H/O drug abuse    Pulmonary infiltrates, resolved    Overview Signed 7/16/2018  7:42 AM by Ruslan Estrella MD     - pneumonia vs. aspiration pneumonitis vs. pulmonary edema.          Acute urinary retention    NMS (neuroleptic malignant syndrome), possible    Chronic hepatitis C without hepatic coma (CMS/HCC)    Fever    Seizures (CMS/HCC)               Plan        Simple the antibiotics to cover the strep species found and cultures  Continue nutritional support  Add Seroquel for agitation  Continue to monitor in the intensive care unit  Continue to work on placement      Plan of care and goals reviewed with mulitdisciplinary team at daily rounds   I discussed the patient's findings and my recommendations with nursing staff       JOSE Sutton MD  Pulmonary and Critical Care Medicine
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
## 2018-08-16 NOTE — PLAN OF CARE
Problem: Patient Care Overview  Goal: Plan of Care Review  Outcome: Ongoing (interventions implemented as appropriate)   08/16/18 7959   Coping/Psychosocial   Plan of Care Reviewed With patient   Plan of Care Review   Progress improving   OTHER   Outcome Summary Pt was restless and agitated this morning; PRN ativan and morphine given x1. Seroquel added for resltessness. TF increased to 75cc/hr and FW decreased to 35cc/hr. Continues to be tachycardic, MD aware. Pt is afebrile. Suctioned trach two times; small to mod amount of tan thick sputum. Plan is to transfer pt to long term care facility soon.      Goal: Individualization and Mutuality  Outcome: Ongoing (interventions implemented as appropriate)    Goal: Discharge Needs Assessment  Outcome: Ongoing (interventions implemented as appropriate)    Goal: Interprofessional Rounds/Family Conf  Outcome: Ongoing (interventions implemented as appropriate)      Problem: Skin Injury Risk (Adult)  Goal: Skin Health and Integrity  Outcome: Ongoing (interventions implemented as appropriate)      Problem: Palliative Care (Adult)  Goal: Maximized Comfort  Outcome: Ongoing (interventions implemented as appropriate)    Goal: Enhanced Quality of Life  Outcome: Ongoing (interventions implemented as appropriate)      Problem: Cardiac Output Decreased (Adult)  Goal: Effective Tissue Perfusion  Outcome: Ongoing (interventions implemented as appropriate)      Problem: Nutrition, Enteral (Adult)  Goal: Signs and Symptoms of Listed Potential Problems Will be Absent, Minimized or Managed (Nutrition, Enteral)  Outcome: Ongoing (interventions implemented as appropriate)

## 2018-08-16 NOTE — PLAN OF CARE
Problem: Patient Care Overview  Goal: Plan of Care Review  Outcome: Ongoing (interventions implemented as appropriate)   08/16/18 0622   Coping/Psychosocial   Plan of Care Reviewed With patient   Plan of Care Review   Progress no change   OTHER   Outcome Summary Pt transferred to ICU from 5G last PM with reports of inxcreased restlessness and excesssive secretions requiring frequent suctioning. As the night progressed pt appear to relax much more and sleep. Suctioned one time this pm and removed a small amount of thrick green sputum after trache care. Remains tachycardic       Problem: Palliative Care (Adult)  Goal: Maximized Comfort  Outcome: Ongoing (interventions implemented as appropriate)   08/16/18 0622   Palliative Care (Adult)   Maximized Comfort making progress toward outcome     Goal: Enhanced Quality of Life  Outcome: Ongoing (interventions implemented as appropriate)   08/16/18 0622   Palliative Care (Adult)   Enhanced Quality of Life making progress toward outcome       Problem: Cardiac Output Decreased (Adult)  Goal: Effective Tissue Perfusion  Outcome: Ongoing (interventions implemented as appropriate)   08/16/18 0622   Cardiac Output Decreased (Adult)   Effective Tissue Perfusion making progress toward outcome       Problem: Nutrition, Enteral (Adult)  Goal: Signs and Symptoms of Listed Potential Problems Will be Absent, Minimized or Managed (Nutrition, Enteral)   08/16/18 0622   Goal/Outcome Evaluation   Problems Assessed (Enteral Nutrition) all   Problems Present (Enteral Nutrition) skin/mucosal integrity impairment

## 2018-08-16 NOTE — PROGRESS NOTES
Clinical Nutrition   Reason For Visit: MDR, Follow-up protocol, EN    Patient Name: Otilia Holm  YOB: 1980  MRN: [id number]  Date of Encounter: 08/16/18 10:28 AM  Admission date: 7/16/2018      Nutrition Assessment     Hospital Problem List  Principal Problem:    Anoxic brain damage   Active Problems:    Alcohol abuse    H/O drug abuse    Pulmonary infiltrates, resolved    Acute urinary retention    NMS (neuroleptic malignant syndrome), possible    Chronic hepatitis C without hepatic coma (CMS/HCC)    Fever    Seizures (CMS/HCC)      Other Applicable Diagnosis:  Intubated (7/16)  Hypoxic Encephalopathy - unclear etiology with an unremarkable MRI and EEG.    Respiratory Failure / Aspiration Pneumonia  NSTEMI   S/p trach/PEG (7/26)  Tachycardia  Fever  Transferred back to ICU (8/15) secondary to pt needing frequent suctioning and for closer monitoring      Nutrition-related information:  (7/17) EN initiated   (7/26) EN changed from Peptamen 1.5 to Fibersource HN   (8/6) EN changed to Replete with Fiber at 70 ml/hr and free water at 20 ml/hr   (8/10) EN changed to FiberSource HN at 65 ml/hr and free water at 20 ml/hr  (8/14) Free water increased to 40 ml/hr     Reported/Observed/Food/Nutrition Related History     Pt tolerating EN. Last bowel movement documented (8/13-8/14)      Anthropometrics   Height: 62 in  Weight: 145 lb per lift/sling (8/9)  BMI: 26.5  BMI classification: Overweight: 25.0-29.9kg/m2        Date Weight (kg) Weight (lbs) Weight Method   8/12/2018 66.407 kg 146 lb 6.4 oz -   8/11/2018 66.225 kg 146 lb -   8/10/2018 65.862 kg 145 lb 3.2 oz -   8/9/2018 65.772 kg 145 lb Lift/sling scale   7/30/2018 62.8 kg 138 lb 7.2 oz -   7/16/2018 64.864 kg 143 lb -   7/16/2018 64.864 kg 143 lb -   7/16/2018 65 kg 143 lb 4.8 oz Bed scale       Needs Assessment   Height used: 62 in   Weight used: 145 lb/66 kg    Estimated Calories needs: ~1750 kcal/day  MSJ= 1297 x1.2= 1556 kcal/day  25-30 kcal/kg=  1650- 1980 kcal/day    Estimated Protein needs: ~79 g pro/day  1.2-1.5 g/kg= 79-99 g pro/day     Labs reviewed   Labs reviewed: Yes    Results from last 7 days  Lab Units 08/16/18  0342 08/15/18  1128 08/13/18  0343   SODIUM mmol/L 139 138 143   POTASSIUM mmol/L 4.2 3.8 3.8   CHLORIDE mmol/L 103 105 110*   CO2 mmol/L 26.0 25.0 24.0   BUN mg/dL 11 10 11   CREATININE mg/dL 0.52* 0.45* 0.52*   GLUCOSE mg/dL 134* 99 130*   CALCIUM mg/dL 9.4 9.2 9.2   PHOSPHORUS mg/dL  --   --  3.1   MAGNESIUM mg/dL  --   --  2.3   CHOLESTEROL mg/dL  --   --  121   TRIGLYCERIDES mg/dL  --   --  122     Medications reviewed   Medications reviewed: Yes  Pertinent: lactulose, probiotic, 100 mg/day PO thiamine    Current Nutrition Prescription   PO: NPO Diet  EN: FiberSource HN at 65 ml/hr (goal volume= 1430 ml/day (x22 hrs delivery for floor pt)) and free water at 40 ml/hr  Route: PEG  Verified at bedside: Yes    Evaluation of Received Nutrient/Fluid Intake:  1 Day:   1142 ml, 80%  1370 kcal, 78%  61 g pro, 77%  17 g fiber  1418 ml total water      Nutrition Diagnosis   8/8, updated 8/10, 8/16  Problem Inadequate enteral nutrition infusion   Etiology ? etiology   Signs/Symptoms 80% of EN goal volume delivered over the past 48 hrs   Status- (8/16) EN was being held on-and-off over the past week secondary to pt dry heaving, constipation. EN now running at goal rate and has been tolerating.     8/10  Problem Less than optimal enteral nutrition composition or modality   Etiology Formula needs re-est in view of skin integrity not as severe as initially documented   Signs/Symptoms Pt does not require as high protein formula as is currently on   Status- resolved, EN changed (8/10)      8/14  Problem Increased nutrient needs- fluid   Etiology Current EN regimen   Signs/Symptoms Per MD notes, pt appears dehydrated, IVFs started amd current EN regimen does not meet full fluid needs   Status- resolved, free water increased from 20 ml/hr to 40  ml/hr      Intervention   Intervention: Follow treatment progress, Care plan reviewed, Nutrition support order placed   -Will adjust EN regimen in Epic:  FiberSource HN at 75 ml/hr (goal volume= 1500 ml/day) and free water at 35 ml/hr via PEG  -Will provide at goal volume:  1800 kcal, 103%  81 g pro, 103%  22 g fiber  1915 ml total water    -Will continue to follow and adjust EN regimen as medically appropriate      Goal:   General: Nutrition support treatment  EN/PN: Adjust EN      Monitoring/Evaluation:       Monitoring/Evaluation: Per protocol, Pertinent labs, EN delivery/tolerance, Skin status, GI status, Symptoms, POC/GOC  Will Continue to follow per protocol  Shaneka Rodrigues, MS RD/LD CNSC  Time Spent: 60 minutes

## 2018-08-17 LAB
BACTERIA SPEC RESP CULT: ABNORMAL
BACTERIA SPEC RESP CULT: ABNORMAL
GLUCOSE BLDC GLUCOMTR-MCNC: 122 MG/DL (ref 70–130)
GLUCOSE BLDC GLUCOMTR-MCNC: 130 MG/DL (ref 70–130)
GLUCOSE BLDC GLUCOMTR-MCNC: 157 MG/DL (ref 70–130)
GLUCOSE BLDC GLUCOMTR-MCNC: 97 MG/DL (ref 70–130)
GRAM STN SPEC: ABNORMAL
STREP GROUPING: ABNORMAL

## 2018-08-17 PROCEDURE — 25010000002 ENOXAPARIN PER 10 MG: Performed by: INTERNAL MEDICINE

## 2018-08-17 PROCEDURE — 94760 N-INVAS EAR/PLS OXIMETRY 1: CPT

## 2018-08-17 PROCEDURE — 25010000002 MORPHINE SULFATE (PF) 2 MG/ML SOLUTION: Performed by: HOSPITALIST

## 2018-08-17 PROCEDURE — 82962 GLUCOSE BLOOD TEST: CPT

## 2018-08-17 PROCEDURE — 97530 THERAPEUTIC ACTIVITIES: CPT

## 2018-08-17 PROCEDURE — 97110 THERAPEUTIC EXERCISES: CPT

## 2018-08-17 PROCEDURE — 25010000002 CEFTRIAXONE PER 250 MG: Performed by: INTERNAL MEDICINE

## 2018-08-17 PROCEDURE — 25010000002 LORAZEPAM PER 2 MG: Performed by: HOSPITALIST

## 2018-08-17 PROCEDURE — 99232 SBSQ HOSP IP/OBS MODERATE 35: CPT | Performed by: INTERNAL MEDICINE

## 2018-08-17 PROCEDURE — 97168 OT RE-EVAL EST PLAN CARE: CPT

## 2018-08-17 PROCEDURE — 97164 PT RE-EVAL EST PLAN CARE: CPT

## 2018-08-17 RX ORDER — QUETIAPINE FUMARATE 25 MG/1
50 TABLET, FILM COATED ORAL EVERY 8 HOURS SCHEDULED
Status: DISCONTINUED | OUTPATIENT
Start: 2018-08-17 | End: 2018-08-18

## 2018-08-17 RX ADMIN — MORPHINE SULFATE 2 MG: 2 INJECTION, SOLUTION INTRAMUSCULAR; INTRAVENOUS at 10:15

## 2018-08-17 RX ADMIN — QUETIAPINE FUMARATE 50 MG: 25 TABLET ORAL at 13:57

## 2018-08-17 RX ADMIN — VALPROIC ACID 250 MG: 250 SOLUTION ORAL at 03:58

## 2018-08-17 RX ADMIN — LEVETIRACETAM 500 MG: 100 SOLUTION ORAL at 10:16

## 2018-08-17 RX ADMIN — ENOXAPARIN SODIUM 40 MG: 100 INJECTION SUBCUTANEOUS at 08:55

## 2018-08-17 RX ADMIN — QUETIAPINE FUMARATE 25 MG: 25 TABLET ORAL at 05:58

## 2018-08-17 RX ADMIN — FAMOTIDINE 20 MG: 20 TABLET ORAL at 21:18

## 2018-08-17 RX ADMIN — QUETIAPINE FUMARATE 50 MG: 25 TABLET ORAL at 21:17

## 2018-08-17 RX ADMIN — ATORVASTATIN CALCIUM 80 MG: 40 TABLET, FILM COATED ORAL at 21:17

## 2018-08-17 RX ADMIN — VALPROIC ACID 250 MG: 250 SOLUTION ORAL at 10:16

## 2018-08-17 RX ADMIN — CEFTRIAXONE SODIUM 1 G: 1 INJECTION, SOLUTION INTRAVENOUS at 15:50

## 2018-08-17 RX ADMIN — LEVETIRACETAM 500 MG: 100 SOLUTION ORAL at 21:19

## 2018-08-17 RX ADMIN — LACTULOSE 10 G: 10 SOLUTION ORAL at 08:55

## 2018-08-17 RX ADMIN — Medication 100 MG: at 08:55

## 2018-08-17 RX ADMIN — VALPROIC ACID 250 MG: 250 SOLUTION ORAL at 21:18

## 2018-08-17 RX ADMIN — LORAZEPAM 1 MG: 2 INJECTION INTRAMUSCULAR; INTRAVENOUS at 01:55

## 2018-08-17 RX ADMIN — LORAZEPAM 1 MG: 2 INJECTION INTRAMUSCULAR; INTRAVENOUS at 18:06

## 2018-08-17 RX ADMIN — CLOPIDOGREL BISULFATE 75 MG: 75 TABLET ORAL at 08:55

## 2018-08-17 RX ADMIN — Medication 1 CAPSULE: at 08:55

## 2018-08-17 RX ADMIN — MORPHINE SULFATE 2 MG: 2 INJECTION, SOLUTION INTRAMUSCULAR; INTRAVENOUS at 01:55

## 2018-08-17 RX ADMIN — FAMOTIDINE 20 MG: 20 TABLET ORAL at 08:55

## 2018-08-17 RX ADMIN — VALPROIC ACID 250 MG: 250 SOLUTION ORAL at 13:57

## 2018-08-17 RX ADMIN — MORPHINE SULFATE 2 MG: 2 INJECTION, SOLUTION INTRAMUSCULAR; INTRAVENOUS at 15:50

## 2018-08-17 RX ADMIN — ASPIRIN 81 MG CHEWABLE TABLET 81 MG: 81 TABLET CHEWABLE at 08:55

## 2018-08-17 NOTE — PAYOR COMM NOTE
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 [id number] Iglesia Jackman    POC Glucose Once [[id number]]  (Normal) Collected:  08/14/18 0005    Specimen:  Blood Updated:  08/14/18 0007     Glucose 110 mg/dL     Narrative:       Meter: [id number] : [id number] Jackson Canales    POC Glucose Once [[id number]]  (Normal) Collected:  08/13/18 1759    Specimen:  Blood Updated:  08/13/18 1801     Glucose 115 mg/dL     Narrative:       Meter: [id number] : [id number] Ami Bowman        Imaging Results (last 24 hours)     ** No results found for the last 24 hours. **          Physical Exam:  Gen: NAD, lying in bed resting  Skin: warm, dry   Eyes: MIRNA, conjunctiva clear and moist   HEENT: trach, with oxygen at 6L/min per trach collar  Resp/thorax: even effort, non labored, CTA   CV: tachy  ABD: soft, bowel sounds +, non distended  Ext: no edema, no redness, increase tone in extremities  Neuro: eyes closed, no myoclonus       Reviewed labs and diagnostic results.   No results found for: HGBA1C        Results from " last 7 days  Lab Units 08/13/18  0343   SODIUM mmol/L 143   POTASSIUM mmol/L 3.8   CHLORIDE mmol/L 110*   CO2 mmol/L 24.0   BUN mg/dL 11   CREATININE mg/dL 0.52*   CALCIUM mg/dL 9.2   BILIRUBIN mg/dL 0.2*   ALK PHOS U/L 68   ALT (SGPT) U/L 59*   AST (SGOT) U/L 86*   GLUCOSE mg/dL 130*       Impression: 37 y.o. female with hypoxic brain injury    Plan:   Restlessness - recommend rotating meds to route per G tube since plan is facility placement,   Hydrocodone/APAP elixir ordered as prn.     Trach secretions - noted discontinuance of robinul for tachycardia.  Also addition of IV fluids for tachycardia.  Nursing reporting some increase in suctioning.  May consider decrease in IV fluid rate.     Increase muscle tone - consider baclofen 10mg every 8 hours may provide double effect to manage restlessness and assist with less tone and prevent contractures for the future    Goals of care discussion - will follow up with son in next day or so.     Plan - facility placement currently, recommend move plan of care to interventions that will be provided in a facility unsure of oxygen limit for trach collar support.   Recommend continue to taper oxygen support as tolerated, unsure if respiratory able to provide only humidfied air or if patient requires current FiO2 support.       Time: 30 minutes       KATIA Robertson  [phone]  08/14/18  5:11 PM        Electronically signed by Yue Garcia APRN at 8/14/2018  5:34 PM       Consult Notes (last 24 hours) (Notes from 8/16/2018  9:28 AM through 8/17/2018  9:28 AM)     No notes of this type exist for this encounter.

## 2018-08-17 NOTE — PROGRESS NOTES
Intensive Care Follow-up      LOS: 32 days     Ms. Otilia Holm, 37 y.o. female is followed for: Anoxic brain damage (CMS/HCC)     Subjective - Interval History     Secretions have improved.  Requiring less suctioning  Agitation has improved on scheduled Seroquel  Tolerating tube feeding    The patient's relevant past medical, surgical and social history were reviewed and updated in Epic as appropriate.     Objective     Infusions:     Medications:    aspirin 81 mg Oral Daily   atorvastatin 80 mg Oral Nightly   ceftriaxone 1 g Intravenous Q24H   clopidogrel 75 mg Oral Daily   enoxaparin 40 mg Subcutaneous Q24H   famotidine 20 mg Oral BID   lactobacillus acidophilus 1 capsule Oral Daily   lactulose 10 g Oral Daily   levETIRAcetam 500 mg Per PEG Tube Q12H   QUEtiapine 25 mg Oral Q8H   thiamine 100 mg Oral Daily   Valproic Acid 250 mg Nasogastric Q6H     Intake/Output       08/16/18 0700 - 08/17/18 0659    Intake (ml) 2741.6    Output (ml) 1040    Net (ml) 1701.6        Vital Sign Min/Max for last 24 hours  Temp  Min: 98.4 °F (36.9 °C)  Max: 101.7 °F (38.7 °C)   BP  Min: 99/81  Max: 136/105   Pulse  Min: 120  Max: 152   Resp  Min: 20  Max: 32   SpO2  Min: 94 %  Max: 100 %   Flow (L/min)  Min: 6  Max: 6        Physical Exam:   GENERAL: Lethargic, no distress   HEENT: Tracheostomy site okay, no adenopathy   LUNGS: No wheezes or rhonchi   HEART: Regular rate and rhythm without murmurs   ABDOMEN: Soft.  PEG site looks good.  Bowel sounds present   EXTREMITIES: Trace edema.  Contractions of the hands bilaterally   NEURO/PSYCH: Lethargic.  Doesn't follow commands.  Moves all 4 spontaneously      Results from last 7 days  Lab Units 08/16/18  0342 08/15/18  1128   WBC 10*3/mm3 15.72* 16.87*   HEMOGLOBIN g/dL 11.4* 10.8*   PLATELETS 10*3/mm3 420 402       Results from last 7 days  Lab Units 08/16/18  0342 08/15/18  1128 08/13/18  0343   SODIUM mmol/L 139 138 143   POTASSIUM mmol/L 4.2 3.8 3.8   CO2 mmol/L 26.0 25.0 24.0    BUN mg/dL 11 10 11   CREATININE mg/dL 0.52* 0.45* 0.52*   MAGNESIUM mg/dL  --   --  2.3   PHOSPHORUS mg/dL  --   --  3.1   GLUCOSE mg/dL 134* 99 130*     Estimated Creatinine Clearance: 124.8 mL/min (A) (by C-G formula based on SCr of 0.52 mg/dL (L)).              Lab Results   Component Value Date    LACTATE 1.1 08/05/2018          Images: Most recent chest x-ray without consolidation or effusion    I reviewed the patient's results and images.     Impression      Hospital Problem List     * (Principal)Anoxic brain damage     Alcohol abuse    S/P Respiratory arrest (Long term vent/trach)    H/O drug abuse    Pulmonary infiltrates, resolved    Overview Signed 7/16/2018  7:42 AM by Ruslan Estrella MD     - pneumonia vs. aspiration pneumonitis vs. pulmonary edema.          Acute urinary retention    NMS (neuroleptic malignant syndrome), possible    Chronic hepatitis C without hepatic coma (CMS/HCC)    Fever    Seizures (CMS/HCC)               Plan        Complete a course of Rocephin for strep identified and cultures  Continue to adjust scheduled Seroquel to control agitation  Continuing to work on placement options    Plan of care and goals reviewed with mulitdisciplinary team at daily rounds   I discussed the patient's findings and my recommendations with nursing staff       JOSE Sutton MD  Pulmonary and Critical Care Medicine

## 2018-08-17 NOTE — PLAN OF CARE
Problem: Patient Care Overview  Goal: Plan of Care Review  Outcome: Ongoing (interventions implemented as appropriate)   08/17/18 0345   Coping/Psychosocial   Plan of Care Reviewed With patient   Plan of Care Review   Progress improving   OTHER   Outcome Summary Pt remains restless throughout shift. Morphine and Ativan given x2. TF remains at goal; tolerating well. Tachycardia continues. Suctioned trach x4 with several thick tan plugs of sputum removed. Possible transfer to LTC facility soon.        Problem: Skin Injury Risk (Adult)  Goal: Skin Health and Integrity  Outcome: Ongoing (interventions implemented as appropriate)   08/17/18 0345   Skin Injury Risk (Adult)   Skin Health and Integrity making progress toward outcome       Problem: Palliative Care (Adult)  Goal: Maximized Comfort  Outcome: Ongoing (interventions implemented as appropriate)   08/17/18 0345   Palliative Care (Adult)   Maximized Comfort making progress toward outcome     Goal: Enhanced Quality of Life  Outcome: Outcome(s) achieved Date Met: 08/17/18 08/17/18 0345   Palliative Care (Adult)   Enhanced Quality of Life making progress toward outcome       Problem: Cardiac Output Decreased (Adult)  Goal: Effective Tissue Perfusion  Outcome: Ongoing (interventions implemented as appropriate)   08/17/18 0345   Cardiac Output Decreased (Adult)   Effective Tissue Perfusion making progress toward outcome       Problem: Nutrition, Enteral (Adult)  Goal: Signs and Symptoms of Listed Potential Problems Will be Absent, Minimized or Managed (Nutrition, Enteral)  Outcome: Ongoing (interventions implemented as appropriate)   08/17/18 0345   Goal/Outcome Evaluation   Problems Assessed (Enteral Nutrition) all   Problems Present (Enteral Nutrition) skin/mucosal integrity impairment       Problem: Fall Risk (Adult)  Goal: Identify Related Risk Factors and Signs and Symptoms  Outcome: Ongoing (interventions implemented as appropriate)   08/17/18 0345   Fall Risk  (Adult)   Related Risk Factors (Fall Risk) confusion/agitation;polypharmacy;sleep pattern alteration;environment unfamiliar     Goal: Absence of Fall  Outcome: Ongoing (interventions implemented as appropriate)   08/17/18 0345   Fall Risk (Adult)   Absence of Fall making progress toward outcome

## 2018-08-17 NOTE — PLAN OF CARE
Problem: Patient Care Overview  Goal: Plan of Care Review  Outcome: Ongoing (interventions implemented as appropriate)   08/17/18 5303   Coping/Psychosocial   Plan of Care Reviewed With patient   OTHER   Outcome Summary PT re-eval performed. Pt unable to actively participate in skilled PT and demonstrates inability of follow commands d/t cognitive status. Pt not appropriate for skilled PT intervention at this time. PROM and and skin integrity protection to be performed by nursing staff. No functional goals re-established at this date. Pt unable to achieve prior functional goals d/t no improvement in cognitive and ability to participate in skilled PT. Please re-consult if further mobility needs arise.
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
## 2018-08-17 NOTE — PLAN OF CARE
Problem: Patient Care Overview  Goal: Plan of Care Review  Outcome: Ongoing (interventions implemented as appropriate)   08/17/18 4345   Coping/Psychosocial   Plan of Care Reviewed With patient   OTHER   Outcome Summary Pt w/ noted slight edema fluctuations from orthosis strap on L wrist, RN notifed of strap adjustment. No other skin integrity issues identified. Pt unable to actively participate in therapy at this time and conts to have no family available for education, will sign off from skilled IPOT. Please re-consult for any future concerns. Recommend pt DC to LTACH.
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
## 2018-08-17 NOTE — THERAPY DISCHARGE NOTE
Acute Care - Occupational Therapy Treatment Note/Discharge   [place]     Patient Name: Otilia Holm  : 1980  MRN: [id number]  Today's Date: 2018  Onset of Illness/Injury or Date of Surgery: (P) 18  Date of Referral to OT: 18  Referring Physician: MD Amor (P)       Admit Date: 2018    Visit Dx:     ICD-10-CM ICD-9-CM   1. Respiratory arrest (CMS/HCC) R09.2 799.1   2. Encephalopathy G93.40 348.30   3. Elevated troponin R74.8 790.6   4. Impaired functional mobility, balance, gait, and endurance Z74.09 V49.89   5. Impaired mobility and ADLs Z74.09 799.89     Patient Active Problem List   Diagnosis   • Alcohol abuse   • Anoxic brain damage    • S/P Respiratory arrest (Long term vent/trach)   • H/O drug abuse   • Pulmonary infiltrates, resolved   • Acute urinary retention   • NMS (neuroleptic malignant syndrome), possible   • Chronic hepatitis C without hepatic coma (CMS/HCC)   • Fever   • Seizures (CMS/HCC)       Therapy Treatment          Rehabilitation Treatment Summary     Row Name 18 1508 18 1330          Treatment Time/Intention    Discipline occupational therapist  -CL occupational therapist  -CL     Document Type therapy note (daily note)  -CL re-evaluation  -CL     Patient Effort poor  -CL  --     Existing Precautions/Restrictions cardiac;fall;oxygen therapy device and L/min;other (see comments)   trach/PEG, anoxic brain injury  -CL  --     Treatment Considerations/Comments OT returned for skin check of B resting hand orthosis. Pt conts to not follow any commands or actively participate with therapy session and no family available for education on PROM/splinting. RN verbalized understanding of resting hand orthosis.   -CL  --     Recorded by [CL] Shilpa Rhoades, OT 18 1537 [CL] Shilpa Rhoades OT 18 1518     Row Name 18 1508             Vital Signs    Pre Systolic BP Rehab --   VSS, RN cleared for tx.   -CL      Recorded by [CL] Shilpa Rhoades, OT  08/17/18 1537      Row Name 08/17/18 1508             Therapeutic Exercise    88346 - OT Therapeutic Activity Minutes 8  -CL      Recorded by [CL] Shilpa Rhoades OT 08/17/18 1537      Row Name 08/17/18 1508             Positioning and Restraints    Pre-Treatment Position in bed  -CL      Post Treatment Position bed  -CL      In Bed notified nsg;fowlers;call light within reach;encouraged to call for assist;exit alarm on;side rails up x3;RUE elevated;LUE elevated;legs elevated;heels elevated  -CL      Recorded by [CL] Shilpa Rhoades OT 08/17/18 1537      Row Name 08/17/18 1508             Pain Scale: FACES Pre/Post-Treatment    Pain: FACES Scale, Pretreatment 0-->no hurt  -CL      Pain: FACES Scale, Post-Treatment 0-->no hurt  -CL      Recorded by [CL] Shilpa Rhoades OT 08/17/18 1537      Row Name 08/17/18 1508             Wrist/Hand Orthosis Management    Type (Wrist/Hand Orthosis) left;right;prefabricated;resting wrist/hand functional position orthosis  -CL      Position (Wrist/Hand Orthosis) functional/resting hand position  -CL      Functional Design (Wrist/Hand Orthosis) static progressive orthosis  -CL      Therapeutic Indications (Wrist/Hand Orthosis) contracture prevention/reduction  -CL      Wearing Schedule (Wrist/Hand Orthosis) wear 2 hours on/2 hours off  -CL      Orthosis Training (Wrist/Hand Orthosis) donning/doffing orthosis;orthosis adjustment;sensory precautions;skin inspection/precautions;wearing schedule   RN verablized understanding  -CL      Adjustment Needed/Outcome (Wrist/Hand Orthosis) edema fluctuations  -CL      Skin Assessment (Wrist/Hand Orthosis) Pt w/ noted slight edema fluctuations from strap on R wrist, RN notified to loosen strap and check for any future redness/irritation.   -CL      Recorded by [CL] Shilpa Rhoades OT 08/17/18 1537      Row Name                Wound 08/14/18 0829 Right posterior back abrasion    Wound - Properties Group Date first assessed: 08/14/18 [CC] Time first  assessed: 0829 [CC] Present On Admission : no [CC] Side: Right [CC] Orientation: posterior [CC] Location: back [CC] Type: abrasion [CC] Recorded by:  [CC] Eufemia Perez RN 08/14/18 1448      User Key  (r) = Recorded By, (t) = Taken By, (c) = Cosigned By    Initials Name Effective Dates Discipline    CC Eufemia Perez RN 06/16/16 -  Nurse    Shilpa Beth OT 04/03/18 -  OT        Wound 08/14/18 0829 Right posterior back abrasion (Active)   Base clean;dry;pink 8/17/2018  2:00 PM   Periwound dry;pink;intact 8/17/2018  2:00 PM   Drainage Amount none 8/16/2018  8:00 PM   Care, Wound cleansed with;soap and water 8/16/2018  8:00 PM   Dressing Care, Wound dressing changed;foam;low-adherent 8/16/2018  8:00 PM   Periwound Care, Wound cleansed with pH balanced cleanser;dry periwound area maintained 8/16/2018  8:00 PM             OT Rehab Goals     Row Name 08/17/18 1508 08/17/18 1330          ROM Goal 1 (OT)    Progress/Outcome (ROM Goal 1, OT)  -- goal no longer appropriate  -CL        Caregiver Training Goal 1 (OT)    Caregiver Training Goal 1 (OT)  -- RN verbalizes understanding of fit/adjustment/skin integrity/wear schedule of rest hand orthosis.   -CL     Time Frame (Caregiver Training Goal 1, OT)  -- 1 day  -CL     Progress/Outcomes (Caregiver Training Goal 1, OT) goal met  -CL goal revised this date  -CL       User Key  (r) = Recorded By, (t) = Taken By, (c) = Cosigned By    Initials Name Provider Type Discipline    Shilpa Beth OT Occupational Therapist OT          Occupational Therapy Education     Title: PT OT SLP Therapies (Active)     Topic: Occupational Therapy (Done)     Point: Home exercise program (Done)     Description: Instruct learner(s) on appropriate technique for monitoring, assisting and/or progressing therapeutic exercises/activities.   Learning Progress Summary     Learner Status Readiness Method Response Comment Documented by    Patient Done Acceptance E VU RN verablized understanding of  purpose/fit/skin check/wear schedule/don/doff of resting hand orthosis.  08/17/18 1537     Active Acceptance E NR Pt/RN educated on positioning, role of OT, and purpose/wear schedule/adjustment/skin checks/don/doff/fit of resting hand orthosis.  08/17/18 1529     Done Acceptance E VU Nursing education provided on wear schedule of splint, PROM provided, and change of positioning for Pt  08/08/18 1540     Done Acceptance E VU RN reducated on strap positioning.  08/07/18 1501    Caregiver Done Acceptance E VU RN and PCT edcuated on wear schedule of 2 hours on and 2 hours off.  08/10/18 1558     Done Acceptance E VU RN educated on wear schedule of RUE resting hand splint and PROM.  08/07/18 1448    Other Done Acceptance E VU RN verablized understanding of purpose/fit/skin check/wear schedule/don/doff of resting hand orthosis.  08/17/18 1537     Active Acceptance E NR Pt/RN educated on positioning, role of OT, and purpose/wear schedule/adjustment/skin checks/don/doff/fit of resting hand orthosis.  08/17/18 1529     Done Acceptance E VU Nursing education provided on wear schedule of splint, PROM provided, and change of positioning for Pt  08/08/18 1540          Point: Precautions (Done)     Description: Instruct learner(s) on prescribed precautions during self-care and functional transfers.   Learning Progress Summary     Learner Status Readiness Method Response Comment Documented by    Patient Done Acceptance E VU RN verablized understanding of purpose/fit/skin check/wear schedule/don/doff of resting hand orthosis.  08/17/18 1537     Active Acceptance E NR Pt/RN educated on positioning, role of OT, and purpose/wear schedule/adjustment/skin checks/don/doff/fit of resting hand orthosis.  08/17/18 1529     Done Acceptance E VU Nursing education provided on wear schedule of splint, PROM provided, and change of positioning for Pt  08/08/18 1540     Done Acceptance E VU RN reducated on strap  positioning.  08/07/18 1501    Caregiver Done Acceptance E VU RN and PCT edcuated on wear schedule of 2 hours on and 2 hours off.  08/10/18 1558     Done Acceptance E VU RN educated on wear schedule of RUE resting hand splint and PROM.  08/07/18 1448    Other Done Acceptance E VU RN verablized understanding of purpose/fit/skin check/wear schedule/don/doff of resting hand orthosis.  08/17/18 1537     Active Acceptance E NR Pt/RN educated on positioning, role of OT, and purpose/wear schedule/adjustment/skin checks/don/doff/fit of resting hand orthosis.  08/17/18 1529     Done Acceptance E VU Nursing education provided on wear schedule of splint, PROM provided, and change of positioning for Pt  08/08/18 1540                      User Key     Initials Effective Dates Name Provider Type Discipline     04/03/18 -  Shilpa Rhoades, OT Occupational Therapist OT     03/07/18 -  Humaira King, OT Occupational Therapist OT     04/03/18 -  Penny Wood, OT Occupational Therapist OT                OT Recommendation and Plan  Outcome Summary/Treatment Plan (OT)  Anticipated Discharge Disposition (OT): long term acute care facility  Therapy Frequency (OT Eval): other (see comments) (skin check after ~2 hrs)  Plan of Care Review  Plan of Care Reviewed With: patient  Plan of Care Reviewed With: patient  Outcome Summary: Pt w/ noted slight edema fluctuations from orthosis strap on L wrist, RN notifed of strap adjustment. No other skin integrity issues identified. Pt unable to actively participate in therapy at this time and conts to have no family available for education, will sign off from skilled IPOT. Please re-consult for any future concerns. Recommend pt DC to LTACH.           Outcome Measures     Row Name 08/17/18 1508 08/17/18 1418 08/17/18 1330       How much help from another person do you currently need...    Turning from your back to your side while in flat bed without using bedrails?  -- (P)  1  -CM   --    Moving from lying on back to sitting on the side of a flat bed without bedrails?  -- (P)  1  -CM  --    Moving to and from a bed to a chair (including a wheelchair)?  -- (P)  1  -CM  --    Standing up from a chair using your arms (e.g., wheelchair, bedside chair)?  -- (P)  1  -CM  --    Climbing 3-5 steps with a railing?  -- (P)  1  -CM  --    To walk in hospital room?  -- (P)  1  -CM  --    AM-PAC 6 Clicks Score  -- (P)  6  -CL (r) CM (t)  --       How much help from another is currently needed...    Putting on and taking off regular lower body clothing? 1  -CL  -- 1  -CL    Bathing (including washing, rinsing, and drying) 1  -CL  -- 1  -CL    Toileting (which includes using toilet bed pan or urinal) 1  -CL  -- 1  -CL    Putting on and taking off regular upper body clothing 1  -CL  -- 1  -CL    Taking care of personal grooming (such as brushing teeth) 1  -CL  -- 1  -CL    Eating meals 1  -CL  -- 1  -CL    Score 6  -CL  -- 6  -CL       Functional Assessment    Outcome Measure Options AM-PAC 6 Clicks Daily Activity (OT)  -CL (P)  AM-PAC 6 Clicks Basic Mobility (PT)  -CM AM-PAC 6 Clicks Daily Activity (OT)  -CL      User Key  (r) = Recorded By, (t) = Taken By, (c) = Cosigned By    Initials Name Provider Type    CL Shilpa Rhoades, OT Occupational Therapist    CM Justina Acevedo, PT Student PT Student           Time Calculation:          Time Calculation- OT     Row Name 08/17/18 1540 08/17/18 1532 08/17/18 1508       Time Calculation- OT    OT Start Time 1508  -CL 1330  -CL  --    OT Received On 08/17/18  -CL 08/17/18  -CL  --    OT Goal Re-Cert Due Date 08/27/18  -CL 08/27/18  -CL  --       Timed Charges    28626 - OT Therapeutic Activity Minutes  --  -- 8  -CL    Row Name 08/17/18 1330             Timed Charges    21646 - OT Therapeutic Activity Minutes 45  -CL        User Key  (r) = Recorded By, (t) = Taken By, (c) = Cosigned By    Initials Name Provider Type    Shilpa Beth, OT Occupational Therapist         Therapy Suggested Charges     Code   Minutes Charges    82653 (CPT®) Hc Ot Neuromusc Re Education Ea 15 Min      55693 (CPT®) Hc Ot Ther Proc Ea 15 Min      43833 (CPT®) Hc Ot Therapeutic Act Ea 15 Min 8 1    57015 (CPT®) Hc Ot Manual Therapy Ea 15 Min      30181 (CPT®) Hc Ot Iontophoresis Ea 15 Min      06459 (CPT®) Hc Ot Elec Stim Ea-Per 15 Min      96704 (CPT®) Hc Ot Ultrasound Ea 15 Min      50912 (CPT®) Hc Ot Self Care/Mgmt/Train Ea 15 Min      Total  8 1        Therapy Charges for Today     Code Description Service Date Service Provider Modifiers Qty    23299839881 HC OT THERAPEUTIC ACT EA 15 MIN 8/17/2018 Shilpa Rhoades, OT GO 3    93375754908 HC OT RE-EVAL 2 8/17/2018 Shilpa Rhoades OT GO 1    01149764427 HC OT THERAPEUTIC ACT EA 15 MIN 8/17/2018 Shilpa Rhoades, OT GO 1               OT Discharge Summary  Anticipated Discharge Disposition (OT): long term acute care facility  Reason for Discharge: Unable to participate  Outcomes Achieved: Refer to plan of care for updates on goals achieved  Discharge Destination: LTACH    Shilpa Rhoades OT  8/17/2018

## 2018-08-17 NOTE — PLAN OF CARE
Problem: Patient Care Overview  Goal: Plan of Care Review   08/17/18 1600 08/17/18 1640   Coping/Psychosocial   Plan of Care Reviewed With patient --    Plan of Care Review   Progress --  no change   OTHER   Outcome Summary --  pt remains labile. Intermittantly restless- seroquel increased. HR between 120-140s but hypotensive at times.        Problem: Skin Injury Risk (Adult)  Goal: Skin Health and Integrity   08/17/18 1640   Skin Injury Risk (Adult)   Skin Health and Integrity making progress toward outcome       Problem: Palliative Care (Adult)  Goal: Maximized Comfort   08/17/18 1640   Palliative Care (Adult)   Maximized Comfort making progress toward outcome       Problem: Cardiac Output Decreased (Adult)  Goal: Effective Tissue Perfusion   08/17/18 1640   Cardiac Output Decreased (Adult)   Effective Tissue Perfusion making progress toward outcome       Problem: Nutrition, Enteral (Adult)  Goal: Signs and Symptoms of Listed Potential Problems Will be Absent, Minimized or Managed (Nutrition, Enteral)   08/17/18 0345   Goal/Outcome Evaluation   Problems Assessed (Enteral Nutrition) all   Problems Present (Enteral Nutrition) skin/mucosal integrity impairment       Problem: Fall Risk (Adult)  Goal: Identify Related Risk Factors and Signs and Symptoms   08/17/18 0345   Fall Risk (Adult)   Related Risk Factors (Fall Risk) confusion/agitation;polypharmacy;sleep pattern alteration;environment unfamiliar     Goal: Absence of Fall   08/17/18 1640   Fall Risk (Adult)   Absence of Fall making progress toward outcome

## 2018-08-17 NOTE — PROGRESS NOTES
Continued Stay Note   [place]     Patient Name: Otilia Holm  MRN: [id number]  Today's Date: 8/17/2018    Admit Date: 7/16/2018          Discharge Plan     Row Name 08/17/18 1045       Plan    Plan LTC    Plan Comments Reached out to Gissell with [facility] and facility is  still interested in patient when medically ready. Made her aware patient close if not ready. Gissell is checking to see if they can take the scheduled seroquel and will call me back with this. So plan is to fax updated clinicals on 8-20 to [phone], attention: Lee covering for Gissell that day. Hopefullly all works out and facility will accept and transport on Tuesday. Once we know facility can accept then will contact shannon Reyes with final discharge plans. Marialuisa at [phone]               Discharge Codes    No documentation.       Expected Discharge Date and Time     Expected Discharge Date Expected Discharge Time    Aug 17, 2018             Zahraa Jay RN

## 2018-08-17 NOTE — PATIENT CARE CONFERENCE
ICU ROUNDS: PT re-eval s/p transfer to ICU; will plan to assess appropriateness of further PT intervention based upon ability to actively participate. OT order will remain on HOLD until resting hand splint has arrived. Likely order to floor with transfer to LTACH when bed available.

## 2018-08-17 NOTE — PLAN OF CARE
Problem: Patient Care Overview  Goal: Plan of Care Review  Outcome: Ongoing (interventions implemented as appropriate)   08/17/18 8790   Coping/Psychosocial   Plan of Care Reviewed With patient   OTHER   Outcome Summary OT Re-eval complete s/p ICU t/f. Pt adminstered B pre-haley resting hand orthosis, RN educated on wear schedule/fit/adjustment. Will return for addtional treatment for skin check. OT POC revised this date to reflect level of performance. Recommend pt DC to LTACH.

## 2018-08-17 NOTE — PROGRESS NOTES
Multidisciplinary Rounds    Time: 20min  Patient Name: Otilia Holm  Date of Encounter: 08/17/18 11:55 AM  MRN: [id number]  Admission date: 7/16/2018      Reason for visit: MDR. RD to continue to follow per protocol.     Additional information obtained during MDR: 93% of EN goal volume delivered over the past 24 hrs, pt tolerating EN. Receiving lactulose.     Current diet: FiberSource HN at 75 ml/hr and free water at 35 ml/hr       Intervention:  Follow treatment plan  Care plan reviewed    Follow up:   Per protocol      Shaneka Rodrigues MS RD/LD CNSC  11:55 AM

## 2018-08-17 NOTE — PLAN OF CARE
Problem: Patient Care Overview  Goal: Interprofessional Rounds/Family Conf  Outcome: Ongoing (interventions implemented as appropriate)  13:00 Palliative Team Conference: JOSE ALBERTO Barone RN, CHPN; KAELA Noble DO; LALY Thompson RN, CHPN; JOSE Garcia, APRN; HUGO Mercer MDiv   08/17/18 1515   Interdisciplinary Rounds/Family Conf   Summary Seroquel increased for restlessness. Attempts to reach son by phone thus far unsuccessful, will continue to follow.       Problem: Palliative Care (Adult)  Goal: Maximized Comfort   08/17/18 1515   Palliative Care (Adult)   Maximized Comfort making progress toward outcome

## 2018-08-18 LAB
GLUCOSE BLDC GLUCOMTR-MCNC: 111 MG/DL (ref 70–130)
GLUCOSE BLDC GLUCOMTR-MCNC: 129 MG/DL (ref 70–130)
GLUCOSE BLDC GLUCOMTR-MCNC: 143 MG/DL (ref 70–130)

## 2018-08-18 PROCEDURE — 99291 CRITICAL CARE FIRST HOUR: CPT | Performed by: INTERNAL MEDICINE

## 2018-08-18 PROCEDURE — 25010000002 MORPHINE SULFATE (PF) 2 MG/ML SOLUTION: Performed by: HOSPITALIST

## 2018-08-18 PROCEDURE — 25010000002 ENOXAPARIN PER 10 MG: Performed by: INTERNAL MEDICINE

## 2018-08-18 PROCEDURE — 82962 GLUCOSE BLOOD TEST: CPT

## 2018-08-18 PROCEDURE — 25010000002 CEFTRIAXONE PER 250 MG: Performed by: INTERNAL MEDICINE

## 2018-08-18 PROCEDURE — 25010000002 LORAZEPAM PER 2 MG: Performed by: HOSPITALIST

## 2018-08-18 RX ORDER — QUETIAPINE FUMARATE 25 MG/1
75 TABLET, FILM COATED ORAL EVERY 8 HOURS SCHEDULED
Status: DISCONTINUED | OUTPATIENT
Start: 2018-08-18 | End: 2018-08-19

## 2018-08-18 RX ORDER — ATORVASTATIN CALCIUM 40 MG/1
80 TABLET, FILM COATED ORAL NIGHTLY
Status: DISCONTINUED | OUTPATIENT
Start: 2018-08-18 | End: 2018-08-25 | Stop reason: HOSPADM

## 2018-08-18 RX ORDER — LACTULOSE 10 G/15ML
10 SOLUTION ORAL DAILY
Status: DISCONTINUED | OUTPATIENT
Start: 2018-08-19 | End: 2018-08-20

## 2018-08-18 RX ORDER — VALPROIC ACID 250 MG/5ML
250 SOLUTION ORAL EVERY 6 HOURS SCHEDULED
Status: DISCONTINUED | OUTPATIENT
Start: 2018-08-18 | End: 2018-08-22

## 2018-08-18 RX ORDER — L.ACID,PARA/B.BIFIDUM/S.THERM 8B CELL
1 CAPSULE ORAL DAILY
Status: DISCONTINUED | OUTPATIENT
Start: 2018-08-19 | End: 2018-08-25 | Stop reason: HOSPADM

## 2018-08-18 RX ORDER — QUETIAPINE FUMARATE 25 MG/1
75 TABLET, FILM COATED ORAL EVERY 8 HOURS SCHEDULED
Status: DISCONTINUED | OUTPATIENT
Start: 2018-08-18 | End: 2018-08-18

## 2018-08-18 RX ORDER — ASPIRIN 81 MG/1
81 TABLET, CHEWABLE ORAL DAILY
Status: DISCONTINUED | OUTPATIENT
Start: 2018-08-19 | End: 2018-08-25 | Stop reason: HOSPADM

## 2018-08-18 RX ORDER — CLOPIDOGREL BISULFATE 75 MG/1
75 TABLET ORAL DAILY
Status: DISCONTINUED | OUTPATIENT
Start: 2018-08-19 | End: 2018-08-25 | Stop reason: HOSPADM

## 2018-08-18 RX ORDER — THIAMINE MONONITRATE (VIT B1) 100 MG
100 TABLET ORAL DAILY
Status: DISCONTINUED | OUTPATIENT
Start: 2018-08-19 | End: 2018-08-25 | Stop reason: HOSPADM

## 2018-08-18 RX ORDER — FAMOTIDINE 20 MG/1
20 TABLET, FILM COATED ORAL 2 TIMES DAILY
Status: DISCONTINUED | OUTPATIENT
Start: 2018-08-18 | End: 2018-08-25 | Stop reason: HOSPADM

## 2018-08-18 RX ADMIN — DESMOPRESSIN ACETATE 80 MG: 0.2 TABLET ORAL at 21:31

## 2018-08-18 RX ADMIN — LEVETIRACETAM 500 MG: 100 SOLUTION ORAL at 21:31

## 2018-08-18 RX ADMIN — FAMOTIDINE 20 MG: 20 TABLET ORAL at 21:32

## 2018-08-18 RX ADMIN — QUETIAPINE FUMARATE 75 MG: 25 TABLET ORAL at 14:43

## 2018-08-18 RX ADMIN — VALPROIC ACID 250 MG: 250 SOLUTION ORAL at 14:43

## 2018-08-18 RX ADMIN — ASPIRIN 81 MG CHEWABLE TABLET 81 MG: 81 TABLET CHEWABLE at 08:43

## 2018-08-18 RX ADMIN — VALPROIC ACID 250 MG: 250 SOLUTION ORAL at 08:42

## 2018-08-18 RX ADMIN — CLOPIDOGREL BISULFATE 75 MG: 75 TABLET ORAL at 08:43

## 2018-08-18 RX ADMIN — LORAZEPAM 1 MG: 2 INJECTION INTRAMUSCULAR; INTRAVENOUS at 03:52

## 2018-08-18 RX ADMIN — Medication 1 CAPSULE: at 08:43

## 2018-08-18 RX ADMIN — VALPROIC ACID 250 MG: 250 SOLUTION ORAL at 03:52

## 2018-08-18 RX ADMIN — MORPHINE SULFATE 2 MG: 2 INJECTION, SOLUTION INTRAMUSCULAR; INTRAVENOUS at 13:43

## 2018-08-18 RX ADMIN — ENOXAPARIN SODIUM 40 MG: 100 INJECTION SUBCUTANEOUS at 08:42

## 2018-08-18 RX ADMIN — VALPROIC ACID 250 MG: 250 SOLUTION ORAL at 21:31

## 2018-08-18 RX ADMIN — LACTULOSE 10 G: 10 SOLUTION ORAL at 08:42

## 2018-08-18 RX ADMIN — QUETIAPINE FUMARATE 50 MG: 25 TABLET ORAL at 06:16

## 2018-08-18 RX ADMIN — CEFTRIAXONE SODIUM 1 G: 1 INJECTION, SOLUTION INTRAVENOUS at 17:30

## 2018-08-18 RX ADMIN — FAMOTIDINE 20 MG: 20 TABLET ORAL at 08:43

## 2018-08-18 RX ADMIN — Medication 100 MG: at 08:43

## 2018-08-18 RX ADMIN — MORPHINE SULFATE 2 MG: 2 INJECTION, SOLUTION INTRAMUSCULAR; INTRAVENOUS at 03:52

## 2018-08-18 RX ADMIN — LEVETIRACETAM 500 MG: 100 SOLUTION ORAL at 08:44

## 2018-08-18 RX ADMIN — LORAZEPAM 1 MG: 2 INJECTION INTRAMUSCULAR; INTRAVENOUS at 11:31

## 2018-08-18 RX ADMIN — QUETIAPINE FUMARATE 75 MG: 25 TABLET ORAL at 21:31

## 2018-08-18 NOTE — PLAN OF CARE
Problem: Patient Care Overview  Goal: Plan of Care Review  Outcome: Ongoing (interventions implemented as appropriate)   08/18/18 0600   Coping/Psychosocial   Plan of Care Reviewed With patient   Plan of Care Review   Progress no change   OTHER   Outcome Summary pt remains labile. Intermittantly restless, seroquel was increased. Small amount of secretions from trach, pt able to cough them up and out. Only suctioned once. HR between 110s-140s.      Goal: Individualization and Mutuality  Outcome: Ongoing (interventions implemented as appropriate)    Goal: Discharge Needs Assessment  Outcome: Ongoing (interventions implemented as appropriate)      Problem: Skin Injury Risk (Adult)  Goal: Skin Health and Integrity  Outcome: Ongoing (interventions implemented as appropriate)      Problem: Palliative Care (Adult)  Goal: Maximized Comfort  Outcome: Ongoing (interventions implemented as appropriate)      Problem: Cardiac Output Decreased (Adult)  Goal: Effective Tissue Perfusion  Outcome: Ongoing (interventions implemented as appropriate)      Problem: Nutrition, Enteral (Adult)  Goal: Signs and Symptoms of Listed Potential Problems Will be Absent, Minimized or Managed (Nutrition, Enteral)  Outcome: Ongoing (interventions implemented as appropriate)      Problem: Fall Risk (Adult)  Goal: Identify Related Risk Factors and Signs and Symptoms  Outcome: Ongoing (interventions implemented as appropriate)    Goal: Absence of Fall  Outcome: Ongoing (interventions implemented as appropriate)

## 2018-08-18 NOTE — PLAN OF CARE
Problem: Patient Care Overview  Goal: Plan of Care Review  Outcome: Ongoing (interventions implemented as appropriate)   08/18/18 8971   Plan of Care Review   Progress no change   OTHER   Outcome Summary i/o cath x 1; trach suction x 3; ativan x1, morphine x 1; seroquel increased       Problem: Cardiac Output Decreased (Adult)  Goal: Effective Tissue Perfusion  Outcome: Ongoing (interventions implemented as appropriate)      Problem: Nutrition, Enteral (Adult)  Goal: Signs and Symptoms of Listed Potential Problems Will be Absent, Minimized or Managed (Nutrition, Enteral)  Outcome: Ongoing (interventions implemented as appropriate)      Problem: Fall Risk (Adult)  Goal: Absence of Fall  Outcome: Ongoing (interventions implemented as appropriate)

## 2018-08-19 ENCOUNTER — APPOINTMENT (OUTPATIENT)
Dept: GENERAL RADIOLOGY | Facility: HOSPITAL | Age: 38
End: 2018-08-19

## 2018-08-19 LAB
ALBUMIN SERPL-MCNC: 3.67 G/DL (ref 3.2–4.8)
ALBUMIN/GLOB SERPL: 1.3 G/DL (ref 1.5–2.5)
ALP SERPL-CCNC: 63 U/L (ref 25–100)
ALT SERPL W P-5'-P-CCNC: 63 U/L (ref 7–40)
ANION GAP SERPL CALCULATED.3IONS-SCNC: 6 MMOL/L (ref 3–11)
AST SERPL-CCNC: 73 U/L (ref 0–33)
BASOPHILS # BLD AUTO: 0.04 10*3/MM3 (ref 0–0.2)
BASOPHILS NFR BLD AUTO: 0.4 % (ref 0–1)
BILIRUB SERPL-MCNC: 0.3 MG/DL (ref 0.3–1.2)
BUN BLD-MCNC: 14 MG/DL (ref 9–23)
BUN/CREAT SERPL: 29.8 (ref 7–25)
CALCIUM SPEC-SCNC: 9.3 MG/DL (ref 8.7–10.4)
CHLORIDE SERPL-SCNC: 107 MMOL/L (ref 99–109)
CO2 SERPL-SCNC: 29 MMOL/L (ref 20–31)
CREAT BLD-MCNC: 0.47 MG/DL (ref 0.6–1.3)
DEPRECATED RDW RBC AUTO: 55.7 FL (ref 37–54)
EOSINOPHIL # BLD AUTO: 0.21 10*3/MM3 (ref 0–0.3)
EOSINOPHIL NFR BLD AUTO: 1.9 % (ref 0–3)
ERYTHROCYTE [DISTWIDTH] IN BLOOD BY AUTOMATED COUNT: 15.3 % (ref 11.3–14.5)
ERYTHROCYTE [SEDIMENTATION RATE] IN BLOOD: 52 MM/HR (ref 0–20)
GFR SERPL CREATININE-BSD FRML MDRD: 148 ML/MIN/1.73
GLOBULIN UR ELPH-MCNC: 2.8 GM/DL
GLUCOSE BLD-MCNC: 135 MG/DL (ref 70–100)
HCT VFR BLD AUTO: 32.7 % (ref 34.5–44)
HGB BLD-MCNC: 10.1 G/DL (ref 11.5–15.5)
IMM GRANULOCYTES # BLD: 0.02 10*3/MM3 (ref 0–0.03)
IMM GRANULOCYTES NFR BLD: 0.2 % (ref 0–0.6)
LYMPHOCYTES # BLD AUTO: 2.08 10*3/MM3 (ref 0.6–4.8)
LYMPHOCYTES NFR BLD AUTO: 18.7 % (ref 24–44)
MAGNESIUM SERPL-MCNC: 2.1 MG/DL (ref 1.3–2.7)
MCH RBC QN AUTO: 30.5 PG (ref 27–31)
MCHC RBC AUTO-ENTMCNC: 30.9 G/DL (ref 32–36)
MCV RBC AUTO: 98.8 FL (ref 80–99)
MONOCYTES # BLD AUTO: 1.05 10*3/MM3 (ref 0–1)
MONOCYTES NFR BLD AUTO: 9.5 % (ref 0–12)
NEUTROPHILS # BLD AUTO: 7.73 10*3/MM3 (ref 1.5–8.3)
NEUTROPHILS NFR BLD AUTO: 69.5 % (ref 41–71)
PHOSPHATE SERPL-MCNC: 3.5 MG/DL (ref 2.4–5.1)
PLATELET # BLD AUTO: 363 10*3/MM3 (ref 150–450)
PMV BLD AUTO: 10.5 FL (ref 6–12)
POTASSIUM BLD-SCNC: 4.2 MMOL/L (ref 3.5–5.5)
PROT SERPL-MCNC: 6.5 G/DL (ref 5.7–8.2)
RBC # BLD AUTO: 3.31 10*6/MM3 (ref 3.89–5.14)
SODIUM BLD-SCNC: 142 MMOL/L (ref 132–146)
WBC NRBC COR # BLD: 11.11 10*3/MM3 (ref 3.5–10.8)

## 2018-08-19 PROCEDURE — 25010000002 ENOXAPARIN PER 10 MG: Performed by: INTERNAL MEDICINE

## 2018-08-19 PROCEDURE — 25010000002 MORPHINE SULFATE (PF) 2 MG/ML SOLUTION: Performed by: HOSPITALIST

## 2018-08-19 PROCEDURE — 80053 COMPREHEN METABOLIC PANEL: CPT | Performed by: INTERNAL MEDICINE

## 2018-08-19 PROCEDURE — 99291 CRITICAL CARE FIRST HOUR: CPT | Performed by: INTERNAL MEDICINE

## 2018-08-19 PROCEDURE — 25010000002 LORAZEPAM PER 2 MG: Performed by: HOSPITALIST

## 2018-08-19 PROCEDURE — 25010000002 CEFTRIAXONE PER 250 MG: Performed by: INTERNAL MEDICINE

## 2018-08-19 PROCEDURE — 84100 ASSAY OF PHOSPHORUS: CPT | Performed by: INTERNAL MEDICINE

## 2018-08-19 PROCEDURE — 71045 X-RAY EXAM CHEST 1 VIEW: CPT

## 2018-08-19 PROCEDURE — 85025 COMPLETE CBC W/AUTO DIFF WBC: CPT | Performed by: INTERNAL MEDICINE

## 2018-08-19 PROCEDURE — 83735 ASSAY OF MAGNESIUM: CPT | Performed by: INTERNAL MEDICINE

## 2018-08-19 RX ADMIN — FAMOTIDINE 20 MG: 20 TABLET ORAL at 10:01

## 2018-08-19 RX ADMIN — VALPROIC ACID 250 MG: 250 SOLUTION ORAL at 10:02

## 2018-08-19 RX ADMIN — DESMOPRESSIN ACETATE 80 MG: 0.2 TABLET ORAL at 20:05

## 2018-08-19 RX ADMIN — ACETAMINOPHEN 650 MG: 650 SOLUTION ORAL at 18:17

## 2018-08-19 RX ADMIN — CEFTRIAXONE SODIUM 1 G: 1 INJECTION, SOLUTION INTRAVENOUS at 16:10

## 2018-08-19 RX ADMIN — VALPROIC ACID 250 MG: 250 SOLUTION ORAL at 04:04

## 2018-08-19 RX ADMIN — LEVETIRACETAM 500 MG: 100 SOLUTION ORAL at 20:06

## 2018-08-19 RX ADMIN — MORPHINE SULFATE 2 MG: 2 INJECTION, SOLUTION INTRAMUSCULAR; INTRAVENOUS at 05:52

## 2018-08-19 RX ADMIN — VALPROIC ACID 250 MG: 250 SOLUTION ORAL at 20:06

## 2018-08-19 RX ADMIN — LORAZEPAM 1 MG: 2 INJECTION INTRAMUSCULAR; INTRAVENOUS at 00:30

## 2018-08-19 RX ADMIN — QUETIAPINE FUMARATE 75 MG: 25 TABLET ORAL at 21:38

## 2018-08-19 RX ADMIN — FAMOTIDINE 20 MG: 20 TABLET ORAL at 20:06

## 2018-08-19 RX ADMIN — MORPHINE SULFATE 2 MG: 2 INJECTION, SOLUTION INTRAMUSCULAR; INTRAVENOUS at 00:30

## 2018-08-19 RX ADMIN — Medication 1 CAPSULE: at 10:02

## 2018-08-19 RX ADMIN — CLOPIDOGREL BISULFATE 75 MG: 75 TABLET ORAL at 10:01

## 2018-08-19 RX ADMIN — MORPHINE SULFATE 2 MG: 2 INJECTION, SOLUTION INTRAMUSCULAR; INTRAVENOUS at 10:01

## 2018-08-19 RX ADMIN — VALPROIC ACID 250 MG: 250 SOLUTION ORAL at 15:02

## 2018-08-19 RX ADMIN — ASPIRIN 81 MG 81 MG: 81 TABLET ORAL at 10:01

## 2018-08-19 RX ADMIN — QUETIAPINE FUMARATE 75 MG: 25 TABLET ORAL at 05:52

## 2018-08-19 RX ADMIN — MORPHINE SULFATE 2 MG: 2 INJECTION, SOLUTION INTRAMUSCULAR; INTRAVENOUS at 15:02

## 2018-08-19 RX ADMIN — QUETIAPINE FUMARATE 75 MG: 25 TABLET ORAL at 15:01

## 2018-08-19 RX ADMIN — MORPHINE SULFATE 2 MG: 2 INJECTION, SOLUTION INTRAMUSCULAR; INTRAVENOUS at 21:38

## 2018-08-19 RX ADMIN — LORAZEPAM 1 MG: 2 INJECTION INTRAMUSCULAR; INTRAVENOUS at 21:38

## 2018-08-19 RX ADMIN — ACETAMINOPHEN 650 MG: 650 SOLUTION ORAL at 00:37

## 2018-08-19 RX ADMIN — LEVETIRACETAM 500 MG: 100 SOLUTION ORAL at 10:02

## 2018-08-19 RX ADMIN — ACETAMINOPHEN 650 MG: 650 SOLUTION ORAL at 10:00

## 2018-08-19 RX ADMIN — Medication 100 MG: at 10:01

## 2018-08-19 RX ADMIN — ENOXAPARIN SODIUM 40 MG: 100 INJECTION SUBCUTANEOUS at 10:02

## 2018-08-19 NOTE — PLAN OF CARE
Problem: Patient Care Overview  Goal: Plan of Care Review  Outcome: Ongoing (interventions implemented as appropriate)   08/19/18 1956   Plan of Care Review   Progress no change   OTHER   Outcome Summary i/o cath x 1 (800); morphine x 2; menses; temp, tylenol x 2

## 2018-08-19 NOTE — PROGRESS NOTES
Intensivist Note     8/18/2018  Hospital Day: 33  23 Days Post-Op      Ms. Otilia Holm, 37 y.o. female is followed for:  Principal Problem:    S/P Respiratory arrest due to drugs and alcohol. Prolonged ventilatory support requiring tracheostomy and PEG 7/26/18. Now off the ventilator  Active Problems:    Anoxic brain damage     Seizures versus myoclonus and posturing from anoxia on admission. EEG negative (CMS/HCC)    Alcohol abuse. Intoxicated on admission    H/O drug abuse    Possible NMS (neuroleptic malignant syndrome) causing fever. Resolved    Pulmonary infiltrates, resolved. Suspected pulmonary edema versus aspiration    Persistent urinary retention requiring in and out catheterization    Chronic hepatitis C without hepatic coma (CMS/HCC)       SUBJECTIVE     37-year-old white female hospitalized since transferred here 7/16/18 from [facility] ER for unresponsiveness associated with prolonged seizure versus myoclonus and posturing after suffering a respiratory arrest presumably due to acute alcohol intake and drugs. Has undergone MRIs, CT, LP, and continuous EEG without diagnosis (never picked up a seizure here despite multiple EEGs). Is felt to have suffered an anoxic insult and does not appear she will recover. Was unable to handle secretions and therefore underwent tracheostomy and PEG feeding tube 7/26/18. Was eventually able to be liberated from the ventilator. She had problems with fevers 8/5/18 and was covered broadly but all cultures returned negative except for a strep species (beta hemolytic strep group G) from tracheal secretions on 8/5/18 and 8/15/18. Had been doing reasonably well after moved to the floor, but became increasingly agitated and tachypneic leading to her transfer to the ICU 8/15/18.    The patient has never recovered neurologically. She is restless, and has contractures of her arms as well as shortening of her Achilles tendons. MAXIMUM TEMPERATURE is 99.7 and her last  "white count was approximately 15,000 on 8/16/18. She remains on Rocephin. She apparently has been unable to void and required in and out catheterization for urinary retention (approximately 500-600 mL is obtained every 6 hours. She remains on appropriate ulcer and DVT prophylaxis. She also remains on both valproic acid (Depakene) and Levetiracetam (Keppra) although has never been documented to have seizures on EEG since admission. Agitation remains a problem but I am told she slightly better since Seroquel was started 8/17/18    She is receiving appropriate enteral support with fiber source HN at 75 mL an hour +35 mL an hour water.    The patient's relevant past medical, surgical and social history were reviewed and updated in Epic as appropriate.    OBJECTIVE     /68   Pulse (!) 131   Temp 97.6 °F (36.4 °C) (Axillary)   Resp 22   Ht 144.8 cm (57.01\")   Wt 66.4 kg (146 lb 6.4 oz)   SpO2 98%   BMI 31.67 kg/m²   Oxygen Concentration (%): 28      Flowsheet Rows      First Filed Value   Admission Height  146.1 cm (57.5\") Documented at 07/16/2018 0559   Admission Weight  65 kg (143 lb 4.8 oz) Documented at 07/16/2018 0559        Intake & Output (last day)       08/18 0701 - 08/19 0700    I.V. (mL/kg) 113 (1.7)    Other 465    NG/    IV Piggyback 50    Total Intake(mL/kg) 1381 (20.8)    Urine (mL/kg/hr) 675 (0.8)    Total Output 675    Net +706         Unmeasured Stool Occurrence 1 x          Exam:  General Exam:  Well-developed restless white female who turns head side to side  HEENT: Pupils equal and reactive. Nose and throat clear.  Neck:                          Supple, no JVD, thyromegaly, or adenopathy. Trach tube site clean and dry  Lungs: Clear anteriorly and laterally  Cardiovascular: RRR without murmurs or gallops. Heart rate 135  Abdomen: Soft nontender without organomegaly or masses. PEG feeding tube in place   and rectal: Deferred.  Extremities: No cyanosis clubbing edema.  Neurologic:    "              Moves all extremities but much more on the right. Flexion contractures at elbows and wrist. Keeps right leg pulled up. Shortening of the Achilles with toes pointed (left worse than right)    Chest X-Ray: No x-rays since 8/15/18 which was normal      Results from last 7 days  Lab Units 08/16/18  0342 08/15/18  1128   WBC 10*3/mm3 15.72* 16.87*   HEMOGLOBIN g/dL 11.4* 10.8*   HEMATOCRIT % 36.3 34.5   PLATELETS 10*3/mm3 420 402       Results from last 7 days  Lab Units 08/16/18  0342 08/15/18  1128   SODIUM mmol/L 139 138   POTASSIUM mmol/L 4.2 3.8   CHLORIDE mmol/L 103 105   CO2 mmol/L 26.0 25.0   BUN mg/dL 11 10   CREATININE mg/dL 0.52* 0.45*   GLUCOSE mg/dL 134* 99   CALCIUM mg/dL 9.4 9.2       Results from last 7 days  Lab Units 08/13/18  0343   MAGNESIUM mg/dL 2.3   PHOSPHORUS mg/dL 3.1       Results from last 7 days  Lab Units 08/15/18  1128 08/13/18  0343   ALK PHOS U/L 70 68   BILIRUBIN mg/dL 0.4 0.2*   ALT (SGPT) U/L 76* 59*   AST (SGOT) U/L 111* 86*       Lab Results   Component Value Date    SEDRATE 80 (H) 07/25/2018     Lab Results   Component Value Date    .0 (H) 08/05/2018     Lab Results   Component Value Date    CKTOTAL 820 (H) 08/16/2018    TROPONINI 0.039 08/05/2018     Lab Results   Component Value Date    TSH 1.905 08/08/2018     Lab Results   Component Value Date    LACTATE 1.1 08/05/2018     Lab Results   Component Value Date    CORTISOL 22.20 08/08/2018       I reviewed the patient's results, images and medication.    Assessment/Plan   ASSESSMENT      Principal Problem:    S/P Respiratory arrest due to drugs and alcohol. Prolonged ventilatory support requiring tracheostomy and PEG 7/26/18. Now off the ventilator  Active Problems:    Anoxic brain damage     Seizures versus myoclonus and posturing from anoxia on admission. EEG negative (CMS/HCC)    Alcohol abuse. Intoxicated on admission    H/O drug abuse    Possible NMS (neuroleptic malignant syndrome) causing fever. Resolved     Pulmonary infiltrates, resolved. Suspected pulmonary edema versus aspiration    Persistent urinary retention requiring in and out catheterization    Chronic hepatitis C without hepatic coma (CMS/HCC)      DISCUSSION: Agitation persists although told it is better on Seroquel. I am a little concerned because of the questionable history of neuroleptic malignant syndrome,  but I think it is unlikely she ever had that diagnosis. If we can get her to calm down a little bit more with medial get her back to the floor. Still trying to obtain placement options.    PLAN     1. Continue Rocephin for the one culture we have of strep from her sputum. We will also recheck chest x-ray  2. Increase Seroquel slightly  3. Continue enteral support  4. Would like to use an external collection device rather than in and out cath if possible. Will hold in and out catheterization and see if she is able to void spontaneously  5. If remains stable can return to the floor while efforts are made to find a long-term facility  6. If spikes temp again will ask ID to see and will stop Seroquel    Plan of care and goals reviewed with mulitdisciplinary team at daily rounds.    I discussed the patient's findings and my recommendations with nursing staff    Time spent Critical care 35 min (It does not include procedure time).    Bud Piña MD  Intensive Care Medicine  08/18/18 8:18 PM

## 2018-08-19 NOTE — PLAN OF CARE
Problem: Patient Care Overview  Goal: Plan of Care Review  Outcome: Ongoing (interventions implemented as appropriate)   08/19/18 0658   Coping/Psychosocial   Plan of Care Reviewed With patient   Plan of Care Review   Progress no change   OTHER   Outcome Summary Pt rested better this evening, i/o cath x1; BM x3; trach suction x2; ativan x1; morphine x2. VSS.     Goal: Individualization and Mutuality  Outcome: Ongoing (interventions implemented as appropriate)    Goal: Discharge Needs Assessment  Outcome: Ongoing (interventions implemented as appropriate)      Problem: Skin Injury Risk (Adult)  Goal: Skin Health and Integrity  Outcome: Ongoing (interventions implemented as appropriate)      Problem: Palliative Care (Adult)  Goal: Maximized Comfort  Outcome: Ongoing (interventions implemented as appropriate)      Problem: Cardiac Output Decreased (Adult)  Goal: Effective Tissue Perfusion  Outcome: Ongoing (interventions implemented as appropriate)      Problem: Nutrition, Enteral (Adult)  Goal: Signs and Symptoms of Listed Potential Problems Will be Absent, Minimized or Managed (Nutrition, Enteral)  Outcome: Ongoing (interventions implemented as appropriate)      Problem: Fall Risk (Adult)  Goal: Identify Related Risk Factors and Signs and Symptoms  Outcome: Ongoing (interventions implemented as appropriate)    Goal: Absence of Fall  Outcome: Ongoing (interventions implemented as appropriate)

## 2018-08-20 LAB
ALBUMIN SERPL-MCNC: 3.67 G/DL (ref 3.2–4.8)
ALP SERPL-CCNC: 64 U/L (ref 25–100)
ALT SERPL W P-5'-P-CCNC: 59 U/L (ref 7–40)
ANION GAP SERPL CALCULATED.3IONS-SCNC: 7 MMOL/L (ref 3–11)
AST SERPL-CCNC: 60 U/L (ref 0–33)
BACTERIA SPEC AEROBE CULT: NORMAL
BACTERIA SPEC AEROBE CULT: NORMAL
BACTERIA UR QL AUTO: NORMAL /HPF
BASOPHILS # BLD AUTO: 0.06 10*3/MM3 (ref 0–0.2)
BASOPHILS NFR BLD AUTO: 0.5 % (ref 0–1)
BILIRUB SERPL-MCNC: 0.3 MG/DL (ref 0.3–1.2)
BILIRUB UR QL STRIP: NEGATIVE
BUN BLD-MCNC: 15 MG/DL (ref 9–23)
CALCIUM SPEC-SCNC: 9.2 MG/DL (ref 8.7–10.4)
CHLORIDE SERPL-SCNC: 106 MMOL/L (ref 99–109)
CHOLEST SERPL-MCNC: 121 MG/DL (ref 0–200)
CLARITY UR: ABNORMAL
CO2 SERPL-SCNC: 30 MMOL/L (ref 20–31)
COLOR UR: YELLOW
CREAT BLD-MCNC: 0.43 MG/DL (ref 0.6–1.3)
CRP SERPL-MCNC: 3.01 MG/DL (ref 0–1)
DEPRECATED RDW RBC AUTO: 55 FL (ref 37–54)
EOSINOPHIL # BLD AUTO: 0.15 10*3/MM3 (ref 0–0.3)
EOSINOPHIL NFR BLD AUTO: 1.2 % (ref 0–3)
ERYTHROCYTE [DISTWIDTH] IN BLOOD BY AUTOMATED COUNT: 15.2 % (ref 11.3–14.5)
GLUCOSE BLD-MCNC: 108 MG/DL (ref 70–100)
GLUCOSE UR STRIP-MCNC: NEGATIVE MG/DL
HCT VFR BLD AUTO: 33.9 % (ref 34.5–44)
HGB BLD-MCNC: 10.4 G/DL (ref 11.5–15.5)
HGB UR QL STRIP.AUTO: NEGATIVE
HYALINE CASTS UR QL AUTO: NORMAL /LPF
IMM GRANULOCYTES # BLD: 0.03 10*3/MM3 (ref 0–0.03)
IMM GRANULOCYTES NFR BLD: 0.2 % (ref 0–0.6)
KETONES UR QL STRIP: NEGATIVE
LEUKOCYTE ESTERASE UR QL STRIP.AUTO: NEGATIVE
LYMPHOCYTES # BLD AUTO: 2.07 10*3/MM3 (ref 0.6–4.8)
LYMPHOCYTES NFR BLD AUTO: 17 % (ref 24–44)
MAGNESIUM SERPL-MCNC: 2.1 MG/DL (ref 1.3–2.7)
MCH RBC QN AUTO: 30.3 PG (ref 27–31)
MCHC RBC AUTO-ENTMCNC: 30.7 G/DL (ref 32–36)
MCV RBC AUTO: 98.8 FL (ref 80–99)
MONOCYTES # BLD AUTO: 1.5 10*3/MM3 (ref 0–1)
MONOCYTES NFR BLD AUTO: 12.3 % (ref 0–12)
NEUTROPHILS # BLD AUTO: 8.4 10*3/MM3 (ref 1.5–8.3)
NEUTROPHILS NFR BLD AUTO: 69 % (ref 41–71)
NITRITE UR QL STRIP: NEGATIVE
PH UR STRIP.AUTO: 7.5 [PH] (ref 5–8)
PHOSPHATE SERPL-MCNC: 3.6 MG/DL (ref 2.4–5.1)
PLATELET # BLD AUTO: 376 10*3/MM3 (ref 150–450)
PMV BLD AUTO: 10.8 FL (ref 6–12)
POTASSIUM BLD-SCNC: 4.2 MMOL/L (ref 3.5–5.5)
PREALB SERPL-MCNC: 14.5 MG/DL (ref 10–40)
PROCALCITONIN SERPL-MCNC: 0.08 NG/ML
PROT SERPL-MCNC: 6.3 G/DL (ref 5.7–8.2)
PROT UR QL STRIP: NEGATIVE
RBC # BLD AUTO: 3.43 10*6/MM3 (ref 3.89–5.14)
RBC # UR: NORMAL /HPF
REF LAB TEST METHOD: NORMAL
SODIUM BLD-SCNC: 143 MMOL/L (ref 132–146)
SP GR UR STRIP: 1.02 (ref 1–1.03)
SQUAMOUS #/AREA URNS HPF: NORMAL /HPF
TRIGL SERPL-MCNC: 93 MG/DL (ref 0–150)
UROBILINOGEN UR QL STRIP: ABNORMAL
WBC NRBC COR # BLD: 12.18 10*3/MM3 (ref 3.5–10.8)
WBC UR QL AUTO: NORMAL /HPF

## 2018-08-20 PROCEDURE — 25010000002 CEFTRIAXONE PER 250 MG: Performed by: INTERNAL MEDICINE

## 2018-08-20 PROCEDURE — 25010000002 MORPHINE SULFATE (PF) 2 MG/ML SOLUTION: Performed by: HOSPITALIST

## 2018-08-20 PROCEDURE — 82465 ASSAY BLD/SERUM CHOLESTEROL: CPT | Performed by: INTERNAL MEDICINE

## 2018-08-20 PROCEDURE — 80053 COMPREHEN METABOLIC PANEL: CPT | Performed by: INTERNAL MEDICINE

## 2018-08-20 PROCEDURE — 84478 ASSAY OF TRIGLYCERIDES: CPT | Performed by: INTERNAL MEDICINE

## 2018-08-20 PROCEDURE — 86140 C-REACTIVE PROTEIN: CPT | Performed by: INTERNAL MEDICINE

## 2018-08-20 PROCEDURE — 99232 SBSQ HOSP IP/OBS MODERATE 35: CPT | Performed by: INTERNAL MEDICINE

## 2018-08-20 PROCEDURE — 83735 ASSAY OF MAGNESIUM: CPT | Performed by: INTERNAL MEDICINE

## 2018-08-20 PROCEDURE — 94799 UNLISTED PULMONARY SVC/PX: CPT

## 2018-08-20 PROCEDURE — 85025 COMPLETE CBC W/AUTO DIFF WBC: CPT | Performed by: INTERNAL MEDICINE

## 2018-08-20 PROCEDURE — 81001 URINALYSIS AUTO W/SCOPE: CPT | Performed by: INTERNAL MEDICINE

## 2018-08-20 PROCEDURE — 84134 ASSAY OF PREALBUMIN: CPT | Performed by: INTERNAL MEDICINE

## 2018-08-20 PROCEDURE — 84145 PROCALCITONIN (PCT): CPT | Performed by: INTERNAL MEDICINE

## 2018-08-20 PROCEDURE — 25010000002 ENOXAPARIN PER 10 MG: Performed by: INTERNAL MEDICINE

## 2018-08-20 PROCEDURE — 84100 ASSAY OF PHOSPHORUS: CPT | Performed by: INTERNAL MEDICINE

## 2018-08-20 PROCEDURE — 25010000002 LORAZEPAM PER 2 MG: Performed by: HOSPITALIST

## 2018-08-20 RX ADMIN — ASPIRIN 81 MG 81 MG: 81 TABLET ORAL at 10:33

## 2018-08-20 RX ADMIN — MORPHINE SULFATE 2 MG: 2 INJECTION, SOLUTION INTRAMUSCULAR; INTRAVENOUS at 20:55

## 2018-08-20 RX ADMIN — LEVETIRACETAM 500 MG: 100 SOLUTION ORAL at 20:22

## 2018-08-20 RX ADMIN — CLOPIDOGREL BISULFATE 75 MG: 75 TABLET ORAL at 10:33

## 2018-08-20 RX ADMIN — LORAZEPAM 1 MG: 2 INJECTION INTRAMUSCULAR; INTRAVENOUS at 21:19

## 2018-08-20 RX ADMIN — ACETAMINOPHEN 650 MG: 650 SOLUTION ORAL at 13:29

## 2018-08-20 RX ADMIN — FAMOTIDINE 20 MG: 20 TABLET ORAL at 10:33

## 2018-08-20 RX ADMIN — VALPROIC ACID 250 MG: 250 SOLUTION ORAL at 20:22

## 2018-08-20 RX ADMIN — FAMOTIDINE 20 MG: 20 TABLET ORAL at 20:22

## 2018-08-20 RX ADMIN — LORAZEPAM 1 MG: 2 INJECTION INTRAMUSCULAR; INTRAVENOUS at 16:48

## 2018-08-20 RX ADMIN — LORAZEPAM 1 MG: 2 INJECTION INTRAMUSCULAR; INTRAVENOUS at 05:07

## 2018-08-20 RX ADMIN — MORPHINE SULFATE 2 MG: 2 INJECTION, SOLUTION INTRAMUSCULAR; INTRAVENOUS at 05:07

## 2018-08-20 RX ADMIN — Medication 100 MG: at 10:34

## 2018-08-20 RX ADMIN — ACETAMINOPHEN 650 MG: 650 SOLUTION ORAL at 05:06

## 2018-08-20 RX ADMIN — VALPROIC ACID 250 MG: 250 SOLUTION ORAL at 02:20

## 2018-08-20 RX ADMIN — MORPHINE SULFATE 2 MG: 2 INJECTION, SOLUTION INTRAMUSCULAR; INTRAVENOUS at 16:17

## 2018-08-20 RX ADMIN — IBUPROFEN 400 MG: 100 SUSPENSION ORAL at 10:34

## 2018-08-20 RX ADMIN — LEVETIRACETAM 500 MG: 100 SOLUTION ORAL at 10:43

## 2018-08-20 RX ADMIN — VALPROIC ACID 250 MG: 250 SOLUTION ORAL at 10:33

## 2018-08-20 RX ADMIN — CEFTRIAXONE SODIUM 1 G: 1 INJECTION, SOLUTION INTRAVENOUS at 16:17

## 2018-08-20 RX ADMIN — ENOXAPARIN SODIUM 40 MG: 100 INJECTION SUBCUTANEOUS at 10:34

## 2018-08-20 RX ADMIN — IBUPROFEN 400 MG: 100 SUSPENSION ORAL at 16:17

## 2018-08-20 RX ADMIN — DESMOPRESSIN ACETATE 80 MG: 0.2 TABLET ORAL at 20:22

## 2018-08-20 RX ADMIN — VALPROIC ACID 250 MG: 250 SOLUTION ORAL at 16:15

## 2018-08-20 RX ADMIN — Medication 1 CAPSULE: at 10:34

## 2018-08-20 RX ADMIN — MORPHINE SULFATE 2 MG: 2 INJECTION, SOLUTION INTRAMUSCULAR; INTRAVENOUS at 11:34

## 2018-08-20 NOTE — PAYOR COMM NOTE
"  Vicki Leal RN Utilization Review [phone]  Fax # [phone]  Ref # [id number]    Last covered day is 8/14/18, updated clinicals faxed on 8/15, 8/17, and today.  Please call or fax with continued stay authorization.     Clint Pace  (37 y.o. Female)     Date of Birth Social Security Number Address Home Phone MRN    [id number]  [address] [id number] [phone] [id number]    Tenriism Marital Status          None Single       Admission Date Admission Type Admitting Provider Attending Provider Department, Room/Bed    7/16/18 Urgent Bud Sutton MD Thompson, John Randall, MD [facility] 2A ICU, N219/1    Discharge Date Discharge Disposition Discharge Destination                       Attending Provider:  Bud Sutton MD    Allergies:  No Known Allergies    Isolation:  None   Infection:  None   Code Status:  CPR    Ht:  144.8 cm (57.01\")   Wt:  66.4 kg (146 lb 6.4 oz)    Admission Cmt:  None   Principal Problem:  S/P Respiratory arrest due to drugs and alcohol. Prolonged ventilatory support requiring tracheostomy and PEG 7/26/18. Now off the ventilator [R09.2]                 Active Insurance as of 7/16/2018     Primary Coverage     Payor Plan Insurance Group Employer/Plan Group    [employer] [place] [employer] [place]ICA[place]      Payor Plan Address Payor Plan Phone Number Effective From Effective To    [address] [address] [phone] 7/16/2018     [facility] [id number]       Subscriber Name Subscriber Birth Date Member ID       CLINT PACE 1980 [id number]                 Emergency Contacts      (Rel.) Home Phone Work Phone Mobile Phone    Eric Pace (Son) -- -- [phone]                  ICU Vital Signs     Row Name 08/20/18 0759 08/20/18 0600 08/20/18 0500 08/20/18 0400 08/20/18 0300       Vitals    Temp  --  --  -- (!)  101.8 °F (38.8 °C)   medicated  --    Temp src  --  --  -- Axillary  --    Pulse (!)  134 (!)  147 (!)  140 " (!)  140 (!)  130    Heart Rate Source  -- Monitor Monitor Monitor Monitor    Resp  -- 22 20 18 20    Resp Rate Source  -- Visual Visual Visual Visual    BP  -- 115/75 (!)  128/111 118/99 110/65    Noninvasive MAP (mmHg)  -- 83 120 108 80    BP Location  -- Left arm Left arm Left arm Left arm    BP Method  -- Automatic Automatic Automatic Automatic    Patient Position  -- Lying Lying Lying Lying       Oxygen Therapy    SpO2 93 % 100 % 98 % 97 % 97 %    Pulse Oximetry Type Continuous Continuous Continuous Continuous Continuous    Device (Oxygen Therapy) room air   tubing off trach collar. reattached TC 28% tracheostomy collar tracheostomy collar tracheostomy collar tracheostomy collar    Oxygen Concentration (%)  -- 28 28 28 28    Row Name 08/20/18 0200 08/20/18 0100 08/20/18 0000 08/19/18 2300 08/19/18 2200       Vitals    Temp  --  -- 99.8 °F (37.7 °C)  --  --    Temp src  --  -- Axillary  --  --    Pulse (!)  140 (!)  131 120 (!)  123 (!)  135    Heart Rate Source Monitor Monitor Monitor Monitor Monitor    Resp 22 20 22 20 18    Resp Rate Source Visual Visual Visual Visual Visual    /82 120/84 107/73 109/71 96/74    Noninvasive MAP (mmHg) 96 95 87 84 82    BP Location Left arm Left arm Left arm Left arm Left arm    BP Method Automatic Automatic Automatic Automatic Automatic    Patient Position Lying Lying Lying Lying Lying       Oxygen Therapy    SpO2 95 % 95 % 96 % 94 % 96 %    Pulse Oximetry Type Continuous Continuous Continuous Continuous Continuous    Device (Oxygen Therapy) tracheostomy collar tracheostomy collar tracheostomy collar tracheostomy collar tracheostomy collar    Oxygen Concentration (%) 28 28 28 28 28    Row Name 08/19/18 2100 08/19/18 2000 08/19/18 1900 08/19/18 1800 08/19/18 1700       Vitals    Temp  -- (!)  101.6 °F (38.7 °C)  -- (!)  101.1 °F (38.4 °C)  --    Temp src  -- Axillary  -- Oral  --    Pulse (!)  144 120 (!)  126 (!)  134 112    Heart Rate Source Monitor Monitor Monitor  --   --    Resp 20 22 18 --  --    Resp Rate Source Visual Visual Visual  --  --    /89 116/77 116/85 125/72 105/62    Noninvasive MAP (mmHg) 98 87 87 86 77    BP Location Left arm Left arm Left arm  --  --    BP Method Automatic Automatic Automatic  --  --    Patient Position Lying Lying Lying  --  --       Oxygen Therapy    SpO2 96 % 98 % 96 % 97 % 98 %    Pulse Oximetry Type Continuous Continuous Continuous  --  --    Device (Oxygen Therapy) tracheostomy collar tracheostomy collar tracheostomy collar  --  --    Oxygen Concentration (%) 28 28 28  --  --    Row Name 08/19/18 1600 08/19/18 1500 08/19/18 1400 08/19/18 1300 08/19/18 1200       Vitals    Temp 99.9 °F (37.7 °C)  --  --  -- 99.9 °F (37.7 °C)    Temp src  --  --  --  -- Oral    Pulse (!)  138 (!)  138 115 (!)  129 (!)  142    Resp 20  -- 20  -- 20    /66 130/85 116/96  -- 112/69    Noninvasive MAP (mmHg) 85 100 103  -- 82       Oxygen Therapy    SpO2 96 % 97 % 95 % 98 % 96 %    Row Name 08/19/18 1100 08/19/18 1000                Vitals    Temp  -- (!)  101.2 °F (38.4 °C)   tylenol admin       Temp src  -- Oral       Pulse (!)  142 (!)  133       Resp  -- 22       /92 104/66       Noninvasive MAP (mmHg) 95 77          Oxygen Therapy    SpO2 96 % 98 %           Hospital Medications (active)       Dose Frequency Start End    acetaminophen (TYLENOL) 160 MG/5ML solution 650 mg 650 mg Every 4 Hours PRN 8/5/2018     Sig - Route: Take 20.3 mL by mouth Every 4 (Four) Hours As Needed for Fever. - Oral    aspirin chewable tablet 81 mg 81 mg Daily 8/19/2018     Sig - Route: 1 tablet by Per G Tube route Daily. - Per G Tube    atorvastatin (LIPITOR) tablet 80 mg 80 mg Nightly 8/18/2018     Sig - Route: 2 tablets by Per G Tube route Every Night. - Per G Tube    bisacodyl (DULCOLAX) suppository 10 mg 10 mg Daily PRN 8/8/2018     Sig - Route: Insert 1 suppository into the rectum Daily As Needed for Constipation. - Rectal    cefTRIAXone (ROCEPHIN) IVPB 1 g 1 g  "Every 24 Hours 8/16/2018 8/23/2018    Sig - Route: Infuse 50 mL into a venous catheter Daily. - Intravenous    clopidogrel (PLAVIX) tablet 75 mg 75 mg Daily 8/19/2018     Sig - Route: 1 tablet by Per G Tube route Daily. - Per G Tube    enoxaparin (LOVENOX) syringe 40 mg 40 mg Every 24 Hours 7/22/2018     Sig - Route: Inject 0.4 mL under the skin into the appropriate area as directed Daily. - Subcutaneous    famotidine (PEPCID) tablet 20 mg 20 mg 2 Times Daily 8/18/2018     Sig - Route: 1 tablet by Per G Tube route 2 (Two) Times a Day. - Per G Tube    ipratropium-albuterol (DUO-NEB) nebulizer solution 3 mL 3 mL Every 8 Hours PRN 8/12/2018     Sig - Route: Take 3 mL by nebulization Every 8 (Eight) Hours As Needed for Shortness of Air. - Nebulization    lactobacillus acidophilus (RISAQUAD) capsule 1 capsule 1 capsule Daily 8/19/2018     Sig - Route: 1 capsule by Per G Tube route Daily. - Per G Tube    lactulose (CHRONULAC) 10 GM/15ML solution 10 g 10 g Daily 8/19/2018     Sig - Route: 15 mL by Per G Tube route Daily. - Per G Tube    levETIRAcetam (KEPPRA) 100 MG/ML solution 500 mg 500 mg Every 12 Hours Scheduled 8/12/2018     Sig - Route: 5 mL by Per PEG Tube route Every 12 (Twelve) Hours. - Per PEG Tube    LORazepam (ATIVAN) injection 1 mg 1 mg Every 4 Hours PRN 8/13/2018 8/23/2018    Sig - Route: Infuse 0.5 mL into a venous catheter Every 4 (Four) Hours As Needed for Anxiety, Seizures or Agitation. - Intravenous    Magnesium Sulfate 2 gram / 50mL Infusion (GIVE X 3 BAGS TO EQUAL 6GM TOTAL DOSE) - Mg 1.1 - 1/5 mg/dl 2 g As Needed 7/16/2018     Sig - Route: Infuse 50 mL into a venous catheter As Needed (See Administration Instructions). - Intravenous    Linked Group 1:  \"Or\" Linked Group Details        Magnesium Sulfate 2 gram Bolus, followed by 8 gram infusion (total Mg dose 10 grams)- Mg less than or equal to 1mg/dL 2 g As Needed 7/16/2018     Sig - Route: Infuse 50 mL into a venous catheter As Needed (See " "Administration Instructions). - Intravenous    Linked Group 1:  \"Or\" Linked Group Details        Magnesium Sulfate 4 gram infusion- Mg 1.6-1.9 mg/dL 4 g As Needed 7/16/2018     Sig - Route: Infuse 100 mL into a venous catheter As Needed (See Administration Instructions). - Intravenous    Linked Group 1:  \"Or\" Linked Group Details        Morphine sulfate (PF) injection 2 mg 2 mg Every 4 Hours PRN 8/15/2018 8/25/2018    Sig - Route: Infuse 1 mL into a venous catheter Every 4 (Four) Hours As Needed for Severe Pain  (agitation/restlessness). - Intravenous    ondansetron (ZOFRAN) injection 4 mg 4 mg Every 6 Hours PRN 7/29/2018     Sig - Route: Infuse 2 mL into a venous catheter Every 6 (Six) Hours As Needed for Nausea or Vomiting. - Intravenous    potassium chloride (KLOR-CON) packet 40 mEq 40 mEq As Needed 7/16/2018     Sig - Route: Take 40 mEq by mouth As Needed (potassium replacement, see admin instructions). - Oral    Linked Group 2:  \"Or\" Linked Group Details        potassium chloride (MICRO-K) CR capsule 40 mEq 40 mEq As Needed 7/16/2018     Sig - Route: Take 4 capsules by mouth As Needed (potassium replacement.  see admin instructions). - Oral    Linked Group 2:  \"Or\" Linked Group Details        sodium chloride 0.9 % flush 1-10 mL 1-10 mL As Needed 7/16/2018     Sig - Route: Infuse 1-10 mL into a venous catheter As Needed for Line Care. - Intravenous    sodium chloride 0.9 % flush 10 mL 10 mL As Needed 8/9/2018     Sig - Route: Infuse 10 mL into a venous catheter As Needed for Line Care. - Intravenous    Cosign for Ordering: Accepted by Jennifer Edwards MD on 8/17/2018  8:04 AM    thiamine (VITAMIN B-1) tablet 100 mg 100 mg Daily 8/19/2018     Sig - Route: 1 tablet by Per G Tube route Daily. - Per G Tube    Valproic Acid (DEPAKENE) syrup 250 mg 250 mg Every 6 Hours Scheduled 8/18/2018     Sig - Route: 5 mL by Per G Tube route Every 6 (Six) Hours. - Per G Tube    QUEtiapine (SEROquel) tablet 75 mg (Discontinued) " 75 mg Every 8 Hours Scheduled 8/18/2018 8/19/2018    Sig - Route: 3 tablets by Per G Tube route Every 8 (Eight) Hours. - Per G Tube          Ventilator/Non-Invasive Ventilation Settings     Start     Ordered    07/27/18 0447  Ventilator - AC/VC+; (16); 92%; 5; 0.39  Continuous,   Status:  Canceled     Question Answer Comment   Vent Mode AC/VC+    Breath rate  16   FiO2 titrate for Sp02% =/> 92%    PEEP 5    Tidal Volume 0.39        07/27/18 0446    07/27/18 0445  Ventilator - AC/VC+; 20 (14); 92%; 5; 0.4  Continuous,   Status:  Canceled     Question Answer Comment   Vent Mode AC/VC+    Breath rate 20 14   FiO2 titrate for Sp02% =/> 92%    PEEP 5    Tidal Volume 0.4        07/27/18 0444    07/16/18 0614  Ventilator - AC/VC+; 20; 92%; 5; 500  Continuous,   Status:  Canceled     Question Answer Comment   Vent Mode AC/VC+    Breath rate 20    FiO2 titrate for Sp02% =/> 92%    PEEP 5    Tidal Volume 500        07/16/18 0620          Operative/Procedure Notes (last 72 hours) (Notes from 8/17/2018  9:46 AM through 8/20/2018  9:46 AM)     No notes of this type exist for this encounter.           Physician Progress Notes (last 72 hours) (Notes from 8/17/2018  9:46 AM through 8/20/2018  9:46 AM)      Bud Piña MD at 8/19/2018 11:09 PM          Intensivist Note     8/19/2018  Hospital Day: 34  24 Days Post-Op      Ms. Otilia Holm, 37 y.o. female is followed for:  Principal Problem:    S/P Respiratory arrest due to drugs and alcohol. Prolonged ventilatory support requiring tracheostomy and PEG 7/26/18. Now off the ventilator  Active Problems:    Anoxic brain damage     Seizures versus myoclonus and posturing from anoxia on admission. EEG negative (CMS/HCC)    Alcohol abuse. Intoxicated on admission    H/O drug abuse    Possible NMS (neuroleptic malignant syndrome) causing fever. Resolved    Pulmonary infiltrates, resolved. Suspected pulmonary edema versus aspiration    Persistent urinary retention requiring in and  out catheterization    Chronic hepatitis C without hepatic coma (CMS/HCC)       SUBJECTIVE     37-year-old white female hospitalized since transferred here 7/16/18 from [facility] ER for unresponsiveness associated with prolonged seizure versus myoclonus and posturing after suffering a respiratory arrest presumably due to acute alcohol intake and drugs. Has undergone MRIs, CT, LP, and continuous EEG without diagnosis (never picked up a seizure here despite multiple EEGs). Is felt to have suffered an anoxic insult and does not appear she will recover. Was unable to handle secretions and therefore underwent tracheostomy and PEG feeding tube 7/26/18. Was eventually able to be liberated from the ventilator. She had problems with fevers 8/5/18 and was covered broadly but all cultures returned negative except for a strep species (beta hemolytic strep group G) from tracheal secretions on 8/5/18 and 8/15/18. Had been doing reasonably well after moved to the floor, but became increasingly agitated and tachypneic leading to her transfer to the ICU 8/15/18.     The patient has never recovered neurologically. She is restless, and has contractures of her arms as well as shortening of her Achilles tendons. Temp has increased again since yesterday with a MAXIMUM TEMPERATURE of 101.6. Although her last CBC is 11.11 today. She remains on Rocephin alone. Yesterday had been unable to void and required in and out catheterization for urinary retention (approximately 500-600 mL was obtained every 6 hours). That seems to have improved some. She remains on appropriate ulcer and DVT prophylaxis. She also remains on both valproic acid (Depakene) and Levetiracetam (Keppra) although has never been documented to have seizures on EEG since admission. Agitation remains a problem but I am told she slightly better since Seroquel was started 8/17/18.     She receives appropriate enteral support with fiber source HN at 75 mL/ hour plus 35  "mL/hr water.       The patient's relevant past medical, surgical and social history were reviewed and updated in Epic as appropriate.    OBJECTIVE     BP 96/74 (BP Location: Left arm, Patient Position: Lying)   Pulse (!) 135   Temp (!) 101.6 °F (38.7 °C) (Axillary)   Resp 18   Ht 144.8 cm (57.01\")   Wt 66.4 kg (146 lb 6.4 oz)   SpO2 96%   BMI 31.67 kg/m²    Oxygen Concentration (%): 28 tracheostomy collar        Flowsheet Rows      First Filed Value   Admission Height  146.1 cm (57.5\") Documented at 07/16/2018 0559   Admission Weight  65 kg (143 lb 4.8 oz) Documented at 07/16/2018 0559        Intake & Output (last day)       08/19 0701 - 08/20 0700    I.V. (mL/kg) 115 (1.7)    Other 680    NG/GT 1129    IV Piggyback 50    Total Intake(mL/kg) 1974 (29.7)    Urine (mL/kg/hr) 800 (0.7)    Total Output 800    Net +1174         Unmeasured Stool Occurrence 2 x          Exam:  General Exam:  Younger appearing white female sleeping quietly..  HEENT: Pupils equal and reactive. Nose and throat clear.  Neck:                          Supple, no JVD, thyromegaly, or adenopathy. Trach tube site clean and dry  Lungs: Clear anteriorly and laterally  Cardiovascular: RRR without murmurs or gallops. HR 1:15  Abdomen: Soft nontender without organomegaly or masses. PEG feeding tube in place and tolerating fiber source HN at 75 mL/hr   and rectal: Deferred.  Extremities: No cyanosis clubbing edema.  Neurologic:                 Moves all extremities but somewhat spastic. When stimulated grimaces and withdraws    Chest X-Ray: Normal lung fields. Right PICC line in place. Trach tube in place        Results from last 7 days  Lab Units 08/19/18  0856 08/16/18  0342 08/15/18  1128   WBC 10*3/mm3 11.11* 15.72* 16.87*   HEMOGLOBIN g/dL 10.1* 11.4* 10.8*   HEMATOCRIT % 32.7* 36.3 34.5   PLATELETS 10*3/mm3 363 420 402       Results from last 7 days  Lab Units 08/19/18  0856 08/16/18  0342   SODIUM mmol/L 142 139   POTASSIUM mmol/L 4.2 4.2 "   CHLORIDE mmol/L 107 103   CO2 mmol/L 29.0 26.0   BUN mg/dL 14 11   CREATININE mg/dL 0.47* 0.52*   GLUCOSE mg/dL 135* 134*   CALCIUM mg/dL 9.3 9.4       Results from last 7 days  Lab Units 08/19/18  0856 08/13/18  0343   MAGNESIUM mg/dL 2.1 2.3   PHOSPHORUS mg/dL 3.5 3.1       Results from last 7 days  Lab Units 08/19/18  0856 08/15/18  1128 08/13/18  0343   ALK PHOS U/L 63 70 68   BILIRUBIN mg/dL 0.3 0.4 0.2*   ALT (SGPT) U/L 63* 76* 59*   AST (SGOT) U/L 73* 111* 86*       Lab Results   Component Value Date    SEDRATE 52 (H) 08/19/2018     Lab Results   Component Value Date    .0 (H) 08/05/2018     Lab Results   Component Value Date    CKTOTAL 820 (H) 08/16/2018    TROPONINI 0.039 08/05/2018     Lab Results   Component Value Date    TSH 1.905 08/08/2018     Lab Results   Component Value Date    LACTATE 1.1 08/05/2018     Lab Results   Component Value Date    CORTISOL 22.20 08/08/2018       I reviewed the patient's results, images and medication.    Assessment/Plan   ASSESSMENT      Principal Problem:    S/P Respiratory arrest due to drugs and alcohol. Prolonged ventilatory support requiring tracheostomy and PEG 7/26/18. Now off the ventilator  Active Problems:    Anoxic brain damage     Seizures versus myoclonus and posturing from anoxia on admission. EEG negative (CMS/HCC)    Alcohol abuse. Intoxicated on admission    H/O drug abuse    Possible NMS (neuroleptic malignant syndrome) causing fever. Resolved    Pulmonary infiltrates, resolved. Suspected pulmonary edema versus aspiration    Persistent urinary retention requiring in and out catheterization    Chronic hepatitis C without hepatic coma (CMS/HCC)      DISCUSSION: No improvement neurologically. I am concerned because of increasing fever although white count is normal and chest x-ray is clear. There was concern about neuroleptic malignant syndrome in the past but this did not appear to panel and out and she has been on Seroquel. A number of other  other drugs could also cause drug fever but would be occult to prove    PLAN     1. Because of increasing fever will discontinue Seroquel and in the future avoid all similar drugs of this class  2. Will ask ID to see again in a.m.  3. Procalcitonin in a.m.  4. Urinalysis and urine culture    Plan of care and goals reviewed with mulitdisciplinary team at daily rounds.    I discussed the patient's findings and my recommendations with nursing staff    Time spent Critical care 35 min (It does not include procedure time).    Bud Piña MD  Intensive Care Medicine  08/19/18 11:09 PM       Electronically signed by Bud Piña MD at 8/19/2018 11:26 PM     Bud Piña MD at 8/18/2018  8:18 PM          Intensivist Note     8/18/2018  Hospital Day: 33  23 Days Post-Op      Ms. Otilia Holm, 37 y.o. female is followed for:  Principal Problem:    S/P Respiratory arrest due to drugs and alcohol. Prolonged ventilatory support requiring tracheostomy and PEG 7/26/18. Now off the ventilator  Active Problems:    Anoxic brain damage     Seizures versus myoclonus and posturing from anoxia on admission. EEG negative (CMS/HCC)    Alcohol abuse. Intoxicated on admission    H/O drug abuse    Possible NMS (neuroleptic malignant syndrome) causing fever. Resolved    Pulmonary infiltrates, resolved. Suspected pulmonary edema versus aspiration    Persistent urinary retention requiring in and out catheterization    Chronic hepatitis C without hepatic coma (CMS/HCC)       SUBJECTIVE     37-year-old white female hospitalized since transferred here 7/16/18 from [facility] ER for unresponsiveness associated with prolonged seizure versus myoclonus and posturing after suffering a respiratory arrest presumably due to acute alcohol intake and drugs. Has undergone MRIs, CT, LP, and continuous EEG without diagnosis (never picked up a seizure here despite multiple EEGs). Is felt to have suffered an anoxic insult and does not  "appear she will recover. Was unable to handle secretions and therefore underwent tracheostomy and PEG feeding tube 7/26/18. Was eventually able to be liberated from the ventilator. She had problems with fevers 8/5/18 and was covered broadly but all cultures returned negative except for a strep species (beta hemolytic strep group G) from tracheal secretions on 8/5/18 and 8/15/18. Had been doing reasonably well after moved to the floor, but became increasingly agitated and tachypneic leading to her transfer to the ICU 8/15/18.    The patient has never recovered neurologically. She is restless, and has contractures of her arms as well as shortening of her Achilles tendons. MAXIMUM TEMPERATURE is 99.7 and her last white count was approximately 15,000 on 8/16/18. She remains on Rocephin. She apparently has been unable to void and required in and out catheterization for urinary retention (approximately 500-600 mL is obtained every 6 hours. She remains on appropriate ulcer and DVT prophylaxis. She also remains on both valproic acid (Depakene) and Levetiracetam (Keppra) although has never been documented to have seizures on EEG since admission. Agitation remains a problem but I am told she slightly better since Seroquel was started 8/17/18    She is receiving appropriate enteral support with fiber source HN at 75 mL an hour +35 mL an hour water.    The patient's relevant past medical, surgical and social history were reviewed and updated in Epic as appropriate.    OBJECTIVE     /68   Pulse (!) 131   Temp 97.6 °F (36.4 °C) (Axillary)   Resp 22   Ht 144.8 cm (57.01\")   Wt 66.4 kg (146 lb 6.4 oz)   SpO2 98%   BMI 31.67 kg/m²    Oxygen Concentration (%): 28      Flowsheet Rows      First Filed Value   Admission Height  146.1 cm (57.5\") Documented at 07/16/2018 0559   Admission Weight  65 kg (143 lb 4.8 oz) Documented at 07/16/2018 0559        Intake & Output (last day)       08/18 0701 - 08/19 0700    I.V. (mL/kg) " 113 (1.7)    Other 465    NG/    IV Piggyback 50    Total Intake(mL/kg) 1381 (20.8)    Urine (mL/kg/hr) 675 (0.8)    Total Output 675    Net +706         Unmeasured Stool Occurrence 1 x          Exam:  General Exam:  Well-developed restless white female who turns head side to side  HEENT: Pupils equal and reactive. Nose and throat clear.  Neck:                          Supple, no JVD, thyromegaly, or adenopathy. Trach tube site clean and dry  Lungs: Clear anteriorly and laterally  Cardiovascular: RRR without murmurs or gallops. Heart rate 135  Abdomen: Soft nontender without organomegaly or masses. PEG feeding tube in place   and rectal: Deferred.  Extremities: No cyanosis clubbing edema.  Neurologic:                 Moves all extremities but much more on the right. Flexion contractures at elbows and wrist. Keeps right leg pulled up. Shortening of the Achilles with toes pointed (left worse than right)    Chest X-Ray: No x-rays since 8/15/18 which was normal      Results from last 7 days  Lab Units 08/16/18  0342 08/15/18  1128   WBC 10*3/mm3 15.72* 16.87*   HEMOGLOBIN g/dL 11.4* 10.8*   HEMATOCRIT % 36.3 34.5   PLATELETS 10*3/mm3 420 402       Results from last 7 days  Lab Units 08/16/18  0342 08/15/18  1128   SODIUM mmol/L 139 138   POTASSIUM mmol/L 4.2 3.8   CHLORIDE mmol/L 103 105   CO2 mmol/L 26.0 25.0   BUN mg/dL 11 10   CREATININE mg/dL 0.52* 0.45*   GLUCOSE mg/dL 134* 99   CALCIUM mg/dL 9.4 9.2       Results from last 7 days  Lab Units 08/13/18  0343   MAGNESIUM mg/dL 2.3   PHOSPHORUS mg/dL 3.1       Results from last 7 days  Lab Units 08/15/18  1128 08/13/18  0343   ALK PHOS U/L 70 68   BILIRUBIN mg/dL 0.4 0.2*   ALT (SGPT) U/L 76* 59*   AST (SGOT) U/L 111* 86*       Lab Results   Component Value Date    SEDRATE 80 (H) 07/25/2018     Lab Results   Component Value Date    .0 (H) 08/05/2018     Lab Results   Component Value Date    CKTOTAL 820 (H) 08/16/2018    TROPONINI 0.039 08/05/2018     Lab  Results   Component Value Date    TSH 1.905 08/08/2018     Lab Results   Component Value Date    LACTATE 1.1 08/05/2018     Lab Results   Component Value Date    CORTISOL 22.20 08/08/2018       I reviewed the patient's results, images and medication.    Assessment/Plan   ASSESSMENT      Principal Problem:    S/P Respiratory arrest due to drugs and alcohol. Prolonged ventilatory support requiring tracheostomy and PEG 7/26/18. Now off the ventilator  Active Problems:    Anoxic brain damage     Seizures versus myoclonus and posturing from anoxia on admission. EEG negative (CMS/HCC)    Alcohol abuse. Intoxicated on admission    H/O drug abuse    Possible NMS (neuroleptic malignant syndrome) causing fever. Resolved    Pulmonary infiltrates, resolved. Suspected pulmonary edema versus aspiration    Persistent urinary retention requiring in and out catheterization    Chronic hepatitis C without hepatic coma (CMS/HCC)      DISCUSSION: Agitation persists although told it is better on Seroquel. I am a little concerned because of the questionable history of neuroleptic malignant syndrome,  but I think it is unlikely she ever had that diagnosis. If we can get her to calm down a little bit more with medial get her back to the floor. Still trying to obtain placement options.    PLAN     1. Continue Rocephin for the one culture we have of strep from her sputum. We will also recheck chest x-ray  2. Increase Seroquel slightly  3. Continue enteral support  4. Would like to use an external collection device rather than in and out cath if possible. Will hold in and out catheterization and see if she is able to void spontaneously  5. If remains stable can return to the floor while efforts are made to find a long-term facility  6. If spikes temp again will ask ID to see and will stop Seroquel    Plan of care and goals reviewed with mulitdisciplinary team at daily rounds.    I discussed the patient's findings and my recommendations with  nursing staff    Time spent Critical care 35 min (It does not include procedure time).    Bud Piña MD  Intensive Care Medicine  08/18/18 8:18 PM       Electronically signed by Bud Piña MD at 8/18/2018  8:42 PM       Consult Notes (last 72 hours) (Notes from 8/17/2018  9:46 AM through 8/20/2018  9:46 AM)     No notes of this type exist for this encounter.

## 2018-08-20 NOTE — PROGRESS NOTES
Clinical Nutrition   Reason For Visit: MDR, Follow-up protocol, EN    Patient Name: Otilia Holm  YOB: 1980  MRN: [id number]  Date of Encounter: 08/20/18 1:28 PM  Admission date: 7/16/2018        Nutrition Assessment     Hospital Problem List  Principal Problem:    S/P Respiratory arrest due to drugs and alcohol. Prolonged ventilatory support requiring tracheostomy and PEG 7/26/18. Now off the ventilator  Active Problems:    Alcohol abuse. Intoxicated on admission    Anoxic brain damage     H/O drug abuse    Pulmonary infiltrates, resolved. Suspected pulmonary edema versus aspiration    Persistent urinary retention requiring in and out catheterization    Possible NMS (neuroleptic malignant syndrome) causing fever. Resolved    Chronic hepatitis C without hepatic coma (CMS/HCC)    Seizures versus myoclonus and posturing from anoxia on admission. EEG negative (CMS/HCC)      Other Applicable Diagnosis:  Intubated (7/16)  Hypoxic Encephalopathy - unclear etiology with an unremarkable MRI and EEG.    Respiratory Failure / Aspiration Pneumonia  NSTEMI   S/p trach/PEG (7/26)  Tachycardia  Fever  Transferred back to ICU (8/15) secondary to pt needing frequent suctioning and for closer monitoring        Nutrition-related information:  (7/17) EN initiated   (7/26) EN changed from Peptamen 1.5 to Fibersource HN   (8/6) EN changed to Replete with Fiber at 70 ml/hr and free water at 20 ml/hr   (8/10) EN changed to FiberSource HN at 65 ml/hr and free water at 20 ml/hr  (8/14) Free water increased to 40 ml/hr  (8/16) EN regimen adjusted to better meet pt's estimated needs- FiberSource HN at 75 ml/hr and free water at 35 ml/hr      Reported/Observed/Food/Nutrition Related History     Pt tolerating EN at goal rate. Per I&O- 2 bowel movements past 24 hrs, 4 bowel movements previous 24 hrs. Plan to d/c lactulose per intensivist.       Anthropometrics   Height: 62 in  Weight: 145 lb per lift/sling (8/9)  BMI:  26.5  BMI classification: Overweight: 25.0-29.9kg/m2          Date Weight (kg) Weight (lbs) Weight Method   8/12/2018 66.407 kg 146 lb 6.4 oz -   8/11/2018 66.225 kg 146 lb -   8/10/2018 65.862 kg 145 lb 3.2 oz -   8/9/2018 65.772 kg 145 lb Lift/sling scale   7/30/2018 62.8 kg 138 lb 7.2 oz -   7/16/2018 64.864 kg 143 lb -   7/16/2018 64.864 kg 143 lb -   7/16/2018 65 kg 143 lb 4.8 oz Bed scale        Needs Assessment   Height used: 62 in   Weight used: 145 lb/66 kg     Estimated Calories needs: ~1750 kcal/day  MSJ= 1297 x1.2= 1556 kcal/day  25-30 kcal/kg= 1650- 1980 kcal/day     Estimated Protein needs: ~79 g pro/day  1.2-1.5 g/kg= 79-99 g pro/day      Labs reviewed   Labs reviewed: Yes    Results from last 7 days  Lab Units 08/20/18 0440 08/19/18  0856 08/16/18  0342   SODIUM mmol/L 143 142 139   POTASSIUM mmol/L 4.2 4.2 4.2   CHLORIDE mmol/L 106 107 103   CO2 mmol/L 30.0 29.0 26.0   BUN mg/dL 15 14 11   CREATININE mg/dL 0.43* 0.47* 0.52*   GLUCOSE mg/dL 108* 135* 134*   CALCIUM mg/dL 9.2 9.3 9.4   PHOSPHORUS mg/dL 3.6 3.5  --    MAGNESIUM mg/dL 2.1 2.1  --    CHOLESTEROL mg/dL 121  --   --    TRIGLYCERIDES mg/dL 93  --   --        Results from last 7 days  Lab Units 08/20/18  0440 08/19/18  0856 08/16/18  0342 08/15/18  1128   WBC 10*3/mm3 12.18* 11.11* 15.72* 16.87*   ALBUMIN g/dL 3.67 3.67  --  3.73   PREALBUMIN mg/dL 14.5  --   --   --    CRP mg/dL 3.01*  --   --   --    CHOLESTEROL mg/dL 121  --   --   --    TRIGLYCERIDES mg/dL 93  --   --   --      Medications reviewed   Medications reviewed: Yes  Pertinent: probiotic, 100 mg/day PO thiamine    Current Nutrition Prescription   PO: NPO Diet  EN: FiberSource HN at 75 ml/hr (goal volume= 1500 ml/day) and free water at 35 ml/hr   Route: PEG  Verified at bedside: Yes    Evaluation of Received Nutrient/Fluid Intake:  3 Days:   1560 ml, 104%  1872 kcal,  84 g pro,  23 g fiber  2261 ml total water      Nutrition Diagnosis   8/8, updated 8/10, 8/16, 8/20  Problem  Inadequate enteral nutrition infusion   Etiology ? etiology   Signs/Symptoms 80% of EN goal volume delivered over the past 48 hrs   Status- resolvedm 104% of EN goal volume delivered over the past 72 hrs      8/10  Problem Less than optimal enteral nutrition composition or modality   Etiology Formula needs re-est in view of skin integrity not as severe as initially documented   Signs/Symptoms Pt does not require as high protein formula as is currently on   Status- resolved, EN changed (8/10)        8/14  Problem Increased nutrient needs- fluid   Etiology Current EN regimen   Signs/Symptoms Per MD notes, pt appears dehydrated, IVFs started amd current EN regimen does not meet full fluid needs   Status- resolved, free water increased from 20 ml/hr to 40 ml/hr      Intervention   Intervention: Follow treatment progress, Care plan reviewed   -Will continue to follow and adjust nutrition support regimen as medically appropriate      Goal:   General: Nutrition support treatment  EN/PN: Maintain EN      Monitoring/Evaluation:       Monitoring/Evaluation: Per protocol, Pertinent labs, EN delivery/tolerance, Skin status, GI status, Symptoms, POC/GOC  Will Continue to follow per protocol  Shaneka Rodrigues, MS RD/LD CNSC  Time Spent: 45 minutes

## 2018-08-20 NOTE — PLAN OF CARE
Problem: Patient Care Overview  Goal: Plan of Care Review  Outcome: Ongoing (interventions implemented as appropriate)   08/20/18 1850   Plan of Care Review   Progress no change   OTHER   Outcome Summary continues to have temp (high 102); tylenol x1, ibuprofen x 2; ice packs, cool bath, down to 99.3; lactulose dc'd; order to tele; restless most of the day, finally calm after ibuprofen, cool bath, morphine and ativan (Hr in low 100s)       Problem: Skin Injury Risk (Adult)  Goal: Skin Health and Integrity  Outcome: Ongoing (interventions implemented as appropriate)      Problem: Fall Risk (Adult)  Goal: Absence of Fall  Outcome: Ongoing (interventions implemented as appropriate)

## 2018-08-20 NOTE — PLAN OF CARE
Problem: Patient Care Overview  Goal: Plan of Care Review  Outcome: Ongoing (interventions implemented as appropriate)   08/20/18 0630   Coping/Psychosocial   Plan of Care Reviewed With patient   Plan of Care Review   Progress no change   OTHER   Outcome Summary In and out cath x1 for Urinalysis and culture, Pt medicated with morphine and ativan twice, labile fever throughout the night. Otherwise no change in status.       Problem: Skin Injury Risk (Adult)  Goal: Skin Health and Integrity  Outcome: Ongoing (interventions implemented as appropriate)   08/20/18 0630   Skin Injury Risk (Adult)   Skin Health and Integrity making progress toward outcome       Problem: Cardiac Output Decreased (Adult)  Goal: Effective Tissue Perfusion  Outcome: Ongoing (interventions implemented as appropriate)   08/20/18 0630   Cardiac Output Decreased (Adult)   Effective Tissue Perfusion making progress toward outcome       Problem: Fall Risk (Adult)  Goal: Identify Related Risk Factors and Signs and Symptoms  Outcome: Ongoing (interventions implemented as appropriate)   08/20/18 0630   Fall Risk (Adult)   Related Risk Factors (Fall Risk) confusion/agitation;sleep pattern alteration;environment unfamiliar     Goal: Absence of Fall  Outcome: Ongoing (interventions implemented as appropriate)   08/20/18 0630   Fall Risk (Adult)   Absence of Fall making progress toward outcome

## 2018-08-20 NOTE — PROGRESS NOTES
INTENSIVIST   PROGRESS NOTE     Hospital:  LOS: 35 days     Ms. Otilia Holm, 37 y.o. female is followed for a Chief Complaint of: Respiratory Failure, Fever, Tachycardia      Subjective   S   Ms. Holm is a 37-year-old white female hospitalized since transferred here 7/16/18 from [facility] ER for unresponsiveness associated with prolonged seizure versus myoclonus and posturing after suffering a respiratory arrest presumably due to acute alcohol intake and drugs. Has undergone MRIs, CT, LP, and continuous EEG without diagnosis (never picked up a seizure here despite multiple EEGs). Is felt to have suffered an anoxic insult and does not appear she will recover. Was unable to handle secretions and therefore underwent tracheostomy and PEG feeding tube 7/26/18. Was eventually able to be liberated from the ventilator. She had problems with fevers 8/5/18 and was covered broadly but all cultures returned negative except for a strep species (beta hemolytic strep group G) from tracheal secretions on 8/5/18 and 8/15/18. Had been doing reasonably well after moved to the floor, but became increasingly agitated and tachypneic leading to her transfer to the ICU 8/15/18.     The patient has never recovered neurologically. She is restless, and has contractures of her arms as well as shortening of her Achilles tendons. Temp has increased again since yesterday with a MAXIMUM TEMPERATURE of 101.8. She remains on Rocephin alone. She has been unable to void and required in and out catheterization for urinary retention (approximately 500-600 mL was obtained every 6 hours). That seems to have improved some. She remains on appropriate ulcer and DVT prophylaxis. She also remains on both valproic acid (Depakene) and Levetiracetam (Keppra) although has never been documented to have seizures on EEG since admission.     Seroquel has been discontinued since 8/19.      She receives appropriate enteral support with fiber source HN  at 75 mL/ hour plus 35 mL/hr water.    Interval History:  No acute events overnight. Remains febrile and tachycardic with no obvious source.        The patient's relevant past medical, surgical and social history were reviewed and updated in Epic as appropriate.      ROS: Unable to obtain secondary mental status.     Objective   O     Vitals:  Temp  Min: 99.8 °F (37.7 °C)  Max: 102 °F (38.9 °C)  BP  Min: 96/74  Max: 137/82  Pulse  Min: 112  Max: 147  Resp  Min: 18  Max: 22  SpO2  Min: 93 %  Max: 100 % No Data Recorded    Intake/Ouptut 24 hrs (7:00AM - 6:59 AM)  Intake & Output (last 3 days)       08/17 0701 - 08/18 0700 08/18 0701 - 08/19 0700 08/19 0701 - 08/20 0700 08/20 0701 - 08/21 0700    I.V. (mL/kg) 319 (4.8) 231.6 (3.5) 115 (1.7)     Other 1041 946 945     NG/GT 1951 1522 1269     IV Piggyback  50 50     Total Intake(mL/kg) 3311 (49.9) 2749.6 (41.4) 2379 (35.8)     Urine (mL/kg/hr) 1775 (1.1) 1300 (0.8) 1330 (0.8)     Total Output 1775 1300 1330      Net +1536 +1449.6 +1049              Unmeasured Stool Occurrence 2 x 4 x 2 x             Physical Examination  Telemetry:  Sinus tachycardia.    Constitutional:  Mild distress.   Contractures.    Cardiovascular: Normal rate, regular and rhythm. Normal heart sounds.  No murmurs, gallop or rub.   Respiratory: No respiratory distress. Normal respiratory effort.  Normal breath sounds  Clear to ascultation.    Abdominal:  Soft. No masses. Non-tender. No distension. No HSM.   Extremities: No digital cyanosis. No clubbing.  No peripheral edema.   Neurological:   Contractures  Agitation  Does not follow any commands.              Results from last 7 days  Lab Units 08/20/18  0440 08/19/18  0856 08/16/18  0342   WBC 10*3/mm3 12.18* 11.11* 15.72*   HEMOGLOBIN g/dL 10.4* 10.1* 11.4*   MCV fL 98.8 98.8 97.1   PLATELETS 10*3/mm3 376 363 420       Results from last 7 days  Lab Units 08/20/18  0440 08/19/18  0856 08/16/18  0342   SODIUM mmol/L 143 142 139   POTASSIUM mmol/L  4.2 4.2 4.2   CO2 mmol/L 30.0 29.0 26.0   CREATININE mg/dL 0.43* 0.47* 0.52*   GLUCOSE mg/dL 108* 135* 134*   MAGNESIUM mg/dL 2.1 2.1  --    PHOSPHORUS mg/dL 3.6 3.5  --      Estimated Creatinine Clearance: 150.9 mL/min (A) (by C-G formula based on SCr of 0.43 mg/dL (L)).    Results from last 7 days  Lab Units 08/20/18  0440 08/19/18  0856 08/15/18  1128   ALK PHOS U/L 64 63 70   BILIRUBIN mg/dL 0.3 0.3 0.4   ALT (SGPT) U/L 59* 63* 76*   AST (SGOT) U/L 60* 73* 111*             Images:  Imaging Results (last 24 hours)     Procedure Component Value Units Date/Time    XR Chest 1 View [[id number]] Collected:  08/19/18 1328     Updated:  08/19/18 2340    Narrative:          EXAMINATION: XR CHEST 1 VW - 8/19/2018     INDICATION:  R09.2-Respiratory arrest; G93.40-Encephalopathy,  unspecified; R74.8-Abnormal levels of other serum enzymes; Z74.09-Other  reduced mobility, Respiratory failure.     COMPARISON: 8/15/2018.     FINDINGS: PICC line tip remains in the mid SVC. Tracheostomy tube is  noted. Heart and vasculature appear normal in size. Lungs appear well  expanded and clear.           Impression:       No new chest disease.     DICTATED:   8/19/2018  EDITED/ls :   8/19/2018      This report was finalized on 8/19/2018 11:38 PM by DR. Rad Li MD.               Results: Reviewed.  I reviewed the patient's new laboratory and imaging results.  I independently reviewed the patient's new images.    Medications: Reviewed.    Assessment/Plan   A / P     Ms. Holm is a 36yo F with anoxic injury s/p trach and peg who was moved back to the ICU for agitation and mucous plugging. She is no longer requiring frequent suctioning. She continues to be febrile and tachycardic. There is no obvious source of infection. She has been on Rocephin for Strep from respiratory culture, however, she does not have any infiltrates on her chest imaging. This may be a drug fever versus other neurogenic fever. Seroquel was stopped on 8/19.      Nutrition:   NPO Diet  Advance Directives:   Code Status and Medical Interventions:   Ordered at: 07/16/18 0620     Code Status:    CPR     Medical Interventions (Level of Support Prior to Arrest):    Full       Hospital Problem List     * (Principal)S/P Respiratory arrest due to drugs and alcohol. Prolonged ventilatory support requiring tracheostomy and PEG 7/26/18. Now off the ventilator    Alcohol abuse. Intoxicated on admission    Anoxic brain damage     H/O drug abuse    Pulmonary infiltrates, resolved. Suspected pulmonary edema versus aspiration    Overview Signed 7/16/2018  7:42 AM by Ruslan Estrella MD     - pneumonia vs. aspiration pneumonitis vs. pulmonary edema.          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 Resolved    Chronic hepatitis C without hepatic coma (CMS/HCC)    Seizures versus myoclonus and posturing from anoxia on admission. EEG negative (CMS/HCC)          Assessment / Plan:    1. Continue Rocephin for now. Can likely discontinue at least by tomorrow.   2. ID consulted  3. Continue to hold Seroquel  4. Motrin for fever  5. AM labs  6. Okay to transfer to telemetry when a bed is available.     Plan of care and goals reviewed during interdisciplinary rounds.  I discussed the patient's findings and my recommendations with nursing staff    Time: was greater than 25 minutes.      Tyesha Shell, DO    Intensive Care Medicine and Pulmonary Medicine

## 2018-08-20 NOTE — PROGRESS NOTES
Intensivist Note     8/19/2018  Hospital Day: 34  24 Days Post-Op      Ms. Otilia Holm, 37 y.o. female is followed for:  Principal Problem:    S/P Respiratory arrest due to drugs and alcohol. Prolonged ventilatory support requiring tracheostomy and PEG 7/26/18. Now off the ventilator  Active Problems:    Anoxic brain damage     Seizures versus myoclonus and posturing from anoxia on admission. EEG negative (CMS/HCC)    Alcohol abuse. Intoxicated on admission    H/O drug abuse    Possible NMS (neuroleptic malignant syndrome) causing fever. Resolved    Pulmonary infiltrates, resolved. Suspected pulmonary edema versus aspiration    Persistent urinary retention requiring in and out catheterization    Chronic hepatitis C without hepatic coma (CMS/HCC)       SUBJECTIVE     37-year-old white female hospitalized since transferred here 7/16/18 from [facility] ER for unresponsiveness associated with prolonged seizure versus myoclonus and posturing after suffering a respiratory arrest presumably due to acute alcohol intake and drugs. Has undergone MRIs, CT, LP, and continuous EEG without diagnosis (never picked up a seizure here despite multiple EEGs). Is felt to have suffered an anoxic insult and does not appear she will recover. Was unable to handle secretions and therefore underwent tracheostomy and PEG feeding tube 7/26/18. Was eventually able to be liberated from the ventilator. She had problems with fevers 8/5/18 and was covered broadly but all cultures returned negative except for a strep species (beta hemolytic strep group G) from tracheal secretions on 8/5/18 and 8/15/18. Had been doing reasonably well after moved to the floor, but became increasingly agitated and tachypneic leading to her transfer to the ICU 8/15/18.     The patient has never recovered neurologically. She is restless, and has contractures of her arms as well as shortening of her Achilles tendons. Temp has increased again since yesterday  "with a MAXIMUM TEMPERATURE of 101.6. Although her last CBC is 11.11 today. She remains on Rocephin alone. Yesterday had been unable to void and required in and out catheterization for urinary retention (approximately 500-600 mL was obtained every 6 hours). That seems to have improved some. She remains on appropriate ulcer and DVT prophylaxis. She also remains on both valproic acid (Depakene) and Levetiracetam (Keppra) although has never been documented to have seizures on EEG since admission. Agitation remains a problem but I am told she slightly better since Seroquel was started 8/17/18.     She receives appropriate enteral support with fiber source HN at 75 mL/ hour plus 35 mL/hr water.       The patient's relevant past medical, surgical and social history were reviewed and updated in Epic as appropriate.    OBJECTIVE     BP 96/74 (BP Location: Left arm, Patient Position: Lying)   Pulse (!) 135   Temp (!) 101.6 °F (38.7 °C) (Axillary)   Resp 18   Ht 144.8 cm (57.01\")   Wt 66.4 kg (146 lb 6.4 oz)   SpO2 96%   BMI 31.67 kg/m²   Oxygen Concentration (%): 28 tracheostomy collar        Flowsheet Rows      First Filed Value   Admission Height  146.1 cm (57.5\") Documented at 07/16/2018 0559   Admission Weight  65 kg (143 lb 4.8 oz) Documented at 07/16/2018 0559        Intake & Output (last day)       08/19 0701 - 08/20 0700    I.V. (mL/kg) 115 (1.7)    Other 680    NG/GT 1129    IV Piggyback 50    Total Intake(mL/kg) 1974 (29.7)    Urine (mL/kg/hr) 800 (0.7)    Total Output 800    Net +1174         Unmeasured Stool Occurrence 2 x          Exam:  General Exam:  Younger appearing white female sleeping quietly..  HEENT: Pupils equal and reactive. Nose and throat clear.  Neck:                          Supple, no JVD, thyromegaly, or adenopathy. Trach tube site clean and dry  Lungs: Clear anteriorly and laterally  Cardiovascular: RRR without murmurs or gallops. HR 1:15  Abdomen: Soft nontender without organomegaly or " masses. PEG feeding tube in place and tolerating fiber source HN at 75 mL/hr   and rectal: Deferred.  Extremities: No cyanosis clubbing edema.  Neurologic:                 Moves all extremities but somewhat spastic. When stimulated grimaces and withdraws    Chest X-Ray: Normal lung fields. Right PICC line in place. Trach tube in place        Results from last 7 days  Lab Units 08/19/18  0856 08/16/18  0342 08/15/18  1128   WBC 10*3/mm3 11.11* 15.72* 16.87*   HEMOGLOBIN g/dL 10.1* 11.4* 10.8*   HEMATOCRIT % 32.7* 36.3 34.5   PLATELETS 10*3/mm3 363 420 402       Results from last 7 days  Lab Units 08/19/18  0856 08/16/18  0342   SODIUM mmol/L 142 139   POTASSIUM mmol/L 4.2 4.2   CHLORIDE mmol/L 107 103   CO2 mmol/L 29.0 26.0   BUN mg/dL 14 11   CREATININE mg/dL 0.47* 0.52*   GLUCOSE mg/dL 135* 134*   CALCIUM mg/dL 9.3 9.4       Results from last 7 days  Lab Units 08/19/18  0856 08/13/18  0343   MAGNESIUM mg/dL 2.1 2.3   PHOSPHORUS mg/dL 3.5 3.1       Results from last 7 days  Lab Units 08/19/18  0856 08/15/18  1128 08/13/18  0343   ALK PHOS U/L 63 70 68   BILIRUBIN mg/dL 0.3 0.4 0.2*   ALT (SGPT) U/L 63* 76* 59*   AST (SGOT) U/L 73* 111* 86*       Lab Results   Component Value Date    SEDRATE 52 (H) 08/19/2018     Lab Results   Component Value Date    .0 (H) 08/05/2018     Lab Results   Component Value Date    CKTOTAL 820 (H) 08/16/2018    TROPONINI 0.039 08/05/2018     Lab Results   Component Value Date    TSH 1.905 08/08/2018     Lab Results   Component Value Date    LACTATE 1.1 08/05/2018     Lab Results   Component Value Date    CORTISOL 22.20 08/08/2018       I reviewed the patient's results, images and medication.    Assessment/Plan   ASSESSMENT      Principal Problem:    S/P Respiratory arrest due to drugs and alcohol. Prolonged ventilatory support requiring tracheostomy and PEG 7/26/18. Now off the ventilator  Active Problems:    Anoxic brain damage     Seizures versus myoclonus and posturing from  anoxia on admission. EEG negative (CMS/HCC)    Alcohol abuse. Intoxicated on admission    H/O drug abuse    Possible NMS (neuroleptic malignant syndrome) causing fever. Resolved    Pulmonary infiltrates, resolved. Suspected pulmonary edema versus aspiration    Persistent urinary retention requiring in and out catheterization    Chronic hepatitis C without hepatic coma (CMS/HCC)      DISCUSSION: No improvement neurologically. I am concerned because of increasing fever although white count is normal and chest x-ray is clear. There was concern about neuroleptic malignant syndrome in the past but this did not appear to panel and out and she has been on Seroquel. A number of other other drugs could also cause drug fever but would be occult to prove    PLAN     1. Because of increasing fever will discontinue Seroquel and in the future avoid all similar drugs of this class  2. Will ask ID to see again in a.m.  3. Procalcitonin in a.m.  4. Urinalysis and urine culture    Plan of care and goals reviewed with mulitdisciplinary team at daily rounds.    I discussed the patient's findings and my recommendations with nursing staff    Time spent Critical care 35 min (It does not include procedure time).    Bud Piña MD  Intensive Care Medicine  08/19/18 11:09 PM
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
## 2018-08-20 NOTE — PROGRESS NOTES
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 08/20/18 1034  •  Valproic Acid (DEPAKENE) syrup 250 mg, 250 mg, Per G Tube, Q6H, Bud Piña MD, 250 mg at 08/20/18 1033  Antibiotics:  Anti-Infectives     Ordered     Dose/Rate Route Frequency Start Stop    08/16/18 1416  cefTRIAXone (ROCEPHIN) IVPB 1 g     Ordering Provider:  Bud Sutton MD    1 g  100 mL/hr over 30 Minutes Intravenous Every 24 Hours 08/16/18 1600 08/23/18 1559    08/15/18 1032  piperacillin-tazobactam (ZOSYN) 4.5 g in iso-osmotic dextrose 100 mL IVPB (premix)     Ordering Provider:  Rad Stanton MD    4.5 g  over 30 Minutes Intravenous Once 08/15/18 1130 08/15/18 1247    08/15/18 1046  vancomycin 1250 mg/250 mL 0.9% NS IVPB ([facility])     Ordering Provider:  Diana Smyth, [facility]    1,250 mg  over 90 Minutes Intravenous Once 08/15/18 1130 08/15/18 1505    08/07/18 2311  metroNIDAZOLE (FLAGYL) tablet 500 mg     Ordering Provider:  Rachell Vickers  MD HERMANN    500 mg Nasogastric Every 8 Hours Scheduled 08/08/18 1400 08/14/18 0458    08/07/18 1221  metroNIDAZOLE (FLAGYL) IVPB 500 mg     Chino Rivera [facility] reviewed the order on 08/07/18 2311.   Ordering Provider:  Rachell Vickers MD    500 mg  over 60 Minutes Intravenous Every 8 Hours 08/07/18 1400 08/08/18 0606    08/06/18 1724  micafungin 100 mg/100 mL 0.9% NS IVPB ([facility])     Rachell Vickers MD reviewed the order on 08/09/18 1611.   Ordering Provider:  Jennifer Edwards MD    100 mg  over 60 Minutes Intravenous Every 24 Hours Scheduled 08/06/18 1800 08/10/18 1252    08/05/18 1436  piperacillin-tazobactam (ZOSYN) 4.5 g in iso-osmotic dextrose 100 mL IVPB (premix)     Ordering Provider:  Dayday Jacobs MD    4.5 g  over 4 Hours Intravenous Every 8 Hours 08/05/18 2130 08/12/18 1613    08/05/18 1436  piperacillin-tazobactam (ZOSYN) 4.5 g in iso-osmotic dextrose 100 mL IVPB (premix)     Ordering Provider:  Dayday Jacobs MD    4.5 g  over 30 Minutes Intravenous Once 08/05/18 1530 08/05/18 1536    08/05/18 1441  vancomycin 1250 mg/250 mL 0.9% NS IVPB ([facility])     Ordering Provider:  Sofia Hardy RPH    20 mg/kg × 62.8 kg  over 90 Minutes Intravenous Once 08/05/18 1530 08/05/18 1728    07/29/18 1946  vancomycin 1500 mg/500 mL 0.9% NS IVPB ([facility])     Ordering Provider:  Elisabeth Benites APRN    25 mg/kg × 64.9 kg  333.3 mL/hr over 90 Minutes Intravenous Once 07/29/18 2100 07/29/18 2156    07/29/18 0527  piperacillin-tazobactam (ZOSYN) 4.5 g in iso-osmotic dextrose 100 mL IVPB (premix)     Ordering Provider:  Don, Gloria A, APRN    4.5 g  over 30 Minutes Intravenous Once 07/29/18 0615 07/29/18 0707    07/25/18 1653  ceFAZolin in dextrose (ANCEF) IVPB solution 2 g     Ordering Provider:  Segundo Galicia MD    2 g  over 30 Minutes Intravenous Once 07/26/18 0930 07/26/18 0958    07/17/18 1008  ampicillin-sulbactam (UNASYN) 3 g in sodium chloride 0.9 % 100 mL IVPB-MBP     Ordering Provider:   "Jani Sutton MD    3 g  over 60 Minutes Intravenous Every 6 Hours Scheduled 07/17/18 1100 07/23/18 0631    07/16/18 1034  vancomycin 1750 mg/500 mL 0.9% NS IVPB ([facility])     Ordering Provider:  Jani Sutton MD    25 mg/kg × 65 kg  over 120 Minutes Intravenous Once 07/16/18 1200 07/16/18 1630    07/16/18 0737  piperacillin-tazobactam (ZOSYN) 4.5 g in iso-osmotic dextrose 100 mL IVPB (premix)     Ordering Provider:  Ruslan Estrella MD    4.5 g  over 30 Minutes Intravenous Once 07/16/18 0815 07/16/18 0918          Allergies:  has No Known Allergies.      Review of Systems: All other reviewed and negative except as per HPI    /80   Pulse (!) 130   Temp (!) 101.8 °F (38.8 °C) Comment: ice packs; linens/pillows off  Resp 22   Ht 144.8 cm (57.01\")   Wt 66.4 kg (146 lb 6.4 oz)   SpO2 100%   BMI 31.67 kg/m²   Temp:  [99.8 °F (37.7 °C)-102 °F (38.9 °C)] 101.8 °F (38.8 °C)    GENERAL:  Unresponsive, s/p trach and peg  HEENT: Oropharynx without thrush. .. No cervical adenopathy. No neck masses Neck supple  EYES: pupils poorly reactive to light. No conjunctival injection. No icterus.   LYMPHATICS: No lymphadenopathy of the neck or axillary or inguinal regions.   HEART: No murmur, gallop, or pericardial friction rub.   LUNGS:decreased bs at bases. No respiratory distress  ABDOMEN: Soft, nontender, nondistended. PEG site w/o erythema or drainage No appreciable HSM.    SKIN: Warm and dry without cutaneous eruptions.    Neuro:unresponsive; muscle tone stiff  EXT:  No cellulitic change       DIAGNOSTICS:  Lab Results   Component Value Date    WBC 12.18 (H) 08/20/2018    HGB 10.4 (L) 08/20/2018    HCT 33.9 (L) 08/20/2018     08/20/2018     Lab Results   Component Value Date    CRP 3.01 (H) 08/20/2018     Lab Results   Component Value Date    SEDRATE 52 (H) 08/19/2018     Lab Results   Component Value Date    GLUCOSE 108 (H) 08/20/2018    BUN 15 08/20/2018    CREATININE 0.43 (L) " 08/20/2018    EGFRIFNONA 148 08/19/2018    BCR 29.8 (H) 08/19/2018    CO2 30.0 08/20/2018    CALCIUM 9.2 08/20/2018    ALBUMIN 3.67 08/20/2018    AST 60 (H) 08/20/2018    ALT 59 (H) 08/20/2018       Microbiology:  Blood Culture   Date Value Ref Range Status   08/05/2018 No growth at 3 days  Preliminary   08/05/2018 No growth at 3 days  Preliminary              MRSA SCREEN CX   Date Value Ref Range Status   08/06/2018   Final    No Methicillin Resistant Staphylococcus aureus isolated        Urine Culture   Date Value Ref Range Status   08/06/2018 No growth at 2 days  Final        Lab Results   Component Value Date    BLOODCX No growth at 5 days 08/15/2018    BLOODCX No growth at 5 days 08/15/2018     Lab Results   Component Value Date    URINECX No growth at 2 days 08/06/2018       RADIOLOGY:  Imaging Results (last 72 hours)     Procedure Component Value Units Date/Time    MRI Brain Without Contrast [[id number]] Collected:  08/07/18 1731     Updated:  08/08/18 1345    Narrative:       EXAMINATION: MRI BRAIN WO CONTRAST- 08/07/2018     INDICATION: encephalopathy; R09.2-Respiratory arrest;  G93.40-Encephalopathy, unspecified; R74.8-Abnormal levels of other serum  enzymes; Z74.09-Other reduced mobility      TECHNIQUE: Multiplanar MRI of the brain without intravenous contrast  administration.     COMPARISON: MRI dated 07/25/2018     FINDINGS: No restriction on diffusion-weighted imaging. Midline  structures are symmetric without evidence of mass, mass effect or  midline shift however motion degraded axial T2 and FLAIR imaging  sequences limit evaluation. Small amount of T2 and FLAIR increased  signal abnormalities within the periventricular and deep white matter  consistent with chronic small vessel ischemic disease. Pituitary and  sella within normal limits. Cervicomedullary junction widely patent.  Globes and orbits retain normal T2 signal characteristics. Visualized  paranasal sinuses and mastoid air cells demonstrate  mucus retention  cysts and minimal mucosal edema right maxillary sinus otherwise grossly  clear and well-pneumatized. No cerebellopontine angle mass lesion.       Impression:       No acute intracranial abnormality specifically no evidence  for acute infarction or restricted diffusion. Minimal chronic small  vessel ischemic changes of the supratentorial white matter however may  represent sequela of migraines or demyelinating white matter disease in  a patient of this age, unchanged from prior exam. Mucous retention cyst  and mild mucosal thickening of the right maxillary sinus.         D:  08/07/2018  E:  08/08/2018     This report was finalized on 8/8/2018 1:43 PM by Dr. Fernando Zuluaga.       XR Chest 1 View [[id number]] Collected:  08/07/18 1036     Updated:  08/07/18 1107    Narrative:       EXAMINATION: XR CHEST 1 VW- 08/07/2018     INDICATION: fever, vomiting     TECHNIQUE:  Single view frontal chest.     COMPARISONS: 08/06/2018     FINDINGS:  Lungs are without focal abnormality. No pleural effusion or  pneumothorax. Cardiomediastinal silhouette is within normal limits.  Upper chest surgical clips again noted.       Impression:       Stable exam.     D:  08/07/2018  E:  08/07/2018     This report was finalized on 8/7/2018 11:04 AM by Cassius White.       XR Abdomen KUB [[id number]] Collected:  08/07/18 1038     Updated:  08/07/18 1107    Narrative:       EXAMINATION: XR ABDOMEN KUB-      INDICATION: Fever and vomiting.     TECHNIQUE:  KUB.     COMPARISONS:  08/04/2018.     FINDINGS:  Moderate stool burden. No evidence of bowel obstruction. No  acutely concerning calcifications. Gastrostomy tube in place.       Impression:       No acute finding.     D:  08/07/2018  E:  08/07/2018     This report was finalized on 8/7/2018 11:04 AM by Cassius White.       XR Chest 1 View [[id number]] Collected:  08/06/18 0907     Updated:  08/06/18 0946    Narrative:       EXAMINATION: XR CHEST 1 VW-08/06/2018:      INDICATION: Fever,  suspect aspiration (trach/peg); R09.2-Respiratory  arrest; G93.40-Encephalopathy, unspecified; R74.8-Abnormal levels of  other serum enzymes.      COMPARISON: 08/04/2018.     FINDINGS: The cardiac silhouette is normal. A tracheostomy tube is well  positioned. There is no acute inflammatory process. There is no mass or  effusion.           Impression:       No active disease.     D:  08/06/2018  E:  08/06/2018     This report was finalized on 8/6/2018 9:44 AM by Dr. Eric Luis MD.       CT Angiogram Chest With & Without Contrast [[id number]] Collected:  08/05/18 1559     Updated:  08/05/18 1956    Narrative:       EXAM:  CT Angiography Chest With Intravenous Contrast    CLINICAL HISTORY:  37 years old, female; Encephalopathy, unspecified; Respiratory arrest; Abnormal   levels of other serum enzymes; Signs and symptoms; Cough and shortness of   breath; Additional info: Tachycardia, elevated d-dimer, tracheostomy, concern   for aspiration as well    TECHNIQUE:  Axial computed tomographic angiography images of the chest with intravenous   contrast using pulmonary embolism protocol.  All CT scans at this facility use   at least one of these dose optimization techniques: automated exposure control;   mA and/or kV adjustment per patient size (includes targeted exams where dose is   matched to clinical indication); or iterative reconstruction.  MIP reconstructed images were created and reviewed.    CONTRAST:  150 mL of isovue 370 administered intravenously.      COMPARISON:  CR - XR CHEST 1 VW 2018-08-04 10:12    FINDINGS:  Pulmonary arteries:  No pulmonary embolism.  Aorta:  No acute findings.  Lungs:  Small cluster of tree in bud nodules within the posterior medial aspect   of the superior segment right lower lobe, likely minimal bronchopneumonia or   aspiration pneumonitis.  Mild upper lobe predominant paraseptal and   centrilobular emphysema.  Pleural space:  Small bilateral pleural effusions, with associated posterior    atelectasis.  No pneumothorax.  Heart:  Normal.  Bones/joints:  No acute fracture.  No dislocation.  Soft tissues:  Normal.  Lymph nodes:  Calcified right hilar lymph nodes, compatible with prior   granulomatous disease.  Spleen:  Splenic calcification, compatible with prior granulomatous disease.  Tubes, lines and devices:  Tracheostomy tube tip in midthoracic trachea.      Impression:         1.  No pulmonary embolism.    2.  Small cluster of tree in bud nodules within the posterior medial aspect of   the superior segment right lower lobe, likely minimal bronchopneumonia or   aspiration pneumonitis.    3.  Incidental/non-acute findings are described above.    THIS DOCUMENT HAS BEEN ELECTRONICALLY SIGNED BY MER[facility] JERONIMO MD             ASSESSMENT     --  Fever  The group C strep respiratory infection is being appropriately treated but she has had fever, tachycardia and restlessness  She has been on medications that  put her at risk for Serotonin syndrome- most recently valproic acid and zofran  (multiple doses 8/14 to 8/16). From a quick search it appears controversial about whether seroquel increases the risk for  Serotonin syndrome     Her movements are suggestive of tardive dyskinesia which could be an adverse reaction of both valproic acid and seroquel.     -- Suspected anoxic encephalopathy     -- Vomiting, etiology unclear  ?pancreatitis- lipase modestly elevated on 8/6 and improved today  She is being treated for constipation     -- Polysubstance abuse--Hepatitis C infection with + RNA pcr     -- Seizure disorder     -     PLAN     -- continue rocephin for now, consider changing to amoxicillin per peg    --ask neurology to see to address alternate medication  to valproate    -- agree with stopping seroquel     --probiotic          -- hold cooling blanket for T < 102 to more accurately follow fever         I discussed the patients findings and my recommendations with RN     Thank you for this consult.  Our  group would be pleased to follow this patient over the course of their hospitalization and assist with outpatient antimicrobial therapy, as indicated.        Rachell Vickers MD  8/20/2018  3:37 PM

## 2018-08-20 NOTE — PROGRESS NOTES
Continued Stay Note   [place]     Patient Name: Otilia Holm  MRN: [id number]  Today's Date: 8/20/2018    Admit Date: 7/16/2018          Discharge Plan     Row Name 08/20/18 1113       Plan    Plan LT Nursing Facility Placement    Patient/Family in Agreement with Plan --   Son    Plan Comments Medical record reviewed to date, faxed updated information to Admissions @ [facility].  Will update son.  Goldie Salmon, Ext. [phone]    Final Discharge Disposition Code 64 - nursing facility under Medicaid;04 - intermediate care facility              Discharge Codes    No documentation.       Expected Discharge Date and Time     Expected Discharge Date Expected Discharge Time    Aug 17, 2018             THEO Harrell

## 2018-08-21 LAB
ANION GAP SERPL CALCULATED.3IONS-SCNC: 10 MMOL/L (ref 3–11)
BUN BLD-MCNC: 18 MG/DL (ref 9–23)
BUN/CREAT SERPL: 36 (ref 7–25)
CALCIUM SPEC-SCNC: 9.3 MG/DL (ref 8.7–10.4)
CHLORIDE SERPL-SCNC: 104 MMOL/L (ref 99–109)
CO2 SERPL-SCNC: 29 MMOL/L (ref 20–31)
CREAT BLD-MCNC: 0.5 MG/DL (ref 0.6–1.3)
DEPRECATED RDW RBC AUTO: 54.4 FL (ref 37–54)
ERYTHROCYTE [DISTWIDTH] IN BLOOD BY AUTOMATED COUNT: 15 % (ref 11.3–14.5)
GFR SERPL CREATININE-BSD FRML MDRD: 138 ML/MIN/1.73
GLUCOSE BLD-MCNC: 124 MG/DL (ref 70–100)
GLUCOSE BLDC GLUCOMTR-MCNC: 105 MG/DL (ref 70–130)
HCT VFR BLD AUTO: 33.3 % (ref 34.5–44)
HGB BLD-MCNC: 10.3 G/DL (ref 11.5–15.5)
MAGNESIUM SERPL-MCNC: 2.1 MG/DL (ref 1.3–2.7)
MCH RBC QN AUTO: 30.7 PG (ref 27–31)
MCHC RBC AUTO-ENTMCNC: 30.9 G/DL (ref 32–36)
MCV RBC AUTO: 99.1 FL (ref 80–99)
PHOSPHATE SERPL-MCNC: 3.4 MG/DL (ref 2.4–5.1)
PLATELET # BLD AUTO: 386 10*3/MM3 (ref 150–450)
PMV BLD AUTO: 11 FL (ref 6–12)
POTASSIUM BLD-SCNC: 4 MMOL/L (ref 3.5–5.5)
RBC # BLD AUTO: 3.36 10*6/MM3 (ref 3.89–5.14)
SODIUM BLD-SCNC: 143 MMOL/L (ref 132–146)
WBC NRBC COR # BLD: 11.38 10*3/MM3 (ref 3.5–10.8)

## 2018-08-21 PROCEDURE — 25010000002 LORAZEPAM PER 2 MG: Performed by: HOSPITALIST

## 2018-08-21 PROCEDURE — 99232 SBSQ HOSP IP/OBS MODERATE 35: CPT | Performed by: NURSE PRACTITIONER

## 2018-08-21 PROCEDURE — 84100 ASSAY OF PHOSPHORUS: CPT | Performed by: INTERNAL MEDICINE

## 2018-08-21 PROCEDURE — 83735 ASSAY OF MAGNESIUM: CPT | Performed by: INTERNAL MEDICINE

## 2018-08-21 PROCEDURE — 25010000002 CEFTRIAXONE PER 250 MG: Performed by: INTERNAL MEDICINE

## 2018-08-21 PROCEDURE — 94760 N-INVAS EAR/PLS OXIMETRY 1: CPT

## 2018-08-21 PROCEDURE — 80048 BASIC METABOLIC PNL TOTAL CA: CPT | Performed by: INTERNAL MEDICINE

## 2018-08-21 PROCEDURE — 25010000002 ENOXAPARIN PER 10 MG: Performed by: INTERNAL MEDICINE

## 2018-08-21 PROCEDURE — 94799 UNLISTED PULMONARY SVC/PX: CPT

## 2018-08-21 PROCEDURE — 85027 COMPLETE CBC AUTOMATED: CPT | Performed by: INTERNAL MEDICINE

## 2018-08-21 PROCEDURE — 82962 GLUCOSE BLOOD TEST: CPT

## 2018-08-21 PROCEDURE — 25010000002 MORPHINE SULFATE (PF) 2 MG/ML SOLUTION: Performed by: HOSPITALIST

## 2018-08-21 RX ADMIN — Medication 100 MG: at 10:47

## 2018-08-21 RX ADMIN — ENOXAPARIN SODIUM 40 MG: 100 INJECTION SUBCUTANEOUS at 08:53

## 2018-08-21 RX ADMIN — LORAZEPAM 1 MG: 2 INJECTION INTRAMUSCULAR; INTRAVENOUS at 00:49

## 2018-08-21 RX ADMIN — ACETAMINOPHEN 650 MG: 650 SOLUTION ORAL at 01:04

## 2018-08-21 RX ADMIN — MORPHINE SULFATE 2 MG: 2 INJECTION, SOLUTION INTRAMUSCULAR; INTRAVENOUS at 12:32

## 2018-08-21 RX ADMIN — FAMOTIDINE 20 MG: 20 TABLET ORAL at 10:47

## 2018-08-21 RX ADMIN — CEFTRIAXONE SODIUM 1 G: 1 INJECTION, SOLUTION INTRAVENOUS at 16:14

## 2018-08-21 RX ADMIN — Medication 1 CAPSULE: at 10:46

## 2018-08-21 RX ADMIN — MORPHINE SULFATE 2 MG: 2 INJECTION, SOLUTION INTRAMUSCULAR; INTRAVENOUS at 16:57

## 2018-08-21 RX ADMIN — DESMOPRESSIN ACETATE 80 MG: 0.2 TABLET ORAL at 20:45

## 2018-08-21 RX ADMIN — MORPHINE SULFATE 2 MG: 2 INJECTION, SOLUTION INTRAMUSCULAR; INTRAVENOUS at 05:09

## 2018-08-21 RX ADMIN — LORAZEPAM 1 MG: 2 INJECTION INTRAMUSCULAR; INTRAVENOUS at 08:52

## 2018-08-21 RX ADMIN — MORPHINE SULFATE 2 MG: 2 INJECTION, SOLUTION INTRAMUSCULAR; INTRAVENOUS at 08:53

## 2018-08-21 RX ADMIN — MORPHINE SULFATE 2 MG: 2 INJECTION, SOLUTION INTRAMUSCULAR; INTRAVENOUS at 20:44

## 2018-08-21 RX ADMIN — VALPROIC ACID 250 MG: 250 SOLUTION ORAL at 20:44

## 2018-08-21 RX ADMIN — VALPROIC ACID 250 MG: 250 SOLUTION ORAL at 03:36

## 2018-08-21 RX ADMIN — MORPHINE SULFATE 2 MG: 2 INJECTION, SOLUTION INTRAMUSCULAR; INTRAVENOUS at 01:00

## 2018-08-21 RX ADMIN — ASPIRIN 81 MG 81 MG: 81 TABLET ORAL at 10:46

## 2018-08-21 RX ADMIN — CLOPIDOGREL BISULFATE 75 MG: 75 TABLET ORAL at 10:47

## 2018-08-21 RX ADMIN — LORAZEPAM 1 MG: 2 INJECTION INTRAMUSCULAR; INTRAVENOUS at 16:57

## 2018-08-21 RX ADMIN — VALPROIC ACID 250 MG: 250 SOLUTION ORAL at 14:30

## 2018-08-21 RX ADMIN — ACETAMINOPHEN 650 MG: 650 SOLUTION ORAL at 10:47

## 2018-08-21 RX ADMIN — LEVETIRACETAM 500 MG: 100 SOLUTION ORAL at 20:44

## 2018-08-21 RX ADMIN — LORAZEPAM 1 MG: 2 INJECTION INTRAMUSCULAR; INTRAVENOUS at 05:09

## 2018-08-21 RX ADMIN — METOPROLOL TARTRATE 25 MG: 25 TABLET ORAL at 21:28

## 2018-08-21 RX ADMIN — FAMOTIDINE 20 MG: 20 TABLET ORAL at 20:45

## 2018-08-21 RX ADMIN — VALPROIC ACID 250 MG: 250 SOLUTION ORAL at 10:48

## 2018-08-21 RX ADMIN — IBUPROFEN 400 MG: 100 SUSPENSION ORAL at 03:36

## 2018-08-21 RX ADMIN — LORAZEPAM 1 MG: 2 INJECTION INTRAMUSCULAR; INTRAVENOUS at 12:32

## 2018-08-21 RX ADMIN — METOPROLOL TARTRATE 25 MG: 25 TABLET ORAL at 12:32

## 2018-08-21 RX ADMIN — LORAZEPAM 1 MG: 2 INJECTION INTRAMUSCULAR; INTRAVENOUS at 20:45

## 2018-08-21 RX ADMIN — LEVETIRACETAM 500 MG: 100 SOLUTION ORAL at 10:48

## 2018-08-21 NOTE — PLAN OF CARE
Problem: Patient Care Overview  Goal: Plan of Care Review  Outcome: Ongoing (interventions implemented as appropriate)   08/20/18 1850 08/20/18 2330   Coping/Psychosocial   Plan of Care Reviewed With --  patient   Plan of Care Review   Progress no change --      Goal: Individualization and Mutuality  Outcome: Ongoing (interventions implemented as appropriate)   07/21/18 1726   Individualization   Patient Specific Preferences pt unable to answer   Patient Specific Goals (Include Timeframe) pt unable to answer   Patient Specific Interventions standard interventions for comfort   Mutuality/Individual Preferences   What Anxieties, Fears, Concerns, or Questions Do You Have About Your Care? pt unable to answer   What Information Would Help Us Give You More Personalized Care? pt unable to answer   How Would You and/or Your Support Person Like to Participate in Your Care? pt unable to answer   Mutuality/Individual Preferences   How to Address Anxieties/Fears pt unable to answer     Goal: Discharge Needs Assessment  Outcome: Ongoing (interventions implemented as appropriate)   07/17/18 1345 08/03/18 0308 08/17/18 1418   Discharge Needs Assessment   Readmission Within the Last 30 Days no previous admission in last 30 days --  --    Concerns to be Addressed decision making;discharge planning;substance/tobacco abuse/use --  --    Concerns Comments S/P respiratory arrest/intubated and unresponsive. H/O substance abuse --  --    Patient/Family Anticipates Transition to other (see comments) --  --    Patient/Family Anticipated Services at Transition other (see comments) --  --    Transportation Concerns --  --  car, none   Transportation Anticipated other (see comments) --  --    Anticipated Changes Related to Illness --  inability to care for self;inability to work;inability to care for someone else --    Discharge Facility/Level of Care Needs --  nursing facility, skilled --    Offered/Gave Vendor List no --  --    Current  Discharge Risk --  cognitively impaired;physical impairment --    Discharge Coordination/Progress Critically ill, sedated on vent in ICU. --  --    Disability   Equipment Currently Used at Home none --  --      Goal: Interprofessional Rounds/Family Conf   07/27/18 1212   Interdisciplinary Rounds/Family Conf   Participants physician       Problem: Skin Injury Risk (Adult)  Goal: Skin Health and Integrity  Outcome: Ongoing (interventions implemented as appropriate)   08/21/18 0324   Skin Injury Risk (Adult)   Skin Health and Integrity making progress toward outcome       Problem: Palliative Care (Adult)  Goal: Maximized Comfort  Outcome: Ongoing (interventions implemented as appropriate)   08/19/18 0658   Palliative Care (Adult)   Maximized Comfort making progress toward outcome       Problem: Cardiac Output Decreased (Adult)  Goal: Effective Tissue Perfusion  Outcome: Ongoing (interventions implemented as appropriate)   08/20/18 0630   Cardiac Output Decreased (Adult)   Effective Tissue Perfusion making progress toward outcome       Problem: Nutrition, Enteral (Adult)  Goal: Signs and Symptoms of Listed Potential Problems Will be Absent, Minimized or Managed (Nutrition, Enteral)  Outcome: Ongoing (interventions implemented as appropriate)   08/19/18 0658   Goal/Outcome Evaluation   Problems Assessed (Enteral Nutrition) all   Problems Present (Enteral Nutrition) none       Problem: Fall Risk (Adult)  Goal: Identify Related Risk Factors and Signs and Symptoms  Outcome: Ongoing (interventions implemented as appropriate)   08/19/18 0658 08/20/18 0630   Fall Risk (Adult)   Related Risk Factors (Fall Risk) --  confusion/agitation;sleep pattern alteration;environment unfamiliar   Signs and Symptoms (Fall Risk) presence of risk factors --      Goal: Absence of Fall  Outcome: Ongoing (interventions implemented as appropriate)   08/21/18 0324   Fall Risk (Adult)   Absence of Fall making progress toward outcome

## 2018-08-21 NOTE — PLAN OF CARE
Problem: Patient Care Overview  Goal: Plan of Care Review  Outcome: Ongoing (interventions implemented as appropriate)   08/21/18 1729   OTHER   Outcome Summary continues to have high temp; tylenol x1, ice packs. has had multiple incontinent bm's; soft, dark brown. ativan and morphine has been given for pain; grimacing and restlessness present. bladder scan at 1200 was 911 mL; in and out cath with 1000 mL output.        Problem: Skin Injury Risk (Adult)  Goal: Skin Health and Integrity  Outcome: Ongoing (interventions implemented as appropriate)   08/21/18 1729   Skin Injury Risk (Adult)   Skin Health and Integrity making progress toward outcome       Problem: Palliative Care (Adult)  Goal: Maximized Comfort  Outcome: Ongoing (interventions implemented as appropriate)   08/19/18 0658   Palliative Care (Adult)   Maximized Comfort making progress toward outcome       Problem: Cardiac Output Decreased (Adult)  Goal: Effective Tissue Perfusion  Outcome: Ongoing (interventions implemented as appropriate)      Problem: Nutrition, Enteral (Adult)  Goal: Signs and Symptoms of Listed Potential Problems Will be Absent, Minimized or Managed (Nutrition, Enteral)  Outcome: Ongoing (interventions implemented as appropriate)      Problem: Fall Risk (Adult)  Goal: Identify Related Risk Factors and Signs and Symptoms  Outcome: Outcome(s) achieved Date Met: 08/21/18    Goal: Absence of Fall  Outcome: Ongoing (interventions implemented as appropriate)   08/21/18 1729   Fall Risk (Adult)   Absence of Fall making progress toward outcome

## 2018-08-21 NOTE — PLAN OF CARE
Problem: Patient Care Overview  Goal: Interprofessional Rounds/Family Conf  Outcome: Ongoing (interventions implemented as appropriate)  Palliative Team Attendance 1300. Stephanie BALDWIN, Chris Lester RN PN, Justina DUNBAR, Betsey Mercer MDIV Jenniffer Garcia RN [facility], Norma Thompson RN , [facility]   08/21/18 1300   Interdisciplinary Rounds/Family Conf   Summary pt is having intermittent pain and restlessness. morphine and ativan effective. considering baclofen for spasms. discuss goals with son.

## 2018-08-21 NOTE — PROGRESS NOTES
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 20 mEq at 07/20/18 0954  •  sodium chloride 0.9 % flush 1-10 mL, 1-10 mL, Intravenous, PRN, Alec Menjivar, APRN  •  sodium chloride 0.9 % flush 10 mL, 10 mL, Intravenous, PRN, Jennifer Edwards MD  •  thiamine (VITAMIN B-1) tablet 100 mg, 100 mg, Per G Tube, Daily, Bud Piña MD, 100 mg at 08/21/18 1047  •  Valproic Acid (DEPAKENE) syrup 250 mg, 250 mg, Per G Tube, Q6H, Bud Piña MD, 250 mg at 08/21/18 1430  Antibiotics:  Anti-Infectives     Ordered     Dose/Rate Route Frequency Start Stop    08/16/18 1416  cefTRIAXone (ROCEPHIN) IVPB 1 g     Ordering Provider:  Bud Sutton MD    1 g  100 mL/hr over 30 Minutes Intravenous Every 24 Hours 08/16/18 1600 08/23/18 1559    08/15/18 1032  piperacillin-tazobactam (ZOSYN) 4.5 g in iso-osmotic dextrose 100 mL IVPB (premix)     Ordering Provider:  Rad Stanton MD    4.5 g  over 30 Minutes Intravenous Once 08/15/18  1130 08/15/18 1247    08/15/18 1046  vancomycin 1250 mg/250 mL 0.9% NS IVPB ([facility])     Ordering Provider:  Diana Smyth, [facility]    1,250 mg  over 90 Minutes Intravenous Once 08/15/18 1130 08/15/18 1505    08/07/18 2311  metroNIDAZOLE (FLAGYL) tablet 500 mg     Ordering Provider:  Rachell Vickers MD    500 mg Nasogastric Every 8 Hours Scheduled 08/08/18 1400 08/14/18 0458    08/07/18 1221  metroNIDAZOLE (FLAGYL) IVPB 500 mg     Chino Rivera [facility] reviewed the order on 08/07/18 2311.   Ordering Provider:  Rachell Vickers MD    500 mg  over 60 Minutes Intravenous Every 8 Hours 08/07/18 1400 08/08/18 0606    08/06/18 1724  micafungin 100 mg/100 mL 0.9% NS IVPB ([facility])     Rachell Vickers MD reviewed the order on 08/09/18 1611.   Ordering Provider:  Jennifer Edwards MD    100 mg  over 60 Minutes Intravenous Every 24 Hours Scheduled 08/06/18 1800 08/10/18 1252    08/05/18 1436  piperacillin-tazobactam (ZOSYN) 4.5 g in iso-osmotic dextrose 100 mL IVPB (premix)     Ordering Provider:  Dayday Jacobs MD    4.5 g  over 4 Hours Intravenous Every 8 Hours 08/05/18 2130 08/12/18 1613    08/05/18 1436  piperacillin-tazobactam (ZOSYN) 4.5 g in iso-osmotic dextrose 100 mL IVPB (premix)     Ordering Provider:  Dayday Jacobs MD    4.5 g  over 30 Minutes Intravenous Once 08/05/18 1530 08/05/18 1536    08/05/18 1441  vancomycin 1250 mg/250 mL 0.9% NS IVPB ([facility])     Ordering Provider:  Sofia Hardy [facility]    20 mg/kg × 62.8 kg  over 90 Minutes Intravenous Once 08/05/18 1530 08/05/18 1728    07/29/18 1946  vancomycin 1500 mg/500 mL 0.9% NS IVPB ([facility])     Ordering Provider:  Elisabeth Benites APRN    25 mg/kg × 64.9 kg  333.3 mL/hr over 90 Minutes Intravenous Once 07/29/18 2100 07/29/18 2156    07/29/18 0527  piperacillin-tazobactam (ZOSYN) 4.5 g in iso-osmotic dextrose 100 mL IVPB (premix)     Ordering Provider:  Gloria Ochoa APRN    4.5 g  over 30 Minutes Intravenous Once 07/29/18 0615 07/29/18 0707     "07/25/18 1653  ceFAZolin in dextrose (ANCEF) IVPB solution 2 g     Ordering Provider:  Segundo Galicia MD    2 g  over 30 Minutes Intravenous Once 07/26/18 0930 07/26/18 0958    07/17/18 1008  ampicillin-sulbactam (UNASYN) 3 g in sodium chloride 0.9 % 100 mL IVPB-MBP     Ordering Provider:  Jani Sutton MD    3 g  over 60 Minutes Intravenous Every 6 Hours Scheduled 07/17/18 1100 07/23/18 0631    07/16/18 1034  vancomycin 1750 mg/500 mL 0.9% NS IVPB ([facility])     Ordering Provider:  Jani Sutton MD    25 mg/kg × 65 kg  over 120 Minutes Intravenous Once 07/16/18 1200 07/16/18 1630    07/16/18 0737  piperacillin-tazobactam (ZOSYN) 4.5 g in iso-osmotic dextrose 100 mL IVPB (premix)     Ordering Provider:  Ruslan Estrella MD    4.5 g  over 30 Minutes Intravenous Once 07/16/18 0815 07/16/18 0918          Allergies:  has No Known Allergies.      Review of Systems: All other reviewed and negative except as per HPI    /80 (BP Location: Left leg, Patient Position: Lying)   Pulse 118   Temp (!) 100.6 °F (38.1 °C) (Oral)   Resp 22   Ht 144.8 cm (57.01\")   Wt 66.2 kg (146 lb)   SpO2 98%   BMI 31.59 kg/m²   Temp:  [98.8 °F (37.1 °C)-101.3 °F (38.5 °C)] 100.6 °F (38.1 °C)    GENERAL:  Unresponsive, s/p trach and peg  HEENT: Oropharynx without thrush. .. No cervical adenopathy. No neck masses Neck supple  EYES: pupils poorly reactive to light. No conjunctival injection. No icterus.   LYMPHATICS: No lymphadenopathy of the neck or axillary or inguinal regions.   HEART: No murmur, gallop, or pericardial friction rub.   LUNGS:decreased bs at bases. No respiratory distress  ABDOMEN: Soft, nontender, nondistended. PEG site w/o erythema or drainage No appreciable HSM.    SKIN: Warm and dry without cutaneous eruptions.    Neuro:unresponsive; muscle tone stiff  EXT:  No cellulitic change       DIAGNOSTICS:  Lab Results   Component Value Date    WBC 11.38 (H) 08/21/2018    HGB 10.3 (L) " 08/21/2018    HCT 33.3 (L) 08/21/2018     08/21/2018     Lab Results   Component Value Date    CRP 3.01 (H) 08/20/2018     Lab Results   Component Value Date    SEDRATE 52 (H) 08/19/2018     Lab Results   Component Value Date    GLUCOSE 124 (H) 08/21/2018    BUN 18 08/21/2018    CREATININE 0.50 (L) 08/21/2018    EGFRIFNONA 138 08/21/2018    BCR 36.0 (H) 08/21/2018    CO2 29.0 08/21/2018    CALCIUM 9.3 08/21/2018    ALBUMIN 3.67 08/20/2018    AST 60 (H) 08/20/2018    ALT 59 (H) 08/20/2018       Microbiology:  Blood Culture   Date Value Ref Range Status   08/05/2018 No growth at 3 days  Preliminary   08/05/2018 No growth at 3 days  Preliminary              MRSA SCREEN CX   Date Value Ref Range Status   08/06/2018   Final    No Methicillin Resistant Staphylococcus aureus isolated        Urine Culture   Date Value Ref Range Status   08/06/2018 No growth at 2 days  Final        Lab Results   Component Value Date    BLOODCX No growth at 5 days 08/15/2018    BLOODCX No growth at 5 days 08/15/2018     Lab Results   Component Value Date    URINECX No growth at 2 days 08/06/2018       RADIOLOGY:  Imaging Results (last 72 hours)     Procedure Component Value Units Date/Time    MRI Brain Without Contrast [[id number]] Collected:  08/07/18 1731     Updated:  08/08/18 1345    Narrative:       EXAMINATION: MRI BRAIN WO CONTRAST- 08/07/2018     INDICATION: encephalopathy; R09.2-Respiratory arrest;  G93.40-Encephalopathy, unspecified; R74.8-Abnormal levels of other serum  enzymes; Z74.09-Other reduced mobility      TECHNIQUE: Multiplanar MRI of the brain without intravenous contrast  administration.     COMPARISON: MRI dated 07/25/2018     FINDINGS: No restriction on diffusion-weighted imaging. Midline  structures are symmetric without evidence of mass, mass effect or  midline shift however motion degraded axial T2 and FLAIR imaging  sequences limit evaluation. Small amount of T2 and FLAIR increased  signal abnormalities within  the periventricular and deep white matter  consistent with chronic small vessel ischemic disease. Pituitary and  sella within normal limits. Cervicomedullary junction widely patent.  Globes and orbits retain normal T2 signal characteristics. Visualized  paranasal sinuses and mastoid air cells demonstrate mucus retention  cysts and minimal mucosal edema right maxillary sinus otherwise grossly  clear and well-pneumatized. No cerebellopontine angle mass lesion.       Impression:       No acute intracranial abnormality specifically no evidence  for acute infarction or restricted diffusion. Minimal chronic small  vessel ischemic changes of the supratentorial white matter however may  represent sequela of migraines or demyelinating white matter disease in  a patient of this age, unchanged from prior exam. Mucous retention cyst  and mild mucosal thickening of the right maxillary sinus.         D:  08/07/2018  E:  08/08/2018     This report was finalized on 8/8/2018 1:43 PM by Dr. Fernando Zuluaga.       XR Chest 1 View [[id number]] Collected:  08/07/18 1036     Updated:  08/07/18 1107    Narrative:       EXAMINATION: XR CHEST 1 VW- 08/07/2018     INDICATION: fever, vomiting     TECHNIQUE:  Single view frontal chest.     COMPARISONS: 08/06/2018     FINDINGS:  Lungs are without focal abnormality. No pleural effusion or  pneumothorax. Cardiomediastinal silhouette is within normal limits.  Upper chest surgical clips again noted.       Impression:       Stable exam.     D:  08/07/2018  E:  08/07/2018     This report was finalized on 8/7/2018 11:04 AM by Cassius White.       XR Abdomen KUB [[id number]] Collected:  08/07/18 1038     Updated:  08/07/18 1107    Narrative:       EXAMINATION: XR ABDOMEN KUB-      INDICATION: Fever and vomiting.     TECHNIQUE:  KUB.     COMPARISONS:  08/04/2018.     FINDINGS:  Moderate stool burden. No evidence of bowel obstruction. No  acutely concerning calcifications. Gastrostomy tube in place.        Impression:       No acute finding.     D:  08/07/2018  E:  08/07/2018     This report was finalized on 8/7/2018 11:04 AM by Cassius White.       XR Chest 1 View [[id number]] Collected:  08/06/18 0907     Updated:  08/06/18 0946    Narrative:       EXAMINATION: XR CHEST 1 VW-08/06/2018:      INDICATION: Fever, suspect aspiration (trach/peg); R09.2-Respiratory  arrest; G93.40-Encephalopathy, unspecified; R74.8-Abnormal levels of  other serum enzymes.      COMPARISON: 08/04/2018.     FINDINGS: The cardiac silhouette is normal. A tracheostomy tube is well  positioned. There is no acute inflammatory process. There is no mass or  effusion.           Impression:       No active disease.     D:  08/06/2018  E:  08/06/2018     This report was finalized on 8/6/2018 9:44 AM by Dr. Eric Luis MD.       CT Angiogram Chest With & Without Contrast [[id number]] Collected:  08/05/18 1559     Updated:  08/05/18 1956    Narrative:       EXAM:  CT Angiography Chest With Intravenous Contrast    CLINICAL HISTORY:  37 years old, female; Encephalopathy, unspecified; Respiratory arrest; Abnormal   levels of other serum enzymes; Signs and symptoms; Cough and shortness of   breath; Additional info: Tachycardia, elevated d-dimer, tracheostomy, concern   for aspiration as well    TECHNIQUE:  Axial computed tomographic angiography images of the chest with intravenous   contrast using pulmonary embolism protocol.  All CT scans at this facility use   at least one of these dose optimization techniques: automated exposure control;   mA and/or kV adjustment per patient size (includes targeted exams where dose is   matched to clinical indication); or iterative reconstruction.  MIP reconstructed images were created and reviewed.    CONTRAST:  150 mL of isovue 370 administered intravenously.      COMPARISON:  CR - XR CHEST 1 VW 2018-08-04 10:12    FINDINGS:  Pulmonary arteries:  No pulmonary embolism.  Aorta:  No acute findings.  Lungs:  Small cluster of  tree in bud nodules within the posterior medial aspect   of the superior segment right lower lobe, likely minimal bronchopneumonia or   aspiration pneumonitis.  Mild upper lobe predominant paraseptal and   centrilobular emphysema.  Pleural space:  Small bilateral pleural effusions, with associated posterior   atelectasis.  No pneumothorax.  Heart:  Normal.  Bones/joints:  No acute fracture.  No dislocation.  Soft tissues:  Normal.  Lymph nodes:  Calcified right hilar lymph nodes, compatible with prior   granulomatous disease.  Spleen:  Splenic calcification, compatible with prior granulomatous disease.  Tubes, lines and devices:  Tracheostomy tube tip in midthoracic trachea.      Impression:         1.  No pulmonary embolism.    2.  Small cluster of tree in bud nodules within the posterior medial aspect of   the superior segment right lower lobe, likely minimal bronchopneumonia or   aspiration pneumonitis.    3.  Incidental/non-acute findings are described above.    THIS DOCUMENT HAS BEEN ELECTRONICALLY SIGNED BY [facility]ON[facility] MD             ASSESSMENT     --  Fever  The group C strep respiratory infection is being appropriately treated but she has had fever, tachycardia and restlessness  She has been on medications that  put her at risk for Serotonin syndrome- most recently valproic acid and zofran  (multiple doses 8/14 to 8/16). From a quick search it appears controversial about whether seroquel increases the risk for  Serotonin syndrome     Her movements are suggestive of tardive dyskinesia which could be an adverse reaction of both valproic acid and seroquel.     -- Suspected anoxic encephalopathy     -- Vomiting, etiology unclear  ?pancreatitis- lipase modestly elevated on 8/6 and improved today  She is being treated for constipation     -- Polysubstance abuse--Hepatitis C infection with + RNA pcr     -- Seizure disorder     -     PLAN     -- continue rocephin for now, consider changing to amoxicillin per  peg    --ask neurology to see to address alternate medication  to valproate since I am concerned that she may have serotonin syndrome after multiple meds that could have contributed    -- agree with stopping seroquel     --probiotic          -- hold cooling blanket for T < 102 to more accurately follow fever              Thank you for this consult.  Our group would be pleased to follow this patient over the course of their hospitalization and assist with outpatient antimicrobial therapy, as indicated.        Rachell Vickers MD  8/21/2018  4:11 PM

## 2018-08-21 NOTE — PROGRESS NOTES
[facility] Medicine Services  PROGRESS NOTE    Patient Name: Otilia Holm  : 1980  MRN: [id number]    Date of Admission: 2018  Length of Stay: 36  Primary Care Physician: Provider, No Known    Subjective   Subjective     CC:  F/u respiratory failure, fever, tachycardia    HPI:  Resting in bed and appears restless.  Nurse reports she remains febrile and tachycardic.  Tolerating tube feedings.  No family at bedside.      Review of Systems  Unable to obtain due to mental status.      Objective   Objective     Vital Signs:   Temp:  [98.8 °F (37.1 °C)-101.3 °F (38.5 °C)] 100.6 °F (38.1 °C)  Heart Rate:  [101-144] 118  Resp:  [20-24] 22  BP: (117-138)/() 135/80        Physical Exam:  Constitutional: Unresponsive, posturing  HENT: NCAT, mucous membranes moist, trach  Respiratory: Clear to auscultation bilaterally, respiratory effort normal on 28% oxygen trach collar  Cardiovascular: ST, no murmurs, rubs, or gallops, palpable pedal pulses bilaterally  Gastrointestinal: Positive bowel sounds, soft, nontender, non distended, PEG site without drainage or erythema  Genitourinary: calderon in place  Musculoskeletal: No bilateral ankle edema, contractures  Neurologic: Unresponsive  Skin: No rashes    Results Reviewed:  I have personally reviewed current lab, radiology, and data and agree.      Results from last 7 days  Lab Units 18  0511 18  0440 18  0856   WBC 10*3/mm3 11.38* 12.18* 11.11*   HEMOGLOBIN g/dL 10.3* 10.4* 10.1*   HEMATOCRIT % 33.3* 33.9* 32.7*   PLATELETS 10*3/mm3 386 376 363       Results from last 7 days  Lab Units 18  0511 18  0440 18  0856  08/15/18  1128   SODIUM mmol/L 143 143 142  < > 138   POTASSIUM mmol/L 4.0 4.2 4.2  < > 3.8   CHLORIDE mmol/L 104 106 107  < > 105   CO2 mmol/L 29.0 30.0 29.0  < > 25.0   BUN mg/dL 18 15 14  < > 10   CREATININE mg/dL 0.50* 0.43* 0.47*  < > 0.45*   GLUCOSE mg/dL 124* 108* 135*  < > 99   CALCIUM  mg/dL 9.3 9.2 9.3  < > 9.2   ALT (SGPT) U/L  --  59* 63*  --  76*   AST (SGOT) U/L  --  60* 73*  --  111*   < > = values in this interval not displayed.  Estimated Creatinine Clearance: 129.6 mL/min (A) (by C-G formula based on SCr of 0.5 mg/dL (L)).  No results found for: BNP    Microbiology Results Abnormal     Procedure Component Value - Date/Time    Blood Culture - Blood, [[id number]]  (Normal) Collected:  08/15/18 1128    Lab Status:  Final result Specimen:  Blood from Hand, Right Updated:  08/20/18 1201     Blood Culture No growth at 5 days    Blood Culture - Blood, [[id number]]  (Normal) Collected:  08/15/18 1128    Lab Status:  Final result Specimen:  Blood from Hand, Left Updated:  08/20/18 1201     Blood Culture No growth at 5 days    Respiratory Culture - Sputum, Cough [[id number]]  (Abnormal) Collected:  08/15/18 1113    Lab Status:  Final result Specimen:  Sputum from Cough Updated:  08/17/18 0751     Respiratory Culture Moderate growth (3+) Streptococcus, Beta Hemolytic, Group G (A)     Comment: This organism is considered to be universally susceptible to penicillin.  No further antibiotic testing will be performed.        STREP GROUPING G     Respiratory Culture Moderate growth (3+) Normal Respiratory Sima     Gram Stain Result Many (4+) WBCs per low power field      Moderate (3+) Epithelial cells per low power field      Many (4+) Gram positive cocci in pairs, chains and clusters    Respiratory Culture - Aspirate, ET Suction [[id number]] Collected:  08/10/18 0919    Lab Status:  Final result Specimen:  Aspirate from ET Suction Updated:  08/12/18 0701     Respiratory Culture Light growth (2+) Normal Respiratory Sima     Gram Stain Result Many (4+) WBCs per low power field      Few (2+) Epithelial cells per low power field      Few (2+) Gram positive cocci in pairs      Few (2+) Gram positive cocci in clusters      Rare (1+) Gram positive bacilli    Blood Culture - Blood, [[id number]]  (Normal) Collected:   08/05/18 1437    Lab Status:  Final result Specimen:  Blood from Wrist, Right Updated:  08/10/18 1500     Blood Culture No growth at 5 days    Blood Culture - Blood, [[id number]]  (Normal) Collected:  08/05/18 1437    Lab Status:  Final result Specimen:  Blood from Wrist, Left Updated:  08/10/18 1500     Blood Culture No growth at 5 days    Urine Culture - Urine, [[id number]]  (Normal) Collected:  08/06/18 1723    Lab Status:  Final result Specimen:  Urine from Urine, Catheter Updated:  08/08/18 0833     Urine Culture No growth at 2 days    MRSA Screen Culture - Swab, Nares [[id number]]  (Normal) Collected:  08/06/18 1734    Lab Status:  Final result Specimen:  Swab from Nares Updated:  08/08/18 0717     MRSA SCREEN CX No Methicillin Resistant Staphylococcus aureus isolated    Respiratory Culture - Sputum, NT Suction [[id number]]  (Abnormal) Collected:  08/05/18 1711    Lab Status:  Final result Specimen:  Sputum from NT Suction Updated:  08/07/18 1412     Respiratory Culture Light growth (2+) Streptococcus, Beta Hemolytic, Group G (A)     Comment: This organism is considered to be universally susceptible to penicillin.  No further antibiotic testing will be performed.        STREP GROUPING G     Respiratory Culture Light growth (2+) Normal Respiratory Sima     Gram Stain Result Many (4+) WBCs per low power field      Many (4+) Epithelial cells per low power field      Many (4+) Gram positive cocci in pairs, chains and clusters      Many (4+) Gram positive bacilli      Few (2+) Gram negative bacilli    Blood Culture - Blood, Blood, Venous Line [[id number]]  (Normal) Collected:  07/29/18 0350    Lab Status:  Final result Specimen:  Blood from Arm, Left Updated:  08/03/18 0501     Blood Culture No growth at 5 days    Blood Culture - Blood, Blood, Venous Line [[id number]]  (Normal) Collected:  07/29/18 0355    Lab Status:  Final result Specimen:  Blood from Hand, Left Updated:  08/03/18 0501     Blood Culture No growth at  5 days    Narrative:       Aerobic bottle only    Respiratory Culture - Sputum, ET Suction [[id number]] Collected:  07/29/18 0755    Lab Status:  Final result Specimen:  Sputum from ET Suction Updated:  07/31/18 0754     Respiratory Culture Light growth (2+) Normal Respiratory Sima     Gram Stain Result Moderate (3+) WBCs per low power field      Few (2+) Epithelial cells per low power field      Few (2+) Gram positive bacilli      Few (2+) Gram positive cocci in pairs    MRSA Screen, PCR - Swab, Nares [[id number]]  (Normal) Collected:  07/29/18 1434    Lab Status:  Final result Specimen:  Swab from Nares Updated:  07/29/18 2149     MRSA, PCR Negative    Narrative:         MRSA Negative    Culture, CSF - Cerebrospinal Fluid, Lumbar Puncture [[id number]]  (Normal) Collected:  07/16/18 1140    Lab Status:  Final result Specimen:  Cerebrospinal Fluid from Lumbar Puncture Updated:  07/23/18 1007     CSF Culture No growth at 7 days     Gram Stain Result No WBCs or organisms seen    Blood Culture - Blood, Blood, Venous Line [[id number]]  (Normal) Collected:  07/16/18 0817    Lab Status:  Final result Specimen:  Blood from Hand, Left Updated:  07/21/18 0830     Blood Culture No growth at 5 days    Blood Culture - Blood, Blood, Venous Line [[id number]]  (Normal) Collected:  07/16/18 0817    Lab Status:  Final result Specimen:  Blood from Arm, Right Updated:  07/21/18 0830     Blood Culture No growth at 5 days    Respiratory Culture - Aspirate, ET Suction [[id number]]  (Abnormal) Collected:  07/16/18 1606    Lab Status:  Final result Specimen:  Aspirate from ET Suction Updated:  07/18/18 1450     Respiratory Culture Scant growth (1+) Streptococcus, Beta Hemolytic, Group G (A)     Comment: This organism is considered to be universally susceptible to penicillin.  No further antibiotic testing will be performed.        STREP GROUPING G     Respiratory Culture Scant growth (1+) Normal Respiratory Sima     Gram Stain Result Many (4+)  WBCs per low power field      Rare (1+) Epithelial cells per low power field      Few (2+) Gram positive cocci in pairs    Meningitis / Encephalitis Panel, PCR - Cerebrospinal Fluid, Lumbar Puncture [[id number]]  (Normal) Collected:  07/16/18 1140    Lab Status:  Final result Specimen:  Cerebrospinal Fluid from Lumbar Puncture Updated:  07/16/18 1419     ESCHERICHIA COLI K1, PCR Not Detected     HAEMOPHILUS INFLUENZAE, PCR Not Detected     LISTERIA MONOCYTOGENES, PCR Not Detected     NEISSERIA MENINGITIDIS, PCR Not Detected     STREPTOCOCCUS AGALACTIAE, PCR Not Detected     STREPTOCOCCUS PNEUMONIAE, PCR Not Detected     CYTOMEGALOVIRUS (CMV), PCR Not Detected     ENTEROVIRUS, PCR Not Detected     HERPES SIMPLEX VIRUS 1 (HSV-1), PCR Not Detected     HERPES SIMPLEX VIRUS 2 (HSV-2), PCR Not Detected     HUMAN PARECHOVIRUS, PCR Not Detected     VARICELLA ZOSTER VIRUS (VZV), PCR Not Detected     CRYPTOCOCCUS NEOFORMANS / GATTII, PCR Not Detected     HUMAN HERPES VIRUS 6 PCR Not Detected          Imaging Results (last 24 hours)     ** No results found for the last 24 hours. **        Results for orders placed during the hospital encounter of 07/16/18   Adult Transthoracic Echo Complete W/ Cont if Necessary Per Protocol    Addendum · Estimated LVEF = 50%. · The following left ventricular wall segments are hypokinetic: mid  anterior, apical septal and mid anteroseptal. · The cardiac valves are anatomically and functionally normal.        Aubrey Jiménez MD 7/16/2018  6:05 PM          Narrative · Left ventricular systolic function is normal. Estimated EF = 50%.  · The cardiac valves are anatomically and functionally normal.          I have reviewed the medications.      aspirin 81 mg Per G Tube Daily   atorvastatin 80 mg Per G Tube Nightly   ceftriaxone 1 g Intravenous Q24H   clopidogrel 75 mg Per G Tube Daily   enoxaparin 40 mg Subcutaneous Q24H   famotidine 20 mg Per G Tube BID   lactobacillus acidophilus 1 capsule Per G  Tube Daily   levETIRAcetam 500 mg Per PEG Tube Q12H   metoprolol tartrate 25 mg Oral Q12H   thiamine 100 mg Per G Tube Daily   Valproic Acid 250 mg Per G Tube Q6H         Assessment/Plan   Assessment / Plan     Hospital Problem List     * (Principal)S/P Respiratory arrest due to drugs and alcohol. Prolonged ventilatory support requiring tracheostomy and PEG 7/26/18. Now off the ventilator    Alcohol abuse. Intoxicated on admission    Anoxic brain damage     H/O drug abuse    Pulmonary infiltrates, resolved. Suspected pulmonary edema versus aspiration    Overview Signed 7/16/2018  7:42 AM by Ruslan Estrella MD     - pneumonia vs. aspiration pneumonitis vs. pulmonary edema.          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 Resolved    Chronic hepatitis C without hepatic coma (CMS/HCC)    Seizures versus myoclonus and posturing from anoxia on admission. EEG negative (CMS/HCC)          Brief Hospital Course to date:  Otilia Holm is a 37 y.o. female transferred from [facility] on 7/16/2018 for unresponsive ness associated with prolonged seizure versus myoclonus and posturing after suffering a respiratory arrest presumably due to acute alcohol intake and drugs.  It is felt that she suffered an anoxic insult and does not appear that she will recover.  She underwent tracheostomy and PEG feeding tube 7/26/2018.  Infectious disease has followed for ongoing fevers.  Patient was transferred to telemetry on 8/20/18.      Assessment & Plan:    --patient remains febrile and tachycardic, ID following.  Aspiration related to vomiting is a possible etiology.  Elevated CPK and possible response to dantrolene also suggest malignant hyperthermia  --continue Rocephin  --hold cooling blanket for T<102 to more accurately follow fever  --started low dose metoprolol  --palliative following, continue morphine and ativan prn   --on Keppra  and valproic acid  --continue TF, Fibersource HN at goal rate of 75 cc/hour  --leukocytosis stable  --calderon in place for urinary retention  --polysubstance abuse--hepatitis C infection with +RNA pcr      DVT Prophylaxis:  Lovenox    CODE STATUS:   Code Status and Medical Interventions:   Ordered at: 07/16/18 0620     Code Status:    CPR     Medical Interventions (Level of Support Prior to Arrest):    Full       Disposition: I expect the patient to be discharged TBD.  CM following for long term placement.       Electronically signed by KATIA Vasquez, 08/21/18, 1:30 PM.

## 2018-08-22 PROBLEM — E87.6 HYPOKALEMIA: Status: ACTIVE | Noted: 2018-08-22

## 2018-08-22 LAB
ANION GAP SERPL CALCULATED.3IONS-SCNC: 9 MMOL/L (ref 3–11)
BASOPHILS # BLD AUTO: 0.08 10*3/MM3 (ref 0–0.2)
BASOPHILS NFR BLD AUTO: 0.9 % (ref 0–1)
BUN BLD-MCNC: 17 MG/DL (ref 9–23)
BUN/CREAT SERPL: 40.5 (ref 7–25)
CALCIUM SPEC-SCNC: 8.9 MG/DL (ref 8.7–10.4)
CHLORIDE SERPL-SCNC: 103 MMOL/L (ref 99–109)
CO2 SERPL-SCNC: 26 MMOL/L (ref 20–31)
CREAT BLD-MCNC: 0.42 MG/DL (ref 0.6–1.3)
DEPRECATED RDW RBC AUTO: 53.9 FL (ref 37–54)
EOSINOPHIL # BLD AUTO: 0.49 10*3/MM3 (ref 0–0.3)
EOSINOPHIL NFR BLD AUTO: 5.4 % (ref 0–3)
ERYTHROCYTE [DISTWIDTH] IN BLOOD BY AUTOMATED COUNT: 15 % (ref 11.3–14.5)
GFR SERPL CREATININE-BSD FRML MDRD: >150 ML/MIN/1.73
GLUCOSE BLD-MCNC: 126 MG/DL (ref 70–100)
GLUCOSE BLDC GLUCOMTR-MCNC: 117 MG/DL (ref 70–130)
GLUCOSE BLDC GLUCOMTR-MCNC: 119 MG/DL (ref 70–130)
GLUCOSE BLDC GLUCOMTR-MCNC: 84 MG/DL (ref 70–130)
GLUCOSE BLDC GLUCOMTR-MCNC: 92 MG/DL (ref 70–130)
HCT VFR BLD AUTO: 30.8 % (ref 34.5–44)
HGB BLD-MCNC: 9.4 G/DL (ref 11.5–15.5)
IMM GRANULOCYTES # BLD: 0.01 10*3/MM3 (ref 0–0.03)
IMM GRANULOCYTES NFR BLD: 0.1 % (ref 0–0.6)
LYMPHOCYTES # BLD AUTO: 1.86 10*3/MM3 (ref 0.6–4.8)
LYMPHOCYTES NFR BLD AUTO: 20.6 % (ref 24–44)
MCH RBC QN AUTO: 30.3 PG (ref 27–31)
MCHC RBC AUTO-ENTMCNC: 30.5 G/DL (ref 32–36)
MCV RBC AUTO: 99.4 FL (ref 80–99)
MONOCYTES # BLD AUTO: 0.71 10*3/MM3 (ref 0–1)
MONOCYTES NFR BLD AUTO: 7.9 % (ref 0–12)
NEUTROPHILS # BLD AUTO: 5.89 10*3/MM3 (ref 1.5–8.3)
NEUTROPHILS NFR BLD AUTO: 65.2 % (ref 41–71)
PLATELET # BLD AUTO: 348 10*3/MM3 (ref 150–450)
PMV BLD AUTO: 10.4 FL (ref 6–12)
POTASSIUM BLD-SCNC: 3.4 MMOL/L (ref 3.5–5.5)
POTASSIUM BLD-SCNC: 4.3 MMOL/L (ref 3.5–5.5)
RBC # BLD AUTO: 3.1 10*6/MM3 (ref 3.89–5.14)
SODIUM BLD-SCNC: 138 MMOL/L (ref 132–146)
VALPROATE SERPL-MCNC: 50 MCG/ML (ref 50–150)
WBC NRBC COR # BLD: 9.03 10*3/MM3 (ref 3.5–10.8)

## 2018-08-22 PROCEDURE — 82962 GLUCOSE BLOOD TEST: CPT

## 2018-08-22 PROCEDURE — 84132 ASSAY OF SERUM POTASSIUM: CPT | Performed by: HOSPITALIST

## 2018-08-22 PROCEDURE — 25010000002 LORAZEPAM PER 2 MG: Performed by: HOSPITALIST

## 2018-08-22 PROCEDURE — 99231 SBSQ HOSP IP/OBS SF/LOW 25: CPT | Performed by: PSYCHIATRY & NEUROLOGY

## 2018-08-22 PROCEDURE — 80164 ASSAY DIPROPYLACETIC ACD TOT: CPT | Performed by: INTERNAL MEDICINE

## 2018-08-22 PROCEDURE — 25010000002 HYDROMORPHONE PER 4 MG: Performed by: HOSPITALIST

## 2018-08-22 PROCEDURE — 25010000002 ENOXAPARIN PER 10 MG: Performed by: INTERNAL MEDICINE

## 2018-08-22 PROCEDURE — 99233 SBSQ HOSP IP/OBS HIGH 50: CPT | Performed by: HOSPITALIST

## 2018-08-22 PROCEDURE — 25010000002 CEFTRIAXONE PER 250 MG: Performed by: INTERNAL MEDICINE

## 2018-08-22 PROCEDURE — 25010000002 MORPHINE SULFATE (PF) 2 MG/ML SOLUTION: Performed by: HOSPITALIST

## 2018-08-22 PROCEDURE — 80048 BASIC METABOLIC PNL TOTAL CA: CPT | Performed by: NURSE PRACTITIONER

## 2018-08-22 PROCEDURE — 85025 COMPLETE CBC W/AUTO DIFF WBC: CPT | Performed by: NURSE PRACTITIONER

## 2018-08-22 RX ORDER — BACLOFEN 10 MG/1
5 TABLET ORAL 3 TIMES DAILY
Status: DISCONTINUED | OUTPATIENT
Start: 2018-08-22 | End: 2018-08-25 | Stop reason: HOSPADM

## 2018-08-22 RX ORDER — METOPROLOL TARTRATE 50 MG/1
50 TABLET, FILM COATED ORAL EVERY 12 HOURS SCHEDULED
Status: DISCONTINUED | OUTPATIENT
Start: 2018-08-22 | End: 2018-08-25 | Stop reason: HOSPADM

## 2018-08-22 RX ORDER — LEVETIRACETAM 100 MG/ML
250 SOLUTION ORAL EVERY 12 HOURS SCHEDULED
Status: COMPLETED | OUTPATIENT
Start: 2018-08-22 | End: 2018-08-23

## 2018-08-22 RX ORDER — HYDROMORPHONE HYDROCHLORIDE 1 MG/ML
0.5 INJECTION, SOLUTION INTRAMUSCULAR; INTRAVENOUS; SUBCUTANEOUS ONCE
Status: COMPLETED | OUTPATIENT
Start: 2018-08-22 | End: 2018-08-22

## 2018-08-22 RX ORDER — AMOXICILLIN 125 MG/5ML
500 POWDER, FOR SUSPENSION ORAL EVERY 8 HOURS SCHEDULED
Status: DISCONTINUED | OUTPATIENT
Start: 2018-08-22 | End: 2018-08-25 | Stop reason: HOSPADM

## 2018-08-22 RX ORDER — HYDROCODONE BITARTRATE AND ACETAMINOPHEN 7.5; 325 MG/1; MG/1
1 TABLET ORAL EVERY 4 HOURS PRN
Status: DISCONTINUED | OUTPATIENT
Start: 2018-08-22 | End: 2018-08-25 | Stop reason: HOSPADM

## 2018-08-22 RX ORDER — VALPROIC ACID 250 MG/5ML
125 SOLUTION ORAL EVERY 8 HOURS SCHEDULED
Status: COMPLETED | OUTPATIENT
Start: 2018-08-22 | End: 2018-08-23

## 2018-08-22 RX ORDER — HYDRALAZINE HYDROCHLORIDE 50 MG/1
50 TABLET, FILM COATED ORAL ONCE
Status: DISCONTINUED | OUTPATIENT
Start: 2018-08-22 | End: 2018-08-22

## 2018-08-22 RX ADMIN — HYDROCODONE BITARTRATE AND ACETAMINOPHEN 1 TABLET: 7.5; 325 TABLET ORAL at 16:25

## 2018-08-22 RX ADMIN — CLOPIDOGREL BISULFATE 75 MG: 75 TABLET ORAL at 08:21

## 2018-08-22 RX ADMIN — AMOXICILLIN 500 MG: 125 POWDER, FOR SUSPENSION ORAL at 22:59

## 2018-08-22 RX ADMIN — VALPROIC ACID 125 MG: 250 SOLUTION ORAL at 15:25

## 2018-08-22 RX ADMIN — Medication 1 CAPSULE: at 08:21

## 2018-08-22 RX ADMIN — POTASSIUM CHLORIDE 40 MEQ: 1.5 POWDER, FOR SOLUTION ORAL at 15:19

## 2018-08-22 RX ADMIN — METOPROLOL TARTRATE 50 MG: 50 TABLET ORAL at 21:37

## 2018-08-22 RX ADMIN — MORPHINE SULFATE 2 MG: 2 INJECTION, SOLUTION INTRAMUSCULAR; INTRAVENOUS at 15:19

## 2018-08-22 RX ADMIN — ENOXAPARIN SODIUM 40 MG: 100 INJECTION SUBCUTANEOUS at 08:21

## 2018-08-22 RX ADMIN — Medication 100 MG: at 08:21

## 2018-08-22 RX ADMIN — FAMOTIDINE 20 MG: 20 TABLET ORAL at 08:21

## 2018-08-22 RX ADMIN — MORPHINE SULFATE 2 MG: 2 INJECTION, SOLUTION INTRAMUSCULAR; INTRAVENOUS at 09:36

## 2018-08-22 RX ADMIN — HYDROCODONE BITARTRATE AND ACETAMINOPHEN 1 TABLET: 7.5; 325 TABLET ORAL at 21:38

## 2018-08-22 RX ADMIN — VALPROIC ACID 125 MG: 250 SOLUTION ORAL at 22:11

## 2018-08-22 RX ADMIN — BACLOFEN 5 MG: 10 TABLET ORAL at 21:59

## 2018-08-22 RX ADMIN — ASPIRIN 81 MG 81 MG: 81 TABLET ORAL at 08:21

## 2018-08-22 RX ADMIN — LORAZEPAM 1 MG: 2 INJECTION INTRAMUSCULAR; INTRAVENOUS at 01:10

## 2018-08-22 RX ADMIN — LORAZEPAM 1 MG: 2 INJECTION INTRAMUSCULAR; INTRAVENOUS at 15:19

## 2018-08-22 RX ADMIN — MORPHINE SULFATE 2 MG: 2 INJECTION, SOLUTION INTRAMUSCULAR; INTRAVENOUS at 05:24

## 2018-08-22 RX ADMIN — FAMOTIDINE 20 MG: 20 TABLET ORAL at 21:38

## 2018-08-22 RX ADMIN — LEVETIRACETAM 500 MG: 100 SOLUTION ORAL at 08:22

## 2018-08-22 RX ADMIN — HYDROMORPHONE HYDROCHLORIDE 0.5 MG: 1 INJECTION, SOLUTION INTRAMUSCULAR; INTRAVENOUS; SUBCUTANEOUS at 11:49

## 2018-08-22 RX ADMIN — LORAZEPAM 1 MG: 2 INJECTION INTRAMUSCULAR; INTRAVENOUS at 09:35

## 2018-08-22 RX ADMIN — CEFTRIAXONE SODIUM 1 G: 1 INJECTION, SOLUTION INTRAVENOUS at 18:00

## 2018-08-22 RX ADMIN — BACLOFEN 5 MG: 10 TABLET ORAL at 16:25

## 2018-08-22 RX ADMIN — VALPROIC ACID 250 MG: 250 SOLUTION ORAL at 08:22

## 2018-08-22 RX ADMIN — VALPROIC ACID 250 MG: 250 SOLUTION ORAL at 03:57

## 2018-08-22 RX ADMIN — LORAZEPAM 1 MG: 2 INJECTION INTRAMUSCULAR; INTRAVENOUS at 21:37

## 2018-08-22 RX ADMIN — DESMOPRESSIN ACETATE 80 MG: 0.2 TABLET ORAL at 21:37

## 2018-08-22 RX ADMIN — MORPHINE SULFATE 2 MG: 2 INJECTION, SOLUTION INTRAMUSCULAR; INTRAVENOUS at 01:10

## 2018-08-22 RX ADMIN — POTASSIUM CHLORIDE 40 MEQ: 1.5 POWDER, FOR SOLUTION ORAL at 08:25

## 2018-08-22 RX ADMIN — LEVETIRACETAM 250 MG: 100 SOLUTION ORAL at 21:59

## 2018-08-22 RX ADMIN — METOPROLOL TARTRATE 25 MG: 25 TABLET ORAL at 08:21

## 2018-08-22 RX ADMIN — MORPHINE SULFATE 2 MG: 2 INJECTION, SOLUTION INTRAMUSCULAR; INTRAVENOUS at 21:37

## 2018-08-22 RX ADMIN — LORAZEPAM 1 MG: 2 INJECTION INTRAMUSCULAR; INTRAVENOUS at 05:24

## 2018-08-22 NOTE — PROGRESS NOTES
Continued Stay Note   [place]     Patient Name: Otilia Holm  MRN: [id number]  Today's Date: 8/22/2018    Admit Date: 7/16/2018          Discharge Plan     Row Name 08/22/18 1512       Plan    Plan Update     Plan Comments Spoke with [facility] and no female bed today. [facility] may have a bed on 8-23. [facility] will call on 8-23. [facility] plans to talk with her DON to make sure they can meet patient's care needs. Marialuisa @ [phone]     Final Discharge Disposition Code 30 - still a patient              Discharge Codes    No documentation.           Zahraa Jay RN

## 2018-08-22 NOTE — PROGRESS NOTES
Palliative was reconsulted on 8/5 to review goals of care with son, Cortes Holm.  I spoke to son over the phone and reviewed choices with plans of care and informed him that he may stop and start interventions based on his mother's wishes for her quality of life and her acceptance of her current functional status.  Followed up phone call was made and informed son of patient's transfer to ICU with request to reply with a phone call.  No reply by the son.     Noted , Goldie Salmon, is in process to make contact with son as well.   Spoke to Goldie and relayed that there is palliative care community based services in [facility] if the son would want to have any further assistance for symptom management or support with ongoing clinical decisions.   Palliative medicine will sign off.   Thank you for the re consult.

## 2018-08-22 NOTE — PROGRESS NOTES
[facility] Medicine Services  PROGRESS NOTE    Patient Name: Otilia Holm  : 1980  MRN: [id number]    Date of Admission: 2018  Length of Stay: 37  Primary Care Physician: Provider, No Known    Subjective   Subjective     CC:  F/u drug overdose with respiratory failure and anoxic brain injury    HPI:  Resting in bed, RN reported that pt appeared to be in distress intermittently, poss related to uncontrolled pain/muscle spasm. She also cont to be febrile, though Tm has improved over the last 24 hrs. No family present. Pt unable to provide any history.    Review of Systems  Unable to obtain due to mental status.      Objective   Objective     Vital Signs:   Temp:  [98.6 °F (37 °C)-100.3 °F (37.9 °C)] 98.6 °F (37 °C)  Heart Rate:  [106-117] 117  Resp:  [22] 22  BP: (119-144)/(86-89) 144/89        Physical Exam:  Constitutional: No acute cardiopulm distress  HENT: NCAT, mucous membranes moist, trach intact  Respiratory: Good air flow bilat, mild crackles at bases, no wheezing, resp non-labored, respiratory effort normal on 28% oxygen trach collar  Cardiovascular: RRR, S1/S2 normal, no murmurs  Gastrointestinal: Positive bowel sounds, soft, nontender, non distended, PEG site without drainage or erythema  Musculoskeletal: No bilateral ankle edema  Neurologic: Eyes closed, moves right leg spontaneously, + bilat UE decorticate posturing, does not withdraw from painful stimuli in all ext  Skin: No rashes    Results Reviewed:  I have personally reviewed current lab, radiology, and data and agree.      Results from last 7 days  Lab Units 18  0635 18  0511 18  0440   WBC 10*3/mm3 9.03 11.38* 12.18*   HEMOGLOBIN g/dL 9.4* 10.3* 10.4*   HEMATOCRIT % 30.8* 33.3* 33.9*   PLATELETS 10*3/mm3 348 386 376       Results from last 7 days  Lab Units 18  0635 18  0511 18  0440 18  0856   SODIUM mmol/L 138 143 143 142   POTASSIUM mmol/L 3.4* 4.0 4.2 4.2    CHLORIDE mmol/L 103 104 106 107   CO2 mmol/L 26.0 29.0 30.0 29.0   BUN mg/dL 17 18 15 14   CREATININE mg/dL 0.42* 0.50* 0.43* 0.47*   GLUCOSE mg/dL 126* 124* 108* 135*   CALCIUM mg/dL 8.9 9.3 9.2 9.3   ALT (SGPT) U/L  --   --  59* 63*   AST (SGOT) U/L  --   --  60* 73*     Estimated Creatinine Clearance: 154.5 mL/min (A) (by C-G formula based on SCr of 0.42 mg/dL (L)).  No results found for: BNP    Microbiology Results Abnormal     Procedure Component Value - Date/Time    Blood Culture - Blood, [[id number]]  (Normal) Collected:  08/15/18 1128    Lab Status:  Final result Specimen:  Blood from Hand, Right Updated:  08/20/18 1201     Blood Culture No growth at 5 days    Blood Culture - Blood, [[id number]]  (Normal) Collected:  08/15/18 1128    Lab Status:  Final result Specimen:  Blood from Hand, Left Updated:  08/20/18 1201     Blood Culture No growth at 5 days    Respiratory Culture - Sputum, Cough [[id number]]  (Abnormal) Collected:  08/15/18 1113    Lab Status:  Final result Specimen:  Sputum from Cough Updated:  08/17/18 0751     Respiratory Culture Moderate growth (3+) Streptococcus, Beta Hemolytic, Group G (A)     Comment: This organism is considered to be universally susceptible to penicillin.  No further antibiotic testing will be performed.        STREP GROUPING G     Respiratory Culture Moderate growth (3+) Normal Respiratory Sima     Gram Stain Result Many (4+) WBCs per low power field      Moderate (3+) Epithelial cells per low power field      Many (4+) Gram positive cocci in pairs, chains and clusters    Respiratory Culture - Aspirate, ET Suction [[id number]] Collected:  08/10/18 0919    Lab Status:  Final result Specimen:  Aspirate from ET Suction Updated:  08/12/18 0701     Respiratory Culture Light growth (2+) Normal Respiratory Sima     Gram Stain Result Many (4+) WBCs per low power field      Few (2+) Epithelial cells per low power field      Few (2+) Gram positive cocci in pairs      Few (2+) Gram  positive cocci in clusters      Rare (1+) Gram positive bacilli    Blood Culture - Blood, [[id number]]  (Normal) Collected:  08/05/18 1437    Lab Status:  Final result Specimen:  Blood from Wrist, Right Updated:  08/10/18 1500     Blood Culture No growth at 5 days    Blood Culture - Blood, [[id number]]  (Normal) Collected:  08/05/18 1437    Lab Status:  Final result Specimen:  Blood from Wrist, Left Updated:  08/10/18 1500     Blood Culture No growth at 5 days    Urine Culture - Urine, [[id number]]  (Normal) Collected:  08/06/18 1723    Lab Status:  Final result Specimen:  Urine from Urine, Catheter Updated:  08/08/18 0833     Urine Culture No growth at 2 days    MRSA Screen Culture - Swab, Nares [[id number]]  (Normal) Collected:  08/06/18 1734    Lab Status:  Final result Specimen:  Swab from Nares Updated:  08/08/18 0717     MRSA SCREEN CX No Methicillin Resistant Staphylococcus aureus isolated    Respiratory Culture - Sputum, NT Suction [[id number]]  (Abnormal) Collected:  08/05/18 1711    Lab Status:  Final result Specimen:  Sputum from NT Suction Updated:  08/07/18 1412     Respiratory Culture Light growth (2+) Streptococcus, Beta Hemolytic, Group G (A)     Comment: This organism is considered to be universally susceptible to penicillin.  No further antibiotic testing will be performed.        STREP GROUPING G     Respiratory Culture Light growth (2+) Normal Respiratory Sima     Gram Stain Result Many (4+) WBCs per low power field      Many (4+) Epithelial cells per low power field      Many (4+) Gram positive cocci in pairs, chains and clusters      Many (4+) Gram positive bacilli      Few (2+) Gram negative bacilli    Blood Culture - Blood, Blood, Venous Line [[id number]]  (Normal) Collected:  07/29/18 0350    Lab Status:  Final result Specimen:  Blood from Arm, Left Updated:  08/03/18 0501     Blood Culture No growth at 5 days    Blood Culture - Blood, Blood, Venous Line [[id number]]  (Normal) Collected:  07/29/18  0355    Lab Status:  Final result Specimen:  Blood from Hand, Left Updated:  08/03/18 0501     Blood Culture No growth at 5 days    Narrative:       Aerobic bottle only    Respiratory Culture - Sputum, ET Suction [[id number]] Collected:  07/29/18 0755    Lab Status:  Final result Specimen:  Sputum from ET Suction Updated:  07/31/18 0754     Respiratory Culture Light growth (2+) Normal Respiratory Sima     Gram Stain Result Moderate (3+) WBCs per low power field      Few (2+) Epithelial cells per low power field      Few (2+) Gram positive bacilli      Few (2+) Gram positive cocci in pairs    MRSA Screen, PCR - Swab, Nares [[id number]]  (Normal) Collected:  07/29/18 1434    Lab Status:  Final result Specimen:  Swab from Nares Updated:  07/29/18 2149     MRSA, PCR Negative    Narrative:         MRSA Negative    Culture, CSF - Cerebrospinal Fluid, Lumbar Puncture [[id number]]  (Normal) Collected:  07/16/18 1140    Lab Status:  Final result Specimen:  Cerebrospinal Fluid from Lumbar Puncture Updated:  07/23/18 1007     CSF Culture No growth at 7 days     Gram Stain Result No WBCs or organisms seen    Blood Culture - Blood, Blood, Venous Line [[id number]]  (Normal) Collected:  07/16/18 0817    Lab Status:  Final result Specimen:  Blood from Hand, Left Updated:  07/21/18 0830     Blood Culture No growth at 5 days    Blood Culture - Blood, Blood, Venous Line [[id number]]  (Normal) Collected:  07/16/18 0817    Lab Status:  Final result Specimen:  Blood from Arm, Right Updated:  07/21/18 0830     Blood Culture No growth at 5 days    Respiratory Culture - Aspirate, ET Suction [[id number]]  (Abnormal) Collected:  07/16/18 1606    Lab Status:  Final result Specimen:  Aspirate from ET Suction Updated:  07/18/18 1450     Respiratory Culture Scant growth (1+) Streptococcus, Beta Hemolytic, Group G (A)     Comment: This organism is considered to be universally susceptible to penicillin.  No further antibiotic testing will be performed.         STREP GROUPING G     Respiratory Culture Scant growth (1+) Normal Respiratory Sima     Gram Stain Result Many (4+) WBCs per low power field      Rare (1+) Epithelial cells per low power field      Few (2+) Gram positive cocci in pairs    Meningitis / Encephalitis Panel, PCR - Cerebrospinal Fluid, Lumbar Puncture [[id number]]  (Normal) Collected:  07/16/18 1140    Lab Status:  Final result Specimen:  Cerebrospinal Fluid from Lumbar Puncture Updated:  07/16/18 1419     ESCHERICHIA COLI K1, PCR Not Detected     HAEMOPHILUS INFLUENZAE, PCR Not Detected     LISTERIA MONOCYTOGENES, PCR Not Detected     NEISSERIA MENINGITIDIS, PCR Not Detected     STREPTOCOCCUS AGALACTIAE, PCR Not Detected     STREPTOCOCCUS PNEUMONIAE, PCR Not Detected     CYTOMEGALOVIRUS (CMV), PCR Not Detected     ENTEROVIRUS, PCR Not Detected     HERPES SIMPLEX VIRUS 1 (HSV-1), PCR Not Detected     HERPES SIMPLEX VIRUS 2 (HSV-2), PCR Not Detected     HUMAN PARECHOVIRUS, PCR Not Detected     VARICELLA ZOSTER VIRUS (VZV), PCR Not Detected     CRYPTOCOCCUS NEOFORMANS / GATTII, PCR Not Detected     HUMAN HERPES VIRUS 6 PCR Not Detected          Imaging Results (last 24 hours)     ** No results found for the last 24 hours. **        Results for orders placed during the hospital encounter of 07/16/18   Adult Transthoracic Echo Complete W/ Cont if Necessary Per Protocol    Addendum · Estimated LVEF = 50%. · The following left ventricular wall segments are hypokinetic: mid  anterior, apical septal and mid anteroseptal. · The cardiac valves are anatomically and functionally normal.        Aubrey Jiménez MD 7/16/2018  6:05 PM          Narrative · Left ventricular systolic function is normal. Estimated EF = 50%.  · The cardiac valves are anatomically and functionally normal.          I have reviewed the medications.      aspirin 81 mg Per G Tube Daily   atorvastatin 80 mg Per G Tube Nightly   ceftriaxone 1 g Intravenous Q24H   clopidogrel 75 mg Per G Tube  Daily   enoxaparin 40 mg Subcutaneous Q24H   famotidine 20 mg Per G Tube BID   lactobacillus acidophilus 1 capsule Per G Tube Daily   levETIRAcetam 250 mg Per PEG Tube Q12H   metoprolol tartrate 25 mg Oral Q12H   thiamine 100 mg Per G Tube Daily   Valproic Acid 125 mg Per G Tube Q8H         Assessment/Plan   Assessment / Plan     Hospital Problem List     * (Principal)S/P Respiratory arrest due to drugs and alcohol. Prolonged ventilatory support requiring tracheostomy and PEG 7/26/18. Now off the ventilator    Alcohol abuse. Intoxicated on admission    Anoxic brain damage     H/O drug abuse    Pulmonary infiltrates, resolved. Suspected pulmonary edema versus aspiration    Overview Signed 7/16/2018  7:42 AM by Ruslan Estrella MD     - pneumonia vs. aspiration pneumonitis vs. pulmonary edema.          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 Resolved    Chronic hepatitis C without hepatic coma (CMS/HCC)    Seizures versus myoclonus and posturing from anoxia on admission. EEG negative (CMS/HCC)          Brief Hospital Course to date:  Otilia Holm is a 37 y.o. female transferred from [facility] on 7/16/2018 with unresponsiveness associated with acute hypoxic resp failure and prolonged seizure versus myoclonus due to alcohol and illicit drug use. She was transferred to our ICU and remained there until 8/20. She was seen by multidisciplinary specialties. She was not able to wean off ventilator, so PEG and trach was placed. She cont show decorticate posturing, thought to be from anoxic insult and does not appear that she will recover. She also continued to have persistent fever, tachycardia, and elevated CPK. She was followed by infectious disease for fever and started on Zosyn and Flagyl for poss group C Strep resp infection, but fever cont despite adequate abx, so she was also treated with Dantrolene for poss  neuroleptic malignant hyperthermia. Hopsitalist assumed care on .      Assessment & Plan:    --Very complex medico-social issues. Pt's son, who I'm sure is relatively young given that pt herself is relatively young, so Im not sure if he has the maturity to comprehend the complexity of pt's medical problems. Pt's son is the only contact listed on chart. I have not seen any visitors. I tried calling son, went straight to .  Overall prognosis is poor. Case/POC d/w staff.  --Currently on Rocephin per ID, which is getting ready to  in the next 24 hr  --Cooling blanket PRN for T<102   --Pt is still tachycardic, poss related to anxiety/pain. I will increase BB. I will add Baclofen and add Norco to IV Morphine, which is 2mg q4 hrs prn at this time. Palliative med have been following, but RN felt that symptoms were not well controlled with current meds.  --on Keppra and valproic acid, appreciate input from neuro regarding mgt, reconsult note from yesterday noted.  --continue TF, Fibersource HN at goal rate of 75 cc/hour (at goal)  --leukocytosis have resolved  --Had urinary retention of 900cc yesterday, so Watson inserted again   --polysubstance abuse--hepatitis C infection with +RNA pcr, if pt recovers from this acute injury, can consider F/U with [facility]  --ETOH abuse, passed window for WD already      Again, very complex medico-social issues as stated above. Overall condition is relatively stable, but long term prognosis is poor. I spent about 50 min on case, most of my time was spent reviewing chart.      DVT Prophylaxis:  Lovenox    CODE STATUS:   Code Status and Medical Interventions:   Ordered at: 18 0620     Code Status:    CPR     Medical Interventions (Level of Support Prior to Arrest):    Full       Disposition: I expect the patient to be discharged TBD.   following for long term placement.       Electronically signed by Candida Connolly MD, 18, 1:20 PM.

## 2018-08-22 NOTE — PROGRESS NOTES
Clinical Nutrition     Nutrition Support Followup  Reason for Visit:   Follow-up protocol, EN    Patient Name: Otilia Holm  YOB: 1980  MRN: [id number]  Date of Encounter: 18 11:03 AM  Admission date: 2018    Nutrition Assessment   Assessment       Hospital Problem List  Principal Problem:    S/P Respiratory arrest due to drugs and alcohol. Prolonged ventilatory support requiring tracheostomy and PEG 18. Now off the ventilator  Active Problems:    Alcohol abuse. Intoxicated on admission    Anoxic brain damage     H/O drug abuse    Pulmonary infiltrates, resolved. Suspected pulmonary edema versus aspiration    Persistent urinary retention requiring in and out catheterization    Possible NMS (neuroleptic malignant syndrome) causing fever. Resolved    Chronic hepatitis C without hepatic coma (CMS/HCC)    Seizures versus myoclonus and posturing from anoxia on admission. EEG negative (CMS/HCC)      PMH: She  has a past medical history of Acute urinary retention (2018); Aneurysm (CMS/HCC) (unknown type reported by mother); Heroin abuse; Opioid abuse; and Sepsis (CMS/HCC) (2018).   PSxH: She  has a past surgical history that includes  section; Other surgical history; and tracheostomy and peg tube insertion (N/A, 2018).     Other nutrition related factors:  Intubated ()  Hypoxic Encephalopathy - unclear etiology with an unremarkable MRI and EEG.    Respiratory Failure / Aspiration Pneumonia  NSTEMI   S/p trach/PEG ()  Tachycardia  Fever  Transferred back to ICU (8/15) secondary to pt needing frequent suctioning and for closer monitoring      Nutrition support information:  () EN initiated   () EN changed from Peptamen 1.5 to Fibersource HN   () EN changed to Replete with Fiber at 70 ml/hr and free water at 20 ml/hr   (8/10) EN changed to FiberSource HN at 65 ml/hr and free water at 20 ml/hr  () Free water increased to 40  "ml/hr  (8/16) EN regimen adjusted to better meet pt's estimated needs- FiberSource HN at 75 ml/hr and free water at 35 ml/hr       Reported/Observed/Food/Nutrition Related History:     RN reports patient tolerating EN at goal 75 ml/hr. Per Is/Os, patient had multiple BMs. Sleeping during time of visit. No family at bedside.      Anthropometrics     Height: 144.8 cm (57.01\")  Last filed wt: Weight: 66.4 kg (146 lb 5 oz) (08/22/18 0500)  Weight Method: Bed scale    BMI: BMI (Calculated): 30.9  Obese Class I: 30-34.9kg/m2    Ideal Body Weight (IBW) (kg): 39.02  Admission wt: 143 lbs 4.8 oz  Method obtained: bed scale weight per charting (7/16)      Labs reviewed       Results from last 7 days  Lab Units 08/22/18  0635 08/21/18  0511 08/20/18  0440 08/19/18  0856  08/15/18  1128   GLUCOSE mg/dL 126* 124* 108* 135*  < > 99   BUN mg/dL 17 18 15 14  < > 10   CREATININE mg/dL 0.42* 0.50* 0.43* 0.47*  < > 0.45*   SODIUM mmol/L 138 143 143 142  < > 138   CHLORIDE mmol/L 103 104 106 107  < > 105   POTASSIUM mmol/L 3.4* 4.0 4.2 4.2  < > 3.8   PHOSPHORUS mg/dL  --  3.4 3.6 3.5  --   --    MAGNESIUM mg/dL  --  2.1 2.1 2.1  --   --    ALT (SGPT) U/L  --   --  59* 63*  --  76*   < > = values in this interval not displayed.    Results from last 7 days  Lab Units 08/20/18  0440 08/19/18  0856 08/15/18  1128   ALBUMIN g/dL 3.67 3.67 3.73   PREALBUMIN mg/dL 14.5  --   --    CRP mg/dL 3.01*  --   --    CHOLESTEROL mg/dL 121  --   --    TRIGLYCERIDES mg/dL 93  --   --          Results from last 7 days  Lab Units 08/22/18  0551 08/22/18  0017 08/21/18  0613 08/18/18  1712 08/18/18  1117 08/18/18  0513   GLUCOSE mg/dL 92 119 105 143* 111 129     No results found for: HGBA1C      Medications reviewed   Pertinent: Keppra, Vitamin B1, Depakene      Intake/Ouptut 24 hrs (7:00AM - 6:59 AM)     Intake & Output (last day)       08/21 0701 - 08/22 0700 08/22 0701 - 08/23 0700    Other 666     NG/GT 1460     Total Intake(mL/kg) 2126 (32)     Urine " (mL/kg/hr) 2350 (1.5)     Total Output 2350      Net -224            Unmeasured Stool Occurrence 3 x           Current Nutrition Prescription     PO: NPO Diet  EN: Diet, Tube Feeding Tube Feeding Formula: Fibersource HN (Standard With Fiber)  Verified at bedside: Yes  Goal rate: 75 mL/hr  Route: PEG    Evaluation of Received Nutrient/Fluid Intake last day:     At Target Goal Volume  Received per I/O's    % Est needs   Volume  1500ml 1640ml 109%   Energy/kcals 1800 kcals 1968 kcals 109%   Protein  81g pro 89 g pro 110%               Fiber 22g 25g    Fluid   W/Free water 1215ml  1915ml 1328 ml  2338ml              Nutrition Diagnosis     (8/8), updated (8/10), (8/16), (8/20), (8/22)  Problem Inadequate enteral nutrition infusion   Etiology ? etiology   Signs/Symptoms 80% of EN goal volume delivered over the past 48 hours   Status - resolved 109% of EN goal volume delivered over the past 24 hours    (8/10)  Problem Less than optimal enteral nutrition composition or modality   Etiology Formula needs re-est in view of skin integrity not as severe as initially documented   Signs/Symptoms Pt does not require as high protein formula as is currently   Status - resolved, EN changed (8/10)    (8/14)  Problem Increased nturient needs- fluid   Etiology Current EN regimen   Signs/Symptoms Per MD note, pt appears dehydrated, IVFs started and current EN regimen does not meet full fluid needs   Status - resolved, free water increased from 20 ml/hr to 40 ml/hr      Nutrition Intervention   1.  Follow treatment progress, Care plan reviewed   2. Will continue to follow and adjust nutrition support regimen as appropriate      Goal:   General: Nutrition support treatment  EN/PN: Maintain EN    Monitoring/Evaluation:   Per protocol, I&O, Pertinent labs, EN delivery/tolerance, Weight, Skin status, Symptoms, POC/GOC      Will Continue to follow per protocol      Juanis Segal  Dietetic Intern  Time Spent: 30 minutes

## 2018-08-22 NOTE — PROGRESS NOTES
Daily Progress Note  Neurology     LOS: 37 days     Subjective     Chief Complaint: respiratory arrest    Interval History:  No significant improvement    ROS: positive for fever    Objective     Vital signs in last 24 hours:  Temp:  [98.6 °F (37 °C)-100.6 °F (38.1 °C)] 98.6 °F (37 °C)  Heart Rate:  [106-136] 117  Resp:  [22] 22  BP: (119-144)/(80-89) 144/89      Physical Exam:   General: Lying in bed with eyes closed.      Respiratory: Respirations unlabored   CV: RRR       Neurologic Exam:   Mental status: obtunded, not following commands   CN: PERRL                         Results from last 7 days  Lab Units 08/22/18  0635   WBC 10*3/mm3 9.03   HEMOGLOBIN g/dL 9.4*   HEMATOCRIT % 30.8*   PLATELETS 10*3/mm3 348           Results Review:    Labs reviewed    Assessment/Plan     Principal Problem:    S/P Respiratory arrest due to drugs and alcohol. Prolonged ventilatory support requiring tracheostomy and PEG 7/26/18. Now off the ventilator  Active Problems:    Alcohol abuse. Intoxicated on admission    Anoxic brain damage     H/O drug abuse    Pulmonary infiltrates, resolved. Suspected pulmonary edema versus aspiration    Persistent urinary retention requiring in and out catheterization    Possible NMS (neuroleptic malignant syndrome) causing fever. Resolved    Chronic hepatitis C without hepatic coma (CMS/HCC)    Seizures versus myoclonus and posturing from anoxia on admission. EEG negative (CMS/HCC)        1.  Hypoxic encephalopathy = Primary team requesting assistance with weaning her valproic acid. She is currently on 250mg Q6 hours, will decrease to 125mg Q8 hours for 3 doses, then stop. Will also plan to wean her Keppra off as previously recommended. Still no seizures noted      Will follow as needed      Ute Hines MD  08/22/18  11:10 AM

## 2018-08-22 NOTE — PROGRESS NOTES
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 **OR** [DISCONTINUED] potassium chloride 20 mEq in 50 mL IVPB, 20 mEq, Intravenous, Q1H PRN, Alec Menjivar, APRCAMILO, Last Rate: 50 mL/hr at 07/20/18 0954, 20 mEq at 07/20/18 0954  •  sodium chloride 0.9 % flush 1-10 mL, 1-10 mL, Intravenous, PRN, Alec Menjivar, APRN  •  sodium chloride 0.9 % flush 10 mL, 10 mL, Intravenous, PRN, Jennifer Edwards MD  •  thiamine (VITAMIN B-1) tablet 100 mg, 100 mg, Per G Tube, Daily, Bud Piña MD, 100 mg at 08/22/18 0821  •  Valproic Acid (DEPAKENE) syrup 125 mg, 125 mg, Per G Tube, Q8H, Ute Hines MD, 125 mg at 08/22/18 1525  Antibiotics:  Anti-Infectives     Ordered     Dose/Rate Route Frequency Start Stop    08/16/18 1416  cefTRIAXone (ROCEPHIN) IVPB 1 g     Ordering Provider:  Bud Sutton MD    1 g  100 mL/hr over 30 Minutes Intravenous Every  24 Hours 08/16/18 1600 08/23/18 1559    08/15/18 1032  piperacillin-tazobactam (ZOSYN) 4.5 g in iso-osmotic dextrose 100 mL IVPB (premix)     Ordering Provider:  Rad Stanton MD    4.5 g  over 30 Minutes Intravenous Once 08/15/18 1130 08/15/18 1247    08/15/18 1046  vancomycin 1250 mg/250 mL 0.9% NS IVPB ([facility])     Ordering Provider:  Diana Smyth formerly [facility]    1,250 mg  over 90 Minutes Intravenous Once 08/15/18 1130 08/15/18 1505    08/07/18 2311  metroNIDAZOLE (FLAGYL) tablet 500 mg     Ordering Provider:  Rachell Vickers MD    500 mg Nasogastric Every 8 Hours Scheduled 08/08/18 1400 08/14/18 0458    08/07/18 1221  metroNIDAZOLE (FLAGYL) IVPB 500 mg     Chino Rivera formerly [facility] reviewed the order on 08/07/18 2311.   Ordering Provider:  Rachell Vickers MD    500 mg  over 60 Minutes Intravenous Every 8 Hours 08/07/18 1400 08/08/18 0606    08/06/18 1724  micafungin 100 mg/100 mL 0.9% NS IVPB ([facility])     Rachell Vickers MD reviewed the order on 08/09/18 1611.   Ordering Provider:  Jennifer Edwards MD    100 mg  over 60 Minutes Intravenous Every 24 Hours Scheduled 08/06/18 1800 08/10/18 1252    08/05/18 1436  piperacillin-tazobactam (ZOSYN) 4.5 g in iso-osmotic dextrose 100 mL IVPB (premix)     Ordering Provider:  Dayday Jacobs MD    4.5 g  over 4 Hours Intravenous Every 8 Hours 08/05/18 2130 08/12/18 1613    08/05/18 1436  piperacillin-tazobactam (ZOSYN) 4.5 g in iso-osmotic dextrose 100 mL IVPB (premix)     Ordering Provider:  Dayday Jacobs MD    4.5 g  over 30 Minutes Intravenous Once 08/05/18 1530 08/05/18 1536    08/05/18 1441  vancomycin 1250 mg/250 mL 0.9% NS IVPB ([facility])     Ordering Provider:  Sofia Hardy, RPH    20 mg/kg × 62.8 kg  over 90 Minutes Intravenous Once 08/05/18 1530 08/05/18 1728    07/29/18 1946  vancomycin 1500 mg/500 mL 0.9% NS IVPB ([facility])     Ordering Provider:  Elisabeth Benites, APRN    25 mg/kg × 64.9 kg  333.3 mL/hr over 90 Minutes Intravenous Once 07/29/18 2100  "07/29/18 2156    07/29/18 0527  piperacillin-tazobactam (ZOSYN) 4.5 g in iso-osmotic dextrose 100 mL IVPB (premix)     Ordering Provider:  Gloria Ochoa APRN    4.5 g  over 30 Minutes Intravenous Once 07/29/18 0615 07/29/18 0707    07/25/18 1653  ceFAZolin in dextrose (ANCEF) IVPB solution 2 g     Ordering Provider:  Segundo Galicia MD    2 g  over 30 Minutes Intravenous Once 07/26/18 0930 07/26/18 0958    07/17/18 1008  ampicillin-sulbactam (UNASYN) 3 g in sodium chloride 0.9 % 100 mL IVPB-MBP     Ordering Provider:  Jani Sutton MD    3 g  over 60 Minutes Intravenous Every 6 Hours Scheduled 07/17/18 1100 07/23/18 0631    07/16/18 1034  vancomycin 1750 mg/500 mL 0.9% NS IVPB ([facility])     Ordering Provider:  Jani Sutton MD    25 mg/kg × 65 kg  over 120 Minutes Intravenous Once 07/16/18 1200 07/16/18 1630    07/16/18 0737  piperacillin-tazobactam (ZOSYN) 4.5 g in iso-osmotic dextrose 100 mL IVPB (premix)     Ordering Provider:  Ruslan Estrella MD    4.5 g  over 30 Minutes Intravenous Once 07/16/18 0815 07/16/18 0918          Allergies:  has No Known Allergies.      Review of Systems: All other reviewed and negative except as per HPI    /89 (BP Location: Left leg, Patient Position: Lying)   Pulse 117   Temp 98.6 °F (37 °C) (Axillary)   Resp 22   Ht 144.8 cm (57.01\")   Wt 66.4 kg (146 lb 5 oz)   SpO2 95%   BMI 31.65 kg/m²   Temp:  [98.6 °F (37 °C)-100.3 °F (37.9 °C)] 98.6 °F (37 °C)    GENERAL:  Unresponsive, s/p trach and peg; restless, tachycardic moving around in bed  HEENT: Oropharynx without thrush. .. No cervical adenopathy. No neck masses Neck supple  EYES: pupils poorly reactive to light. No conjunctival injection. No icterus.   LYMPHATICS: No lymphadenopathy of the neck or axillary or inguinal regions.   HEART: No murmur, gallop, or pericardial friction rub.   LUNGS:decreased bs at bases. No respiratory distress  ABDOMEN: Soft, nontender, nondistended. PEG " site w/o erythema or drainage No appreciable HSM.    SKIN: Warm and dry without cutaneous eruptions.    Neuro:unresponsive; muscle tone stiff  EXT:  No cellulitic change       DIAGNOSTICS:  Lab Results   Component Value Date    WBC 9.03 08/22/2018    HGB 9.4 (L) 08/22/2018    HCT 30.8 (L) 08/22/2018     08/22/2018     Lab Results   Component Value Date    CRP 3.01 (H) 08/20/2018     Lab Results   Component Value Date    SEDRATE 52 (H) 08/19/2018     Lab Results   Component Value Date    GLUCOSE 126 (H) 08/22/2018    BUN 17 08/22/2018    CREATININE 0.42 (L) 08/22/2018    EGFRIFNONA >150 08/22/2018    BCR 40.5 (H) 08/22/2018    CO2 26.0 08/22/2018    CALCIUM 8.9 08/22/2018    ALBUMIN 3.67 08/20/2018    AST 60 (H) 08/20/2018    ALT 59 (H) 08/20/2018       Microbiology:  Blood Culture   Date Value Ref Range Status   08/05/2018 No growth at 3 days  Preliminary   08/05/2018 No growth at 3 days  Preliminary              MRSA SCREEN CX   Date Value Ref Range Status   08/06/2018   Final    No Methicillin Resistant Staphylococcus aureus isolated        Urine Culture   Date Value Ref Range Status   08/06/2018 No growth at 2 days  Final        Lab Results   Component Value Date    BLOODCX No growth at 5 days 08/15/2018    BLOODCX No growth at 5 days 08/15/2018     Lab Results   Component Value Date    URINECX No growth at 2 days 08/06/2018       RADIOLOGY:  Imaging Results (last 72 hours)     Procedure Component Value Units Date/Time    MRI Brain Without Contrast [[id number]] Collected:  08/07/18 1731     Updated:  08/08/18 1345    Narrative:       EXAMINATION: MRI BRAIN WO CONTRAST- 08/07/2018     INDICATION: encephalopathy; R09.2-Respiratory arrest;  G93.40-Encephalopathy, unspecified; R74.8-Abnormal levels of other serum  enzymes; Z74.09-Other reduced mobility      TECHNIQUE: Multiplanar MRI of the brain without intravenous contrast  administration.     COMPARISON: MRI dated 07/25/2018     FINDINGS: No restriction on  diffusion-weighted imaging. Midline  structures are symmetric without evidence of mass, mass effect or  midline shift however motion degraded axial T2 and FLAIR imaging  sequences limit evaluation. Small amount of T2 and FLAIR increased  signal abnormalities within the periventricular and deep white matter  consistent with chronic small vessel ischemic disease. Pituitary and  sella within normal limits. Cervicomedullary junction widely patent.  Globes and orbits retain normal T2 signal characteristics. Visualized  paranasal sinuses and mastoid air cells demonstrate mucus retention  cysts and minimal mucosal edema right maxillary sinus otherwise grossly  clear and well-pneumatized. No cerebellopontine angle mass lesion.       Impression:       No acute intracranial abnormality specifically no evidence  for acute infarction or restricted diffusion. Minimal chronic small  vessel ischemic changes of the supratentorial white matter however may  represent sequela of migraines or demyelinating white matter disease in  a patient of this age, unchanged from prior exam. Mucous retention cyst  and mild mucosal thickening of the right maxillary sinus.         D:  08/07/2018  E:  08/08/2018     This report was finalized on 8/8/2018 1:43 PM by Dr. Fernando Zuluaga.       XR Chest 1 View [[id number]] Collected:  08/07/18 1036     Updated:  08/07/18 1107    Narrative:       EXAMINATION: XR CHEST 1 VW- 08/07/2018     INDICATION: fever, vomiting     TECHNIQUE:  Single view frontal chest.     COMPARISONS: 08/06/2018     FINDINGS:  Lungs are without focal abnormality. No pleural effusion or  pneumothorax. Cardiomediastinal silhouette is within normal limits.  Upper chest surgical clips again noted.       Impression:       Stable exam.     D:  08/07/2018  E:  08/07/2018     This report was finalized on 8/7/2018 11:04 AM by Cassius White.       XR Abdomen KUB [[id number]] Collected:  08/07/18 1038     Updated:  08/07/18 1107    Narrative:        EXAMINATION: XR ABDOMEN KUB-      INDICATION: Fever and vomiting.     TECHNIQUE:  KUB.     COMPARISONS:  08/04/2018.     FINDINGS:  Moderate stool burden. No evidence of bowel obstruction. No  acutely concerning calcifications. Gastrostomy tube in place.       Impression:       No acute finding.     D:  08/07/2018  E:  08/07/2018     This report was finalized on 8/7/2018 11:04 AM by Cassius White.       XR Chest 1 View [[id number]] Collected:  08/06/18 0907     Updated:  08/06/18 0946    Narrative:       EXAMINATION: XR CHEST 1 VW-08/06/2018:      INDICATION: Fever, suspect aspiration (trach/peg); R09.2-Respiratory  arrest; G93.40-Encephalopathy, unspecified; R74.8-Abnormal levels of  other serum enzymes.      COMPARISON: 08/04/2018.     FINDINGS: The cardiac silhouette is normal. A tracheostomy tube is well  positioned. There is no acute inflammatory process. There is no mass or  effusion.           Impression:       No active disease.     D:  08/06/2018  E:  08/06/2018     This report was finalized on 8/6/2018 9:44 AM by Dr. Eric Luis MD.       CT Angiogram Chest With & Without Contrast [[id number]] Collected:  08/05/18 1559     Updated:  08/05/18 1956    Narrative:       EXAM:  CT Angiography Chest With Intravenous Contrast    CLINICAL HISTORY:  37 years old, female; Encephalopathy, unspecified; Respiratory arrest; Abnormal   levels of other serum enzymes; Signs and symptoms; Cough and shortness of   breath; Additional info: Tachycardia, elevated d-dimer, tracheostomy, concern   for aspiration as well    TECHNIQUE:  Axial computed tomographic angiography images of the chest with intravenous   contrast using pulmonary embolism protocol.  All CT scans at this facility use   at least one of these dose optimization techniques: automated exposure control;   mA and/or kV adjustment per patient size (includes targeted exams where dose is   matched to clinical indication); or iterative reconstruction.  MIP reconstructed  images were created and reviewed.    CONTRAST:  150 mL of isovue 370 administered intravenously.      COMPARISON:  CR - XR CHEST 1 VW 2018-08-04 10:12    FINDINGS:  Pulmonary arteries:  No pulmonary embolism.  Aorta:  No acute findings.  Lungs:  Small cluster of tree in bud nodules within the posterior medial aspect   of the superior segment right lower lobe, likely minimal bronchopneumonia or   aspiration pneumonitis.  Mild upper lobe predominant paraseptal and   centrilobular emphysema.  Pleural space:  Small bilateral pleural effusions, with associated posterior   atelectasis.  No pneumothorax.  Heart:  Normal.  Bones/joints:  No acute fracture.  No dislocation.  Soft tissues:  Normal.  Lymph nodes:  Calcified right hilar lymph nodes, compatible with prior   granulomatous disease.  Spleen:  Splenic calcification, compatible with prior granulomatous disease.  Tubes, lines and devices:  Tracheostomy tube tip in midthoracic trachea.      Impression:         1.  No pulmonary embolism.    2.  Small cluster of tree in bud nodules within the posterior medial aspect of   the superior segment right lower lobe, likely minimal bronchopneumonia or   aspiration pneumonitis.    3.  Incidental/non-acute findings are described above.    THIS DOCUMENT HAS BEEN ELECTRONICALLY SIGNED BY MER[facility] JERONIMO MD             ASSESSMENT     --  Fever  The group C strep respiratory infection is being appropriately treated but she has had fever, tachycardia and restlessness  She has been on medications that  put her at risk for Serotonin syndrome- most recently valproic acid and zofran  (multiple doses 8/14 to 8/16). From a quick search it appears controversial about whether seroquel increases the risk for  Serotonin syndrome     Her movements are suggestive of tardive dyskinesia which could be an adverse reaction of both valproic acid and seroquel.     -- Suspected anoxic encephalopathy        -- Polysubstance abuse--Hepatitis C infection with  + RNA pcr     --  Possible Seizure disorder     -     PLAN     -- change  Rocephin to amoxicillin 500mg tid to 8/28 per peg    -- neurologyis weaning valproic acid and keppra    -- agree with stopping seroquel     --probiotic          -- hold cooling blanket for T < 102 to more accurately follow fever              Thank you for this consult.  Our group would be pleased to follow this patient over the course of their hospitalization and assist with outpatient antimicrobial therapy, as indicated.        Rachell Vickers MD  8/22/2018  4:25 PM

## 2018-08-22 NOTE — PLAN OF CARE
Problem: Patient Care Overview  Goal: Plan of Care Review  Outcome: Ongoing (interventions implemented as appropriate)   08/20/18 1850 08/21/18 2000   Coping/Psychosocial   Plan of Care Reviewed With --  patient   Plan of Care Review   Progress no change --      Goal: Individualization and Mutuality   07/21/18 1726   Individualization   Patient Specific Preferences pt unable to answer   Patient Specific Goals (Include Timeframe) pt unable to answer   Patient Specific Interventions standard interventions for comfort   Mutuality/Individual Preferences   What Anxieties, Fears, Concerns, or Questions Do You Have About Your Care? pt unable to answer   What Information Would Help Us Give You More Personalized Care? pt unable to answer   How Would You and/or Your Support Person Like to Participate in Your Care? pt unable to answer   Mutuality/Individual Preferences   How to Address Anxieties/Fears pt unable to answer     Goal: Discharge Needs Assessment  Outcome: Ongoing (interventions implemented as appropriate)   07/17/18 1345 08/03/18 0308 08/17/18 1418   Discharge Needs Assessment   Readmission Within the Last 30 Days no previous admission in last 30 days --  --    Concerns to be Addressed decision making;discharge planning;substance/tobacco abuse/use --  --    Concerns Comments S/P respiratory arrest/intubated and unresponsive. H/O substance abuse --  --    Patient/Family Anticipates Transition to other (see comments) --  --    Patient/Family Anticipated Services at Transition other (see comments) --  --    Transportation Concerns --  --  car, none   Transportation Anticipated other (see comments) --  --    Anticipated Changes Related to Illness --  inability to care for self;inability to work;inability to care for someone else --    Discharge Facility/Level of Care Needs --  nursing facility, skilled --    Offered/Gave Vendor List no --  --    Current Discharge Risk --  cognitively impaired;physical impairment --     Discharge Coordination/Progress Critically ill, sedated on vent in ICU. --  --    Disability   Equipment Currently Used at Home none --  --      Goal: Interprofessional Rounds/Family Conf  Outcome: Ongoing (interventions implemented as appropriate)   08/21/18 1300 08/22/18 0328   Interdisciplinary Rounds/Family Conf   Summary pt is having intermittent pain and restlessness. morphine and ativan effective. considering baclofen for spasms. discuss goals with son. --    Participants --  family;nursing;physician      08/21/18 1300 08/22/18 0328   Interdisciplinary Rounds/Family Conf   Summary pt is having intermittent pain and restlessness. morphine and ativan effective. considering baclofen for spasms. discuss goals with son. --    Participants --  family;nursing;physician       Problem: Skin Injury Risk (Adult)  Goal: Skin Health and Integrity  Outcome: Ongoing (interventions implemented as appropriate)   08/22/18 0328   Skin Injury Risk (Adult)   Skin Health and Integrity making progress toward outcome       Problem: Palliative Care (Adult)  Goal: Maximized Comfort  Outcome: Ongoing (interventions implemented as appropriate)   08/19/18 0658   Palliative Care (Adult)   Maximized Comfort making progress toward outcome       Problem: Cardiac Output Decreased (Adult)  Goal: Effective Tissue Perfusion  Outcome: Ongoing (interventions implemented as appropriate)   08/22/18 0328   Cardiac Output Decreased (Adult)   Effective Tissue Perfusion making progress toward outcome       Problem: Nutrition, Enteral (Adult)  Goal: Signs and Symptoms of Listed Potential Problems Will be Absent, Minimized or Managed (Nutrition, Enteral)  Outcome: Ongoing (interventions implemented as appropriate)   08/19/18 0658   Goal/Outcome Evaluation   Problems Assessed (Enteral Nutrition) all   Problems Present (Enteral Nutrition) none       Problem: Fall Risk (Adult)  Goal: Absence of Fall  Outcome: Ongoing (interventions implemented as  appropriate)   08/22/18 0328   Fall Risk (Adult)   Absence of Fall making progress toward outcome

## 2018-08-22 NOTE — PLAN OF CARE
Problem: Patient Care Overview  Goal: Plan of Care Review  Outcome: Ongoing (interventions implemented as appropriate)   08/22/18 1540   Plan of Care Review   Progress no change     Goal: Individualization and Mutuality  Outcome: Ongoing (interventions implemented as appropriate)   07/21/18 1726   Individualization   Patient Specific Preferences pt unable to answer   Patient Specific Goals (Include Timeframe) pt unable to answer   Patient Specific Interventions standard interventions for comfort   Mutuality/Individual Preferences   What Anxieties, Fears, Concerns, or Questions Do You Have About Your Care? pt unable to answer   What Information Would Help Us Give You More Personalized Care? pt unable to answer   How Would You and/or Your Support Person Like to Participate in Your Care? pt unable to answer   Mutuality/Individual Preferences   How to Address Anxieties/Fears pt unable to answer       Problem: Skin Injury Risk (Adult)  Goal: Skin Health and Integrity  Outcome: Ongoing (interventions implemented as appropriate)   08/22/18 1540   Skin Injury Risk (Adult)   Skin Health and Integrity making progress toward outcome       Problem: Palliative Care (Adult)  Goal: Maximized Comfort  Outcome: Ongoing (interventions implemented as appropriate)   08/22/18 1540   Palliative Care (Adult)   Maximized Comfort making progress toward outcome       Problem: Nutrition, Enteral (Adult)  Goal: Signs and Symptoms of Listed Potential Problems Will be Absent, Minimized or Managed (Nutrition, Enteral)  Outcome: Ongoing (interventions implemented as appropriate)      Problem: Fall Risk (Adult)  Goal: Absence of Fall  Outcome: Ongoing (interventions implemented as appropriate)   08/22/18 1540   Fall Risk (Adult)   Absence of Fall making progress toward outcome

## 2018-08-22 NOTE — PLAN OF CARE
Problem: Patient Care Overview  Goal: Plan of Care Review  Outcome: Ongoing (interventions implemented as appropriate)   08/22/18 0914   Coping/Psychosocial   Plan of Care Reviewed With other (see comments)  (no family present)   Plan of Care Review   Progress no change   OTHER   Outcome Summary palliative care will sign off. Cm working on placement. palliative care can follow at long term care. jeannie attempts to discuss goals with son but continues with aggressive plan of care, full code

## 2018-08-23 LAB
GLUCOSE BLDC GLUCOMTR-MCNC: 120 MG/DL (ref 70–130)
GLUCOSE BLDC GLUCOMTR-MCNC: 90 MG/DL (ref 70–130)

## 2018-08-23 PROCEDURE — 25010000002 MORPHINE SULFATE (PF) 2 MG/ML SOLUTION: Performed by: HOSPITALIST

## 2018-08-23 PROCEDURE — 25010000002 LORAZEPAM PER 2 MG: Performed by: HOSPITALIST

## 2018-08-23 PROCEDURE — 94799 UNLISTED PULMONARY SVC/PX: CPT

## 2018-08-23 PROCEDURE — 99232 SBSQ HOSP IP/OBS MODERATE 35: CPT | Performed by: NURSE PRACTITIONER

## 2018-08-23 PROCEDURE — 94760 N-INVAS EAR/PLS OXIMETRY 1: CPT

## 2018-08-23 PROCEDURE — 82962 GLUCOSE BLOOD TEST: CPT

## 2018-08-23 PROCEDURE — 25010000002 ENOXAPARIN PER 10 MG: Performed by: INTERNAL MEDICINE

## 2018-08-23 RX ORDER — MORPHINE SULFATE 2 MG/ML
2 INJECTION, SOLUTION INTRAMUSCULAR; INTRAVENOUS
Status: DISCONTINUED | OUTPATIENT
Start: 2018-08-23 | End: 2018-08-25 | Stop reason: HOSPADM

## 2018-08-23 RX ADMIN — LEVETIRACETAM 250 MG: 100 SOLUTION ORAL at 20:20

## 2018-08-23 RX ADMIN — LORAZEPAM 1 MG: 2 INJECTION INTRAMUSCULAR; INTRAVENOUS at 15:25

## 2018-08-23 RX ADMIN — METOPROLOL TARTRATE 50 MG: 50 TABLET ORAL at 08:32

## 2018-08-23 RX ADMIN — AMOXICILLIN 500 MG: 125 POWDER, FOR SUSPENSION ORAL at 05:19

## 2018-08-23 RX ADMIN — HYDROCODONE BITARTRATE AND ACETAMINOPHEN 1 TABLET: 7.5; 325 TABLET ORAL at 20:17

## 2018-08-23 RX ADMIN — MORPHINE SULFATE 2 MG: 2 INJECTION, SOLUTION INTRAMUSCULAR; INTRAVENOUS at 02:18

## 2018-08-23 RX ADMIN — MORPHINE SULFATE 2 MG: 2 INJECTION, SOLUTION INTRAMUSCULAR; INTRAVENOUS at 21:49

## 2018-08-23 RX ADMIN — LORAZEPAM 1 MG: 2 INJECTION INTRAMUSCULAR; INTRAVENOUS at 20:18

## 2018-08-23 RX ADMIN — BACLOFEN 5 MG: 10 TABLET ORAL at 08:32

## 2018-08-23 RX ADMIN — LORAZEPAM 1 MG: 2 INJECTION INTRAMUSCULAR; INTRAVENOUS at 10:50

## 2018-08-23 RX ADMIN — Medication 100 MG: at 08:32

## 2018-08-23 RX ADMIN — ENOXAPARIN SODIUM 40 MG: 100 INJECTION SUBCUTANEOUS at 08:19

## 2018-08-23 RX ADMIN — HYDROCODONE BITARTRATE AND ACETAMINOPHEN 1 TABLET: 7.5; 325 TABLET ORAL at 10:50

## 2018-08-23 RX ADMIN — AMOXICILLIN 500 MG: 125 POWDER, FOR SUSPENSION ORAL at 13:52

## 2018-08-23 RX ADMIN — AMOXICILLIN 500 MG: 125 POWDER, FOR SUSPENSION ORAL at 21:50

## 2018-08-23 RX ADMIN — HYDROCODONE BITARTRATE AND ACETAMINOPHEN 1 TABLET: 7.5; 325 TABLET ORAL at 02:18

## 2018-08-23 RX ADMIN — MORPHINE SULFATE 2 MG: 2 INJECTION, SOLUTION INTRAMUSCULAR; INTRAVENOUS at 16:05

## 2018-08-23 RX ADMIN — MORPHINE SULFATE 2 MG: 2 INJECTION, SOLUTION INTRAMUSCULAR; INTRAVENOUS at 06:08

## 2018-08-23 RX ADMIN — DESMOPRESSIN ACETATE 80 MG: 0.2 TABLET ORAL at 20:19

## 2018-08-23 RX ADMIN — VALPROIC ACID 125 MG: 250 SOLUTION ORAL at 08:19

## 2018-08-23 RX ADMIN — FAMOTIDINE 20 MG: 20 TABLET ORAL at 08:32

## 2018-08-23 RX ADMIN — HYDROCODONE BITARTRATE AND ACETAMINOPHEN 1 TABLET: 7.5; 325 TABLET ORAL at 06:08

## 2018-08-23 RX ADMIN — ASPIRIN 81 MG 81 MG: 81 TABLET ORAL at 08:32

## 2018-08-23 RX ADMIN — MORPHINE SULFATE 2 MG: 2 INJECTION, SOLUTION INTRAMUSCULAR; INTRAVENOUS at 18:12

## 2018-08-23 RX ADMIN — LORAZEPAM 1 MG: 2 INJECTION INTRAMUSCULAR; INTRAVENOUS at 06:08

## 2018-08-23 RX ADMIN — MORPHINE SULFATE 2 MG: 2 INJECTION, SOLUTION INTRAMUSCULAR; INTRAVENOUS at 13:52

## 2018-08-23 RX ADMIN — BACLOFEN 5 MG: 10 TABLET ORAL at 20:19

## 2018-08-23 RX ADMIN — BACLOFEN 5 MG: 10 TABLET ORAL at 16:05

## 2018-08-23 RX ADMIN — CLOPIDOGREL BISULFATE 75 MG: 75 TABLET ORAL at 08:32

## 2018-08-23 RX ADMIN — HYDROCODONE BITARTRATE AND ACETAMINOPHEN 1 TABLET: 7.5; 325 TABLET ORAL at 15:25

## 2018-08-23 RX ADMIN — LEVETIRACETAM 250 MG: 100 SOLUTION ORAL at 08:19

## 2018-08-23 RX ADMIN — MORPHINE SULFATE 2 MG: 2 INJECTION, SOLUTION INTRAMUSCULAR; INTRAVENOUS at 10:51

## 2018-08-23 RX ADMIN — METOPROLOL TARTRATE 50 MG: 50 TABLET ORAL at 20:20

## 2018-08-23 RX ADMIN — LORAZEPAM 1 MG: 2 INJECTION INTRAMUSCULAR; INTRAVENOUS at 02:18

## 2018-08-23 RX ADMIN — FAMOTIDINE 20 MG: 20 TABLET ORAL at 20:20

## 2018-08-23 RX ADMIN — Medication 1 CAPSULE: at 08:32

## 2018-08-23 NOTE — PROGRESS NOTES
Continued Stay Note  [facility]     Patient Name: Otilia Holm  MRN: [id number]  Today's Date: 8/23/2018    Admit Date: 7/16/2018          Discharge Plan     Row Name 08/23/18 0951       Plan    Plan LT Nursing Facility Placement    Plan Comments Received bed offer from [facility] in [place]-can accept tomorrow, 8/24/18.  Left message with son, Eric Holm, to contact me to discuss further.  Tentatively arranged ambulance transport for tomorrow @ 4:00pm.  Goldie Salmon, Ext. [phone]    Final Discharge Disposition Code 30 - still a patient              Discharge Codes    No documentation.           THEO Harrell

## 2018-08-23 NOTE — PLAN OF CARE
Problem: Patient Care Overview  Goal: Plan of Care Review  Outcome: Ongoing (interventions implemented as appropriate)   08/23/18 1730   Plan of Care Review   Progress no change   OTHER   Outcome Summary pt has been sobbing throughout the day; morphine dose changed to q2 hr. pt is comfortable with proper medication timing. CHG, [facility] Care, and Trach care all complete. Pt has had excess mucous and has needed to be suctioned multiple times today.        Problem: Skin Injury Risk (Adult)  Goal: Skin Health and Integrity  Outcome: Ongoing (interventions implemented as appropriate)   08/23/18 1730   Skin Injury Risk (Adult)   Skin Health and Integrity making progress toward outcome       Problem: Palliative Care (Adult)  Goal: Maximized Comfort  Outcome: Ongoing (interventions implemented as appropriate)   08/23/18 1730   Palliative Care (Adult)   Maximized Comfort making progress toward outcome       Problem: Cardiac Output Decreased (Adult)  Goal: Effective Tissue Perfusion  Outcome: Ongoing (interventions implemented as appropriate)   08/23/18 1730   Cardiac Output Decreased (Adult)   Effective Tissue Perfusion making progress toward outcome       Problem: Fall Risk (Adult)  Goal: Absence of Fall  Outcome: Ongoing (interventions implemented as appropriate)   08/23/18 1730   Fall Risk (Adult)   Absence of Fall making progress toward outcome

## 2018-08-23 NOTE — PROGRESS NOTES
[facility] Medicine Services  PROGRESS NOTE    Patient Name: Otilia Holm  : 1980  MRN: [id number]    Date of Admission: 2018  Length of Stay: 38  Primary Care Physician: Provider, No Known    Subjective   Subjective     CC:  F/u drug overdose with respiratory failure and anoxic brain injury    HPI:  Resting in bed and appears comfortable at this time.  Nurse reports that patient is requiring prn medications for intermittent periods of distress.  Tm 99.7 in past 24 hours.  No family at bedside.      Review of Systems  Unable to obtain due to mental status.      Objective   Objective     Vital Signs:   Temp:  [97.9 °F (36.6 °C)-99.7 °F (37.6 °C)] 97.9 °F (36.6 °C)  Heart Rate:  [] 82  Resp:  [20-22] 20  BP: (121-131)/(71-73) 121/71        Physical Exam:  Constitutional: No acute cardiopulm distress  HENT: NCAT, mucous membranes moist, trach intact  Respiratory: Good air flow bilat, mild crackles at bases, no wheezing, resp non-labored, respiratory effort normal on 28% oxygen trach collar  Cardiovascular: RRR, S1/S2 normal, no murmurs  Gastrointestinal: Positive bowel sounds, soft, nontender, non distended, PEG site without drainage or erythema  Musculoskeletal: No bilateral ankle edema  Neurologic: Eyes closed, moves right leg spontaneously, + bilat UE decorticate posturing, does not withdraw from painful stimuli in all ext  Skin: No rashes    Results Reviewed:  I have personally reviewed current lab, radiology, and data and agree.      Results from last 7 days  Lab Units 18  0635 18  0511 18  0440   WBC 10*3/mm3 9.03 11.38* 12.18*   HEMOGLOBIN g/dL 9.4* 10.3* 10.4*   HEMATOCRIT % 30.8* 33.3* 33.9*   PLATELETS 10*3/mm3 348 386 376       Results from last 7 days  Lab Units 18  1927 18  0635 18  0511 18  0440 18  0856   SODIUM mmol/L  --  138 143 143 142   POTASSIUM mmol/L 4.3 3.4* 4.0 4.2 4.2   CHLORIDE mmol/L  --  103  104 106 107   CO2 mmol/L  --  26.0 29.0 30.0 29.0   BUN mg/dL  --  17 18 15 14   CREATININE mg/dL  --  0.42* 0.50* 0.43* 0.47*   GLUCOSE mg/dL  --  126* 124* 108* 135*   CALCIUM mg/dL  --  8.9 9.3 9.2 9.3   ALT (SGPT) U/L  --   --   --  59* 63*   AST (SGOT) U/L  --   --   --  60* 73*     Estimated Creatinine Clearance: 152.2 mL/min (A) (by C-G formula based on SCr of 0.42 mg/dL (L)).  No results found for: BNP    Microbiology Results Abnormal     Procedure Component Value - Date/Time    Blood Culture - Blood, [[id number]]  (Normal) Collected:  08/15/18 1128    Lab Status:  Final result Specimen:  Blood from Hand, Right Updated:  08/20/18 1201     Blood Culture No growth at 5 days    Blood Culture - Blood, [[id number]]  (Normal) Collected:  08/15/18 1128    Lab Status:  Final result Specimen:  Blood from Hand, Left Updated:  08/20/18 1201     Blood Culture No growth at 5 days    Respiratory Culture - Sputum, Cough [[id number]]  (Abnormal) Collected:  08/15/18 1113    Lab Status:  Final result Specimen:  Sputum from Cough Updated:  08/17/18 0751     Respiratory Culture Moderate growth (3+) Streptococcus, Beta Hemolytic, Group G (A)     Comment: This organism is considered to be universally susceptible to penicillin.  No further antibiotic testing will be performed.        STREP GROUPING G     Respiratory Culture Moderate growth (3+) Normal Respiratory Sima     Gram Stain Result Many (4+) WBCs per low power field      Moderate (3+) Epithelial cells per low power field      Many (4+) Gram positive cocci in pairs, chains and clusters    Respiratory Culture - Aspirate, ET Suction [[id number]] Collected:  08/10/18 0919    Lab Status:  Final result Specimen:  Aspirate from ET Suction Updated:  08/12/18 0701     Respiratory Culture Light growth (2+) Normal Respiratory Sima     Gram Stain Result Many (4+) WBCs per low power field      Few (2+) Epithelial cells per low power field      Few (2+) Gram positive cocci in pairs       Few (2+) Gram positive cocci in clusters      Rare (1+) Gram positive bacilli    Blood Culture - Blood, [[id number]]  (Normal) Collected:  08/05/18 1437    Lab Status:  Final result Specimen:  Blood from Wrist, Right Updated:  08/10/18 1500     Blood Culture No growth at 5 days    Blood Culture - Blood, [[id number]]  (Normal) Collected:  08/05/18 1437    Lab Status:  Final result Specimen:  Blood from Wrist, Left Updated:  08/10/18 1500     Blood Culture No growth at 5 days    Urine Culture - Urine, [[id number]]  (Normal) Collected:  08/06/18 1723    Lab Status:  Final result Specimen:  Urine from Urine, Catheter Updated:  08/08/18 0833     Urine Culture No growth at 2 days    MRSA Screen Culture - Swab, Nares [[id number]]  (Normal) Collected:  08/06/18 1734    Lab Status:  Final result Specimen:  Swab from Nares Updated:  08/08/18 0717     MRSA SCREEN CX No Methicillin Resistant Staphylococcus aureus isolated    Respiratory Culture - Sputum, NT Suction [[id number]]  (Abnormal) Collected:  08/05/18 1711    Lab Status:  Final result Specimen:  Sputum from NT Suction Updated:  08/07/18 1412     Respiratory Culture Light growth (2+) Streptococcus, Beta Hemolytic, Group G (A)     Comment: This organism is considered to be universally susceptible to penicillin.  No further antibiotic testing will be performed.        STREP GROUPING G     Respiratory Culture Light growth (2+) Normal Respiratory Sima     Gram Stain Result Many (4+) WBCs per low power field      Many (4+) Epithelial cells per low power field      Many (4+) Gram positive cocci in pairs, chains and clusters      Many (4+) Gram positive bacilli      Few (2+) Gram negative bacilli    Blood Culture - Blood, Blood, Venous Line [[id number]]  (Normal) Collected:  07/29/18 0350    Lab Status:  Final result Specimen:  Blood from Arm, Left Updated:  08/03/18 0501     Blood Culture No growth at 5 days    Blood Culture - Blood, Blood, Venous Line [[id number]]  (Normal)  Collected:  07/29/18 0355    Lab Status:  Final result Specimen:  Blood from Hand, Left Updated:  08/03/18 0501     Blood Culture No growth at 5 days    Narrative:       Aerobic bottle only    Respiratory Culture - Sputum, ET Suction [[id number]] Collected:  07/29/18 0755    Lab Status:  Final result Specimen:  Sputum from ET Suction Updated:  07/31/18 0754     Respiratory Culture Light growth (2+) Normal Respiratory Sima     Gram Stain Result Moderate (3+) WBCs per low power field      Few (2+) Epithelial cells per low power field      Few (2+) Gram positive bacilli      Few (2+) Gram positive cocci in pairs    MRSA Screen, PCR - Swab, Nares [[id number]]  (Normal) Collected:  07/29/18 1434    Lab Status:  Final result Specimen:  Swab from Nares Updated:  07/29/18 2149     MRSA, PCR Negative    Narrative:         MRSA Negative    Culture, CSF - Cerebrospinal Fluid, Lumbar Puncture [[id number]]  (Normal) Collected:  07/16/18 1140    Lab Status:  Final result Specimen:  Cerebrospinal Fluid from Lumbar Puncture Updated:  07/23/18 1007     CSF Culture No growth at 7 days     Gram Stain Result No WBCs or organisms seen    Blood Culture - Blood, Blood, Venous Line [[id number]]  (Normal) Collected:  07/16/18 0817    Lab Status:  Final result Specimen:  Blood from Hand, Left Updated:  07/21/18 0830     Blood Culture No growth at 5 days    Blood Culture - Blood, Blood, Venous Line [[id number]]  (Normal) Collected:  07/16/18 0817    Lab Status:  Final result Specimen:  Blood from Arm, Right Updated:  07/21/18 0830     Blood Culture No growth at 5 days    Respiratory Culture - Aspirate, ET Suction [[id number]]  (Abnormal) Collected:  07/16/18 1606    Lab Status:  Final result Specimen:  Aspirate from ET Suction Updated:  07/18/18 1450     Respiratory Culture Scant growth (1+) Streptococcus, Beta Hemolytic, Group G (A)     Comment: This organism is considered to be universally susceptible to penicillin.  No further antibiotic  testing will be performed.        STREP GROUPING G     Respiratory Culture Scant growth (1+) Normal Respiratory Sima     Gram Stain Result Many (4+) WBCs per low power field      Rare (1+) Epithelial cells per low power field      Few (2+) Gram positive cocci in pairs    Meningitis / Encephalitis Panel, PCR - Cerebrospinal Fluid, Lumbar Puncture [[id number]]  (Normal) Collected:  07/16/18 1140    Lab Status:  Final result Specimen:  Cerebrospinal Fluid from Lumbar Puncture Updated:  07/16/18 1419     ESCHERICHIA COLI K1, PCR Not Detected     HAEMOPHILUS INFLUENZAE, PCR Not Detected     LISTERIA MONOCYTOGENES, PCR Not Detected     NEISSERIA MENINGITIDIS, PCR Not Detected     STREPTOCOCCUS AGALACTIAE, PCR Not Detected     STREPTOCOCCUS PNEUMONIAE, PCR Not Detected     CYTOMEGALOVIRUS (CMV), PCR Not Detected     ENTEROVIRUS, PCR Not Detected     HERPES SIMPLEX VIRUS 1 (HSV-1), PCR Not Detected     HERPES SIMPLEX VIRUS 2 (HSV-2), PCR Not Detected     HUMAN PARECHOVIRUS, PCR Not Detected     VARICELLA ZOSTER VIRUS (VZV), PCR Not Detected     CRYPTOCOCCUS NEOFORMANS / GATTII, PCR Not Detected     HUMAN HERPES VIRUS 6 PCR Not Detected          Imaging Results (last 24 hours)     ** No results found for the last 24 hours. **        Results for orders placed during the hospital encounter of 07/16/18   Adult Transthoracic Echo Complete W/ Cont if Necessary Per Protocol    Addendum · Estimated LVEF = 50%. · The following left ventricular wall segments are hypokinetic: mid  anterior, apical septal and mid anteroseptal. · The cardiac valves are anatomically and functionally normal.        Aubrey Jiménez MD 7/16/2018  6:05 PM          Narrative · Left ventricular systolic function is normal. Estimated EF = 50%.  · The cardiac valves are anatomically and functionally normal.          I have reviewed the medications.      amoxicillin 500 mg Oral Q8H   aspirin 81 mg Per G Tube Daily   atorvastatin 80 mg Per G Tube Nightly    baclofen 5 mg Oral TID   clopidogrel 75 mg Per G Tube Daily   enoxaparin 40 mg Subcutaneous Q24H   famotidine 20 mg Per G Tube BID   lactobacillus acidophilus 1 capsule Per G Tube Daily   levETIRAcetam 250 mg Per PEG Tube Q12H   metoprolol tartrate 50 mg Oral Q12H   thiamine 100 mg Per G Tube Daily         Assessment/Plan   Assessment / Plan     Hospital Problem List     * (Principal)S/P Respiratory arrest due to drugs and alcohol. Prolonged ventilatory support requiring tracheostomy and PEG 7/26/18. Now off the ventilator    Alcohol abuse. Intoxicated on admission    Anoxic brain damage     H/O drug abuse    Pulmonary infiltrates, resolved. Suspected pulmonary edema versus aspiration    Overview Signed 7/16/2018  7:42 AM by Ruslan Estrella MD     - pneumonia vs. aspiration pneumonitis vs. pulmonary edema.          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 Resolved    Chronic hepatitis C without hepatic coma (CMS/HCC)    Seizures versus myoclonus and posturing from anoxia on admission. EEG negative (CMS/HCC)    Hypokalemia          Brief Hospital Course to date:  Otilia Holm is a 37 y.o. female transferred from [facility] on 7/16/2018 with unresponsiveness associated with acute hypoxic resp failure and prolonged seizure versus myoclonus due to alcohol and illicit drug use. She was transferred to our ICU and remained there until 8/20. She was seen by multidisciplinary specialties. She was not able to wean off ventilator, so PEG and trach was placed. She cont show decorticate posturing, thought to be from anoxic insult and does not appear that she will recover. She also continued to have persistent fever, tachycardia, and elevated CPK. She was followed by infectious disease for fever and started on Zosyn and Flagyl for poss group C Strep resp infection, but fever cont despite adequate abx, so she was also treated  with Dantrolene for poss neuroleptic malignant hyperthermia. Hopsitalist assumed care on 8/21.      Assessment & Plan:    --Very complex medico-social issues. Pt's son, who I'm sure is relatively young given that pt herself is relatively young, so Im not sure if he has the maturity to comprehend the complexity of pt's medical problems. Pt's son is the only contact listed on chart. I have not seen any visitors.  Overall prognosis is poor.   --Rocephin stopped and patient was started on amoxicillin 500 mg tid to 8/28 per ID  --Cooling blanket PRN for T<102   --Tachycardia improved.  Continue BB and PRN medications (Baclofen, Norco, and IV Morphine).  Palliative following  --on Keppra and valproic acid, appreciate neurology assistance with weaning valproic acid.  --continue TF, Fibersource HN at goal rate of 75 cc/hour (at goal)  --leukocytosis have resolved  --Had urinary retention, so Watson inserted again 8/21  --polysubstance abuse--hepatitis C infection with +RNA pcr, if pt recovers from this acute injury, can consider F/U with [facility]  --ETOH abuse, passed window for WD already      Again, very complex medico-social issues as stated above. Overall condition is relatively stable, but long term prognosis is poor.  Patient has a bed offer from [facility] in [place] and can accept tomorrow.  SW left message with son to contact her to discuss further.  Ambulance tentatively arrange for tomorrow @ 1600.        DVT Prophylaxis:  Lovenox    CODE STATUS:   Code Status and Medical Interventions:   Ordered at: 07/16/18 0620     Code Status:    CPR     Medical Interventions (Level of Support Prior to Arrest):    Full       Disposition:  I expect the patient to be discharged possibly tomorrow.  Ambulance arranged for tomorrow @ 1600.        Electronically signed by KATIA Vasquez, 08/23/18, 11:20 AM.

## 2018-08-23 NOTE — PLAN OF CARE
Problem: Patient Care Overview  Goal: Plan of Care Review  Outcome: Ongoing (interventions implemented as appropriate)   08/22/18 1540   Plan of Care Review   Progress no change     Goal: Individualization and Mutuality  Outcome: Ongoing (interventions implemented as appropriate)   07/21/18 1726   Individualization   Patient Specific Preferences pt unable to answer   Patient Specific Goals (Include Timeframe) pt unable to answer   Patient Specific Interventions standard interventions for comfort   Mutuality/Individual Preferences   What Anxieties, Fears, Concerns, or Questions Do You Have About Your Care? pt unable to answer   What Information Would Help Us Give You More Personalized Care? pt unable to answer   How Would You and/or Your Support Person Like to Participate in Your Care? pt unable to answer   Mutuality/Individual Preferences   How to Address Anxieties/Fears pt unable to answer     Goal: Discharge Needs Assessment  Outcome: Ongoing (interventions implemented as appropriate)   07/17/18 1345 08/03/18 0308 08/17/18 1418   Discharge Needs Assessment   Readmission Within the Last 30 Days no previous admission in last 30 days --  --    Concerns to be Addressed decision making;discharge planning;substance/tobacco abuse/use --  --    Transportation Concerns --  --  car, none   Anticipated Changes Related to Illness --  inability to care for self;inability to work;inability to care for someone else --    Discharge Facility/Level of Care Needs --  nursing facility, skilled --    Current Discharge Risk --  cognitively impaired;physical impairment --    Disability   Equipment Currently Used at Home none --  --      Goal: Interprofessional Rounds/Family Conf  Outcome: Ongoing (interventions implemented as appropriate)   08/22/18 0328   Interdisciplinary Rounds/Family Conf   Participants family;nursing;physician       Problem: Skin Injury Risk (Adult)  Goal: Skin Health and Integrity  Outcome: Ongoing (interventions  implemented as appropriate)   08/23/18 0349   Skin Injury Risk (Adult)   Skin Health and Integrity making progress toward outcome       Problem: Palliative Care (Adult)  Goal: Maximized Comfort  Outcome: Ongoing (interventions implemented as appropriate)   08/23/18 0349   Palliative Care (Adult)   Maximized Comfort making progress toward outcome       Problem: Cardiac Output Decreased (Adult)  Goal: Effective Tissue Perfusion  Outcome: Ongoing (interventions implemented as appropriate)   08/23/18 0349   Cardiac Output Decreased (Adult)   Effective Tissue Perfusion making progress toward outcome       Problem: Nutrition, Enteral (Adult)  Goal: Signs and Symptoms of Listed Potential Problems Will be Absent, Minimized or Managed (Nutrition, Enteral)  Outcome: Ongoing (interventions implemented as appropriate)   08/19/18 0658   Goal/Outcome Evaluation   Problems Assessed (Enteral Nutrition) all   Problems Present (Enteral Nutrition) none       Problem: Fall Risk (Adult)  Goal: Absence of Fall  Outcome: Ongoing (interventions implemented as appropriate)   08/23/18 0349   Fall Risk (Adult)   Absence of Fall making progress toward outcome

## 2018-08-24 VITALS
BODY MASS INDEX: 30.96 KG/M2 | SYSTOLIC BLOOD PRESSURE: 110 MMHG | WEIGHT: 143.5 LBS | TEMPERATURE: 99.5 F | HEIGHT: 57 IN | DIASTOLIC BLOOD PRESSURE: 76 MMHG | OXYGEN SATURATION: 100 % | HEART RATE: 119 BPM | RESPIRATION RATE: 18 BRPM

## 2018-08-24 LAB — GLUCOSE BLDC GLUCOMTR-MCNC: 111 MG/DL (ref 70–130)

## 2018-08-24 PROCEDURE — 94799 UNLISTED PULMONARY SVC/PX: CPT

## 2018-08-24 PROCEDURE — 25010000002 MORPHINE SULFATE (PF) 2 MG/ML SOLUTION: Performed by: HOSPITALIST

## 2018-08-24 PROCEDURE — 82962 GLUCOSE BLOOD TEST: CPT

## 2018-08-24 PROCEDURE — 99239 HOSP IP/OBS DSCHRG MGMT >30: CPT | Performed by: INTERNAL MEDICINE

## 2018-08-24 PROCEDURE — 25010000002 ENOXAPARIN PER 10 MG: Performed by: INTERNAL MEDICINE

## 2018-08-24 PROCEDURE — 94760 N-INVAS EAR/PLS OXIMETRY 1: CPT

## 2018-08-24 PROCEDURE — 25010000002 LORAZEPAM PER 2 MG: Performed by: HOSPITALIST

## 2018-08-24 RX ORDER — CLOPIDOGREL BISULFATE 75 MG/1
75 TABLET ORAL DAILY
Qty: 30 TABLET | Refills: 0 | Status: SHIPPED | OUTPATIENT
Start: 2018-08-25 | End: 2018-10-24 | Stop reason: HOSPADM

## 2018-08-24 RX ORDER — BISACODYL 10 MG
10 SUPPOSITORY, RECTAL RECTAL DAILY PRN
Qty: 30 SUPPOSITORY | Refills: 0 | Status: SHIPPED | OUTPATIENT
Start: 2018-08-24

## 2018-08-24 RX ORDER — METOPROLOL TARTRATE 50 MG/1
50 TABLET, FILM COATED ORAL EVERY 12 HOURS SCHEDULED
Qty: 60 TABLET | Refills: 0 | Status: ON HOLD | OUTPATIENT
Start: 2018-08-24 | End: 2018-10-24

## 2018-08-24 RX ORDER — L.ACID,PARA/B.BIFIDUM/S.THERM 8B CELL
1 CAPSULE ORAL DAILY
Qty: 30 CAPSULE | Refills: 0 | Status: SHIPPED | OUTPATIENT
Start: 2018-08-25

## 2018-08-24 RX ORDER — BACLOFEN 5 MG/1
5 TABLET ORAL 3 TIMES DAILY
Qty: 90 TABLET | Refills: 0 | Status: ON HOLD | OUTPATIENT
Start: 2018-08-24 | End: 2018-10-24

## 2018-08-24 RX ORDER — ATORVASTATIN CALCIUM 80 MG/1
80 TABLET, FILM COATED ORAL NIGHTLY
Qty: 30 TABLET | Refills: 0 | Status: SHIPPED | OUTPATIENT
Start: 2018-08-24 | End: 2018-10-24 | Stop reason: HOSPADM

## 2018-08-24 RX ORDER — FAMOTIDINE 20 MG/1
20 TABLET, FILM COATED ORAL 2 TIMES DAILY
Qty: 60 TABLET | Refills: 0 | Status: SHIPPED | OUTPATIENT
Start: 2018-08-24

## 2018-08-24 RX ORDER — LORAZEPAM 0.5 MG/1
0.5 TABLET ORAL EVERY 8 HOURS PRN
Qty: 9 TABLET | Refills: 0 | Status: ON HOLD | OUTPATIENT
Start: 2018-08-24 | End: 2018-10-24

## 2018-08-24 RX ORDER — ASPIRIN 81 MG/1
81 TABLET, CHEWABLE ORAL DAILY
Qty: 30 TABLET | Refills: 0 | Status: SHIPPED | OUTPATIENT
Start: 2018-08-25

## 2018-08-24 RX ORDER — AMOXICILLIN 125 MG/5ML
500 POWDER, FOR SUSPENSION ORAL EVERY 8 HOURS SCHEDULED
Qty: 320 ML | Refills: 0 | Status: SHIPPED | OUTPATIENT
Start: 2018-08-24 | End: 2018-10-24 | Stop reason: HOSPADM

## 2018-08-24 RX ORDER — LANOLIN ALCOHOL/MO/W.PET/CERES
100 CREAM (GRAM) TOPICAL DAILY
Qty: 30 TABLET | Refills: 0 | Status: SHIPPED | OUTPATIENT
Start: 2018-08-25

## 2018-08-24 RX ORDER — HYDROCODONE BITARTRATE AND ACETAMINOPHEN 7.5; 325 MG/1; MG/1
1 TABLET ORAL EVERY 4 HOURS PRN
Qty: 18 TABLET | Refills: 0 | Status: SHIPPED | OUTPATIENT
Start: 2018-08-24 | End: 2018-10-24 | Stop reason: HOSPADM

## 2018-08-24 RX ADMIN — MORPHINE SULFATE 2 MG: 2 INJECTION, SOLUTION INTRAMUSCULAR; INTRAVENOUS at 05:46

## 2018-08-24 RX ADMIN — METOPROLOL TARTRATE 50 MG: 50 TABLET ORAL at 08:54

## 2018-08-24 RX ADMIN — BACLOFEN 5 MG: 10 TABLET ORAL at 08:54

## 2018-08-24 RX ADMIN — BACLOFEN 5 MG: 10 TABLET ORAL at 20:45

## 2018-08-24 RX ADMIN — ENOXAPARIN SODIUM 40 MG: 100 INJECTION SUBCUTANEOUS at 08:54

## 2018-08-24 RX ADMIN — MORPHINE SULFATE 2 MG: 2 INJECTION, SOLUTION INTRAMUSCULAR; INTRAVENOUS at 18:27

## 2018-08-24 RX ADMIN — FAMOTIDINE 20 MG: 20 TABLET ORAL at 20:44

## 2018-08-24 RX ADMIN — FAMOTIDINE 20 MG: 20 TABLET ORAL at 08:54

## 2018-08-24 RX ADMIN — METOPROLOL TARTRATE 50 MG: 50 TABLET ORAL at 20:45

## 2018-08-24 RX ADMIN — MORPHINE SULFATE 2 MG: 2 INJECTION, SOLUTION INTRAMUSCULAR; INTRAVENOUS at 20:46

## 2018-08-24 RX ADMIN — LORAZEPAM 1 MG: 2 INJECTION INTRAMUSCULAR; INTRAVENOUS at 01:04

## 2018-08-24 RX ADMIN — MORPHINE SULFATE 2 MG: 2 INJECTION, SOLUTION INTRAMUSCULAR; INTRAVENOUS at 01:04

## 2018-08-24 RX ADMIN — Medication 1 CAPSULE: at 08:54

## 2018-08-24 RX ADMIN — MORPHINE SULFATE 2 MG: 2 INJECTION, SOLUTION INTRAMUSCULAR; INTRAVENOUS at 15:33

## 2018-08-24 RX ADMIN — AMOXICILLIN 500 MG: 125 POWDER, FOR SUSPENSION ORAL at 15:34

## 2018-08-24 RX ADMIN — LORAZEPAM 1 MG: 2 INJECTION INTRAMUSCULAR; INTRAVENOUS at 05:46

## 2018-08-24 RX ADMIN — MORPHINE SULFATE 2 MG: 2 INJECTION, SOLUTION INTRAMUSCULAR; INTRAVENOUS at 11:17

## 2018-08-24 RX ADMIN — LORAZEPAM 1 MG: 2 INJECTION INTRAMUSCULAR; INTRAVENOUS at 18:26

## 2018-08-24 RX ADMIN — HYDROCODONE BITARTRATE AND ACETAMINOPHEN 1 TABLET: 7.5; 325 TABLET ORAL at 08:54

## 2018-08-24 RX ADMIN — LORAZEPAM 1 MG: 2 INJECTION INTRAMUSCULAR; INTRAVENOUS at 11:17

## 2018-08-24 RX ADMIN — DESMOPRESSIN ACETATE 80 MG: 0.2 TABLET ORAL at 20:45

## 2018-08-24 RX ADMIN — Medication 100 MG: at 08:55

## 2018-08-24 RX ADMIN — ASPIRIN 81 MG 81 MG: 81 TABLET ORAL at 08:55

## 2018-08-24 RX ADMIN — CLOPIDOGREL BISULFATE 75 MG: 75 TABLET ORAL at 08:54

## 2018-08-24 RX ADMIN — HYDROCODONE BITARTRATE AND ACETAMINOPHEN 1 TABLET: 7.5; 325 TABLET ORAL at 02:54

## 2018-08-24 RX ADMIN — AMOXICILLIN 500 MG: 125 POWDER, FOR SUSPENSION ORAL at 05:46

## 2018-08-24 RX ADMIN — AMOXICILLIN 500 MG: 125 POWDER, FOR SUSPENSION ORAL at 20:50

## 2018-08-24 RX ADMIN — BACLOFEN 5 MG: 10 TABLET ORAL at 15:37

## 2018-08-24 NOTE — PROGRESS NOTES
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 chloride 20 mEq in 50 mL IVPB, 20 mEq, Intravenous, Q1H PRN, Alec Menjivar, APRN, Last Rate: 50 mL/hr at 07/20/18 0954, 20 mEq at 07/20/18 0954  •  sodium chloride 0.9 % flush 1-10 mL, 1-10 mL, Intravenous, PRN, Alec Menjivar, APRN  •  sodium chloride 0.9 % flush 10 mL, 10 mL, Intravenous, PRN, Jennifer Edwards MD  •  thiamine (VITAMIN B-1) tablet 100 mg, 100 mg, Per G Tube, Daily, Bud Piña MD, 100 mg at 08/23/18 0832  Antibiotics:  Anti-Infectives     Ordered     Dose/Rate Route Frequency Start Stop    08/22/18 1631  amoxicillin (AMOXIL) 125 MG/5ML suspension 500 mg     Ordering Provider:  Rachell Vickers MD    500 mg Oral Every 8 Hours Scheduled 08/22/18 2200 08/29/18 2159    08/16/18 1416  cefTRIAXone (ROCEPHIN) IVPB 1 g     Ordering Provider:  Bud Sutton MD    1 g  100  mL/hr over 30 Minutes Intravenous Every 24 Hours 08/16/18 1600 08/22/18 1830    08/15/18 1032  piperacillin-tazobactam (ZOSYN) 4.5 g in iso-osmotic dextrose 100 mL IVPB (premix)     Ordering Provider:  Rad Stanton MD    4.5 g  over 30 Minutes Intravenous Once 08/15/18 1130 08/15/18 1247    08/15/18 1046  vancomycin 1250 mg/250 mL 0.9% NS IVPB ([facility])     Ordering Provider:  Diana Smyth, [facility]    1,250 mg  over 90 Minutes Intravenous Once 08/15/18 1130 08/15/18 1505    08/07/18 2311  metroNIDAZOLE (FLAGYL) tablet 500 mg     Ordering Provider:  Rachell Vickers MD    500 mg Nasogastric Every 8 Hours Scheduled 08/08/18 1400 08/14/18 0458    08/07/18 1221  metroNIDAZOLE (FLAGYL) IVPB 500 mg     Chino Rivera [facility] reviewed the order on 08/07/18 2311.   Ordering Provider:  Rachell Vickers MD    500 mg  over 60 Minutes Intravenous Every 8 Hours 08/07/18 1400 08/08/18 0606    08/06/18 1724  micafungin 100 mg/100 mL 0.9% NS IVPB ([facility])     Rachell Vickers MD reviewed the order on 08/09/18 1611.   Ordering Provider:  Jennifer Edwards MD    100 mg  over 60 Minutes Intravenous Every 24 Hours Scheduled 08/06/18 1800 08/10/18 1252    08/05/18 1436  piperacillin-tazobactam (ZOSYN) 4.5 g in iso-osmotic dextrose 100 mL IVPB (premix)     Ordering Provider:  Dayday Jacobs MD    4.5 g  over 4 Hours Intravenous Every 8 Hours 08/05/18 2130 08/12/18 1613    08/05/18 1436  piperacillin-tazobactam (ZOSYN) 4.5 g in iso-osmotic dextrose 100 mL IVPB (premix)     Ordering Provider:  Dayday Jacobs MD    4.5 g  over 30 Minutes Intravenous Once 08/05/18 1530 08/05/18 1536    08/05/18 1441  vancomycin 1250 mg/250 mL 0.9% NS IVPB ([facility])     Ordering Provider:  Sofia Hardy RPH    20 mg/kg × 62.8 kg  over 90 Minutes Intravenous Once 08/05/18 1530 08/05/18 1728    07/29/18 1946  vancomycin 1500 mg/500 mL 0.9% NS IVPB ([facility])     Ordering Provider:  Elisabeth Benites APRN    25 mg/kg × 64.9 kg  333.3 mL/hr over 90  "Minutes Intravenous Once 07/29/18 2100 07/29/18 2156    07/29/18 0527  piperacillin-tazobactam (ZOSYN) 4.5 g in iso-osmotic dextrose 100 mL IVPB (premix)     Ordering Provider:  Gloria Ochoa APRN    4.5 g  over 30 Minutes Intravenous Once 07/29/18 0615 07/29/18 0707    07/25/18 1653  ceFAZolin in dextrose (ANCEF) IVPB solution 2 g     Ordering Provider:  Segundo Galicia MD    2 g  over 30 Minutes Intravenous Once 07/26/18 0930 07/26/18 0958    07/17/18 1008  ampicillin-sulbactam (UNASYN) 3 g in sodium chloride 0.9 % 100 mL IVPB-MBP     Ordering Provider:  Jani Sutton MD    3 g  over 60 Minutes Intravenous Every 6 Hours Scheduled 07/17/18 1100 07/23/18 0631    07/16/18 1034  vancomycin 1750 mg/500 mL 0.9% NS IVPB ([facility])     Ordering Provider:  Jani Sutton MD    25 mg/kg × 65 kg  over 120 Minutes Intravenous Once 07/16/18 1200 07/16/18 1630    07/16/18 0737  piperacillin-tazobactam (ZOSYN) 4.5 g in iso-osmotic dextrose 100 mL IVPB (premix)     Ordering Provider:  Ruslan Estrella MD    4.5 g  over 30 Minutes Intravenous Once 07/16/18 0815 07/16/18 0918          Allergies:  has No Known Allergies.      Review of Systems: All other reviewed and negative except as per HPI    /71 (BP Location: Left leg, Patient Position: Lying)   Pulse 102   Temp 98.8 °F (37.1 °C) (Oral)   Resp 22   Ht 144.8 cm (57.01\")   Wt 64.4 kg (142 lb)   SpO2 95%   BMI 30.72 kg/m²   Temp:  [97.9 °F (36.6 °C)-98.8 °F (37.1 °C)] 98.8 °F (37.1 °C)    GENERAL:  Unresponsive, s/p trach and peg; less restless, tachycardic moving around in bed  HEENT: Oropharynx without thrush. .. No cervical adenopathy. No neck masses Neck supple  EYES: pupils poorly reactive to light. No conjunctival injection. No icterus.   LYMPHATICS: No lymphadenopathy of the neck or axillary or inguinal regions.   HEART: No murmur, gallop, or pericardial friction rub.   LUNGS:decreased bs at bases. No respiratory " distress  ABDOMEN: Soft, nontender, nondistended. PEG site w/o erythema or drainage No appreciable HSM.    SKIN: Warm and dry without cutaneous eruptions.    Neuro:unresponsive; muscle tone stiff  EXT:  No cellulitic change       DIAGNOSTICS:  Lab Results   Component Value Date    WBC 9.03 08/22/2018    HGB 9.4 (L) 08/22/2018    HCT 30.8 (L) 08/22/2018     08/22/2018     Lab Results   Component Value Date    CRP 3.01 (H) 08/20/2018     Lab Results   Component Value Date    SEDRATE 52 (H) 08/19/2018     Lab Results   Component Value Date    GLUCOSE 126 (H) 08/22/2018    BUN 17 08/22/2018    CREATININE 0.42 (L) 08/22/2018    EGFRIFNONA >150 08/22/2018    BCR 40.5 (H) 08/22/2018    CO2 26.0 08/22/2018    CALCIUM 8.9 08/22/2018    ALBUMIN 3.67 08/20/2018    AST 60 (H) 08/20/2018    ALT 59 (H) 08/20/2018       Microbiology:  Blood Culture   Date Value Ref Range Status   08/05/2018 No growth at 3 days  Preliminary   08/05/2018 No growth at 3 days  Preliminary              MRSA SCREEN CX   Date Value Ref Range Status   08/06/2018   Final    No Methicillin Resistant Staphylococcus aureus isolated        Urine Culture   Date Value Ref Range Status   08/06/2018 No growth at 2 days  Final        Lab Results   Component Value Date    BLOODCX No growth at 5 days 08/15/2018    BLOODCX No growth at 5 days 08/15/2018     Lab Results   Component Value Date    URINECX No growth at 2 days 08/06/2018       RADIOLOGY:  Imaging Results (last 72 hours)     Procedure Component Value Units Date/Time    MRI Brain Without Contrast [[id number]] Collected:  08/07/18 1731     Updated:  08/08/18 1345    Narrative:       EXAMINATION: MRI BRAIN WO CONTRAST- 08/07/2018     INDICATION: encephalopathy; R09.2-Respiratory arrest;  G93.40-Encephalopathy, unspecified; R74.8-Abnormal levels of other serum  enzymes; Z74.09-Other reduced mobility      TECHNIQUE: Multiplanar MRI of the brain without intravenous contrast  administration.     COMPARISON:  MRI dated 07/25/2018     FINDINGS: No restriction on diffusion-weighted imaging. Midline  structures are symmetric without evidence of mass, mass effect or  midline shift however motion degraded axial T2 and FLAIR imaging  sequences limit evaluation. Small amount of T2 and FLAIR increased  signal abnormalities within the periventricular and deep white matter  consistent with chronic small vessel ischemic disease. Pituitary and  sella within normal limits. Cervicomedullary junction widely patent.  Globes and orbits retain normal T2 signal characteristics. Visualized  paranasal sinuses and mastoid air cells demonstrate mucus retention  cysts and minimal mucosal edema right maxillary sinus otherwise grossly  clear and well-pneumatized. No cerebellopontine angle mass lesion.       Impression:       No acute intracranial abnormality specifically no evidence  for acute infarction or restricted diffusion. Minimal chronic small  vessel ischemic changes of the supratentorial white matter however may  represent sequela of migraines or demyelinating white matter disease in  a patient of this age, unchanged from prior exam. Mucous retention cyst  and mild mucosal thickening of the right maxillary sinus.         D:  08/07/2018  E:  08/08/2018     This report was finalized on 8/8/2018 1:43 PM by Dr. Fernando Zuluaga.       XR Chest 1 View [[id number]] Collected:  08/07/18 1036     Updated:  08/07/18 1107    Narrative:       EXAMINATION: XR CHEST 1 VW- 08/07/2018     INDICATION: fever, vomiting     TECHNIQUE:  Single view frontal chest.     COMPARISONS: 08/06/2018     FINDINGS:  Lungs are without focal abnormality. No pleural effusion or  pneumothorax. Cardiomediastinal silhouette is within normal limits.  Upper chest surgical clips again noted.       Impression:       Stable exam.     D:  08/07/2018  E:  08/07/2018     This report was finalized on 8/7/2018 11:04 AM by Cassius White.       XR Abdomen KUB [[id number]] Collected:  08/07/18  1038     Updated:  08/07/18 1107    Narrative:       EXAMINATION: XR ABDOMEN KUB-      INDICATION: Fever and vomiting.     TECHNIQUE:  KUB.     COMPARISONS:  08/04/2018.     FINDINGS:  Moderate stool burden. No evidence of bowel obstruction. No  acutely concerning calcifications. Gastrostomy tube in place.       Impression:       No acute finding.     D:  08/07/2018  E:  08/07/2018     This report was finalized on 8/7/2018 11:04 AM by Cassius White.       XR Chest 1 View [[id number]] Collected:  08/06/18 0907     Updated:  08/06/18 0946    Narrative:       EXAMINATION: XR CHEST 1 VW-08/06/2018:      INDICATION: Fever, suspect aspiration (trach/peg); R09.2-Respiratory  arrest; G93.40-Encephalopathy, unspecified; R74.8-Abnormal levels of  other serum enzymes.      COMPARISON: 08/04/2018.     FINDINGS: The cardiac silhouette is normal. A tracheostomy tube is well  positioned. There is no acute inflammatory process. There is no mass or  effusion.           Impression:       No active disease.     D:  08/06/2018  E:  08/06/2018     This report was finalized on 8/6/2018 9:44 AM by Dr. Eric Luis MD.       CT Angiogram Chest With & Without Contrast [[id number]] Collected:  08/05/18 1559     Updated:  08/05/18 1956    Narrative:       EXAM:  CT Angiography Chest With Intravenous Contrast    CLINICAL HISTORY:  37 years old, female; Encephalopathy, unspecified; Respiratory arrest; Abnormal   levels of other serum enzymes; Signs and symptoms; Cough and shortness of   breath; Additional info: Tachycardia, elevated d-dimer, tracheostomy, concern   for aspiration as well    TECHNIQUE:  Axial computed tomographic angiography images of the chest with intravenous   contrast using pulmonary embolism protocol.  All CT scans at this facility use   at least one of these dose optimization techniques: automated exposure control;   mA and/or kV adjustment per patient size (includes targeted exams where dose is   matched to clinical  indication); or iterative reconstruction.  MIP reconstructed images were created and reviewed.    CONTRAST:  150 mL of isovue 370 administered intravenously.      COMPARISON:  CR - XR CHEST 1 VW 2018-08-04 10:12    FINDINGS:  Pulmonary arteries:  No pulmonary embolism.  Aorta:  No acute findings.  Lungs:  Small cluster of tree in bud nodules within the posterior medial aspect   of the superior segment right lower lobe, likely minimal bronchopneumonia or   aspiration pneumonitis.  Mild upper lobe predominant paraseptal and   centrilobular emphysema.  Pleural space:  Small bilateral pleural effusions, with associated posterior   atelectasis.  No pneumothorax.  Heart:  Normal.  Bones/joints:  No acute fracture.  No dislocation.  Soft tissues:  Normal.  Lymph nodes:  Calcified right hilar lymph nodes, compatible with prior   granulomatous disease.  Spleen:  Splenic calcification, compatible with prior granulomatous disease.  Tubes, lines and devices:  Tracheostomy tube tip in midthoracic trachea.      Impression:         1.  No pulmonary embolism.    2.  Small cluster of tree in bud nodules within the posterior medial aspect of   the superior segment right lower lobe, likely minimal bronchopneumonia or   aspiration pneumonitis.    3.  Incidental/non-acute findings are described above.    THIS DOCUMENT HAS BEEN ELECTRONICALLY SIGNED BY [facility] JERONIMO MD             ASSESSMENT     --  Fever  The group C strep respiratory infection is being appropriately treated but she has had fever, tachycardia and restlessness Improved    -- Suspected anoxic encephalopathy        -- Polysubstance abuse--Hepatitis C infection with + RNA pcr     --  Possible Seizure disorder     -     PLAN     -- change  Rocephin to amoxicillin 500mg tid to 8/28 per peg    -- neurology is weaning valproic acid and keppra    -- agree with stopping seroquel     --probiotic          -- hold cooling blanket for T < 102 to more accurately follow fever               Thank you for this consult.  Our group would be pleased to follow this patient over the course of their hospitalization and assist with outpatient antimicrobial therapy, as indicated.        Rachell Vickers MD  8/23/2018  10:00 PM

## 2018-08-24 NOTE — PLAN OF CARE
Problem: Patient Care Overview  Goal: Plan of Care Review  Outcome: Ongoing (interventions implemented as appropriate)   08/24/18 0640   Coping/Psychosocial   Plan of Care Reviewed With patient   Plan of Care Review   Progress no change     Goal: Individualization and Mutuality  Outcome: Ongoing (interventions implemented as appropriate)   07/21/18 1726   Individualization   Patient Specific Preferences pt unable to answer   Patient Specific Goals (Include Timeframe) pt unable to answer   Patient Specific Interventions standard interventions for comfort   Mutuality/Individual Preferences   What Anxieties, Fears, Concerns, or Questions Do You Have About Your Care? pt unable to answer   What Information Would Help Us Give You More Personalized Care? pt unable to answer   How Would You and/or Your Support Person Like to Participate in Your Care? pt unable to answer   Mutuality/Individual Preferences   How to Address Anxieties/Fears pt unable to answer     Goal: Discharge Needs Assessment  Outcome: Ongoing (interventions implemented as appropriate)   07/17/18 1345 08/03/18 0308 08/17/18 1418   Discharge Needs Assessment   Readmission Within the Last 30 Days no previous admission in last 30 days --  --    Concerns to be Addressed decision making;discharge planning;substance/tobacco abuse/use --  --    Concerns Comments S/P respiratory arrest/intubated and unresponsive. H/O substance abuse --  --    Patient/Family Anticipates Transition to other (see comments) --  --    Patient/Family Anticipated Services at Transition other (see comments) --  --    Transportation Concerns --  --  car, none   Transportation Anticipated other (see comments) --  --    Anticipated Changes Related to Illness --  inability to care for self;inability to work;inability to care for someone else --    Discharge Facility/Level of Care Needs --  nursing facility, skilled --    Current Discharge Risk --  cognitively impaired;physical impairment --     Disability   Equipment Currently Used at Home none --  --        Problem: Skin Injury Risk (Adult)  Goal: Skin Health and Integrity  Outcome: Ongoing (interventions implemented as appropriate)   08/24/18 0640   Skin Injury Risk (Adult)   Skin Health and Integrity making progress toward outcome       Problem: Palliative Care (Adult)  Goal: Maximized Comfort  Outcome: Ongoing (interventions implemented as appropriate)   08/24/18 0640   Palliative Care (Adult)   Maximized Comfort making progress toward outcome       Problem: Cardiac Output Decreased (Adult)  Goal: Effective Tissue Perfusion  Outcome: Ongoing (interventions implemented as appropriate)   08/24/18 0640   Cardiac Output Decreased (Adult)   Effective Tissue Perfusion making progress toward outcome       Problem: Nutrition, Enteral (Adult)  Goal: Signs and Symptoms of Listed Potential Problems Will be Absent, Minimized or Managed (Nutrition, Enteral)  Outcome: Ongoing (interventions implemented as appropriate)   08/24/18 0640   Goal/Outcome Evaluation   Problems Assessed (Enteral Nutrition) all   Problems Present (Enteral Nutrition) none       Problem: Fall Risk (Adult)  Goal: Absence of Fall  Outcome: Ongoing (interventions implemented as appropriate)   08/24/18 0640   Fall Risk (Adult)   Absence of Fall making progress toward outcome

## 2018-08-24 NOTE — DISCHARGE SUMMARY
[facility]  DISCHARGE SUMMARY    Patient Name: Otilia Holm  : 1980  MRN: [id number]    Date of Admission: 2018  Date of Discharge:  2018  Primary Care Physician: Provider, No Known    Consults     Date and Time Order Name Status Description    2018 003 Inpatient Neurology Consult      2018 0030 Inpatient Infectious Diseases Consult Completed     2018 1550 Inpatient Palliative Care MD Consult Completed     2018 003 Inpatient Infectious Diseases Consult Completed     2018 1422 Inpatient General Surgery Consult Completed     2018 0913 Inpatient Palliative Care MD Consult Completed     2018 1032 Inpatient Cardiology Consult Completed     2018 0620 Inpatient Neurology Consult Completed         Hospital Course     Presenting Problem:   Acute respiratory failure (CMS/HCC) [J96.00]  Respiratory arrest (CMS/HCC) [R09.2]    Active Hospital Problems    Diagnosis Date Noted   • **S/P Respiratory arrest due to drugs and alcohol. Prolonged ventilatory support requiring tracheostomy and PEG 18. Now off the ventilator [R09.2] 2018   • Hypokalemia [E87.6] 2018   • Seizures versus myoclonus and posturing from anoxia on admission. 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 Resolved [G21.0] 2018   • Persistent urinary retention requiring in and out catheterization [R33.8] 2018   • Alcohol abuse. Intoxicated on admission [F10.10] 2018   • Anoxic brain damage  [G93.1] 2018   • H/O drug abuse [Z87.898] 2018   • Pulmonary infiltrates, resolved. Suspected pulmonary edema versus aspiration [R91.8] 2018      Resolved Hospital Problems    Diagnosis Date Noted Date Resolved   No resolved problems to display.          Hospital Course:  Otilia Holm  is a 37 y.o. female transferred from [facility] on 7/16/2018 with unresponsiveness associated with acute hypoxic resp failure and prolonged seizure versus myoclonus due to alcohol and illicit drug use. She was admitted to the ICU then transferred to the floor earlier this month. She went back to the ICU for closer nursing monitoring and came back to our service 8/20. She was seen by multidisciplinary specialties. She was not able to wean off ventilator, so PEG and trach were placed. She cont show decorticate posturing, thought to be from anoxic insult and does not appear that she will recover. She also continued to have persistent fever, tachycardia, and elevated CPK. She was followed by infectious disease for fever and started on Zosyn and Flagyl for poss group C Strep resp infection, but fever cont despite adequate abx, so she was also treated with Dantrolene for poss neuroleptic malignant hyperthermia. Hospitalist assumed care on 8/21.Pt will continue Amoxicillin 500mg TID until 8/28 per ID.  She will continue beta blocker on discharge for her tachycardia.  She will also continue PRN pain medication and anxiolytic.  She is on Fibersource TFs with a goal rate of 75cc/hr.  Although her overall prognosis is poor, she has been stable over the last few days.  She will be transferred to [facility] in [place] this afternoon.  Pt will go to facility with Watson in place, she failed a voiding trial on 8/21.  They may want to attempt a voiding trial again in about a week's time.        Discharge Follow Up Recommendations for labs/diagnostics:       Day of Discharge     HPI:   Pt unresponsive this am. No issues overnight per RN.     Review of Systems  Unable to obtain due to mental status.     Otherwise ROS is negative except as mentioned in the HPI.    Vital Signs:   Temp:  [98.8 °F (37.1 °C)-99.5 °F (37.5 °C)] 99.5 °F (37.5 °C)  Heart Rate:  [102-120] 120  Resp:  [18-22] 20  BP: (119-143)/() 119/92      Physical Exam:  Constitutional: No acute cardiopulm distress  HENT: NCAT, mucous membranes moist, trach intact  Respiratory: Good air flow bilat, mild crackles at bases, no wheezing, resp non-labored, respiratory effort normal on 28% oxygen trach collar  Cardiovascular: RRR, S1/S2 normal, no murmurs  Gastrointestinal: Positive bowel sounds, soft, nontender, non distended, PEG site without drainage or erythema  Musculoskeletal: No bilateral ankle edema  Neurologic: Eyes closed, moves right leg spontaneously, + bilat UE decorticate posturing, does not withdraw from painful stimuli in all ext  Skin: No rashes  Pertinent  and/or Most Recent Results       Results from last 7 days  Lab Units 08/22/18  1927 08/22/18  0635 08/21/18  0511 08/20/18  0440 08/19/18  0856   WBC 10*3/mm3  --  9.03 11.38* 12.18* 11.11*   HEMOGLOBIN g/dL  --  9.4* 10.3* 10.4* 10.1*   HEMATOCRIT %  --  30.8* 33.3* 33.9* 32.7*   PLATELETS 10*3/mm3  --  348 386 376 363   SODIUM mmol/L  --  138 143 143 142   POTASSIUM mmol/L 4.3 3.4* 4.0 4.2 4.2   CHLORIDE mmol/L  --  103 104 106 107   CO2 mmol/L  --  26.0 29.0 30.0 29.0   BUN mg/dL  --  17 18 15 14   CREATININE mg/dL  --  0.42* 0.50* 0.43* 0.47*   GLUCOSE mg/dL  --  126* 124* 108* 135*   CALCIUM mg/dL  --  8.9 9.3 9.2 9.3       Results from last 7 days  Lab Units 08/20/18  0440 08/19/18  0856   BILIRUBIN mg/dL 0.3 0.3   ALK PHOS U/L 64 63   ALT (SGPT) U/L 59* 63*   AST (SGOT) U/L 60* 73*       Results from last 7 days  Lab Units 08/20/18  0440   CHOLESTEROL mg/dL 121   TRIGLYCERIDES mg/dL 93         Brief Urine Lab Results  (Last result in the past 365 days)      Color   Clarity   Blood   Leuk Est   Nitrite   Protein   CREAT   Urine HCG        08/20/18 0238 Yellow Turbid(A) Negative Negative Negative Negative               Microbiology Results Abnormal     Procedure Component Value - Date/Time    Blood Culture - Blood, [[id number]]  (Normal) Collected:  08/15/18 1128    Lab Status:  Final result  Specimen:  Blood from Hand, Right Updated:  08/20/18 1201     Blood Culture No growth at 5 days    Blood Culture - Blood, [[id number]]  (Normal) Collected:  08/15/18 1128    Lab Status:  Final result Specimen:  Blood from Hand, Left Updated:  08/20/18 1201     Blood Culture No growth at 5 days    Respiratory Culture - Sputum, Cough [[id number]]  (Abnormal) Collected:  08/15/18 1113    Lab Status:  Final result Specimen:  Sputum from Cough Updated:  08/17/18 0751     Respiratory Culture Moderate growth (3+) Streptococcus, Beta Hemolytic, Group G (A)     Comment: This organism is considered to be universally susceptible to penicillin.  No further antibiotic testing will be performed.        STREP GROUPING G     Respiratory Culture Moderate growth (3+) Normal Respiratory Sima     Gram Stain Result Many (4+) WBCs per low power field      Moderate (3+) Epithelial cells per low power field      Many (4+) Gram positive cocci in pairs, chains and clusters    Respiratory Culture - Aspirate, ET Suction [[id number]] Collected:  08/10/18 0919    Lab Status:  Final result Specimen:  Aspirate from ET Suction Updated:  08/12/18 0701     Respiratory Culture Light growth (2+) Normal Respiratory Sima     Gram Stain Result Many (4+) WBCs per low power field      Few (2+) Epithelial cells per low power field      Few (2+) Gram positive cocci in pairs      Few (2+) Gram positive cocci in clusters      Rare (1+) Gram positive bacilli    Blood Culture - Blood, [[id number]]  (Normal) Collected:  08/05/18 1437    Lab Status:  Final result Specimen:  Blood from Wrist, Right Updated:  08/10/18 1500     Blood Culture No growth at 5 days    Blood Culture - Blood, [[id number]]  (Normal) Collected:  08/05/18 1437    Lab Status:  Final result Specimen:  Blood from Wrist, Left Updated:  08/10/18 1500     Blood Culture No growth at 5 days    Urine Culture - Urine, [[id number]]  (Normal) Collected:  08/06/18 1723    Lab Status:  Final result Specimen:   Urine from Urine, Catheter Updated:  08/08/18 0833     Urine Culture No growth at 2 days    MRSA Screen Culture - Swab, Nares [[id number]]  (Normal) Collected:  08/06/18 1734    Lab Status:  Final result Specimen:  Swab from Nares Updated:  08/08/18 0717     MRSA SCREEN CX No Methicillin Resistant Staphylococcus aureus isolated    Respiratory Culture - Sputum, NT Suction [[id number]]  (Abnormal) Collected:  08/05/18 1711    Lab Status:  Final result Specimen:  Sputum from NT Suction Updated:  08/07/18 1412     Respiratory Culture Light growth (2+) Streptococcus, Beta Hemolytic, Group G (A)     Comment: This organism is considered to be universally susceptible to penicillin.  No further antibiotic testing will be performed.        STREP GROUPING G     Respiratory Culture Light growth (2+) Normal Respiratory Sima     Gram Stain Result Many (4+) WBCs per low power field      Many (4+) Epithelial cells per low power field      Many (4+) Gram positive cocci in pairs, chains and clusters      Many (4+) Gram positive bacilli      Few (2+) Gram negative bacilli    Blood Culture - Blood, Blood, Venous Line [[id number]]  (Normal) Collected:  07/29/18 0350    Lab Status:  Final result Specimen:  Blood from Arm, Left Updated:  08/03/18 0501     Blood Culture No growth at 5 days    Blood Culture - Blood, Blood, Venous Line [[id number]]  (Normal) Collected:  07/29/18 0355    Lab Status:  Final result Specimen:  Blood from Hand, Left Updated:  08/03/18 0501     Blood Culture No growth at 5 days    Narrative:       Aerobic bottle only    Respiratory Culture - Sputum, ET Suction [[id number]] Collected:  07/29/18 0755    Lab Status:  Final result Specimen:  Sputum from ET Suction Updated:  07/31/18 0754     Respiratory Culture Light growth (2+) Normal Respiratory Sima     Gram Stain Result Moderate (3+) WBCs per low power field      Few (2+) Epithelial cells per low power field      Few (2+) Gram positive bacilli      Few (2+) Gram  positive cocci in pairs    MRSA Screen, PCR - Swab, Nares [[id number]]  (Normal) Collected:  07/29/18 1434    Lab Status:  Final result Specimen:  Swab from Nares Updated:  07/29/18 2149     MRSA, PCR Negative    Narrative:         MRSA Negative    Culture, CSF - Cerebrospinal Fluid, Lumbar Puncture [[id number]]  (Normal) Collected:  07/16/18 1140    Lab Status:  Final result Specimen:  Cerebrospinal Fluid from Lumbar Puncture Updated:  07/23/18 1007     CSF Culture No growth at 7 days     Gram Stain Result No WBCs or organisms seen    Blood Culture - Blood, Blood, Venous Line [[id number]]  (Normal) Collected:  07/16/18 0817    Lab Status:  Final result Specimen:  Blood from Hand, Left Updated:  07/21/18 0830     Blood Culture No growth at 5 days    Blood Culture - Blood, Blood, Venous Line [[id number]]  (Normal) Collected:  07/16/18 0817    Lab Status:  Final result Specimen:  Blood from Arm, Right Updated:  07/21/18 0830     Blood Culture No growth at 5 days    Respiratory Culture - Aspirate, ET Suction [[id number]]  (Abnormal) Collected:  07/16/18 1606    Lab Status:  Final result Specimen:  Aspirate from ET Suction Updated:  07/18/18 1450     Respiratory Culture Scant growth (1+) Streptococcus, Beta Hemolytic, Group G (A)     Comment: This organism is considered to be universally susceptible to penicillin.  No further antibiotic testing will be performed.        STREP GROUPING G     Respiratory Culture Scant growth (1+) Normal Respiratory Sima     Gram Stain Result Many (4+) WBCs per low power field      Rare (1+) Epithelial cells per low power field      Few (2+) Gram positive cocci in pairs    Meningitis / Encephalitis Panel, PCR - Cerebrospinal Fluid, Lumbar Puncture [[id number]]  (Normal) Collected:  07/16/18 1140    Lab Status:  Final result Specimen:  Cerebrospinal Fluid from Lumbar Puncture Updated:  07/16/18 1419     ESCHERICHIA COLI K1, PCR Not Detected     HAEMOPHILUS INFLUENZAE, PCR Not Detected      LISTERIA MONOCYTOGENES, PCR Not Detected     NEISSERIA MENINGITIDIS, PCR Not Detected     STREPTOCOCCUS AGALACTIAE, PCR Not Detected     STREPTOCOCCUS PNEUMONIAE, PCR Not Detected     CYTOMEGALOVIRUS (CMV), PCR Not Detected     ENTEROVIRUS, PCR Not Detected     HERPES SIMPLEX VIRUS 1 (HSV-1), PCR Not Detected     HERPES SIMPLEX VIRUS 2 (HSV-2), PCR Not Detected     HUMAN PARECHOVIRUS, PCR Not Detected     VARICELLA ZOSTER VIRUS (VZV), PCR Not Detected     CRYPTOCOCCUS NEOFORMANS / GATTII, PCR Not Detected     HUMAN HERPES VIRUS 6 PCR Not Detected          Imaging Results (all)     Procedure Component Value Units Date/Time    XR Chest 1 View [[id number]] Collected:  08/19/18 1328     Updated:  08/19/18 2340    Narrative:          EXAMINATION: XR CHEST 1 VW - 8/19/2018     INDICATION:  R09.2-Respiratory arrest; G93.40-Encephalopathy,  unspecified; R74.8-Abnormal levels of other serum enzymes; Z74.09-Other  reduced mobility, Respiratory failure.     COMPARISON: 8/15/2018.     FINDINGS: PICC line tip remains in the mid SVC. Tracheostomy tube is  noted. Heart and vasculature appear normal in size. Lungs appear well  expanded and clear.           Impression:       No new chest disease.     DICTATED:   8/19/2018  EDITED/ls :   8/19/2018      This report was finalized on 8/19/2018 11:38 PM by DR. Rad Li MD.       XR Chest 1 View [[id number]] Collected:  08/15/18 1341     Updated:  08/15/18 1345    Narrative:       EXAMINATION: XR CHEST 1 VW- 08/15/2018     INDICATION: R09.2-Respiratory arrest; G93.40-Encephalopathy,  unspecified; R74.8-Abnormal levels of other serum enzymes; Z74.09-Other  reduced mobility     COMPARISON: 08/07/2018     FINDINGS: Portable chest reveals PICC line catheter identified on the  right tip in the SVC. Surgical clips seen in the upper lung fields.  Cardiac and mediastinal silhouettes are within normal limits. The lung  fields are clear. No focal parenchymal opacification present.  No  pleural  effusion or pneumothorax. Degenerative changes seen within the  spine.           Impression:       PICC line catheter identified on the right tip in the SVC.  No new focal parenchymal opacification present.     D:  08/15/2018  E:  08/15/2018     This report was finalized on 8/15/2018 1:43 PM by Dr. Ileana Toledo MD.       CT Abdomen Pelvis Without Contrast [[id number]] Collected:  08/09/18 1032     Updated:  08/09/18 1335    Narrative:       EXAMINATION: CT ABDOMEN AND PELVIS WO CONTRAST-08/09/2018:      INDICATION: Bowel obstruction, low-grade; R09.2-Respiratory arrest;  G93.40-Encephalopathy, unspecified; R74.8-Abnormal levels of other serum  enzymes; Z74.09-Other reduced mobility; Z74.09-Other reduced mobility.      TECHNIQUE: CT abdomen and pelvis without intravenous contrast  administration.     The radiation dose reduction device was turned on for each scan per the  ALARA (As Low as Reasonably Achievable) protocol.     COMPARISON: NONE.     FINDINGS: The lung bases demonstrate moderate subsegmental dependent  atelectasis without focal consolidation. Liver without focal lesion.  Gallbladder distended and grossly unremarkable without focal thickening  or biliary dilatation. Pancreas and spleen within normal limits.  Adrenals without distinct nodule. Kidneys without hydronephrosis or  hydroureter. No obstructive uropathy or urolithiasis is evident. No  bulky retroperitoneal adenopathy. GI tract evaluation demonstrates  percutaneous gastrostomy tube well positioned without abnormality. No  disproportionate dilatation of bowel to suggest mechanical obstructive  process despite moderate colonic stool burden including the rectosigmoid  colon. No free fluid or intra-abdominal free air. Pelvic viscera are  grossly unremarkable including Watson catheter in situ within a  decompressed urinary bladder, however, without bulky pelvic adenopathy  or significant free fluid. Degenerative changes of the spine  without  aggressive osseous or soft tissue body wall lesions of concern.       Impression:       1. No obstructive pathology of the GI tract with percutaneous  gastrostomy tube in place. Moderate colonic stool burden including  moderate rectosigmoid stool burden is noted however. No free fluid or  intra-abdominal free air.  2. No loculated intra-abdominal fluid collection.     D:  08/09/2018  E:  08/09/2018           This report was finalized on 8/9/2018 1:33 PM by Dr. Fernando Zuluaga.       MRI Brain Without Contrast [[id number]] Collected:  08/07/18 1731     Updated:  08/08/18 1345    Narrative:       EXAMINATION: MRI BRAIN WO CONTRAST- 08/07/2018     INDICATION: encephalopathy; R09.2-Respiratory arrest;  G93.40-Encephalopathy, unspecified; R74.8-Abnormal levels of other serum  enzymes; Z74.09-Other reduced mobility      TECHNIQUE: Multiplanar MRI of the brain without intravenous contrast  administration.     COMPARISON: MRI dated 07/25/2018     FINDINGS: No restriction on diffusion-weighted imaging. Midline  structures are symmetric without evidence of mass, mass effect or  midline shift however motion degraded axial T2 and FLAIR imaging  sequences limit evaluation. Small amount of T2 and FLAIR increased  signal abnormalities within the periventricular and deep white matter  consistent with chronic small vessel ischemic disease. Pituitary and  sella within normal limits. Cervicomedullary junction widely patent.  Globes and orbits retain normal T2 signal characteristics. Visualized  paranasal sinuses and mastoid air cells demonstrate mucus retention  cysts and minimal mucosal edema right maxillary sinus otherwise grossly  clear and well-pneumatized. No cerebellopontine angle mass lesion.       Impression:       No acute intracranial abnormality specifically no evidence  for acute infarction or restricted diffusion. Minimal chronic small  vessel ischemic changes of the supratentorial white matter however may  represent  sequela of migraines or demyelinating white matter disease in  a patient of this age, unchanged from prior exam. Mucous retention cyst  and mild mucosal thickening of the right maxillary sinus.         D:  08/07/2018  E:  08/08/2018     This report was finalized on 8/8/2018 1:43 PM by Dr. Fernando Zuluaga.       XR Chest 1 View [[id number]] Collected:  08/07/18 1036     Updated:  08/07/18 1107    Narrative:       EXAMINATION: XR CHEST 1 VW- 08/07/2018     INDICATION: fever, vomiting     TECHNIQUE:  Single view frontal chest.     COMPARISONS: 08/06/2018     FINDINGS:  Lungs are without focal abnormality. No pleural effusion or  pneumothorax. Cardiomediastinal silhouette is within normal limits.  Upper chest surgical clips again noted.       Impression:       Stable exam.     D:  08/07/2018  E:  08/07/2018     This report was finalized on 8/7/2018 11:04 AM by Cassius White.       XR Abdomen KUB [[id number]] Collected:  08/07/18 1038     Updated:  08/07/18 1107    Narrative:       EXAMINATION: XR ABDOMEN KUB-      INDICATION: Fever and vomiting.     TECHNIQUE:  KUB.     COMPARISONS:  08/04/2018.     FINDINGS:  Moderate stool burden. No evidence of bowel obstruction. No  acutely concerning calcifications. Gastrostomy tube in place.       Impression:       No acute finding.     D:  08/07/2018  E:  08/07/2018     This report was finalized on 8/7/2018 11:04 AM by Cassius White.       XR Chest 1 View [[id number]] Collected:  08/06/18 0907     Updated:  08/06/18 0946    Narrative:       EXAMINATION: XR CHEST 1 VW-08/06/2018:      INDICATION: Fever, suspect aspiration (trach/peg); R09.2-Respiratory  arrest; G93.40-Encephalopathy, unspecified; R74.8-Abnormal levels of  other serum enzymes.      COMPARISON: 08/04/2018.     FINDINGS: The cardiac silhouette is normal. A tracheostomy tube is well  positioned. There is no acute inflammatory process. There is no mass or  effusion.           Impression:       No active disease.     D:   08/06/2018  E:  08/06/2018     This report was finalized on 8/6/2018 9:44 AM by Dr. Eric Luis MD.       CT Angiogram Chest With & Without Contrast [[id number]] Collected:  08/05/18 1559     Updated:  08/05/18 1956    Narrative:       EXAM:  CT Angiography Chest With Intravenous Contrast    CLINICAL HISTORY:  37 years old, female; Encephalopathy, unspecified; Respiratory arrest; Abnormal   levels of other serum enzymes; Signs and symptoms; Cough and shortness of   breath; Additional info: Tachycardia, elevated d-dimer, tracheostomy, concern   for aspiration as well    TECHNIQUE:  Axial computed tomographic angiography images of the chest with intravenous   contrast using pulmonary embolism protocol.  All CT scans at this facility use   at least one of these dose optimization techniques: automated exposure control;   mA and/or kV adjustment per patient size (includes targeted exams where dose is   matched to clinical indication); or iterative reconstruction.  MIP reconstructed images were created and reviewed.    CONTRAST:  150 mL of isovue 370 administered intravenously.      COMPARISON:  CR - XR CHEST 1 VW 2018-08-04 10:12    FINDINGS:  Pulmonary arteries:  No pulmonary embolism.  Aorta:  No acute findings.  Lungs:  Small cluster of tree in bud nodules within the posterior medial aspect   of the superior segment right lower lobe, likely minimal bronchopneumonia or   aspiration pneumonitis.  Mild upper lobe predominant paraseptal and   centrilobular emphysema.  Pleural space:  Small bilateral pleural effusions, with associated posterior   atelectasis.  No pneumothorax.  Heart:  Normal.  Bones/joints:  No acute fracture.  No dislocation.  Soft tissues:  Normal.  Lymph nodes:  Calcified right hilar lymph nodes, compatible with prior   granulomatous disease.  Spleen:  Splenic calcification, compatible with prior granulomatous disease.  Tubes, lines and devices:  Tracheostomy tube tip in midthoracic trachea.       Impression:         1.  No pulmonary embolism.    2.  Small cluster of tree in bud nodules within the posterior medial aspect of   the superior segment right lower lobe, likely minimal bronchopneumonia or   aspiration pneumonitis.    3.  Incidental/non-acute findings are described above.    THIS DOCUMENT HAS BEEN ELECTRONICALLY SIGNED BY MERCY JERONIMO MD    XR Chest 1 View [[id number]] Collected:  08/04/18 1031     Updated:  08/04/18 1820    Narrative:          EXAMINATION: XR CHEST 1 VW - 8/4/2018     INDICATION: R09.2-Respiratory arrest; G93.40-Encephalopathy,  unspecified; R74.8-Abnormal levels of other serum enzymes.      COMPARISON: 7/29/2018.     FINDINGS: Postsurgical changes overlying the lung apices similar to  prior. Tracheostomy in place. No pneumothorax or pleural effusion. No  focal consolidation. Cardiac silhouette within normal limits and  unchanged. Degenerative changes of the spine.           Impression:       No acute cardiopulmonary process.     DICTATED:   8/4/2018  EDITED/ls :   8/4/2018      This report was finalized on 8/4/2018 6:18 PM by Dr. Fernando Zuluaga.       XR Abdomen KUB [[id number]] Collected:  08/04/18 1032     Updated:  08/04/18 1820    Narrative:          EXAMINATION: XR ABDOMEN KUB - 8/4/2018     INDICATION: R09.2-Respiratory arrest; G93.40-Encephalopathy,  unspecified; R74.8-Abnormal levels of other serum enzymes.      COMPARISON: None.     FINDINGS: Nonspecific nonobstructive bowel gas pattern with gas and  stool to the level of the rectum. Mild colonic stool burden. No abnormal  calcifications overlie the abdomen or kidneys specifically with  calcified phlebolith right hemipelvis. No acute osseous abnormality.           Impression:       Nonobstructive bowel gas pattern with only mild colonic  stool burden in the proximal ascending and rectal region. No gross  intraperitoneal free air.     DICTATED:   8/4/2018  EDITED/ls :   8/4/2018      This report was finalized on 8/4/2018 6:18  PM by Dr. Fernando Zuluaga.       XR Chest 1 View [[id number]] Collected:  07/29/18 0519     Updated:  07/29/18 0646    Narrative:       EXAM:    XR Chest, 1 View     EXAM DATE/TIME:    7/29/2018 5:19 AM     CLINICAL HISTORY:    37 years old, female; Signs and symptoms; Other: Possible aspiration     TECHNIQUE:    XR of the chest, 1 view.     COMPARISON:    CR - XR CHEST 1  2018-07-28 04:34     FINDINGS:    Tubes, lines and devices:  A tracheostomy tube is noted in satisfactory   position.    Lungs:  Lungs are clear.    Pleural space:  There is no evidence of pleural effusion.    Heart/Mediastinum:  There is no cardiomegaly .    Bones/joints:  Osseous structures are unchanged.       Impression:       Stable radiographic appearance of the chest when compared to CR - XR CHEST 1    2018-07-28 04:34.     THIS DOCUMENT HAS BEEN ELECTRONICALLY SIGNED BY CAMELIA RAE MD    XR Chest 1 View [[id number]] Collected:  07/28/18 1258     Updated:  07/28/18 1431    Narrative:       EXAMINATION: XR CHEST 1 VW - 7/28/2018     INDICATION: R09.2-Respiratory arrest; G93.40-Encephalopathy,  unspecified; R74.8-Abnormal levels of other serum enzymes.     TECHNIQUE:  Single view frontal chest.     COMPARISONS: 7/27/2018.     FINDINGS:  Tracheostomy in place. Right IJ line has been removed. No  focal airspace disease, pleural effusion or pneumothorax.       Impression:       No acute finding.     DICTATED:   7/28/2018  EDITED/ls :   7/28/2018      This report was finalized on 7/28/2018 2:29 PM by Cassius White.       XR Chest 1 View [[id number]] Collected:  07/27/18 0853     Updated:  07/28/18 0035    Narrative:       EXAMINATION: XR CHEST 1 VW- 07/27/2018     INDICATION: R09.2-Respiratory arrest; G93.40-Encephalopathy,  unspecified; R74.8-Abnormal levels of other serum enzymes      COMPARISON: 07/25/2018     FINDINGS: ET tube has been replaced by a tracheostomy tube. No  pneumothorax or pneumo mediastinum is seen. Right IJ catheter  remains in  the SVC. The heart and vasculature appear normal in size. Lungs appear  grossly clear.        Impression:       Tracheostomy tube placement. No new chest disease is seen.     D:  07/27/2018  E:  07/27/2018     This report was finalized on 7/28/2018 12:33 AM by DR. Rad Li MD.       MRI Brain With & Without Contrast [[id number]] Collected:  07/25/18 1344     Updated:  07/25/18 1555    Narrative:       EXAMINATION: MRI BRAIN W WO CONTRAST- 07/25/2018     INDICATION: Anoxic brain damage, not elsewhere classified;  R09.2-Respiratory arrest; G93.40-Encephalopathy, unspecified;  R74.8-Abnormal levels of other serum enzymes     TECHNIQUE:  Multiplanar multisequence MRI of the brain was performed  without contrast.     COMPARISONS:  Brain death study 07/23/2018, CT head without contrast  07/21/2018 and MRI/MRA brain 07/16/2018.     FINDINGS:  Evaluation limited by motion.     Parenchymal brain volume is within normal limits. Ventricles are normal  in size and configuration. There is no significant parenchymal signal  abnormality on the diffusion weighted series essentially excluding  global anoxia. There is no mass effect or shift of midline structures.  Basal cisterns are patent. Patient is intubated. Fluid signal noted in  the maxillary sinuses and sphenoid sinus.       Impression:       Essentially normal noncontrast brain MRI.      D:  07/25/2018  E:  07/25/2018     This report was finalized on 7/25/2018 3:53 PM by Cassius White.       XR Chest 1 View [[id number]] Collected:  07/25/18 0907     Updated:  07/25/18 1007    Narrative:       EXAMINATION: XR CHEST 1 VW- 07/25/2018     INDICATION: f/u vent; R09.2-Respiratory arrest; G93.40-Encephalopathy,  unspecified; R74.8-Abnormal levels of other serum enzymes     TECHNIQUE:  Single view frontal chest.     COMPARISONS: 07/24/2018     FINDINGS:  Esophagogastric tube has been exchanged for a feeding tube.  Other support apparatus stable. Lungs are without focal  opacity. No  pleural effusion or pneumothorax. Cardiomediastinal silhouette is  unchanged.        Impression:       Feeding tube placement; the terminus is below the diaphragm  and out of view. Otherwise, stable exam.     D:  07/25/2018  E:  07/25/2018     This report was finalized on 7/25/2018 10:05 AM by Cassius White.       NM Brain Scan Complete With Vascular Flow [[id number]] Collected:  07/23/18 1207     Updated:  07/23/18 1431    Narrative:       EXAMINATION: NM BRAIN SCAN, COMPLETE WITH VASCULAR FLOW-07/23/2018:      INDICATION: Neuro deficit(s), subacute.     TECHNIQUE:  Planar dynamic imaging of the head and neck was performed  during injection in the anterior projection of the head and neck. Planar  images were obtained immediately and 5 minutes following IV injection of  27 mCi of Technetium 99m HMPAO in the anterior and lateral projections.     COMPARISONS:  Noncontrast CT head 07/21/2018, MR brain 07/16/2018.     FINDINGS:  Rapid sequence images obtained for the flow portion of the  examination show flow in the [place]. Immediate and 5 minute  delay static images show evidence of uptake in the cerebral hemispheres,  basal ganglia, thalami and the cerebellar hemispheres.       Impression:       No evidence of brain death.     Discussed with Dr. Sutton at 10:05 AM (approximately) by Dr. White.     D:  07/23/2018  E:  07/23/2018     This report was finalized on 7/23/2018 2:29 PM by Cassius White.       CT Head Without Contrast [[id number]] Collected:  07/21/18 1606     Updated:  07/21/18 2157    Narrative:       EXAM:    CT Head Without Intravenous Contrast    EXAM DATE/TIME:    7/21/2018 4:06 PM    CLINICAL HISTORY:    The patient age is 37 years old and is female; Encephalopathy, unspecified;   Respiratory arrest; Abnormal levels of other serum enzymes; Signs and symptoms;   Additional info: [facility] exam id and description: [id number] CT head wo   contrast    TECHNIQUE:    Axial computed tomography  images of the head/brain without intravenous   contrast.  All CT scans at this facility use at least one of these dose   optimization techniques: automated exposure control; mA and/or kV adjustment   per patient size (includes targeted exams where dose is matched to clinical   indication); or iterative reconstruction.    COMPARISON:    MRI ANGIOGRAM HEAD WO CONTRAST 2018-07-16 12:58    FINDINGS:    Brain:  The white-gray differentiation is preserved demonstrating no acute   territorial type infarct.  No acute intracranial hemorrhage is seen.  Minimal   white matter disease is identified on the recent MRI, but not well visualized   on current CT images. MRI is more sensitive for white matter disease.    Midline shift:  There is no midline shift.    Ventricles:  No ventriculomegaly.    Bones/joints:  The calvarium demonstrates no evidence for a depressed   fracture.    Soft tissues:  No acute abnormality.    Sinuses: An air fluid level is identified within the left sphenoid sinus.   There is mucosal thickening of the bilateral maxillary sinuses, with a mucus   retention cyst or polyp in the right maxillary sinus. There is mucosal   thickening of the left frontal sinus, right sphenoid sinus, and scattered   ethmoid air cells.    Mastoid air cells:  No mastoid effusion.      Impression:       1.  No acute intracranial abnormality.  2.  Paranasal sinus disease is noted above.  3.  Additional findings described above.    THIS DOCUMENT HAS BEEN ELECTRONICALLY SIGNED BY ELISEO LUJAN MD    XR Chest 1 View [[id number]] Collected:  07/20/18 0906     Updated:  07/20/18 2234    Narrative:       EXAMINATION: XR CHEST 1 VW-07/20/2018:      INDICATION: F/U vent; R09.2-Respiratory arrest; G93.40-Encephalopathy,  unspecified; R74.8-Abnormal levels of other serum enzymes.      COMPARISON: 07/19/2018.     FINDINGS: ET tube, NG tube and right IJ catheter appear to be in good  position. The heart is normal in size. There is persistent  perihilar  disease, similar to the prior study. No consolidated lung is seen. There  is probably trace right effusion.           Impression:       Persistent perihilar disease and small new right pleural  effusion. No evidence of pneumothorax.     D:  07/20/2018  E:  07/20/2018     This report was finalized on 7/20/2018 10:32 PM by DR. Rad Li MD.       XR Abdomen KUB [[id number]] Collected:  07/20/18 1347     Updated:  07/20/18 1624    Narrative:       EXAMINATION: XR ABDOMEN, KUB-07/20/2018:      INDICATION: Corpak placement; R09.2-Respiratory arrest;  G93.40-Encephalopathy, unspecified; R74.8-Abnormal levels of other serum  enzymes.      COMPARISON: NONE.     FINDINGS: Supine radiograph of the abdomen demonstrates a feeding tube  in the third portion of the duodenum. A nasogastric tube is in the body  of the stomach.           Impression:       A feeding tube is in the third portion of the duodenum.     D:  07/20/2018  E:  07/20/2018     This report was finalized on 7/20/2018 4:22 PM by Dr. rEic Luis MD.       XR Chest 1 View [[id number]] Collected:  07/19/18 0859     Updated:  07/19/18 0909    Narrative:       EXAMINATION: XR CHEST 1 VW-      INDICATION: Followup vent; R09.2-Respiratory arrest;  G93.40-Encephalopathy, unspecified; R74.8-Abnormal levels of other serum  enzymes.      COMPARISON: 07/18/2018.     FINDINGS: The cardiac silhouette is normal. There is minimal patchy  bibasilar airspace disease. A central venous catheter is well positioned  as is the endotracheal tube. There is no pneumothorax. There has been  little change since the previous examination of the previous day.           Impression:       There has been no change since the previous examination of  the previous day.     D:  07/19/2018  E:  07/19/2018     This report was finalized on 7/19/2018 9:07 AM by Dr. Eric Luis MD.       XR Chest 1 View [[id number]] Collected:  07/18/18 0734     Updated:  07/18/18 0944    Narrative:        EXAMINATION: XR CHEST 1 VW-07/18/2018:      INDICATION: F/U vent; R09.2-Respiratory arrest; G93.40-Encephalopathy,  unspecified; R74.8-Abnormal levels of other serum enzymes.      COMPARISON: Chest x-ray 07/17/2018.     FINDINGS: Support hardware unchanged and in satisfactory positioning.  Cardiac silhouette borderline enlarged and unchanged from prior with  increased central pulmonary vascularity that appears increased from  comparison along with persistent bibasilar hazy pulmonary  opacifications. No pneumothorax with probable trace bilateral pleural  effusions partially visualized.       Impression:       Interval increase in central pulmonary vascularity with  persistent bibasilar hazy opacities and probable trace volume pleural  effusions now present.     D:  07/18/2018  E:  07/18/2018     This report was finalized on 7/18/2018 9:42 AM by Dr. Fernando Zuluaga.       XR Chest 1 View [[id number]] Collected:  07/17/18 0919     Updated:  07/17/18 1314    Narrative:       EXAMINATION: XR CHEST 1 VW-07/17/2018:      INDICATION: Intubated Patient; R09.2-Respiratory arrest;  G93.40-Encephalopathy, unspecified.      COMPARISON: 07/16/2018.     FINDINGS: The heart is normal. There is patchy right perihilar airspace  disease. Nevertheless, aeration of lungs has significantly improved when  compared to previous examinations of the previous day.  An endotracheal  tube is well positioned as is a central venous catheter.           Impression:       There has been significant interval improvement of airspace  changes, particularly in the left lung.     D:  07/17/2018  E:  07/17/2018     This report was finalized on 7/17/2018 1:12 PM by Dr. Eric Luis MD.       XR Chest 1 View [[id number]] Collected:  07/16/18 1513     Updated:  07/16/18 1648    Narrative:       EXAMINATION: XR CHEST 1 VW-07/16/2018:      INDICATION: ETT placement; R09.2-Respiratory arrest;  G93.40-Encephalopathy, unspecified.      COMPARISON: 07/16/2018.      FINDINGS:  The endotracheal tube is well positioned. There is central  vascular congestion with perihilar and basilar airspace changes  consistent with pulmonary edema. A central venous catheter is well  positioned.         Impression:       The endotracheal tube is well positioned. Pulmonary findings  consistent with pulmonary edema persist unchanged.     D:  07/16/2018  E:  07/16/2018     This report was finalized on 7/16/2018 4:46 PM by Dr. Eric Luis MD.       MRI Brain With & Without Contrast [[id number]] Collected:  07/16/18 1437     Updated:  07/16/18 1542    Narrative:       EXAMINATION: MRI BRAIN W WO CONTRAST-, MRI ANGIOGRAM HEAD WO CONTRAST-,  MRI ANGIOGRAM NECK W CONTRAST- 07/16/2018     INDICATION: Encephalopathy; R09.2-Respiratory arrest;  G93.40-Encephalopathy, unspecified; unresponsive     TECHNIQUE: Routine multiplanar imaging was obtained of the brain with  and without the administration of gadolinium contrast. 3-D time of  flight MR angiographic imaging was obtained of the intracranial vessels  centered at the [place] without the administration of  gadolinium contrast. 3-D time-of-flight MR angiographic imaging was  obtained of the carotid vessels within the neck following the  administration of gadolinium contrast.     COMPARISON: NONE     FINDINGS: There is no evidence of hemorrhage or hydrocephalus.  Visualized paranasal sinuses reveal mucosal thickening in the ethmoid  air cells and right maxillary sinus. Mastoid air cells are patent. No  cerebellopontine angle mass identified.  Tiny area of increased signal  seen in the right frontal region from possible prior chronic small  vessel ischemic change. The remainder of the gray-white differentiation  is preserved. Parenchyma is unremarkable. No hemorrhage or  hydrocephalus. No mass, mass effect, or midline shift. No abnormal  extra-axial fluid collection is identified. No evidence of restricted  diffusion to suggest evidence of an  acute ischemic insult. Flow voids  are preserved in the major intracranial vessels. There is no evidence of  cortical dysplasia. No evidence of mesial temporal sclerosis.     The carotid vessels reveal both common carotid arteries to be  unremarkable. Both carotid bifurcations are within normal limits.  Vertebral arteries reveal a dominant left vertebral artery. No evidence  of significant stenosis.     The intracranial vessels reveals no aneurysmal dilatation, vascular  malformation or significant stenosis identified. There is hypertrophy  identified of the posterior  communicating arteries bilaterally with  fetal origin identified in the right posterior cerebral artery.       Impression:       Fetal origin of the right posterior cerebral artery. The  remainder of the anterior and posterior circulation are intact and  unremarkable. The vessels within the neck are unremarkable with no acute  intracranial abnormality present.     D:  07/16/2018  E:  07/16/2018         This report was finalized on 7/16/2018 3:40 PM by Dr. Ileana Toledo MD.       MRI Angiogram Head Without Contrast [[id number]] Collected:  07/16/18 1437     Updated:  07/16/18 1542    Narrative:       EXAMINATION: MRI BRAIN W WO CONTRAST-, MRI ANGIOGRAM HEAD WO CONTRAST-,  MRI ANGIOGRAM NECK W CONTRAST- 07/16/2018     INDICATION: Encephalopathy; R09.2-Respiratory arrest;  G93.40-Encephalopathy, unspecified; unresponsive     TECHNIQUE: Routine multiplanar imaging was obtained of the brain with  and without the administration of gadolinium contrast. 3-D time of  flight MR angiographic imaging was obtained of the intracranial vessels  centered at the [place] without the administration of  gadolinium contrast. 3-D time-of-flight MR angiographic imaging was  obtained of the carotid vessels within the neck following the  administration of gadolinium contrast.     COMPARISON: NONE     FINDINGS: There is no evidence of hemorrhage or  hydrocephalus.  Visualized paranasal sinuses reveal mucosal thickening in the ethmoid  air cells and right maxillary sinus. Mastoid air cells are patent. No  cerebellopontine angle mass identified.  Tiny area of increased signal  seen in the right frontal region from possible prior chronic small  vessel ischemic change. The remainder of the gray-white differentiation  is preserved. Parenchyma is unremarkable. No hemorrhage or  hydrocephalus. No mass, mass effect, or midline shift. No abnormal  extra-axial fluid collection is identified. No evidence of restricted  diffusion to suggest evidence of an acute ischemic insult. Flow voids  are preserved in the major intracranial vessels. There is no evidence of  cortical dysplasia. No evidence of mesial temporal sclerosis.     The carotid vessels reveal both common carotid arteries to be  unremarkable. Both carotid bifurcations are within normal limits.  Vertebral arteries reveal a dominant left vertebral artery. No evidence  of significant stenosis.     The intracranial vessels reveals no aneurysmal dilatation, vascular  malformation or significant stenosis identified. There is hypertrophy  identified of the posterior  communicating arteries bilaterally with  fetal origin identified in the right posterior cerebral artery.       Impression:       Fetal origin of the right posterior cerebral artery. The  remainder of the anterior and posterior circulation are intact and  unremarkable. The vessels within the neck are unremarkable with no acute  intracranial abnormality present.     D:  07/16/2018  E:  07/16/2018         This report was finalized on 7/16/2018 3:40 PM by Dr. Ileana Toledo MD.       MRI Angiogram Neck With Contrast [[id number]] Collected:  07/16/18 1437     Updated:  07/16/18 1542    Narrative:       EXAMINATION: MRI BRAIN W WO CONTRAST-, MRI ANGIOGRAM HEAD WO CONTRAST-,  MRI ANGIOGRAM NECK W CONTRAST- 07/16/2018     INDICATION: Encephalopathy;  R09.2-Respiratory arrest;  G93.40-Encephalopathy, unspecified; unresponsive     TECHNIQUE: Routine multiplanar imaging was obtained of the brain with  and without the administration of gadolinium contrast. 3-D time of  flight MR angiographic imaging was obtained of the intracranial vessels  centered at the [place] without the administration of  gadolinium contrast. 3-D time-of-flight MR angiographic imaging was  obtained of the carotid vessels within the neck following the  administration of gadolinium contrast.     COMPARISON: NONE     FINDINGS: There is no evidence of hemorrhage or hydrocephalus.  Visualized paranasal sinuses reveal mucosal thickening in the ethmoid  air cells and right maxillary sinus. Mastoid air cells are patent. No  cerebellopontine angle mass identified.  Tiny area of increased signal  seen in the right frontal region from possible prior chronic small  vessel ischemic change. The remainder of the gray-white differentiation  is preserved. Parenchyma is unremarkable. No hemorrhage or  hydrocephalus. No mass, mass effect, or midline shift. No abnormal  extra-axial fluid collection is identified. No evidence of restricted  diffusion to suggest evidence of an acute ischemic insult. Flow voids  are preserved in the major intracranial vessels. There is no evidence of  cortical dysplasia. No evidence of mesial temporal sclerosis.     The carotid vessels reveal both common carotid arteries to be  unremarkable. Both carotid bifurcations are within normal limits.  Vertebral arteries reveal a dominant left vertebral artery. No evidence  of significant stenosis.     The intracranial vessels reveals no aneurysmal dilatation, vascular  malformation or significant stenosis identified. There is hypertrophy  identified of the posterior  communicating arteries bilaterally with  fetal origin identified in the right posterior cerebral artery.       Impression:       Fetal origin of the right posterior  cerebral artery. The  remainder of the anterior and posterior circulation are intact and  unremarkable. The vessels within the neck are unremarkable with no acute  intracranial abnormality present.     D:  07/16/2018  E:  07/16/2018         This report was finalized on 7/16/2018 3:40 PM by Dr. Ileana Toledo MD.       XR Chest 1 View [[id number]] Collected:  07/16/18 0813     Updated:  07/16/18 1104    Narrative:       EXAMINATION: XR CHEST 1 VW-      INDICATION: Confirm ET tube placement; R09.2-Respiratory arrest.      COMPARISON: None.     FINDINGS: Portable chest reveals endotracheal tube in satisfactory  position above the aristeo. Deep line catheter is identified on the right  with tip in the SVC with nasogastric tube with tip in the stomach. There  is diffuse haziness seen within the left lung. There is prominence of  the pulmonary vascularity. Cardiac silhouette is within normal limits.       Impression:       Lines and tubes in satisfactory position. Haziness seen  overlying the left lung. Increased markings seen in the perihilar  regions bilaterally.     D:  07/16/2018  E:  07/16/2018     This report was finalized on 7/16/2018 11:02 AM by Dr. Ileana Toledo MD.             Results for orders placed during the hospital encounter of 07/16/18   Duplex Venous Upper Extremity - Bilateral CAR    Narrative · Normal bilateral upper extremity venous duplex scan.          Results for orders placed during the hospital encounter of 07/16/18   Duplex Venous Upper Extremity - Bilateral CAR    Narrative · Normal bilateral upper extremity venous duplex scan.          Results for orders placed during the hospital encounter of 07/16/18   Adult Transthoracic Echo Complete W/ Cont if Necessary Per Protocol    Addendum · Estimated LVEF = 50%. · The following left ventricular wall segments are hypokinetic: mid  anterior, apical septal and mid anteroseptal. · The cardiac valves are anatomically and functionally normal.         Aubrey Jiménez MD 7/16/2018  6:05 PM          Narrative · Left ventricular systolic function is normal. Estimated EF = 50%.  · The cardiac valves are anatomically and functionally normal.            Discharge Details        Discharge Medications      New Medications      Instructions Start Date   amoxicillin 125 MG/5ML suspension  Commonly known as:  AMOXIL   500 mg, Oral, Every 8 Hours Scheduled      aspirin 81 MG chewable tablet   81 mg, Per G Tube, Daily      atorvastatin 80 MG tablet  Commonly known as:  LIPITOR   80 mg, Per G Tube, Nightly      Baclofen 5 MG tablet   5 mg, Oral, 3 Times Daily      bisacodyl 10 MG suppository  Commonly known as:  DULCOLAX   10 mg, Rectal, Daily PRN      clopidogrel 75 MG tablet  Commonly known as:  PLAVIX   75 mg, Per G Tube, Daily      enoxaparin 40 MG/0.4ML solution syringe  Commonly known as:  LOVENOX   40 mg, Subcutaneous, Every 24 Hours      famotidine 20 MG tablet  Commonly known as:  PEPCID   20 mg, Per G Tube, 2 Times Daily      HYDROcodone-acetaminophen 7.5-325 MG per tablet  Commonly known as:  NORCO   1 tablet, Oral, Every 4 Hours PRN      lactobacillus acidophilus capsule capsule   1 capsule, Per G Tube, Daily      LORazepam 0.5 MG tablet  Commonly known as:  ATIVAN   0.5 mg, Oral, Every 8 Hours PRN      metoprolol tartrate 50 MG tablet  Commonly known as:  LOPRESSOR   50 mg, Oral, Every 12 Hours Scheduled      thiamine 100 MG tablet  Commonly known as:  VITAMIN B1   100 mg, Per G Tube, Daily               Discharge Disposition:  Skilled Nursing Facility (DC - External)    Discharge Diet:      Discharge Activity:           Special Instructions:      Code Status/Level of Support:  Code Status and Medical Interventions:   Ordered at: 07/16/18 0620     Code Status:    CPR     Medical Interventions (Level of Support Prior to Arrest):    Full       No future appointments.        Time Spent on Discharge: 35 minutes    Electronically signed by Niurka Singleton  MD, 08/24/18, 12:33 PM.

## 2018-08-24 NOTE — PROGRESS NOTES
Case Management Discharge Note    Final Note: Transfer to [facility] today; [facility] scheduled to transpot @ 4:00pm, ([facility] report they are running 45 minutes late).  RN to call report to #[phone].  Discussed with patient's son-Eric Holm-who voiced agreement with transfer.  Have provided son's email and mailing address to Admissions at [facility] in order to complete admission paperwork.  Discussed with Melissa Singleton and Alee.     Destination - Selection Complete     Service Request Status Selected Specialties Address Phone Number Fax Number    [facility] [address], [facility] [address] [phone]      Durable Medical Equipment     No service has been selected for the patient.      Dialysis/Infusion     No service has been selected for the patient.      Home Medical Care     No service has been selected for the patient.      Social Care     No service has been selected for the patient.        Ambulance: [facility]    Final Discharge Disposition Code: 30 - still a patient

## 2018-08-24 NOTE — PAYOR COMM NOTE
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 Blood, [[id number]]   (Normal) Collected:  08/15/18 1128    Lab Status:  Final result Specimen:  Blood from Hand, Left Updated:  08/20/18 1201     Blood Culture No growth at 5 days    Respiratory Culture - Sputum, Cough [[id number]]  (Abnormal) Collected:  08/15/18 1113    Lab Status:  Final result Specimen:  Sputum from Cough Updated:  08/17/18 0751     Respiratory Culture Moderate growth (3+) Streptococcus, Beta Hemolytic, Group G (A)     Comment: This organism is considered to be universally susceptible to penicillin.  No further antibiotic testing will be performed.        STREP GROUPING G     Respiratory Culture Moderate growth (3+) Normal Respiratory Sima     Gram Stain Result Many (4+) WBCs per low power field      Moderate (3+) Epithelial cells per low power field      Many (4+) Gram positive cocci in pairs, chains and clusters    Respiratory Culture - Aspirate, ET Suction [[id number]] Collected:  08/10/18 0919    Lab Status:  Final result Specimen:  Aspirate from ET Suction Updated:  08/12/18 0701     Respiratory Culture Light growth (2+) Normal Respiratory Sima     Gram Stain Result Many (4+) WBCs per low power field      Few (2+) Epithelial cells per low power field      Few (2+) Gram positive cocci in pairs      Few (2+) Gram positive cocci in clusters      Rare (1+) Gram positive bacilli    Blood Culture - Blood, [[id number]]  (Normal) Collected:  08/05/18 1437    Lab Status:  Final result Specimen:  Blood from Wrist, Right Updated:  08/10/18 1500     Blood Culture No growth at 5 days    Blood Culture - Blood, [[id number]]  (Normal) Collected:  08/05/18 1437    Lab Status:  Final result Specimen:  Blood from Wrist, Left Updated:  08/10/18 1500     Blood Culture No growth at 5 days    Urine Culture - Urine, [[id number]]  (Normal) Collected:  08/06/18 1723    Lab Status:  Final result Specimen:  Urine from Urine, Catheter Updated:  08/08/18 0833     Urine Culture No growth at 2 days    MRSA Screen Culture - Swab, Nares  [[id number]]  (Normal) Collected:  08/06/18 1731    Lab Status:  Final result Specimen:  Swab from Nares Updated:  08/08/18 0717     MRSA SCREEN CX No Methicillin Resistant Staphylococcus aureus isolated    Respiratory Culture - Sputum, NT Suction [[id number]]  (Abnormal) Collected:  08/05/18 1711    Lab Status:  Final result Specimen:  Sputum from NT Suction Updated:  08/07/18 1412     Respiratory Culture Light growth (2+) Streptococcus, Beta Hemolytic, Group G (A)     Comment: This organism is considered to be universally susceptible to penicillin.  No further antibiotic testing will be performed.        STREP GROUPING G     Respiratory Culture Light growth (2+) Normal Respiratory Sima     Gram Stain Result Many (4+) WBCs per low power field      Many (4+) Epithelial cells per low power field      Many (4+) Gram positive cocci in pairs, chains and clusters      Many (4+) Gram positive bacilli      Few (2+) Gram negative bacilli    Blood Culture - Blood, Blood, Venous Line [[id number]]  (Normal) Collected:  07/29/18 0350    Lab Status:  Final result Specimen:  Blood from Arm, Left Updated:  08/03/18 0501     Blood Culture No growth at 5 days    Blood Culture - Blood, Blood, Venous Line [[id number]]  (Normal) Collected:  07/29/18 0355    Lab Status:  Final result Specimen:  Blood from Hand, Left Updated:  08/03/18 0501     Blood Culture No growth at 5 days    Narrative:       Aerobic bottle only    Respiratory Culture - Sputum, ET Suction [[id number]] Collected:  07/29/18 0755    Lab Status:  Final result Specimen:  Sputum from ET Suction Updated:  07/31/18 0754     Respiratory Culture Light growth (2+) Normal Respiratory Sima     Gram Stain Result Moderate (3+) WBCs per low power field      Few (2+) Epithelial cells per low power field      Few (2+) Gram positive bacilli      Few (2+) Gram positive cocci in pairs    MRSA Screen, PCR - Swab, Nares [[id number]]  (Normal) Collected:  07/29/18 1434    Lab Status:  Final  result Specimen:  Swab from Nares Updated:  07/29/18 2149     MRSA, PCR Negative    Narrative:         MRSA Negative    Culture, CSF - Cerebrospinal Fluid, Lumbar Puncture [[id number]]  (Normal) Collected:  07/16/18 1140    Lab Status:  Final result Specimen:  Cerebrospinal Fluid from Lumbar Puncture Updated:  07/23/18 1007     CSF Culture No growth at 7 days     Gram Stain Result No WBCs or organisms seen    Blood Culture - Blood, Blood, Venous Line [[id number]]  (Normal) Collected:  07/16/18 0817    Lab Status:  Final result Specimen:  Blood from Hand, Left Updated:  07/21/18 0830     Blood Culture No growth at 5 days    Blood Culture - Blood, Blood, Venous Line [[id number]]  (Normal) Collected:  07/16/18 0817    Lab Status:  Final result Specimen:  Blood from Arm, Right Updated:  07/21/18 0830     Blood Culture No growth at 5 days    Respiratory Culture - Aspirate, ET Suction [[id number]]  (Abnormal) Collected:  07/16/18 1606    Lab Status:  Final result Specimen:  Aspirate from ET Suction Updated:  07/18/18 1450     Respiratory Culture Scant growth (1+) Streptococcus, Beta Hemolytic, Group G (A)     Comment: This organism is considered to be universally susceptible to penicillin.  No further antibiotic testing will be performed.        STREP GROUPING G     Respiratory Culture Scant growth (1+) Normal Respiratory Sima     Gram Stain Result Many (4+) WBCs per low power field      Rare (1+) Epithelial cells per low power field      Few (2+) Gram positive cocci in pairs    Meningitis / Encephalitis Panel, PCR - Cerebrospinal Fluid, Lumbar Puncture [[id number]]  (Normal) Collected:  07/16/18 1140    Lab Status:  Final result Specimen:  Cerebrospinal Fluid from Lumbar Puncture Updated:  07/16/18 1419     ESCHERICHIA COLI K1, PCR Not Detected     HAEMOPHILUS INFLUENZAE, PCR Not Detected     LISTERIA MONOCYTOGENES, PCR Not Detected     NEISSERIA MENINGITIDIS, PCR Not Detected     STREPTOCOCCUS AGALACTIAE, PCR Not Detected      STREPTOCOCCUS PNEUMONIAE, PCR Not Detected     CYTOMEGALOVIRUS (CMV), PCR Not Detected     ENTEROVIRUS, PCR Not Detected     HERPES SIMPLEX VIRUS 1 (HSV-1), PCR Not Detected     HERPES SIMPLEX VIRUS 2 (HSV-2), PCR Not Detected     HUMAN PARECHOVIRUS, PCR Not Detected     VARICELLA ZOSTER VIRUS (VZV), PCR Not Detected     CRYPTOCOCCUS NEOFORMANS / GATTII, PCR Not Detected     HUMAN HERPES VIRUS 6 PCR Not Detected          Imaging Results (last 24 hours)     ** No results found for the last 24 hours. **        Results for orders placed during the hospital encounter of 07/16/18   Adult Transthoracic Echo Complete W/ Cont if Necessary Per Protocol    Addendum · Estimated LVEF = 50%. · The following left ventricular wall segments are hypokinetic: mid  anterior, apical septal and mid anteroseptal. · The cardiac valves are anatomically and functionally normal.        Aubrey Jiménez MD 7/16/2018  6:05 PM          Narrative · Left ventricular systolic function is normal. Estimated EF = 50%.  · The cardiac valves are anatomically and functionally normal.          I have reviewed the medications.      amoxicillin 500 mg Oral Q8H   aspirin 81 mg Per G Tube Daily   atorvastatin 80 mg Per G Tube Nightly   baclofen 5 mg Oral TID   clopidogrel 75 mg Per G Tube Daily   enoxaparin 40 mg Subcutaneous Q24H   famotidine 20 mg Per G Tube BID   lactobacillus acidophilus 1 capsule Per G Tube Daily   levETIRAcetam 250 mg Per PEG Tube Q12H   metoprolol tartrate 50 mg Oral Q12H   thiamine 100 mg Per G Tube Daily         Assessment/Plan   Assessment / Plan     Hospital Problem List     * (Principal)S/P Respiratory arrest due to drugs and alcohol. Prolonged ventilatory support requiring tracheostomy and PEG 7/26/18. Now off the ventilator    Alcohol abuse. Intoxicated on admission    Anoxic brain damage     H/O drug abuse    Pulmonary infiltrates, resolved. Suspected pulmonary edema versus aspiration    Overview Signed 7/16/2018  7:42 AM  by Ruslan Estrella MD     - pneumonia vs. aspiration pneumonitis vs. pulmonary edema.          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 Resolved    Chronic hepatitis C without hepatic coma (CMS/HCC)    Seizures versus myoclonus and posturing from anoxia on admission. EEG negative (CMS/HCC)    Hypokalemia          Brief Hospital Course to date:  Otilia Holm is a 37 y.o. female transferred from [facility] on 7/16/2018 with unresponsiveness associated with acute hypoxic resp failure and prolonged seizure versus myoclonus due to alcohol and illicit drug use. She was transferred to our ICU and remained there until 8/20. She was seen by multidisciplinary specialties. She was not able to wean off ventilator, so PEG and trach was placed. She cont show decorticate posturing, thought to be from anoxic insult and does not appear that she will recover. She also continued to have persistent fever, tachycardia, and elevated CPK. She was followed by infectious disease for fever and started on Zosyn and Flagyl for poss group C Strep resp infection, but fever cont despite adequate abx, so she was also treated with Dantrolene for poss neuroleptic malignant hyperthermia. Hopsitalist assumed care on 8/21.      Assessment & Plan:    --Very complex medico-social issues. Pt's son, who I'm sure is relatively young given that pt herself is relatively young, so Im not sure if he has the maturity to comprehend the complexity of pt's medical problems. Pt's son is the only contact listed on chart. I have not seen any visitors.  Overall prognosis is poor.   --Rocephin stopped and patient was started on amoxicillin 500 mg tid to 8/28 per ID  --Cooling blanket PRN for T<102   --Tachycardia improved.  Continue BB and PRN medications (Baclofen, Norco, and IV Morphine).  Palliative following  --on Keppra and valproic acid, appreciate neurology  assistance with weaning valproic acid.  --continue TF, Fibersource HN at goal rate of 75 cc/hour (at goal)  --leukocytosis have resolved  --Had urinary retention, so Watson inserted again 8/21  --polysubstance abuse--hepatitis C infection with +RNA pcr, if pt recovers from this acute injury, can consider F/U with [facility]  --ETOH abuse, passed window for WD already      Again, very complex medico-social issues as stated above. Overall condition is relatively stable, but long term prognosis is poor.  Patient has a bed offer from [facility] in [place] and can accept tomorrow.  SW left message with son to contact her to discuss further.  Ambulance tentatively arrange for tomorrow @ 1600.        DVT Prophylaxis:  Lovenox    CODE STATUS:   Code Status and Medical Interventions:   Ordered at: 07/16/18 0620     Code Status:    CPR     Medical Interventions (Level of Support Prior to Arrest):    Full       Disposition:  I expect the patient to be discharged possibly tomorrow.  Ambulance arranged for tomorrow @ 1600.        Electronically signed by KATIA Vasquez, 08/23/18, 11:20 AM.      Electronically signed by Shadia Garcia APRN at 8/23/2018 11:36 AM       Consult Notes (last 24 hours) (Notes from 8/23/2018 10:30 AM through 8/24/2018 10:30 AM)     No notes of this type exist for this encounter.        Goldie Salmon, MSW  Signed Case Management  Progress Notes Date of Service: 8/23/2018  9:54 AM         []Manual[]Template  []Copied  Continued Stay Note  JAKOB Blanc     Patient Name: Otilia Holm                   MRN: [id number]  Today's Date: 8/23/2018                     Admit Date: 7/16/2018                       Discharge Plan      Row Name 08/23/18 0951           Plan     Plan LTC Nursing Facility Placement     Plan Comments Received bed offer from [facility] in [place]-can accept tomorrow, 8/24/18.  Left message with son, Eric Holm, to contact me to discuss further.   Tentatively arranged ambulance transport for tomorrow @ 4:00pm.  Goldie Salomn, Ext. [phone]     Final Discharge Disposition Code 30 - still a patient                  Discharge Codes    No documentation.               THEO Harrell

## 2018-08-24 NOTE — PROGRESS NOTES
Clinical Nutrition     Nutrition Support Followup  Reason for Visit:   Follow-up protocol, EN      Patient Name: Otilia Holm  YOB: 1980  MRN: [id number]  Date of Encounter: 18 12:20 PM  Admission date: 2018        Nutrition Assessment   Assessment       Hospital Problem List  Principal Problem:    S/P Respiratory arrest due to drugs and alcohol. Prolonged ventilatory support requiring tracheostomy and PEG 18. Now off the ventilator  Active Problems:    Alcohol abuse. Intoxicated on admission    Anoxic brain damage     H/O drug abuse    Pulmonary infiltrates, resolved. Suspected pulmonary edema versus aspiration    Persistent urinary retention requiring in and out catheterization    Possible NMS (neuroleptic malignant syndrome) causing fever. Resolved    Chronic hepatitis C without hepatic coma (CMS/HCC)    Seizures versus myoclonus and posturing from anoxia on admission. EEG negative (CMS/HCC)    Hypokalemia      PMH: She  has a past medical history of Acute urinary retention (2018); Aneurysm (CMS/HCC) (unknown type reported by mother); Heroin abuse; Opioid abuse; and Sepsis (CMS/HCC) (2018).   PSxH: She  has a past surgical history that includes  section; Other surgical history; and tracheostomy and peg tube insertion (N/A, 2018).     Other nutrition related factors:  Intubated ()  Hypoxic Encephalopathy - unclear etiology with an unremarkable MRI and EEG.    Respiratory Failure / Aspiration Pneumonia  NSTEMI   S/p trach/PEG ()  Tachycardia  Fever  Transferred back to ICU (8/15) secondary to pt needing frequent suctioning and for closer monitoring        Nutrition support information:  () EN initiated   () EN changed from Peptamen 1.5 to Fibersource HN   () EN changed to Replete with Fiber at 70 ml/hr and free water at 20 ml/hr   (8/10) EN changed to FiberSource HN at 65 ml/hr and free water at 20 ml/hr  () Free water  "increased to 40 ml/hr  (8/16) EN regimen adjusted to better meet pt's estimated needs- FiberSource HN at 75 ml/hr and free water at 35 ml/h    Reported/Observed/Food/Nutrition Related History:      RD notes pt to be d/c'd to [facility] in [place] today.     Anthropometrics     Height: 144.8 cm (57.01\")  Last filed wt: Weight: 65.1 kg (143 lb 8 oz) (08/24/18 0627)  Weight Method: Bed scale    BMI: BMI (Calculated): 31.09kg/m2   Obese Class I: 30-34.9kg/m2    Ideal Body Weight (IBW) (kg): 39.02  Admission wt:143lb (7/16)  Method obtained: bedscale       Labs reviewed       Results from last 7 days  Lab Units 08/22/18  1927 08/22/18  0635 08/21/18  0511 08/20/18  0440 08/19/18  0856   GLUCOSE mg/dL  --  126* 124* 108* 135*   BUN mg/dL  --  17 18 15 14   CREATININE mg/dL  --  0.42* 0.50* 0.43* 0.47*   SODIUM mmol/L  --  138 143 143 142   CHLORIDE mmol/L  --  103 104 106 107   POTASSIUM mmol/L 4.3 3.4* 4.0 4.2 4.2   PHOSPHORUS mg/dL  --   --  3.4 3.6 3.5   MAGNESIUM mg/dL  --   --  2.1 2.1 2.1   ALT (SGPT) U/L  --   --   --  59* 63*       Results from last 7 days  Lab Units 08/20/18  0440 08/19/18  0856   ALBUMIN g/dL 3.67 3.67   PREALBUMIN mg/dL 14.5  --    CRP mg/dL 3.01*  --    CHOLESTEROL mg/dL 121  --    TRIGLYCERIDES mg/dL 93  --          Results from last 7 days  Lab Units 08/23/18  1148 08/23/18  0604 08/22/18  2337 08/22/18  1218 08/22/18  0551 08/22/18  0017   GLUCOSE mg/dL 90 120 117 84 92 119       Medications reviewed   Pertinent:      Applicable medical tests/Procedures since admission:    Intake/Ouptut 24 hrs (7:00AM - 6:59 AM)     Intake & Output (last day)       08/23 0701 - 08/24 0700 08/24 0701 - 08/25 0700    Other 846 60    NG/GT 1455     Total Intake(mL/kg) 2301 (35.3) 60 (0.9)    Urine (mL/kg/hr) 1025 (0.7)     Total Output 1025      Net +1276 +60          Unmeasured Stool Occurrence 6 x               Current Nutrition Prescription     PO: NPO Diet  EN: Diet, Tube Feeding Tube Feeding " Formula: Fibersource HN (Standard With Fiber)  Verified at bedside: Yes  Goal rate: 75 mL/hr  Route: PEG    Free water: 35mL/hr     Evaluation of Received Nutrient/Fluid Intake last day:       At Target Goal Volume  Received per I/O's    % Est needs   Volume  1500ml 1455ml 97%   Energy/kcals 1800 kcals 1746 kcals 100%   Protein  81g pro 78 g pro 99%                       Fiber 22g 22g     Fluid   W/Free water 1215ml  1915ml 1179ml  2025ml                RD notes last 3 days of EN intake average of 1476mL/day (meeting 98% daily goal volume).        Nutrition Diagnosis       (8/8), updated (8/10), (8/16), (8/20), (8/22)  Problem Inadequate enteral nutrition infusion   Etiology ? etiology   Signs/Symptoms 80% of EN goal volume delivered over the past 48 hours   Status - resolved 109% of EN goal volume delivered over the past 24 hours     (8/10)  Problem Less than optimal enteral nutrition composition or modality   Etiology Formula needs re-est in view of skin integrity not as severe as initially documented   Signs/Symptoms Pt does not require as high protein formula as is currently   Status - resolved, EN changed (8/10)     (8/14)  Problem Increased nturient needs- fluid   Etiology Current EN regimen   Signs/Symptoms Per MD note, pt appears dehydrated, IVFs started and current EN regimen does not meet full fluid needs   Status - resolved, free water increased from 20 ml/hr to 40 ml/hr       Nutrition Intervention   1.  Follow treatment progress, Care plan reviewed   2. Recommendation: Continue current EN regimen. Will rec changing rate per percentage EN received.     Goal:   General: Nutrition support treatment  EN/PN: Maintain EN      Monitoring/Evaluation:   Per protocol, EN delivery/tolerance      Will Continue to follow per protocol      Lorna Manuel, SONDRA, LD  Time Spent: 45min

## 2018-08-25 ENCOUNTER — HOSPITAL ENCOUNTER (INPATIENT)
Facility: HOSPITAL | Age: 38
LOS: 60 days | Discharge: NURSING, REHAB OR LONG-TERM CARE | End: 2018-10-24
Attending: INTERNAL MEDICINE | Admitting: INTERNAL MEDICINE

## 2018-08-25 ENCOUNTER — APPOINTMENT (OUTPATIENT)
Dept: GENERAL RADIOLOGY | Facility: HOSPITAL | Age: 38
End: 2018-08-25

## 2018-08-25 DIAGNOSIS — Z74.09 IMPAIRED FUNCTIONAL MOBILITY, BALANCE, GAIT, AND ENDURANCE: Primary | ICD-10-CM

## 2018-08-25 PROBLEM — A49.1 GROUP C STREPTOCOCCAL INFECTION: Status: ACTIVE | Noted: 2018-08-25

## 2018-08-25 PROBLEM — A41.9 SEPSIS (HCC): Status: ACTIVE | Noted: 2018-08-25

## 2018-08-25 LAB
ALBUMIN SERPL-MCNC: 3.44 G/DL (ref 3.2–4.8)
ALBUMIN/GLOB SERPL: 1.2 G/DL (ref 1.5–2.5)
ALP SERPL-CCNC: 65 U/L (ref 25–100)
ALT SERPL W P-5'-P-CCNC: 55 U/L (ref 7–40)
AMORPH URATE CRY URNS QL MICRO: ABNORMAL /HPF
AMPHET+METHAMPHET UR QL: NEGATIVE
AMPHETAMINES UR QL: NEGATIVE
ANION GAP SERPL CALCULATED.3IONS-SCNC: 6 MMOL/L (ref 3–11)
AST SERPL-CCNC: 71 U/L (ref 0–33)
B-HCG UR QL: NEGATIVE
BACTERIA UR QL AUTO: ABNORMAL /HPF
BARBITURATES UR QL SCN: NEGATIVE
BASOPHILS # BLD AUTO: 0.05 10*3/MM3 (ref 0–0.2)
BASOPHILS NFR BLD AUTO: 0.6 % (ref 0–1)
BENZODIAZ UR QL SCN: POSITIVE
BILIRUB SERPL-MCNC: 0.3 MG/DL (ref 0.3–1.2)
BILIRUB UR QL STRIP: NEGATIVE
BUN BLD-MCNC: 17 MG/DL (ref 9–23)
BUN/CREAT SERPL: 38.6 (ref 7–25)
BUPRENORPHINE SERPL-MCNC: NEGATIVE NG/ML
CALCIUM SPEC-SCNC: 8.9 MG/DL (ref 8.7–10.4)
CANNABINOIDS SERPL QL: NEGATIVE
CHLORIDE SERPL-SCNC: 117 MMOL/L (ref 99–109)
CLARITY UR: ABNORMAL
CO2 SERPL-SCNC: 28 MMOL/L (ref 20–31)
COCAINE UR QL: NEGATIVE
COLOR UR: ABNORMAL
CREAT BLD-MCNC: 0.44 MG/DL (ref 0.6–1.3)
D-LACTATE SERPL-SCNC: 0.7 MMOL/L (ref 0.5–2)
DEPRECATED RDW RBC AUTO: 55.6 FL (ref 37–54)
EOSINOPHIL # BLD AUTO: 0 10*3/MM3 (ref 0–0.3)
EOSINOPHIL NFR BLD AUTO: 0 % (ref 0–3)
ERYTHROCYTE [DISTWIDTH] IN BLOOD BY AUTOMATED COUNT: 15.2 % (ref 11.3–14.5)
GFR SERPL CREATININE-BSD FRML MDRD: >150 ML/MIN/1.73
GLOBULIN UR ELPH-MCNC: 2.9 GM/DL
GLUCOSE BLD-MCNC: 105 MG/DL (ref 70–100)
GLUCOSE UR STRIP-MCNC: NEGATIVE MG/DL
HCT VFR BLD AUTO: 30.5 % (ref 34.5–44)
HGB BLD-MCNC: 9.3 G/DL (ref 11.5–15.5)
HGB UR QL STRIP.AUTO: ABNORMAL
HYALINE CASTS UR QL AUTO: ABNORMAL /LPF
IMM GRANULOCYTES # BLD: 0.02 10*3/MM3 (ref 0–0.03)
IMM GRANULOCYTES NFR BLD: 0.2 % (ref 0–0.6)
KETONES UR QL STRIP: NEGATIVE
LEUKOCYTE ESTERASE UR QL STRIP.AUTO: ABNORMAL
LYMPHOCYTES # BLD AUTO: 1.74 10*3/MM3 (ref 0.6–4.8)
LYMPHOCYTES NFR BLD AUTO: 19.8 % (ref 24–44)
MAGNESIUM SERPL-MCNC: 2.2 MG/DL (ref 1.3–2.7)
MCH RBC QN AUTO: 30.6 PG (ref 27–31)
MCHC RBC AUTO-ENTMCNC: 30.5 G/DL (ref 32–36)
MCV RBC AUTO: 100.3 FL (ref 80–99)
METHADONE UR QL SCN: NEGATIVE
MONOCYTES # BLD AUTO: 0.98 10*3/MM3 (ref 0–1)
MONOCYTES NFR BLD AUTO: 11.1 % (ref 0–12)
MUCOUS THREADS URNS QL MICRO: ABNORMAL /HPF
NEUTROPHILS # BLD AUTO: 6.02 10*3/MM3 (ref 1.5–8.3)
NEUTROPHILS NFR BLD AUTO: 68.5 % (ref 41–71)
NITRITE UR QL STRIP: NEGATIVE
OPIATES UR QL: POSITIVE
OXYCODONE UR QL SCN: NEGATIVE
PCP UR QL SCN: NEGATIVE
PH UR STRIP.AUTO: <=5 [PH] (ref 5–8)
PLATELET # BLD AUTO: 391 10*3/MM3 (ref 150–450)
PMV BLD AUTO: 10.6 FL (ref 6–12)
POTASSIUM BLD-SCNC: 3.4 MMOL/L (ref 3.5–5.5)
PROCALCITONIN SERPL-MCNC: <0.05 NG/ML
PROPOXYPH UR QL: NEGATIVE
PROT SERPL-MCNC: 6.3 G/DL (ref 5.7–8.2)
PROT UR QL STRIP: ABNORMAL
RBC # BLD AUTO: 3.04 10*6/MM3 (ref 3.89–5.14)
RBC # UR: ABNORMAL /HPF
REF LAB TEST METHOD: ABNORMAL
SODIUM BLD-SCNC: 151 MMOL/L (ref 132–146)
SP GR UR STRIP: 1.03 (ref 1–1.03)
SQUAMOUS #/AREA URNS HPF: ABNORMAL /HPF
TRICYCLICS UR QL SCN: NEGATIVE
UROBILINOGEN UR QL STRIP: ABNORMAL
WBC NRBC COR # BLD: 8.79 10*3/MM3 (ref 3.5–10.8)
WBC UR QL AUTO: ABNORMAL /HPF
YEAST URNS QL MICRO: ABNORMAL /HPF

## 2018-08-25 PROCEDURE — 83605 ASSAY OF LACTIC ACID: CPT | Performed by: NURSE PRACTITIONER

## 2018-08-25 PROCEDURE — 87040 BLOOD CULTURE FOR BACTERIA: CPT | Performed by: NURSE PRACTITIONER

## 2018-08-25 PROCEDURE — 87106 FUNGI IDENTIFICATION YEAST: CPT | Performed by: NURSE PRACTITIONER

## 2018-08-25 PROCEDURE — 25810000003 SODIUM CHLORIDE 0.9 % WITH KCL 20 MEQ 20-0.9 MEQ/L-% SOLUTION: Performed by: NURSE PRACTITIONER

## 2018-08-25 PROCEDURE — 99220 PR INITIAL OBSERVATION CARE/DAY 70 MINUTES: CPT | Performed by: INTERNAL MEDICINE

## 2018-08-25 PROCEDURE — 81001 URINALYSIS AUTO W/SCOPE: CPT | Performed by: NURSE PRACTITIONER

## 2018-08-25 PROCEDURE — 83735 ASSAY OF MAGNESIUM: CPT | Performed by: NURSE PRACTITIONER

## 2018-08-25 PROCEDURE — 87186 SC STD MICRODIL/AGAR DIL: CPT | Performed by: NURSE PRACTITIONER

## 2018-08-25 PROCEDURE — 87077 CULTURE AEROBIC IDENTIFY: CPT | Performed by: NURSE PRACTITIONER

## 2018-08-25 PROCEDURE — 87086 URINE CULTURE/COLONY COUNT: CPT | Performed by: NURSE PRACTITIONER

## 2018-08-25 PROCEDURE — 80306 DRUG TEST PRSMV INSTRMNT: CPT | Performed by: NURSE PRACTITIONER

## 2018-08-25 PROCEDURE — 71046 X-RAY EXAM CHEST 2 VIEWS: CPT

## 2018-08-25 PROCEDURE — G0378 HOSPITAL OBSERVATION PER HR: HCPCS

## 2018-08-25 PROCEDURE — 80053 COMPREHEN METABOLIC PANEL: CPT | Performed by: NURSE PRACTITIONER

## 2018-08-25 PROCEDURE — 0T9B00Z DRAINAGE OF BLADDER WITH DRAINAGE DEVICE, OPEN APPROACH: ICD-10-PCS | Performed by: UROLOGY

## 2018-08-25 PROCEDURE — 81025 URINE PREGNANCY TEST: CPT | Performed by: NURSE PRACTITIONER

## 2018-08-25 PROCEDURE — 85025 COMPLETE CBC W/AUTO DIFF WBC: CPT | Performed by: NURSE PRACTITIONER

## 2018-08-25 PROCEDURE — 84145 PROCALCITONIN (PCT): CPT | Performed by: NURSE PRACTITIONER

## 2018-08-25 RX ORDER — ONDANSETRON 4 MG/1
4 TABLET, FILM COATED ORAL EVERY 6 HOURS PRN
Status: DISCONTINUED | OUTPATIENT
Start: 2018-08-25 | End: 2018-08-25

## 2018-08-25 RX ORDER — MAGNESIUM SULFATE HEPTAHYDRATE 40 MG/ML
2 INJECTION, SOLUTION INTRAVENOUS AS NEEDED
Status: DISCONTINUED | OUTPATIENT
Start: 2018-08-25 | End: 2018-08-25

## 2018-08-25 RX ORDER — ASPIRIN 81 MG/1
81 TABLET, CHEWABLE ORAL DAILY
Status: DISCONTINUED | OUTPATIENT
Start: 2018-08-26 | End: 2018-10-14

## 2018-08-25 RX ORDER — POTASSIUM CHLORIDE 7.45 MG/ML
10 INJECTION INTRAVENOUS
Status: DISCONTINUED | OUTPATIENT
Start: 2018-08-25 | End: 2018-08-25

## 2018-08-25 RX ORDER — METOPROLOL TARTRATE 50 MG/1
50 TABLET, FILM COATED ORAL EVERY 12 HOURS SCHEDULED
Status: DISCONTINUED | OUTPATIENT
Start: 2018-08-25 | End: 2018-10-15

## 2018-08-25 RX ORDER — ATORVASTATIN CALCIUM 40 MG/1
80 TABLET, FILM COATED ORAL NIGHTLY
Status: DISCONTINUED | OUTPATIENT
Start: 2018-08-25 | End: 2018-09-10

## 2018-08-25 RX ORDER — BISACODYL 10 MG
10 SUPPOSITORY, RECTAL RECTAL DAILY PRN
Status: DISCONTINUED | OUTPATIENT
Start: 2018-08-25 | End: 2018-09-25

## 2018-08-25 RX ORDER — POTASSIUM CHLORIDE 750 MG/1
40 CAPSULE, EXTENDED RELEASE ORAL AS NEEDED
Status: DISCONTINUED | OUTPATIENT
Start: 2018-08-25 | End: 2018-08-25

## 2018-08-25 RX ORDER — BACLOFEN 10 MG/1
5 TABLET ORAL 3 TIMES DAILY
Status: DISCONTINUED | OUTPATIENT
Start: 2018-08-25 | End: 2018-09-03

## 2018-08-25 RX ORDER — MAGNESIUM SULFATE HEPTAHYDRATE 40 MG/ML
4 INJECTION, SOLUTION INTRAVENOUS AS NEEDED
Status: DISCONTINUED | OUTPATIENT
Start: 2018-08-25 | End: 2018-08-25

## 2018-08-25 RX ORDER — DEXTROSE, SODIUM CHLORIDE, AND POTASSIUM CHLORIDE 5; .9; .15 G/100ML; G/100ML; G/100ML
50 INJECTION INTRAVENOUS CONTINUOUS
Status: DISCONTINUED | OUTPATIENT
Start: 2018-08-26 | End: 2018-08-30

## 2018-08-25 RX ORDER — LORAZEPAM 0.5 MG/1
0.5 TABLET ORAL EVERY 8 HOURS PRN
Status: DISCONTINUED | OUTPATIENT
Start: 2018-08-25 | End: 2018-09-10

## 2018-08-25 RX ORDER — L.ACID,PARA/B.BIFIDUM/S.THERM 8B CELL
1 CAPSULE ORAL DAILY
Status: DISCONTINUED | OUTPATIENT
Start: 2018-08-26 | End: 2018-10-24 | Stop reason: HOSPADM

## 2018-08-25 RX ORDER — CLOPIDOGREL BISULFATE 75 MG/1
75 TABLET ORAL DAILY
Status: DISCONTINUED | OUTPATIENT
Start: 2018-08-26 | End: 2018-10-03

## 2018-08-25 RX ORDER — FAMOTIDINE 20 MG/1
20 TABLET, FILM COATED ORAL 2 TIMES DAILY
Status: DISCONTINUED | OUTPATIENT
Start: 2018-08-25 | End: 2018-10-24 | Stop reason: HOSPADM

## 2018-08-25 RX ORDER — THIAMINE MONONITRATE (VIT B1) 100 MG
100 TABLET ORAL DAILY
Status: DISCONTINUED | OUTPATIENT
Start: 2018-08-26 | End: 2018-10-24 | Stop reason: HOSPADM

## 2018-08-25 RX ORDER — POTASSIUM CHLORIDE 1.5 G/1.77G
40 POWDER, FOR SOLUTION ORAL AS NEEDED
Status: DISCONTINUED | OUTPATIENT
Start: 2018-08-25 | End: 2018-08-25

## 2018-08-25 RX ORDER — SODIUM CHLORIDE 0.9 % (FLUSH) 0.9 %
1-10 SYRINGE (ML) INJECTION AS NEEDED
Status: DISCONTINUED | OUTPATIENT
Start: 2018-08-25 | End: 2018-10-24 | Stop reason: HOSPADM

## 2018-08-25 RX ORDER — HYDROCODONE BITARTRATE AND ACETAMINOPHEN 7.5; 325 MG/1; MG/1
1 TABLET ORAL EVERY 4 HOURS PRN
Status: DISPENSED | OUTPATIENT
Start: 2018-08-25 | End: 2018-09-01

## 2018-08-25 RX ORDER — SENNA AND DOCUSATE SODIUM 50; 8.6 MG/1; MG/1
2 TABLET, FILM COATED ORAL DAILY PRN
Status: DISCONTINUED | OUTPATIENT
Start: 2018-08-25 | End: 2018-10-24 | Stop reason: HOSPADM

## 2018-08-25 RX ORDER — SODIUM CHLORIDE AND POTASSIUM CHLORIDE 150; 900 MG/100ML; MG/100ML
100 INJECTION, SOLUTION INTRAVENOUS CONTINUOUS
Status: DISCONTINUED | OUTPATIENT
Start: 2018-08-25 | End: 2018-08-26

## 2018-08-25 RX ORDER — ACETAMINOPHEN 650 MG/1
650 SUPPOSITORY RECTAL EVERY 4 HOURS PRN
Status: DISCONTINUED | OUTPATIENT
Start: 2018-08-25 | End: 2018-10-24 | Stop reason: HOSPADM

## 2018-08-25 RX ORDER — ONDANSETRON 2 MG/ML
4 INJECTION INTRAMUSCULAR; INTRAVENOUS EVERY 6 HOURS PRN
Status: DISCONTINUED | OUTPATIENT
Start: 2018-08-25 | End: 2018-08-25

## 2018-08-25 RX ORDER — AMOXICILLIN 125 MG/5ML
500 POWDER, FOR SUSPENSION ORAL EVERY 8 HOURS SCHEDULED
Status: DISCONTINUED | OUTPATIENT
Start: 2018-08-25 | End: 2018-08-26

## 2018-08-25 RX ADMIN — POTASSIUM CHLORIDE AND SODIUM CHLORIDE 100 ML/HR: 900; 150 INJECTION, SOLUTION INTRAVENOUS at 23:36

## 2018-08-25 RX ADMIN — METOPROLOL TARTRATE 50 MG: 50 TABLET ORAL at 23:35

## 2018-08-25 RX ADMIN — FAMOTIDINE 20 MG: 20 TABLET ORAL at 23:34

## 2018-08-25 RX ADMIN — ATORVASTATIN CALCIUM 80 MG: 40 TABLET, FILM COATED ORAL at 23:33

## 2018-08-25 RX ADMIN — BACLOFEN 5 MG: 10 TABLET ORAL at 23:34

## 2018-08-25 RX ADMIN — AMOXICILLIN 500 MG: 125 POWDER, FOR SUSPENSION ORAL at 22:15

## 2018-08-26 ENCOUNTER — APPOINTMENT (OUTPATIENT)
Dept: CT IMAGING | Facility: HOSPITAL | Age: 38
End: 2018-08-26

## 2018-08-26 PROBLEM — R82.90 ABNORMAL URINALYSIS: Status: ACTIVE | Noted: 2018-08-26

## 2018-08-26 PROBLEM — E87.0 HYPERNATREMIA: Status: ACTIVE | Noted: 2018-08-26

## 2018-08-26 LAB
ALBUMIN SERPL-MCNC: 3.48 G/DL (ref 3.2–4.8)
ALP SERPL-CCNC: 67 U/L (ref 25–100)
ALT SERPL W P-5'-P-CCNC: 63 U/L (ref 7–40)
ANION GAP SERPL CALCULATED.3IONS-SCNC: 12 MMOL/L (ref 3–11)
ANION GAP SERPL CALCULATED.3IONS-SCNC: 9 MMOL/L (ref 3–11)
AST SERPL-CCNC: 79 U/L (ref 0–33)
BILIRUB SERPL-MCNC: 0.4 MG/DL (ref 0.3–1.2)
BUN BLD-MCNC: 18 MG/DL (ref 9–23)
BUN BLD-MCNC: 18 MG/DL (ref 9–23)
BUN/CREAT SERPL: 39.1 (ref 7–25)
CALCIUM SPEC-SCNC: 9 MG/DL (ref 8.7–10.4)
CALCIUM SPEC-SCNC: 9.1 MG/DL (ref 8.7–10.4)
CHLORIDE SERPL-SCNC: 114 MMOL/L (ref 99–109)
CHLORIDE SERPL-SCNC: 118 MMOL/L (ref 99–109)
CHOLEST SERPL-MCNC: 127 MG/DL (ref 0–200)
CO2 SERPL-SCNC: 22 MMOL/L (ref 20–31)
CO2 SERPL-SCNC: 25 MMOL/L (ref 20–31)
CREAT BLD-MCNC: 0.45 MG/DL (ref 0.6–1.3)
CREAT BLD-MCNC: 0.46 MG/DL (ref 0.6–1.3)
GFR SERPL CREATININE-BSD FRML MDRD: >150 ML/MIN/1.73
GLUCOSE BLD-MCNC: 108 MG/DL (ref 70–100)
GLUCOSE BLD-MCNC: 108 MG/DL (ref 70–100)
GLUCOSE BLDC GLUCOMTR-MCNC: 102 MG/DL (ref 70–130)
GLUCOSE BLDC GLUCOMTR-MCNC: 111 MG/DL (ref 70–130)
GLUCOSE BLDC GLUCOMTR-MCNC: 172 MG/DL (ref 70–130)
MAGNESIUM SERPL-MCNC: 2.1 MG/DL (ref 1.3–2.7)
PHOSPHATE SERPL-MCNC: 2.9 MG/DL (ref 2.4–5.1)
POTASSIUM BLD-SCNC: 3.7 MMOL/L (ref 3.5–5.5)
POTASSIUM BLD-SCNC: 3.8 MMOL/L (ref 3.5–5.5)
PROT SERPL-MCNC: 6.1 G/DL (ref 5.7–8.2)
SODIUM BLD-SCNC: 147 MMOL/L (ref 132–146)
SODIUM BLD-SCNC: 148 MMOL/L (ref 132–146)
SODIUM BLD-SCNC: 152 MMOL/L (ref 132–146)
SODIUM BLD-SCNC: 152 MMOL/L (ref 132–146)
TRIGL SERPL-MCNC: 109 MG/DL (ref 0–150)

## 2018-08-26 PROCEDURE — 0 IOPAMIDOL PER 1 ML: Performed by: FAMILY MEDICINE

## 2018-08-26 PROCEDURE — 25010000002 LINEZOLID 600 MG/300ML SOLUTION: Performed by: INTERNAL MEDICINE

## 2018-08-26 PROCEDURE — 84478 ASSAY OF TRIGLYCERIDES: CPT

## 2018-08-26 PROCEDURE — 82962 GLUCOSE BLOOD TEST: CPT

## 2018-08-26 PROCEDURE — 99226 PR SBSQ OBSERVATION CARE/DAY 35 MINUTES: CPT | Performed by: FAMILY MEDICINE

## 2018-08-26 PROCEDURE — 84295 ASSAY OF SERUM SODIUM: CPT | Performed by: NURSE PRACTITIONER

## 2018-08-26 PROCEDURE — 25010000002 PIPERACILLIN SOD-TAZOBACTAM PER 1 G: Performed by: FAMILY MEDICINE

## 2018-08-26 PROCEDURE — 25010000002 ENOXAPARIN PER 10 MG: Performed by: NURSE PRACTITIONER

## 2018-08-26 PROCEDURE — G0378 HOSPITAL OBSERVATION PER HR: HCPCS

## 2018-08-26 PROCEDURE — 82465 ASSAY BLD/SERUM CHOLESTEROL: CPT

## 2018-08-26 PROCEDURE — 25010000002 LORAZEPAM PER 2 MG: Performed by: NURSE PRACTITIONER

## 2018-08-26 PROCEDURE — 25010000002 FLUCONAZOLE PER 200 MG: Performed by: NURSE PRACTITIONER

## 2018-08-26 PROCEDURE — 71275 CT ANGIOGRAPHY CHEST: CPT

## 2018-08-26 PROCEDURE — 83735 ASSAY OF MAGNESIUM: CPT

## 2018-08-26 PROCEDURE — 80053 COMPREHEN METABOLIC PANEL: CPT | Performed by: INTERNAL MEDICINE

## 2018-08-26 PROCEDURE — 84100 ASSAY OF PHOSPHORUS: CPT

## 2018-08-26 RX ORDER — FLUCONAZOLE 2 MG/ML
400 INJECTION, SOLUTION INTRAVENOUS ONCE
Status: COMPLETED | OUTPATIENT
Start: 2018-08-26 | End: 2018-08-26

## 2018-08-26 RX ORDER — LORAZEPAM 2 MG/ML
0.5 INJECTION INTRAMUSCULAR 4 TIMES DAILY PRN
Status: DISCONTINUED | OUTPATIENT
Start: 2018-08-26 | End: 2018-09-02

## 2018-08-26 RX ORDER — LINEZOLID 2 MG/ML
600 INJECTION, SOLUTION INTRAVENOUS EVERY 12 HOURS
Status: DISCONTINUED | OUTPATIENT
Start: 2018-08-26 | End: 2018-08-27

## 2018-08-26 RX ADMIN — AMOXICILLIN 500 MG: 125 POWDER, FOR SUSPENSION ORAL at 05:47

## 2018-08-26 RX ADMIN — ATORVASTATIN CALCIUM 80 MG: 40 TABLET, FILM COATED ORAL at 21:30

## 2018-08-26 RX ADMIN — THIAMINE HCL TAB 100 MG 100 MG: 100 TAB at 09:21

## 2018-08-26 RX ADMIN — LORAZEPAM 0.5 MG: 0.5 TABLET ORAL at 22:29

## 2018-08-26 RX ADMIN — BACLOFEN 5 MG: 10 TABLET ORAL at 21:31

## 2018-08-26 RX ADMIN — METOPROLOL TARTRATE 50 MG: 50 TABLET ORAL at 21:31

## 2018-08-26 RX ADMIN — LORAZEPAM 0.5 MG: 0.5 TABLET ORAL at 09:21

## 2018-08-26 RX ADMIN — TAZOBACTAM SODIUM AND PIPERACILLIN SODIUM 4.5 G: 500; 4 INJECTION, SOLUTION INTRAVENOUS at 11:06

## 2018-08-26 RX ADMIN — TAZOBACTAM SODIUM AND PIPERACILLIN SODIUM 4.5 G: 500; 4 INJECTION, SOLUTION INTRAVENOUS at 15:40

## 2018-08-26 RX ADMIN — ACETAMINOPHEN 650 MG: 650 SUPPOSITORY RECTAL at 13:01

## 2018-08-26 RX ADMIN — FAMOTIDINE 20 MG: 20 TABLET ORAL at 09:20

## 2018-08-26 RX ADMIN — HYDROCODONE BITARTRATE AND ACETAMINOPHEN 1 TABLET: 7.5; 325 TABLET ORAL at 22:29

## 2018-08-26 RX ADMIN — CLOPIDOGREL BISULFATE 75 MG: 75 TABLET ORAL at 09:21

## 2018-08-26 RX ADMIN — ENOXAPARIN SODIUM 40 MG: 100 INJECTION SUBCUTANEOUS at 09:20

## 2018-08-26 RX ADMIN — Medication 1 CAPSULE: at 09:21

## 2018-08-26 RX ADMIN — DEXTROSE MONOHYDRATE, SODIUM CHLORIDE, AND POTASSIUM CHLORIDE 100 ML/HR: 50; 9; 1.49 INJECTION, SOLUTION INTRAVENOUS at 04:06

## 2018-08-26 RX ADMIN — HYDROCODONE BITARTRATE AND ACETAMINOPHEN 1 TABLET: 7.5; 325 TABLET ORAL at 09:20

## 2018-08-26 RX ADMIN — ASPIRIN 81 MG CHEWABLE TABLET 81 MG: 81 TABLET CHEWABLE at 09:21

## 2018-08-26 RX ADMIN — LORAZEPAM 0.5 MG: 2 INJECTION INTRAMUSCULAR; INTRAVENOUS at 02:25

## 2018-08-26 RX ADMIN — HYDROCODONE BITARTRATE AND ACETAMINOPHEN 1 TABLET: 7.5; 325 TABLET ORAL at 01:29

## 2018-08-26 RX ADMIN — FAMOTIDINE 20 MG: 20 TABLET ORAL at 21:31

## 2018-08-26 RX ADMIN — BACLOFEN 5 MG: 10 TABLET ORAL at 09:20

## 2018-08-26 RX ADMIN — LINEZOLID 600 MG: 600 INJECTION, SOLUTION INTRAVENOUS at 14:08

## 2018-08-26 RX ADMIN — METOPROLOL TARTRATE 50 MG: 50 TABLET ORAL at 09:21

## 2018-08-26 RX ADMIN — FLUCONAZOLE 400 MG: 400 INJECTION, SOLUTION INTRAVENOUS at 01:11

## 2018-08-26 RX ADMIN — BACLOFEN 5 MG: 10 TABLET ORAL at 15:40

## 2018-08-26 RX ADMIN — MICAFUNGIN SODIUM 100 MG: 20 INJECTION, POWDER, LYOPHILIZED, FOR SOLUTION INTRAVENOUS at 14:08

## 2018-08-26 RX ADMIN — BISACODYL 10 MG: 10 SUPPOSITORY RECTAL at 13:01

## 2018-08-26 RX ADMIN — IOPAMIDOL 55 ML: 755 INJECTION, SOLUTION INTRAVENOUS at 22:45

## 2018-08-27 LAB
ALBUMIN SERPL-MCNC: 3.26 G/DL (ref 3.2–4.8)
ALP SERPL-CCNC: 64 U/L (ref 25–100)
ALT SERPL W P-5'-P-CCNC: 70 U/L (ref 7–40)
AMORPH URATE CRY URNS QL MICRO: ABNORMAL /HPF
ANION GAP SERPL CALCULATED.3IONS-SCNC: 7 MMOL/L (ref 3–11)
AST SERPL-CCNC: 75 U/L (ref 0–33)
BACTERIA UR QL AUTO: ABNORMAL /HPF
BILIRUB SERPL-MCNC: 0.4 MG/DL (ref 0.3–1.2)
BILIRUB UR QL STRIP: NEGATIVE
BUN BLD-MCNC: 12 MG/DL (ref 9–23)
CALCIUM SPEC-SCNC: 8.5 MG/DL (ref 8.7–10.4)
CHLORIDE SERPL-SCNC: 115 MMOL/L (ref 99–109)
CHOLEST SERPL-MCNC: 120 MG/DL (ref 0–200)
CLARITY UR: ABNORMAL
CO2 SERPL-SCNC: 25 MMOL/L (ref 20–31)
COARSE GRAN CASTS URNS QL MICRO: ABNORMAL /LPF
COD CRY URNS QL: ABNORMAL /HPF
COLOR UR: YELLOW
CREAT BLD-MCNC: 0.44 MG/DL (ref 0.6–1.3)
DEPRECATED RDW RBC AUTO: 55.3 FL (ref 37–54)
ERYTHROCYTE [DISTWIDTH] IN BLOOD BY AUTOMATED COUNT: 15.1 % (ref 11.3–14.5)
GLUCOSE BLD-MCNC: 129 MG/DL (ref 70–100)
GLUCOSE BLDC GLUCOMTR-MCNC: 125 MG/DL (ref 70–130)
GLUCOSE BLDC GLUCOMTR-MCNC: 130 MG/DL (ref 70–130)
GLUCOSE BLDC GLUCOMTR-MCNC: 145 MG/DL (ref 70–130)
GLUCOSE UR STRIP-MCNC: NEGATIVE MG/DL
HCT VFR BLD AUTO: 30.1 % (ref 34.5–44)
HGB BLD-MCNC: 9.2 G/DL (ref 11.5–15.5)
HGB UR QL STRIP.AUTO: ABNORMAL
HYALINE CASTS UR QL AUTO: ABNORMAL /LPF
KETONES UR QL STRIP: NEGATIVE
KOH PREP NAIL: NORMAL
LEUKOCYTE ESTERASE UR QL STRIP.AUTO: ABNORMAL
MAGNESIUM SERPL-MCNC: 1.8 MG/DL (ref 1.3–2.7)
MCH RBC QN AUTO: 30.7 PG (ref 27–31)
MCHC RBC AUTO-ENTMCNC: 30.6 G/DL (ref 32–36)
MCV RBC AUTO: 100.3 FL (ref 80–99)
MUCOUS THREADS URNS QL MICRO: ABNORMAL /HPF
NITRITE UR QL STRIP: NEGATIVE
PH UR STRIP.AUTO: 6.5 [PH] (ref 5–8)
PHOSPHATE SERPL-MCNC: 2.5 MG/DL (ref 2.4–5.1)
PLATELET # BLD AUTO: 405 10*3/MM3 (ref 150–450)
PMV BLD AUTO: 10.4 FL (ref 6–12)
POTASSIUM BLD-SCNC: 3.5 MMOL/L (ref 3.5–5.5)
PROT SERPL-MCNC: 5.7 G/DL (ref 5.7–8.2)
PROT UR QL STRIP: ABNORMAL
RBC # BLD AUTO: 3 10*6/MM3 (ref 3.89–5.14)
RBC # UR: ABNORMAL /HPF
REF LAB TEST METHOD: ABNORMAL
SODIUM BLD-SCNC: 147 MMOL/L (ref 132–146)
SODIUM BLD-SCNC: 147 MMOL/L (ref 132–146)
SP GR UR STRIP: 1.03 (ref 1–1.03)
SQUAMOUS #/AREA URNS HPF: ABNORMAL /HPF
TRIGL SERPL-MCNC: 106 MG/DL (ref 0–150)
UROBILINOGEN UR QL STRIP: ABNORMAL
WBC NRBC COR # BLD: 8.91 10*3/MM3 (ref 3.5–10.8)
WBC UR QL AUTO: ABNORMAL /HPF

## 2018-08-27 PROCEDURE — 87070 CULTURE OTHR SPECIMN AEROBIC: CPT | Performed by: NURSE PRACTITIONER

## 2018-08-27 PROCEDURE — 84100 ASSAY OF PHOSPHORUS: CPT

## 2018-08-27 PROCEDURE — 87633 RESP VIRUS 12-25 TARGETS: CPT | Performed by: NURSE PRACTITIONER

## 2018-08-27 PROCEDURE — 87205 SMEAR GRAM STAIN: CPT | Performed by: NURSE PRACTITIONER

## 2018-08-27 PROCEDURE — 87220 TISSUE EXAM FOR FUNGI: CPT | Performed by: INTERNAL MEDICINE

## 2018-08-27 PROCEDURE — 87106 FUNGI IDENTIFICATION YEAST: CPT | Performed by: INTERNAL MEDICINE

## 2018-08-27 PROCEDURE — 99232 SBSQ HOSP IP/OBS MODERATE 35: CPT | Performed by: FAMILY MEDICINE

## 2018-08-27 PROCEDURE — 87206 SMEAR FLUORESCENT/ACID STAI: CPT | Performed by: INTERNAL MEDICINE

## 2018-08-27 PROCEDURE — 82962 GLUCOSE BLOOD TEST: CPT

## 2018-08-27 PROCEDURE — 84478 ASSAY OF TRIGLYCERIDES: CPT

## 2018-08-27 PROCEDURE — 25010000002 PIPERACILLIN SOD-TAZOBACTAM PER 1 G: Performed by: FAMILY MEDICINE

## 2018-08-27 PROCEDURE — 25010000002 ENOXAPARIN PER 10 MG: Performed by: NURSE PRACTITIONER

## 2018-08-27 PROCEDURE — 85027 COMPLETE CBC AUTOMATED: CPT | Performed by: INTERNAL MEDICINE

## 2018-08-27 PROCEDURE — 25010000002 LINEZOLID 600 MG/300ML SOLUTION: Performed by: INTERNAL MEDICINE

## 2018-08-27 PROCEDURE — 87798 DETECT AGENT NOS DNA AMP: CPT | Performed by: NURSE PRACTITIONER

## 2018-08-27 PROCEDURE — 87581 M.PNEUMON DNA AMP PROBE: CPT | Performed by: NURSE PRACTITIONER

## 2018-08-27 PROCEDURE — 87486 CHLMYD PNEUM DNA AMP PROBE: CPT | Performed by: NURSE PRACTITIONER

## 2018-08-27 PROCEDURE — 84295 ASSAY OF SERUM SODIUM: CPT | Performed by: NURSE PRACTITIONER

## 2018-08-27 PROCEDURE — 81001 URINALYSIS AUTO W/SCOPE: CPT | Performed by: INTERNAL MEDICINE

## 2018-08-27 PROCEDURE — G0378 HOSPITAL OBSERVATION PER HR: HCPCS

## 2018-08-27 PROCEDURE — 83735 ASSAY OF MAGNESIUM: CPT

## 2018-08-27 PROCEDURE — 82465 ASSAY BLD/SERUM CHOLESTEROL: CPT

## 2018-08-27 PROCEDURE — 87102 FUNGUS ISOLATION CULTURE: CPT | Performed by: INTERNAL MEDICINE

## 2018-08-27 PROCEDURE — 80053 COMPREHEN METABOLIC PANEL: CPT

## 2018-08-27 RX ORDER — FLUCONAZOLE 40 MG/ML
200 POWDER, FOR SUSPENSION ORAL DAILY
Status: DISCONTINUED | OUTPATIENT
Start: 2018-08-27 | End: 2018-08-29

## 2018-08-27 RX ADMIN — Medication 1 CAPSULE: at 10:03

## 2018-08-27 RX ADMIN — BACLOFEN 5 MG: 10 TABLET ORAL at 10:03

## 2018-08-27 RX ADMIN — FAMOTIDINE 20 MG: 20 TABLET ORAL at 10:03

## 2018-08-27 RX ADMIN — TAZOBACTAM SODIUM AND PIPERACILLIN SODIUM 4.5 G: 500; 4 INJECTION, SOLUTION INTRAVENOUS at 18:03

## 2018-08-27 RX ADMIN — METOPROLOL TARTRATE 50 MG: 50 TABLET ORAL at 10:03

## 2018-08-27 RX ADMIN — DEXTROSE MONOHYDRATE, SODIUM CHLORIDE, AND POTASSIUM CHLORIDE 100 ML/HR: 50; 9; 1.49 INJECTION, SOLUTION INTRAVENOUS at 06:03

## 2018-08-27 RX ADMIN — CLOPIDOGREL BISULFATE 75 MG: 75 TABLET ORAL at 10:03

## 2018-08-27 RX ADMIN — ASPIRIN 81 MG CHEWABLE TABLET 81 MG: 81 TABLET CHEWABLE at 10:03

## 2018-08-27 RX ADMIN — HYDROCODONE BITARTRATE AND ACETAMINOPHEN 1 TABLET: 7.5; 325 TABLET ORAL at 10:03

## 2018-08-27 RX ADMIN — LINEZOLID 600 MG: 600 INJECTION, SOLUTION INTRAVENOUS at 00:18

## 2018-08-27 RX ADMIN — ATORVASTATIN CALCIUM 80 MG: 40 TABLET, FILM COATED ORAL at 21:48

## 2018-08-27 RX ADMIN — ENOXAPARIN SODIUM 40 MG: 100 INJECTION SUBCUTANEOUS at 10:04

## 2018-08-27 RX ADMIN — LORAZEPAM 0.5 MG: 0.5 TABLET ORAL at 13:32

## 2018-08-27 RX ADMIN — BACLOFEN 5 MG: 10 TABLET ORAL at 21:48

## 2018-08-27 RX ADMIN — THIAMINE HCL TAB 100 MG 100 MG: 100 TAB at 10:03

## 2018-08-27 RX ADMIN — BACLOFEN 5 MG: 10 TABLET ORAL at 18:03

## 2018-08-27 RX ADMIN — METOPROLOL TARTRATE 50 MG: 50 TABLET ORAL at 21:48

## 2018-08-27 RX ADMIN — TAZOBACTAM SODIUM AND PIPERACILLIN SODIUM 4.5 G: 500; 4 INJECTION, SOLUTION INTRAVENOUS at 10:07

## 2018-08-27 RX ADMIN — FLUCONAZOLE 200 MG: 40 POWDER, FOR SUSPENSION ORAL at 18:03

## 2018-08-27 RX ADMIN — TAZOBACTAM SODIUM AND PIPERACILLIN SODIUM 4.5 G: 500; 4 INJECTION, SOLUTION INTRAVENOUS at 00:18

## 2018-08-27 RX ADMIN — FAMOTIDINE 20 MG: 20 TABLET ORAL at 21:48

## 2018-08-28 LAB
ANION GAP SERPL CALCULATED.3IONS-SCNC: 4 MMOL/L (ref 3–11)
BACTERIA SPEC RESP CULT: NORMAL
BUN BLD-MCNC: 8 MG/DL (ref 9–23)
BUN/CREAT SERPL: 23.5 (ref 7–25)
CALCIUM SPEC-SCNC: 8.5 MG/DL (ref 8.7–10.4)
CHLORIDE SERPL-SCNC: 109 MMOL/L (ref 99–109)
CO2 SERPL-SCNC: 24 MMOL/L (ref 20–31)
CREAT BLD-MCNC: 0.34 MG/DL (ref 0.6–1.3)
GFR SERPL CREATININE-BSD FRML MDRD: >150 ML/MIN/1.73
GLUCOSE BLD-MCNC: 130 MG/DL (ref 70–100)
GLUCOSE BLDC GLUCOMTR-MCNC: 107 MG/DL (ref 70–130)
GLUCOSE BLDC GLUCOMTR-MCNC: 127 MG/DL (ref 70–130)
GLUCOSE BLDC GLUCOMTR-MCNC: 139 MG/DL (ref 70–130)
GLUCOSE BLDC GLUCOMTR-MCNC: 77 MG/DL (ref 70–130)
GRAM STN SPEC: NORMAL
MAGNESIUM SERPL-MCNC: 2 MG/DL (ref 1.3–2.7)
POTASSIUM BLD-SCNC: 4 MMOL/L (ref 3.5–5.5)
SODIUM BLD-SCNC: 137 MMOL/L (ref 132–146)

## 2018-08-28 PROCEDURE — 82962 GLUCOSE BLOOD TEST: CPT

## 2018-08-28 PROCEDURE — 25010000002 LORAZEPAM PER 2 MG: Performed by: NURSE PRACTITIONER

## 2018-08-28 PROCEDURE — 25010000002 ENOXAPARIN PER 10 MG: Performed by: NURSE PRACTITIONER

## 2018-08-28 PROCEDURE — 83735 ASSAY OF MAGNESIUM: CPT | Performed by: INTERNAL MEDICINE

## 2018-08-28 PROCEDURE — 25010000002 PIPERACILLIN SOD-TAZOBACTAM PER 1 G: Performed by: FAMILY MEDICINE

## 2018-08-28 PROCEDURE — 80048 BASIC METABOLIC PNL TOTAL CA: CPT | Performed by: INTERNAL MEDICINE

## 2018-08-28 PROCEDURE — 87205 SMEAR GRAM STAIN: CPT | Performed by: INTERNAL MEDICINE

## 2018-08-28 PROCEDURE — 94799 UNLISTED PULMONARY SVC/PX: CPT

## 2018-08-28 PROCEDURE — 94760 N-INVAS EAR/PLS OXIMETRY 1: CPT

## 2018-08-28 PROCEDURE — 86480 TB TEST CELL IMMUN MEASURE: CPT | Performed by: INTERNAL MEDICINE

## 2018-08-28 PROCEDURE — 87070 CULTURE OTHR SPECIMN AEROBIC: CPT | Performed by: INTERNAL MEDICINE

## 2018-08-28 PROCEDURE — 99232 SBSQ HOSP IP/OBS MODERATE 35: CPT | Performed by: INTERNAL MEDICINE

## 2018-08-28 RX ORDER — MAGNESIUM SULFATE HEPTAHYDRATE 40 MG/ML
2 INJECTION, SOLUTION INTRAVENOUS AS NEEDED
Status: DISCONTINUED | OUTPATIENT
Start: 2018-08-28 | End: 2018-10-24 | Stop reason: HOSPADM

## 2018-08-28 RX ORDER — POTASSIUM CHLORIDE 7.45 MG/ML
10 INJECTION INTRAVENOUS
Status: DISCONTINUED | OUTPATIENT
Start: 2018-08-28 | End: 2018-10-24 | Stop reason: HOSPADM

## 2018-08-28 RX ORDER — POTASSIUM CHLORIDE 750 MG/1
40 CAPSULE, EXTENDED RELEASE ORAL AS NEEDED
Status: DISCONTINUED | OUTPATIENT
Start: 2018-08-28 | End: 2018-10-24 | Stop reason: HOSPADM

## 2018-08-28 RX ORDER — POTASSIUM CHLORIDE 1.5 G/1.77G
40 POWDER, FOR SOLUTION ORAL AS NEEDED
Status: DISCONTINUED | OUTPATIENT
Start: 2018-08-28 | End: 2018-10-24 | Stop reason: HOSPADM

## 2018-08-28 RX ORDER — MAGNESIUM SULFATE HEPTAHYDRATE 40 MG/ML
4 INJECTION, SOLUTION INTRAVENOUS AS NEEDED
Status: DISCONTINUED | OUTPATIENT
Start: 2018-08-28 | End: 2018-10-24 | Stop reason: HOSPADM

## 2018-08-28 RX ADMIN — BACLOFEN 5 MG: 10 TABLET ORAL at 17:01

## 2018-08-28 RX ADMIN — METOPROLOL TARTRATE 50 MG: 50 TABLET ORAL at 20:01

## 2018-08-28 RX ADMIN — ENOXAPARIN SODIUM 40 MG: 100 INJECTION SUBCUTANEOUS at 09:35

## 2018-08-28 RX ADMIN — TAZOBACTAM SODIUM AND PIPERACILLIN SODIUM 4.5 G: 500; 4 INJECTION, SOLUTION INTRAVENOUS at 09:35

## 2018-08-28 RX ADMIN — FLUCONAZOLE 200 MG: 40 POWDER, FOR SUSPENSION ORAL at 10:38

## 2018-08-28 RX ADMIN — CLOPIDOGREL BISULFATE 75 MG: 75 TABLET ORAL at 09:35

## 2018-08-28 RX ADMIN — LORAZEPAM 0.5 MG: 2 INJECTION INTRAMUSCULAR; INTRAVENOUS at 15:07

## 2018-08-28 RX ADMIN — BACLOFEN 5 MG: 10 TABLET ORAL at 09:35

## 2018-08-28 RX ADMIN — BACLOFEN 5 MG: 10 TABLET ORAL at 20:00

## 2018-08-28 RX ADMIN — METOPROLOL TARTRATE 50 MG: 50 TABLET ORAL at 09:36

## 2018-08-28 RX ADMIN — HYDROCODONE BITARTRATE AND ACETAMINOPHEN 1 TABLET: 7.5; 325 TABLET ORAL at 09:35

## 2018-08-28 RX ADMIN — ASPIRIN 81 MG CHEWABLE TABLET 81 MG: 81 TABLET CHEWABLE at 09:36

## 2018-08-28 RX ADMIN — HYDROCODONE BITARTRATE AND ACETAMINOPHEN 1 TABLET: 7.5; 325 TABLET ORAL at 13:10

## 2018-08-28 RX ADMIN — FAMOTIDINE 20 MG: 20 TABLET ORAL at 20:00

## 2018-08-28 RX ADMIN — THIAMINE HCL TAB 100 MG 100 MG: 100 TAB at 09:36

## 2018-08-28 RX ADMIN — FAMOTIDINE 20 MG: 20 TABLET ORAL at 09:36

## 2018-08-28 RX ADMIN — TAZOBACTAM SODIUM AND PIPERACILLIN SODIUM 4.5 G: 500; 4 INJECTION, SOLUTION INTRAVENOUS at 00:25

## 2018-08-28 RX ADMIN — HYDROCODONE BITARTRATE AND ACETAMINOPHEN 1 TABLET: 7.5; 325 TABLET ORAL at 22:29

## 2018-08-28 RX ADMIN — LORAZEPAM 0.5 MG: 0.5 TABLET ORAL at 06:17

## 2018-08-28 RX ADMIN — Medication 1 CAPSULE: at 09:35

## 2018-08-28 RX ADMIN — ATORVASTATIN CALCIUM 80 MG: 40 TABLET, FILM COATED ORAL at 20:00

## 2018-08-28 RX ADMIN — HYDROCODONE BITARTRATE AND ACETAMINOPHEN 1 TABLET: 7.5; 325 TABLET ORAL at 17:00

## 2018-08-28 RX ADMIN — DEXTROSE MONOHYDRATE, SODIUM CHLORIDE, AND POTASSIUM CHLORIDE 100 ML/HR: 50; 9; 1.49 INJECTION, SOLUTION INTRAVENOUS at 04:12

## 2018-08-28 NOTE — PAYOR COMM NOTE
"Clint Holm  (37 y.o. Female)     Date of Birth Social Security Number Address Home Phone MRN    1980  [address] [facility], [facility]  [place] [address] [phone] [id number]    [employer] Marital Status          None Single       Admission Date Admission Type Admitting Provider Attending Provider Department, Room/Bed    18 Urgent Niurka Singleton MD  [facility] 5G, S555/1    Discharge Date Discharge Disposition Discharge Destination        2018 Skilled Nursing Facility (DC - External)              Attending Provider:  (none)   Allergies:  No Known Allergies    Isolation:  None   Infection:  None   Code Status:  CPR    Ht:  144.8 cm (57.01\")   Wt:  65.1 kg (143 lb 8 oz)    Admission Cmt:  None   Principal Problem:  S/P Respiratory arrest due to drugs and alcohol. Prolonged ventilatory support requiring tracheostomy and PEG 18 [R09.2]                 Active Insurance as of 2018     Primary Coverage     Payor Plan Insurance Group Employer/Plan Group    [employer] [place] [employer] [place]ICA[place]      Payor Plan Address Payor Plan Phone Number Effective From Effective To    [address] [address] [phone] 2018     [facility] [id number]       Subscriber Name Subscriber Birth Date Member ID       CLINT HOLM 1980 [id number]                 Emergency Contacts      (Rel.) Home Phone Work Phone Mobile Phone    Eric Holm (Son) -- -- [phone]               Discharge Summary      Niurka Singleton MD at 2018 12:33 PM              [facility] Medicine Services  DISCHARGE SUMMARY    Patient Name: Clint Holm  : 1980  MRN: [id number]    Date of Admission: 2018  Date of Discharge:  2018  Primary Care Physician: Provider, No Known    Consults     Date and Time Order Name Status Description    2018 003 Inpatient Neurology Consult      2018 0030 Inpatient Infectious Diseases Consult Completed     " 8/11/2018 1550 Inpatient Palliative Care MD Consult Completed     8/7/2018 0030 Inpatient Infectious Diseases Consult Completed     7/25/2018 1422 Inpatient General Surgery Consult Completed     7/20/2018 0913 Inpatient Palliative Care MD Consult Completed     7/17/2018 1032 Inpatient Cardiology Consult Completed     7/16/2018 0620 Inpatient Neurology Consult Completed         Hospital Course     Presenting Problem:   Acute respiratory failure (CMS/HCC) [J96.00]  Respiratory arrest (CMS/[facility]) [R09.2]    Active Hospital Problems    Diagnosis Date Noted   • **S/P Respiratory arrest due to drugs and alcohol. Prolonged ventilatory support requiring tracheostomy and PEG 7/26/18. Now off the ventilator [R09.2] 07/16/2018   • Hypokalemia [E87.6] 08/22/2018   • Seizures versus myoclonus and posturing from anoxia on admission. 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 Resolved [G21.0] 08/06/2018   • Persistent urinary retention requiring in and out catheterization [R33.8] 08/05/2018   • Alcohol abuse. Intoxicated on admission [F10.10] 07/16/2018   • Anoxic brain damage  [G93.1] 07/16/2018   • H/O drug abuse [Z87.898] 07/16/2018   • Pulmonary infiltrates, resolved. Suspected pulmonary edema versus aspiration [R91.8] 07/16/2018      Resolved Hospital Problems    Diagnosis Date Noted Date Resolved   No resolved problems to display.          Hospital Course:  Otilia Holm is a 37 y.o. female transferred from [facility] on 7/16/2018 with unresponsiveness associated with acute hypoxic resp failure and prolonged seizure versus myoclonus due to alcohol and illicit drug use. She was admitted to the ICU then transferred to the floor earlier this month. She went back to the ICU for closer nursing monitoring and came back to our service 8/20. She was seen by multidisciplinary specialties.  She was not able to wean off ventilator, so PEG and trach were placed. She cont show decorticate posturing, thought to be from anoxic insult and does not appear that she will recover. She also continued to have persistent fever, tachycardia, and elevated CPK. She was followed by infectious disease for fever and started on Zosyn and Flagyl for poss group C Strep resp infection, but fever cont despite adequate abx, so she was also treated with Dantrolene for poss neuroleptic malignant hyperthermia. Hospitalist assumed care on 8/21.Pt will continue Amoxicillin 500mg TID until 8/28 per ID.  She will continue beta blocker on discharge for her tachycardia.  She will also continue PRN pain medication and anxiolytic.  She is on Fibersource TFs with a goal rate of 75cc/hr.  Although her overall prognosis is poor, she has been stable over the last few days.  She will be transferred to [facility] in [place] this afternoon.  Pt will go to facility with Watson in place, she failed a voiding trial on 8/21.  They may want to attempt a voiding trial again in about a week's time.        Discharge Follow Up Recommendations for labs/diagnostics:       Day of Discharge     HPI:   Pt unresponsive this am. No issues overnight per RN.     Review of Systems  Unable to obtain due to mental status.     Otherwise ROS is negative except as mentioned in the HPI.    Vital Signs:   Temp:  [98.8 °F (37.1 °C)-99.5 °F (37.5 °C)] 99.5 °F (37.5 °C)  Heart Rate:  [102-120] 120  Resp:  [18-22] 20  BP: (119-143)/() 119/92     Physical Exam:  Constitutional: No acute cardiopulm distress  HENT: NCAT, mucous membranes moist, trach intact  Respiratory: Good air flow bilat, mild crackles at bases, no wheezing, resp non-labored, respiratory effort normal on 28% oxygen trach collar  Cardiovascular: RRR, S1/S2 normal, no murmurs  Gastrointestinal: Positive bowel sounds, soft, nontender, non distended, PEG site without drainage or  erythema  Musculoskeletal: No bilateral ankle edema  Neurologic: Eyes closed, moves right leg spontaneously, + bilat UE decorticate posturing, does not withdraw from painful stimuli in all ext  Skin: No rashes  Pertinent  and/or Most Recent Results       Results from last 7 days  Lab Units 08/22/18  1927 08/22/18  0635 08/21/18  0511 08/20/18  0440 08/19/18  0856   WBC 10*3/mm3  --  9.03 11.38* 12.18* 11.11*   HEMOGLOBIN g/dL  --  9.4* 10.3* 10.4* 10.1*   HEMATOCRIT %  --  30.8* 33.3* 33.9* 32.7*   PLATELETS 10*3/mm3  --  348 386 376 363   SODIUM mmol/L  --  138 143 143 142   POTASSIUM mmol/L 4.3 3.4* 4.0 4.2 4.2   CHLORIDE mmol/L  --  103 104 106 107   CO2 mmol/L  --  26.0 29.0 30.0 29.0   BUN mg/dL  --  17 18 15 14   CREATININE mg/dL  --  0.42* 0.50* 0.43* 0.47*   GLUCOSE mg/dL  --  126* 124* 108* 135*   CALCIUM mg/dL  --  8.9 9.3 9.2 9.3       Results from last 7 days  Lab Units 08/20/18  0440 08/19/18  0856   BILIRUBIN mg/dL 0.3 0.3   ALK PHOS U/L 64 63   ALT (SGPT) U/L 59* 63*   AST (SGOT) U/L 60* 73*       Results from last 7 days  Lab Units 08/20/18  0440   CHOLESTEROL mg/dL 121   TRIGLYCERIDES mg/dL 93         Brief Urine Lab Results  (Last result in the past 365 days)      Color   Clarity   Blood   Leuk Est   Nitrite   Protein   CREAT   Urine HCG        08/20/18 0238 Yellow Turbid(A) Negative Negative Negative Negative               Microbiology Results Abnormal     Procedure Component Value - Date/Time    Blood Culture - Blood, [[id number]]  (Normal) Collected:  08/15/18 1128    Lab Status:  Final result Specimen:  Blood from Hand, Right Updated:  08/20/18 1201     Blood Culture No growth at 5 days    Blood Culture - Blood, [[id number]]  (Normal) Collected:  08/15/18 1128    Lab Status:  Final result Specimen:  Blood from Hand, Left Updated:  08/20/18 1201     Blood Culture No growth at 5 days    Respiratory Culture - Sputum, Cough [[id number]]  (Abnormal) Collected:  08/15/18 1113    Lab Status:  Final  result Specimen:  Sputum from Cough Updated:  08/17/18 0751     Respiratory Culture Moderate growth (3+) Streptococcus, Beta Hemolytic, Group G (A)     Comment: This organism is considered to be universally susceptible to penicillin.  No further antibiotic testing will be performed.        STREP GROUPING G     Respiratory Culture Moderate growth (3+) Normal Respiratory Sima     Gram Stain Result Many (4+) WBCs per low power field      Moderate (3+) Epithelial cells per low power field      Many (4+) Gram positive cocci in pairs, chains and clusters    Respiratory Culture - Aspirate, ET Suction [[id number]] Collected:  08/10/18 0919    Lab Status:  Final result Specimen:  Aspirate from ET Suction Updated:  08/12/18 0701     Respiratory Culture Light growth (2+) Normal Respiratory Sima     Gram Stain Result Many (4+) WBCs per low power field      Few (2+) Epithelial cells per low power field      Few (2+) Gram positive cocci in pairs      Few (2+) Gram positive cocci in clusters      Rare (1+) Gram positive bacilli    Blood Culture - Blood, [[id number]]  (Normal) Collected:  08/05/18 1437    Lab Status:  Final result Specimen:  Blood from Wrist, Right Updated:  08/10/18 1500     Blood Culture No growth at 5 days    Blood Culture - Blood, [[id number]]  (Normal) Collected:  08/05/18 1437    Lab Status:  Final result Specimen:  Blood from Wrist, Left Updated:  08/10/18 1500     Blood Culture No growth at 5 days    Urine Culture - Urine, [[id number]]  (Normal) Collected:  08/06/18 1723    Lab Status:  Final result Specimen:  Urine from Urine, Catheter Updated:  08/08/18 0833     Urine Culture No growth at 2 days    MRSA Screen Culture - Swab, Nares [[id number]]  (Normal) Collected:  08/06/18 1734    Lab Status:  Final result Specimen:  Swab from Nares Updated:  08/08/18 0717     MRSA SCREEN CX No Methicillin Resistant Staphylococcus aureus isolated    Respiratory Culture - Sputum, NT Suction [[id number]]  (Abnormal)  Collected:  08/05/18 1711    Lab Status:  Final result Specimen:  Sputum from NT Suction Updated:  08/07/18 1412     Respiratory Culture Light growth (2+) Streptococcus, Beta Hemolytic, Group G (A)     Comment: This organism is considered to be universally susceptible to penicillin.  No further antibiotic testing will be performed.        STREP GROUPING G     Respiratory Culture Light growth (2+) Normal Respiratory Sima     Gram Stain Result Many (4+) WBCs per low power field      Many (4+) Epithelial cells per low power field      Many (4+) Gram positive cocci in pairs, chains and clusters      Many (4+) Gram positive bacilli      Few (2+) Gram negative bacilli    Blood Culture - Blood, Blood, Venous Line [[id number]]  (Normal) Collected:  07/29/18 0350    Lab Status:  Final result Specimen:  Blood from Arm, Left Updated:  08/03/18 0501     Blood Culture No growth at 5 days    Blood Culture - Blood, Blood, Venous Line [[id number]]  (Normal) Collected:  07/29/18 0355    Lab Status:  Final result Specimen:  Blood from Hand, Left Updated:  08/03/18 0501     Blood Culture No growth at 5 days    Narrative:       Aerobic bottle only    Respiratory Culture - Sputum, ET Suction [[id number]] Collected:  07/29/18 0755    Lab Status:  Final result Specimen:  Sputum from ET Suction Updated:  07/31/18 0754     Respiratory Culture Light growth (2+) Normal Respiratory Sima     Gram Stain Result Moderate (3+) WBCs per low power field      Few (2+) Epithelial cells per low power field      Few (2+) Gram positive bacilli      Few (2+) Gram positive cocci in pairs    MRSA Screen, PCR - Swab, Nares [[id number]]  (Normal) Collected:  07/29/18 1434    Lab Status:  Final result Specimen:  Swab from Nares Updated:  07/29/18 2149     MRSA, PCR Negative    Narrative:         MRSA Negative    Culture, CSF - Cerebrospinal Fluid, Lumbar Puncture [[id number]]  (Normal) Collected:  07/16/18 1140    Lab Status:  Final result Specimen:  Cerebrospinal  Fluid from Lumbar Puncture Updated:  07/23/18 1007     CSF Culture No growth at 7 days     Gram Stain Result No WBCs or organisms seen    Blood Culture - Blood, Blood, Venous Line [[id number]]  (Normal) Collected:  07/16/18 0817    Lab Status:  Final result Specimen:  Blood from Hand, Left Updated:  07/21/18 0830     Blood Culture No growth at 5 days    Blood Culture - Blood, Blood, Venous Line [[id number]]  (Normal) Collected:  07/16/18 0817    Lab Status:  Final result Specimen:  Blood from Arm, Right Updated:  07/21/18 0830     Blood Culture No growth at 5 days    Respiratory Culture - Aspirate, ET Suction [[id number]]  (Abnormal) Collected:  07/16/18 1606    Lab Status:  Final result Specimen:  Aspirate from ET Suction Updated:  07/18/18 1450     Respiratory Culture Scant growth (1+) Streptococcus, Beta Hemolytic, Group G (A)     Comment: This organism is considered to be universally susceptible to penicillin.  No further antibiotic testing will be performed.        STREP GROUPING G     Respiratory Culture Scant growth (1+) Normal Respiratory Sima     Gram Stain Result Many (4+) WBCs per low power field      Rare (1+) Epithelial cells per low power field      Few (2+) Gram positive cocci in pairs    Meningitis / Encephalitis Panel, PCR - Cerebrospinal Fluid, Lumbar Puncture [[id number]]  (Normal) Collected:  07/16/18 1140    Lab Status:  Final result Specimen:  Cerebrospinal Fluid from Lumbar Puncture Updated:  07/16/18 1419     ESCHERICHIA COLI K1, PCR Not Detected     HAEMOPHILUS INFLUENZAE, PCR Not Detected     LISTERIA MONOCYTOGENES, PCR Not Detected     NEISSERIA MENINGITIDIS, PCR Not Detected     STREPTOCOCCUS AGALACTIAE, PCR Not Detected     STREPTOCOCCUS PNEUMONIAE, PCR Not Detected     CYTOMEGALOVIRUS (CMV), PCR Not Detected     ENTEROVIRUS, PCR Not Detected     HERPES SIMPLEX VIRUS 1 (HSV-1), PCR Not Detected     HERPES SIMPLEX VIRUS 2 (HSV-2), PCR Not Detected     HUMAN PARECHOVIRUS, PCR Not Detected      VARICELLA ZOSTER VIRUS (VZV), PCR Not Detected     CRYPTOCOCCUS NEOFORMANS / GATTII, PCR Not Detected     HUMAN HERPES VIRUS 6 PCR Not Detected          Imaging Results (all)     Procedure Component Value Units Date/Time    XR Chest 1 View [[id number]] Collected:  08/19/18 1328     Updated:  08/19/18 2340    Narrative:          EXAMINATION: XR CHEST 1 VW - 8/19/2018     INDICATION:  R09.2-Respiratory arrest; G93.40-Encephalopathy,  unspecified; R74.8-Abnormal levels of other serum enzymes; Z74.09-Other  reduced mobility, Respiratory failure.     COMPARISON: 8/15/2018.     FINDINGS: PICC line tip remains in the mid SVC. Tracheostomy tube is  noted. Heart and vasculature appear normal in size. Lungs appear well  expanded and clear.           Impression:       No new chest disease.     DICTATED:   8/19/2018  EDITED/ls :   8/19/2018      This report was finalized on 8/19/2018 11:38 PM by DR. Rad Li MD.       XR Chest 1 View [[id number]] Collected:  08/15/18 1341     Updated:  08/15/18 1345    Narrative:       EXAMINATION: XR CHEST 1 VW- 08/15/2018     INDICATION: R09.2-Respiratory arrest; G93.40-Encephalopathy,  unspecified; R74.8-Abnormal levels of other serum enzymes; Z74.09-Other  reduced mobility     COMPARISON: 08/07/2018     FINDINGS: Portable chest reveals PICC line catheter identified on the  right tip in the SVC. Surgical clips seen in the upper lung fields.  Cardiac and mediastinal silhouettes are within normal limits. The lung  fields are clear. No focal parenchymal opacification present.  No  pleural effusion or pneumothorax. Degenerative changes seen within the  spine.           Impression:       PICC line catheter identified on the right tip in the SVC.  No new focal parenchymal opacification present.     D:  08/15/2018  E:  08/15/2018     This report was finalized on 8/15/2018 1:43 PM by Dr. Ileana Toledo MD.       CT Abdomen Pelvis Without Contrast [[id number]] Collected:  08/09/18 1032      Updated:  08/09/18 1335    Narrative:       EXAMINATION: CT ABDOMEN AND PELVIS WO CONTRAST-08/09/2018:      INDICATION: Bowel obstruction, low-grade; R09.2-Respiratory arrest;  G93.40-Encephalopathy, unspecified; R74.8-Abnormal levels of other serum  enzymes; Z74.09-Other reduced mobility; Z74.09-Other reduced mobility.      TECHNIQUE: CT abdomen and pelvis without intravenous contrast  administration.     The radiation dose reduction device was turned on for each scan per the  ALARA (As Low as Reasonably Achievable) protocol.     COMPARISON: NONE.     FINDINGS: The lung bases demonstrate moderate subsegmental dependent  atelectasis without focal consolidation. Liver without focal lesion.  Gallbladder distended and grossly unremarkable without focal thickening  or biliary dilatation. Pancreas and spleen within normal limits.  Adrenals without distinct nodule. Kidneys without hydronephrosis or  hydroureter. No obstructive uropathy or urolithiasis is evident. No  bulky retroperitoneal adenopathy. GI tract evaluation demonstrates  percutaneous gastrostomy tube well positioned without abnormality. No  disproportionate dilatation of bowel to suggest mechanical obstructive  process despite moderate colonic stool burden including the rectosigmoid  colon. No free fluid or intra-abdominal free air. Pelvic viscera are  grossly unremarkable including Watson catheter in situ within a  decompressed urinary bladder, however, without bulky pelvic adenopathy  or significant free fluid. Degenerative changes of the spine without  aggressive osseous or soft tissue body wall lesions of concern.       Impression:       1. No obstructive pathology of the GI tract with percutaneous  gastrostomy tube in place. Moderate colonic stool burden including  moderate rectosigmoid stool burden is noted however. No free fluid or  intra-abdominal free air.  2. No loculated intra-abdominal fluid collection.     D:  08/09/2018  E:  08/09/2018            This report was finalized on 8/9/2018 1:33 PM by Dr. Fernando Zuluaga.       MRI Brain Without Contrast [[id number]] Collected:  08/07/18 1731     Updated:  08/08/18 1345    Narrative:       EXAMINATION: MRI BRAIN WO CONTRAST- 08/07/2018     INDICATION: encephalopathy; R09.2-Respiratory arrest;  G93.40-Encephalopathy, unspecified; R74.8-Abnormal levels of other serum  enzymes; Z74.09-Other reduced mobility      TECHNIQUE: Multiplanar MRI of the brain without intravenous contrast  administration.     COMPARISON: MRI dated 07/25/2018     FINDINGS: No restriction on diffusion-weighted imaging. Midline  structures are symmetric without evidence of mass, mass effect or  midline shift however motion degraded axial T2 and FLAIR imaging  sequences limit evaluation. Small amount of T2 and FLAIR increased  signal abnormalities within the periventricular and deep white matter  consistent with chronic small vessel ischemic disease. Pituitary and  sella within normal limits. Cervicomedullary junction widely patent.  Globes and orbits retain normal T2 signal characteristics. Visualized  paranasal sinuses and mastoid air cells demonstrate mucus retention  cysts and minimal mucosal edema right maxillary sinus otherwise grossly  clear and well-pneumatized. No cerebellopontine angle mass lesion.       Impression:       No acute intracranial abnormality specifically no evidence  for acute infarction or restricted diffusion. Minimal chronic small  vessel ischemic changes of the supratentorial white matter however may  represent sequela of migraines or demyelinating white matter disease in  a patient of this age, unchanged from prior exam. Mucous retention cyst  and mild mucosal thickening of the right maxillary sinus.         D:  08/07/2018  E:  08/08/2018     This report was finalized on 8/8/2018 1:43 PM by Dr. Fernando Zuluaga.       XR Chest 1 View [[id number]] Collected:  08/07/18 1036     Updated:  08/07/18 1107    Narrative:        EXAMINATION: XR CHEST 1 VW- 08/07/2018     INDICATION: fever, vomiting     TECHNIQUE:  Single view frontal chest.     COMPARISONS: 08/06/2018     FINDINGS:  Lungs are without focal abnormality. No pleural effusion or  pneumothorax. Cardiomediastinal silhouette is within normal limits.  Upper chest surgical clips again noted.       Impression:       Stable exam.     D:  08/07/2018  E:  08/07/2018     This report was finalized on 8/7/2018 11:04 AM by Cassius White.       XR Abdomen KUB [[id number]] Collected:  08/07/18 1038     Updated:  08/07/18 1107    Narrative:       EXAMINATION: XR ABDOMEN KUB-      INDICATION: Fever and vomiting.     TECHNIQUE:  KUB.     COMPARISONS:  08/04/2018.     FINDINGS:  Moderate stool burden. No evidence of bowel obstruction. No  acutely concerning calcifications. Gastrostomy tube in place.       Impression:       No acute finding.     D:  08/07/2018  E:  08/07/2018     This report was finalized on 8/7/2018 11:04 AM by Cassius White.       XR Chest 1 View [[id number]] Collected:  08/06/18 0907     Updated:  08/06/18 0946    Narrative:       EXAMINATION: XR CHEST 1 VW-08/06/2018:      INDICATION: Fever, suspect aspiration (trach/peg); R09.2-Respiratory  arrest; G93.40-Encephalopathy, unspecified; R74.8-Abnormal levels of  other serum enzymes.      COMPARISON: 08/04/2018.     FINDINGS: The cardiac silhouette is normal. A tracheostomy tube is well  positioned. There is no acute inflammatory process. There is no mass or  effusion.           Impression:       No active disease.     D:  08/06/2018  E:  08/06/2018     This report was finalized on 8/6/2018 9:44 AM by Dr. Eric Luis MD.       CT Angiogram Chest With & Without Contrast [[id number]] Collected:  08/05/18 1559     Updated:  08/05/18 1956    Narrative:       EXAM:  CT Angiography Chest With Intravenous Contrast    CLINICAL HISTORY:  37 years old, female; Encephalopathy, unspecified; Respiratory arrest; Abnormal   levels of other serum  enzymes; Signs and symptoms; Cough and shortness of   breath; Additional info: Tachycardia, elevated d-dimer, tracheostomy, concern   for aspiration as well    TECHNIQUE:  Axial computed tomographic angiography images of the chest with intravenous   contrast using pulmonary embolism protocol.  All CT scans at this facility use   at least one of these dose optimization techniques: automated exposure control;   mA and/or kV adjustment per patient size (includes targeted exams where dose is   matched to clinical indication); or iterative reconstruction.  MIP reconstructed images were created and reviewed.    CONTRAST:  150 mL of isovue 370 administered intravenously.      COMPARISON:  CR - XR CHEST 1 VW 2018-08-04 10:12    FINDINGS:  Pulmonary arteries:  No pulmonary embolism.  Aorta:  No acute findings.  Lungs:  Small cluster of tree in bud nodules within the posterior medial aspect   of the superior segment right lower lobe, likely minimal bronchopneumonia or   aspiration pneumonitis.  Mild upper lobe predominant paraseptal and   centrilobular emphysema.  Pleural space:  Small bilateral pleural effusions, with associated posterior   atelectasis.  No pneumothorax.  Heart:  Normal.  Bones/joints:  No acute fracture.  No dislocation.  Soft tissues:  Normal.  Lymph nodes:  Calcified right hilar lymph nodes, compatible with prior   granulomatous disease.  Spleen:  Splenic calcification, compatible with prior granulomatous disease.  Tubes, lines and devices:  Tracheostomy tube tip in midthoracic trachea.      Impression:         1.  No pulmonary embolism.    2.  Small cluster of tree in bud nodules within the posterior medial aspect of   the superior segment right lower lobe, likely minimal bronchopneumonia or   aspiration pneumonitis.    3.  Incidental/non-acute findings are described above.    THIS DOCUMENT HAS BEEN ELECTRONICALLY SIGNED BY MERCY JERONIMO MD    XR Chest 1 View [[id number]] Collected:  08/04/18 1030      Updated:  08/04/18 1820    Narrative:          EXAMINATION: XR CHEST 1 VW - 8/4/2018     INDICATION: R09.2-Respiratory arrest; G93.40-Encephalopathy,  unspecified; R74.8-Abnormal levels of other serum enzymes.      COMPARISON: 7/29/2018.     FINDINGS: Postsurgical changes overlying the lung apices similar to  prior. Tracheostomy in place. No pneumothorax or pleural effusion. No  focal consolidation. Cardiac silhouette within normal limits and  unchanged. Degenerative changes of the spine.           Impression:       No acute cardiopulmonary process.     DICTATED:   8/4/2018  EDITED/ls :   8/4/2018      This report was finalized on 8/4/2018 6:18 PM by Dr. Fernando Zuluaga.       XR Abdomen KUB [[id number]] Collected:  08/04/18 1032     Updated:  08/04/18 1820    Narrative:          EXAMINATION: XR ABDOMEN KUB - 8/4/2018     INDICATION: R09.2-Respiratory arrest; G93.40-Encephalopathy,  unspecified; R74.8-Abnormal levels of other serum enzymes.      COMPARISON: None.     FINDINGS: Nonspecific nonobstructive bowel gas pattern with gas and  stool to the level of the rectum. Mild colonic stool burden. No abnormal  calcifications overlie the abdomen or kidneys specifically with  calcified phlebolith right hemipelvis. No acute osseous abnormality.           Impression:       Nonobstructive bowel gas pattern with only mild colonic  stool burden in the proximal ascending and rectal region. No gross  intraperitoneal free air.     DICTATED:   8/4/2018  EDITED/ls :   8/4/2018      This report was finalized on 8/4/2018 6:18 PM by Dr. Fernando Zuluaga.       XR Chest 1 View [[id number]] Collected:  07/29/18 0519     Updated:  07/29/18 0646    Narrative:       EXAM:    XR Chest, 1 View     EXAM DATE/TIME:    7/29/2018 5:19 AM     CLINICAL HISTORY:    37 years old, female; Signs and symptoms; Other: Possible aspiration     TECHNIQUE:    XR of the chest, 1 view.     COMPARISON:    CR - XR CHEST 1 VW 2018-07-28 04:34     FINDINGS:    Tubes,  lines and devices:  A tracheostomy tube is noted in satisfactory   position.    Lungs:  Lungs are clear.    Pleural space:  There is no evidence of pleural effusion.    Heart/Mediastinum:  There is no cardiomegaly .    Bones/joints:  Osseous structures are unchanged.       Impression:       Stable radiographic appearance of the chest when compared to CR - XR CHEST 1 VW   2018-07-28 04:34.     THIS DOCUMENT HAS BEEN ELECTRONICALLY SIGNED BY CAMELIA RAE MD    XR Chest 1 View [[id number]] Collected:  07/28/18 1258     Updated:  07/28/18 1431    Narrative:       EXAMINATION: XR CHEST 1 VW - 7/28/2018     INDICATION: R09.2-Respiratory arrest; G93.40-Encephalopathy,  unspecified; R74.8-Abnormal levels of other serum enzymes.     TECHNIQUE:  Single view frontal chest.     COMPARISONS: 7/27/2018.     FINDINGS:  Tracheostomy in place. Right IJ line has been removed. No  focal airspace disease, pleural effusion or pneumothorax.       Impression:       No acute finding.     DICTATED:   7/28/2018  EDITED/ls :   7/28/2018      This report was finalized on 7/28/2018 2:29 PM by Cassius White.       XR Chest 1 View [[id number]] Collected:  07/27/18 0853     Updated:  07/28/18 0035    Narrative:       EXAMINATION: XR CHEST 1 VW- 07/27/2018     INDICATION: R09.2-Respiratory arrest; G93.40-Encephalopathy,  unspecified; R74.8-Abnormal levels of other serum enzymes      COMPARISON: 07/25/2018     FINDINGS: ET tube has been replaced by a tracheostomy tube. No  pneumothorax or pneumo mediastinum is seen. Right IJ catheter remains in  the SVC. The heart and vasculature appear normal in size. Lungs appear  grossly clear.        Impression:       Tracheostomy tube placement. No new chest disease is seen.     D:  07/27/2018  E:  07/27/2018     This report was finalized on 7/28/2018 12:33 AM by DR. Rad Li MD.       MRI Brain With & Without Contrast [[id number]] Collected:  07/25/18 1344     Updated:  07/25/18 1555    Narrative:        EXAMINATION: MRI BRAIN W WO CONTRAST- 07/25/2018     INDICATION: Anoxic brain damage, not elsewhere classified;  R09.2-Respiratory arrest; G93.40-Encephalopathy, unspecified;  R74.8-Abnormal levels of other serum enzymes     TECHNIQUE:  Multiplanar multisequence MRI of the brain was performed  without contrast.     COMPARISONS:  Brain death study 07/23/2018, CT head without contrast  07/21/2018 and MRI/MRA brain 07/16/2018.     FINDINGS:  Evaluation limited by motion.     Parenchymal brain volume is within normal limits. Ventricles are normal  in size and configuration. There is no significant parenchymal signal  abnormality on the diffusion weighted series essentially excluding  global anoxia. There is no mass effect or shift of midline structures.  Basal cisterns are patent. Patient is intubated. Fluid signal noted in  the maxillary sinuses and sphenoid sinus.       Impression:       Essentially normal noncontrast brain MRI.      D:  07/25/2018  E:  07/25/2018     This report was finalized on 7/25/2018 3:53 PM by Cassius White.       XR Chest 1 View [[id number]] Collected:  07/25/18 0907     Updated:  07/25/18 1007    Narrative:       EXAMINATION: XR CHEST 1 VW- 07/25/2018     INDICATION: f/u vent; R09.2-Respiratory arrest; G93.40-Encephalopathy,  unspecified; R74.8-Abnormal levels of other serum enzymes     TECHNIQUE:  Single view frontal chest.     COMPARISONS: 07/24/2018     FINDINGS:  Esophagogastric tube has been exchanged for a feeding tube.  Other support apparatus stable. Lungs are without focal opacity. No  pleural effusion or pneumothorax. Cardiomediastinal silhouette is  unchanged.        Impression:       Feeding tube placement; the terminus is below the diaphragm  and out of view. Otherwise, stable exam.     D:  07/25/2018  E:  07/25/2018     This report was finalized on 7/25/2018 10:05 AM by Cassius White.       NM Brain Scan Complete With Vascular Flow [[id number]] Collected:  07/23/18 1207     Updated:   07/23/18 1431    Narrative:       EXAMINATION: NM BRAIN SCAN, COMPLETE WITH VASCULAR FLOW-07/23/2018:      INDICATION: Neuro deficit(s), subacute.     TECHNIQUE:  Planar dynamic imaging of the head and neck was performed  during injection in the anterior projection of the head and neck. Planar  images were obtained immediately and 5 minutes following IV injection of  27 mCi of Technetium 99m HMPAO in the anterior and lateral projections.     COMPARISONS:  Noncontrast CT head 07/21/2018, MR brain 07/16/2018.     FINDINGS:  Rapid sequence images obtained for the flow portion of the  examination show flow in the [place]. Immediate and 5 minute  delay static images show evidence of uptake in the cerebral hemispheres,  basal ganglia, thalami and the cerebellar hemispheres.       Impression:       No evidence of brain death.     Discussed with Dr. Sutton at 10:05 AM (approximately) by Dr. White.     D:  07/23/2018  E:  07/23/2018     This report was finalized on 7/23/2018 2:29 PM by Cassius White.       CT Head Without Contrast [[id number]] Collected:  07/21/18 1606     Updated:  07/21/18 2157    Narrative:       EXAM:    CT Head Without Intravenous Contrast    EXAM DATE/TIME:    7/21/2018 4:06 PM    CLINICAL HISTORY:    The patient age is 37 years old and is female; Encephalopathy, unspecified;   Respiratory arrest; Abnormal levels of other serum enzymes; Signs and symptoms;   Additional info: [facility] exam id and description: [id number] CT head wo   contrast    TECHNIQUE:    Axial computed tomography images of the head/brain without intravenous   contrast.  All CT scans at this facility use at least one of these dose   optimization techniques: automated exposure control; mA and/or kV adjustment   per patient size (includes targeted exams where dose is matched to clinical   indication); or iterative reconstruction.    COMPARISON:    MRI ANGIOGRAM HEAD WO CONTRAST 2018-07-16 12:58    FINDINGS:    Brain:  The  white-gray differentiation is preserved demonstrating no acute   territorial type infarct.  No acute intracranial hemorrhage is seen.  Minimal   white matter disease is identified on the recent MRI, but not well visualized   on current CT images. MRI is more sensitive for white matter disease.    Midline shift:  There is no midline shift.    Ventricles:  No ventriculomegaly.    Bones/joints:  The calvarium demonstrates no evidence for a depressed   fracture.    Soft tissues:  No acute abnormality.    Sinuses: An air fluid level is identified within the left sphenoid sinus.   There is mucosal thickening of the bilateral maxillary sinuses, with a mucus   retention cyst or polyp in the right maxillary sinus. There is mucosal   thickening of the left frontal sinus, right sphenoid sinus, and scattered   ethmoid air cells.    Mastoid air cells:  No mastoid effusion.      Impression:       1.  No acute intracranial abnormality.  2.  Paranasal sinus disease is noted above.  3.  Additional findings described above.    THIS DOCUMENT HAS BEEN ELECTRONICALLY SIGNED BY ELISEO LUJAN MD    XR Chest 1 View [[id number]] Collected:  07/20/18 0906     Updated:  07/20/18 2234    Narrative:       EXAMINATION: XR CHEST 1 VW-07/20/2018:      INDICATION: F/U vent; R09.2-Respiratory arrest; G93.40-Encephalopathy,  unspecified; R74.8-Abnormal levels of other serum enzymes.      COMPARISON: 07/19/2018.     FINDINGS: ET tube, NG tube and right IJ catheter appear to be in good  position. The heart is normal in size. There is persistent perihilar  disease, similar to the prior study. No consolidated lung is seen. There  is probably trace right effusion.           Impression:       Persistent perihilar disease and small new right pleural  effusion. No evidence of pneumothorax.     D:  07/20/2018  E:  07/20/2018     This report was finalized on 7/20/2018 10:32 PM by DR. Rad Li MD.       XR Abdomen KUB [[id number]] Collected:  07/20/18 1347      Updated:  07/20/18 1624    Narrative:       EXAMINATION: XR ABDOMEN, KUB-07/20/2018:      INDICATION: Corpak placement; R09.2-Respiratory arrest;  G93.40-Encephalopathy, unspecified; R74.8-Abnormal levels of other serum  enzymes.      COMPARISON: NONE.     FINDINGS: Supine radiograph of the abdomen demonstrates a feeding tube  in the third portion of the duodenum. A nasogastric tube is in the body  of the stomach.           Impression:       A feeding tube is in the third portion of the duodenum.     D:  07/20/2018  E:  07/20/2018     This report was finalized on 7/20/2018 4:22 PM by Dr. Eric Luis MD.       XR Chest 1 View [[id number]] Collected:  07/19/18 0859     Updated:  07/19/18 0909    Narrative:       EXAMINATION: XR CHEST 1 VW-      INDICATION: Followup vent; R09.2-Respiratory arrest;  G93.40-Encephalopathy, unspecified; R74.8-Abnormal levels of other serum  enzymes.      COMPARISON: 07/18/2018.     FINDINGS: The cardiac silhouette is normal. There is minimal patchy  bibasilar airspace disease. A central venous catheter is well positioned  as is the endotracheal tube. There is no pneumothorax. There has been  little change since the previous examination of the previous day.           Impression:       There has been no change since the previous examination of  the previous day.     D:  07/19/2018  E:  07/19/2018     This report was finalized on 7/19/2018 9:07 AM by Dr. Eric Luis MD.       XR Chest 1 View [[id number]] Collected:  07/18/18 0734     Updated:  07/18/18 0944    Narrative:       EXAMINATION: XR CHEST 1 VW-07/18/2018:      INDICATION: F/U vent; R09.2-Respiratory arrest; G93.40-Encephalopathy,  unspecified; R74.8-Abnormal levels of other serum enzymes.      COMPARISON: Chest x-ray 07/17/2018.     FINDINGS: Support hardware unchanged and in satisfactory positioning.  Cardiac silhouette borderline enlarged and unchanged from prior with  increased central pulmonary vascularity that appears  increased from  comparison along with persistent bibasilar hazy pulmonary  opacifications. No pneumothorax with probable trace bilateral pleural  effusions partially visualized.       Impression:       Interval increase in central pulmonary vascularity with  persistent bibasilar hazy opacities and probable trace volume pleural  effusions now present.     D:  07/18/2018  E:  07/18/2018     This report was finalized on 7/18/2018 9:42 AM by Dr. Fernando Zuluaga.       XR Chest 1 View [[id number]] Collected:  07/17/18 0919     Updated:  07/17/18 1314    Narrative:       EXAMINATION: XR CHEST 1 VW-07/17/2018:      INDICATION: Intubated Patient; R09.2-Respiratory arrest;  G93.40-Encephalopathy, unspecified.      COMPARISON: 07/16/2018.     FINDINGS: The heart is normal. There is patchy right perihilar airspace  disease. Nevertheless, aeration of lungs has significantly improved when  compared to previous examinations of the previous day.  An endotracheal  tube is well positioned as is a central venous catheter.           Impression:       There has been significant interval improvement of airspace  changes, particularly in the left lung.     D:  07/17/2018  E:  07/17/2018     This report was finalized on 7/17/2018 1:12 PM by Dr. Eric Luis MD.       XR Chest 1 View [[id number]] Collected:  07/16/18 1513     Updated:  07/16/18 1648    Narrative:       EXAMINATION: XR CHEST 1 VW-07/16/2018:      INDICATION: ETT placement; R09.2-Respiratory arrest;  G93.40-Encephalopathy, unspecified.      COMPARISON: 07/16/2018.     FINDINGS:  The endotracheal tube is well positioned. There is central  vascular congestion with perihilar and basilar airspace changes  consistent with pulmonary edema. A central venous catheter is well  positioned.         Impression:       The endotracheal tube is well positioned. Pulmonary findings  consistent with pulmonary edema persist unchanged.     D:  07/16/2018  E:  07/16/2018     This report was finalized  on 7/16/2018 4:46 PM by Dr. Eric Luis MD.       MRI Brain With & Without Contrast [[id number]] Collected:  07/16/18 1437     Updated:  07/16/18 1542    Narrative:       EXAMINATION: MRI BRAIN W WO CONTRAST-, MRI ANGIOGRAM HEAD WO CONTRAST-,  MRI ANGIOGRAM NECK W CONTRAST- 07/16/2018     INDICATION: Encephalopathy; R09.2-Respiratory arrest;  G93.40-Encephalopathy, unspecified; unresponsive     TECHNIQUE: Routine multiplanar imaging was obtained of the brain with  and without the administration of gadolinium contrast. 3-D time of  flight MR angiographic imaging was obtained of the intracranial vessels  centered at the [place] without the administration of  gadolinium contrast. 3-D time-of-flight MR angiographic imaging was  obtained of the carotid vessels within the neck following the  administration of gadolinium contrast.     COMPARISON: NONE     FINDINGS: There is no evidence of hemorrhage or hydrocephalus.  Visualized paranasal sinuses reveal mucosal thickening in the ethmoid  air cells and right maxillary sinus. Mastoid air cells are patent. No  cerebellopontine angle mass identified.  Tiny area of increased signal  seen in the right frontal region from possible prior chronic small  vessel ischemic change. The remainder of the gray-white differentiation  is preserved. Parenchyma is unremarkable. No hemorrhage or  hydrocephalus. No mass, mass effect, or midline shift. No abnormal  extra-axial fluid collection is identified. No evidence of restricted  diffusion to suggest evidence of an acute ischemic insult. Flow voids  are preserved in the major intracranial vessels. There is no evidence of  cortical dysplasia. No evidence of mesial temporal sclerosis.     The carotid vessels reveal both common carotid arteries to be  unremarkable. Both carotid bifurcations are within normal limits.  Vertebral arteries reveal a dominant left vertebral artery. No evidence  of significant stenosis.     The intracranial  vessels reveals no aneurysmal dilatation, vascular  malformation or significant stenosis identified. There is hypertrophy  identified of the posterior  communicating arteries bilaterally with  fetal origin identified in the right posterior cerebral artery.       Impression:       Fetal origin of the right posterior cerebral artery. The  remainder of the anterior and posterior circulation are intact and  unremarkable. The vessels within the neck are unremarkable with no acute  intracranial abnormality present.     D:  07/16/2018  E:  07/16/2018         This report was finalized on 7/16/2018 3:40 PM by Dr. Ileana Toledo MD.       MRI Angiogram Head Without Contrast [[id number]] Collected:  07/16/18 1437     Updated:  07/16/18 1542    Narrative:       EXAMINATION: MRI BRAIN W WO CONTRAST-, MRI ANGIOGRAM HEAD WO CONTRAST-,  MRI ANGIOGRAM NECK W CONTRAST- 07/16/2018     INDICATION: Encephalopathy; R09.2-Respiratory arrest;  G93.40-Encephalopathy, unspecified; unresponsive     TECHNIQUE: Routine multiplanar imaging was obtained of the brain with  and without the administration of gadolinium contrast. 3-D time of  flight MR angiographic imaging was obtained of the intracranial vessels  centered at the [place] without the administration of  gadolinium contrast. 3-D time-of-flight MR angiographic imaging was  obtained of the carotid vessels within the neck following the  administration of gadolinium contrast.     COMPARISON: NONE     FINDINGS: There is no evidence of hemorrhage or hydrocephalus.  Visualized paranasal sinuses reveal mucosal thickening in the ethmoid  air cells and right maxillary sinus. Mastoid air cells are patent. No  cerebellopontine angle mass identified.  Tiny area of increased signal  seen in the right frontal region from possible prior chronic small  vessel ischemic change. The remainder of the gray-white differentiation  is preserved. Parenchyma is unremarkable. No hemorrhage  or  hydrocephalus. No mass, mass effect, or midline shift. No abnormal  extra-axial fluid collection is identified. No evidence of restricted  diffusion to suggest evidence of an acute ischemic insult. Flow voids  are preserved in the major intracranial vessels. There is no evidence of  cortical dysplasia. No evidence of mesial temporal sclerosis.     The carotid vessels reveal both common carotid arteries to be  unremarkable. Both carotid bifurcations are within normal limits.  Vertebral arteries reveal a dominant left vertebral artery. No evidence  of significant stenosis.     The intracranial vessels reveals no aneurysmal dilatation, vascular  malformation or significant stenosis identified. There is hypertrophy  identified of the posterior  communicating arteries bilaterally with  fetal origin identified in the right posterior cerebral artery.       Impression:       Fetal origin of the right posterior cerebral artery. The  remainder of the anterior and posterior circulation are intact and  unremarkable. The vessels within the neck are unremarkable with no acute  intracranial abnormality present.     D:  07/16/2018  E:  07/16/2018         This report was finalized on 7/16/2018 3:40 PM by Dr. Ileana Toledo MD.       MRI Angiogram Neck With Contrast [[id number]] Collected:  07/16/18 1437     Updated:  07/16/18 1542    Narrative:       EXAMINATION: MRI BRAIN W WO CONTRAST-, MRI ANGIOGRAM HEAD WO CONTRAST-,  MRI ANGIOGRAM NECK W CONTRAST- 07/16/2018     INDICATION: Encephalopathy; R09.2-Respiratory arrest;  G93.40-Encephalopathy, unspecified; unresponsive     TECHNIQUE: Routine multiplanar imaging was obtained of the brain with  and without the administration of gadolinium contrast. 3-D time of  flight MR angiographic imaging was obtained of the intracranial vessels  centered at the [place] without the administration of  gadolinium contrast. 3-D time-of-flight MR angiographic imaging was  obtained of  the carotid vessels within the neck following the  administration of gadolinium contrast.     COMPARISON: NONE     FINDINGS: There is no evidence of hemorrhage or hydrocephalus.  Visualized paranasal sinuses reveal mucosal thickening in the ethmoid  air cells and right maxillary sinus. Mastoid air cells are patent. No  cerebellopontine angle mass identified.  Tiny area of increased signal  seen in the right frontal region from possible prior chronic small  vessel ischemic change. The remainder of the gray-white differentiation  is preserved. Parenchyma is unremarkable. No hemorrhage or  hydrocephalus. No mass, mass effect, or midline shift. No abnormal  extra-axial fluid collection is identified. No evidence of restricted  diffusion to suggest evidence of an acute ischemic insult. Flow voids  are preserved in the major intracranial vessels. There is no evidence of  cortical dysplasia. No evidence of mesial temporal sclerosis.     The carotid vessels reveal both common carotid arteries to be  unremarkable. Both carotid bifurcations are within normal limits.  Vertebral arteries reveal a dominant left vertebral artery. No evidence  of significant stenosis.     The intracranial vessels reveals no aneurysmal dilatation, vascular  malformation or significant stenosis identified. There is hypertrophy  identified of the posterior  communicating arteries bilaterally with  fetal origin identified in the right posterior cerebral artery.       Impression:       Fetal origin of the right posterior cerebral artery. The  remainder of the anterior and posterior circulation are intact and  unremarkable. The vessels within the neck are unremarkable with no acute  intracranial abnormality present.     D:  07/16/2018  E:  07/16/2018         This report was finalized on 7/16/2018 3:40 PM by Dr. Ileana Toledo MD.       XR Chest 1 View [[id number]] Collected:  07/16/18 0813     Updated:  07/16/18 1104    Narrative:       EXAMINATION:  XR CHEST 1 VW-      INDICATION: Confirm ET tube placement; R09.2-Respiratory arrest.      COMPARISON: None.     FINDINGS: Portable chest reveals endotracheal tube in satisfactory  position above the aristeo. Deep line catheter is identified on the right  with tip in the SVC with nasogastric tube with tip in the stomach. There  is diffuse haziness seen within the left lung. There is prominence of  the pulmonary vascularity. Cardiac silhouette is within normal limits.       Impression:       Lines and tubes in satisfactory position. Haziness seen  overlying the left lung. Increased markings seen in the perihilar  regions bilaterally.     D:  07/16/2018  E:  07/16/2018     This report was finalized on 7/16/2018 11:02 AM by Dr. Ileana Toledo MD.             Results for orders placed during the hospital encounter of 07/16/18   Duplex Venous Upper Extremity - Bilateral CAR    Narrative · Normal bilateral upper extremity venous duplex scan.          Results for orders placed during the hospital encounter of 07/16/18   Duplex Venous Upper Extremity - Bilateral CAR    Narrative · Normal bilateral upper extremity venous duplex scan.          Results for orders placed during the hospital encounter of 07/16/18   Adult Transthoracic Echo Complete W/ Cont if Necessary Per Protocol    Addendum · Estimated LVEF = 50%. · The following left ventricular wall segments are hypokinetic: mid  anterior, apical septal and mid anteroseptal. · The cardiac valves are anatomically and functionally normal.        Aubrey Jiménez MD 7/16/2018  6:05 PM          Narrative · Left ventricular systolic function is normal. Estimated EF = 50%.  · The cardiac valves are anatomically and functionally normal.            Discharge Details        Discharge Medications      New Medications      Instructions Start Date   amoxicillin 125 MG/5ML suspension  Commonly known as:  AMOXIL   500 mg, Oral, Every 8 Hours Scheduled      aspirin 81 MG chewable  tablet   81 mg, Per G Tube, Daily      atorvastatin 80 MG tablet  Commonly known as:  LIPITOR   80 mg, Per G Tube, Nightly      Baclofen 5 MG tablet   5 mg, Oral, 3 Times Daily      bisacodyl 10 MG suppository  Commonly known as:  DULCOLAX   10 mg, Rectal, Daily PRN      clopidogrel 75 MG tablet  Commonly known as:  PLAVIX   75 mg, Per G Tube, Daily      enoxaparin 40 MG/0.4ML solution syringe  Commonly known as:  LOVENOX   40 mg, Subcutaneous, Every 24 Hours      famotidine 20 MG tablet  Commonly known as:  PEPCID   20 mg, Per G Tube, 2 Times Daily      HYDROcodone-acetaminophen 7.5-325 MG per tablet  Commonly known as:  NORCO   1 tablet, Oral, Every 4 Hours PRN      lactobacillus acidophilus capsule capsule   1 capsule, Per G Tube, Daily      LORazepam 0.5 MG tablet  Commonly known as:  ATIVAN   0.5 mg, Oral, Every 8 Hours PRN      metoprolol tartrate 50 MG tablet  Commonly known as:  LOPRESSOR   50 mg, Oral, Every 12 Hours Scheduled      thiamine 100 MG tablet  Commonly known as:  VITAMIN B1   100 mg, Per G Tube, Daily               Discharge Disposition:  Skilled Nursing Facility (DC - External)    Discharge Diet:      Discharge Activity:           Special Instructions:      Code Status/Level of Support:  Code Status and Medical Interventions:   Ordered at: 07/16/18 0620     Code Status:    CPR     Medical Interventions (Level of Support Prior to Arrest):    Full       No future appointments.        Time Spent on Discharge: 35 minutes    Electronically signed by Niurka Singleton MD, 08/24/18, 12:33 PM.      Electronically signed by Niurka Singleton MD at 8/24/2018 12:39 PM

## 2018-08-29 LAB
BACTERIA SPEC AEROBE CULT: ABNORMAL
BACTERIA SPEC AEROBE CULT: ABNORMAL
GIE STN SPEC: NORMAL
GIE STN SPEC: NORMAL
GLUCOSE BLDC GLUCOMTR-MCNC: 102 MG/DL (ref 70–130)
GLUCOSE BLDC GLUCOMTR-MCNC: 94 MG/DL (ref 70–130)
GLUCOSE BLDC GLUCOMTR-MCNC: 95 MG/DL (ref 70–130)

## 2018-08-29 PROCEDURE — 82962 GLUCOSE BLOOD TEST: CPT

## 2018-08-29 PROCEDURE — 25010000002 ENOXAPARIN PER 10 MG: Performed by: NURSE PRACTITIONER

## 2018-08-29 PROCEDURE — 94799 UNLISTED PULMONARY SVC/PX: CPT

## 2018-08-29 PROCEDURE — 99232 SBSQ HOSP IP/OBS MODERATE 35: CPT | Performed by: INTERNAL MEDICINE

## 2018-08-29 PROCEDURE — 94760 N-INVAS EAR/PLS OXIMETRY 1: CPT

## 2018-08-29 RX ADMIN — BACLOFEN 5 MG: 10 TABLET ORAL at 08:48

## 2018-08-29 RX ADMIN — HYDROCODONE BITARTRATE AND ACETAMINOPHEN 1 TABLET: 7.5; 325 TABLET ORAL at 23:29

## 2018-08-29 RX ADMIN — FLUCONAZOLE 200 MG: 40 POWDER, FOR SUSPENSION ORAL at 13:38

## 2018-08-29 RX ADMIN — FAMOTIDINE 20 MG: 20 TABLET ORAL at 08:48

## 2018-08-29 RX ADMIN — BACLOFEN 5 MG: 10 TABLET ORAL at 21:13

## 2018-08-29 RX ADMIN — MICAFUNGIN SODIUM 100 MG: 20 INJECTION, POWDER, LYOPHILIZED, FOR SOLUTION INTRAVENOUS at 21:13

## 2018-08-29 RX ADMIN — ENOXAPARIN SODIUM 40 MG: 100 INJECTION SUBCUTANEOUS at 08:50

## 2018-08-29 RX ADMIN — METOPROLOL TARTRATE 50 MG: 50 TABLET ORAL at 21:13

## 2018-08-29 RX ADMIN — LORAZEPAM 0.5 MG: 0.5 TABLET ORAL at 08:50

## 2018-08-29 RX ADMIN — LORAZEPAM 0.5 MG: 0.5 TABLET ORAL at 16:35

## 2018-08-29 RX ADMIN — FAMOTIDINE 20 MG: 20 TABLET ORAL at 21:13

## 2018-08-29 RX ADMIN — HYDROCODONE BITARTRATE AND ACETAMINOPHEN 1 TABLET: 7.5; 325 TABLET ORAL at 18:29

## 2018-08-29 RX ADMIN — DEXTROSE MONOHYDRATE, SODIUM CHLORIDE, AND POTASSIUM CHLORIDE 50 ML/HR: 50; 9; 1.49 INJECTION, SOLUTION INTRAVENOUS at 23:24

## 2018-08-29 RX ADMIN — ASPIRIN 81 MG CHEWABLE TABLET 81 MG: 81 TABLET CHEWABLE at 08:48

## 2018-08-29 RX ADMIN — CLOPIDOGREL BISULFATE 75 MG: 75 TABLET ORAL at 08:49

## 2018-08-29 RX ADMIN — HYDROCODONE BITARTRATE AND ACETAMINOPHEN 1 TABLET: 7.5; 325 TABLET ORAL at 13:38

## 2018-08-29 RX ADMIN — THIAMINE HCL TAB 100 MG 100 MG: 100 TAB at 08:49

## 2018-08-29 RX ADMIN — BACLOFEN 5 MG: 10 TABLET ORAL at 16:35

## 2018-08-29 RX ADMIN — HYDROCODONE BITARTRATE AND ACETAMINOPHEN 1 TABLET: 7.5; 325 TABLET ORAL at 06:51

## 2018-08-29 RX ADMIN — ATORVASTATIN CALCIUM 80 MG: 40 TABLET, FILM COATED ORAL at 21:13

## 2018-08-29 RX ADMIN — Medication 1 CAPSULE: at 08:48

## 2018-08-29 RX ADMIN — LORAZEPAM 0.5 MG: 0.5 TABLET ORAL at 21:13

## 2018-08-30 PROBLEM — B95.2 UTI (URINARY TRACT INFECTION) DUE TO ENTEROCOCCUS: Status: ACTIVE | Noted: 2018-08-30

## 2018-08-30 PROBLEM — N39.0 UTI (URINARY TRACT INFECTION) DUE TO ENTEROCOCCUS: Status: ACTIVE | Noted: 2018-08-30

## 2018-08-30 LAB
BACTERIA SPEC AEROBE CULT: NORMAL
BACTERIA SPEC AEROBE CULT: NORMAL
BACTERIA SPEC RESP CULT: ABNORMAL
BACTERIA SPEC RESP CULT: ABNORMAL
GLUCOSE BLDC GLUCOMTR-MCNC: 87 MG/DL (ref 70–130)
GLUCOSE BLDC GLUCOMTR-MCNC: 93 MG/DL (ref 70–130)
GRAM STN SPEC: ABNORMAL

## 2018-08-30 PROCEDURE — 99232 SBSQ HOSP IP/OBS MODERATE 35: CPT | Performed by: INTERNAL MEDICINE

## 2018-08-30 PROCEDURE — 25010000002 LORAZEPAM PER 2 MG: Performed by: NURSE PRACTITIONER

## 2018-08-30 PROCEDURE — 25010000002 ENOXAPARIN PER 10 MG: Performed by: NURSE PRACTITIONER

## 2018-08-30 PROCEDURE — 82962 GLUCOSE BLOOD TEST: CPT

## 2018-08-30 RX ADMIN — BACLOFEN 5 MG: 10 TABLET ORAL at 15:51

## 2018-08-30 RX ADMIN — ASPIRIN 81 MG CHEWABLE TABLET 81 MG: 81 TABLET CHEWABLE at 09:15

## 2018-08-30 RX ADMIN — MICAFUNGIN SODIUM 100 MG: 20 INJECTION, POWDER, LYOPHILIZED, FOR SOLUTION INTRAVENOUS at 20:34

## 2018-08-30 RX ADMIN — LORAZEPAM 0.5 MG: 2 INJECTION INTRAMUSCULAR; INTRAVENOUS at 15:51

## 2018-08-30 RX ADMIN — LORAZEPAM 0.5 MG: 2 INJECTION INTRAMUSCULAR; INTRAVENOUS at 04:22

## 2018-08-30 RX ADMIN — FAMOTIDINE 20 MG: 20 TABLET ORAL at 20:35

## 2018-08-30 RX ADMIN — METOPROLOL TARTRATE 50 MG: 50 TABLET ORAL at 09:16

## 2018-08-30 RX ADMIN — LORAZEPAM 0.5 MG: 2 INJECTION INTRAMUSCULAR; INTRAVENOUS at 23:26

## 2018-08-30 RX ADMIN — BACLOFEN 5 MG: 10 TABLET ORAL at 20:35

## 2018-08-30 RX ADMIN — ENOXAPARIN SODIUM 40 MG: 100 INJECTION SUBCUTANEOUS at 09:16

## 2018-08-30 RX ADMIN — CLOPIDOGREL BISULFATE 75 MG: 75 TABLET ORAL at 09:15

## 2018-08-30 RX ADMIN — METOPROLOL TARTRATE 50 MG: 50 TABLET ORAL at 20:35

## 2018-08-30 RX ADMIN — Medication 1 CAPSULE: at 09:15

## 2018-08-30 RX ADMIN — LORAZEPAM 0.5 MG: 0.5 TABLET ORAL at 20:44

## 2018-08-30 RX ADMIN — FAMOTIDINE 20 MG: 20 TABLET ORAL at 09:16

## 2018-08-30 RX ADMIN — BACLOFEN 5 MG: 10 TABLET ORAL at 09:15

## 2018-08-30 RX ADMIN — HYDROCODONE BITARTRATE AND ACETAMINOPHEN 1 TABLET: 7.5; 325 TABLET ORAL at 23:24

## 2018-08-30 RX ADMIN — THIAMINE HCL TAB 100 MG 100 MG: 100 TAB at 09:15

## 2018-08-30 RX ADMIN — ATORVASTATIN CALCIUM 80 MG: 40 TABLET, FILM COATED ORAL at 20:35

## 2018-08-31 LAB
B PERT.PT PRMT NPH QL NAA+NON-PROBE: NOT DETECTED
C PNEUM DNA NPH QL NAA+NON-PROBE: NOT DETECTED
DEPRECATED RDW RBC AUTO: 53.5 FL (ref 37–54)
ERYTHROCYTE [DISTWIDTH] IN BLOOD BY AUTOMATED COUNT: 15.2 % (ref 11.3–14.5)
FLUAV H1 RNA NPH QL NAA+NON-PROBE: NOT DETECTED
FLUAV H3 RNA NPH QL NAA+NON-PROBE: NOT DETECTED
FLUAV RNA SPEC QL NAA+PROBE: NOT DETECTED
FLUBV RNA SPEC QL NAA+PROBE: NOT DETECTED
GLUCOSE BLDC GLUCOMTR-MCNC: 106 MG/DL (ref 70–130)
GLUCOSE BLDC GLUCOMTR-MCNC: 112 MG/DL (ref 70–130)
GLUCOSE BLDC GLUCOMTR-MCNC: 90 MG/DL (ref 70–130)
GLUCOSE BLDC GLUCOMTR-MCNC: 97 MG/DL (ref 70–130)
HADV DNA SPEC QL NAA+PROBE: NOT DETECTED
HCOV 229E RNA NPH QL NAA+NON-PROBE: NOT DETECTED
HCOV HKU1 RNA NPH QL NAA+NON-PROBE: NOT DETECTED
HCOV NL63 RNA NPH QL NAA+NON-PROBE: NOT DETECTED
HCOV OC43 RNA NPH QL NAA+NON-PROBE: NOT DETECTED
HCT VFR BLD AUTO: 36.6 % (ref 34.5–44)
HGB BLD-MCNC: 11.4 G/DL (ref 11.5–15.5)
HMPV RNA SPEC QL NAA+PROBE: NOT DETECTED
HPIV1 RNA SPEC QL NAA+PROBE: NOT DETECTED
HPIV2 SPEC QL CULT: NOT DETECTED
HPIV3 SPEC QL CULT: NOT DETECTED
HPIV4 RNA NPH QL NAA+NON-PROBE: NOT DETECTED
INR PPP: NOT DETECTED
M PNEUMO DNA SPEC QL NAA+PROBE: NOT DETECTED
MCH RBC QN AUTO: 30.3 PG (ref 27–31)
MCHC RBC AUTO-ENTMCNC: 31.1 G/DL (ref 32–36)
MCV RBC AUTO: 97.3 FL (ref 80–99)
PLATELET # BLD AUTO: 469 10*3/MM3 (ref 150–450)
PMV BLD AUTO: 10.6 FL (ref 6–12)
RBC # BLD AUTO: 3.76 10*6/MM3 (ref 3.89–5.14)
RHINOVIRUS RNA SPEC QL NAA+PROBE: NOT DETECTED
RSV AG SPEC QL: NOT DETECTED
WBC NRBC COR # BLD: 7.18 10*3/MM3 (ref 3.5–10.8)

## 2018-08-31 PROCEDURE — 94760 N-INVAS EAR/PLS OXIMETRY 1: CPT

## 2018-08-31 PROCEDURE — 25010000002 ENOXAPARIN PER 10 MG: Performed by: NURSE PRACTITIONER

## 2018-08-31 PROCEDURE — 25010000002 LORAZEPAM PER 2 MG: Performed by: NURSE PRACTITIONER

## 2018-08-31 PROCEDURE — 85027 COMPLETE CBC AUTOMATED: CPT

## 2018-08-31 PROCEDURE — 94799 UNLISTED PULMONARY SVC/PX: CPT

## 2018-08-31 PROCEDURE — 82962 GLUCOSE BLOOD TEST: CPT

## 2018-08-31 PROCEDURE — 99232 SBSQ HOSP IP/OBS MODERATE 35: CPT | Performed by: INTERNAL MEDICINE

## 2018-08-31 RX ADMIN — FAMOTIDINE 20 MG: 20 TABLET ORAL at 21:46

## 2018-08-31 RX ADMIN — FAMOTIDINE 20 MG: 20 TABLET ORAL at 09:08

## 2018-08-31 RX ADMIN — HYDROCODONE BITARTRATE AND ACETAMINOPHEN 1 TABLET: 7.5; 325 TABLET ORAL at 04:19

## 2018-08-31 RX ADMIN — MICAFUNGIN SODIUM 100 MG: 20 INJECTION, POWDER, LYOPHILIZED, FOR SOLUTION INTRAVENOUS at 21:45

## 2018-08-31 RX ADMIN — BACLOFEN 5 MG: 10 TABLET ORAL at 21:46

## 2018-08-31 RX ADMIN — METOPROLOL TARTRATE 50 MG: 50 TABLET ORAL at 09:08

## 2018-08-31 RX ADMIN — Medication 1 CAPSULE: at 09:09

## 2018-08-31 RX ADMIN — THIAMINE HCL TAB 100 MG 100 MG: 100 TAB at 09:09

## 2018-08-31 RX ADMIN — LORAZEPAM 0.5 MG: 0.5 TABLET ORAL at 04:19

## 2018-08-31 RX ADMIN — CLOPIDOGREL BISULFATE 75 MG: 75 TABLET ORAL at 09:08

## 2018-08-31 RX ADMIN — ATORVASTATIN CALCIUM 80 MG: 40 TABLET, FILM COATED ORAL at 21:46

## 2018-08-31 RX ADMIN — HYDROCODONE BITARTRATE AND ACETAMINOPHEN 1 TABLET: 7.5; 325 TABLET ORAL at 11:35

## 2018-08-31 RX ADMIN — LORAZEPAM 0.5 MG: 2 INJECTION INTRAMUSCULAR; INTRAVENOUS at 16:59

## 2018-08-31 RX ADMIN — BACLOFEN 5 MG: 10 TABLET ORAL at 16:12

## 2018-08-31 RX ADMIN — BACLOFEN 5 MG: 10 TABLET ORAL at 09:08

## 2018-08-31 RX ADMIN — HYDROCODONE BITARTRATE AND ACETAMINOPHEN 1 TABLET: 7.5; 325 TABLET ORAL at 17:44

## 2018-08-31 RX ADMIN — ASPIRIN 81 MG CHEWABLE TABLET 81 MG: 81 TABLET CHEWABLE at 09:09

## 2018-08-31 RX ADMIN — ENOXAPARIN SODIUM 40 MG: 100 INJECTION SUBCUTANEOUS at 09:09

## 2018-09-01 LAB
GLUCOSE BLDC GLUCOMTR-MCNC: 104 MG/DL (ref 70–130)
GLUCOSE BLDC GLUCOMTR-MCNC: 107 MG/DL (ref 70–130)
GLUCOSE BLDC GLUCOMTR-MCNC: 108 MG/DL (ref 70–130)
GLUCOSE BLDC GLUCOMTR-MCNC: 115 MG/DL (ref 70–130)

## 2018-09-01 PROCEDURE — 25010000002 HYDROMORPHONE PER 4 MG: Performed by: INTERNAL MEDICINE

## 2018-09-01 PROCEDURE — 82962 GLUCOSE BLOOD TEST: CPT

## 2018-09-01 PROCEDURE — 25010000002 ENOXAPARIN PER 10 MG: Performed by: NURSE PRACTITIONER

## 2018-09-01 PROCEDURE — 99232 SBSQ HOSP IP/OBS MODERATE 35: CPT | Performed by: INTERNAL MEDICINE

## 2018-09-01 PROCEDURE — 25010000002 LORAZEPAM PER 2 MG: Performed by: NURSE PRACTITIONER

## 2018-09-01 PROCEDURE — 25010000002 HALOPERIDOL LACTATE PER 5 MG: Performed by: INTERNAL MEDICINE

## 2018-09-01 RX ORDER — HYDROMORPHONE HYDROCHLORIDE 1 MG/ML
0.5 INJECTION, SOLUTION INTRAMUSCULAR; INTRAVENOUS; SUBCUTANEOUS ONCE
Status: COMPLETED | OUTPATIENT
Start: 2018-09-01 | End: 2018-09-01

## 2018-09-01 RX ORDER — HALOPERIDOL 5 MG/ML
5 INJECTION INTRAMUSCULAR ONCE
Status: COMPLETED | OUTPATIENT
Start: 2018-09-01 | End: 2018-09-01

## 2018-09-01 RX ORDER — QUETIAPINE FUMARATE 25 MG/1
25 TABLET, FILM COATED ORAL ONCE
Status: COMPLETED | OUTPATIENT
Start: 2018-09-01 | End: 2018-09-01

## 2018-09-01 RX ORDER — HYDROCODONE BITARTRATE AND ACETAMINOPHEN 7.5; 325 MG/1; MG/1
1 TABLET ORAL EVERY 4 HOURS PRN
Status: DISPENSED | OUTPATIENT
Start: 2018-09-01 | End: 2018-09-08

## 2018-09-01 RX ORDER — BACLOFEN 10 MG/1
5 TABLET ORAL ONCE
Status: COMPLETED | OUTPATIENT
Start: 2018-09-01 | End: 2018-09-01

## 2018-09-01 RX ADMIN — ATORVASTATIN CALCIUM 80 MG: 40 TABLET, FILM COATED ORAL at 19:48

## 2018-09-01 RX ADMIN — LORAZEPAM 0.5 MG: 2 INJECTION INTRAMUSCULAR; INTRAVENOUS at 13:16

## 2018-09-01 RX ADMIN — HYDROMORPHONE HYDROCHLORIDE 1 MG: 1 INJECTION, SOLUTION INTRAMUSCULAR; INTRAVENOUS; SUBCUTANEOUS at 18:22

## 2018-09-01 RX ADMIN — HALOPERIDOL LACTATE 5 MG: 5 INJECTION, SOLUTION INTRAMUSCULAR at 17:33

## 2018-09-01 RX ADMIN — ENOXAPARIN SODIUM 40 MG: 100 INJECTION SUBCUTANEOUS at 09:27

## 2018-09-01 RX ADMIN — LORAZEPAM 0.5 MG: 2 INJECTION INTRAMUSCULAR; INTRAVENOUS at 02:43

## 2018-09-01 RX ADMIN — MICAFUNGIN SODIUM 100 MG: 20 INJECTION, POWDER, LYOPHILIZED, FOR SOLUTION INTRAVENOUS at 23:50

## 2018-09-01 RX ADMIN — LORAZEPAM 0.5 MG: 2 INJECTION INTRAMUSCULAR; INTRAVENOUS at 17:58

## 2018-09-01 RX ADMIN — HYDROCODONE BITARTRATE AND ACETAMINOPHEN 1 TABLET: 7.5; 325 TABLET ORAL at 19:48

## 2018-09-01 RX ADMIN — HYDROMORPHONE HYDROCHLORIDE 0.5 MG: 1 INJECTION, SOLUTION INTRAMUSCULAR; INTRAVENOUS; SUBCUTANEOUS at 15:15

## 2018-09-01 RX ADMIN — METOPROLOL TARTRATE 50 MG: 50 TABLET ORAL at 19:48

## 2018-09-01 RX ADMIN — BACLOFEN 5 MG: 10 TABLET ORAL at 15:15

## 2018-09-01 RX ADMIN — CLOPIDOGREL BISULFATE 75 MG: 75 TABLET ORAL at 09:27

## 2018-09-01 RX ADMIN — BACLOFEN 5 MG: 10 TABLET ORAL at 09:28

## 2018-09-01 RX ADMIN — BACLOFEN 5 MG: 10 TABLET ORAL at 19:50

## 2018-09-01 RX ADMIN — FAMOTIDINE 20 MG: 20 TABLET ORAL at 09:27

## 2018-09-01 RX ADMIN — Medication 1 CAPSULE: at 09:28

## 2018-09-01 RX ADMIN — LORAZEPAM 0.5 MG: 0.5 TABLET ORAL at 19:49

## 2018-09-01 RX ADMIN — METOPROLOL TARTRATE 50 MG: 50 TABLET ORAL at 09:28

## 2018-09-01 RX ADMIN — HYDROCODONE BITARTRATE AND ACETAMINOPHEN 1 TABLET: 7.5; 325 TABLET ORAL at 23:50

## 2018-09-01 RX ADMIN — THIAMINE HCL TAB 100 MG 100 MG: 100 TAB at 09:27

## 2018-09-01 RX ADMIN — FAMOTIDINE 20 MG: 20 TABLET ORAL at 19:49

## 2018-09-01 RX ADMIN — HYDROCODONE BITARTRATE AND ACETAMINOPHEN 1 TABLET: 7.5; 325 TABLET ORAL at 14:01

## 2018-09-01 RX ADMIN — LORAZEPAM 0.5 MG: 2 INJECTION INTRAMUSCULAR; INTRAVENOUS at 09:28

## 2018-09-01 RX ADMIN — ASPIRIN 81 MG CHEWABLE TABLET 81 MG: 81 TABLET CHEWABLE at 09:28

## 2018-09-01 RX ADMIN — QUETIAPINE FUMARATE 25 MG: 25 TABLET ORAL at 18:22

## 2018-09-01 RX ADMIN — LORAZEPAM 0.5 MG: 2 INJECTION INTRAMUSCULAR; INTRAVENOUS at 23:42

## 2018-09-01 RX ADMIN — HYDROCODONE BITARTRATE AND ACETAMINOPHEN 1 TABLET: 7.5; 325 TABLET ORAL at 09:27

## 2018-09-01 RX ADMIN — BACLOFEN 5 MG: 10 TABLET ORAL at 19:48

## 2018-09-02 LAB
ALBUMIN SERPL-MCNC: 3.77 G/DL (ref 3.2–4.8)
ALBUMIN/GLOB SERPL: 1.5 G/DL (ref 1.5–2.5)
ALP SERPL-CCNC: 97 U/L (ref 25–100)
ALT SERPL W P-5'-P-CCNC: 59 U/L (ref 7–40)
ANION GAP SERPL CALCULATED.3IONS-SCNC: 8 MMOL/L (ref 3–11)
AST SERPL-CCNC: 53 U/L (ref 0–33)
BILIRUB SERPL-MCNC: 0.4 MG/DL (ref 0.3–1.2)
BUN BLD-MCNC: 12 MG/DL (ref 9–23)
BUN/CREAT SERPL: 24.5 (ref 7–25)
CALCIUM SPEC-SCNC: 9.5 MG/DL (ref 8.7–10.4)
CHLORIDE SERPL-SCNC: 101 MMOL/L (ref 99–109)
CO2 SERPL-SCNC: 28 MMOL/L (ref 20–31)
CREAT BLD-MCNC: 0.49 MG/DL (ref 0.6–1.3)
GFR SERPL CREATININE-BSD FRML MDRD: 141 ML/MIN/1.73
GLOBULIN UR ELPH-MCNC: 2.5 GM/DL
GLUCOSE BLD-MCNC: 127 MG/DL (ref 70–100)
GLUCOSE BLDC GLUCOMTR-MCNC: 108 MG/DL (ref 70–130)
GLUCOSE BLDC GLUCOMTR-MCNC: 123 MG/DL (ref 70–130)
GLUCOSE BLDC GLUCOMTR-MCNC: 124 MG/DL (ref 70–130)
GLUCOSE BLDC GLUCOMTR-MCNC: 92 MG/DL (ref 70–130)
POTASSIUM BLD-SCNC: 4.4 MMOL/L (ref 3.5–5.5)
PROT SERPL-MCNC: 6.3 G/DL (ref 5.7–8.2)
SODIUM BLD-SCNC: 137 MMOL/L (ref 132–146)

## 2018-09-02 PROCEDURE — 80053 COMPREHEN METABOLIC PANEL: CPT | Performed by: INTERNAL MEDICINE

## 2018-09-02 PROCEDURE — 25010000002 ENOXAPARIN PER 10 MG: Performed by: NURSE PRACTITIONER

## 2018-09-02 PROCEDURE — 82962 GLUCOSE BLOOD TEST: CPT

## 2018-09-02 PROCEDURE — 25010000002 HYDROMORPHONE PER 4 MG: Performed by: INTERNAL MEDICINE

## 2018-09-02 PROCEDURE — 25010000002 LORAZEPAM PER 2 MG: Performed by: INTERNAL MEDICINE

## 2018-09-02 PROCEDURE — 99232 SBSQ HOSP IP/OBS MODERATE 35: CPT | Performed by: INTERNAL MEDICINE

## 2018-09-02 PROCEDURE — 25010000002 LORAZEPAM PER 2 MG: Performed by: NURSE PRACTITIONER

## 2018-09-02 RX ORDER — HYDROMORPHONE HYDROCHLORIDE 1 MG/ML
0.5 INJECTION, SOLUTION INTRAMUSCULAR; INTRAVENOUS; SUBCUTANEOUS ONCE
Status: DISCONTINUED | OUTPATIENT
Start: 2018-09-02 | End: 2018-09-02

## 2018-09-02 RX ORDER — LORAZEPAM 2 MG/ML
0.5 INJECTION INTRAMUSCULAR 4 TIMES DAILY PRN
Status: DISCONTINUED | OUTPATIENT
Start: 2018-09-02 | End: 2018-09-10

## 2018-09-02 RX ADMIN — HYDROCODONE BITARTRATE AND ACETAMINOPHEN 1 TABLET: 7.5; 325 TABLET ORAL at 17:09

## 2018-09-02 RX ADMIN — Medication 1 CAPSULE: at 08:16

## 2018-09-02 RX ADMIN — ATORVASTATIN CALCIUM 80 MG: 40 TABLET, FILM COATED ORAL at 20:30

## 2018-09-02 RX ADMIN — BACLOFEN 5 MG: 10 TABLET ORAL at 20:30

## 2018-09-02 RX ADMIN — HYDROCODONE BITARTRATE AND ACETAMINOPHEN 1 TABLET: 7.5; 325 TABLET ORAL at 13:15

## 2018-09-02 RX ADMIN — LORAZEPAM 0.5 MG: 2 INJECTION INTRAMUSCULAR; INTRAVENOUS at 16:12

## 2018-09-02 RX ADMIN — THIAMINE HCL TAB 100 MG 100 MG: 100 TAB at 08:16

## 2018-09-02 RX ADMIN — ASPIRIN 81 MG CHEWABLE TABLET 81 MG: 81 TABLET CHEWABLE at 08:19

## 2018-09-02 RX ADMIN — MICAFUNGIN SODIUM 100 MG: 20 INJECTION, POWDER, LYOPHILIZED, FOR SOLUTION INTRAVENOUS at 20:30

## 2018-09-02 RX ADMIN — HYDROCODONE BITARTRATE AND ACETAMINOPHEN 1 TABLET: 7.5; 325 TABLET ORAL at 22:10

## 2018-09-02 RX ADMIN — METOPROLOL TARTRATE 50 MG: 50 TABLET ORAL at 20:30

## 2018-09-02 RX ADMIN — LORAZEPAM 0.5 MG: 0.5 TABLET ORAL at 20:30

## 2018-09-02 RX ADMIN — LORAZEPAM 0.5 MG: 2 INJECTION INTRAMUSCULAR; INTRAVENOUS at 22:10

## 2018-09-02 RX ADMIN — BACLOFEN 5 MG: 10 TABLET ORAL at 15:44

## 2018-09-02 RX ADMIN — CLOPIDOGREL BISULFATE 75 MG: 75 TABLET ORAL at 08:18

## 2018-09-02 RX ADMIN — LORAZEPAM 0.5 MG: 0.5 TABLET ORAL at 11:31

## 2018-09-02 RX ADMIN — HYDROCODONE BITARTRATE AND ACETAMINOPHEN 1 TABLET: 7.5; 325 TABLET ORAL at 03:47

## 2018-09-02 RX ADMIN — HYDROMORPHONE HYDROCHLORIDE 1 MG: 1 INJECTION, SOLUTION INTRAMUSCULAR; INTRAVENOUS; SUBCUTANEOUS at 18:53

## 2018-09-02 RX ADMIN — FAMOTIDINE 20 MG: 20 TABLET ORAL at 08:18

## 2018-09-02 RX ADMIN — BACLOFEN 5 MG: 10 TABLET ORAL at 08:17

## 2018-09-02 RX ADMIN — HYDROCODONE BITARTRATE AND ACETAMINOPHEN 1 TABLET: 7.5; 325 TABLET ORAL at 09:30

## 2018-09-02 RX ADMIN — LORAZEPAM 0.5 MG: 0.5 TABLET ORAL at 03:47

## 2018-09-02 RX ADMIN — FAMOTIDINE 20 MG: 20 TABLET ORAL at 20:30

## 2018-09-02 RX ADMIN — LORAZEPAM 0.5 MG: 2 INJECTION INTRAMUSCULAR; INTRAVENOUS at 05:25

## 2018-09-02 RX ADMIN — ENOXAPARIN SODIUM 40 MG: 100 INJECTION SUBCUTANEOUS at 08:20

## 2018-09-03 ENCOUNTER — APPOINTMENT (OUTPATIENT)
Dept: GENERAL RADIOLOGY | Facility: HOSPITAL | Age: 38
End: 2018-09-03

## 2018-09-03 LAB
ALBUMIN SERPL-MCNC: 3.92 G/DL (ref 3.2–4.8)
ALP SERPL-CCNC: 98 U/L (ref 25–100)
ALT SERPL W P-5'-P-CCNC: 61 U/L (ref 7–40)
ANION GAP SERPL CALCULATED.3IONS-SCNC: 7 MMOL/L (ref 3–11)
AST SERPL-CCNC: 58 U/L (ref 0–33)
BACTERIA UR QL AUTO: ABNORMAL /HPF
BILIRUB SERPL-MCNC: 0.3 MG/DL (ref 0.3–1.2)
BILIRUB UR QL STRIP: NEGATIVE
BUN BLD-MCNC: 11 MG/DL (ref 9–23)
CALCIUM SPEC-SCNC: 9.6 MG/DL (ref 8.7–10.4)
CHLORIDE SERPL-SCNC: 100 MMOL/L (ref 99–109)
CHOLEST SERPL-MCNC: 146 MG/DL (ref 0–200)
CK SERPL-CCNC: 261 U/L (ref 26–174)
CLARITY UR: ABNORMAL
CO2 SERPL-SCNC: 28 MMOL/L (ref 20–31)
COLOR UR: YELLOW
CREAT BLD-MCNC: 0.5 MG/DL (ref 0.6–1.3)
D DIMER PPP FEU-MCNC: 2.32 MG/L (FEU) (ref 0–0.5)
GLUCOSE BLD-MCNC: 102 MG/DL (ref 70–100)
GLUCOSE BLDC GLUCOMTR-MCNC: 106 MG/DL (ref 70–130)
GLUCOSE BLDC GLUCOMTR-MCNC: 109 MG/DL (ref 70–130)
GLUCOSE BLDC GLUCOMTR-MCNC: 119 MG/DL (ref 70–130)
GLUCOSE BLDC GLUCOMTR-MCNC: 97 MG/DL (ref 70–130)
GLUCOSE UR STRIP-MCNC: NEGATIVE MG/DL
HGB UR QL STRIP.AUTO: ABNORMAL
KETONES UR QL STRIP: NEGATIVE
LEUKOCYTE ESTERASE UR QL STRIP.AUTO: ABNORMAL
MAGNESIUM SERPL-MCNC: 2 MG/DL (ref 1.3–2.7)
NITRITE UR QL STRIP: POSITIVE
PH UR STRIP.AUTO: 6.5 [PH] (ref 5–8)
PHOSPHATE SERPL-MCNC: 5 MG/DL (ref 2.4–5.1)
POTASSIUM BLD-SCNC: 4.2 MMOL/L (ref 3.5–5.5)
PROT SERPL-MCNC: 6.6 G/DL (ref 5.7–8.2)
PROT UR QL STRIP: NEGATIVE
RBC # UR: ABNORMAL /HPF
REF LAB TEST METHOD: ABNORMAL
SODIUM BLD-SCNC: 135 MMOL/L (ref 132–146)
SP GR UR STRIP: 1.02 (ref 1–1.03)
SQUAMOUS #/AREA URNS HPF: ABNORMAL /HPF
TRIGL SERPL-MCNC: 171 MG/DL (ref 0–150)
UROBILINOGEN UR QL STRIP: ABNORMAL
WBC UR QL AUTO: ABNORMAL /HPF

## 2018-09-03 PROCEDURE — 87077 CULTURE AEROBIC IDENTIFY: CPT | Performed by: FAMILY MEDICINE

## 2018-09-03 PROCEDURE — 87086 URINE CULTURE/COLONY COUNT: CPT | Performed by: FAMILY MEDICINE

## 2018-09-03 PROCEDURE — 84478 ASSAY OF TRIGLYCERIDES: CPT

## 2018-09-03 PROCEDURE — 84100 ASSAY OF PHOSPHORUS: CPT

## 2018-09-03 PROCEDURE — 80053 COMPREHEN METABOLIC PANEL: CPT

## 2018-09-03 PROCEDURE — 99233 SBSQ HOSP IP/OBS HIGH 50: CPT | Performed by: FAMILY MEDICINE

## 2018-09-03 PROCEDURE — 83735 ASSAY OF MAGNESIUM: CPT

## 2018-09-03 PROCEDURE — 82962 GLUCOSE BLOOD TEST: CPT

## 2018-09-03 PROCEDURE — 81001 URINALYSIS AUTO W/SCOPE: CPT | Performed by: FAMILY MEDICINE

## 2018-09-03 PROCEDURE — 25010000002 LORAZEPAM PER 2 MG: Performed by: INTERNAL MEDICINE

## 2018-09-03 PROCEDURE — 25010000002 ENOXAPARIN PER 10 MG: Performed by: NURSE PRACTITIONER

## 2018-09-03 PROCEDURE — 82465 ASSAY BLD/SERUM CHOLESTEROL: CPT

## 2018-09-03 PROCEDURE — 87186 SC STD MICRODIL/AGAR DIL: CPT | Performed by: FAMILY MEDICINE

## 2018-09-03 PROCEDURE — 71045 X-RAY EXAM CHEST 1 VIEW: CPT

## 2018-09-03 PROCEDURE — 85379 FIBRIN DEGRADATION QUANT: CPT | Performed by: FAMILY MEDICINE

## 2018-09-03 PROCEDURE — 25810000003 DEXTROSE-NACL PER 500 ML: Performed by: FAMILY MEDICINE

## 2018-09-03 PROCEDURE — 82550 ASSAY OF CK (CPK): CPT | Performed by: FAMILY MEDICINE

## 2018-09-03 RX ORDER — DEXTROSE AND SODIUM CHLORIDE 5; .9 G/100ML; G/100ML
75 INJECTION, SOLUTION INTRAVENOUS CONTINUOUS
Status: DISCONTINUED | OUTPATIENT
Start: 2018-09-03 | End: 2018-09-04

## 2018-09-03 RX ORDER — LORAZEPAM 2 MG/ML
0.25 INJECTION INTRAMUSCULAR ONCE
Status: COMPLETED | OUTPATIENT
Start: 2018-09-03 | End: 2018-09-03

## 2018-09-03 RX ORDER — BACLOFEN 10 MG/1
10 TABLET ORAL 3 TIMES DAILY
Status: DISCONTINUED | OUTPATIENT
Start: 2018-09-03 | End: 2018-10-24 | Stop reason: HOSPADM

## 2018-09-03 RX ORDER — GLYCOPYRROLATE 0.2 MG/ML
0.1 INJECTION INTRAMUSCULAR; INTRAVENOUS 3 TIMES DAILY
Status: DISCONTINUED | OUTPATIENT
Start: 2018-09-03 | End: 2018-10-24 | Stop reason: HOSPADM

## 2018-09-03 RX ADMIN — Medication 1 CAPSULE: at 08:19

## 2018-09-03 RX ADMIN — LORAZEPAM 0.5 MG: 2 INJECTION INTRAMUSCULAR; INTRAVENOUS at 23:39

## 2018-09-03 RX ADMIN — BACLOFEN 10 MG: 10 TABLET ORAL at 20:26

## 2018-09-03 RX ADMIN — BACLOFEN 5 MG: 10 TABLET ORAL at 08:19

## 2018-09-03 RX ADMIN — GLYCOPYRROLATE 0.1 MG: 0.2 INJECTION, SOLUTION INTRAMUSCULAR; INTRAVENOUS at 20:26

## 2018-09-03 RX ADMIN — LORAZEPAM 0.25 MG: 2 INJECTION INTRAMUSCULAR; INTRAVENOUS at 01:57

## 2018-09-03 RX ADMIN — BACLOFEN 10 MG: 10 TABLET ORAL at 15:34

## 2018-09-03 RX ADMIN — ASPIRIN 81 MG CHEWABLE TABLET 81 MG: 81 TABLET CHEWABLE at 08:19

## 2018-09-03 RX ADMIN — METOPROLOL TARTRATE 50 MG: 50 TABLET ORAL at 08:18

## 2018-09-03 RX ADMIN — ATORVASTATIN CALCIUM 80 MG: 40 TABLET, FILM COATED ORAL at 20:25

## 2018-09-03 RX ADMIN — LORAZEPAM 0.5 MG: 0.5 TABLET ORAL at 15:34

## 2018-09-03 RX ADMIN — ENOXAPARIN SODIUM 40 MG: 100 INJECTION SUBCUTANEOUS at 08:19

## 2018-09-03 RX ADMIN — THIAMINE HCL TAB 100 MG 100 MG: 100 TAB at 08:19

## 2018-09-03 RX ADMIN — HYDROCODONE BITARTRATE AND ACETAMINOPHEN 1 TABLET: 7.5; 325 TABLET ORAL at 01:57

## 2018-09-03 RX ADMIN — FAMOTIDINE 20 MG: 20 TABLET ORAL at 08:19

## 2018-09-03 RX ADMIN — LORAZEPAM 0.5 MG: 2 INJECTION INTRAMUSCULAR; INTRAVENOUS at 10:55

## 2018-09-03 RX ADMIN — HYDROCODONE BITARTRATE AND ACETAMINOPHEN 1 TABLET: 7.5; 325 TABLET ORAL at 08:19

## 2018-09-03 RX ADMIN — CLOPIDOGREL BISULFATE 75 MG: 75 TABLET ORAL at 08:19

## 2018-09-03 RX ADMIN — METOPROLOL TARTRATE 50 MG: 50 TABLET ORAL at 20:26

## 2018-09-03 RX ADMIN — HYDROCODONE BITARTRATE AND ACETAMINOPHEN 1 TABLET: 7.5; 325 TABLET ORAL at 15:34

## 2018-09-03 RX ADMIN — FAMOTIDINE 20 MG: 20 TABLET ORAL at 20:26

## 2018-09-03 RX ADMIN — HYDROCODONE BITARTRATE AND ACETAMINOPHEN 1 TABLET: 7.5; 325 TABLET ORAL at 20:25

## 2018-09-03 RX ADMIN — LORAZEPAM 0.5 MG: 0.5 TABLET ORAL at 08:19

## 2018-09-03 RX ADMIN — LORAZEPAM 0.5 MG: 2 INJECTION INTRAMUSCULAR; INTRAVENOUS at 17:16

## 2018-09-03 RX ADMIN — BISACODYL 10 MG: 10 SUPPOSITORY RECTAL at 10:55

## 2018-09-03 RX ADMIN — DEXTROSE AND SODIUM CHLORIDE 75 ML/HR: 5; 900 INJECTION, SOLUTION INTRAVENOUS at 17:56

## 2018-09-03 RX ADMIN — MICAFUNGIN SODIUM 100 MG: 20 INJECTION, POWDER, LYOPHILIZED, FOR SOLUTION INTRAVENOUS at 20:26

## 2018-09-04 ENCOUNTER — APPOINTMENT (OUTPATIENT)
Dept: NEUROLOGY | Facility: HOSPITAL | Age: 38
End: 2018-09-04
Attending: FAMILY MEDICINE

## 2018-09-04 ENCOUNTER — APPOINTMENT (OUTPATIENT)
Dept: CT IMAGING | Facility: HOSPITAL | Age: 38
End: 2018-09-04

## 2018-09-04 ENCOUNTER — APPOINTMENT (OUTPATIENT)
Dept: CARDIOLOGY | Facility: HOSPITAL | Age: 38
End: 2018-09-04
Attending: FAMILY MEDICINE

## 2018-09-04 LAB
ALBUMIN SERPL-MCNC: 3.66 G/DL (ref 3.2–4.8)
ALBUMIN/GLOB SERPL: 1.6 G/DL (ref 1.5–2.5)
ALP SERPL-CCNC: 89 U/L (ref 25–100)
ALT SERPL W P-5'-P-CCNC: 53 U/L (ref 7–40)
ANION GAP SERPL CALCULATED.3IONS-SCNC: 8 MMOL/L (ref 3–11)
AST SERPL-CCNC: 46 U/L (ref 0–33)
BH CV LOWER VASCULAR LEFT COMMON FEMORAL AUGMENT: NORMAL
BH CV LOWER VASCULAR LEFT COMMON FEMORAL COMPRESS: NORMAL
BH CV LOWER VASCULAR LEFT COMMON FEMORAL PHASIC: NORMAL
BH CV LOWER VASCULAR LEFT COMMON FEMORAL SPONT: NORMAL
BH CV LOWER VASCULAR LEFT DISTAL FEMORAL COMPRESS: NORMAL
BH CV LOWER VASCULAR LEFT MID FEMORAL AUGMENT: NORMAL
BH CV LOWER VASCULAR LEFT MID FEMORAL COMPRESS: NORMAL
BH CV LOWER VASCULAR LEFT MID FEMORAL PHASIC: NORMAL
BH CV LOWER VASCULAR LEFT MID FEMORAL SPONT: NORMAL
BH CV LOWER VASCULAR LEFT PERONEAL COMPRESS: NORMAL
BH CV LOWER VASCULAR LEFT POPLITEAL AUGMENT: NORMAL
BH CV LOWER VASCULAR LEFT POPLITEAL COMPRESS: NORMAL
BH CV LOWER VASCULAR LEFT POPLITEAL PHASIC: NORMAL
BH CV LOWER VASCULAR LEFT POPLITEAL SPONT: NORMAL
BH CV LOWER VASCULAR LEFT POSTERIOR TIBIAL COMPRESS: NORMAL
BH CV LOWER VASCULAR LEFT PROXIMAL FEMORAL COMPRESS: NORMAL
BH CV LOWER VASCULAR LEFT SAPHENOFEMORAL JUNCTION AUGMENT: NORMAL
BH CV LOWER VASCULAR LEFT SAPHENOFEMORAL JUNCTION COMPRESS: NORMAL
BH CV LOWER VASCULAR LEFT SAPHENOFEMORAL JUNCTION PHASIC: NORMAL
BH CV LOWER VASCULAR LEFT SAPHENOFEMORAL JUNCTION SPONT: NORMAL
BH CV LOWER VASCULAR RIGHT COMMON FEMORAL AUGMENT: NORMAL
BH CV LOWER VASCULAR RIGHT COMMON FEMORAL COMPRESS: NORMAL
BH CV LOWER VASCULAR RIGHT COMMON FEMORAL PHASIC: NORMAL
BH CV LOWER VASCULAR RIGHT COMMON FEMORAL SPONT: NORMAL
BH CV LOWER VASCULAR RIGHT DISTAL FEMORAL COMPRESS: NORMAL
BH CV LOWER VASCULAR RIGHT GASTRONEMIUS COMPRESS: NORMAL
BH CV LOWER VASCULAR RIGHT MID FEMORAL AUGMENT: NORMAL
BH CV LOWER VASCULAR RIGHT MID FEMORAL COMPRESS: NORMAL
BH CV LOWER VASCULAR RIGHT MID FEMORAL PHASIC: NORMAL
BH CV LOWER VASCULAR RIGHT MID FEMORAL SPONT: NORMAL
BH CV LOWER VASCULAR RIGHT PERONEAL COMPRESS: NORMAL
BH CV LOWER VASCULAR RIGHT POPLITEAL AUGMENT: NORMAL
BH CV LOWER VASCULAR RIGHT POPLITEAL COMPRESS: NORMAL
BH CV LOWER VASCULAR RIGHT POPLITEAL PHASIC: NORMAL
BH CV LOWER VASCULAR RIGHT POPLITEAL SPONT: NORMAL
BH CV LOWER VASCULAR RIGHT POSTERIOR TIBIAL COMPRESS: NORMAL
BH CV LOWER VASCULAR RIGHT PROXIMAL FEMORAL COMPRESS: NORMAL
BH CV LOWER VASCULAR RIGHT SAPHENOFEMORAL JUNCTION AUGMENT: NORMAL
BH CV LOWER VASCULAR RIGHT SAPHENOFEMORAL JUNCTION COMPRESS: NORMAL
BH CV LOWER VASCULAR RIGHT SAPHENOFEMORAL JUNCTION PHASIC: NORMAL
BH CV LOWER VASCULAR RIGHT SAPHENOFEMORAL JUNCTION SPONT: NORMAL
BILIRUB SERPL-MCNC: 0.4 MG/DL (ref 0.3–1.2)
BUN BLD-MCNC: 11 MG/DL (ref 9–23)
BUN/CREAT SERPL: 26.2 (ref 7–25)
CALCIUM SPEC-SCNC: 9 MG/DL (ref 8.7–10.4)
CHLORIDE SERPL-SCNC: 103 MMOL/L (ref 99–109)
CO2 SERPL-SCNC: 25 MMOL/L (ref 20–31)
CREAT BLD-MCNC: 0.42 MG/DL (ref 0.6–1.3)
DEPRECATED RDW RBC AUTO: 52.3 FL (ref 37–54)
ERYTHROCYTE [DISTWIDTH] IN BLOOD BY AUTOMATED COUNT: 14.7 % (ref 11.3–14.5)
GFR SERPL CREATININE-BSD FRML MDRD: >150 ML/MIN/1.73
GLOBULIN UR ELPH-MCNC: 2.3 GM/DL
GLUCOSE BLD-MCNC: 126 MG/DL (ref 70–100)
GLUCOSE BLDC GLUCOMTR-MCNC: 116 MG/DL (ref 70–130)
GLUCOSE BLDC GLUCOMTR-MCNC: 133 MG/DL (ref 70–130)
HCT VFR BLD AUTO: 32.6 % (ref 34.5–44)
HGB BLD-MCNC: 10.2 G/DL (ref 11.5–15.5)
MCH RBC QN AUTO: 30.3 PG (ref 27–31)
MCHC RBC AUTO-ENTMCNC: 31.3 G/DL (ref 32–36)
MCV RBC AUTO: 96.7 FL (ref 80–99)
PLATELET # BLD AUTO: 389 10*3/MM3 (ref 150–450)
PMV BLD AUTO: 10.4 FL (ref 6–12)
POTASSIUM BLD-SCNC: 4.2 MMOL/L (ref 3.5–5.5)
PROT SERPL-MCNC: 6 G/DL (ref 5.7–8.2)
RBC # BLD AUTO: 3.37 10*6/MM3 (ref 3.89–5.14)
SODIUM BLD-SCNC: 136 MMOL/L (ref 132–146)
WBC NRBC COR # BLD: 7.64 10*3/MM3 (ref 3.5–10.8)

## 2018-09-04 PROCEDURE — 85027 COMPLETE CBC AUTOMATED: CPT | Performed by: FAMILY MEDICINE

## 2018-09-04 PROCEDURE — 25010000002 ENOXAPARIN PER 10 MG: Performed by: NURSE PRACTITIONER

## 2018-09-04 PROCEDURE — 95816 EEG AWAKE AND DROWSY: CPT

## 2018-09-04 PROCEDURE — 93970 EXTREMITY STUDY: CPT | Performed by: INTERNAL MEDICINE

## 2018-09-04 PROCEDURE — 99232 SBSQ HOSP IP/OBS MODERATE 35: CPT | Performed by: FAMILY MEDICINE

## 2018-09-04 PROCEDURE — 25010000002 LORAZEPAM PER 2 MG: Performed by: INTERNAL MEDICINE

## 2018-09-04 PROCEDURE — 93970 EXTREMITY STUDY: CPT

## 2018-09-04 PROCEDURE — 82962 GLUCOSE BLOOD TEST: CPT

## 2018-09-04 PROCEDURE — 80053 COMPREHEN METABOLIC PANEL: CPT | Performed by: FAMILY MEDICINE

## 2018-09-04 RX ORDER — QUETIAPINE FUMARATE 25 MG/1
25 TABLET, FILM COATED ORAL EVERY 12 HOURS SCHEDULED
Status: DISCONTINUED | OUTPATIENT
Start: 2018-09-04 | End: 2018-10-14

## 2018-09-04 RX ADMIN — BACLOFEN 10 MG: 10 TABLET ORAL at 20:45

## 2018-09-04 RX ADMIN — BACLOFEN 10 MG: 10 TABLET ORAL at 08:21

## 2018-09-04 RX ADMIN — ENOXAPARIN SODIUM 40 MG: 100 INJECTION SUBCUTANEOUS at 09:22

## 2018-09-04 RX ADMIN — LORAZEPAM 0.5 MG: 0.5 TABLET ORAL at 02:54

## 2018-09-04 RX ADMIN — LORAZEPAM 0.5 MG: 2 INJECTION INTRAMUSCULAR; INTRAVENOUS at 08:22

## 2018-09-04 RX ADMIN — LORAZEPAM 0.5 MG: 2 INJECTION INTRAMUSCULAR; INTRAVENOUS at 23:41

## 2018-09-04 RX ADMIN — LORAZEPAM 0.5 MG: 2 INJECTION INTRAMUSCULAR; INTRAVENOUS at 12:53

## 2018-09-04 RX ADMIN — ASPIRIN 81 MG CHEWABLE TABLET 81 MG: 81 TABLET CHEWABLE at 08:21

## 2018-09-04 RX ADMIN — FAMOTIDINE 20 MG: 20 TABLET ORAL at 20:45

## 2018-09-04 RX ADMIN — BACLOFEN 10 MG: 10 TABLET ORAL at 16:17

## 2018-09-04 RX ADMIN — QUETIAPINE FUMARATE 25 MG: 25 TABLET ORAL at 20:45

## 2018-09-04 RX ADMIN — Medication 1 CAPSULE: at 08:21

## 2018-09-04 RX ADMIN — GLYCOPYRROLATE 0.1 MG: 0.2 INJECTION, SOLUTION INTRAMUSCULAR; INTRAVENOUS at 16:17

## 2018-09-04 RX ADMIN — THIAMINE HCL TAB 100 MG 100 MG: 100 TAB at 08:21

## 2018-09-04 RX ADMIN — MICAFUNGIN SODIUM 100 MG: 20 INJECTION, POWDER, LYOPHILIZED, FOR SOLUTION INTRAVENOUS at 20:54

## 2018-09-04 RX ADMIN — GLYCOPYRROLATE 0.1 MG: 0.2 INJECTION, SOLUTION INTRAMUSCULAR; INTRAVENOUS at 08:22

## 2018-09-04 RX ADMIN — GLYCOPYRROLATE 0.1 MG: 0.2 INJECTION, SOLUTION INTRAMUSCULAR; INTRAVENOUS at 20:44

## 2018-09-04 RX ADMIN — HYDROCODONE BITARTRATE AND ACETAMINOPHEN 1 TABLET: 7.5; 325 TABLET ORAL at 17:11

## 2018-09-04 RX ADMIN — LORAZEPAM 0.5 MG: 0.5 TABLET ORAL at 17:11

## 2018-09-04 RX ADMIN — HYDROCODONE BITARTRATE AND ACETAMINOPHEN 1 TABLET: 7.5; 325 TABLET ORAL at 08:22

## 2018-09-04 RX ADMIN — METOPROLOL TARTRATE 50 MG: 50 TABLET ORAL at 08:21

## 2018-09-04 RX ADMIN — ATORVASTATIN CALCIUM 80 MG: 40 TABLET, FILM COATED ORAL at 20:44

## 2018-09-04 RX ADMIN — HYDROCODONE BITARTRATE AND ACETAMINOPHEN 1 TABLET: 7.5; 325 TABLET ORAL at 02:35

## 2018-09-04 RX ADMIN — CLOPIDOGREL BISULFATE 75 MG: 75 TABLET ORAL at 08:21

## 2018-09-04 RX ADMIN — HYDROCODONE BITARTRATE AND ACETAMINOPHEN 1 TABLET: 7.5; 325 TABLET ORAL at 12:55

## 2018-09-04 RX ADMIN — FAMOTIDINE 20 MG: 20 TABLET ORAL at 08:21

## 2018-09-05 LAB — BACTERIA SPEC AEROBE CULT: ABNORMAL

## 2018-09-05 PROCEDURE — 99232 SBSQ HOSP IP/OBS MODERATE 35: CPT | Performed by: NURSE PRACTITIONER

## 2018-09-05 PROCEDURE — 94760 N-INVAS EAR/PLS OXIMETRY 1: CPT

## 2018-09-05 PROCEDURE — 25010000002 LORAZEPAM PER 2 MG: Performed by: INTERNAL MEDICINE

## 2018-09-05 PROCEDURE — 25010000002 ENOXAPARIN PER 10 MG: Performed by: NURSE PRACTITIONER

## 2018-09-05 PROCEDURE — 94799 UNLISTED PULMONARY SVC/PX: CPT

## 2018-09-05 RX ORDER — CEFTRIAXONE SODIUM 1 G/50ML
1 INJECTION, SOLUTION INTRAVENOUS EVERY 24 HOURS
Status: COMPLETED | OUTPATIENT
Start: 2018-09-05 | End: 2018-09-11

## 2018-09-05 RX ADMIN — FAMOTIDINE 20 MG: 20 TABLET ORAL at 21:41

## 2018-09-05 RX ADMIN — LORAZEPAM 0.5 MG: 0.5 TABLET ORAL at 21:41

## 2018-09-05 RX ADMIN — GLYCOPYRROLATE 0.1 MG: 0.2 INJECTION, SOLUTION INTRAMUSCULAR; INTRAVENOUS at 18:08

## 2018-09-05 RX ADMIN — QUETIAPINE FUMARATE 25 MG: 25 TABLET ORAL at 23:09

## 2018-09-05 RX ADMIN — BACLOFEN 10 MG: 10 TABLET ORAL at 09:50

## 2018-09-05 RX ADMIN — ASPIRIN 81 MG CHEWABLE TABLET 81 MG: 81 TABLET CHEWABLE at 09:49

## 2018-09-05 RX ADMIN — METOPROLOL TARTRATE 50 MG: 50 TABLET ORAL at 21:41

## 2018-09-05 RX ADMIN — LORAZEPAM 0.5 MG: 2 INJECTION INTRAMUSCULAR; INTRAVENOUS at 13:36

## 2018-09-05 RX ADMIN — THIAMINE HCL TAB 100 MG 100 MG: 100 TAB at 09:50

## 2018-09-05 RX ADMIN — QUETIAPINE FUMARATE 25 MG: 25 TABLET ORAL at 09:49

## 2018-09-05 RX ADMIN — GLYCOPYRROLATE 0.1 MG: 0.2 INJECTION, SOLUTION INTRAMUSCULAR; INTRAVENOUS at 21:41

## 2018-09-05 RX ADMIN — MICAFUNGIN SODIUM 100 MG: 20 INJECTION, POWDER, LYOPHILIZED, FOR SOLUTION INTRAVENOUS at 21:40

## 2018-09-05 RX ADMIN — BACLOFEN 10 MG: 10 TABLET ORAL at 18:06

## 2018-09-05 RX ADMIN — Medication 1 CAPSULE: at 09:50

## 2018-09-05 RX ADMIN — FAMOTIDINE 20 MG: 20 TABLET ORAL at 09:49

## 2018-09-05 RX ADMIN — BACLOFEN 10 MG: 10 TABLET ORAL at 21:41

## 2018-09-05 RX ADMIN — LORAZEPAM 0.5 MG: 2 INJECTION INTRAMUSCULAR; INTRAVENOUS at 09:31

## 2018-09-05 RX ADMIN — GLYCOPYRROLATE 0.1 MG: 0.2 INJECTION, SOLUTION INTRAMUSCULAR; INTRAVENOUS at 09:50

## 2018-09-05 RX ADMIN — CLOPIDOGREL BISULFATE 75 MG: 75 TABLET ORAL at 09:50

## 2018-09-05 RX ADMIN — ATORVASTATIN CALCIUM 80 MG: 40 TABLET, FILM COATED ORAL at 21:41

## 2018-09-05 RX ADMIN — ENOXAPARIN SODIUM 40 MG: 100 INJECTION SUBCUTANEOUS at 09:49

## 2018-09-05 RX ADMIN — METOPROLOL TARTRATE 50 MG: 50 TABLET ORAL at 09:49

## 2018-09-06 LAB
ALBUMIN SERPL-MCNC: 3.63 G/DL (ref 3.2–4.8)
ALBUMIN/GLOB SERPL: 1.4 G/DL (ref 1.5–2.5)
ALP SERPL-CCNC: 85 U/L (ref 25–100)
ALT SERPL W P-5'-P-CCNC: 61 U/L (ref 7–40)
ANION GAP SERPL CALCULATED.3IONS-SCNC: 9 MMOL/L (ref 3–11)
AST SERPL-CCNC: 57 U/L (ref 0–33)
BILIRUB SERPL-MCNC: 0.3 MG/DL (ref 0.3–1.2)
BUN BLD-MCNC: 9 MG/DL (ref 9–23)
BUN/CREAT SERPL: 20.9 (ref 7–25)
CALCIUM SPEC-SCNC: 9.4 MG/DL (ref 8.7–10.4)
CHLORIDE SERPL-SCNC: 100 MMOL/L (ref 99–109)
CO2 SERPL-SCNC: 26 MMOL/L (ref 20–31)
CREAT BLD-MCNC: 0.43 MG/DL (ref 0.6–1.3)
GFR SERPL CREATININE-BSD FRML MDRD: >150 ML/MIN/1.73
GLOBULIN UR ELPH-MCNC: 2.6 GM/DL
GLUCOSE BLD-MCNC: 108 MG/DL (ref 70–100)
POTASSIUM BLD-SCNC: 3.5 MMOL/L (ref 3.5–5.5)
POTASSIUM BLD-SCNC: 5 MMOL/L (ref 3.5–5.5)
PROT SERPL-MCNC: 6.2 G/DL (ref 5.7–8.2)
SODIUM BLD-SCNC: 135 MMOL/L (ref 132–146)

## 2018-09-06 PROCEDURE — 25010000002 MORPHINE SULFATE (PF) 2 MG/ML SOLUTION: Performed by: NURSE PRACTITIONER

## 2018-09-06 PROCEDURE — 80053 COMPREHEN METABOLIC PANEL: CPT | Performed by: NURSE PRACTITIONER

## 2018-09-06 PROCEDURE — 99232 SBSQ HOSP IP/OBS MODERATE 35: CPT | Performed by: NURSE PRACTITIONER

## 2018-09-06 PROCEDURE — 25010000002 ENOXAPARIN PER 10 MG: Performed by: NURSE PRACTITIONER

## 2018-09-06 PROCEDURE — 25010000002 LORAZEPAM PER 2 MG: Performed by: INTERNAL MEDICINE

## 2018-09-06 PROCEDURE — 84132 ASSAY OF SERUM POTASSIUM: CPT | Performed by: FAMILY MEDICINE

## 2018-09-06 PROCEDURE — 25010000002 CEFTRIAXONE PER 250 MG: Performed by: INTERNAL MEDICINE

## 2018-09-06 RX ORDER — MORPHINE SULFATE 2 MG/ML
1 INJECTION, SOLUTION INTRAMUSCULAR; INTRAVENOUS EVERY 4 HOURS PRN
Status: DISPENSED | OUTPATIENT
Start: 2018-09-06 | End: 2018-09-16

## 2018-09-06 RX ADMIN — POTASSIUM CHLORIDE 40 MEQ: 1.5 POWDER, FOR SOLUTION ORAL at 13:33

## 2018-09-06 RX ADMIN — FAMOTIDINE 20 MG: 20 TABLET ORAL at 20:32

## 2018-09-06 RX ADMIN — CEFTRIAXONE SODIUM 1 G: 1 INJECTION, SOLUTION INTRAVENOUS at 00:14

## 2018-09-06 RX ADMIN — MORPHINE SULFATE 1 MG: 2 INJECTION, SOLUTION INTRAMUSCULAR; INTRAVENOUS at 17:15

## 2018-09-06 RX ADMIN — HYDROCODONE BITARTRATE AND ACETAMINOPHEN 1 TABLET: 7.5; 325 TABLET ORAL at 09:12

## 2018-09-06 RX ADMIN — ENOXAPARIN SODIUM 40 MG: 100 INJECTION SUBCUTANEOUS at 09:13

## 2018-09-06 RX ADMIN — POTASSIUM CHLORIDE 40 MEQ: 1.5 POWDER, FOR SOLUTION ORAL at 09:13

## 2018-09-06 RX ADMIN — METOPROLOL TARTRATE 50 MG: 50 TABLET ORAL at 22:05

## 2018-09-06 RX ADMIN — LORAZEPAM 0.5 MG: 2 INJECTION INTRAMUSCULAR; INTRAVENOUS at 08:57

## 2018-09-06 RX ADMIN — BACLOFEN 10 MG: 10 TABLET ORAL at 09:14

## 2018-09-06 RX ADMIN — CEFTRIAXONE SODIUM 1 G: 1 INJECTION, SOLUTION INTRAVENOUS at 18:30

## 2018-09-06 RX ADMIN — GLYCOPYRROLATE 0.1 MG: 0.2 INJECTION, SOLUTION INTRAMUSCULAR; INTRAVENOUS at 20:32

## 2018-09-06 RX ADMIN — ATORVASTATIN CALCIUM 80 MG: 40 TABLET, FILM COATED ORAL at 20:32

## 2018-09-06 RX ADMIN — BACLOFEN 10 MG: 10 TABLET ORAL at 17:15

## 2018-09-06 RX ADMIN — QUETIAPINE FUMARATE 25 MG: 25 TABLET ORAL at 20:35

## 2018-09-06 RX ADMIN — METOPROLOL TARTRATE 50 MG: 50 TABLET ORAL at 09:13

## 2018-09-06 RX ADMIN — ASPIRIN 81 MG CHEWABLE TABLET 81 MG: 81 TABLET CHEWABLE at 09:14

## 2018-09-06 RX ADMIN — QUETIAPINE FUMARATE 25 MG: 25 TABLET ORAL at 09:13

## 2018-09-06 RX ADMIN — LORAZEPAM 0.5 MG: 2 INJECTION INTRAMUSCULAR; INTRAVENOUS at 13:33

## 2018-09-06 RX ADMIN — LORAZEPAM 0.5 MG: 2 INJECTION INTRAMUSCULAR; INTRAVENOUS at 17:50

## 2018-09-06 RX ADMIN — Medication 1 CAPSULE: at 09:13

## 2018-09-06 RX ADMIN — HYDROCODONE BITARTRATE AND ACETAMINOPHEN 1 TABLET: 7.5; 325 TABLET ORAL at 13:33

## 2018-09-06 RX ADMIN — THIAMINE HCL TAB 100 MG 100 MG: 100 TAB at 09:13

## 2018-09-06 RX ADMIN — GLYCOPYRROLATE 0.1 MG: 0.2 INJECTION, SOLUTION INTRAMUSCULAR; INTRAVENOUS at 09:13

## 2018-09-06 RX ADMIN — GLYCOPYRROLATE 0.1 MG: 0.2 INJECTION, SOLUTION INTRAMUSCULAR; INTRAVENOUS at 17:14

## 2018-09-06 RX ADMIN — BACLOFEN 10 MG: 10 TABLET ORAL at 20:32

## 2018-09-06 RX ADMIN — CLOPIDOGREL BISULFATE 75 MG: 75 TABLET ORAL at 09:14

## 2018-09-06 RX ADMIN — FAMOTIDINE 20 MG: 20 TABLET ORAL at 09:14

## 2018-09-07 LAB
ANION GAP SERPL CALCULATED.3IONS-SCNC: 10 MMOL/L (ref 3–11)
ANION GAP SERPL CALCULATED.3IONS-SCNC: 8 MMOL/L (ref 3–11)
BASOPHILS # BLD AUTO: 0.05 10*3/MM3 (ref 0–0.2)
BASOPHILS NFR BLD AUTO: 0.6 % (ref 0–1)
BUN BLD-MCNC: 11 MG/DL (ref 9–23)
BUN BLD-MCNC: 9 MG/DL (ref 9–23)
BUN/CREAT SERPL: 20.5 (ref 7–25)
BUN/CREAT SERPL: 23.4 (ref 7–25)
CALCIUM SPEC-SCNC: 10 MG/DL (ref 8.7–10.4)
CALCIUM SPEC-SCNC: 9.8 MG/DL (ref 8.7–10.4)
CHLORIDE SERPL-SCNC: 101 MMOL/L (ref 99–109)
CHLORIDE SERPL-SCNC: 99 MMOL/L (ref 99–109)
CO2 SERPL-SCNC: 28 MMOL/L (ref 20–31)
CO2 SERPL-SCNC: 28 MMOL/L (ref 20–31)
CREAT BLD-MCNC: 0.44 MG/DL (ref 0.6–1.3)
CREAT BLD-MCNC: 0.47 MG/DL (ref 0.6–1.3)
DEPRECATED RDW RBC AUTO: 50.6 FL (ref 37–54)
EOSINOPHIL # BLD AUTO: 0.13 10*3/MM3 (ref 0–0.3)
EOSINOPHIL NFR BLD AUTO: 1.7 % (ref 0–3)
ERYTHROCYTE [DISTWIDTH] IN BLOOD BY AUTOMATED COUNT: 14.3 % (ref 11.3–14.5)
GFR SERPL CREATININE-BSD FRML MDRD: 148 ML/MIN/1.73
GFR SERPL CREATININE-BSD FRML MDRD: >150 ML/MIN/1.73
GLUCOSE BLD-MCNC: 92 MG/DL (ref 70–100)
GLUCOSE BLD-MCNC: 97 MG/DL (ref 70–100)
HCT VFR BLD AUTO: 36.5 % (ref 34.5–44)
HGB BLD-MCNC: 11.5 G/DL (ref 11.5–15.5)
IMM GRANULOCYTES # BLD: 0.01 10*3/MM3 (ref 0–0.03)
IMM GRANULOCYTES NFR BLD: 0.1 % (ref 0–0.6)
INTERPRETATION: NORMAL
LYMPHOCYTES # BLD AUTO: 2.65 10*3/MM3 (ref 0.6–4.8)
LYMPHOCYTES NFR BLD AUTO: 34.2 % (ref 24–44)
M TB TUBERC IFN-G BLD QL: NEGATIVE
MAGNESIUM SERPL-MCNC: 1.9 MG/DL (ref 1.3–2.7)
MCH RBC QN AUTO: 30.2 PG (ref 27–31)
MCHC RBC AUTO-ENTMCNC: 31.5 G/DL (ref 32–36)
MCV RBC AUTO: 95.8 FL (ref 80–99)
MONOCYTES # BLD AUTO: 0.93 10*3/MM3 (ref 0–1)
MONOCYTES NFR BLD AUTO: 12 % (ref 0–12)
NEUTROPHILS # BLD AUTO: 3.99 10*3/MM3 (ref 1.5–8.3)
NEUTROPHILS NFR BLD AUTO: 51.5 % (ref 41–71)
PLATELET # BLD AUTO: 377 10*3/MM3 (ref 150–450)
PMV BLD AUTO: 10.9 FL (ref 6–12)
POTASSIUM BLD-SCNC: 4.4 MMOL/L (ref 3.5–5.5)
POTASSIUM BLD-SCNC: 4.4 MMOL/L (ref 3.5–5.5)
QFT TB AG MINUS NIL VALUE: 0.01 IU/ML
QUANTIFERON CRITERIA: NORMAL
QUANTIFERON MITOGEN VALUE: >10 IU/ML
QUANTIFERON NIL VALUE: 0.06 IU/ML
QUANTIFERON TB AG VALUE: 0.07 IU/ML
RBC # BLD AUTO: 3.81 10*6/MM3 (ref 3.89–5.14)
SODIUM BLD-SCNC: 135 MMOL/L (ref 132–146)
SODIUM BLD-SCNC: 139 MMOL/L (ref 132–146)
TROPONIN I SERPL-MCNC: 0.01 NG/ML
WBC NRBC COR # BLD: 7.75 10*3/MM3 (ref 3.5–10.8)

## 2018-09-07 PROCEDURE — 93010 ELECTROCARDIOGRAM REPORT: CPT | Performed by: INTERNAL MEDICINE

## 2018-09-07 PROCEDURE — 25010000002 ENOXAPARIN PER 10 MG: Performed by: NURSE PRACTITIONER

## 2018-09-07 PROCEDURE — 25010000002 CEFTRIAXONE PER 250 MG: Performed by: INTERNAL MEDICINE

## 2018-09-07 PROCEDURE — 80048 BASIC METABOLIC PNL TOTAL CA: CPT | Performed by: PHYSICIAN ASSISTANT

## 2018-09-07 PROCEDURE — 83735 ASSAY OF MAGNESIUM: CPT | Performed by: NURSE PRACTITIONER

## 2018-09-07 PROCEDURE — 93005 ELECTROCARDIOGRAM TRACING: CPT | Performed by: NURSE PRACTITIONER

## 2018-09-07 PROCEDURE — 84484 ASSAY OF TROPONIN QUANT: CPT | Performed by: NURSE PRACTITIONER

## 2018-09-07 PROCEDURE — 25010000002 LORAZEPAM PER 2 MG: Performed by: INTERNAL MEDICINE

## 2018-09-07 PROCEDURE — 80048 BASIC METABOLIC PNL TOTAL CA: CPT | Performed by: NURSE PRACTITIONER

## 2018-09-07 PROCEDURE — 99232 SBSQ HOSP IP/OBS MODERATE 35: CPT | Performed by: INTERNAL MEDICINE

## 2018-09-07 PROCEDURE — 85025 COMPLETE CBC W/AUTO DIFF WBC: CPT | Performed by: PHYSICIAN ASSISTANT

## 2018-09-07 RX ADMIN — QUETIAPINE FUMARATE 25 MG: 25 TABLET ORAL at 20:54

## 2018-09-07 RX ADMIN — BACLOFEN 10 MG: 10 TABLET ORAL at 18:20

## 2018-09-07 RX ADMIN — ATORVASTATIN CALCIUM 80 MG: 40 TABLET, FILM COATED ORAL at 20:52

## 2018-09-07 RX ADMIN — METOPROLOL TARTRATE 50 MG: 50 TABLET ORAL at 09:11

## 2018-09-07 RX ADMIN — QUETIAPINE FUMARATE 25 MG: 25 TABLET ORAL at 09:10

## 2018-09-07 RX ADMIN — HYDROCODONE BITARTRATE AND ACETAMINOPHEN 1 TABLET: 7.5; 325 TABLET ORAL at 00:57

## 2018-09-07 RX ADMIN — BACLOFEN 10 MG: 10 TABLET ORAL at 09:11

## 2018-09-07 RX ADMIN — GLYCOPYRROLATE 0.1 MG: 0.2 INJECTION, SOLUTION INTRAMUSCULAR; INTRAVENOUS at 20:54

## 2018-09-07 RX ADMIN — LORAZEPAM 0.5 MG: 2 INJECTION INTRAMUSCULAR; INTRAVENOUS at 00:57

## 2018-09-07 RX ADMIN — CLOPIDOGREL BISULFATE 75 MG: 75 TABLET ORAL at 09:11

## 2018-09-07 RX ADMIN — FAMOTIDINE 20 MG: 20 TABLET ORAL at 09:11

## 2018-09-07 RX ADMIN — CEFTRIAXONE SODIUM 1 G: 1 INJECTION, SOLUTION INTRAVENOUS at 18:19

## 2018-09-07 RX ADMIN — GLYCOPYRROLATE 0.1 MG: 0.2 INJECTION, SOLUTION INTRAMUSCULAR; INTRAVENOUS at 18:19

## 2018-09-07 RX ADMIN — BACLOFEN 10 MG: 10 TABLET ORAL at 20:54

## 2018-09-07 RX ADMIN — THIAMINE HCL TAB 100 MG 100 MG: 100 TAB at 09:11

## 2018-09-07 RX ADMIN — Medication 1 CAPSULE: at 09:11

## 2018-09-07 RX ADMIN — LORAZEPAM 0.5 MG: 2 INJECTION INTRAMUSCULAR; INTRAVENOUS at 22:39

## 2018-09-07 RX ADMIN — ENOXAPARIN SODIUM 40 MG: 100 INJECTION SUBCUTANEOUS at 09:10

## 2018-09-07 RX ADMIN — ASPIRIN 81 MG CHEWABLE TABLET 81 MG: 81 TABLET CHEWABLE at 09:11

## 2018-09-07 RX ADMIN — FAMOTIDINE 20 MG: 20 TABLET ORAL at 20:54

## 2018-09-07 RX ADMIN — GLYCOPYRROLATE 0.1 MG: 0.2 INJECTION, SOLUTION INTRAMUSCULAR; INTRAVENOUS at 09:11

## 2018-09-08 PROCEDURE — 25010000002 HALOPERIDOL LACTATE PER 5 MG: Performed by: NURSE PRACTITIONER

## 2018-09-08 PROCEDURE — 25010000002 LORAZEPAM PER 2 MG: Performed by: INTERNAL MEDICINE

## 2018-09-08 PROCEDURE — 25010000002 MORPHINE SULFATE (PF) 2 MG/ML SOLUTION: Performed by: NURSE PRACTITIONER

## 2018-09-08 PROCEDURE — 25010000002 ENOXAPARIN PER 10 MG: Performed by: NURSE PRACTITIONER

## 2018-09-08 PROCEDURE — 25010000002 CEFTRIAXONE PER 250 MG: Performed by: INTERNAL MEDICINE

## 2018-09-08 PROCEDURE — 99232 SBSQ HOSP IP/OBS MODERATE 35: CPT | Performed by: INTERNAL MEDICINE

## 2018-09-08 RX ORDER — HALOPERIDOL 5 MG/ML
2 INJECTION INTRAMUSCULAR ONCE
Status: COMPLETED | OUTPATIENT
Start: 2018-09-08 | End: 2018-09-08

## 2018-09-08 RX ORDER — LORAZEPAM 2 MG/ML
1 INJECTION INTRAMUSCULAR ONCE
Status: COMPLETED | OUTPATIENT
Start: 2018-09-08 | End: 2018-09-08

## 2018-09-08 RX ADMIN — Medication 1 CAPSULE: at 09:18

## 2018-09-08 RX ADMIN — HALOPERIDOL LACTATE 2 MG: 5 INJECTION, SOLUTION INTRAMUSCULAR at 21:26

## 2018-09-08 RX ADMIN — LORAZEPAM 1 MG: 2 INJECTION INTRAMUSCULAR; INTRAVENOUS at 18:01

## 2018-09-08 RX ADMIN — QUETIAPINE FUMARATE 25 MG: 25 TABLET ORAL at 21:25

## 2018-09-08 RX ADMIN — QUETIAPINE FUMARATE 25 MG: 25 TABLET ORAL at 09:14

## 2018-09-08 RX ADMIN — GLYCOPYRROLATE 0.1 MG: 0.2 INJECTION, SOLUTION INTRAMUSCULAR; INTRAVENOUS at 18:00

## 2018-09-08 RX ADMIN — THIAMINE HCL TAB 100 MG 100 MG: 100 TAB at 09:14

## 2018-09-08 RX ADMIN — METOPROLOL TARTRATE 50 MG: 50 TABLET ORAL at 21:25

## 2018-09-08 RX ADMIN — HYDROCODONE BITARTRATE AND ACETAMINOPHEN 1 TABLET: 7.5; 325 TABLET ORAL at 15:02

## 2018-09-08 RX ADMIN — BACLOFEN 10 MG: 10 TABLET ORAL at 18:00

## 2018-09-08 RX ADMIN — FAMOTIDINE 20 MG: 20 TABLET ORAL at 21:25

## 2018-09-08 RX ADMIN — MORPHINE SULFATE 1 MG: 2 INJECTION, SOLUTION INTRAMUSCULAR; INTRAVENOUS at 15:02

## 2018-09-08 RX ADMIN — FAMOTIDINE 20 MG: 20 TABLET ORAL at 09:19

## 2018-09-08 RX ADMIN — CEFTRIAXONE SODIUM 1 G: 1 INJECTION, SOLUTION INTRAVENOUS at 18:03

## 2018-09-08 RX ADMIN — GLYCOPYRROLATE 0.1 MG: 0.2 INJECTION, SOLUTION INTRAMUSCULAR; INTRAVENOUS at 09:20

## 2018-09-08 RX ADMIN — CLOPIDOGREL BISULFATE 75 MG: 75 TABLET ORAL at 09:13

## 2018-09-08 RX ADMIN — ASPIRIN 81 MG CHEWABLE TABLET 81 MG: 81 TABLET CHEWABLE at 09:18

## 2018-09-08 RX ADMIN — BACLOFEN 10 MG: 10 TABLET ORAL at 09:13

## 2018-09-08 RX ADMIN — ATORVASTATIN CALCIUM 80 MG: 40 TABLET, FILM COATED ORAL at 21:25

## 2018-09-08 RX ADMIN — GLYCOPYRROLATE 0.1 MG: 0.2 INJECTION, SOLUTION INTRAMUSCULAR; INTRAVENOUS at 21:26

## 2018-09-08 RX ADMIN — BACLOFEN 10 MG: 10 TABLET ORAL at 21:25

## 2018-09-08 RX ADMIN — LORAZEPAM 0.5 MG: 0.5 TABLET ORAL at 15:02

## 2018-09-08 RX ADMIN — ENOXAPARIN SODIUM 40 MG: 100 INJECTION SUBCUTANEOUS at 09:19

## 2018-09-08 RX ADMIN — LORAZEPAM 0.5 MG: 2 INJECTION INTRAMUSCULAR; INTRAVENOUS at 05:06

## 2018-09-09 PROCEDURE — 94760 N-INVAS EAR/PLS OXIMETRY 1: CPT

## 2018-09-09 PROCEDURE — 99232 SBSQ HOSP IP/OBS MODERATE 35: CPT | Performed by: INTERNAL MEDICINE

## 2018-09-09 PROCEDURE — 25010000002 LORAZEPAM PER 2 MG: Performed by: INTERNAL MEDICINE

## 2018-09-09 PROCEDURE — 94799 UNLISTED PULMONARY SVC/PX: CPT

## 2018-09-09 PROCEDURE — 25010000002 CEFTRIAXONE PER 250 MG: Performed by: INTERNAL MEDICINE

## 2018-09-09 PROCEDURE — 25010000002 HALOPERIDOL LACTATE PER 5 MG: Performed by: INTERNAL MEDICINE

## 2018-09-09 PROCEDURE — 25010000002 ENOXAPARIN PER 10 MG: Performed by: NURSE PRACTITIONER

## 2018-09-09 PROCEDURE — 25010000002 MORPHINE SULFATE (PF) 2 MG/ML SOLUTION: Performed by: NURSE PRACTITIONER

## 2018-09-09 RX ORDER — HALOPERIDOL 5 MG/ML
2 INJECTION INTRAMUSCULAR EVERY 6 HOURS PRN
Status: DISCONTINUED | OUTPATIENT
Start: 2018-09-09 | End: 2018-10-24 | Stop reason: HOSPADM

## 2018-09-09 RX ADMIN — ENOXAPARIN SODIUM 40 MG: 100 INJECTION SUBCUTANEOUS at 07:46

## 2018-09-09 RX ADMIN — LORAZEPAM 0.5 MG: 0.5 TABLET ORAL at 20:18

## 2018-09-09 RX ADMIN — QUETIAPINE FUMARATE 25 MG: 25 TABLET ORAL at 20:18

## 2018-09-09 RX ADMIN — FAMOTIDINE 20 MG: 20 TABLET ORAL at 07:47

## 2018-09-09 RX ADMIN — METOPROLOL TARTRATE 50 MG: 50 TABLET ORAL at 07:47

## 2018-09-09 RX ADMIN — ASPIRIN 81 MG CHEWABLE TABLET 81 MG: 81 TABLET CHEWABLE at 07:47

## 2018-09-09 RX ADMIN — BACLOFEN 10 MG: 10 TABLET ORAL at 15:37

## 2018-09-09 RX ADMIN — LORAZEPAM 0.5 MG: 2 INJECTION INTRAMUSCULAR; INTRAVENOUS at 18:06

## 2018-09-09 RX ADMIN — LORAZEPAM 0.5 MG: 2 INJECTION INTRAMUSCULAR; INTRAVENOUS at 11:25

## 2018-09-09 RX ADMIN — CLOPIDOGREL BISULFATE 75 MG: 75 TABLET ORAL at 07:46

## 2018-09-09 RX ADMIN — GLYCOPYRROLATE 0.1 MG: 0.2 INJECTION, SOLUTION INTRAMUSCULAR; INTRAVENOUS at 07:49

## 2018-09-09 RX ADMIN — FAMOTIDINE 20 MG: 20 TABLET ORAL at 20:18

## 2018-09-09 RX ADMIN — MORPHINE SULFATE 1 MG: 2 INJECTION, SOLUTION INTRAMUSCULAR; INTRAVENOUS at 20:15

## 2018-09-09 RX ADMIN — CEFTRIAXONE SODIUM 1 G: 1 INJECTION, SOLUTION INTRAVENOUS at 17:46

## 2018-09-09 RX ADMIN — HALOPERIDOL LACTATE 2 MG: 5 INJECTION, SOLUTION INTRAMUSCULAR at 15:36

## 2018-09-09 RX ADMIN — QUETIAPINE FUMARATE 25 MG: 25 TABLET ORAL at 07:47

## 2018-09-09 RX ADMIN — LORAZEPAM 0.5 MG: 0.5 TABLET ORAL at 07:46

## 2018-09-09 RX ADMIN — HALOPERIDOL LACTATE 2 MG: 5 INJECTION, SOLUTION INTRAMUSCULAR at 22:33

## 2018-09-09 RX ADMIN — GLYCOPYRROLATE 0.1 MG: 0.2 INJECTION, SOLUTION INTRAMUSCULAR; INTRAVENOUS at 15:36

## 2018-09-09 RX ADMIN — GLYCOPYRROLATE 0.1 MG: 0.2 INJECTION, SOLUTION INTRAMUSCULAR; INTRAVENOUS at 20:18

## 2018-09-09 RX ADMIN — HALOPERIDOL LACTATE 2 MG: 5 INJECTION, SOLUTION INTRAMUSCULAR at 07:48

## 2018-09-09 RX ADMIN — METOPROLOL TARTRATE 50 MG: 50 TABLET ORAL at 20:19

## 2018-09-09 RX ADMIN — Medication 1 CAPSULE: at 07:46

## 2018-09-09 RX ADMIN — BACLOFEN 10 MG: 10 TABLET ORAL at 20:18

## 2018-09-09 RX ADMIN — BACLOFEN 10 MG: 10 TABLET ORAL at 07:47

## 2018-09-09 RX ADMIN — MORPHINE SULFATE 1 MG: 2 INJECTION, SOLUTION INTRAMUSCULAR; INTRAVENOUS at 16:18

## 2018-09-09 RX ADMIN — ATORVASTATIN CALCIUM 80 MG: 40 TABLET, FILM COATED ORAL at 20:18

## 2018-09-09 RX ADMIN — LORAZEPAM 0.5 MG: 2 INJECTION INTRAMUSCULAR; INTRAVENOUS at 03:03

## 2018-09-09 RX ADMIN — THIAMINE HCL TAB 100 MG 100 MG: 100 TAB at 07:48

## 2018-09-10 PROBLEM — A41.9 SEPSIS (HCC): Status: RESOLVED | Noted: 2018-08-25 | Resolved: 2018-09-10

## 2018-09-10 PROBLEM — E87.0 HYPERNATREMIA: Status: RESOLVED | Noted: 2018-08-26 | Resolved: 2018-09-10

## 2018-09-10 PROBLEM — E87.6 HYPOKALEMIA: Status: RESOLVED | Noted: 2018-08-22 | Resolved: 2018-09-10

## 2018-09-10 LAB
ALBUMIN SERPL-MCNC: 3.77 G/DL (ref 3.2–4.8)
ALP SERPL-CCNC: 86 U/L (ref 25–100)
ALT SERPL W P-5'-P-CCNC: 68 U/L (ref 7–40)
ANION GAP SERPL CALCULATED.3IONS-SCNC: 10 MMOL/L (ref 3–11)
AST SERPL-CCNC: 59 U/L (ref 0–33)
BILIRUB SERPL-MCNC: 0.3 MG/DL (ref 0.3–1.2)
BUN BLD-MCNC: 10 MG/DL (ref 9–23)
CALCIUM SPEC-SCNC: 9.4 MG/DL (ref 8.7–10.4)
CHLORIDE SERPL-SCNC: 102 MMOL/L (ref 99–109)
CHOLEST SERPL-MCNC: 145 MG/DL (ref 0–200)
CO2 SERPL-SCNC: 26 MMOL/L (ref 20–31)
CREAT BLD-MCNC: 0.46 MG/DL (ref 0.6–1.3)
GLUCOSE BLD-MCNC: 97 MG/DL (ref 70–100)
MAGNESIUM SERPL-MCNC: 2 MG/DL (ref 1.3–2.7)
PHOSPHATE SERPL-MCNC: 4.8 MG/DL (ref 2.4–5.1)
POTASSIUM BLD-SCNC: 4 MMOL/L (ref 3.5–5.5)
PROT SERPL-MCNC: 6.5 G/DL (ref 5.7–8.2)
SODIUM BLD-SCNC: 138 MMOL/L (ref 132–146)
TRIGL SERPL-MCNC: 122 MG/DL (ref 0–150)

## 2018-09-10 PROCEDURE — 25010000002 ENOXAPARIN PER 10 MG: Performed by: NURSE PRACTITIONER

## 2018-09-10 PROCEDURE — 84478 ASSAY OF TRIGLYCERIDES: CPT

## 2018-09-10 PROCEDURE — 83735 ASSAY OF MAGNESIUM: CPT

## 2018-09-10 PROCEDURE — 99232 SBSQ HOSP IP/OBS MODERATE 35: CPT | Performed by: PHYSICIAN ASSISTANT

## 2018-09-10 PROCEDURE — 25010000002 MORPHINE SULFATE (PF) 2 MG/ML SOLUTION: Performed by: NURSE PRACTITIONER

## 2018-09-10 PROCEDURE — 25010000002 CEFTRIAXONE PER 250 MG: Performed by: INTERNAL MEDICINE

## 2018-09-10 PROCEDURE — 82465 ASSAY BLD/SERUM CHOLESTEROL: CPT

## 2018-09-10 PROCEDURE — 84100 ASSAY OF PHOSPHORUS: CPT

## 2018-09-10 PROCEDURE — 25010000002 HALOPERIDOL LACTATE PER 5 MG: Performed by: INTERNAL MEDICINE

## 2018-09-10 PROCEDURE — 80053 COMPREHEN METABOLIC PANEL: CPT

## 2018-09-10 PROCEDURE — 25010000002 LORAZEPAM PER 2 MG: Performed by: INTERNAL MEDICINE

## 2018-09-10 RX ORDER — LORAZEPAM 2 MG/ML
1 INJECTION INTRAMUSCULAR EVERY 6 HOURS PRN
Status: DISPENSED | OUTPATIENT
Start: 2018-09-10 | End: 2018-09-20

## 2018-09-10 RX ORDER — ATORVASTATIN CALCIUM 40 MG/1
40 TABLET, FILM COATED ORAL NIGHTLY
Status: DISCONTINUED | OUTPATIENT
Start: 2018-09-10 | End: 2018-09-17

## 2018-09-10 RX ORDER — HYDROCODONE BITARTRATE AND ACETAMINOPHEN 5; 325 MG/1; MG/1
1 TABLET ORAL EVERY 6 HOURS PRN
Status: DISPENSED | OUTPATIENT
Start: 2018-09-10 | End: 2018-09-28

## 2018-09-10 RX ADMIN — MORPHINE SULFATE 1 MG: 2 INJECTION, SOLUTION INTRAMUSCULAR; INTRAVENOUS at 08:52

## 2018-09-10 RX ADMIN — HALOPERIDOL LACTATE 2 MG: 5 INJECTION, SOLUTION INTRAMUSCULAR at 04:03

## 2018-09-10 RX ADMIN — CEFTRIAXONE SODIUM 1 G: 1 INJECTION, SOLUTION INTRAVENOUS at 17:00

## 2018-09-10 RX ADMIN — GLYCOPYRROLATE 0.1 MG: 0.2 INJECTION, SOLUTION INTRAMUSCULAR; INTRAVENOUS at 08:38

## 2018-09-10 RX ADMIN — THIAMINE HCL TAB 100 MG 100 MG: 100 TAB at 08:39

## 2018-09-10 RX ADMIN — METOPROLOL TARTRATE 50 MG: 50 TABLET ORAL at 20:03

## 2018-09-10 RX ADMIN — QUETIAPINE FUMARATE 25 MG: 25 TABLET ORAL at 20:03

## 2018-09-10 RX ADMIN — GLYCOPYRROLATE 0.1 MG: 0.2 INJECTION, SOLUTION INTRAMUSCULAR; INTRAVENOUS at 15:51

## 2018-09-10 RX ADMIN — FAMOTIDINE 20 MG: 20 TABLET ORAL at 08:39

## 2018-09-10 RX ADMIN — BISACODYL 10 MG: 10 SUPPOSITORY RECTAL at 08:52

## 2018-09-10 RX ADMIN — ASPIRIN 81 MG CHEWABLE TABLET 81 MG: 81 TABLET CHEWABLE at 08:39

## 2018-09-10 RX ADMIN — CLOPIDOGREL BISULFATE 75 MG: 75 TABLET ORAL at 08:39

## 2018-09-10 RX ADMIN — BACLOFEN 10 MG: 10 TABLET ORAL at 20:03

## 2018-09-10 RX ADMIN — LORAZEPAM 1 MG: 2 INJECTION INTRAMUSCULAR; INTRAVENOUS at 21:49

## 2018-09-10 RX ADMIN — GLYCOPYRROLATE 0.1 MG: 0.2 INJECTION, SOLUTION INTRAMUSCULAR; INTRAVENOUS at 20:03

## 2018-09-10 RX ADMIN — HALOPERIDOL LACTATE 2 MG: 5 INJECTION, SOLUTION INTRAMUSCULAR at 14:07

## 2018-09-10 RX ADMIN — BACLOFEN 10 MG: 10 TABLET ORAL at 08:39

## 2018-09-10 RX ADMIN — MORPHINE SULFATE 1 MG: 2 INJECTION, SOLUTION INTRAMUSCULAR; INTRAVENOUS at 13:50

## 2018-09-10 RX ADMIN — QUETIAPINE FUMARATE 25 MG: 25 TABLET ORAL at 08:39

## 2018-09-10 RX ADMIN — ENOXAPARIN SODIUM 40 MG: 100 INJECTION SUBCUTANEOUS at 08:39

## 2018-09-10 RX ADMIN — FAMOTIDINE 20 MG: 20 TABLET ORAL at 20:03

## 2018-09-10 RX ADMIN — LORAZEPAM 0.5 MG: 0.5 TABLET ORAL at 13:50

## 2018-09-10 RX ADMIN — MORPHINE SULFATE 1 MG: 2 INJECTION, SOLUTION INTRAMUSCULAR; INTRAVENOUS at 20:02

## 2018-09-10 RX ADMIN — Medication 1 CAPSULE: at 08:39

## 2018-09-10 RX ADMIN — HYDROCODONE BITARTRATE AND ACETAMINOPHEN 1 TABLET: 5; 325 TABLET ORAL at 20:03

## 2018-09-10 RX ADMIN — HALOPERIDOL LACTATE 2 MG: 5 INJECTION, SOLUTION INTRAMUSCULAR at 20:02

## 2018-09-10 RX ADMIN — LORAZEPAM 1 MG: 2 INJECTION INTRAMUSCULAR; INTRAVENOUS at 15:53

## 2018-09-10 RX ADMIN — SENNOSIDES AND DOCUSATE SODIUM 2 TABLET: 8.6; 5 TABLET ORAL at 13:49

## 2018-09-10 RX ADMIN — DESMOPRESSIN ACETATE 40 MG: 0.2 TABLET ORAL at 20:03

## 2018-09-10 RX ADMIN — HYDROCODONE BITARTRATE AND ACETAMINOPHEN 1 TABLET: 5; 325 TABLET ORAL at 13:50

## 2018-09-10 RX ADMIN — BACLOFEN 10 MG: 10 TABLET ORAL at 15:52

## 2018-09-10 RX ADMIN — LORAZEPAM 0.5 MG: 2 INJECTION INTRAMUSCULAR; INTRAVENOUS at 12:00

## 2018-09-10 RX ADMIN — LORAZEPAM 0.5 MG: 2 INJECTION INTRAMUSCULAR; INTRAVENOUS at 03:21

## 2018-09-10 RX ADMIN — HALOPERIDOL LACTATE 2 MG: 5 INJECTION, SOLUTION INTRAMUSCULAR at 08:38

## 2018-09-11 PROCEDURE — 25010000002 MORPHINE SULFATE (PF) 2 MG/ML SOLUTION: Performed by: NURSE PRACTITIONER

## 2018-09-11 PROCEDURE — 25010000002 CEFTRIAXONE PER 250 MG: Performed by: INTERNAL MEDICINE

## 2018-09-11 PROCEDURE — 25010000002 LORAZEPAM PER 2 MG: Performed by: INTERNAL MEDICINE

## 2018-09-11 PROCEDURE — 99232 SBSQ HOSP IP/OBS MODERATE 35: CPT | Performed by: INTERNAL MEDICINE

## 2018-09-11 PROCEDURE — 25010000002 ENOXAPARIN PER 10 MG: Performed by: NURSE PRACTITIONER

## 2018-09-11 PROCEDURE — 94799 UNLISTED PULMONARY SVC/PX: CPT

## 2018-09-11 PROCEDURE — 94760 N-INVAS EAR/PLS OXIMETRY 1: CPT

## 2018-09-11 PROCEDURE — 25010000002 HALOPERIDOL LACTATE PER 5 MG: Performed by: INTERNAL MEDICINE

## 2018-09-11 RX ADMIN — GLYCOPYRROLATE 0.1 MG: 0.2 INJECTION, SOLUTION INTRAMUSCULAR; INTRAVENOUS at 20:10

## 2018-09-11 RX ADMIN — THIAMINE HCL TAB 100 MG 100 MG: 100 TAB at 08:13

## 2018-09-11 RX ADMIN — MORPHINE SULFATE 1 MG: 2 INJECTION, SOLUTION INTRAMUSCULAR; INTRAVENOUS at 20:10

## 2018-09-11 RX ADMIN — FAMOTIDINE 20 MG: 20 TABLET ORAL at 08:12

## 2018-09-11 RX ADMIN — HYDROCODONE BITARTRATE AND ACETAMINOPHEN 1 TABLET: 5; 325 TABLET ORAL at 15:45

## 2018-09-11 RX ADMIN — HALOPERIDOL LACTATE 2 MG: 5 INJECTION, SOLUTION INTRAMUSCULAR at 02:08

## 2018-09-11 RX ADMIN — CLOPIDOGREL BISULFATE 75 MG: 75 TABLET ORAL at 08:12

## 2018-09-11 RX ADMIN — GLYCOPYRROLATE 0.1 MG: 0.2 INJECTION, SOLUTION INTRAMUSCULAR; INTRAVENOUS at 08:12

## 2018-09-11 RX ADMIN — BACLOFEN 10 MG: 10 TABLET ORAL at 15:45

## 2018-09-11 RX ADMIN — BACLOFEN 10 MG: 10 TABLET ORAL at 08:12

## 2018-09-11 RX ADMIN — QUETIAPINE FUMARATE 25 MG: 25 TABLET ORAL at 20:09

## 2018-09-11 RX ADMIN — ENOXAPARIN SODIUM 40 MG: 100 INJECTION SUBCUTANEOUS at 08:38

## 2018-09-11 RX ADMIN — METOPROLOL TARTRATE 50 MG: 50 TABLET ORAL at 08:12

## 2018-09-11 RX ADMIN — METOPROLOL TARTRATE 50 MG: 50 TABLET ORAL at 20:08

## 2018-09-11 RX ADMIN — MORPHINE SULFATE 1 MG: 2 INJECTION, SOLUTION INTRAMUSCULAR; INTRAVENOUS at 12:15

## 2018-09-11 RX ADMIN — GLYCOPYRROLATE 0.1 MG: 0.2 INJECTION, SOLUTION INTRAMUSCULAR; INTRAVENOUS at 15:45

## 2018-09-11 RX ADMIN — DESMOPRESSIN ACETATE 40 MG: 0.2 TABLET ORAL at 20:09

## 2018-09-11 RX ADMIN — Medication 1 CAPSULE: at 08:12

## 2018-09-11 RX ADMIN — BACLOFEN 10 MG: 10 TABLET ORAL at 20:09

## 2018-09-11 RX ADMIN — ASPIRIN 81 MG CHEWABLE TABLET 81 MG: 81 TABLET CHEWABLE at 08:12

## 2018-09-11 RX ADMIN — CEFTRIAXONE SODIUM 1 G: 1 INJECTION, SOLUTION INTRAVENOUS at 16:19

## 2018-09-11 RX ADMIN — LORAZEPAM 1 MG: 2 INJECTION INTRAMUSCULAR; INTRAVENOUS at 20:09

## 2018-09-11 RX ADMIN — HYDROCODONE BITARTRATE AND ACETAMINOPHEN 1 TABLET: 5; 325 TABLET ORAL at 02:08

## 2018-09-11 RX ADMIN — HALOPERIDOL LACTATE 2 MG: 5 INJECTION, SOLUTION INTRAMUSCULAR at 15:45

## 2018-09-11 RX ADMIN — MORPHINE SULFATE 1 MG: 2 INJECTION, SOLUTION INTRAMUSCULAR; INTRAVENOUS at 01:31

## 2018-09-11 RX ADMIN — HYDROCODONE BITARTRATE AND ACETAMINOPHEN 1 TABLET: 5; 325 TABLET ORAL at 08:12

## 2018-09-11 RX ADMIN — FAMOTIDINE 20 MG: 20 TABLET ORAL at 20:09

## 2018-09-11 RX ADMIN — QUETIAPINE FUMARATE 25 MG: 25 TABLET ORAL at 08:12

## 2018-09-11 RX ADMIN — LORAZEPAM 1 MG: 2 INJECTION INTRAMUSCULAR; INTRAVENOUS at 08:12

## 2018-09-12 PROCEDURE — 25010000002 MORPHINE SULFATE (PF) 2 MG/ML SOLUTION: Performed by: NURSE PRACTITIONER

## 2018-09-12 PROCEDURE — 25010000002 ENOXAPARIN PER 10 MG: Performed by: NURSE PRACTITIONER

## 2018-09-12 PROCEDURE — 99232 SBSQ HOSP IP/OBS MODERATE 35: CPT | Performed by: INTERNAL MEDICINE

## 2018-09-12 PROCEDURE — 25010000002 HALOPERIDOL LACTATE PER 5 MG: Performed by: INTERNAL MEDICINE

## 2018-09-12 PROCEDURE — 25010000002 LORAZEPAM PER 2 MG: Performed by: INTERNAL MEDICINE

## 2018-09-12 RX ADMIN — GLYCOPYRROLATE 0.1 MG: 0.2 INJECTION, SOLUTION INTRAMUSCULAR; INTRAVENOUS at 21:10

## 2018-09-12 RX ADMIN — GLYCOPYRROLATE 0.1 MG: 0.2 INJECTION, SOLUTION INTRAMUSCULAR; INTRAVENOUS at 08:43

## 2018-09-12 RX ADMIN — QUETIAPINE FUMARATE 25 MG: 25 TABLET ORAL at 21:10

## 2018-09-12 RX ADMIN — DESMOPRESSIN ACETATE 40 MG: 0.2 TABLET ORAL at 21:10

## 2018-09-12 RX ADMIN — BACLOFEN 10 MG: 10 TABLET ORAL at 08:42

## 2018-09-12 RX ADMIN — LORAZEPAM 1 MG: 2 INJECTION INTRAMUSCULAR; INTRAVENOUS at 10:29

## 2018-09-12 RX ADMIN — Medication 1 CAPSULE: at 08:42

## 2018-09-12 RX ADMIN — METOPROLOL TARTRATE 50 MG: 50 TABLET ORAL at 08:46

## 2018-09-12 RX ADMIN — FAMOTIDINE 20 MG: 20 TABLET ORAL at 21:10

## 2018-09-12 RX ADMIN — ENOXAPARIN SODIUM 40 MG: 100 INJECTION SUBCUTANEOUS at 08:45

## 2018-09-12 RX ADMIN — GLYCOPYRROLATE 0.1 MG: 0.2 INJECTION, SOLUTION INTRAMUSCULAR; INTRAVENOUS at 15:55

## 2018-09-12 RX ADMIN — FAMOTIDINE 20 MG: 20 TABLET ORAL at 08:42

## 2018-09-12 RX ADMIN — MORPHINE SULFATE 1 MG: 2 INJECTION, SOLUTION INTRAMUSCULAR; INTRAVENOUS at 21:11

## 2018-09-12 RX ADMIN — HYDROCODONE BITARTRATE AND ACETAMINOPHEN 1 TABLET: 5; 325 TABLET ORAL at 08:42

## 2018-09-12 RX ADMIN — ASPIRIN 81 MG CHEWABLE TABLET 81 MG: 81 TABLET CHEWABLE at 08:42

## 2018-09-12 RX ADMIN — BACLOFEN 10 MG: 10 TABLET ORAL at 21:10

## 2018-09-12 RX ADMIN — HALOPERIDOL LACTATE 2 MG: 5 INJECTION, SOLUTION INTRAMUSCULAR at 15:55

## 2018-09-12 RX ADMIN — HYDROCODONE BITARTRATE AND ACETAMINOPHEN 1 TABLET: 5; 325 TABLET ORAL at 15:56

## 2018-09-12 RX ADMIN — HALOPERIDOL LACTATE 2 MG: 5 INJECTION, SOLUTION INTRAMUSCULAR at 08:43

## 2018-09-12 RX ADMIN — MORPHINE SULFATE 1 MG: 2 INJECTION, SOLUTION INTRAMUSCULAR; INTRAVENOUS at 12:21

## 2018-09-12 RX ADMIN — BACLOFEN 10 MG: 10 TABLET ORAL at 15:56

## 2018-09-12 RX ADMIN — QUETIAPINE FUMARATE 25 MG: 25 TABLET ORAL at 08:42

## 2018-09-12 RX ADMIN — METOPROLOL TARTRATE 50 MG: 50 TABLET ORAL at 21:10

## 2018-09-12 RX ADMIN — THIAMINE HCL TAB 100 MG 100 MG: 100 TAB at 08:42

## 2018-09-12 RX ADMIN — LORAZEPAM 1 MG: 2 INJECTION INTRAMUSCULAR; INTRAVENOUS at 21:11

## 2018-09-12 RX ADMIN — MORPHINE SULFATE 1 MG: 2 INJECTION, SOLUTION INTRAMUSCULAR; INTRAVENOUS at 16:23

## 2018-09-12 RX ADMIN — CLOPIDOGREL BISULFATE 75 MG: 75 TABLET ORAL at 08:42

## 2018-09-13 LAB
DEPRECATED RDW RBC AUTO: 50.6 FL (ref 37–54)
ERYTHROCYTE [DISTWIDTH] IN BLOOD BY AUTOMATED COUNT: 14.2 % (ref 11.3–14.5)
HCT VFR BLD AUTO: 33.2 % (ref 34.5–44)
HGB BLD-MCNC: 10.5 G/DL (ref 11.5–15.5)
MCH RBC QN AUTO: 30.6 PG (ref 27–31)
MCHC RBC AUTO-ENTMCNC: 31.6 G/DL (ref 32–36)
MCV RBC AUTO: 96.8 FL (ref 80–99)
PLATELET # BLD AUTO: 281 10*3/MM3 (ref 150–450)
PMV BLD AUTO: 10.4 FL (ref 6–12)
RBC # BLD AUTO: 3.43 10*6/MM3 (ref 3.89–5.14)
WBC NRBC COR # BLD: 9.48 10*3/MM3 (ref 3.5–10.8)

## 2018-09-13 PROCEDURE — 85027 COMPLETE CBC AUTOMATED: CPT

## 2018-09-13 PROCEDURE — 25010000002 LORAZEPAM PER 2 MG: Performed by: INTERNAL MEDICINE

## 2018-09-13 PROCEDURE — 99232 SBSQ HOSP IP/OBS MODERATE 35: CPT | Performed by: INTERNAL MEDICINE

## 2018-09-13 PROCEDURE — 25010000002 MORPHINE SULFATE (PF) 2 MG/ML SOLUTION: Performed by: NURSE PRACTITIONER

## 2018-09-13 PROCEDURE — 25010000002 ENOXAPARIN PER 10 MG: Performed by: NURSE PRACTITIONER

## 2018-09-13 RX ADMIN — LORAZEPAM 1 MG: 2 INJECTION INTRAMUSCULAR; INTRAVENOUS at 18:56

## 2018-09-13 RX ADMIN — MORPHINE SULFATE 1 MG: 2 INJECTION, SOLUTION INTRAMUSCULAR; INTRAVENOUS at 16:14

## 2018-09-13 RX ADMIN — GLYCOPYRROLATE 0.1 MG: 0.2 INJECTION, SOLUTION INTRAMUSCULAR; INTRAVENOUS at 07:34

## 2018-09-13 RX ADMIN — ENOXAPARIN SODIUM 40 MG: 100 INJECTION SUBCUTANEOUS at 07:34

## 2018-09-13 RX ADMIN — HYDROCODONE BITARTRATE AND ACETAMINOPHEN 1 TABLET: 5; 325 TABLET ORAL at 16:14

## 2018-09-13 RX ADMIN — BACLOFEN 10 MG: 10 TABLET ORAL at 20:11

## 2018-09-13 RX ADMIN — FAMOTIDINE 20 MG: 20 TABLET ORAL at 07:35

## 2018-09-13 RX ADMIN — Medication 1 CAPSULE: at 07:36

## 2018-09-13 RX ADMIN — THIAMINE HCL TAB 100 MG 100 MG: 100 TAB at 07:35

## 2018-09-13 RX ADMIN — BACLOFEN 10 MG: 10 TABLET ORAL at 15:43

## 2018-09-13 RX ADMIN — ASPIRIN 81 MG CHEWABLE TABLET 81 MG: 81 TABLET CHEWABLE at 07:36

## 2018-09-13 RX ADMIN — METOPROLOL TARTRATE 50 MG: 50 TABLET ORAL at 20:12

## 2018-09-13 RX ADMIN — HYDROCODONE BITARTRATE AND ACETAMINOPHEN 1 TABLET: 5; 325 TABLET ORAL at 07:35

## 2018-09-13 RX ADMIN — DESMOPRESSIN ACETATE 40 MG: 0.2 TABLET ORAL at 20:11

## 2018-09-13 RX ADMIN — FAMOTIDINE 20 MG: 20 TABLET ORAL at 20:11

## 2018-09-13 RX ADMIN — GLYCOPYRROLATE 0.1 MG: 0.2 INJECTION, SOLUTION INTRAMUSCULAR; INTRAVENOUS at 20:11

## 2018-09-13 RX ADMIN — BACLOFEN 10 MG: 10 TABLET ORAL at 07:35

## 2018-09-13 RX ADMIN — QUETIAPINE FUMARATE 25 MG: 25 TABLET ORAL at 07:35

## 2018-09-13 RX ADMIN — QUETIAPINE FUMARATE 25 MG: 25 TABLET ORAL at 20:11

## 2018-09-13 RX ADMIN — GLYCOPYRROLATE 0.1 MG: 0.2 INJECTION, SOLUTION INTRAMUSCULAR; INTRAVENOUS at 15:43

## 2018-09-13 RX ADMIN — METOPROLOL TARTRATE 50 MG: 50 TABLET ORAL at 07:36

## 2018-09-13 RX ADMIN — LORAZEPAM 1 MG: 2 INJECTION INTRAMUSCULAR; INTRAVENOUS at 07:34

## 2018-09-13 RX ADMIN — CLOPIDOGREL BISULFATE 75 MG: 75 TABLET ORAL at 07:35

## 2018-09-14 LAB
ANION GAP SERPL CALCULATED.3IONS-SCNC: 7 MMOL/L (ref 3–11)
BUN BLD-MCNC: 15 MG/DL (ref 9–23)
BUN/CREAT SERPL: 36.6 (ref 7–25)
CALCIUM SPEC-SCNC: 9.5 MG/DL (ref 8.7–10.4)
CHLORIDE SERPL-SCNC: 100 MMOL/L (ref 99–109)
CO2 SERPL-SCNC: 28 MMOL/L (ref 20–31)
CREAT BLD-MCNC: 0.41 MG/DL (ref 0.6–1.3)
GFR SERPL CREATININE-BSD FRML MDRD: >150 ML/MIN/1.73
GLUCOSE BLD-MCNC: 110 MG/DL (ref 70–100)
POTASSIUM BLD-SCNC: 4.2 MMOL/L (ref 3.5–5.5)
SODIUM BLD-SCNC: 135 MMOL/L (ref 132–146)

## 2018-09-14 PROCEDURE — 25010000002 LORAZEPAM PER 2 MG: Performed by: INTERNAL MEDICINE

## 2018-09-14 PROCEDURE — 25010000002 ENOXAPARIN PER 10 MG: Performed by: NURSE PRACTITIONER

## 2018-09-14 PROCEDURE — 99232 SBSQ HOSP IP/OBS MODERATE 35: CPT | Performed by: INTERNAL MEDICINE

## 2018-09-14 PROCEDURE — 25010000002 HALOPERIDOL LACTATE PER 5 MG: Performed by: INTERNAL MEDICINE

## 2018-09-14 PROCEDURE — 25010000002 MORPHINE SULFATE (PF) 2 MG/ML SOLUTION: Performed by: NURSE PRACTITIONER

## 2018-09-14 PROCEDURE — 80048 BASIC METABOLIC PNL TOTAL CA: CPT

## 2018-09-14 RX ADMIN — LORAZEPAM 1 MG: 2 INJECTION INTRAMUSCULAR; INTRAVENOUS at 11:49

## 2018-09-14 RX ADMIN — QUETIAPINE FUMARATE 25 MG: 25 TABLET ORAL at 20:28

## 2018-09-14 RX ADMIN — BISACODYL 10 MG: 10 SUPPOSITORY RECTAL at 19:53

## 2018-09-14 RX ADMIN — ENOXAPARIN SODIUM 40 MG: 100 INJECTION SUBCUTANEOUS at 09:07

## 2018-09-14 RX ADMIN — BACLOFEN 10 MG: 10 TABLET ORAL at 09:08

## 2018-09-14 RX ADMIN — MORPHINE SULFATE 1 MG: 2 INJECTION, SOLUTION INTRAMUSCULAR; INTRAVENOUS at 13:44

## 2018-09-14 RX ADMIN — GLYCOPYRROLATE 0.1 MG: 0.2 INJECTION, SOLUTION INTRAMUSCULAR; INTRAVENOUS at 09:09

## 2018-09-14 RX ADMIN — METOPROLOL TARTRATE 50 MG: 50 TABLET ORAL at 20:28

## 2018-09-14 RX ADMIN — HYDROCODONE BITARTRATE AND ACETAMINOPHEN 1 TABLET: 5; 325 TABLET ORAL at 09:08

## 2018-09-14 RX ADMIN — GLYCOPYRROLATE 0.1 MG: 0.2 INJECTION, SOLUTION INTRAMUSCULAR; INTRAVENOUS at 17:14

## 2018-09-14 RX ADMIN — FAMOTIDINE 20 MG: 20 TABLET ORAL at 09:08

## 2018-09-14 RX ADMIN — HYDROCODONE BITARTRATE AND ACETAMINOPHEN 1 TABLET: 5; 325 TABLET ORAL at 17:13

## 2018-09-14 RX ADMIN — BACLOFEN 10 MG: 10 TABLET ORAL at 17:13

## 2018-09-14 RX ADMIN — ASPIRIN 81 MG CHEWABLE TABLET 81 MG: 81 TABLET CHEWABLE at 09:08

## 2018-09-14 RX ADMIN — BACLOFEN 10 MG: 10 TABLET ORAL at 20:28

## 2018-09-14 RX ADMIN — CLOPIDOGREL BISULFATE 75 MG: 75 TABLET ORAL at 09:08

## 2018-09-14 RX ADMIN — METOPROLOL TARTRATE 50 MG: 50 TABLET ORAL at 09:07

## 2018-09-14 RX ADMIN — THIAMINE HCL TAB 100 MG 100 MG: 100 TAB at 09:08

## 2018-09-14 RX ADMIN — GLYCOPYRROLATE 0.1 MG: 0.2 INJECTION, SOLUTION INTRAMUSCULAR; INTRAVENOUS at 20:28

## 2018-09-14 RX ADMIN — LORAZEPAM 1 MG: 2 INJECTION INTRAMUSCULAR; INTRAVENOUS at 05:26

## 2018-09-14 RX ADMIN — MORPHINE SULFATE 1 MG: 2 INJECTION, SOLUTION INTRAMUSCULAR; INTRAVENOUS at 09:10

## 2018-09-14 RX ADMIN — FAMOTIDINE 20 MG: 20 TABLET ORAL at 20:28

## 2018-09-14 RX ADMIN — HALOPERIDOL LACTATE 2 MG: 5 INJECTION, SOLUTION INTRAMUSCULAR at 10:55

## 2018-09-14 RX ADMIN — MORPHINE SULFATE 1 MG: 2 INJECTION, SOLUTION INTRAMUSCULAR; INTRAVENOUS at 17:13

## 2018-09-14 RX ADMIN — DESMOPRESSIN ACETATE 40 MG: 0.2 TABLET ORAL at 20:28

## 2018-09-14 RX ADMIN — Medication 1 CAPSULE: at 09:08

## 2018-09-14 RX ADMIN — QUETIAPINE FUMARATE 25 MG: 25 TABLET ORAL at 09:08

## 2018-09-15 ENCOUNTER — APPOINTMENT (OUTPATIENT)
Dept: GENERAL RADIOLOGY | Facility: HOSPITAL | Age: 38
End: 2018-09-15

## 2018-09-15 PROCEDURE — 25010000002 ENOXAPARIN PER 10 MG: Performed by: NURSE PRACTITIONER

## 2018-09-15 PROCEDURE — 25010000002 MORPHINE SULFATE (PF) 2 MG/ML SOLUTION: Performed by: NURSE PRACTITIONER

## 2018-09-15 PROCEDURE — 25010000002 LORAZEPAM PER 2 MG: Performed by: INTERNAL MEDICINE

## 2018-09-15 PROCEDURE — 71045 X-RAY EXAM CHEST 1 VIEW: CPT

## 2018-09-15 PROCEDURE — 99232 SBSQ HOSP IP/OBS MODERATE 35: CPT | Performed by: INTERNAL MEDICINE

## 2018-09-15 PROCEDURE — 25010000002 HALOPERIDOL LACTATE PER 5 MG: Performed by: INTERNAL MEDICINE

## 2018-09-15 RX ADMIN — MORPHINE SULFATE 1 MG: 2 INJECTION, SOLUTION INTRAMUSCULAR; INTRAVENOUS at 14:53

## 2018-09-15 RX ADMIN — LORAZEPAM 1 MG: 2 INJECTION INTRAMUSCULAR; INTRAVENOUS at 14:54

## 2018-09-15 RX ADMIN — BACLOFEN 10 MG: 10 TABLET ORAL at 23:01

## 2018-09-15 RX ADMIN — LORAZEPAM 1 MG: 2 INJECTION INTRAMUSCULAR; INTRAVENOUS at 23:05

## 2018-09-15 RX ADMIN — LORAZEPAM 1 MG: 2 INJECTION INTRAMUSCULAR; INTRAVENOUS at 09:25

## 2018-09-15 RX ADMIN — QUETIAPINE FUMARATE 25 MG: 25 TABLET ORAL at 23:00

## 2018-09-15 RX ADMIN — GLYCOPYRROLATE 0.1 MG: 0.2 INJECTION, SOLUTION INTRAMUSCULAR; INTRAVENOUS at 23:01

## 2018-09-15 RX ADMIN — METOPROLOL TARTRATE 50 MG: 50 TABLET ORAL at 09:23

## 2018-09-15 RX ADMIN — ASPIRIN 81 MG CHEWABLE TABLET 81 MG: 81 TABLET CHEWABLE at 09:37

## 2018-09-15 RX ADMIN — GLYCOPYRROLATE 0.1 MG: 0.2 INJECTION, SOLUTION INTRAMUSCULAR; INTRAVENOUS at 09:25

## 2018-09-15 RX ADMIN — THIAMINE HCL TAB 100 MG 100 MG: 100 TAB at 09:24

## 2018-09-15 RX ADMIN — DESMOPRESSIN ACETATE 40 MG: 0.2 TABLET ORAL at 23:01

## 2018-09-15 RX ADMIN — QUETIAPINE FUMARATE 25 MG: 25 TABLET ORAL at 09:23

## 2018-09-15 RX ADMIN — FAMOTIDINE 20 MG: 20 TABLET ORAL at 23:00

## 2018-09-15 RX ADMIN — BACLOFEN 10 MG: 10 TABLET ORAL at 17:39

## 2018-09-15 RX ADMIN — Medication 1 CAPSULE: at 09:22

## 2018-09-15 RX ADMIN — GLYCOPYRROLATE 0.1 MG: 0.2 INJECTION, SOLUTION INTRAMUSCULAR; INTRAVENOUS at 17:39

## 2018-09-15 RX ADMIN — ENOXAPARIN SODIUM 40 MG: 100 INJECTION SUBCUTANEOUS at 09:21

## 2018-09-15 RX ADMIN — CLOPIDOGREL BISULFATE 75 MG: 75 TABLET ORAL at 09:21

## 2018-09-15 RX ADMIN — MORPHINE SULFATE 1 MG: 2 INJECTION, SOLUTION INTRAMUSCULAR; INTRAVENOUS at 09:57

## 2018-09-15 RX ADMIN — BACLOFEN 10 MG: 10 TABLET ORAL at 09:37

## 2018-09-15 RX ADMIN — HALOPERIDOL LACTATE 2 MG: 5 INJECTION, SOLUTION INTRAMUSCULAR at 17:39

## 2018-09-15 RX ADMIN — FAMOTIDINE 20 MG: 20 TABLET ORAL at 09:22

## 2018-09-15 RX ADMIN — LORAZEPAM 1 MG: 2 INJECTION INTRAMUSCULAR; INTRAVENOUS at 01:44

## 2018-09-15 RX ADMIN — MORPHINE SULFATE 1 MG: 2 INJECTION, SOLUTION INTRAMUSCULAR; INTRAVENOUS at 03:13

## 2018-09-16 LAB
BASOPHILS # BLD AUTO: 0.03 10*3/MM3 (ref 0–0.2)
BASOPHILS NFR BLD AUTO: 0.6 % (ref 0–1)
DEPRECATED RDW RBC AUTO: 49.2 FL (ref 37–54)
EOSINOPHIL # BLD AUTO: 0.18 10*3/MM3 (ref 0–0.3)
EOSINOPHIL NFR BLD AUTO: 3.3 % (ref 0–3)
ERYTHROCYTE [DISTWIDTH] IN BLOOD BY AUTOMATED COUNT: 14.1 % (ref 11.3–14.5)
HCT VFR BLD AUTO: 32.4 % (ref 34.5–44)
HGB BLD-MCNC: 10.2 G/DL (ref 11.5–15.5)
IMM GRANULOCYTES # BLD: 0 10*3/MM3 (ref 0–0.03)
IMM GRANULOCYTES NFR BLD: 0 % (ref 0–0.6)
LYMPHOCYTES # BLD AUTO: 2.4 10*3/MM3 (ref 0.6–4.8)
LYMPHOCYTES NFR BLD AUTO: 44.4 % (ref 24–44)
MCH RBC QN AUTO: 30.1 PG (ref 27–31)
MCHC RBC AUTO-ENTMCNC: 31.5 G/DL (ref 32–36)
MCV RBC AUTO: 95.6 FL (ref 80–99)
MONOCYTES # BLD AUTO: 0.4 10*3/MM3 (ref 0–1)
MONOCYTES NFR BLD AUTO: 7.4 % (ref 0–12)
NEUTROPHILS # BLD AUTO: 2.39 10*3/MM3 (ref 1.5–8.3)
NEUTROPHILS NFR BLD AUTO: 44.3 % (ref 41–71)
PLATELET # BLD AUTO: 290 10*3/MM3 (ref 150–450)
PMV BLD AUTO: 10.4 FL (ref 6–12)
RBC # BLD AUTO: 3.39 10*6/MM3 (ref 3.89–5.14)
WBC NRBC COR # BLD: 5.4 10*3/MM3 (ref 3.5–10.8)

## 2018-09-16 PROCEDURE — 25010000002 ENOXAPARIN PER 10 MG: Performed by: NURSE PRACTITIONER

## 2018-09-16 PROCEDURE — 85025 COMPLETE CBC W/AUTO DIFF WBC: CPT | Performed by: INTERNAL MEDICINE

## 2018-09-16 PROCEDURE — 25010000002 LORAZEPAM PER 2 MG: Performed by: INTERNAL MEDICINE

## 2018-09-16 PROCEDURE — 99232 SBSQ HOSP IP/OBS MODERATE 35: CPT | Performed by: INTERNAL MEDICINE

## 2018-09-16 PROCEDURE — 25010000002 MORPHINE SULFATE (PF) 2 MG/ML SOLUTION: Performed by: NURSE PRACTITIONER

## 2018-09-16 RX ORDER — MORPHINE SULFATE 2 MG/ML
1 INJECTION, SOLUTION INTRAMUSCULAR; INTRAVENOUS EVERY 4 HOURS PRN
Status: DISPENSED | OUTPATIENT
Start: 2018-09-16 | End: 2018-09-26

## 2018-09-16 RX ADMIN — GLYCOPYRROLATE 0.1 MG: 0.2 INJECTION, SOLUTION INTRAMUSCULAR; INTRAVENOUS at 16:14

## 2018-09-16 RX ADMIN — MORPHINE SULFATE 1 MG: 2 INJECTION, SOLUTION INTRAMUSCULAR; INTRAVENOUS at 12:45

## 2018-09-16 RX ADMIN — MORPHINE SULFATE 1 MG: 2 INJECTION, SOLUTION INTRAMUSCULAR; INTRAVENOUS at 16:26

## 2018-09-16 RX ADMIN — BACLOFEN 10 MG: 10 TABLET ORAL at 16:14

## 2018-09-16 RX ADMIN — BACLOFEN 10 MG: 10 TABLET ORAL at 08:17

## 2018-09-16 RX ADMIN — GLYCOPYRROLATE 0.1 MG: 0.2 INJECTION, SOLUTION INTRAMUSCULAR; INTRAVENOUS at 20:50

## 2018-09-16 RX ADMIN — CLOPIDOGREL BISULFATE 75 MG: 75 TABLET ORAL at 08:17

## 2018-09-16 RX ADMIN — Medication 1 CAPSULE: at 08:18

## 2018-09-16 RX ADMIN — METOPROLOL TARTRATE 50 MG: 50 TABLET ORAL at 20:51

## 2018-09-16 RX ADMIN — BACLOFEN 10 MG: 10 TABLET ORAL at 20:51

## 2018-09-16 RX ADMIN — FAMOTIDINE 20 MG: 20 TABLET ORAL at 20:51

## 2018-09-16 RX ADMIN — FAMOTIDINE 20 MG: 20 TABLET ORAL at 08:17

## 2018-09-16 RX ADMIN — DESMOPRESSIN ACETATE 40 MG: 0.2 TABLET ORAL at 20:51

## 2018-09-16 RX ADMIN — GLYCOPYRROLATE 0.1 MG: 0.2 INJECTION, SOLUTION INTRAMUSCULAR; INTRAVENOUS at 08:18

## 2018-09-16 RX ADMIN — LORAZEPAM 1 MG: 2 INJECTION INTRAMUSCULAR; INTRAVENOUS at 16:14

## 2018-09-16 RX ADMIN — ENOXAPARIN SODIUM 40 MG: 100 INJECTION SUBCUTANEOUS at 08:18

## 2018-09-16 RX ADMIN — QUETIAPINE FUMARATE 25 MG: 25 TABLET ORAL at 08:17

## 2018-09-16 RX ADMIN — HYDROCODONE BITARTRATE AND ACETAMINOPHEN 1 TABLET: 5; 325 TABLET ORAL at 20:51

## 2018-09-16 RX ADMIN — ASPIRIN 81 MG CHEWABLE TABLET 81 MG: 81 TABLET CHEWABLE at 08:17

## 2018-09-16 RX ADMIN — QUETIAPINE FUMARATE 25 MG: 25 TABLET ORAL at 20:51

## 2018-09-16 RX ADMIN — HYDROCODONE BITARTRATE AND ACETAMINOPHEN 1 TABLET: 5; 325 TABLET ORAL at 02:14

## 2018-09-17 LAB
ALBUMIN SERPL-MCNC: 3.59 G/DL (ref 3.2–4.8)
ALP SERPL-CCNC: 77 U/L (ref 25–100)
ALT SERPL W P-5'-P-CCNC: 146 U/L (ref 7–40)
ANION GAP SERPL CALCULATED.3IONS-SCNC: 8 MMOL/L (ref 3–11)
AST SERPL-CCNC: 103 U/L (ref 0–33)
BILIRUB SERPL-MCNC: 0.3 MG/DL (ref 0.3–1.2)
BUN BLD-MCNC: 13 MG/DL (ref 9–23)
CALCIUM SPEC-SCNC: 9.2 MG/DL (ref 8.7–10.4)
CHLORIDE SERPL-SCNC: 98 MMOL/L (ref 99–109)
CHOLEST SERPL-MCNC: 121 MG/DL (ref 0–200)
CO2 SERPL-SCNC: 28 MMOL/L (ref 20–31)
CREAT BLD-MCNC: 0.44 MG/DL (ref 0.6–1.3)
GLUCOSE BLD-MCNC: 102 MG/DL (ref 70–100)
MAGNESIUM SERPL-MCNC: 1.8 MG/DL (ref 1.3–2.7)
PHOSPHATE SERPL-MCNC: 4.1 MG/DL (ref 2.4–5.1)
POTASSIUM BLD-SCNC: 3.7 MMOL/L (ref 3.5–5.5)
PROT SERPL-MCNC: 6.2 G/DL (ref 5.7–8.2)
SODIUM BLD-SCNC: 134 MMOL/L (ref 132–146)
TRIGL SERPL-MCNC: 80 MG/DL (ref 0–150)

## 2018-09-17 PROCEDURE — 80053 COMPREHEN METABOLIC PANEL: CPT

## 2018-09-17 PROCEDURE — 83735 ASSAY OF MAGNESIUM: CPT

## 2018-09-17 PROCEDURE — 25010000002 HALOPERIDOL LACTATE PER 5 MG: Performed by: INTERNAL MEDICINE

## 2018-09-17 PROCEDURE — 25010000002 ENOXAPARIN PER 10 MG: Performed by: NURSE PRACTITIONER

## 2018-09-17 PROCEDURE — 84100 ASSAY OF PHOSPHORUS: CPT

## 2018-09-17 PROCEDURE — 84478 ASSAY OF TRIGLYCERIDES: CPT

## 2018-09-17 PROCEDURE — 99231 SBSQ HOSP IP/OBS SF/LOW 25: CPT | Performed by: PHYSICIAN ASSISTANT

## 2018-09-17 PROCEDURE — 25010000002 LORAZEPAM PER 2 MG: Performed by: INTERNAL MEDICINE

## 2018-09-17 PROCEDURE — 82465 ASSAY BLD/SERUM CHOLESTEROL: CPT

## 2018-09-17 RX ADMIN — FAMOTIDINE 20 MG: 20 TABLET ORAL at 12:31

## 2018-09-17 RX ADMIN — BACLOFEN 10 MG: 10 TABLET ORAL at 20:42

## 2018-09-17 RX ADMIN — Medication 1 CAPSULE: at 12:33

## 2018-09-17 RX ADMIN — METOPROLOL TARTRATE 50 MG: 50 TABLET ORAL at 12:31

## 2018-09-17 RX ADMIN — GLYCOPYRROLATE 0.1 MG: 0.2 INJECTION, SOLUTION INTRAMUSCULAR; INTRAVENOUS at 15:24

## 2018-09-17 RX ADMIN — ENOXAPARIN SODIUM 40 MG: 100 INJECTION SUBCUTANEOUS at 12:30

## 2018-09-17 RX ADMIN — BACLOFEN 10 MG: 10 TABLET ORAL at 15:24

## 2018-09-17 RX ADMIN — ASPIRIN 81 MG CHEWABLE TABLET 81 MG: 81 TABLET CHEWABLE at 12:33

## 2018-09-17 RX ADMIN — HYDROCODONE BITARTRATE AND ACETAMINOPHEN 1 TABLET: 5; 325 TABLET ORAL at 02:03

## 2018-09-17 RX ADMIN — METOPROLOL TARTRATE 50 MG: 50 TABLET ORAL at 20:43

## 2018-09-17 RX ADMIN — LORAZEPAM 1 MG: 2 INJECTION INTRAMUSCULAR; INTRAVENOUS at 12:33

## 2018-09-17 RX ADMIN — HALOPERIDOL LACTATE 2 MG: 5 INJECTION, SOLUTION INTRAMUSCULAR at 15:24

## 2018-09-17 RX ADMIN — CLOPIDOGREL BISULFATE 75 MG: 75 TABLET ORAL at 12:32

## 2018-09-17 RX ADMIN — LORAZEPAM 1 MG: 2 INJECTION INTRAMUSCULAR; INTRAVENOUS at 02:13

## 2018-09-17 RX ADMIN — MAGNESIUM SULFATE HEPTAHYDRATE 4 G: 40 INJECTION, SOLUTION INTRAVENOUS at 15:23

## 2018-09-17 RX ADMIN — GLYCOPYRROLATE 0.1 MG: 0.2 INJECTION, SOLUTION INTRAMUSCULAR; INTRAVENOUS at 12:31

## 2018-09-17 RX ADMIN — THIAMINE HCL TAB 100 MG 100 MG: 100 TAB at 12:33

## 2018-09-17 RX ADMIN — QUETIAPINE FUMARATE 25 MG: 25 TABLET ORAL at 12:32

## 2018-09-17 RX ADMIN — FAMOTIDINE 20 MG: 20 TABLET ORAL at 20:43

## 2018-09-17 RX ADMIN — QUETIAPINE FUMARATE 25 MG: 25 TABLET ORAL at 20:43

## 2018-09-17 RX ADMIN — HYDROCODONE BITARTRATE AND ACETAMINOPHEN 1 TABLET: 5; 325 TABLET ORAL at 12:31

## 2018-09-17 RX ADMIN — GLYCOPYRROLATE 0.1 MG: 0.2 INJECTION, SOLUTION INTRAMUSCULAR; INTRAVENOUS at 20:43

## 2018-09-17 RX ADMIN — BACLOFEN 10 MG: 10 TABLET ORAL at 12:33

## 2018-09-18 LAB
GLUCOSE BLDC GLUCOMTR-MCNC: 118 MG/DL (ref 70–130)
MAGNESIUM SERPL-MCNC: 1.9 MG/DL (ref 1.3–2.7)

## 2018-09-18 PROCEDURE — 25010000002 HALOPERIDOL LACTATE PER 5 MG: Performed by: INTERNAL MEDICINE

## 2018-09-18 PROCEDURE — 25010000002 LORAZEPAM PER 2 MG: Performed by: INTERNAL MEDICINE

## 2018-09-18 PROCEDURE — 25010000002 ENOXAPARIN PER 10 MG: Performed by: NURSE PRACTITIONER

## 2018-09-18 PROCEDURE — 83735 ASSAY OF MAGNESIUM: CPT | Performed by: INTERNAL MEDICINE

## 2018-09-18 PROCEDURE — 99232 SBSQ HOSP IP/OBS MODERATE 35: CPT | Performed by: PHYSICIAN ASSISTANT

## 2018-09-18 PROCEDURE — 82962 GLUCOSE BLOOD TEST: CPT

## 2018-09-18 RX ADMIN — BACLOFEN 10 MG: 10 TABLET ORAL at 16:54

## 2018-09-18 RX ADMIN — Medication 1 CAPSULE: at 08:50

## 2018-09-18 RX ADMIN — HYDROCODONE BITARTRATE AND ACETAMINOPHEN 1 TABLET: 5; 325 TABLET ORAL at 16:54

## 2018-09-18 RX ADMIN — QUETIAPINE FUMARATE 25 MG: 25 TABLET ORAL at 08:51

## 2018-09-18 RX ADMIN — ASPIRIN 81 MG CHEWABLE TABLET 81 MG: 81 TABLET CHEWABLE at 08:50

## 2018-09-18 RX ADMIN — LORAZEPAM 1 MG: 2 INJECTION INTRAMUSCULAR; INTRAVENOUS at 16:54

## 2018-09-18 RX ADMIN — GLYCOPYRROLATE 0.1 MG: 0.2 INJECTION, SOLUTION INTRAMUSCULAR; INTRAVENOUS at 20:55

## 2018-09-18 RX ADMIN — CLOPIDOGREL BISULFATE 75 MG: 75 TABLET ORAL at 08:51

## 2018-09-18 RX ADMIN — FAMOTIDINE 20 MG: 20 TABLET ORAL at 20:55

## 2018-09-18 RX ADMIN — BACLOFEN 10 MG: 10 TABLET ORAL at 20:55

## 2018-09-18 RX ADMIN — METOPROLOL TARTRATE 50 MG: 50 TABLET ORAL at 20:55

## 2018-09-18 RX ADMIN — BACLOFEN 10 MG: 10 TABLET ORAL at 08:51

## 2018-09-18 RX ADMIN — ENOXAPARIN SODIUM 40 MG: 100 INJECTION SUBCUTANEOUS at 08:50

## 2018-09-18 RX ADMIN — HALOPERIDOL LACTATE 2 MG: 5 INJECTION, SOLUTION INTRAMUSCULAR at 04:16

## 2018-09-18 RX ADMIN — FAMOTIDINE 20 MG: 20 TABLET ORAL at 08:51

## 2018-09-18 RX ADMIN — QUETIAPINE FUMARATE 25 MG: 25 TABLET ORAL at 20:55

## 2018-09-18 RX ADMIN — GLYCOPYRROLATE 0.1 MG: 0.2 INJECTION, SOLUTION INTRAMUSCULAR; INTRAVENOUS at 16:54

## 2018-09-18 RX ADMIN — THIAMINE HCL TAB 100 MG 100 MG: 100 TAB at 08:50

## 2018-09-18 RX ADMIN — METOPROLOL TARTRATE 50 MG: 50 TABLET ORAL at 08:51

## 2018-09-18 RX ADMIN — GLYCOPYRROLATE 0.1 MG: 0.2 INJECTION, SOLUTION INTRAMUSCULAR; INTRAVENOUS at 08:50

## 2018-09-19 LAB
ANION GAP SERPL CALCULATED.3IONS-SCNC: 9 MMOL/L (ref 3–11)
BUN BLD-MCNC: 16 MG/DL (ref 9–23)
BUN/CREAT SERPL: 34.8 (ref 7–25)
CALCIUM SPEC-SCNC: 9.4 MG/DL (ref 8.7–10.4)
CHLORIDE SERPL-SCNC: 100 MMOL/L (ref 99–109)
CO2 SERPL-SCNC: 28 MMOL/L (ref 20–31)
CREAT BLD-MCNC: 0.46 MG/DL (ref 0.6–1.3)
DEPRECATED RDW RBC AUTO: 48.7 FL (ref 37–54)
ERYTHROCYTE [DISTWIDTH] IN BLOOD BY AUTOMATED COUNT: 14 % (ref 11.3–14.5)
GFR SERPL CREATININE-BSD FRML MDRD: >150 ML/MIN/1.73
GLUCOSE BLD-MCNC: 106 MG/DL (ref 70–100)
HCT VFR BLD AUTO: 32.4 % (ref 34.5–44)
HGB BLD-MCNC: 10.1 G/DL (ref 11.5–15.5)
MAGNESIUM SERPL-MCNC: 1.7 MG/DL (ref 1.3–2.7)
MCH RBC QN AUTO: 29.5 PG (ref 27–31)
MCHC RBC AUTO-ENTMCNC: 31.2 G/DL (ref 32–36)
MCV RBC AUTO: 94.7 FL (ref 80–99)
PLATELET # BLD AUTO: 420 10*3/MM3 (ref 150–450)
PMV BLD AUTO: 10.4 FL (ref 6–12)
POTASSIUM BLD-SCNC: 4.2 MMOL/L (ref 3.5–5.5)
RBC # BLD AUTO: 3.42 10*6/MM3 (ref 3.89–5.14)
SODIUM BLD-SCNC: 137 MMOL/L (ref 132–146)
WBC NRBC COR # BLD: 8.12 10*3/MM3 (ref 3.5–10.8)

## 2018-09-19 PROCEDURE — 25010000002 LORAZEPAM PER 2 MG: Performed by: INTERNAL MEDICINE

## 2018-09-19 PROCEDURE — 80048 BASIC METABOLIC PNL TOTAL CA: CPT | Performed by: PHYSICIAN ASSISTANT

## 2018-09-19 PROCEDURE — 25010000002 ENOXAPARIN PER 10 MG: Performed by: NURSE PRACTITIONER

## 2018-09-19 PROCEDURE — 85027 COMPLETE CBC AUTOMATED: CPT | Performed by: PHYSICIAN ASSISTANT

## 2018-09-19 PROCEDURE — 83735 ASSAY OF MAGNESIUM: CPT | Performed by: PHYSICIAN ASSISTANT

## 2018-09-19 PROCEDURE — 99233 SBSQ HOSP IP/OBS HIGH 50: CPT | Performed by: NURSE PRACTITIONER

## 2018-09-19 RX ADMIN — BACLOFEN 10 MG: 10 TABLET ORAL at 20:00

## 2018-09-19 RX ADMIN — THIAMINE HCL TAB 100 MG 100 MG: 100 TAB at 08:44

## 2018-09-19 RX ADMIN — ASPIRIN 81 MG CHEWABLE TABLET 81 MG: 81 TABLET CHEWABLE at 08:44

## 2018-09-19 RX ADMIN — Medication 1 CAPSULE: at 08:44

## 2018-09-19 RX ADMIN — GLYCOPYRROLATE 0.1 MG: 0.2 INJECTION, SOLUTION INTRAMUSCULAR; INTRAVENOUS at 10:14

## 2018-09-19 RX ADMIN — CLOPIDOGREL BISULFATE 75 MG: 75 TABLET ORAL at 08:44

## 2018-09-19 RX ADMIN — QUETIAPINE FUMARATE 25 MG: 25 TABLET ORAL at 08:44

## 2018-09-19 RX ADMIN — HYDROCODONE BITARTRATE AND ACETAMINOPHEN 1 TABLET: 5; 325 TABLET ORAL at 20:00

## 2018-09-19 RX ADMIN — GLYCOPYRROLATE 0.1 MG: 0.2 INJECTION, SOLUTION INTRAMUSCULAR; INTRAVENOUS at 18:12

## 2018-09-19 RX ADMIN — BACLOFEN 10 MG: 10 TABLET ORAL at 18:12

## 2018-09-19 RX ADMIN — QUETIAPINE FUMARATE 25 MG: 25 TABLET ORAL at 20:00

## 2018-09-19 RX ADMIN — FAMOTIDINE 20 MG: 20 TABLET ORAL at 20:00

## 2018-09-19 RX ADMIN — LORAZEPAM 1 MG: 2 INJECTION INTRAMUSCULAR; INTRAVENOUS at 06:58

## 2018-09-19 RX ADMIN — FAMOTIDINE 20 MG: 20 TABLET ORAL at 08:44

## 2018-09-19 RX ADMIN — GLYCOPYRROLATE 0.1 MG: 0.2 INJECTION, SOLUTION INTRAMUSCULAR; INTRAVENOUS at 20:00

## 2018-09-19 RX ADMIN — ENOXAPARIN SODIUM 40 MG: 100 INJECTION SUBCUTANEOUS at 10:17

## 2018-09-19 RX ADMIN — METOPROLOL TARTRATE 50 MG: 50 TABLET ORAL at 08:44

## 2018-09-19 RX ADMIN — BACLOFEN 10 MG: 10 TABLET ORAL at 08:44

## 2018-09-19 RX ADMIN — METOPROLOL TARTRATE 50 MG: 50 TABLET ORAL at 20:00

## 2018-09-19 RX ADMIN — MAGNESIUM SULFATE HEPTAHYDRATE 4 G: 40 INJECTION, SOLUTION INTRAVENOUS at 12:42

## 2018-09-20 LAB — MAGNESIUM SERPL-MCNC: 1.9 MG/DL (ref 1.3–2.7)

## 2018-09-20 PROCEDURE — 99232 SBSQ HOSP IP/OBS MODERATE 35: CPT | Performed by: NURSE PRACTITIONER

## 2018-09-20 PROCEDURE — 25010000002 HALOPERIDOL LACTATE PER 5 MG: Performed by: INTERNAL MEDICINE

## 2018-09-20 PROCEDURE — 25010000002 LORAZEPAM PER 2 MG: Performed by: NURSE PRACTITIONER

## 2018-09-20 PROCEDURE — 25010000002 ENOXAPARIN PER 10 MG: Performed by: NURSE PRACTITIONER

## 2018-09-20 PROCEDURE — 25010000002 MORPHINE SULFATE (PF) 2 MG/ML SOLUTION: Performed by: FAMILY MEDICINE

## 2018-09-20 PROCEDURE — 83735 ASSAY OF MAGNESIUM: CPT | Performed by: NURSE PRACTITIONER

## 2018-09-20 RX ORDER — LORAZEPAM 2 MG/ML
1 INJECTION INTRAMUSCULAR EVERY 6 HOURS PRN
Status: DISPENSED | OUTPATIENT
Start: 2018-09-20 | End: 2018-10-08

## 2018-09-20 RX ADMIN — FAMOTIDINE 20 MG: 20 TABLET ORAL at 08:28

## 2018-09-20 RX ADMIN — GLYCOPYRROLATE 0.1 MG: 0.2 INJECTION, SOLUTION INTRAMUSCULAR; INTRAVENOUS at 20:00

## 2018-09-20 RX ADMIN — METOPROLOL TARTRATE 50 MG: 50 TABLET ORAL at 20:00

## 2018-09-20 RX ADMIN — METOPROLOL TARTRATE 50 MG: 50 TABLET ORAL at 08:28

## 2018-09-20 RX ADMIN — HALOPERIDOL LACTATE 2 MG: 5 INJECTION, SOLUTION INTRAMUSCULAR at 16:14

## 2018-09-20 RX ADMIN — ENOXAPARIN SODIUM 40 MG: 100 INJECTION SUBCUTANEOUS at 08:28

## 2018-09-20 RX ADMIN — Medication 1 CAPSULE: at 08:28

## 2018-09-20 RX ADMIN — MORPHINE SULFATE 1 MG: 2 INJECTION, SOLUTION INTRAMUSCULAR; INTRAVENOUS at 16:14

## 2018-09-20 RX ADMIN — GLYCOPYRROLATE 0.1 MG: 0.2 INJECTION, SOLUTION INTRAMUSCULAR; INTRAVENOUS at 16:14

## 2018-09-20 RX ADMIN — QUETIAPINE FUMARATE 25 MG: 25 TABLET ORAL at 08:29

## 2018-09-20 RX ADMIN — QUETIAPINE FUMARATE 25 MG: 25 TABLET ORAL at 20:01

## 2018-09-20 RX ADMIN — FAMOTIDINE 20 MG: 20 TABLET ORAL at 20:00

## 2018-09-20 RX ADMIN — MORPHINE SULFATE 1 MG: 2 INJECTION, SOLUTION INTRAMUSCULAR; INTRAVENOUS at 20:06

## 2018-09-20 RX ADMIN — LORAZEPAM 1 MG: 2 INJECTION INTRAMUSCULAR; INTRAVENOUS at 16:14

## 2018-09-20 RX ADMIN — BACLOFEN 10 MG: 10 TABLET ORAL at 08:28

## 2018-09-20 RX ADMIN — GLYCOPYRROLATE 0.1 MG: 0.2 INJECTION, SOLUTION INTRAMUSCULAR; INTRAVENOUS at 08:29

## 2018-09-20 RX ADMIN — MAGNESIUM SULFATE HEPTAHYDRATE 4 G: 40 INJECTION, SOLUTION INTRAVENOUS at 08:28

## 2018-09-20 RX ADMIN — HYDROCODONE BITARTRATE AND ACETAMINOPHEN 1 TABLET: 5; 325 TABLET ORAL at 06:27

## 2018-09-20 RX ADMIN — LORAZEPAM 1 MG: 2 INJECTION INTRAMUSCULAR; INTRAVENOUS at 22:05

## 2018-09-20 RX ADMIN — BACLOFEN 10 MG: 10 TABLET ORAL at 16:14

## 2018-09-20 RX ADMIN — THIAMINE HCL TAB 100 MG 100 MG: 100 TAB at 08:28

## 2018-09-20 RX ADMIN — CLOPIDOGREL BISULFATE 75 MG: 75 TABLET ORAL at 08:28

## 2018-09-20 RX ADMIN — BACLOFEN 10 MG: 10 TABLET ORAL at 20:00

## 2018-09-20 RX ADMIN — ASPIRIN 81 MG CHEWABLE TABLET 81 MG: 81 TABLET CHEWABLE at 08:28

## 2018-09-21 LAB — MAGNESIUM SERPL-MCNC: 1.8 MG/DL (ref 1.3–2.7)

## 2018-09-21 PROCEDURE — 83735 ASSAY OF MAGNESIUM: CPT | Performed by: NURSE PRACTITIONER

## 2018-09-21 PROCEDURE — 25010000002 LORAZEPAM PER 2 MG: Performed by: NURSE PRACTITIONER

## 2018-09-21 PROCEDURE — 99232 SBSQ HOSP IP/OBS MODERATE 35: CPT | Performed by: INTERNAL MEDICINE

## 2018-09-21 PROCEDURE — 25010000002 ENOXAPARIN PER 10 MG: Performed by: NURSE PRACTITIONER

## 2018-09-21 PROCEDURE — 25010000002 MORPHINE SULFATE (PF) 2 MG/ML SOLUTION: Performed by: FAMILY MEDICINE

## 2018-09-21 RX ADMIN — GLYCOPYRROLATE 0.1 MG: 0.2 INJECTION, SOLUTION INTRAMUSCULAR; INTRAVENOUS at 21:55

## 2018-09-21 RX ADMIN — QUETIAPINE FUMARATE 25 MG: 25 TABLET ORAL at 21:53

## 2018-09-21 RX ADMIN — CLOPIDOGREL BISULFATE 75 MG: 75 TABLET ORAL at 08:09

## 2018-09-21 RX ADMIN — METOPROLOL TARTRATE 50 MG: 50 TABLET ORAL at 08:09

## 2018-09-21 RX ADMIN — GLYCOPYRROLATE 0.1 MG: 0.2 INJECTION, SOLUTION INTRAMUSCULAR; INTRAVENOUS at 17:08

## 2018-09-21 RX ADMIN — METOPROLOL TARTRATE 50 MG: 50 TABLET ORAL at 21:56

## 2018-09-21 RX ADMIN — ASPIRIN 81 MG CHEWABLE TABLET 81 MG: 81 TABLET CHEWABLE at 08:09

## 2018-09-21 RX ADMIN — BACLOFEN 10 MG: 10 TABLET ORAL at 21:52

## 2018-09-21 RX ADMIN — QUETIAPINE FUMARATE 25 MG: 25 TABLET ORAL at 08:09

## 2018-09-21 RX ADMIN — MORPHINE SULFATE 1 MG: 2 INJECTION, SOLUTION INTRAMUSCULAR; INTRAVENOUS at 22:01

## 2018-09-21 RX ADMIN — LORAZEPAM 1 MG: 2 INJECTION INTRAMUSCULAR; INTRAVENOUS at 15:17

## 2018-09-21 RX ADMIN — LORAZEPAM 1 MG: 2 INJECTION INTRAMUSCULAR; INTRAVENOUS at 04:13

## 2018-09-21 RX ADMIN — Medication 1 CAPSULE: at 08:09

## 2018-09-21 RX ADMIN — FAMOTIDINE 20 MG: 20 TABLET ORAL at 21:53

## 2018-09-21 RX ADMIN — FAMOTIDINE 20 MG: 20 TABLET ORAL at 08:09

## 2018-09-21 RX ADMIN — GLYCOPYRROLATE 0.1 MG: 0.2 INJECTION, SOLUTION INTRAMUSCULAR; INTRAVENOUS at 08:09

## 2018-09-21 RX ADMIN — BACLOFEN 10 MG: 10 TABLET ORAL at 08:09

## 2018-09-21 RX ADMIN — LORAZEPAM 1 MG: 2 INJECTION INTRAMUSCULAR; INTRAVENOUS at 22:01

## 2018-09-21 RX ADMIN — THIAMINE HCL TAB 100 MG 100 MG: 100 TAB at 08:09

## 2018-09-21 RX ADMIN — ENOXAPARIN SODIUM 40 MG: 100 INJECTION SUBCUTANEOUS at 08:09

## 2018-09-21 RX ADMIN — MORPHINE SULFATE 1 MG: 2 INJECTION, SOLUTION INTRAMUSCULAR; INTRAVENOUS at 15:17

## 2018-09-21 RX ADMIN — BACLOFEN 10 MG: 10 TABLET ORAL at 17:08

## 2018-09-22 PROCEDURE — 25010000002 HALOPERIDOL LACTATE PER 5 MG: Performed by: INTERNAL MEDICINE

## 2018-09-22 PROCEDURE — 25010000002 ENOXAPARIN PER 10 MG: Performed by: NURSE PRACTITIONER

## 2018-09-22 PROCEDURE — 99231 SBSQ HOSP IP/OBS SF/LOW 25: CPT | Performed by: INTERNAL MEDICINE

## 2018-09-22 PROCEDURE — 25010000002 MORPHINE SULFATE (PF) 2 MG/ML SOLUTION: Performed by: FAMILY MEDICINE

## 2018-09-22 PROCEDURE — 25010000002 LORAZEPAM PER 2 MG: Performed by: NURSE PRACTITIONER

## 2018-09-22 RX ADMIN — FAMOTIDINE 20 MG: 20 TABLET ORAL at 20:12

## 2018-09-22 RX ADMIN — Medication 1 CAPSULE: at 09:48

## 2018-09-22 RX ADMIN — HALOPERIDOL LACTATE 2 MG: 5 INJECTION, SOLUTION INTRAMUSCULAR at 00:30

## 2018-09-22 RX ADMIN — QUETIAPINE FUMARATE 25 MG: 25 TABLET ORAL at 09:48

## 2018-09-22 RX ADMIN — BACLOFEN 10 MG: 10 TABLET ORAL at 15:16

## 2018-09-22 RX ADMIN — BACLOFEN 10 MG: 10 TABLET ORAL at 09:49

## 2018-09-22 RX ADMIN — MORPHINE SULFATE 1 MG: 2 INJECTION, SOLUTION INTRAMUSCULAR; INTRAVENOUS at 07:37

## 2018-09-22 RX ADMIN — ENOXAPARIN SODIUM 40 MG: 100 INJECTION SUBCUTANEOUS at 09:47

## 2018-09-22 RX ADMIN — HALOPERIDOL LACTATE 2 MG: 5 INJECTION, SOLUTION INTRAMUSCULAR at 23:17

## 2018-09-22 RX ADMIN — SENNOSIDES AND DOCUSATE SODIUM 2 TABLET: 8.6; 5 TABLET ORAL at 11:10

## 2018-09-22 RX ADMIN — QUETIAPINE FUMARATE 25 MG: 25 TABLET ORAL at 20:12

## 2018-09-22 RX ADMIN — METOPROLOL TARTRATE 50 MG: 50 TABLET ORAL at 09:45

## 2018-09-22 RX ADMIN — METOPROLOL TARTRATE 50 MG: 50 TABLET ORAL at 20:12

## 2018-09-22 RX ADMIN — BACLOFEN 10 MG: 10 TABLET ORAL at 20:12

## 2018-09-22 RX ADMIN — MORPHINE SULFATE 1 MG: 2 INJECTION, SOLUTION INTRAMUSCULAR; INTRAVENOUS at 20:12

## 2018-09-22 RX ADMIN — LORAZEPAM 1 MG: 2 INJECTION INTRAMUSCULAR; INTRAVENOUS at 17:54

## 2018-09-22 RX ADMIN — HYDROCODONE BITARTRATE AND ACETAMINOPHEN 1 TABLET: 5; 325 TABLET ORAL at 21:11

## 2018-09-22 RX ADMIN — GLYCOPYRROLATE 0.1 MG: 0.2 INJECTION, SOLUTION INTRAMUSCULAR; INTRAVENOUS at 15:16

## 2018-09-22 RX ADMIN — CLOPIDOGREL BISULFATE 75 MG: 75 TABLET ORAL at 09:46

## 2018-09-22 RX ADMIN — FAMOTIDINE 20 MG: 20 TABLET ORAL at 09:48

## 2018-09-22 RX ADMIN — MAGNESIUM SULFATE HEPTAHYDRATE 4 G: 40 INJECTION, SOLUTION INTRAVENOUS at 07:37

## 2018-09-22 RX ADMIN — ASPIRIN 81 MG CHEWABLE TABLET 81 MG: 81 TABLET CHEWABLE at 09:47

## 2018-09-22 RX ADMIN — THIAMINE HCL TAB 100 MG 100 MG: 100 TAB at 09:46

## 2018-09-22 RX ADMIN — GLYCOPYRROLATE 0.1 MG: 0.2 INJECTION, SOLUTION INTRAMUSCULAR; INTRAVENOUS at 20:12

## 2018-09-22 RX ADMIN — GLYCOPYRROLATE 0.1 MG: 0.2 INJECTION, SOLUTION INTRAMUSCULAR; INTRAVENOUS at 09:50

## 2018-09-23 LAB — MAGNESIUM SERPL-MCNC: 1.9 MG/DL (ref 1.3–2.7)

## 2018-09-23 PROCEDURE — 25010000002 MORPHINE SULFATE (PF) 2 MG/ML SOLUTION: Performed by: FAMILY MEDICINE

## 2018-09-23 PROCEDURE — 99231 SBSQ HOSP IP/OBS SF/LOW 25: CPT | Performed by: INTERNAL MEDICINE

## 2018-09-23 PROCEDURE — 25010000002 ENOXAPARIN PER 10 MG: Performed by: NURSE PRACTITIONER

## 2018-09-23 PROCEDURE — 83735 ASSAY OF MAGNESIUM: CPT | Performed by: INTERNAL MEDICINE

## 2018-09-23 RX ADMIN — ENOXAPARIN SODIUM 40 MG: 100 INJECTION SUBCUTANEOUS at 09:16

## 2018-09-23 RX ADMIN — FAMOTIDINE 20 MG: 20 TABLET ORAL at 19:59

## 2018-09-23 RX ADMIN — MORPHINE SULFATE 1 MG: 2 INJECTION, SOLUTION INTRAMUSCULAR; INTRAVENOUS at 17:36

## 2018-09-23 RX ADMIN — THIAMINE HCL TAB 100 MG 100 MG: 100 TAB at 09:17

## 2018-09-23 RX ADMIN — GLYCOPYRROLATE 0.1 MG: 0.2 INJECTION, SOLUTION INTRAMUSCULAR; INTRAVENOUS at 17:35

## 2018-09-23 RX ADMIN — METOPROLOL TARTRATE 50 MG: 50 TABLET ORAL at 19:59

## 2018-09-23 RX ADMIN — GLYCOPYRROLATE 0.1 MG: 0.2 INJECTION, SOLUTION INTRAMUSCULAR; INTRAVENOUS at 09:16

## 2018-09-23 RX ADMIN — CLOPIDOGREL BISULFATE 75 MG: 75 TABLET ORAL at 09:17

## 2018-09-23 RX ADMIN — FAMOTIDINE 20 MG: 20 TABLET ORAL at 09:17

## 2018-09-23 RX ADMIN — BACLOFEN 10 MG: 10 TABLET ORAL at 19:59

## 2018-09-23 RX ADMIN — Medication 1 CAPSULE: at 12:11

## 2018-09-23 RX ADMIN — BACLOFEN 10 MG: 10 TABLET ORAL at 17:35

## 2018-09-23 RX ADMIN — QUETIAPINE FUMARATE 25 MG: 25 TABLET ORAL at 19:59

## 2018-09-23 RX ADMIN — METOPROLOL TARTRATE 50 MG: 50 TABLET ORAL at 09:17

## 2018-09-23 RX ADMIN — QUETIAPINE FUMARATE 25 MG: 25 TABLET ORAL at 09:17

## 2018-09-23 RX ADMIN — ASPIRIN 81 MG CHEWABLE TABLET 81 MG: 81 TABLET CHEWABLE at 09:17

## 2018-09-23 RX ADMIN — GLYCOPYRROLATE 0.1 MG: 0.2 INJECTION, SOLUTION INTRAMUSCULAR; INTRAVENOUS at 19:59

## 2018-09-23 RX ADMIN — BACLOFEN 10 MG: 10 TABLET ORAL at 09:17

## 2018-09-24 LAB
ALBUMIN SERPL-MCNC: 4.19 G/DL (ref 3.2–4.8)
ALP SERPL-CCNC: 107 U/L (ref 25–100)
ALT SERPL W P-5'-P-CCNC: 253 U/L (ref 7–40)
ANION GAP SERPL CALCULATED.3IONS-SCNC: 13 MMOL/L (ref 3–11)
AST SERPL-CCNC: 135 U/L (ref 0–33)
BILIRUB SERPL-MCNC: 0.3 MG/DL (ref 0.3–1.2)
BUN BLD-MCNC: 18 MG/DL (ref 9–23)
CALCIUM SPEC-SCNC: 9.9 MG/DL (ref 8.7–10.4)
CHLORIDE SERPL-SCNC: 105 MMOL/L (ref 99–109)
CHOLEST SERPL-MCNC: 151 MG/DL (ref 0–200)
CO2 SERPL-SCNC: 27 MMOL/L (ref 20–31)
CREAT BLD-MCNC: 0.49 MG/DL (ref 0.6–1.3)
GLUCOSE BLD-MCNC: 150 MG/DL (ref 70–100)
GLUCOSE BLDC GLUCOMTR-MCNC: 157 MG/DL (ref 70–130)
GLUCOSE BLDC GLUCOMTR-MCNC: 158 MG/DL (ref 70–130)
MAGNESIUM SERPL-MCNC: 1.9 MG/DL (ref 1.3–2.7)
PHOSPHATE SERPL-MCNC: 4 MG/DL (ref 2.4–5.1)
POTASSIUM BLD-SCNC: 3.9 MMOL/L (ref 3.5–5.5)
PROT SERPL-MCNC: 7.2 G/DL (ref 5.7–8.2)
SODIUM BLD-SCNC: 145 MMOL/L (ref 132–146)
TRIGL SERPL-MCNC: 80 MG/DL (ref 0–150)

## 2018-09-24 PROCEDURE — 94799 UNLISTED PULMONARY SVC/PX: CPT

## 2018-09-24 PROCEDURE — 84100 ASSAY OF PHOSPHORUS: CPT

## 2018-09-24 PROCEDURE — 25010000002 LORAZEPAM PER 2 MG: Performed by: NURSE PRACTITIONER

## 2018-09-24 PROCEDURE — 25010000002 MORPHINE SULFATE (PF) 2 MG/ML SOLUTION: Performed by: FAMILY MEDICINE

## 2018-09-24 PROCEDURE — 25010000002 ENOXAPARIN PER 10 MG: Performed by: NURSE PRACTITIONER

## 2018-09-24 PROCEDURE — 82465 ASSAY BLD/SERUM CHOLESTEROL: CPT

## 2018-09-24 PROCEDURE — 83735 ASSAY OF MAGNESIUM: CPT

## 2018-09-24 PROCEDURE — 99232 SBSQ HOSP IP/OBS MODERATE 35: CPT | Performed by: NURSE PRACTITIONER

## 2018-09-24 PROCEDURE — 80053 COMPREHEN METABOLIC PANEL: CPT

## 2018-09-24 PROCEDURE — 94760 N-INVAS EAR/PLS OXIMETRY 1: CPT

## 2018-09-24 PROCEDURE — 82962 GLUCOSE BLOOD TEST: CPT

## 2018-09-24 PROCEDURE — 84478 ASSAY OF TRIGLYCERIDES: CPT

## 2018-09-24 RX ADMIN — MORPHINE SULFATE 1 MG: 2 INJECTION, SOLUTION INTRAMUSCULAR; INTRAVENOUS at 08:10

## 2018-09-24 RX ADMIN — BACLOFEN 10 MG: 10 TABLET ORAL at 20:32

## 2018-09-24 RX ADMIN — BACLOFEN 10 MG: 10 TABLET ORAL at 08:09

## 2018-09-24 RX ADMIN — LORAZEPAM 1 MG: 2 INJECTION INTRAMUSCULAR; INTRAVENOUS at 08:10

## 2018-09-24 RX ADMIN — BACLOFEN 10 MG: 10 TABLET ORAL at 16:46

## 2018-09-24 RX ADMIN — METOPROLOL TARTRATE 50 MG: 50 TABLET ORAL at 08:09

## 2018-09-24 RX ADMIN — Medication 1 CAPSULE: at 08:09

## 2018-09-24 RX ADMIN — FAMOTIDINE 20 MG: 20 TABLET ORAL at 08:09

## 2018-09-24 RX ADMIN — FAMOTIDINE 20 MG: 20 TABLET ORAL at 20:31

## 2018-09-24 RX ADMIN — QUETIAPINE FUMARATE 25 MG: 25 TABLET ORAL at 20:32

## 2018-09-24 RX ADMIN — GLYCOPYRROLATE 0.1 MG: 0.2 INJECTION, SOLUTION INTRAMUSCULAR; INTRAVENOUS at 16:46

## 2018-09-24 RX ADMIN — THIAMINE HCL TAB 100 MG 100 MG: 100 TAB at 08:09

## 2018-09-24 RX ADMIN — GLYCOPYRROLATE 0.1 MG: 0.2 INJECTION, SOLUTION INTRAMUSCULAR; INTRAVENOUS at 20:32

## 2018-09-24 RX ADMIN — ENOXAPARIN SODIUM 40 MG: 100 INJECTION SUBCUTANEOUS at 08:10

## 2018-09-24 RX ADMIN — ASPIRIN 81 MG CHEWABLE TABLET 81 MG: 81 TABLET CHEWABLE at 08:09

## 2018-09-24 RX ADMIN — METOPROLOL TARTRATE 50 MG: 50 TABLET ORAL at 20:32

## 2018-09-24 RX ADMIN — QUETIAPINE FUMARATE 25 MG: 25 TABLET ORAL at 08:09

## 2018-09-24 RX ADMIN — GLYCOPYRROLATE 0.1 MG: 0.2 INJECTION, SOLUTION INTRAMUSCULAR; INTRAVENOUS at 08:09

## 2018-09-24 RX ADMIN — CLOPIDOGREL BISULFATE 75 MG: 75 TABLET ORAL at 08:09

## 2018-09-25 ENCOUNTER — APPOINTMENT (OUTPATIENT)
Dept: GENERAL RADIOLOGY | Facility: HOSPITAL | Age: 38
End: 2018-09-25

## 2018-09-25 LAB
BACTERIA UR QL AUTO: ABNORMAL /HPF
BASOPHILS # BLD AUTO: 0.03 10*3/MM3 (ref 0–0.2)
BASOPHILS NFR BLD AUTO: 0.3 % (ref 0–1)
BILIRUB UR QL STRIP: NEGATIVE
CLARITY UR: ABNORMAL
COLOR UR: YELLOW
DEPRECATED RDW RBC AUTO: 53.3 FL (ref 37–54)
EOSINOPHIL # BLD AUTO: 0.03 10*3/MM3 (ref 0–0.3)
EOSINOPHIL NFR BLD AUTO: 0.3 % (ref 0–3)
ERYTHROCYTE [DISTWIDTH] IN BLOOD BY AUTOMATED COUNT: 14.3 % (ref 11.3–14.5)
FLUAV SUBTYP SPEC NAA+PROBE: NOT DETECTED
FLUBV RNA ISLT QL NAA+PROBE: NOT DETECTED
GLUCOSE BLDC GLUCOMTR-MCNC: 125 MG/DL (ref 70–130)
GLUCOSE BLDC GLUCOMTR-MCNC: 132 MG/DL (ref 70–130)
GLUCOSE UR STRIP-MCNC: NEGATIVE MG/DL
HCT VFR BLD AUTO: 28.9 % (ref 34.5–44)
HGB BLD-MCNC: 8.5 G/DL (ref 11.5–15.5)
HGB UR QL STRIP.AUTO: NEGATIVE
IMM GRANULOCYTES # BLD: 0.03 10*3/MM3 (ref 0–0.03)
IMM GRANULOCYTES NFR BLD: 0.3 % (ref 0–0.6)
KETONES UR QL STRIP: NEGATIVE
LEUKOCYTE ESTERASE UR QL STRIP.AUTO: ABNORMAL
LYMPHOCYTES # BLD AUTO: 3.03 10*3/MM3 (ref 0.6–4.8)
LYMPHOCYTES NFR BLD AUTO: 25.9 % (ref 24–44)
MCH RBC QN AUTO: 29.7 PG (ref 27–31)
MCHC RBC AUTO-ENTMCNC: 29.4 G/DL (ref 32–36)
MCV RBC AUTO: 101 FL (ref 80–99)
MONOCYTES # BLD AUTO: 1.08 10*3/MM3 (ref 0–1)
MONOCYTES NFR BLD AUTO: 9.2 % (ref 0–12)
NEUTROPHILS # BLD AUTO: 7.52 10*3/MM3 (ref 1.5–8.3)
NEUTROPHILS NFR BLD AUTO: 64 % (ref 41–71)
NITRITE UR QL STRIP: POSITIVE
PH UR STRIP.AUTO: 8 [PH] (ref 5–8)
PLATELET # BLD AUTO: 520 10*3/MM3 (ref 150–450)
PMV BLD AUTO: 10 FL (ref 6–12)
PROT UR QL STRIP: ABNORMAL
RBC # BLD AUTO: 2.86 10*6/MM3 (ref 3.89–5.14)
RBC # UR: ABNORMAL /HPF
REF LAB TEST METHOD: ABNORMAL
SP GR UR STRIP: 1.01 (ref 1–1.03)
SQUAMOUS #/AREA URNS HPF: ABNORMAL /HPF
UROBILINOGEN UR QL STRIP: ABNORMAL
WBC NRBC COR # BLD: 11.72 10*3/MM3 (ref 3.5–10.8)
WBC UR QL AUTO: ABNORMAL /HPF

## 2018-09-25 PROCEDURE — 87040 BLOOD CULTURE FOR BACTERIA: CPT | Performed by: NURSE PRACTITIONER

## 2018-09-25 PROCEDURE — 99233 SBSQ HOSP IP/OBS HIGH 50: CPT | Performed by: NURSE PRACTITIONER

## 2018-09-25 PROCEDURE — 94760 N-INVAS EAR/PLS OXIMETRY 1: CPT

## 2018-09-25 PROCEDURE — 87186 SC STD MICRODIL/AGAR DIL: CPT | Performed by: NURSE PRACTITIONER

## 2018-09-25 PROCEDURE — 82962 GLUCOSE BLOOD TEST: CPT

## 2018-09-25 PROCEDURE — 25010000002 MORPHINE SULFATE (PF) 2 MG/ML SOLUTION: Performed by: FAMILY MEDICINE

## 2018-09-25 PROCEDURE — 85025 COMPLETE CBC W/AUTO DIFF WBC: CPT | Performed by: NURSE PRACTITIONER

## 2018-09-25 PROCEDURE — 71045 X-RAY EXAM CHEST 1 VIEW: CPT

## 2018-09-25 PROCEDURE — 94799 UNLISTED PULMONARY SVC/PX: CPT

## 2018-09-25 PROCEDURE — 81001 URINALYSIS AUTO W/SCOPE: CPT | Performed by: NURSE PRACTITIONER

## 2018-09-25 PROCEDURE — 87077 CULTURE AEROBIC IDENTIFY: CPT | Performed by: NURSE PRACTITIONER

## 2018-09-25 PROCEDURE — 87086 URINE CULTURE/COLONY COUNT: CPT | Performed by: NURSE PRACTITIONER

## 2018-09-25 PROCEDURE — 25010000002 ENOXAPARIN PER 10 MG: Performed by: NURSE PRACTITIONER

## 2018-09-25 PROCEDURE — 25010000002 LORAZEPAM PER 2 MG: Performed by: NURSE PRACTITIONER

## 2018-09-25 PROCEDURE — 87502 INFLUENZA DNA AMP PROBE: CPT | Performed by: NURSE PRACTITIONER

## 2018-09-25 RX ORDER — BISACODYL 10 MG
10 SUPPOSITORY, RECTAL RECTAL DAILY
Status: DISCONTINUED | OUTPATIENT
Start: 2018-09-26 | End: 2018-10-05

## 2018-09-25 RX ADMIN — METOPROLOL TARTRATE 50 MG: 50 TABLET ORAL at 20:07

## 2018-09-25 RX ADMIN — ACETAMINOPHEN 650 MG: 650 SUPPOSITORY RECTAL at 11:21

## 2018-09-25 RX ADMIN — GLYCOPYRROLATE 0.1 MG: 0.2 INJECTION, SOLUTION INTRAMUSCULAR; INTRAVENOUS at 17:26

## 2018-09-25 RX ADMIN — BACLOFEN 10 MG: 10 TABLET ORAL at 20:07

## 2018-09-25 RX ADMIN — METOPROLOL TARTRATE 50 MG: 50 TABLET ORAL at 08:58

## 2018-09-25 RX ADMIN — GLYCOPYRROLATE 0.1 MG: 0.2 INJECTION, SOLUTION INTRAMUSCULAR; INTRAVENOUS at 20:07

## 2018-09-25 RX ADMIN — QUETIAPINE FUMARATE 25 MG: 25 TABLET ORAL at 08:58

## 2018-09-25 RX ADMIN — CLOPIDOGREL BISULFATE 75 MG: 75 TABLET ORAL at 08:58

## 2018-09-25 RX ADMIN — FAMOTIDINE 20 MG: 20 TABLET ORAL at 20:07

## 2018-09-25 RX ADMIN — Medication 1 CAPSULE: at 08:59

## 2018-09-25 RX ADMIN — THIAMINE HCL TAB 100 MG 100 MG: 100 TAB at 08:58

## 2018-09-25 RX ADMIN — ENOXAPARIN SODIUM 40 MG: 100 INJECTION SUBCUTANEOUS at 08:57

## 2018-09-25 RX ADMIN — BACLOFEN 10 MG: 10 TABLET ORAL at 17:26

## 2018-09-25 RX ADMIN — POLYETHYLENE GLYCOL (3350) 17 G: 17 POWDER, FOR SOLUTION ORAL at 20:07

## 2018-09-25 RX ADMIN — GLYCOPYRROLATE 0.1 MG: 0.2 INJECTION, SOLUTION INTRAMUSCULAR; INTRAVENOUS at 08:59

## 2018-09-25 RX ADMIN — ASPIRIN 81 MG CHEWABLE TABLET 81 MG: 81 TABLET CHEWABLE at 08:58

## 2018-09-25 RX ADMIN — QUETIAPINE FUMARATE 25 MG: 25 TABLET ORAL at 20:07

## 2018-09-25 RX ADMIN — LORAZEPAM 1 MG: 2 INJECTION INTRAMUSCULAR; INTRAVENOUS at 08:59

## 2018-09-25 RX ADMIN — BACLOFEN 10 MG: 10 TABLET ORAL at 08:58

## 2018-09-25 RX ADMIN — MORPHINE SULFATE 1 MG: 2 INJECTION, SOLUTION INTRAMUSCULAR; INTRAVENOUS at 09:10

## 2018-09-25 RX ADMIN — POLYETHYLENE GLYCOL (3350) 17 G: 17 POWDER, FOR SOLUTION ORAL at 13:05

## 2018-09-25 RX ADMIN — FAMOTIDINE 20 MG: 20 TABLET ORAL at 08:58

## 2018-09-26 LAB
GLUCOSE BLDC GLUCOMTR-MCNC: 107 MG/DL (ref 70–130)
GLUCOSE BLDC GLUCOMTR-MCNC: 125 MG/DL (ref 70–130)
GLUCOSE BLDC GLUCOMTR-MCNC: 125 MG/DL (ref 70–130)
GLUCOSE BLDC GLUCOMTR-MCNC: 134 MG/DL (ref 70–130)

## 2018-09-26 PROCEDURE — 99232 SBSQ HOSP IP/OBS MODERATE 35: CPT | Performed by: HOSPITALIST

## 2018-09-26 PROCEDURE — 82962 GLUCOSE BLOOD TEST: CPT

## 2018-09-26 PROCEDURE — 25010000002 LORAZEPAM PER 2 MG: Performed by: NURSE PRACTITIONER

## 2018-09-26 PROCEDURE — 25010000002 ENOXAPARIN PER 10 MG: Performed by: NURSE PRACTITIONER

## 2018-09-26 PROCEDURE — 25010000002 CEFTRIAXONE PER 250 MG: Performed by: HOSPITALIST

## 2018-09-26 PROCEDURE — 25010000003 AMPICILLIN-SULBACTAM PER 1.5 G: Performed by: HOSPITALIST

## 2018-09-26 RX ORDER — IBUPROFEN 400 MG/1
400 TABLET ORAL EVERY 6 HOURS PRN
Status: DISCONTINUED | OUTPATIENT
Start: 2018-09-26 | End: 2018-10-13

## 2018-09-26 RX ORDER — CEFTRIAXONE SODIUM 1 G/50ML
1 INJECTION, SOLUTION INTRAVENOUS EVERY 24 HOURS
Status: COMPLETED | OUTPATIENT
Start: 2018-09-26 | End: 2018-10-05

## 2018-09-26 RX ADMIN — GLYCOPYRROLATE 0.1 MG: 0.2 INJECTION, SOLUTION INTRAMUSCULAR; INTRAVENOUS at 20:38

## 2018-09-26 RX ADMIN — ACETAMINOPHEN 650 MG: 650 SUPPOSITORY RECTAL at 12:08

## 2018-09-26 RX ADMIN — ACETAMINOPHEN 650 MG: 650 SUPPOSITORY RECTAL at 16:47

## 2018-09-26 RX ADMIN — POLYETHYLENE GLYCOL (3350) 17 G: 17 POWDER, FOR SOLUTION ORAL at 09:10

## 2018-09-26 RX ADMIN — AMPICILLIN AND SULBACTAM 1.5 G: 1; .5 INJECTION, POWDER, FOR SOLUTION INTRAMUSCULAR; INTRAVENOUS at 14:16

## 2018-09-26 RX ADMIN — METOPROLOL TARTRATE 50 MG: 50 TABLET ORAL at 09:11

## 2018-09-26 RX ADMIN — BISACODYL 10 MG: 10 SUPPOSITORY RECTAL at 09:10

## 2018-09-26 RX ADMIN — THIAMINE HCL TAB 100 MG 100 MG: 100 TAB at 09:11

## 2018-09-26 RX ADMIN — BACLOFEN 10 MG: 10 TABLET ORAL at 09:12

## 2018-09-26 RX ADMIN — GLYCOPYRROLATE 0.1 MG: 0.2 INJECTION, SOLUTION INTRAMUSCULAR; INTRAVENOUS at 16:26

## 2018-09-26 RX ADMIN — AMPICILLIN AND SULBACTAM 1.5 G: 1; .5 INJECTION, POWDER, FOR SOLUTION INTRAMUSCULAR; INTRAVENOUS at 12:02

## 2018-09-26 RX ADMIN — LORAZEPAM 1 MG: 2 INJECTION INTRAMUSCULAR; INTRAVENOUS at 12:12

## 2018-09-26 RX ADMIN — ASPIRIN 81 MG CHEWABLE TABLET 81 MG: 81 TABLET CHEWABLE at 09:12

## 2018-09-26 RX ADMIN — POLYETHYLENE GLYCOL (3350) 17 G: 17 POWDER, FOR SOLUTION ORAL at 20:39

## 2018-09-26 RX ADMIN — BACLOFEN 10 MG: 10 TABLET ORAL at 16:26

## 2018-09-26 RX ADMIN — CEFTRIAXONE SODIUM 1 G: 1 INJECTION, SOLUTION INTRAVENOUS at 10:57

## 2018-09-26 RX ADMIN — AMPICILLIN AND SULBACTAM 1.5 G: 1; .5 INJECTION, POWDER, FOR SOLUTION INTRAMUSCULAR; INTRAVENOUS at 20:39

## 2018-09-26 RX ADMIN — METOPROLOL TARTRATE 50 MG: 50 TABLET ORAL at 20:37

## 2018-09-26 RX ADMIN — GLYCOPYRROLATE 0.1 MG: 0.2 INJECTION, SOLUTION INTRAMUSCULAR; INTRAVENOUS at 09:11

## 2018-09-26 RX ADMIN — QUETIAPINE FUMARATE 25 MG: 25 TABLET ORAL at 09:12

## 2018-09-26 RX ADMIN — FAMOTIDINE 20 MG: 20 TABLET ORAL at 20:38

## 2018-09-26 RX ADMIN — CLOPIDOGREL BISULFATE 75 MG: 75 TABLET ORAL at 09:11

## 2018-09-26 RX ADMIN — ENOXAPARIN SODIUM 40 MG: 100 INJECTION SUBCUTANEOUS at 09:10

## 2018-09-26 RX ADMIN — BACLOFEN 10 MG: 10 TABLET ORAL at 20:38

## 2018-09-26 RX ADMIN — IBUPROFEN 400 MG: 400 TABLET ORAL at 20:38

## 2018-09-26 RX ADMIN — QUETIAPINE FUMARATE 25 MG: 25 TABLET ORAL at 20:38

## 2018-09-26 RX ADMIN — Medication 1 CAPSULE: at 09:10

## 2018-09-26 RX ADMIN — FAMOTIDINE 20 MG: 20 TABLET ORAL at 09:10

## 2018-09-27 LAB
ANION GAP SERPL CALCULATED.3IONS-SCNC: 8 MMOL/L (ref 3–11)
BACTERIA SPEC AEROBE CULT: ABNORMAL
BUN BLD-MCNC: 18 MG/DL (ref 9–23)
BUN/CREAT SERPL: 48.6 (ref 7–25)
CALCIUM SPEC-SCNC: 8.8 MG/DL (ref 8.7–10.4)
CHLORIDE SERPL-SCNC: 104 MMOL/L (ref 99–109)
CO2 SERPL-SCNC: 28 MMOL/L (ref 20–31)
CREAT BLD-MCNC: 0.37 MG/DL (ref 0.6–1.3)
DEPRECATED RDW RBC AUTO: 48.8 FL (ref 37–54)
ERYTHROCYTE [DISTWIDTH] IN BLOOD BY AUTOMATED COUNT: 13.9 % (ref 11.3–14.5)
GFR SERPL CREATININE-BSD FRML MDRD: >150 ML/MIN/1.73
GLUCOSE BLD-MCNC: 124 MG/DL (ref 70–100)
GLUCOSE BLDC GLUCOMTR-MCNC: 114 MG/DL (ref 70–130)
GLUCOSE BLDC GLUCOMTR-MCNC: 134 MG/DL (ref 70–130)
GLUCOSE BLDC GLUCOMTR-MCNC: 86 MG/DL (ref 70–130)
GLUCOSE BLDC GLUCOMTR-MCNC: 92 MG/DL (ref 70–130)
HCT VFR BLD AUTO: 29.7 % (ref 34.5–44)
HGB BLD-MCNC: 9.1 G/DL (ref 11.5–15.5)
MCH RBC QN AUTO: 29.3 PG (ref 27–31)
MCHC RBC AUTO-ENTMCNC: 30.6 G/DL (ref 32–36)
MCV RBC AUTO: 95.5 FL (ref 80–99)
PLATELET # BLD AUTO: 445 10*3/MM3 (ref 150–450)
PMV BLD AUTO: 9.7 FL (ref 6–12)
POTASSIUM BLD-SCNC: 3.8 MMOL/L (ref 3.5–5.5)
RBC # BLD AUTO: 3.11 10*6/MM3 (ref 3.89–5.14)
SODIUM BLD-SCNC: 140 MMOL/L (ref 132–146)
WBC NRBC COR # BLD: 10.88 10*3/MM3 (ref 3.5–10.8)

## 2018-09-27 PROCEDURE — 85027 COMPLETE CBC AUTOMATED: CPT | Performed by: HOSPITALIST

## 2018-09-27 PROCEDURE — 80048 BASIC METABOLIC PNL TOTAL CA: CPT | Performed by: HOSPITALIST

## 2018-09-27 PROCEDURE — 82962 GLUCOSE BLOOD TEST: CPT

## 2018-09-27 PROCEDURE — 99232 SBSQ HOSP IP/OBS MODERATE 35: CPT | Performed by: HOSPITALIST

## 2018-09-27 PROCEDURE — 25010000002 ENOXAPARIN PER 10 MG: Performed by: NURSE PRACTITIONER

## 2018-09-27 PROCEDURE — 25010000002 CEFTRIAXONE PER 250 MG: Performed by: HOSPITALIST

## 2018-09-27 PROCEDURE — 94760 N-INVAS EAR/PLS OXIMETRY 1: CPT

## 2018-09-27 PROCEDURE — 25010000003 AMPICILLIN-SULBACTAM PER 1.5 G: Performed by: HOSPITALIST

## 2018-09-27 PROCEDURE — 94799 UNLISTED PULMONARY SVC/PX: CPT

## 2018-09-27 RX ADMIN — POLYETHYLENE GLYCOL (3350) 17 G: 17 POWDER, FOR SOLUTION ORAL at 20:49

## 2018-09-27 RX ADMIN — METOPROLOL TARTRATE 50 MG: 50 TABLET ORAL at 20:53

## 2018-09-27 RX ADMIN — BISACODYL 10 MG: 10 SUPPOSITORY RECTAL at 07:45

## 2018-09-27 RX ADMIN — QUETIAPINE FUMARATE 25 MG: 25 TABLET ORAL at 07:44

## 2018-09-27 RX ADMIN — ASPIRIN 81 MG CHEWABLE TABLET 81 MG: 81 TABLET CHEWABLE at 07:46

## 2018-09-27 RX ADMIN — HYDROCODONE BITARTRATE AND ACETAMINOPHEN 1 TABLET: 5; 325 TABLET ORAL at 20:49

## 2018-09-27 RX ADMIN — Medication 1 CAPSULE: at 07:44

## 2018-09-27 RX ADMIN — ENOXAPARIN SODIUM 40 MG: 100 INJECTION SUBCUTANEOUS at 07:45

## 2018-09-27 RX ADMIN — QUETIAPINE FUMARATE 25 MG: 25 TABLET ORAL at 20:49

## 2018-09-27 RX ADMIN — GLYCOPYRROLATE 0.1 MG: 0.2 INJECTION, SOLUTION INTRAMUSCULAR; INTRAVENOUS at 16:47

## 2018-09-27 RX ADMIN — FAMOTIDINE 20 MG: 20 TABLET ORAL at 07:44

## 2018-09-27 RX ADMIN — BACLOFEN 10 MG: 10 TABLET ORAL at 16:48

## 2018-09-27 RX ADMIN — CLOPIDOGREL BISULFATE 75 MG: 75 TABLET ORAL at 07:44

## 2018-09-27 RX ADMIN — AMPICILLIN AND SULBACTAM 1.5 G: 1; .5 INJECTION, POWDER, FOR SOLUTION INTRAMUSCULAR; INTRAVENOUS at 03:02

## 2018-09-27 RX ADMIN — AMPICILLIN AND SULBACTAM 1.5 G: 1; .5 INJECTION, POWDER, FOR SOLUTION INTRAMUSCULAR; INTRAVENOUS at 07:50

## 2018-09-27 RX ADMIN — BACLOFEN 10 MG: 10 TABLET ORAL at 20:49

## 2018-09-27 RX ADMIN — CEFTRIAXONE SODIUM 1 G: 1 INJECTION, SOLUTION INTRAVENOUS at 11:41

## 2018-09-27 RX ADMIN — POLYETHYLENE GLYCOL (3350) 17 G: 17 POWDER, FOR SOLUTION ORAL at 07:45

## 2018-09-27 RX ADMIN — BACLOFEN 10 MG: 10 TABLET ORAL at 07:44

## 2018-09-27 RX ADMIN — GLYCOPYRROLATE 0.1 MG: 0.2 INJECTION, SOLUTION INTRAMUSCULAR; INTRAVENOUS at 07:45

## 2018-09-27 RX ADMIN — FAMOTIDINE 20 MG: 20 TABLET ORAL at 20:49

## 2018-09-27 RX ADMIN — METOPROLOL TARTRATE 50 MG: 50 TABLET ORAL at 07:45

## 2018-09-27 RX ADMIN — GLYCOPYRROLATE 0.1 MG: 0.2 INJECTION, SOLUTION INTRAMUSCULAR; INTRAVENOUS at 20:48

## 2018-09-28 LAB
DEPRECATED RDW RBC AUTO: 46.7 FL (ref 37–54)
ERYTHROCYTE [DISTWIDTH] IN BLOOD BY AUTOMATED COUNT: 13.7 % (ref 11.3–14.5)
GLUCOSE BLDC GLUCOMTR-MCNC: 118 MG/DL (ref 70–130)
HCT VFR BLD AUTO: 28.6 % (ref 34.5–44)
HGB BLD-MCNC: 8.9 G/DL (ref 11.5–15.5)
MCH RBC QN AUTO: 29.1 PG (ref 27–31)
MCHC RBC AUTO-ENTMCNC: 31.1 G/DL (ref 32–36)
MCV RBC AUTO: 93.5 FL (ref 80–99)
PLATELET # BLD AUTO: 530 10*3/MM3 (ref 150–450)
PMV BLD AUTO: 10.1 FL (ref 6–12)
RBC # BLD AUTO: 3.06 10*6/MM3 (ref 3.89–5.14)
WBC NRBC COR # BLD: 9.44 10*3/MM3 (ref 3.5–10.8)

## 2018-09-28 PROCEDURE — 99232 SBSQ HOSP IP/OBS MODERATE 35: CPT | Performed by: HOSPITALIST

## 2018-09-28 PROCEDURE — 25010000002 LORAZEPAM PER 2 MG: Performed by: NURSE PRACTITIONER

## 2018-09-28 PROCEDURE — 94760 N-INVAS EAR/PLS OXIMETRY 1: CPT

## 2018-09-28 PROCEDURE — 85027 COMPLETE CBC AUTOMATED: CPT | Performed by: HOSPITALIST

## 2018-09-28 PROCEDURE — 82962 GLUCOSE BLOOD TEST: CPT

## 2018-09-28 PROCEDURE — 25010000002 ENOXAPARIN PER 10 MG: Performed by: NURSE PRACTITIONER

## 2018-09-28 PROCEDURE — 25010000002 CEFTRIAXONE PER 250 MG: Performed by: HOSPITALIST

## 2018-09-28 PROCEDURE — 94799 UNLISTED PULMONARY SVC/PX: CPT

## 2018-09-28 RX ORDER — LORAZEPAM 1 MG/1
1 TABLET ORAL EVERY 6 HOURS PRN
Status: DISPENSED | OUTPATIENT
Start: 2018-09-28 | End: 2018-10-08

## 2018-09-28 RX ORDER — HYDROCODONE BITARTRATE AND ACETAMINOPHEN 5; 325 MG/1; MG/1
1 TABLET ORAL EVERY 6 HOURS PRN
Status: DISPENSED | OUTPATIENT
Start: 2018-09-28 | End: 2018-10-08

## 2018-09-28 RX ADMIN — FAMOTIDINE 20 MG: 20 TABLET ORAL at 08:08

## 2018-09-28 RX ADMIN — GLYCOPYRROLATE 0.1 MG: 0.2 INJECTION, SOLUTION INTRAMUSCULAR; INTRAVENOUS at 15:18

## 2018-09-28 RX ADMIN — FAMOTIDINE 20 MG: 20 TABLET ORAL at 21:17

## 2018-09-28 RX ADMIN — LORAZEPAM 1 MG: 2 INJECTION INTRAMUSCULAR; INTRAVENOUS at 00:36

## 2018-09-28 RX ADMIN — LORAZEPAM 1 MG: 1 TABLET ORAL at 16:41

## 2018-09-28 RX ADMIN — BISACODYL 10 MG: 10 SUPPOSITORY RECTAL at 08:09

## 2018-09-28 RX ADMIN — Medication 1 CAPSULE: at 08:09

## 2018-09-28 RX ADMIN — QUETIAPINE FUMARATE 25 MG: 25 TABLET ORAL at 08:09

## 2018-09-28 RX ADMIN — BACLOFEN 10 MG: 10 TABLET ORAL at 08:09

## 2018-09-28 RX ADMIN — METOPROLOL TARTRATE 50 MG: 50 TABLET ORAL at 21:17

## 2018-09-28 RX ADMIN — BACLOFEN 10 MG: 10 TABLET ORAL at 21:16

## 2018-09-28 RX ADMIN — GLYCOPYRROLATE 0.1 MG: 0.2 INJECTION, SOLUTION INTRAMUSCULAR; INTRAVENOUS at 08:11

## 2018-09-28 RX ADMIN — GLYCOPYRROLATE 0.1 MG: 0.2 INJECTION, SOLUTION INTRAMUSCULAR; INTRAVENOUS at 21:16

## 2018-09-28 RX ADMIN — BACLOFEN 10 MG: 10 TABLET ORAL at 15:18

## 2018-09-28 RX ADMIN — QUETIAPINE FUMARATE 25 MG: 25 TABLET ORAL at 21:16

## 2018-09-28 RX ADMIN — POLYETHYLENE GLYCOL (3350) 17 G: 17 POWDER, FOR SOLUTION ORAL at 08:09

## 2018-09-28 RX ADMIN — ENOXAPARIN SODIUM 40 MG: 100 INJECTION SUBCUTANEOUS at 08:13

## 2018-09-28 RX ADMIN — CEFTRIAXONE SODIUM 1 G: 1 INJECTION, SOLUTION INTRAVENOUS at 07:55

## 2018-09-28 RX ADMIN — ASPIRIN 81 MG CHEWABLE TABLET 81 MG: 81 TABLET CHEWABLE at 08:09

## 2018-09-28 RX ADMIN — METOPROLOL TARTRATE 50 MG: 50 TABLET ORAL at 08:08

## 2018-09-28 RX ADMIN — THIAMINE HCL TAB 100 MG 100 MG: 100 TAB at 08:09

## 2018-09-28 RX ADMIN — CLOPIDOGREL BISULFATE 75 MG: 75 TABLET ORAL at 08:08

## 2018-09-28 RX ADMIN — POLYETHYLENE GLYCOL (3350) 17 G: 17 POWDER, FOR SOLUTION ORAL at 21:17

## 2018-09-29 PROCEDURE — 25010000002 ENOXAPARIN PER 10 MG: Performed by: NURSE PRACTITIONER

## 2018-09-29 PROCEDURE — 25010000002 CEFTRIAXONE PER 250 MG: Performed by: HOSPITALIST

## 2018-09-29 PROCEDURE — 99231 SBSQ HOSP IP/OBS SF/LOW 25: CPT | Performed by: HOSPITALIST

## 2018-09-29 PROCEDURE — 94799 UNLISTED PULMONARY SVC/PX: CPT

## 2018-09-29 PROCEDURE — 94760 N-INVAS EAR/PLS OXIMETRY 1: CPT

## 2018-09-29 RX ADMIN — Medication 1 CAPSULE: at 08:51

## 2018-09-29 RX ADMIN — ENOXAPARIN SODIUM 40 MG: 100 INJECTION SUBCUTANEOUS at 08:51

## 2018-09-29 RX ADMIN — FAMOTIDINE 20 MG: 20 TABLET ORAL at 08:53

## 2018-09-29 RX ADMIN — METOPROLOL TARTRATE 50 MG: 50 TABLET ORAL at 09:04

## 2018-09-29 RX ADMIN — LORAZEPAM 1 MG: 1 TABLET ORAL at 21:00

## 2018-09-29 RX ADMIN — QUETIAPINE FUMARATE 25 MG: 25 TABLET ORAL at 08:50

## 2018-09-29 RX ADMIN — GLYCOPYRROLATE 0.1 MG: 0.2 INJECTION, SOLUTION INTRAMUSCULAR; INTRAVENOUS at 08:53

## 2018-09-29 RX ADMIN — FAMOTIDINE 20 MG: 20 TABLET ORAL at 20:55

## 2018-09-29 RX ADMIN — CEFTRIAXONE SODIUM 1 G: 1 INJECTION, SOLUTION INTRAVENOUS at 08:52

## 2018-09-29 RX ADMIN — GLYCOPYRROLATE 0.1 MG: 0.2 INJECTION, SOLUTION INTRAMUSCULAR; INTRAVENOUS at 20:55

## 2018-09-29 RX ADMIN — POLYETHYLENE GLYCOL (3350) 17 G: 17 POWDER, FOR SOLUTION ORAL at 08:50

## 2018-09-29 RX ADMIN — BACLOFEN 10 MG: 10 TABLET ORAL at 08:51

## 2018-09-29 RX ADMIN — POLYETHYLENE GLYCOL (3350) 17 G: 17 POWDER, FOR SOLUTION ORAL at 20:55

## 2018-09-29 RX ADMIN — METOPROLOL TARTRATE 50 MG: 50 TABLET ORAL at 20:55

## 2018-09-29 RX ADMIN — BACLOFEN 10 MG: 10 TABLET ORAL at 16:42

## 2018-09-29 RX ADMIN — BACLOFEN 10 MG: 10 TABLET ORAL at 20:55

## 2018-09-29 RX ADMIN — GLYCOPYRROLATE 0.1 MG: 0.2 INJECTION, SOLUTION INTRAMUSCULAR; INTRAVENOUS at 16:42

## 2018-09-29 RX ADMIN — CLOPIDOGREL BISULFATE 75 MG: 75 TABLET ORAL at 08:50

## 2018-09-29 RX ADMIN — QUETIAPINE FUMARATE 25 MG: 25 TABLET ORAL at 20:55

## 2018-09-29 RX ADMIN — ASPIRIN 81 MG CHEWABLE TABLET 81 MG: 81 TABLET CHEWABLE at 08:53

## 2018-09-29 RX ADMIN — THIAMINE HCL TAB 100 MG 100 MG: 100 TAB at 08:51

## 2018-09-29 RX ADMIN — HYDROCODONE BITARTRATE AND ACETAMINOPHEN 1 TABLET: 5; 325 TABLET ORAL at 21:00

## 2018-09-30 LAB
BACTERIA SPEC AEROBE CULT: NORMAL
BACTERIA SPEC AEROBE CULT: NORMAL

## 2018-09-30 PROCEDURE — 25010000002 LORAZEPAM PER 2 MG: Performed by: HOSPITALIST

## 2018-09-30 PROCEDURE — 25010000002 CEFTRIAXONE PER 250 MG: Performed by: HOSPITALIST

## 2018-09-30 PROCEDURE — 25010000002 ENOXAPARIN PER 10 MG: Performed by: NURSE PRACTITIONER

## 2018-09-30 PROCEDURE — 99231 SBSQ HOSP IP/OBS SF/LOW 25: CPT | Performed by: HOSPITALIST

## 2018-09-30 RX ORDER — GUAR GUM
1 PACKET (EA) ORAL 3 TIMES DAILY
Status: DISCONTINUED | OUTPATIENT
Start: 2018-09-30 | End: 2018-10-01

## 2018-09-30 RX ADMIN — POLYETHYLENE GLYCOL (3350) 17 G: 17 POWDER, FOR SOLUTION ORAL at 10:08

## 2018-09-30 RX ADMIN — LORAZEPAM 1 MG: 2 INJECTION INTRAMUSCULAR; INTRAVENOUS at 10:08

## 2018-09-30 RX ADMIN — CEFTRIAXONE SODIUM 1 G: 1 INJECTION, SOLUTION INTRAVENOUS at 10:08

## 2018-09-30 RX ADMIN — BACLOFEN 10 MG: 10 TABLET ORAL at 21:21

## 2018-09-30 RX ADMIN — BACLOFEN 10 MG: 10 TABLET ORAL at 10:11

## 2018-09-30 RX ADMIN — GLYCOPYRROLATE 0.1 MG: 0.2 INJECTION, SOLUTION INTRAMUSCULAR; INTRAVENOUS at 21:21

## 2018-09-30 RX ADMIN — GLYCOPYRROLATE 0.1 MG: 0.2 INJECTION, SOLUTION INTRAMUSCULAR; INTRAVENOUS at 10:09

## 2018-09-30 RX ADMIN — THIAMINE HCL TAB 100 MG 100 MG: 100 TAB at 10:09

## 2018-09-30 RX ADMIN — BISACODYL 10 MG: 10 SUPPOSITORY RECTAL at 10:09

## 2018-09-30 RX ADMIN — FAMOTIDINE 20 MG: 20 TABLET ORAL at 21:21

## 2018-09-30 RX ADMIN — GLYCOPYRROLATE 0.1 MG: 0.2 INJECTION, SOLUTION INTRAMUSCULAR; INTRAVENOUS at 15:58

## 2018-09-30 RX ADMIN — METOPROLOL TARTRATE 50 MG: 50 TABLET ORAL at 10:09

## 2018-09-30 RX ADMIN — CLOPIDOGREL BISULFATE 75 MG: 75 TABLET ORAL at 10:10

## 2018-09-30 RX ADMIN — QUETIAPINE FUMARATE 25 MG: 25 TABLET ORAL at 21:21

## 2018-09-30 RX ADMIN — Medication 1 CAPSULE: at 10:09

## 2018-09-30 RX ADMIN — QUETIAPINE FUMARATE 25 MG: 25 TABLET ORAL at 10:10

## 2018-09-30 RX ADMIN — ASPIRIN 81 MG CHEWABLE TABLET 81 MG: 81 TABLET CHEWABLE at 10:10

## 2018-09-30 RX ADMIN — BACLOFEN 10 MG: 10 TABLET ORAL at 15:59

## 2018-09-30 RX ADMIN — Medication 1 PACKET: at 22:25

## 2018-09-30 RX ADMIN — FAMOTIDINE 20 MG: 20 TABLET ORAL at 10:10

## 2018-09-30 RX ADMIN — ENOXAPARIN SODIUM 40 MG: 100 INJECTION SUBCUTANEOUS at 10:08

## 2018-10-01 LAB
ALBUMIN SERPL-MCNC: 3.93 G/DL (ref 3.2–4.8)
ALP SERPL-CCNC: 96 U/L (ref 25–100)
ALT SERPL W P-5'-P-CCNC: 91 U/L (ref 7–40)
ANION GAP SERPL CALCULATED.3IONS-SCNC: 12 MMOL/L (ref 3–11)
AST SERPL-CCNC: 47 U/L (ref 0–33)
BILIRUB SERPL-MCNC: 0.3 MG/DL (ref 0.3–1.2)
BUN BLD-MCNC: 12 MG/DL (ref 9–23)
CALCIUM SPEC-SCNC: 9.5 MG/DL (ref 8.7–10.4)
CHLORIDE SERPL-SCNC: 99 MMOL/L (ref 99–109)
CHOLEST SERPL-MCNC: 134 MG/DL (ref 0–200)
CO2 SERPL-SCNC: 24 MMOL/L (ref 20–31)
CREAT BLD-MCNC: 0.41 MG/DL (ref 0.6–1.3)
GLUCOSE BLD-MCNC: 140 MG/DL (ref 70–100)
MAGNESIUM SERPL-MCNC: 1.8 MG/DL (ref 1.3–2.7)
PHOSPHATE SERPL-MCNC: 4.2 MG/DL (ref 2.4–5.1)
POTASSIUM BLD-SCNC: 3.9 MMOL/L (ref 3.5–5.5)
PROT SERPL-MCNC: 6.9 G/DL (ref 5.7–8.2)
SODIUM BLD-SCNC: 135 MMOL/L (ref 132–146)
TRIGL SERPL-MCNC: 90 MG/DL (ref 0–150)

## 2018-10-01 PROCEDURE — 82465 ASSAY BLD/SERUM CHOLESTEROL: CPT

## 2018-10-01 PROCEDURE — 84478 ASSAY OF TRIGLYCERIDES: CPT

## 2018-10-01 PROCEDURE — 25010000002 ENOXAPARIN PER 10 MG: Performed by: NURSE PRACTITIONER

## 2018-10-01 PROCEDURE — 99232 SBSQ HOSP IP/OBS MODERATE 35: CPT | Performed by: INTERNAL MEDICINE

## 2018-10-01 PROCEDURE — 25010000002 CEFTRIAXONE PER 250 MG: Performed by: HOSPITALIST

## 2018-10-01 PROCEDURE — 84100 ASSAY OF PHOSPHORUS: CPT

## 2018-10-01 PROCEDURE — 83735 ASSAY OF MAGNESIUM: CPT

## 2018-10-01 PROCEDURE — 80053 COMPREHEN METABOLIC PANEL: CPT

## 2018-10-01 RX ADMIN — ASPIRIN 81 MG CHEWABLE TABLET 81 MG: 81 TABLET CHEWABLE at 09:17

## 2018-10-01 RX ADMIN — GLYCOPYRROLATE 0.1 MG: 0.2 INJECTION, SOLUTION INTRAMUSCULAR; INTRAVENOUS at 20:44

## 2018-10-01 RX ADMIN — POLYETHYLENE GLYCOL (3350) 17 G: 17 POWDER, FOR SOLUTION ORAL at 20:44

## 2018-10-01 RX ADMIN — QUETIAPINE FUMARATE 25 MG: 25 TABLET ORAL at 20:44

## 2018-10-01 RX ADMIN — BISACODYL 10 MG: 10 SUPPOSITORY RECTAL at 09:16

## 2018-10-01 RX ADMIN — METOPROLOL TARTRATE 50 MG: 50 TABLET ORAL at 09:16

## 2018-10-01 RX ADMIN — QUETIAPINE FUMARATE 25 MG: 25 TABLET ORAL at 09:17

## 2018-10-01 RX ADMIN — METOPROLOL TARTRATE 50 MG: 50 TABLET ORAL at 20:44

## 2018-10-01 RX ADMIN — GLYCOPYRROLATE 0.1 MG: 0.2 INJECTION, SOLUTION INTRAMUSCULAR; INTRAVENOUS at 09:15

## 2018-10-01 RX ADMIN — BACLOFEN 10 MG: 10 TABLET ORAL at 09:16

## 2018-10-01 RX ADMIN — CEFTRIAXONE SODIUM 1 G: 1 INJECTION, SOLUTION INTRAVENOUS at 09:22

## 2018-10-01 RX ADMIN — MAGNESIUM SULFATE HEPTAHYDRATE 4 G: 40 INJECTION, SOLUTION INTRAVENOUS at 10:39

## 2018-10-01 RX ADMIN — POLYETHYLENE GLYCOL (3350) 17 G: 17 POWDER, FOR SOLUTION ORAL at 09:17

## 2018-10-01 RX ADMIN — BACLOFEN 10 MG: 10 TABLET ORAL at 16:27

## 2018-10-01 RX ADMIN — Medication 1 CAPSULE: at 09:15

## 2018-10-01 RX ADMIN — ENOXAPARIN SODIUM 40 MG: 100 INJECTION SUBCUTANEOUS at 09:17

## 2018-10-01 RX ADMIN — FAMOTIDINE 20 MG: 20 TABLET ORAL at 09:17

## 2018-10-01 RX ADMIN — THIAMINE HCL TAB 100 MG 100 MG: 100 TAB at 09:17

## 2018-10-01 RX ADMIN — FAMOTIDINE 20 MG: 20 TABLET ORAL at 20:44

## 2018-10-01 RX ADMIN — CLOPIDOGREL BISULFATE 75 MG: 75 TABLET ORAL at 09:16

## 2018-10-01 RX ADMIN — GLYCOPYRROLATE 0.1 MG: 0.2 INJECTION, SOLUTION INTRAMUSCULAR; INTRAVENOUS at 16:26

## 2018-10-01 RX ADMIN — BACLOFEN 10 MG: 10 TABLET ORAL at 20:44

## 2018-10-01 RX ADMIN — LORAZEPAM 1 MG: 1 TABLET ORAL at 09:16

## 2018-10-02 LAB
ANION GAP SERPL CALCULATED.3IONS-SCNC: 9 MMOL/L (ref 3–11)
BUN BLD-MCNC: 12 MG/DL (ref 9–23)
BUN/CREAT SERPL: 27.9 (ref 7–25)
CALCIUM SPEC-SCNC: 9 MG/DL (ref 8.7–10.4)
CHLORIDE SERPL-SCNC: 98 MMOL/L (ref 99–109)
CO2 SERPL-SCNC: 28 MMOL/L (ref 20–31)
CREAT BLD-MCNC: 0.43 MG/DL (ref 0.6–1.3)
GFR SERPL CREATININE-BSD FRML MDRD: >150 ML/MIN/1.73
GLUCOSE BLD-MCNC: 118 MG/DL (ref 70–100)
GLUCOSE BLDC GLUCOMTR-MCNC: 139 MG/DL (ref 70–130)
GLUCOSE BLDC GLUCOMTR-MCNC: 84 MG/DL (ref 70–130)
MAGNESIUM SERPL-MCNC: 2 MG/DL (ref 1.3–2.7)
POTASSIUM BLD-SCNC: 4 MMOL/L (ref 3.5–5.5)
SODIUM BLD-SCNC: 135 MMOL/L (ref 132–146)

## 2018-10-02 PROCEDURE — 82962 GLUCOSE BLOOD TEST: CPT

## 2018-10-02 PROCEDURE — 25010000002 CEFTRIAXONE PER 250 MG: Performed by: HOSPITALIST

## 2018-10-02 PROCEDURE — 80048 BASIC METABOLIC PNL TOTAL CA: CPT

## 2018-10-02 PROCEDURE — 97161 PT EVAL LOW COMPLEX 20 MIN: CPT

## 2018-10-02 PROCEDURE — 99231 SBSQ HOSP IP/OBS SF/LOW 25: CPT | Performed by: NURSE PRACTITIONER

## 2018-10-02 PROCEDURE — 25010000002 ENOXAPARIN PER 10 MG: Performed by: NURSE PRACTITIONER

## 2018-10-02 PROCEDURE — G8978 MOBILITY CURRENT STATUS: HCPCS

## 2018-10-02 PROCEDURE — 94799 UNLISTED PULMONARY SVC/PX: CPT

## 2018-10-02 PROCEDURE — G8979 MOBILITY GOAL STATUS: HCPCS

## 2018-10-02 PROCEDURE — 83735 ASSAY OF MAGNESIUM: CPT | Performed by: INTERNAL MEDICINE

## 2018-10-02 RX ADMIN — GLYCOPYRROLATE 0.1 MG: 0.2 INJECTION, SOLUTION INTRAMUSCULAR; INTRAVENOUS at 21:11

## 2018-10-02 RX ADMIN — METOPROLOL TARTRATE 50 MG: 50 TABLET ORAL at 08:10

## 2018-10-02 RX ADMIN — FAMOTIDINE 20 MG: 20 TABLET ORAL at 21:11

## 2018-10-02 RX ADMIN — BACLOFEN 10 MG: 10 TABLET ORAL at 17:20

## 2018-10-02 RX ADMIN — HYDROCODONE BITARTRATE AND ACETAMINOPHEN 1 TABLET: 5; 325 TABLET ORAL at 10:58

## 2018-10-02 RX ADMIN — THIAMINE HCL TAB 100 MG 100 MG: 100 TAB at 08:09

## 2018-10-02 RX ADMIN — BACLOFEN 10 MG: 10 TABLET ORAL at 21:12

## 2018-10-02 RX ADMIN — LORAZEPAM 1 MG: 1 TABLET ORAL at 13:30

## 2018-10-02 RX ADMIN — METOPROLOL TARTRATE 50 MG: 50 TABLET ORAL at 21:11

## 2018-10-02 RX ADMIN — GLYCOPYRROLATE 0.1 MG: 0.2 INJECTION, SOLUTION INTRAMUSCULAR; INTRAVENOUS at 17:19

## 2018-10-02 RX ADMIN — QUETIAPINE FUMARATE 25 MG: 25 TABLET ORAL at 08:29

## 2018-10-02 RX ADMIN — GLYCOPYRROLATE 0.1 MG: 0.2 INJECTION, SOLUTION INTRAMUSCULAR; INTRAVENOUS at 08:10

## 2018-10-02 RX ADMIN — BACLOFEN 10 MG: 10 TABLET ORAL at 08:09

## 2018-10-02 RX ADMIN — FAMOTIDINE 20 MG: 20 TABLET ORAL at 08:10

## 2018-10-02 RX ADMIN — ENOXAPARIN SODIUM 40 MG: 100 INJECTION SUBCUTANEOUS at 08:10

## 2018-10-02 RX ADMIN — CLOPIDOGREL BISULFATE 75 MG: 75 TABLET ORAL at 08:09

## 2018-10-02 RX ADMIN — Medication 1 CAPSULE: at 08:09

## 2018-10-02 RX ADMIN — QUETIAPINE FUMARATE 25 MG: 25 TABLET ORAL at 21:11

## 2018-10-02 RX ADMIN — ASPIRIN 81 MG CHEWABLE TABLET 81 MG: 81 TABLET CHEWABLE at 08:09

## 2018-10-02 RX ADMIN — POLYETHYLENE GLYCOL (3350) 17 G: 17 POWDER, FOR SOLUTION ORAL at 08:10

## 2018-10-02 RX ADMIN — CEFTRIAXONE SODIUM 1 G: 1 INJECTION, SOLUTION INTRAVENOUS at 08:22

## 2018-10-03 LAB
GLUCOSE BLDC GLUCOMTR-MCNC: 91 MG/DL (ref 70–130)
GLUCOSE BLDC GLUCOMTR-MCNC: 92 MG/DL (ref 70–130)
GLUCOSE BLDC GLUCOMTR-MCNC: 92 MG/DL (ref 70–130)
GLUCOSE BLDC GLUCOMTR-MCNC: 94 MG/DL (ref 70–130)
GLUCOSE BLDC GLUCOMTR-MCNC: 95 MG/DL (ref 70–130)

## 2018-10-03 PROCEDURE — 99231 SBSQ HOSP IP/OBS SF/LOW 25: CPT | Performed by: NURSE PRACTITIONER

## 2018-10-03 PROCEDURE — 25010000002 CEFTRIAXONE PER 250 MG: Performed by: HOSPITALIST

## 2018-10-03 PROCEDURE — 97110 THERAPEUTIC EXERCISES: CPT

## 2018-10-03 PROCEDURE — 82962 GLUCOSE BLOOD TEST: CPT

## 2018-10-03 PROCEDURE — 94799 UNLISTED PULMONARY SVC/PX: CPT

## 2018-10-03 PROCEDURE — 94760 N-INVAS EAR/PLS OXIMETRY 1: CPT

## 2018-10-03 PROCEDURE — 25010000002 ENOXAPARIN PER 10 MG: Performed by: NURSE PRACTITIONER

## 2018-10-03 RX ORDER — DIAZEPAM 5 MG/1
5 TABLET ORAL 2 TIMES DAILY
Status: COMPLETED | OUTPATIENT
Start: 2018-10-03 | End: 2018-10-12

## 2018-10-03 RX ORDER — FLUOXETINE HYDROCHLORIDE 20 MG/5ML
20 LIQUID ORAL DAILY
Status: DISCONTINUED | OUTPATIENT
Start: 2018-10-03 | End: 2018-10-24 | Stop reason: HOSPADM

## 2018-10-03 RX ADMIN — THIAMINE HCL TAB 100 MG 100 MG: 100 TAB at 07:38

## 2018-10-03 RX ADMIN — QUETIAPINE FUMARATE 25 MG: 25 TABLET ORAL at 07:38

## 2018-10-03 RX ADMIN — DIAZEPAM 5 MG: 5 TABLET ORAL at 22:14

## 2018-10-03 RX ADMIN — FAMOTIDINE 20 MG: 20 TABLET ORAL at 07:38

## 2018-10-03 RX ADMIN — GLYCOPYRROLATE 0.1 MG: 0.2 INJECTION, SOLUTION INTRAMUSCULAR; INTRAVENOUS at 15:29

## 2018-10-03 RX ADMIN — LORAZEPAM 1 MG: 1 TABLET ORAL at 16:13

## 2018-10-03 RX ADMIN — Medication 1 CAPSULE: at 07:38

## 2018-10-03 RX ADMIN — ENOXAPARIN SODIUM 40 MG: 100 INJECTION SUBCUTANEOUS at 07:37

## 2018-10-03 RX ADMIN — QUETIAPINE FUMARATE 25 MG: 25 TABLET ORAL at 22:15

## 2018-10-03 RX ADMIN — METOPROLOL TARTRATE 50 MG: 50 TABLET ORAL at 07:41

## 2018-10-03 RX ADMIN — BACLOFEN 10 MG: 10 TABLET ORAL at 15:29

## 2018-10-03 RX ADMIN — HYDROCODONE BITARTRATE AND ACETAMINOPHEN 1 TABLET: 5; 325 TABLET ORAL at 09:29

## 2018-10-03 RX ADMIN — ASPIRIN 81 MG CHEWABLE TABLET 81 MG: 81 TABLET CHEWABLE at 07:44

## 2018-10-03 RX ADMIN — CEFTRIAXONE SODIUM 1 G: 1 INJECTION, SOLUTION INTRAVENOUS at 07:32

## 2018-10-03 RX ADMIN — METOPROLOL TARTRATE 50 MG: 50 TABLET ORAL at 22:15

## 2018-10-03 RX ADMIN — DIAZEPAM 5 MG: 5 TABLET ORAL at 10:24

## 2018-10-03 RX ADMIN — BACLOFEN 10 MG: 10 TABLET ORAL at 07:38

## 2018-10-03 RX ADMIN — FAMOTIDINE 20 MG: 20 TABLET ORAL at 22:14

## 2018-10-03 RX ADMIN — CLOPIDOGREL BISULFATE 75 MG: 75 TABLET ORAL at 07:38

## 2018-10-03 RX ADMIN — FLUOXETINE HYDROCHLORIDE 20 MG: 20 SOLUTION ORAL at 10:29

## 2018-10-03 RX ADMIN — GLYCOPYRROLATE 0.1 MG: 0.2 INJECTION, SOLUTION INTRAMUSCULAR; INTRAVENOUS at 07:39

## 2018-10-03 RX ADMIN — BACLOFEN 10 MG: 10 TABLET ORAL at 22:14

## 2018-10-03 RX ADMIN — GLYCOPYRROLATE 0.1 MG: 0.2 INJECTION, SOLUTION INTRAMUSCULAR; INTRAVENOUS at 22:15

## 2018-10-04 LAB — GLUCOSE BLDC GLUCOMTR-MCNC: 104 MG/DL (ref 70–130)

## 2018-10-04 PROCEDURE — 25010000002 ENOXAPARIN PER 10 MG: Performed by: NURSE PRACTITIONER

## 2018-10-04 PROCEDURE — 25010000002 CEFTRIAXONE PER 250 MG: Performed by: HOSPITALIST

## 2018-10-04 PROCEDURE — 82962 GLUCOSE BLOOD TEST: CPT

## 2018-10-04 PROCEDURE — 99232 SBSQ HOSP IP/OBS MODERATE 35: CPT | Performed by: INTERNAL MEDICINE

## 2018-10-04 RX ADMIN — BACLOFEN 10 MG: 10 TABLET ORAL at 08:32

## 2018-10-04 RX ADMIN — THIAMINE HCL TAB 100 MG 100 MG: 100 TAB at 08:33

## 2018-10-04 RX ADMIN — ASPIRIN 81 MG CHEWABLE TABLET 81 MG: 81 TABLET CHEWABLE at 08:33

## 2018-10-04 RX ADMIN — DIAZEPAM 5 MG: 5 TABLET ORAL at 08:33

## 2018-10-04 RX ADMIN — METOPROLOL TARTRATE 50 MG: 50 TABLET ORAL at 20:32

## 2018-10-04 RX ADMIN — GLYCOPYRROLATE 0.1 MG: 0.2 INJECTION, SOLUTION INTRAMUSCULAR; INTRAVENOUS at 08:33

## 2018-10-04 RX ADMIN — CEFTRIAXONE SODIUM 1 G: 1 INJECTION, SOLUTION INTRAVENOUS at 08:32

## 2018-10-04 RX ADMIN — METOPROLOL TARTRATE 50 MG: 50 TABLET ORAL at 08:32

## 2018-10-04 RX ADMIN — LORAZEPAM 1 MG: 1 TABLET ORAL at 13:02

## 2018-10-04 RX ADMIN — BACLOFEN 10 MG: 10 TABLET ORAL at 16:41

## 2018-10-04 RX ADMIN — FAMOTIDINE 20 MG: 20 TABLET ORAL at 20:29

## 2018-10-04 RX ADMIN — QUETIAPINE FUMARATE 25 MG: 25 TABLET ORAL at 20:29

## 2018-10-04 RX ADMIN — HYDROCODONE BITARTRATE AND ACETAMINOPHEN 1 TABLET: 5; 325 TABLET ORAL at 20:29

## 2018-10-04 RX ADMIN — BISACODYL 10 MG: 10 SUPPOSITORY RECTAL at 08:33

## 2018-10-04 RX ADMIN — GLYCOPYRROLATE 0.1 MG: 0.2 INJECTION, SOLUTION INTRAMUSCULAR; INTRAVENOUS at 20:29

## 2018-10-04 RX ADMIN — BACLOFEN 10 MG: 10 TABLET ORAL at 20:29

## 2018-10-04 RX ADMIN — FAMOTIDINE 20 MG: 20 TABLET ORAL at 08:33

## 2018-10-04 RX ADMIN — DIAZEPAM 5 MG: 5 TABLET ORAL at 20:29

## 2018-10-04 RX ADMIN — HYDROCODONE BITARTRATE AND ACETAMINOPHEN 1 TABLET: 5; 325 TABLET ORAL at 13:02

## 2018-10-04 RX ADMIN — Medication 1 CAPSULE: at 08:33

## 2018-10-04 RX ADMIN — FLUOXETINE HYDROCHLORIDE 20 MG: 20 SOLUTION ORAL at 08:32

## 2018-10-04 RX ADMIN — QUETIAPINE FUMARATE 25 MG: 25 TABLET ORAL at 08:33

## 2018-10-04 RX ADMIN — GLYCOPYRROLATE 0.1 MG: 0.2 INJECTION, SOLUTION INTRAMUSCULAR; INTRAVENOUS at 16:41

## 2018-10-04 RX ADMIN — ENOXAPARIN SODIUM 40 MG: 100 INJECTION SUBCUTANEOUS at 08:32

## 2018-10-05 PROCEDURE — 25010000002 CEFTRIAXONE PER 250 MG: Performed by: HOSPITALIST

## 2018-10-05 PROCEDURE — 97110 THERAPEUTIC EXERCISES: CPT

## 2018-10-05 PROCEDURE — 25010000002 HALOPERIDOL LACTATE PER 5 MG: Performed by: INTERNAL MEDICINE

## 2018-10-05 PROCEDURE — 25010000002 ENOXAPARIN PER 10 MG: Performed by: NURSE PRACTITIONER

## 2018-10-05 PROCEDURE — 25010000002 LORAZEPAM PER 2 MG: Performed by: HOSPITALIST

## 2018-10-05 PROCEDURE — 99232 SBSQ HOSP IP/OBS MODERATE 35: CPT | Performed by: INTERNAL MEDICINE

## 2018-10-05 RX ADMIN — HALOPERIDOL LACTATE 2 MG: 5 INJECTION, SOLUTION INTRAMUSCULAR at 03:55

## 2018-10-05 RX ADMIN — GLYCOPYRROLATE 0.1 MG: 0.2 INJECTION, SOLUTION INTRAMUSCULAR; INTRAVENOUS at 08:40

## 2018-10-05 RX ADMIN — HALOPERIDOL LACTATE 2 MG: 5 INJECTION, SOLUTION INTRAMUSCULAR at 19:14

## 2018-10-05 RX ADMIN — FLUOXETINE HYDROCHLORIDE 20 MG: 20 SOLUTION ORAL at 08:40

## 2018-10-05 RX ADMIN — IBUPROFEN 400 MG: 400 TABLET ORAL at 20:34

## 2018-10-05 RX ADMIN — Medication 1 CAPSULE: at 08:40

## 2018-10-05 RX ADMIN — POLYETHYLENE GLYCOL 3350 17 G: 17 POWDER, FOR SOLUTION ORAL at 22:35

## 2018-10-05 RX ADMIN — ENOXAPARIN SODIUM 40 MG: 100 INJECTION SUBCUTANEOUS at 08:41

## 2018-10-05 RX ADMIN — DIAZEPAM 5 MG: 5 TABLET ORAL at 20:34

## 2018-10-05 RX ADMIN — BACLOFEN 10 MG: 10 TABLET ORAL at 20:34

## 2018-10-05 RX ADMIN — ASPIRIN 81 MG CHEWABLE TABLET 81 MG: 81 TABLET CHEWABLE at 08:41

## 2018-10-05 RX ADMIN — BACLOFEN 10 MG: 10 TABLET ORAL at 15:07

## 2018-10-05 RX ADMIN — GLYCOPYRROLATE 0.1 MG: 0.2 INJECTION, SOLUTION INTRAMUSCULAR; INTRAVENOUS at 20:35

## 2018-10-05 RX ADMIN — CEFTRIAXONE SODIUM 1 G: 1 INJECTION, SOLUTION INTRAVENOUS at 08:40

## 2018-10-05 RX ADMIN — QUETIAPINE FUMARATE 25 MG: 25 TABLET ORAL at 20:34

## 2018-10-05 RX ADMIN — FAMOTIDINE 20 MG: 20 TABLET ORAL at 08:41

## 2018-10-05 RX ADMIN — LORAZEPAM 1 MG: 2 INJECTION INTRAMUSCULAR; INTRAVENOUS at 11:29

## 2018-10-05 RX ADMIN — THIAMINE HCL TAB 100 MG 100 MG: 100 TAB at 08:41

## 2018-10-05 RX ADMIN — HYDROCODONE BITARTRATE AND ACETAMINOPHEN 1 TABLET: 5; 325 TABLET ORAL at 05:10

## 2018-10-05 RX ADMIN — METOPROLOL TARTRATE 50 MG: 50 TABLET ORAL at 08:41

## 2018-10-05 RX ADMIN — FAMOTIDINE 20 MG: 20 TABLET ORAL at 20:34

## 2018-10-05 RX ADMIN — HYDROCODONE BITARTRATE AND ACETAMINOPHEN 1 TABLET: 5; 325 TABLET ORAL at 22:34

## 2018-10-05 RX ADMIN — HYDROCODONE BITARTRATE AND ACETAMINOPHEN 1 TABLET: 5; 325 TABLET ORAL at 15:07

## 2018-10-05 RX ADMIN — BACLOFEN 10 MG: 10 TABLET ORAL at 08:41

## 2018-10-05 RX ADMIN — QUETIAPINE FUMARATE 25 MG: 25 TABLET ORAL at 08:41

## 2018-10-05 RX ADMIN — DIAZEPAM 5 MG: 5 TABLET ORAL at 08:41

## 2018-10-05 RX ADMIN — BISACODYL 10 MG: 10 SUPPOSITORY RECTAL at 08:40

## 2018-10-05 RX ADMIN — GLYCOPYRROLATE 0.1 MG: 0.2 INJECTION, SOLUTION INTRAMUSCULAR; INTRAVENOUS at 15:07

## 2018-10-06 PROCEDURE — 99232 SBSQ HOSP IP/OBS MODERATE 35: CPT | Performed by: INTERNAL MEDICINE

## 2018-10-06 PROCEDURE — 25010000002 ENOXAPARIN PER 10 MG: Performed by: NURSE PRACTITIONER

## 2018-10-06 PROCEDURE — 25010000002 LORAZEPAM PER 2 MG: Performed by: HOSPITALIST

## 2018-10-06 RX ADMIN — QUETIAPINE FUMARATE 25 MG: 25 TABLET ORAL at 09:17

## 2018-10-06 RX ADMIN — QUETIAPINE FUMARATE 25 MG: 25 TABLET ORAL at 20:15

## 2018-10-06 RX ADMIN — FLUOXETINE HYDROCHLORIDE 20 MG: 20 SOLUTION ORAL at 09:21

## 2018-10-06 RX ADMIN — DIAZEPAM 5 MG: 5 TABLET ORAL at 20:14

## 2018-10-06 RX ADMIN — ENOXAPARIN SODIUM 40 MG: 100 INJECTION SUBCUTANEOUS at 09:20

## 2018-10-06 RX ADMIN — Medication 1 CAPSULE: at 09:19

## 2018-10-06 RX ADMIN — FAMOTIDINE 20 MG: 20 TABLET ORAL at 20:15

## 2018-10-06 RX ADMIN — METOPROLOL TARTRATE 50 MG: 50 TABLET ORAL at 09:18

## 2018-10-06 RX ADMIN — GLYCOPYRROLATE 0.1 MG: 0.2 INJECTION, SOLUTION INTRAMUSCULAR; INTRAVENOUS at 17:34

## 2018-10-06 RX ADMIN — BACLOFEN 10 MG: 10 TABLET ORAL at 20:14

## 2018-10-06 RX ADMIN — BACLOFEN 10 MG: 10 TABLET ORAL at 09:17

## 2018-10-06 RX ADMIN — THIAMINE HCL TAB 100 MG 100 MG: 100 TAB at 09:19

## 2018-10-06 RX ADMIN — LORAZEPAM 1 MG: 2 INJECTION INTRAMUSCULAR; INTRAVENOUS at 17:39

## 2018-10-06 RX ADMIN — GLYCOPYRROLATE 0.1 MG: 0.2 INJECTION, SOLUTION INTRAMUSCULAR; INTRAVENOUS at 09:19

## 2018-10-06 RX ADMIN — METOPROLOL TARTRATE 50 MG: 50 TABLET ORAL at 20:15

## 2018-10-06 RX ADMIN — ASPIRIN 81 MG CHEWABLE TABLET 81 MG: 81 TABLET CHEWABLE at 09:18

## 2018-10-06 RX ADMIN — HYDROCODONE BITARTRATE AND ACETAMINOPHEN 1 TABLET: 5; 325 TABLET ORAL at 17:34

## 2018-10-06 RX ADMIN — FAMOTIDINE 20 MG: 20 TABLET ORAL at 09:18

## 2018-10-06 RX ADMIN — BACLOFEN 10 MG: 10 TABLET ORAL at 17:34

## 2018-10-06 RX ADMIN — GLYCOPYRROLATE 0.1 MG: 0.2 INJECTION, SOLUTION INTRAMUSCULAR; INTRAVENOUS at 20:14

## 2018-10-06 RX ADMIN — HYDROCODONE BITARTRATE AND ACETAMINOPHEN 1 TABLET: 5; 325 TABLET ORAL at 04:40

## 2018-10-06 RX ADMIN — DIAZEPAM 5 MG: 5 TABLET ORAL at 09:18

## 2018-10-06 RX ADMIN — IBUPROFEN 400 MG: 400 TABLET ORAL at 20:15

## 2018-10-07 PROCEDURE — 94760 N-INVAS EAR/PLS OXIMETRY 1: CPT

## 2018-10-07 PROCEDURE — 25010000002 ENOXAPARIN PER 10 MG: Performed by: NURSE PRACTITIONER

## 2018-10-07 PROCEDURE — 99233 SBSQ HOSP IP/OBS HIGH 50: CPT | Performed by: NURSE PRACTITIONER

## 2018-10-07 PROCEDURE — 94799 UNLISTED PULMONARY SVC/PX: CPT

## 2018-10-07 PROCEDURE — 25010000002 LORAZEPAM PER 2 MG: Performed by: HOSPITALIST

## 2018-10-07 RX ADMIN — ENOXAPARIN SODIUM 40 MG: 100 INJECTION SUBCUTANEOUS at 08:08

## 2018-10-07 RX ADMIN — HYDROCODONE BITARTRATE AND ACETAMINOPHEN 1 TABLET: 5; 325 TABLET ORAL at 01:35

## 2018-10-07 RX ADMIN — METOPROLOL TARTRATE 50 MG: 50 TABLET ORAL at 08:03

## 2018-10-07 RX ADMIN — FAMOTIDINE 20 MG: 20 TABLET ORAL at 22:06

## 2018-10-07 RX ADMIN — LORAZEPAM 1 MG: 2 INJECTION INTRAMUSCULAR; INTRAVENOUS at 17:15

## 2018-10-07 RX ADMIN — GLYCOPYRROLATE 0.1 MG: 0.2 INJECTION, SOLUTION INTRAMUSCULAR; INTRAVENOUS at 14:53

## 2018-10-07 RX ADMIN — QUETIAPINE FUMARATE 25 MG: 25 TABLET ORAL at 22:06

## 2018-10-07 RX ADMIN — BACLOFEN 10 MG: 10 TABLET ORAL at 22:05

## 2018-10-07 RX ADMIN — FAMOTIDINE 20 MG: 20 TABLET ORAL at 08:03

## 2018-10-07 RX ADMIN — BACLOFEN 10 MG: 10 TABLET ORAL at 15:07

## 2018-10-07 RX ADMIN — METOPROLOL TARTRATE 50 MG: 50 TABLET ORAL at 22:06

## 2018-10-07 RX ADMIN — DIAZEPAM 5 MG: 5 TABLET ORAL at 08:03

## 2018-10-07 RX ADMIN — Medication 1 CAPSULE: at 08:03

## 2018-10-07 RX ADMIN — GLYCOPYRROLATE 0.1 MG: 0.2 INJECTION, SOLUTION INTRAMUSCULAR; INTRAVENOUS at 22:04

## 2018-10-07 RX ADMIN — BACLOFEN 10 MG: 10 TABLET ORAL at 08:08

## 2018-10-07 RX ADMIN — ASPIRIN 81 MG CHEWABLE TABLET 81 MG: 81 TABLET CHEWABLE at 08:03

## 2018-10-07 RX ADMIN — GLYCOPYRROLATE 0.1 MG: 0.2 INJECTION, SOLUTION INTRAMUSCULAR; INTRAVENOUS at 08:03

## 2018-10-07 RX ADMIN — FLUOXETINE HYDROCHLORIDE 20 MG: 20 SOLUTION ORAL at 08:03

## 2018-10-07 RX ADMIN — THIAMINE HCL TAB 100 MG 100 MG: 100 TAB at 08:03

## 2018-10-07 RX ADMIN — QUETIAPINE FUMARATE 25 MG: 25 TABLET ORAL at 08:03

## 2018-10-07 RX ADMIN — DIAZEPAM 5 MG: 5 TABLET ORAL at 22:05

## 2018-10-07 RX ADMIN — LORAZEPAM 1 MG: 1 TABLET ORAL at 01:35

## 2018-10-08 ENCOUNTER — ANESTHESIA EVENT (OUTPATIENT)
Dept: PERIOP | Facility: HOSPITAL | Age: 38
End: 2018-10-08

## 2018-10-08 LAB
ALBUMIN SERPL-MCNC: 3.78 G/DL (ref 3.2–4.8)
ALP SERPL-CCNC: 103 U/L (ref 25–100)
ALT SERPL W P-5'-P-CCNC: 155 U/L (ref 7–40)
ANION GAP SERPL CALCULATED.3IONS-SCNC: 7 MMOL/L (ref 3–11)
AST SERPL-CCNC: 81 U/L (ref 0–33)
BILIRUB SERPL-MCNC: 0.2 MG/DL (ref 0.3–1.2)
BUN BLD-MCNC: 10 MG/DL (ref 9–23)
CALCIUM SPEC-SCNC: 9.8 MG/DL (ref 8.7–10.4)
CHLORIDE SERPL-SCNC: 98 MMOL/L (ref 99–109)
CHOLEST SERPL-MCNC: 155 MG/DL (ref 0–200)
CO2 SERPL-SCNC: 30 MMOL/L (ref 20–31)
CREAT BLD-MCNC: 0.42 MG/DL (ref 0.6–1.3)
FUNGUS WND CULT: ABNORMAL
GLUCOSE BLD-MCNC: 112 MG/DL (ref 70–100)
MAGNESIUM SERPL-MCNC: 1.8 MG/DL (ref 1.3–2.7)
PHOSPHATE SERPL-MCNC: 4.2 MG/DL (ref 2.4–5.1)
POTASSIUM BLD-SCNC: 4.2 MMOL/L (ref 3.5–5.5)
PROT SERPL-MCNC: 6.6 G/DL (ref 5.7–8.2)
SODIUM BLD-SCNC: 135 MMOL/L (ref 132–146)
TRIGL SERPL-MCNC: 166 MG/DL (ref 0–150)

## 2018-10-08 PROCEDURE — 84478 ASSAY OF TRIGLYCERIDES: CPT

## 2018-10-08 PROCEDURE — 25010000002 HALOPERIDOL LACTATE PER 5 MG: Performed by: INTERNAL MEDICINE

## 2018-10-08 PROCEDURE — 80053 COMPREHEN METABOLIC PANEL: CPT

## 2018-10-08 PROCEDURE — 94760 N-INVAS EAR/PLS OXIMETRY 1: CPT

## 2018-10-08 PROCEDURE — 84100 ASSAY OF PHOSPHORUS: CPT

## 2018-10-08 PROCEDURE — 25010000002 ENOXAPARIN PER 10 MG: Performed by: NURSE PRACTITIONER

## 2018-10-08 PROCEDURE — 97110 THERAPEUTIC EXERCISES: CPT

## 2018-10-08 PROCEDURE — 94799 UNLISTED PULMONARY SVC/PX: CPT

## 2018-10-08 PROCEDURE — 25010000002 LORAZEPAM PER 2 MG: Performed by: HOSPITALIST

## 2018-10-08 PROCEDURE — 99233 SBSQ HOSP IP/OBS HIGH 50: CPT | Performed by: NURSE PRACTITIONER

## 2018-10-08 PROCEDURE — 83735 ASSAY OF MAGNESIUM: CPT

## 2018-10-08 PROCEDURE — 82465 ASSAY BLD/SERUM CHOLESTEROL: CPT

## 2018-10-08 RX ADMIN — Medication 1 CAPSULE: at 08:07

## 2018-10-08 RX ADMIN — QUETIAPINE FUMARATE 25 MG: 25 TABLET ORAL at 08:07

## 2018-10-08 RX ADMIN — FAMOTIDINE 20 MG: 20 TABLET ORAL at 22:10

## 2018-10-08 RX ADMIN — LORAZEPAM 1 MG: 2 INJECTION INTRAMUSCULAR; INTRAVENOUS at 07:08

## 2018-10-08 RX ADMIN — BACLOFEN 10 MG: 10 TABLET ORAL at 22:10

## 2018-10-08 RX ADMIN — METOPROLOL TARTRATE 50 MG: 50 TABLET ORAL at 08:07

## 2018-10-08 RX ADMIN — HYDROCODONE BITARTRATE AND ACETAMINOPHEN 1 TABLET: 5; 325 TABLET ORAL at 17:46

## 2018-10-08 RX ADMIN — QUETIAPINE FUMARATE 25 MG: 25 TABLET ORAL at 22:10

## 2018-10-08 RX ADMIN — BACLOFEN 10 MG: 10 TABLET ORAL at 15:16

## 2018-10-08 RX ADMIN — ASPIRIN 81 MG CHEWABLE TABLET 81 MG: 81 TABLET CHEWABLE at 08:07

## 2018-10-08 RX ADMIN — GLYCOPYRROLATE 0.1 MG: 0.2 INJECTION, SOLUTION INTRAMUSCULAR; INTRAVENOUS at 08:08

## 2018-10-08 RX ADMIN — BACLOFEN 10 MG: 10 TABLET ORAL at 08:07

## 2018-10-08 RX ADMIN — FAMOTIDINE 20 MG: 20 TABLET ORAL at 08:08

## 2018-10-08 RX ADMIN — HALOPERIDOL LACTATE 2 MG: 5 INJECTION, SOLUTION INTRAMUSCULAR at 17:46

## 2018-10-08 RX ADMIN — FLUOXETINE HYDROCHLORIDE 20 MG: 20 SOLUTION ORAL at 09:17

## 2018-10-08 RX ADMIN — DIAZEPAM 5 MG: 5 TABLET ORAL at 22:10

## 2018-10-08 RX ADMIN — GLYCOPYRROLATE 0.1 MG: 0.2 INJECTION, SOLUTION INTRAMUSCULAR; INTRAVENOUS at 15:16

## 2018-10-08 RX ADMIN — DIAZEPAM 5 MG: 5 TABLET ORAL at 08:07

## 2018-10-08 RX ADMIN — THIAMINE HCL TAB 100 MG 100 MG: 100 TAB at 08:07

## 2018-10-08 RX ADMIN — ENOXAPARIN SODIUM 40 MG: 100 INJECTION SUBCUTANEOUS at 08:07

## 2018-10-08 RX ADMIN — GLYCOPYRROLATE 0.1 MG: 0.2 INJECTION, SOLUTION INTRAMUSCULAR; INTRAVENOUS at 22:11

## 2018-10-08 RX ADMIN — LORAZEPAM 1 MG: 2 INJECTION INTRAMUSCULAR; INTRAVENOUS at 15:16

## 2018-10-08 RX ADMIN — LORAZEPAM 1 MG: 2 INJECTION INTRAMUSCULAR; INTRAVENOUS at 00:45

## 2018-10-09 ENCOUNTER — ANESTHESIA (OUTPATIENT)
Dept: PERIOP | Facility: HOSPITAL | Age: 38
End: 2018-10-09

## 2018-10-09 PROCEDURE — 25010000002 HALOPERIDOL LACTATE PER 5 MG: Performed by: INTERNAL MEDICINE

## 2018-10-09 PROCEDURE — 99232 SBSQ HOSP IP/OBS MODERATE 35: CPT | Performed by: HOSPITALIST

## 2018-10-09 PROCEDURE — 94799 UNLISTED PULMONARY SVC/PX: CPT

## 2018-10-09 RX ADMIN — HALOPERIDOL LACTATE 2 MG: 5 INJECTION, SOLUTION INTRAMUSCULAR at 19:30

## 2018-10-09 RX ADMIN — FAMOTIDINE 20 MG: 20 TABLET ORAL at 08:08

## 2018-10-09 RX ADMIN — FAMOTIDINE 20 MG: 20 TABLET ORAL at 20:23

## 2018-10-09 RX ADMIN — Medication 1 CAPSULE: at 08:08

## 2018-10-09 RX ADMIN — FLUOXETINE HYDROCHLORIDE 20 MG: 20 SOLUTION ORAL at 08:06

## 2018-10-09 RX ADMIN — IBUPROFEN 400 MG: 400 TABLET ORAL at 16:09

## 2018-10-09 RX ADMIN — QUETIAPINE FUMARATE 25 MG: 25 TABLET ORAL at 20:23

## 2018-10-09 RX ADMIN — GLYCOPYRROLATE 0.1 MG: 0.2 INJECTION, SOLUTION INTRAMUSCULAR; INTRAVENOUS at 20:23

## 2018-10-09 RX ADMIN — POLYETHYLENE GLYCOL 3350 17 G: 17 POWDER, FOR SOLUTION ORAL at 23:24

## 2018-10-09 RX ADMIN — QUETIAPINE FUMARATE 25 MG: 25 TABLET ORAL at 08:08

## 2018-10-09 RX ADMIN — ASPIRIN 81 MG CHEWABLE TABLET 81 MG: 81 TABLET CHEWABLE at 08:08

## 2018-10-09 RX ADMIN — THIAMINE HCL TAB 100 MG 100 MG: 100 TAB at 08:08

## 2018-10-09 RX ADMIN — GLYCOPYRROLATE 0.1 MG: 0.2 INJECTION, SOLUTION INTRAMUSCULAR; INTRAVENOUS at 16:10

## 2018-10-09 RX ADMIN — DIAZEPAM 5 MG: 5 TABLET ORAL at 20:23

## 2018-10-09 RX ADMIN — BACLOFEN 10 MG: 10 TABLET ORAL at 20:23

## 2018-10-09 RX ADMIN — DIAZEPAM 5 MG: 5 TABLET ORAL at 08:08

## 2018-10-09 RX ADMIN — BACLOFEN 10 MG: 10 TABLET ORAL at 08:08

## 2018-10-09 RX ADMIN — BACLOFEN 10 MG: 10 TABLET ORAL at 16:10

## 2018-10-09 RX ADMIN — METOPROLOL TARTRATE 50 MG: 50 TABLET ORAL at 08:08

## 2018-10-09 RX ADMIN — HALOPERIDOL LACTATE 2 MG: 5 INJECTION, SOLUTION INTRAMUSCULAR at 13:39

## 2018-10-09 RX ADMIN — GLYCOPYRROLATE 0.1 MG: 0.2 INJECTION, SOLUTION INTRAMUSCULAR; INTRAVENOUS at 08:05

## 2018-10-10 PROCEDURE — 25010000002 ENOXAPARIN PER 10 MG: Performed by: NURSE PRACTITIONER

## 2018-10-10 PROCEDURE — 93005 ELECTROCARDIOGRAM TRACING: CPT | Performed by: ANESTHESIOLOGY

## 2018-10-10 PROCEDURE — 97110 THERAPEUTIC EXERCISES: CPT

## 2018-10-10 PROCEDURE — 25010000002 LORAZEPAM PER 2 MG: Performed by: INTERNAL MEDICINE

## 2018-10-10 PROCEDURE — 99232 SBSQ HOSP IP/OBS MODERATE 35: CPT | Performed by: INTERNAL MEDICINE

## 2018-10-10 PROCEDURE — 25010000002 HALOPERIDOL LACTATE PER 5 MG: Performed by: INTERNAL MEDICINE

## 2018-10-10 RX ORDER — LORAZEPAM 2 MG/ML
0.25 INJECTION INTRAMUSCULAR EVERY 6 HOURS PRN
Status: DISPENSED | OUTPATIENT
Start: 2018-10-10 | End: 2018-10-20

## 2018-10-10 RX ADMIN — BACLOFEN 10 MG: 10 TABLET ORAL at 08:33

## 2018-10-10 RX ADMIN — METOPROLOL TARTRATE 50 MG: 50 TABLET ORAL at 08:32

## 2018-10-10 RX ADMIN — DIAZEPAM 5 MG: 5 TABLET ORAL at 08:32

## 2018-10-10 RX ADMIN — GLYCOPYRROLATE 0.1 MG: 0.2 INJECTION, SOLUTION INTRAMUSCULAR; INTRAVENOUS at 17:27

## 2018-10-10 RX ADMIN — FAMOTIDINE 20 MG: 20 TABLET ORAL at 21:52

## 2018-10-10 RX ADMIN — DIAZEPAM 5 MG: 5 TABLET ORAL at 21:52

## 2018-10-10 RX ADMIN — QUETIAPINE FUMARATE 25 MG: 25 TABLET ORAL at 21:52

## 2018-10-10 RX ADMIN — GLYCOPYRROLATE 0.1 MG: 0.2 INJECTION, SOLUTION INTRAMUSCULAR; INTRAVENOUS at 08:32

## 2018-10-10 RX ADMIN — ASPIRIN 81 MG CHEWABLE TABLET 81 MG: 81 TABLET CHEWABLE at 08:32

## 2018-10-10 RX ADMIN — FLUOXETINE HYDROCHLORIDE 20 MG: 20 SOLUTION ORAL at 08:32

## 2018-10-10 RX ADMIN — METOPROLOL TARTRATE 50 MG: 50 TABLET ORAL at 21:52

## 2018-10-10 RX ADMIN — QUETIAPINE FUMARATE 25 MG: 25 TABLET ORAL at 08:34

## 2018-10-10 RX ADMIN — GLYCOPYRROLATE 0.1 MG: 0.2 INJECTION, SOLUTION INTRAMUSCULAR; INTRAVENOUS at 21:53

## 2018-10-10 RX ADMIN — LORAZEPAM 0.25 MG: 2 INJECTION INTRAMUSCULAR; INTRAVENOUS at 20:25

## 2018-10-10 RX ADMIN — FAMOTIDINE 20 MG: 20 TABLET ORAL at 08:32

## 2018-10-10 RX ADMIN — BACLOFEN 10 MG: 10 TABLET ORAL at 21:52

## 2018-10-10 RX ADMIN — BACLOFEN 10 MG: 10 TABLET ORAL at 17:27

## 2018-10-10 RX ADMIN — HALOPERIDOL LACTATE 2 MG: 5 INJECTION, SOLUTION INTRAMUSCULAR at 10:57

## 2018-10-10 RX ADMIN — LORAZEPAM 0.25 MG: 2 INJECTION INTRAMUSCULAR; INTRAVENOUS at 14:22

## 2018-10-10 RX ADMIN — Medication 1 CAPSULE: at 08:32

## 2018-10-10 RX ADMIN — THIAMINE HCL TAB 100 MG 100 MG: 100 TAB at 08:33

## 2018-10-10 RX ADMIN — ENOXAPARIN SODIUM 40 MG: 100 INJECTION SUBCUTANEOUS at 08:33

## 2018-10-11 LAB
B-HCG UR QL: NEGATIVE
INTERNAL NEGATIVE CONTROL: NEGATIVE
INTERNAL POSITIVE CONTROL: POSITIVE
Lab: NORMAL

## 2018-10-11 PROCEDURE — 25010000002 ONDANSETRON PER 1 MG: Performed by: NURSE ANESTHETIST, CERTIFIED REGISTERED

## 2018-10-11 PROCEDURE — 81025 URINE PREGNANCY TEST: CPT | Performed by: ANESTHESIOLOGY

## 2018-10-11 PROCEDURE — 99231 SBSQ HOSP IP/OBS SF/LOW 25: CPT | Performed by: NURSE PRACTITIONER

## 2018-10-11 PROCEDURE — 25010000002 ENOXAPARIN PER 10 MG: Performed by: NURSE PRACTITIONER

## 2018-10-11 PROCEDURE — 25010000002 HALOPERIDOL LACTATE PER 5 MG: Performed by: INTERNAL MEDICINE

## 2018-10-11 PROCEDURE — 25010000002 LORAZEPAM PER 2 MG: Performed by: INTERNAL MEDICINE

## 2018-10-11 PROCEDURE — 93010 ELECTROCARDIOGRAM REPORT: CPT | Performed by: INTERNAL MEDICINE

## 2018-10-11 PROCEDURE — 94760 N-INVAS EAR/PLS OXIMETRY 1: CPT

## 2018-10-11 PROCEDURE — 25010000002 DEXAMETHASONE PER 1 MG: Performed by: NURSE ANESTHETIST, CERTIFIED REGISTERED

## 2018-10-11 PROCEDURE — 25010000002 PROPOFOL 10 MG/ML EMULSION: Performed by: NURSE ANESTHETIST, CERTIFIED REGISTERED

## 2018-10-11 PROCEDURE — 94799 UNLISTED PULMONARY SVC/PX: CPT

## 2018-10-11 PROCEDURE — 25010000002 PHENYLEPHRINE PER 1 ML: Performed by: NURSE ANESTHETIST, CERTIFIED REGISTERED

## 2018-10-11 PROCEDURE — 25010000002 FENTANYL CITRATE (PF) 100 MCG/2ML SOLUTION: Performed by: NURSE ANESTHETIST, CERTIFIED REGISTERED

## 2018-10-11 RX ORDER — SODIUM CHLORIDE 0.9 % (FLUSH) 0.9 %
3-10 SYRINGE (ML) INJECTION AS NEEDED
Status: DISCONTINUED | OUTPATIENT
Start: 2018-10-11 | End: 2018-10-11

## 2018-10-11 RX ORDER — FENTANYL CITRATE 50 UG/ML
INJECTION, SOLUTION INTRAMUSCULAR; INTRAVENOUS AS NEEDED
Status: DISCONTINUED | OUTPATIENT
Start: 2018-10-11 | End: 2018-10-11 | Stop reason: SURG

## 2018-10-11 RX ORDER — LIDOCAINE HYDROCHLORIDE 10 MG/ML
INJECTION, SOLUTION EPIDURAL; INFILTRATION; INTRACAUDAL; PERINEURAL AS NEEDED
Status: DISCONTINUED | OUTPATIENT
Start: 2018-10-11 | End: 2018-10-11 | Stop reason: HOSPADM

## 2018-10-11 RX ORDER — SODIUM CHLORIDE 0.9 % (FLUSH) 0.9 %
3 SYRINGE (ML) INJECTION EVERY 12 HOURS SCHEDULED
Status: DISCONTINUED | OUTPATIENT
Start: 2018-10-11 | End: 2018-10-11 | Stop reason: HOSPADM

## 2018-10-11 RX ORDER — MAGNESIUM HYDROXIDE 1200 MG/15ML
LIQUID ORAL AS NEEDED
Status: DISCONTINUED | OUTPATIENT
Start: 2018-10-11 | End: 2018-10-11 | Stop reason: HOSPADM

## 2018-10-11 RX ORDER — DEXAMETHASONE SODIUM PHOSPHATE 4 MG/ML
INJECTION, SOLUTION INTRA-ARTICULAR; INTRALESIONAL; INTRAMUSCULAR; INTRAVENOUS; SOFT TISSUE AS NEEDED
Status: DISCONTINUED | OUTPATIENT
Start: 2018-10-11 | End: 2018-10-11 | Stop reason: SURG

## 2018-10-11 RX ORDER — SODIUM CHLORIDE 0.9 % (FLUSH) 0.9 %
3 SYRINGE (ML) INJECTION EVERY 12 HOURS SCHEDULED
Status: DISCONTINUED | OUTPATIENT
Start: 2018-10-11 | End: 2018-10-11

## 2018-10-11 RX ORDER — ONDANSETRON 2 MG/ML
INJECTION INTRAMUSCULAR; INTRAVENOUS AS NEEDED
Status: DISCONTINUED | OUTPATIENT
Start: 2018-10-11 | End: 2018-10-11 | Stop reason: SURG

## 2018-10-11 RX ORDER — FENTANYL CITRATE 50 UG/ML
50 INJECTION, SOLUTION INTRAMUSCULAR; INTRAVENOUS
Status: DISCONTINUED | OUTPATIENT
Start: 2018-10-11 | End: 2018-10-11 | Stop reason: HOSPADM

## 2018-10-11 RX ORDER — PROMETHAZINE HYDROCHLORIDE 25 MG/ML
6.25 INJECTION, SOLUTION INTRAMUSCULAR; INTRAVENOUS ONCE AS NEEDED
Status: DISCONTINUED | OUTPATIENT
Start: 2018-10-11 | End: 2018-10-11 | Stop reason: HOSPADM

## 2018-10-11 RX ORDER — FAMOTIDINE 10 MG/ML
20 INJECTION, SOLUTION INTRAVENOUS ONCE
Status: DISCONTINUED | OUTPATIENT
Start: 2018-10-11 | End: 2018-10-11

## 2018-10-11 RX ORDER — LIDOCAINE HYDROCHLORIDE 10 MG/ML
INJECTION, SOLUTION EPIDURAL; INFILTRATION; INTRACAUDAL; PERINEURAL AS NEEDED
Status: DISCONTINUED | OUTPATIENT
Start: 2018-10-11 | End: 2018-10-11 | Stop reason: SURG

## 2018-10-11 RX ORDER — LIDOCAINE HYDROCHLORIDE 10 MG/ML
0.5 INJECTION, SOLUTION EPIDURAL; INFILTRATION; INTRACAUDAL; PERINEURAL ONCE AS NEEDED
Status: DISCONTINUED | OUTPATIENT
Start: 2018-10-11 | End: 2018-10-11

## 2018-10-11 RX ORDER — SODIUM CHLORIDE 9 MG/ML
9 INJECTION, SOLUTION INTRAVENOUS CONTINUOUS PRN
Status: DISCONTINUED | OUTPATIENT
Start: 2018-10-11 | End: 2018-10-24 | Stop reason: HOSPADM

## 2018-10-11 RX ORDER — SODIUM CHLORIDE, SODIUM LACTATE, POTASSIUM CHLORIDE, CALCIUM CHLORIDE 600; 310; 30; 20 MG/100ML; MG/100ML; MG/100ML; MG/100ML
9 INJECTION, SOLUTION INTRAVENOUS CONTINUOUS
Status: DISCONTINUED | OUTPATIENT
Start: 2018-10-11 | End: 2018-10-11 | Stop reason: SDUPTHER

## 2018-10-11 RX ORDER — LIDOCAINE HYDROCHLORIDE 10 MG/ML
0.5 INJECTION, SOLUTION EPIDURAL; INFILTRATION; INTRACAUDAL; PERINEURAL ONCE AS NEEDED
Status: DISCONTINUED | OUTPATIENT
Start: 2018-10-11 | End: 2018-10-11 | Stop reason: SDUPTHER

## 2018-10-11 RX ORDER — FAMOTIDINE 20 MG/1
20 TABLET, FILM COATED ORAL ONCE
Status: DISCONTINUED | OUTPATIENT
Start: 2018-10-11 | End: 2018-10-11 | Stop reason: SDUPTHER

## 2018-10-11 RX ORDER — PROMETHAZINE HYDROCHLORIDE 25 MG/1
25 TABLET ORAL ONCE AS NEEDED
Status: DISCONTINUED | OUTPATIENT
Start: 2018-10-11 | End: 2018-10-11 | Stop reason: HOSPADM

## 2018-10-11 RX ORDER — PROMETHAZINE HYDROCHLORIDE 25 MG/1
25 SUPPOSITORY RECTAL ONCE AS NEEDED
Status: DISCONTINUED | OUTPATIENT
Start: 2018-10-11 | End: 2018-10-11 | Stop reason: HOSPADM

## 2018-10-11 RX ORDER — SODIUM CHLORIDE, SODIUM LACTATE, POTASSIUM CHLORIDE, CALCIUM CHLORIDE 600; 310; 30; 20 MG/100ML; MG/100ML; MG/100ML; MG/100ML
9 INJECTION, SOLUTION INTRAVENOUS CONTINUOUS
Status: DISCONTINUED | OUTPATIENT
Start: 2018-10-11 | End: 2018-10-11

## 2018-10-11 RX ORDER — FAMOTIDINE 10 MG/ML
20 INJECTION, SOLUTION INTRAVENOUS ONCE
Status: DISCONTINUED | OUTPATIENT
Start: 2018-10-11 | End: 2018-10-11 | Stop reason: SDUPTHER

## 2018-10-11 RX ORDER — FAMOTIDINE 20 MG/1
20 TABLET, FILM COATED ORAL ONCE
Status: DISCONTINUED | OUTPATIENT
Start: 2018-10-11 | End: 2018-10-11

## 2018-10-11 RX ORDER — PROPOFOL 10 MG/ML
VIAL (ML) INTRAVENOUS AS NEEDED
Status: DISCONTINUED | OUTPATIENT
Start: 2018-10-11 | End: 2018-10-11 | Stop reason: SURG

## 2018-10-11 RX ORDER — HYDROMORPHONE HYDROCHLORIDE 1 MG/ML
0.5 INJECTION, SOLUTION INTRAMUSCULAR; INTRAVENOUS; SUBCUTANEOUS
Status: DISCONTINUED | OUTPATIENT
Start: 2018-10-11 | End: 2018-10-11 | Stop reason: HOSPADM

## 2018-10-11 RX ADMIN — BACLOFEN 10 MG: 10 TABLET ORAL at 10:28

## 2018-10-11 RX ADMIN — LORAZEPAM 0.25 MG: 2 INJECTION INTRAMUSCULAR; INTRAVENOUS at 10:21

## 2018-10-11 RX ADMIN — HALOPERIDOL LACTATE 2 MG: 5 INJECTION, SOLUTION INTRAMUSCULAR at 10:21

## 2018-10-11 RX ADMIN — DIAZEPAM 5 MG: 5 TABLET ORAL at 10:28

## 2018-10-11 RX ADMIN — EPHEDRINE SULFATE 10 MG: 50 INJECTION INTRAMUSCULAR; INTRAVENOUS; SUBCUTANEOUS at 15:54

## 2018-10-11 RX ADMIN — QUETIAPINE FUMARATE 25 MG: 25 TABLET ORAL at 10:27

## 2018-10-11 RX ADMIN — GLYCOPYRROLATE 0.1 MG: 0.2 INJECTION, SOLUTION INTRAMUSCULAR; INTRAVENOUS at 21:21

## 2018-10-11 RX ADMIN — BACLOFEN 10 MG: 10 TABLET ORAL at 21:21

## 2018-10-11 RX ADMIN — FLUOXETINE HYDROCHLORIDE 20 MG: 20 SOLUTION ORAL at 10:28

## 2018-10-11 RX ADMIN — ENOXAPARIN SODIUM 40 MG: 100 INJECTION SUBCUTANEOUS at 10:29

## 2018-10-11 RX ADMIN — ONDANSETRON 4 MG: 2 INJECTION INTRAMUSCULAR; INTRAVENOUS at 15:52

## 2018-10-11 RX ADMIN — DIAZEPAM 5 MG: 5 TABLET ORAL at 21:21

## 2018-10-11 RX ADMIN — THIAMINE HCL TAB 100 MG 100 MG: 100 TAB at 10:28

## 2018-10-11 RX ADMIN — POLYETHYLENE GLYCOL 3350 17 G: 17 POWDER, FOR SOLUTION ORAL at 23:07

## 2018-10-11 RX ADMIN — PROPOFOL 50 MG: 10 INJECTION, EMULSION INTRAVENOUS at 15:43

## 2018-10-11 RX ADMIN — Medication 1 CAPSULE: at 10:28

## 2018-10-11 RX ADMIN — PHENYLEPHRINE HYDROCHLORIDE 80 MCG: 10 INJECTION INTRAVENOUS at 15:54

## 2018-10-11 RX ADMIN — HALOPERIDOL LACTATE 2 MG: 5 INJECTION, SOLUTION INTRAMUSCULAR at 23:07

## 2018-10-11 RX ADMIN — EPHEDRINE SULFATE 10 MG: 50 INJECTION INTRAMUSCULAR; INTRAVENOUS; SUBCUTANEOUS at 15:52

## 2018-10-11 RX ADMIN — GLYCOPYRROLATE 0.1 MG: 0.2 INJECTION, SOLUTION INTRAMUSCULAR; INTRAVENOUS at 10:28

## 2018-10-11 RX ADMIN — QUETIAPINE FUMARATE 25 MG: 25 TABLET ORAL at 21:21

## 2018-10-11 RX ADMIN — FENTANYL CITRATE 50 MCG: 50 INJECTION, SOLUTION INTRAMUSCULAR; INTRAVENOUS at 15:43

## 2018-10-11 RX ADMIN — FAMOTIDINE 20 MG: 20 TABLET ORAL at 21:21

## 2018-10-11 RX ADMIN — METOPROLOL TARTRATE 50 MG: 50 TABLET ORAL at 10:27

## 2018-10-11 RX ADMIN — LORAZEPAM 0.25 MG: 2 INJECTION INTRAMUSCULAR; INTRAVENOUS at 22:05

## 2018-10-11 RX ADMIN — LIDOCAINE HYDROCHLORIDE 50 MG: 10 INJECTION, SOLUTION EPIDURAL; INFILTRATION; INTRACAUDAL; PERINEURAL at 15:43

## 2018-10-11 RX ADMIN — HALOPERIDOL LACTATE 2 MG: 5 INJECTION, SOLUTION INTRAMUSCULAR at 01:23

## 2018-10-11 RX ADMIN — ASPIRIN 81 MG CHEWABLE TABLET 81 MG: 81 TABLET CHEWABLE at 10:28

## 2018-10-11 RX ADMIN — FAMOTIDINE 20 MG: 20 TABLET ORAL at 10:28

## 2018-10-11 RX ADMIN — DEXAMETHASONE SODIUM PHOSPHATE 8 MG: 4 INJECTION, SOLUTION INTRAMUSCULAR; INTRAVENOUS at 15:51

## 2018-10-11 NOTE — ANESTHESIA POSTPROCEDURE EVALUATION
Patient: Otilia Holm    Procedure Summary     Date:  10/11/18 Room / Location:   [facility]    Anesthesia Start:  1532 Anesthesia Stop:  1632    Procedure:  CYSTOSCOPY WITH Suprapubic  TUBE PLACEMENT (N/A Bladder) Diagnosis:      Surgeon:  Bhanu West MD Provider:  Sim Sandoval MD    Anesthesia Type:  general ASA Status:  3          Anesthesia Type: general  Last vitals  BP   121/69 (10/11/18 1509)   Temp   98.3 °F (36.8 °C) (10/11/18 0720)   Pulse   65 (10/11/18 1509)   Resp   20 (10/11/18 1509)     SpO2   100 % (10/11/18 1509)     Post Anesthesia Care and Evaluation    Patient location during evaluation: PACU  Patient participation: waiting for patient participation  Level of consciousness: sleepy but conscious  Pain score: 0  Pain management: adequate  Airway patency: patent  Anesthetic complications: No anesthetic complications  PONV Status: none  Cardiovascular status: hemodynamically stable and acceptable  Respiratory status: acceptable and trach  Hydration status: acceptable

## 2018-10-11 NOTE — ANESTHESIA PREPROCEDURE EVALUATION
Anesthesia Evaluation     Patient summary reviewed and Nursing notes reviewed   no history of anesthetic complications:  NPO Solid Status: > 8 hours  NPO Liquid Status: > 2 hours           Airway   Comment: TRACHEOSTOMY IN PLACE  [facility] 8  Dental      Pulmonary    (+) pneumonia improving , decreased breath sounds,   Cardiovascular   Exercise tolerance: poor (<4 METS)    ECG reviewed  Rhythm: regular  Rate: normal      ROS comment: 7/18  EF 60%    Neuro/Psych  (+) seizures, psychiatric history Anxiety,     GI/Hepatic/Renal/Endo    (+)   hepatitis C, liver disease,     Musculoskeletal     Abdominal   (-) obese    Abdomen: soft.   Substance History   (+) alcohol use, drug use     OB/GYN          Other   (+) blood dyscrasia     ROS/Med Hx Other: ANEMIA                Anesthesia Plan    ASA 3     general     intravenous induction   Anesthetic plan, all risks, benefits, and alternatives have been provided, discussed and informed consent has been obtained with: patient.    Plan discussed with CRNA.

## 2018-10-12 PROCEDURE — 25010000002 HALOPERIDOL LACTATE PER 5 MG: Performed by: INTERNAL MEDICINE

## 2018-10-12 PROCEDURE — 97110 THERAPEUTIC EXERCISES: CPT

## 2018-10-12 PROCEDURE — 25010000002 LORAZEPAM PER 2 MG: Performed by: INTERNAL MEDICINE

## 2018-10-12 PROCEDURE — 25010000002 ENOXAPARIN PER 10 MG: Performed by: NURSE PRACTITIONER

## 2018-10-12 PROCEDURE — 99232 SBSQ HOSP IP/OBS MODERATE 35: CPT | Performed by: NURSE PRACTITIONER

## 2018-10-12 RX ADMIN — FAMOTIDINE 20 MG: 20 TABLET ORAL at 09:03

## 2018-10-12 RX ADMIN — GLYCOPYRROLATE 0.1 MG: 0.2 INJECTION, SOLUTION INTRAMUSCULAR; INTRAVENOUS at 09:03

## 2018-10-12 RX ADMIN — LORAZEPAM 0.25 MG: 2 INJECTION INTRAMUSCULAR; INTRAVENOUS at 18:19

## 2018-10-12 RX ADMIN — GLYCOPYRROLATE 0.1 MG: 0.2 INJECTION, SOLUTION INTRAMUSCULAR; INTRAVENOUS at 16:27

## 2018-10-12 RX ADMIN — GLYCOPYRROLATE 0.1 MG: 0.2 INJECTION, SOLUTION INTRAMUSCULAR; INTRAVENOUS at 21:51

## 2018-10-12 RX ADMIN — FAMOTIDINE 20 MG: 20 TABLET ORAL at 21:50

## 2018-10-12 RX ADMIN — ASPIRIN 81 MG CHEWABLE TABLET 81 MG: 81 TABLET CHEWABLE at 09:03

## 2018-10-12 RX ADMIN — BACLOFEN 10 MG: 10 TABLET ORAL at 16:27

## 2018-10-12 RX ADMIN — DIAZEPAM 5 MG: 5 TABLET ORAL at 21:50

## 2018-10-12 RX ADMIN — HALOPERIDOL LACTATE 2 MG: 5 INJECTION, SOLUTION INTRAMUSCULAR at 18:20

## 2018-10-12 RX ADMIN — FLUOXETINE HYDROCHLORIDE 20 MG: 20 SOLUTION ORAL at 09:03

## 2018-10-12 RX ADMIN — METOPROLOL TARTRATE 50 MG: 50 TABLET ORAL at 21:50

## 2018-10-12 RX ADMIN — BACLOFEN 10 MG: 10 TABLET ORAL at 21:51

## 2018-10-12 RX ADMIN — QUETIAPINE FUMARATE 25 MG: 25 TABLET ORAL at 21:50

## 2018-10-12 RX ADMIN — BACLOFEN 10 MG: 10 TABLET ORAL at 09:03

## 2018-10-12 RX ADMIN — THIAMINE HCL TAB 100 MG 100 MG: 100 TAB at 09:03

## 2018-10-12 RX ADMIN — METOPROLOL TARTRATE 50 MG: 50 TABLET ORAL at 09:02

## 2018-10-12 RX ADMIN — QUETIAPINE FUMARATE 25 MG: 25 TABLET ORAL at 09:02

## 2018-10-12 RX ADMIN — Medication 1 CAPSULE: at 09:03

## 2018-10-12 RX ADMIN — DIAZEPAM 5 MG: 5 TABLET ORAL at 09:03

## 2018-10-13 LAB
DEPRECATED RDW RBC AUTO: 50 FL (ref 37–54)
ERYTHROCYTE [DISTWIDTH] IN BLOOD BY AUTOMATED COUNT: 14.6 % (ref 11.3–14.5)
HCT VFR BLD AUTO: 31.1 % (ref 34.5–44)
HEMOCCULT STL QL: NEGATIVE
HGB BLD-MCNC: 9.5 G/DL (ref 11.5–15.5)
MCH RBC QN AUTO: 28.6 PG (ref 27–31)
MCHC RBC AUTO-ENTMCNC: 30.5 G/DL (ref 32–36)
MCV RBC AUTO: 93.7 FL (ref 80–99)
PLATELET # BLD AUTO: 337 10*3/MM3 (ref 150–450)
PMV BLD AUTO: 10.4 FL (ref 6–12)
RBC # BLD AUTO: 3.32 10*6/MM3 (ref 3.89–5.14)
WBC NRBC COR # BLD: 7.31 10*3/MM3 (ref 3.5–10.8)

## 2018-10-13 PROCEDURE — 25010000002 ENOXAPARIN PER 10 MG: Performed by: NURSE PRACTITIONER

## 2018-10-13 PROCEDURE — 99232 SBSQ HOSP IP/OBS MODERATE 35: CPT | Performed by: NURSE PRACTITIONER

## 2018-10-13 PROCEDURE — 85027 COMPLETE CBC AUTOMATED: CPT | Performed by: NURSE PRACTITIONER

## 2018-10-13 PROCEDURE — 25010000002 LORAZEPAM PER 2 MG: Performed by: INTERNAL MEDICINE

## 2018-10-13 PROCEDURE — 82272 OCCULT BLD FECES 1-3 TESTS: CPT | Performed by: NURSE PRACTITIONER

## 2018-10-13 PROCEDURE — 25010000002 HALOPERIDOL LACTATE PER 5 MG: Performed by: INTERNAL MEDICINE

## 2018-10-13 RX ORDER — HYDROMORPHONE HYDROCHLORIDE 1 MG/ML
0.25 INJECTION, SOLUTION INTRAMUSCULAR; INTRAVENOUS; SUBCUTANEOUS ONCE
Status: DISCONTINUED | OUTPATIENT
Start: 2018-10-13 | End: 2018-10-14

## 2018-10-13 RX ADMIN — Medication 1 CAPSULE: at 08:56

## 2018-10-13 RX ADMIN — HALOPERIDOL LACTATE 2 MG: 5 INJECTION, SOLUTION INTRAMUSCULAR at 20:26

## 2018-10-13 RX ADMIN — LORAZEPAM 0.25 MG: 2 INJECTION INTRAMUSCULAR; INTRAVENOUS at 09:25

## 2018-10-13 RX ADMIN — BACLOFEN 10 MG: 10 TABLET ORAL at 20:26

## 2018-10-13 RX ADMIN — LORAZEPAM 0.25 MG: 2 INJECTION INTRAMUSCULAR; INTRAVENOUS at 18:46

## 2018-10-13 RX ADMIN — GLYCOPYRROLATE 0.1 MG: 0.2 INJECTION, SOLUTION INTRAMUSCULAR; INTRAVENOUS at 20:26

## 2018-10-13 RX ADMIN — FAMOTIDINE 20 MG: 20 TABLET ORAL at 08:56

## 2018-10-13 RX ADMIN — ACETAMINOPHEN 650 MG: 650 SUPPOSITORY RECTAL at 09:25

## 2018-10-13 RX ADMIN — QUETIAPINE FUMARATE 25 MG: 25 TABLET ORAL at 20:26

## 2018-10-13 RX ADMIN — FLUOXETINE HYDROCHLORIDE 20 MG: 20 SOLUTION ORAL at 08:55

## 2018-10-13 RX ADMIN — LORAZEPAM 0.25 MG: 2 INJECTION INTRAMUSCULAR; INTRAVENOUS at 15:16

## 2018-10-13 RX ADMIN — GLYCOPYRROLATE 0.1 MG: 0.2 INJECTION, SOLUTION INTRAMUSCULAR; INTRAVENOUS at 15:56

## 2018-10-13 RX ADMIN — GLYCOPYRROLATE 0.1 MG: 0.2 INJECTION, SOLUTION INTRAMUSCULAR; INTRAVENOUS at 08:56

## 2018-10-13 RX ADMIN — BACLOFEN 10 MG: 10 TABLET ORAL at 15:56

## 2018-10-13 RX ADMIN — BACLOFEN 10 MG: 10 TABLET ORAL at 08:55

## 2018-10-13 RX ADMIN — THIAMINE HCL TAB 100 MG 100 MG: 100 TAB at 08:56

## 2018-10-13 RX ADMIN — FAMOTIDINE 20 MG: 20 TABLET ORAL at 20:26

## 2018-10-13 RX ADMIN — QUETIAPINE FUMARATE 25 MG: 25 TABLET ORAL at 08:56

## 2018-10-13 RX ADMIN — HALOPERIDOL LACTATE 2 MG: 5 INJECTION, SOLUTION INTRAMUSCULAR at 13:12

## 2018-10-14 LAB
ANION GAP SERPL CALCULATED.3IONS-SCNC: 7 MMOL/L (ref 3–11)
BUN BLD-MCNC: 16 MG/DL (ref 9–23)
BUN/CREAT SERPL: 37.2 (ref 7–25)
CALCIUM SPEC-SCNC: 9.3 MG/DL (ref 8.7–10.4)
CHLORIDE SERPL-SCNC: 101 MMOL/L (ref 99–109)
CO2 SERPL-SCNC: 31 MMOL/L (ref 20–31)
CREAT BLD-MCNC: 0.43 MG/DL (ref 0.6–1.3)
DEPRECATED RDW RBC AUTO: 49.3 FL (ref 37–54)
ERYTHROCYTE [DISTWIDTH] IN BLOOD BY AUTOMATED COUNT: 14.5 % (ref 11.3–14.5)
GFR SERPL CREATININE-BSD FRML MDRD: >150 ML/MIN/1.73
GLUCOSE BLD-MCNC: 100 MG/DL (ref 70–100)
HCT VFR BLD AUTO: 30.5 % (ref 34.5–44)
HGB BLD-MCNC: 9.3 G/DL (ref 11.5–15.5)
MCH RBC QN AUTO: 28.3 PG (ref 27–31)
MCHC RBC AUTO-ENTMCNC: 30.5 G/DL (ref 32–36)
MCV RBC AUTO: 92.7 FL (ref 80–99)
PLATELET # BLD AUTO: 327 10*3/MM3 (ref 150–450)
PMV BLD AUTO: 10.6 FL (ref 6–12)
POTASSIUM BLD-SCNC: 4.3 MMOL/L (ref 3.5–5.5)
RBC # BLD AUTO: 3.29 10*6/MM3 (ref 3.89–5.14)
SODIUM BLD-SCNC: 139 MMOL/L (ref 132–146)
WBC NRBC COR # BLD: 6.22 10*3/MM3 (ref 3.5–10.8)

## 2018-10-14 PROCEDURE — 25010000002 LORAZEPAM PER 2 MG: Performed by: INTERNAL MEDICINE

## 2018-10-14 PROCEDURE — 85027 COMPLETE CBC AUTOMATED: CPT | Performed by: NURSE PRACTITIONER

## 2018-10-14 PROCEDURE — 99232 SBSQ HOSP IP/OBS MODERATE 35: CPT | Performed by: NURSE PRACTITIONER

## 2018-10-14 PROCEDURE — 80048 BASIC METABOLIC PNL TOTAL CA: CPT | Performed by: NURSE PRACTITIONER

## 2018-10-14 PROCEDURE — 25010000002 HALOPERIDOL LACTATE PER 5 MG: Performed by: INTERNAL MEDICINE

## 2018-10-14 PROCEDURE — 94760 N-INVAS EAR/PLS OXIMETRY 1: CPT

## 2018-10-14 PROCEDURE — 94799 UNLISTED PULMONARY SVC/PX: CPT

## 2018-10-14 RX ORDER — QUETIAPINE FUMARATE 25 MG/1
50 TABLET, FILM COATED ORAL EVERY 12 HOURS SCHEDULED
Status: DISCONTINUED | OUTPATIENT
Start: 2018-10-14 | End: 2018-10-24 | Stop reason: HOSPADM

## 2018-10-14 RX ORDER — DIAZEPAM 5 MG/1
5 TABLET ORAL 2 TIMES DAILY
Status: DISCONTINUED | OUTPATIENT
Start: 2018-10-14 | End: 2018-10-24 | Stop reason: HOSPADM

## 2018-10-14 RX ORDER — ASPIRIN 81 MG/1
81 TABLET, CHEWABLE ORAL DAILY
Status: DISCONTINUED | OUTPATIENT
Start: 2018-10-15 | End: 2018-10-24 | Stop reason: HOSPADM

## 2018-10-14 RX ADMIN — FLUOXETINE HYDROCHLORIDE 20 MG: 20 SOLUTION ORAL at 08:36

## 2018-10-14 RX ADMIN — BACLOFEN 10 MG: 10 TABLET ORAL at 17:14

## 2018-10-14 RX ADMIN — THIAMINE HCL TAB 100 MG 100 MG: 100 TAB at 08:37

## 2018-10-14 RX ADMIN — LORAZEPAM 0.25 MG: 2 INJECTION INTRAMUSCULAR; INTRAVENOUS at 23:45

## 2018-10-14 RX ADMIN — FAMOTIDINE 20 MG: 20 TABLET ORAL at 22:06

## 2018-10-14 RX ADMIN — METOPROLOL TARTRATE 50 MG: 50 TABLET ORAL at 17:45

## 2018-10-14 RX ADMIN — FAMOTIDINE 20 MG: 20 TABLET ORAL at 08:37

## 2018-10-14 RX ADMIN — QUETIAPINE FUMARATE 50 MG: 25 TABLET ORAL at 08:38

## 2018-10-14 RX ADMIN — LORAZEPAM 0.25 MG: 2 INJECTION INTRAMUSCULAR; INTRAVENOUS at 17:12

## 2018-10-14 RX ADMIN — QUETIAPINE FUMARATE 50 MG: 25 TABLET ORAL at 22:06

## 2018-10-14 RX ADMIN — ACETAMINOPHEN 650 MG: 650 SUPPOSITORY RECTAL at 19:23

## 2018-10-14 RX ADMIN — GLYCOPYRROLATE 0.1 MG: 0.2 INJECTION, SOLUTION INTRAMUSCULAR; INTRAVENOUS at 08:36

## 2018-10-14 RX ADMIN — Medication 1 CAPSULE: at 08:36

## 2018-10-14 RX ADMIN — HALOPERIDOL LACTATE 2 MG: 5 INJECTION, SOLUTION INTRAMUSCULAR at 19:17

## 2018-10-14 RX ADMIN — GLYCOPYRROLATE 0.1 MG: 0.2 INJECTION, SOLUTION INTRAMUSCULAR; INTRAVENOUS at 17:14

## 2018-10-14 RX ADMIN — ACETAMINOPHEN 650 MG: 650 SUPPOSITORY RECTAL at 08:55

## 2018-10-14 RX ADMIN — GLYCOPYRROLATE 0.1 MG: 0.2 INJECTION, SOLUTION INTRAMUSCULAR; INTRAVENOUS at 22:06

## 2018-10-14 RX ADMIN — DIAZEPAM 5 MG: 5 TABLET ORAL at 19:17

## 2018-10-14 RX ADMIN — BACLOFEN 10 MG: 10 TABLET ORAL at 22:06

## 2018-10-14 RX ADMIN — BACLOFEN 10 MG: 10 TABLET ORAL at 08:37

## 2018-10-14 RX ADMIN — POLYETHYLENE GLYCOL 3350 17 G: 17 POWDER, FOR SOLUTION ORAL at 00:59

## 2018-10-15 LAB
ALBUMIN SERPL-MCNC: 3.44 G/DL (ref 3.2–4.8)
ALP SERPL-CCNC: 128 U/L (ref 25–100)
ALT SERPL W P-5'-P-CCNC: 515 U/L (ref 7–40)
ANION GAP SERPL CALCULATED.3IONS-SCNC: 5 MMOL/L (ref 3–11)
AST SERPL-CCNC: 189 U/L (ref 0–33)
BILIRUB SERPL-MCNC: 0.2 MG/DL (ref 0.3–1.2)
BUN BLD-MCNC: 16 MG/DL (ref 9–23)
CALCIUM SPEC-SCNC: 9.1 MG/DL (ref 8.7–10.4)
CHLORIDE SERPL-SCNC: 99 MMOL/L (ref 99–109)
CHOLEST SERPL-MCNC: 146 MG/DL (ref 0–200)
CO2 SERPL-SCNC: 32 MMOL/L (ref 20–31)
CREAT BLD-MCNC: 0.44 MG/DL (ref 0.6–1.3)
GLUCOSE BLD-MCNC: 106 MG/DL (ref 70–100)
MAGNESIUM SERPL-MCNC: 1.7 MG/DL (ref 1.3–2.7)
PHOSPHATE SERPL-MCNC: 4.9 MG/DL (ref 2.4–5.1)
POTASSIUM BLD-SCNC: 4 MMOL/L (ref 3.5–5.5)
PROT SERPL-MCNC: 5.7 G/DL (ref 5.7–8.2)
SODIUM BLD-SCNC: 136 MMOL/L (ref 132–146)
TRIGL SERPL-MCNC: 120 MG/DL (ref 0–150)

## 2018-10-15 PROCEDURE — 82465 ASSAY BLD/SERUM CHOLESTEROL: CPT

## 2018-10-15 PROCEDURE — 25010000002 ENOXAPARIN PER 10 MG: Performed by: NURSE PRACTITIONER

## 2018-10-15 PROCEDURE — 84478 ASSAY OF TRIGLYCERIDES: CPT

## 2018-10-15 PROCEDURE — 25010000002 LORAZEPAM PER 2 MG: Performed by: INTERNAL MEDICINE

## 2018-10-15 PROCEDURE — 84100 ASSAY OF PHOSPHORUS: CPT

## 2018-10-15 PROCEDURE — 80053 COMPREHEN METABOLIC PANEL: CPT

## 2018-10-15 PROCEDURE — 83735 ASSAY OF MAGNESIUM: CPT

## 2018-10-15 PROCEDURE — 99232 SBSQ HOSP IP/OBS MODERATE 35: CPT | Performed by: INTERNAL MEDICINE

## 2018-10-15 PROCEDURE — 25010000002 HALOPERIDOL LACTATE PER 5 MG: Performed by: INTERNAL MEDICINE

## 2018-10-15 PROCEDURE — 94799 UNLISTED PULMONARY SVC/PX: CPT

## 2018-10-15 RX ADMIN — DIAZEPAM 5 MG: 5 TABLET ORAL at 21:38

## 2018-10-15 RX ADMIN — ASPIRIN 81 MG CHEWABLE TABLET 81 MG: 81 TABLET CHEWABLE at 09:36

## 2018-10-15 RX ADMIN — FAMOTIDINE 20 MG: 20 TABLET ORAL at 21:38

## 2018-10-15 RX ADMIN — FAMOTIDINE 20 MG: 20 TABLET ORAL at 09:36

## 2018-10-15 RX ADMIN — GLYCOPYRROLATE 0.1 MG: 0.2 INJECTION, SOLUTION INTRAMUSCULAR; INTRAVENOUS at 09:36

## 2018-10-15 RX ADMIN — BACLOFEN 10 MG: 10 TABLET ORAL at 15:46

## 2018-10-15 RX ADMIN — LORAZEPAM 0.25 MG: 2 INJECTION INTRAMUSCULAR; INTRAVENOUS at 13:49

## 2018-10-15 RX ADMIN — QUETIAPINE FUMARATE 50 MG: 25 TABLET ORAL at 09:36

## 2018-10-15 RX ADMIN — Medication 1 CAPSULE: at 09:36

## 2018-10-15 RX ADMIN — GLYCOPYRROLATE 0.1 MG: 0.2 INJECTION, SOLUTION INTRAMUSCULAR; INTRAVENOUS at 21:38

## 2018-10-15 RX ADMIN — THIAMINE HCL TAB 100 MG 100 MG: 100 TAB at 09:36

## 2018-10-15 RX ADMIN — HALOPERIDOL LACTATE 2 MG: 5 INJECTION, SOLUTION INTRAMUSCULAR at 15:13

## 2018-10-15 RX ADMIN — ENOXAPARIN SODIUM 40 MG: 40 INJECTION SUBCUTANEOUS at 09:35

## 2018-10-15 RX ADMIN — QUETIAPINE FUMARATE 50 MG: 25 TABLET ORAL at 21:37

## 2018-10-15 RX ADMIN — FLUOXETINE HYDROCHLORIDE 20 MG: 20 SOLUTION ORAL at 09:36

## 2018-10-15 RX ADMIN — BACLOFEN 10 MG: 10 TABLET ORAL at 21:38

## 2018-10-15 RX ADMIN — BACLOFEN 10 MG: 10 TABLET ORAL at 09:36

## 2018-10-15 RX ADMIN — SENNOSIDES AND DOCUSATE SODIUM 2 TABLET: 8.6; 5 TABLET ORAL at 15:46

## 2018-10-15 RX ADMIN — DIAZEPAM 5 MG: 5 TABLET ORAL at 09:36

## 2018-10-15 RX ADMIN — GLYCOPYRROLATE 0.1 MG: 0.2 INJECTION, SOLUTION INTRAMUSCULAR; INTRAVENOUS at 15:46

## 2018-10-15 RX ADMIN — METOPROLOL TARTRATE 50 MG: 50 TABLET ORAL at 09:36

## 2018-10-16 LAB
ALBUMIN SERPL-MCNC: 3.54 G/DL (ref 3.2–4.8)
ALP SERPL-CCNC: 132 U/L (ref 25–100)
ALT SERPL W P-5'-P-CCNC: 487 U/L (ref 7–40)
AST SERPL-CCNC: 174 U/L (ref 0–33)
BILIRUB CONJ SERPL-MCNC: 0.1 MG/DL (ref 0–0.2)
BILIRUB INDIRECT SERPL-MCNC: 0.1 MG/DL (ref 0.1–1.1)
BILIRUB SERPL-MCNC: 0.2 MG/DL (ref 0.3–1.2)
PROT SERPL-MCNC: 6 G/DL (ref 5.7–8.2)

## 2018-10-16 PROCEDURE — 25010000002 LORAZEPAM PER 2 MG: Performed by: INTERNAL MEDICINE

## 2018-10-16 PROCEDURE — 97110 THERAPEUTIC EXERCISES: CPT

## 2018-10-16 PROCEDURE — 94799 UNLISTED PULMONARY SVC/PX: CPT

## 2018-10-16 PROCEDURE — 25010000002 ENOXAPARIN PER 10 MG: Performed by: NURSE PRACTITIONER

## 2018-10-16 PROCEDURE — 80076 HEPATIC FUNCTION PANEL: CPT | Performed by: INTERNAL MEDICINE

## 2018-10-16 PROCEDURE — 99232 SBSQ HOSP IP/OBS MODERATE 35: CPT | Performed by: INTERNAL MEDICINE

## 2018-10-16 RX ADMIN — Medication 1 CAPSULE: at 08:14

## 2018-10-16 RX ADMIN — BACLOFEN 10 MG: 10 TABLET ORAL at 15:00

## 2018-10-16 RX ADMIN — POLYETHYLENE GLYCOL 3350 17 G: 17 POWDER, FOR SOLUTION ORAL at 01:49

## 2018-10-16 RX ADMIN — QUETIAPINE FUMARATE 50 MG: 25 TABLET ORAL at 21:52

## 2018-10-16 RX ADMIN — BACLOFEN 10 MG: 10 TABLET ORAL at 08:14

## 2018-10-16 RX ADMIN — DIAZEPAM 5 MG: 5 TABLET ORAL at 21:52

## 2018-10-16 RX ADMIN — BACLOFEN 10 MG: 10 TABLET ORAL at 21:51

## 2018-10-16 RX ADMIN — QUETIAPINE FUMARATE 50 MG: 25 TABLET ORAL at 08:14

## 2018-10-16 RX ADMIN — THIAMINE HCL TAB 100 MG 100 MG: 100 TAB at 08:14

## 2018-10-16 RX ADMIN — ENOXAPARIN SODIUM 40 MG: 40 INJECTION SUBCUTANEOUS at 08:14

## 2018-10-16 RX ADMIN — DIAZEPAM 5 MG: 5 TABLET ORAL at 08:14

## 2018-10-16 RX ADMIN — FAMOTIDINE 20 MG: 20 TABLET ORAL at 08:14

## 2018-10-16 RX ADMIN — FLUOXETINE HYDROCHLORIDE 20 MG: 20 SOLUTION ORAL at 08:14

## 2018-10-16 RX ADMIN — GLYCOPYRROLATE 0.1 MG: 0.2 INJECTION, SOLUTION INTRAMUSCULAR; INTRAVENOUS at 21:51

## 2018-10-16 RX ADMIN — GLYCOPYRROLATE 0.1 MG: 0.2 INJECTION, SOLUTION INTRAMUSCULAR; INTRAVENOUS at 08:14

## 2018-10-16 RX ADMIN — FAMOTIDINE 20 MG: 20 TABLET ORAL at 21:51

## 2018-10-16 RX ADMIN — LORAZEPAM 0.25 MG: 2 INJECTION INTRAMUSCULAR; INTRAVENOUS at 15:00

## 2018-10-16 RX ADMIN — METOPROLOL TARTRATE 25 MG: 25 TABLET ORAL at 08:14

## 2018-10-16 RX ADMIN — GLYCOPYRROLATE 0.1 MG: 0.2 INJECTION, SOLUTION INTRAMUSCULAR; INTRAVENOUS at 15:00

## 2018-10-16 RX ADMIN — ASPIRIN 81 MG CHEWABLE TABLET 81 MG: 81 TABLET CHEWABLE at 08:14

## 2018-10-16 RX ADMIN — METOPROLOL TARTRATE 25 MG: 25 TABLET ORAL at 21:52

## 2018-10-17 PROCEDURE — 25010000002 HALOPERIDOL LACTATE PER 5 MG: Performed by: INTERNAL MEDICINE

## 2018-10-17 PROCEDURE — 25010000002 ENOXAPARIN PER 10 MG: Performed by: NURSE PRACTITIONER

## 2018-10-17 PROCEDURE — 94799 UNLISTED PULMONARY SVC/PX: CPT

## 2018-10-17 PROCEDURE — 99232 SBSQ HOSP IP/OBS MODERATE 35: CPT | Performed by: INTERNAL MEDICINE

## 2018-10-17 PROCEDURE — 94760 N-INVAS EAR/PLS OXIMETRY 1: CPT

## 2018-10-17 RX ADMIN — HALOPERIDOL LACTATE 2 MG: 5 INJECTION, SOLUTION INTRAMUSCULAR at 15:03

## 2018-10-17 RX ADMIN — QUETIAPINE FUMARATE 50 MG: 25 TABLET ORAL at 20:49

## 2018-10-17 RX ADMIN — GLYCOPYRROLATE 0.1 MG: 0.2 INJECTION, SOLUTION INTRAMUSCULAR; INTRAVENOUS at 08:45

## 2018-10-17 RX ADMIN — DIAZEPAM 5 MG: 5 TABLET ORAL at 08:44

## 2018-10-17 RX ADMIN — BACLOFEN 10 MG: 10 TABLET ORAL at 08:44

## 2018-10-17 RX ADMIN — QUETIAPINE FUMARATE 50 MG: 25 TABLET ORAL at 08:44

## 2018-10-17 RX ADMIN — ASPIRIN 81 MG CHEWABLE TABLET 81 MG: 81 TABLET CHEWABLE at 08:44

## 2018-10-17 RX ADMIN — GLYCOPYRROLATE 0.1 MG: 0.2 INJECTION, SOLUTION INTRAMUSCULAR; INTRAVENOUS at 15:03

## 2018-10-17 RX ADMIN — DIAZEPAM 5 MG: 5 TABLET ORAL at 20:49

## 2018-10-17 RX ADMIN — FAMOTIDINE 20 MG: 20 TABLET ORAL at 20:49

## 2018-10-17 RX ADMIN — Medication 1 CAPSULE: at 08:44

## 2018-10-17 RX ADMIN — METOPROLOL TARTRATE 25 MG: 25 TABLET ORAL at 20:49

## 2018-10-17 RX ADMIN — GLYCOPYRROLATE 0.1 MG: 0.2 INJECTION, SOLUTION INTRAMUSCULAR; INTRAVENOUS at 20:49

## 2018-10-17 RX ADMIN — BACLOFEN 10 MG: 10 TABLET ORAL at 20:49

## 2018-10-17 RX ADMIN — THIAMINE HCL TAB 100 MG 100 MG: 100 TAB at 08:44

## 2018-10-17 RX ADMIN — FLUOXETINE HYDROCHLORIDE 20 MG: 20 SOLUTION ORAL at 08:46

## 2018-10-17 RX ADMIN — ENOXAPARIN SODIUM 40 MG: 40 INJECTION SUBCUTANEOUS at 08:45

## 2018-10-17 RX ADMIN — METOPROLOL TARTRATE 25 MG: 25 TABLET ORAL at 08:44

## 2018-10-17 RX ADMIN — FAMOTIDINE 20 MG: 20 TABLET ORAL at 08:44

## 2018-10-17 RX ADMIN — BACLOFEN 10 MG: 10 TABLET ORAL at 15:03

## 2018-10-18 LAB
ALBUMIN SERPL-MCNC: 3.62 G/DL (ref 3.2–4.8)
ALBUMIN/GLOB SERPL: 1.5 G/DL (ref 1.5–2.5)
ALP SERPL-CCNC: 142 U/L (ref 25–100)
ALT SERPL W P-5'-P-CCNC: 410 U/L (ref 7–40)
ANION GAP SERPL CALCULATED.3IONS-SCNC: 7 MMOL/L (ref 3–11)
AST SERPL-CCNC: 123 U/L (ref 0–33)
BILIRUB SERPL-MCNC: 0.2 MG/DL (ref 0.3–1.2)
BUN BLD-MCNC: 19 MG/DL (ref 9–23)
BUN/CREAT SERPL: 44.2 (ref 7–25)
CALCIUM SPEC-SCNC: 9.4 MG/DL (ref 8.7–10.4)
CHLORIDE SERPL-SCNC: 98 MMOL/L (ref 99–109)
CO2 SERPL-SCNC: 30 MMOL/L (ref 20–31)
CREAT BLD-MCNC: 0.43 MG/DL (ref 0.6–1.3)
DEPRECATED RDW RBC AUTO: 49.3 FL (ref 37–54)
ERYTHROCYTE [DISTWIDTH] IN BLOOD BY AUTOMATED COUNT: 14.5 % (ref 11.3–14.5)
GFR SERPL CREATININE-BSD FRML MDRD: >150 ML/MIN/1.73
GLOBULIN UR ELPH-MCNC: 2.4 GM/DL
GLUCOSE BLD-MCNC: 109 MG/DL (ref 70–100)
HCT VFR BLD AUTO: 32.4 % (ref 34.5–44)
HGB BLD-MCNC: 10.1 G/DL (ref 11.5–15.5)
MCH RBC QN AUTO: 28.9 PG (ref 27–31)
MCHC RBC AUTO-ENTMCNC: 31.2 G/DL (ref 32–36)
MCV RBC AUTO: 92.8 FL (ref 80–99)
PLATELET # BLD AUTO: 319 10*3/MM3 (ref 150–450)
PMV BLD AUTO: 10.5 FL (ref 6–12)
POTASSIUM BLD-SCNC: 4 MMOL/L (ref 3.5–5.5)
PROT SERPL-MCNC: 6 G/DL (ref 5.7–8.2)
RBC # BLD AUTO: 3.49 10*6/MM3 (ref 3.89–5.14)
SODIUM BLD-SCNC: 135 MMOL/L (ref 132–146)
WBC NRBC COR # BLD: 7.97 10*3/MM3 (ref 3.5–10.8)

## 2018-10-18 PROCEDURE — 94760 N-INVAS EAR/PLS OXIMETRY 1: CPT

## 2018-10-18 PROCEDURE — 85027 COMPLETE CBC AUTOMATED: CPT | Performed by: INTERNAL MEDICINE

## 2018-10-18 PROCEDURE — 25010000002 ENOXAPARIN PER 10 MG: Performed by: NURSE PRACTITIONER

## 2018-10-18 PROCEDURE — 99232 SBSQ HOSP IP/OBS MODERATE 35: CPT | Performed by: NURSE PRACTITIONER

## 2018-10-18 PROCEDURE — 94799 UNLISTED PULMONARY SVC/PX: CPT

## 2018-10-18 PROCEDURE — 25010000002 LORAZEPAM PER 2 MG: Performed by: INTERNAL MEDICINE

## 2018-10-18 PROCEDURE — 80053 COMPREHEN METABOLIC PANEL: CPT | Performed by: INTERNAL MEDICINE

## 2018-10-18 PROCEDURE — 25010000002 HALOPERIDOL LACTATE PER 5 MG: Performed by: INTERNAL MEDICINE

## 2018-10-18 RX ADMIN — HALOPERIDOL LACTATE 2 MG: 5 INJECTION, SOLUTION INTRAMUSCULAR at 14:52

## 2018-10-18 RX ADMIN — LORAZEPAM 0.25 MG: 2 INJECTION INTRAMUSCULAR; INTRAVENOUS at 18:05

## 2018-10-18 RX ADMIN — BACLOFEN 10 MG: 10 TABLET ORAL at 21:22

## 2018-10-18 RX ADMIN — Medication 1 CAPSULE: at 08:13

## 2018-10-18 RX ADMIN — FAMOTIDINE 20 MG: 20 TABLET ORAL at 21:22

## 2018-10-18 RX ADMIN — IBUPROFEN 400 MG: 100 SUSPENSION ORAL at 01:25

## 2018-10-18 RX ADMIN — LORAZEPAM 0.25 MG: 2 INJECTION INTRAMUSCULAR; INTRAVENOUS at 07:41

## 2018-10-18 RX ADMIN — GLYCOPYRROLATE 0.1 MG: 0.2 INJECTION, SOLUTION INTRAMUSCULAR; INTRAVENOUS at 21:22

## 2018-10-18 RX ADMIN — POLYETHYLENE GLYCOL 3350 17 G: 17 POWDER, FOR SOLUTION ORAL at 01:29

## 2018-10-18 RX ADMIN — ENOXAPARIN SODIUM 40 MG: 40 INJECTION SUBCUTANEOUS at 08:12

## 2018-10-18 RX ADMIN — GLYCOPYRROLATE 0.1 MG: 0.2 INJECTION, SOLUTION INTRAMUSCULAR; INTRAVENOUS at 08:13

## 2018-10-18 RX ADMIN — BACLOFEN 10 MG: 10 TABLET ORAL at 15:41

## 2018-10-18 RX ADMIN — THIAMINE HCL TAB 100 MG 100 MG: 100 TAB at 08:13

## 2018-10-18 RX ADMIN — LORAZEPAM 0.25 MG: 2 INJECTION INTRAMUSCULAR; INTRAVENOUS at 01:06

## 2018-10-18 RX ADMIN — FLUOXETINE HYDROCHLORIDE 20 MG: 20 SOLUTION ORAL at 08:14

## 2018-10-18 RX ADMIN — FAMOTIDINE 20 MG: 20 TABLET ORAL at 08:13

## 2018-10-18 RX ADMIN — QUETIAPINE FUMARATE 50 MG: 25 TABLET ORAL at 21:22

## 2018-10-18 RX ADMIN — DIAZEPAM 5 MG: 5 TABLET ORAL at 21:22

## 2018-10-18 RX ADMIN — METOPROLOL TARTRATE 25 MG: 25 TABLET ORAL at 08:00

## 2018-10-18 RX ADMIN — DIAZEPAM 5 MG: 5 TABLET ORAL at 08:14

## 2018-10-18 RX ADMIN — QUETIAPINE FUMARATE 50 MG: 25 TABLET ORAL at 08:13

## 2018-10-18 RX ADMIN — GLYCOPYRROLATE 0.1 MG: 0.2 INJECTION, SOLUTION INTRAMUSCULAR; INTRAVENOUS at 15:41

## 2018-10-18 RX ADMIN — BACLOFEN 10 MG: 10 TABLET ORAL at 08:13

## 2018-10-18 RX ADMIN — ASPIRIN 81 MG CHEWABLE TABLET 81 MG: 81 TABLET CHEWABLE at 08:13

## 2018-10-19 PROCEDURE — 94799 UNLISTED PULMONARY SVC/PX: CPT

## 2018-10-19 PROCEDURE — 99232 SBSQ HOSP IP/OBS MODERATE 35: CPT | Performed by: NURSE PRACTITIONER

## 2018-10-19 PROCEDURE — 25010000002 ENOXAPARIN PER 10 MG: Performed by: NURSE PRACTITIONER

## 2018-10-19 PROCEDURE — 94760 N-INVAS EAR/PLS OXIMETRY 1: CPT

## 2018-10-19 PROCEDURE — 25010000002 LORAZEPAM PER 2 MG: Performed by: INTERNAL MEDICINE

## 2018-10-19 PROCEDURE — 25010000002 HALOPERIDOL LACTATE PER 5 MG: Performed by: INTERNAL MEDICINE

## 2018-10-19 RX ADMIN — GLYCOPYRROLATE 0.1 MG: 0.2 INJECTION, SOLUTION INTRAMUSCULAR; INTRAVENOUS at 15:40

## 2018-10-19 RX ADMIN — BACLOFEN 10 MG: 10 TABLET ORAL at 21:37

## 2018-10-19 RX ADMIN — GLYCOPYRROLATE 0.1 MG: 0.2 INJECTION, SOLUTION INTRAMUSCULAR; INTRAVENOUS at 10:01

## 2018-10-19 RX ADMIN — BACLOFEN 10 MG: 10 TABLET ORAL at 15:40

## 2018-10-19 RX ADMIN — ENOXAPARIN SODIUM 40 MG: 40 INJECTION SUBCUTANEOUS at 10:00

## 2018-10-19 RX ADMIN — FAMOTIDINE 20 MG: 20 TABLET ORAL at 10:00

## 2018-10-19 RX ADMIN — QUETIAPINE FUMARATE 50 MG: 25 TABLET ORAL at 21:37

## 2018-10-19 RX ADMIN — DIAZEPAM 5 MG: 5 TABLET ORAL at 10:01

## 2018-10-19 RX ADMIN — GLYCOPYRROLATE 0.1 MG: 0.2 INJECTION, SOLUTION INTRAMUSCULAR; INTRAVENOUS at 21:37

## 2018-10-19 RX ADMIN — LORAZEPAM 0.25 MG: 2 INJECTION INTRAMUSCULAR; INTRAVENOUS at 16:05

## 2018-10-19 RX ADMIN — BACLOFEN 10 MG: 10 TABLET ORAL at 10:00

## 2018-10-19 RX ADMIN — METOPROLOL TARTRATE 25 MG: 25 TABLET ORAL at 10:00

## 2018-10-19 RX ADMIN — FAMOTIDINE 20 MG: 20 TABLET ORAL at 21:37

## 2018-10-19 RX ADMIN — THIAMINE HCL TAB 100 MG 100 MG: 100 TAB at 10:00

## 2018-10-19 RX ADMIN — FLUOXETINE HYDROCHLORIDE 20 MG: 20 SOLUTION ORAL at 10:00

## 2018-10-19 RX ADMIN — Medication 1 CAPSULE: at 10:00

## 2018-10-19 RX ADMIN — HALOPERIDOL LACTATE 2 MG: 5 INJECTION, SOLUTION INTRAMUSCULAR at 17:25

## 2018-10-19 RX ADMIN — ASPIRIN 81 MG CHEWABLE TABLET 81 MG: 81 TABLET CHEWABLE at 10:00

## 2018-10-19 RX ADMIN — DIAZEPAM 5 MG: 5 TABLET ORAL at 21:37

## 2018-10-19 RX ADMIN — LORAZEPAM 0.25 MG: 2 INJECTION INTRAMUSCULAR; INTRAVENOUS at 10:36

## 2018-10-19 RX ADMIN — QUETIAPINE FUMARATE 50 MG: 25 TABLET ORAL at 10:00

## 2018-10-20 PROCEDURE — 25010000002 LORAZEPAM PER 2 MG: Performed by: INTERNAL MEDICINE

## 2018-10-20 PROCEDURE — 25010000002 HALOPERIDOL LACTATE PER 5 MG: Performed by: INTERNAL MEDICINE

## 2018-10-20 PROCEDURE — 94799 UNLISTED PULMONARY SVC/PX: CPT

## 2018-10-20 PROCEDURE — 25010000002 ENOXAPARIN PER 10 MG: Performed by: NURSE PRACTITIONER

## 2018-10-20 PROCEDURE — 99232 SBSQ HOSP IP/OBS MODERATE 35: CPT | Performed by: NURSE PRACTITIONER

## 2018-10-20 RX ADMIN — GLYCOPYRROLATE 0.1 MG: 0.2 INJECTION, SOLUTION INTRAMUSCULAR; INTRAVENOUS at 14:41

## 2018-10-20 RX ADMIN — Medication 1 CAPSULE: at 08:45

## 2018-10-20 RX ADMIN — METOPROLOL TARTRATE 25 MG: 25 TABLET ORAL at 21:43

## 2018-10-20 RX ADMIN — POLYETHYLENE GLYCOL 3350 17 G: 17 POWDER, FOR SOLUTION ORAL at 01:05

## 2018-10-20 RX ADMIN — HALOPERIDOL LACTATE 2 MG: 5 INJECTION, SOLUTION INTRAMUSCULAR at 18:58

## 2018-10-20 RX ADMIN — DIAZEPAM 5 MG: 5 TABLET ORAL at 08:45

## 2018-10-20 RX ADMIN — DIAZEPAM 5 MG: 5 TABLET ORAL at 21:43

## 2018-10-20 RX ADMIN — FAMOTIDINE 20 MG: 20 TABLET ORAL at 21:43

## 2018-10-20 RX ADMIN — GLYCOPYRROLATE 0.1 MG: 0.2 INJECTION, SOLUTION INTRAMUSCULAR; INTRAVENOUS at 08:45

## 2018-10-20 RX ADMIN — THIAMINE HCL TAB 100 MG 100 MG: 100 TAB at 08:45

## 2018-10-20 RX ADMIN — FAMOTIDINE 20 MG: 20 TABLET ORAL at 08:45

## 2018-10-20 RX ADMIN — GLYCOPYRROLATE 0.1 MG: 0.2 INJECTION, SOLUTION INTRAMUSCULAR; INTRAVENOUS at 22:00

## 2018-10-20 RX ADMIN — METOPROLOL TARTRATE 25 MG: 25 TABLET ORAL at 08:45

## 2018-10-20 RX ADMIN — ASPIRIN 81 MG CHEWABLE TABLET 81 MG: 81 TABLET CHEWABLE at 08:45

## 2018-10-20 RX ADMIN — BACLOFEN 10 MG: 10 TABLET ORAL at 22:00

## 2018-10-20 RX ADMIN — ENOXAPARIN SODIUM 40 MG: 40 INJECTION SUBCUTANEOUS at 08:45

## 2018-10-20 RX ADMIN — LORAZEPAM 0.25 MG: 2 INJECTION INTRAMUSCULAR; INTRAVENOUS at 07:25

## 2018-10-20 RX ADMIN — FLUOXETINE HYDROCHLORIDE 20 MG: 20 SOLUTION ORAL at 08:45

## 2018-10-20 RX ADMIN — BACLOFEN 10 MG: 10 TABLET ORAL at 14:41

## 2018-10-20 RX ADMIN — BACLOFEN 10 MG: 10 TABLET ORAL at 08:45

## 2018-10-20 RX ADMIN — IBUPROFEN 400 MG: 100 SUSPENSION ORAL at 14:41

## 2018-10-20 RX ADMIN — QUETIAPINE FUMARATE 50 MG: 25 TABLET ORAL at 21:42

## 2018-10-20 RX ADMIN — QUETIAPINE FUMARATE 50 MG: 25 TABLET ORAL at 08:45

## 2018-10-20 RX ADMIN — LORAZEPAM 0.25 MG: 2 INJECTION INTRAMUSCULAR; INTRAVENOUS at 14:41

## 2018-10-21 PROCEDURE — 94799 UNLISTED PULMONARY SVC/PX: CPT

## 2018-10-21 PROCEDURE — 25010000002 HALOPERIDOL LACTATE PER 5 MG: Performed by: INTERNAL MEDICINE

## 2018-10-21 PROCEDURE — 25010000002 ENOXAPARIN PER 10 MG: Performed by: NURSE PRACTITIONER

## 2018-10-21 PROCEDURE — 99232 SBSQ HOSP IP/OBS MODERATE 35: CPT | Performed by: NURSE PRACTITIONER

## 2018-10-21 RX ADMIN — GLYCOPYRROLATE 0.1 MG: 0.2 INJECTION, SOLUTION INTRAMUSCULAR; INTRAVENOUS at 21:07

## 2018-10-21 RX ADMIN — POLYETHYLENE GLYCOL 3350 17 G: 17 POWDER, FOR SOLUTION ORAL at 23:16

## 2018-10-21 RX ADMIN — DIAZEPAM 5 MG: 5 TABLET ORAL at 21:07

## 2018-10-21 RX ADMIN — ASPIRIN 81 MG CHEWABLE TABLET 81 MG: 81 TABLET CHEWABLE at 08:37

## 2018-10-21 RX ADMIN — FAMOTIDINE 20 MG: 20 TABLET ORAL at 21:07

## 2018-10-21 RX ADMIN — FLUOXETINE HYDROCHLORIDE 20 MG: 20 SOLUTION ORAL at 08:39

## 2018-10-21 RX ADMIN — BACLOFEN 10 MG: 10 TABLET ORAL at 21:07

## 2018-10-21 RX ADMIN — QUETIAPINE FUMARATE 50 MG: 25 TABLET ORAL at 08:37

## 2018-10-21 RX ADMIN — HALOPERIDOL LACTATE 2 MG: 5 INJECTION, SOLUTION INTRAMUSCULAR at 05:00

## 2018-10-21 RX ADMIN — FAMOTIDINE 20 MG: 20 TABLET ORAL at 08:37

## 2018-10-21 RX ADMIN — IBUPROFEN 400 MG: 100 SUSPENSION ORAL at 14:43

## 2018-10-21 RX ADMIN — HALOPERIDOL LACTATE 2 MG: 5 INJECTION, SOLUTION INTRAMUSCULAR at 12:44

## 2018-10-21 RX ADMIN — GLYCOPYRROLATE 0.1 MG: 0.2 INJECTION, SOLUTION INTRAMUSCULAR; INTRAVENOUS at 08:38

## 2018-10-21 RX ADMIN — THIAMINE HCL TAB 100 MG 100 MG: 100 TAB at 08:37

## 2018-10-21 RX ADMIN — BACLOFEN 10 MG: 10 TABLET ORAL at 14:43

## 2018-10-21 RX ADMIN — GLYCOPYRROLATE 0.1 MG: 0.2 INJECTION, SOLUTION INTRAMUSCULAR; INTRAVENOUS at 14:43

## 2018-10-21 RX ADMIN — DIAZEPAM 5 MG: 5 TABLET ORAL at 08:37

## 2018-10-21 RX ADMIN — ENOXAPARIN SODIUM 40 MG: 40 INJECTION SUBCUTANEOUS at 08:38

## 2018-10-21 RX ADMIN — QUETIAPINE FUMARATE 50 MG: 25 TABLET ORAL at 21:06

## 2018-10-21 RX ADMIN — METOPROLOL TARTRATE 25 MG: 25 TABLET ORAL at 08:37

## 2018-10-21 RX ADMIN — BACLOFEN 10 MG: 10 TABLET ORAL at 08:37

## 2018-10-21 RX ADMIN — Medication 1 CAPSULE: at 08:37

## 2018-10-21 RX ADMIN — HALOPERIDOL LACTATE 2 MG: 5 INJECTION, SOLUTION INTRAMUSCULAR at 18:13

## 2018-10-22 LAB
ALBUMIN SERPL-MCNC: 3.44 G/DL (ref 3.2–4.8)
ALP SERPL-CCNC: 118 U/L (ref 25–100)
ALT SERPL W P-5'-P-CCNC: 250 U/L (ref 7–40)
ANION GAP SERPL CALCULATED.3IONS-SCNC: 3 MMOL/L (ref 3–11)
AST SERPL-CCNC: 89 U/L (ref 0–33)
BILIRUB SERPL-MCNC: 0.2 MG/DL (ref 0.3–1.2)
BUN BLD-MCNC: 13 MG/DL (ref 9–23)
CALCIUM SPEC-SCNC: 9.2 MG/DL (ref 8.7–10.4)
CHLORIDE SERPL-SCNC: 104 MMOL/L (ref 99–109)
CHOLEST SERPL-MCNC: 143 MG/DL (ref 0–200)
CO2 SERPL-SCNC: 31 MMOL/L (ref 20–31)
CREAT BLD-MCNC: 0.46 MG/DL (ref 0.6–1.3)
GLUCOSE BLD-MCNC: 100 MG/DL (ref 70–100)
MAGNESIUM SERPL-MCNC: 1.7 MG/DL (ref 1.3–2.7)
PHOSPHATE SERPL-MCNC: 4.5 MG/DL (ref 2.4–5.1)
POTASSIUM BLD-SCNC: 3.6 MMOL/L (ref 3.5–5.5)
PROT SERPL-MCNC: 5.9 G/DL (ref 5.7–8.2)
SODIUM BLD-SCNC: 138 MMOL/L (ref 132–146)
TRIGL SERPL-MCNC: 108 MG/DL (ref 0–150)

## 2018-10-22 PROCEDURE — 84478 ASSAY OF TRIGLYCERIDES: CPT

## 2018-10-22 PROCEDURE — 25010000002 HALOPERIDOL LACTATE PER 5 MG: Performed by: INTERNAL MEDICINE

## 2018-10-22 PROCEDURE — 94799 UNLISTED PULMONARY SVC/PX: CPT

## 2018-10-22 PROCEDURE — 84100 ASSAY OF PHOSPHORUS: CPT

## 2018-10-22 PROCEDURE — 25010000002 ENOXAPARIN PER 10 MG: Performed by: NURSE PRACTITIONER

## 2018-10-22 PROCEDURE — 80053 COMPREHEN METABOLIC PANEL: CPT

## 2018-10-22 PROCEDURE — 99232 SBSQ HOSP IP/OBS MODERATE 35: CPT | Performed by: NURSE PRACTITIONER

## 2018-10-22 PROCEDURE — 83735 ASSAY OF MAGNESIUM: CPT

## 2018-10-22 PROCEDURE — 82465 ASSAY BLD/SERUM CHOLESTEROL: CPT

## 2018-10-22 PROCEDURE — 94760 N-INVAS EAR/PLS OXIMETRY 1: CPT

## 2018-10-22 RX ORDER — LORAZEPAM 0.5 MG/1
0.5 TABLET ORAL EVERY 8 HOURS PRN
Status: DISCONTINUED | OUTPATIENT
Start: 2018-10-22 | End: 2018-10-24 | Stop reason: HOSPADM

## 2018-10-22 RX ADMIN — BACLOFEN 10 MG: 10 TABLET ORAL at 21:19

## 2018-10-22 RX ADMIN — QUETIAPINE FUMARATE 50 MG: 25 TABLET ORAL at 21:19

## 2018-10-22 RX ADMIN — METOPROLOL TARTRATE 25 MG: 25 TABLET ORAL at 21:19

## 2018-10-22 RX ADMIN — ENOXAPARIN SODIUM 40 MG: 40 INJECTION SUBCUTANEOUS at 09:13

## 2018-10-22 RX ADMIN — FAMOTIDINE 20 MG: 20 TABLET ORAL at 09:13

## 2018-10-22 RX ADMIN — LORAZEPAM 0.5 MG: 0.5 TABLET ORAL at 16:01

## 2018-10-22 RX ADMIN — DIAZEPAM 5 MG: 5 TABLET ORAL at 09:14

## 2018-10-22 RX ADMIN — QUETIAPINE FUMARATE 50 MG: 25 TABLET ORAL at 09:13

## 2018-10-22 RX ADMIN — Medication 1 CAPSULE: at 09:13

## 2018-10-22 RX ADMIN — BACLOFEN 10 MG: 10 TABLET ORAL at 15:19

## 2018-10-22 RX ADMIN — THIAMINE HCL TAB 100 MG 100 MG: 100 TAB at 09:14

## 2018-10-22 RX ADMIN — ASPIRIN 81 MG CHEWABLE TABLET 81 MG: 81 TABLET CHEWABLE at 09:14

## 2018-10-22 RX ADMIN — LORAZEPAM 0.5 MG: 0.5 TABLET ORAL at 16:11

## 2018-10-22 RX ADMIN — GLYCOPYRROLATE 0.1 MG: 0.2 INJECTION, SOLUTION INTRAMUSCULAR; INTRAVENOUS at 21:19

## 2018-10-22 RX ADMIN — FAMOTIDINE 20 MG: 20 TABLET ORAL at 21:19

## 2018-10-22 RX ADMIN — HALOPERIDOL LACTATE 2 MG: 5 INJECTION, SOLUTION INTRAMUSCULAR at 13:58

## 2018-10-22 RX ADMIN — BACLOFEN 10 MG: 10 TABLET ORAL at 09:14

## 2018-10-22 RX ADMIN — GLYCOPYRROLATE 0.1 MG: 0.2 INJECTION, SOLUTION INTRAMUSCULAR; INTRAVENOUS at 15:19

## 2018-10-22 RX ADMIN — DIAZEPAM 5 MG: 5 TABLET ORAL at 21:19

## 2018-10-22 RX ADMIN — GLYCOPYRROLATE 0.1 MG: 0.2 INJECTION, SOLUTION INTRAMUSCULAR; INTRAVENOUS at 09:13

## 2018-10-22 RX ADMIN — METOPROLOL TARTRATE 25 MG: 25 TABLET ORAL at 09:14

## 2018-10-22 RX ADMIN — FLUOXETINE HYDROCHLORIDE 20 MG: 20 SOLUTION ORAL at 09:13

## 2018-10-23 PROCEDURE — 25010000002 ENOXAPARIN PER 10 MG: Performed by: NURSE PRACTITIONER

## 2018-10-23 PROCEDURE — 94760 N-INVAS EAR/PLS OXIMETRY 1: CPT

## 2018-10-23 PROCEDURE — 99231 SBSQ HOSP IP/OBS SF/LOW 25: CPT | Performed by: NURSE PRACTITIONER

## 2018-10-23 PROCEDURE — 94799 UNLISTED PULMONARY SVC/PX: CPT

## 2018-10-23 PROCEDURE — 25010000002 HALOPERIDOL LACTATE PER 5 MG: Performed by: INTERNAL MEDICINE

## 2018-10-23 RX ADMIN — LORAZEPAM 0.5 MG: 0.5 TABLET ORAL at 09:41

## 2018-10-23 RX ADMIN — THIAMINE HCL TAB 100 MG 100 MG: 100 TAB at 09:05

## 2018-10-23 RX ADMIN — FLUOXETINE HYDROCHLORIDE 20 MG: 20 SOLUTION ORAL at 09:05

## 2018-10-23 RX ADMIN — DIAZEPAM 5 MG: 5 TABLET ORAL at 21:21

## 2018-10-23 RX ADMIN — GLYCOPYRROLATE 0.1 MG: 0.2 INJECTION, SOLUTION INTRAMUSCULAR; INTRAVENOUS at 16:20

## 2018-10-23 RX ADMIN — METOPROLOL TARTRATE 25 MG: 25 TABLET ORAL at 09:06

## 2018-10-23 RX ADMIN — Medication 1 CAPSULE: at 09:05

## 2018-10-23 RX ADMIN — ASPIRIN 81 MG CHEWABLE TABLET 81 MG: 81 TABLET CHEWABLE at 09:05

## 2018-10-23 RX ADMIN — GLYCOPYRROLATE 0.1 MG: 0.2 INJECTION, SOLUTION INTRAMUSCULAR; INTRAVENOUS at 21:20

## 2018-10-23 RX ADMIN — IBUPROFEN 400 MG: 100 SUSPENSION ORAL at 16:23

## 2018-10-23 RX ADMIN — BACLOFEN 10 MG: 10 TABLET ORAL at 21:20

## 2018-10-23 RX ADMIN — GLYCOPYRROLATE 0.1 MG: 0.2 INJECTION, SOLUTION INTRAMUSCULAR; INTRAVENOUS at 09:05

## 2018-10-23 RX ADMIN — Medication 10 ML: at 09:06

## 2018-10-23 RX ADMIN — HALOPERIDOL LACTATE 2 MG: 5 INJECTION, SOLUTION INTRAMUSCULAR at 05:19

## 2018-10-23 RX ADMIN — BACLOFEN 10 MG: 10 TABLET ORAL at 16:20

## 2018-10-23 RX ADMIN — FAMOTIDINE 20 MG: 20 TABLET ORAL at 09:05

## 2018-10-23 RX ADMIN — ENOXAPARIN SODIUM 40 MG: 40 INJECTION SUBCUTANEOUS at 09:06

## 2018-10-23 RX ADMIN — QUETIAPINE FUMARATE 50 MG: 25 TABLET ORAL at 21:20

## 2018-10-23 RX ADMIN — DIAZEPAM 5 MG: 5 TABLET ORAL at 09:05

## 2018-10-23 RX ADMIN — QUETIAPINE FUMARATE 50 MG: 25 TABLET ORAL at 09:06

## 2018-10-23 RX ADMIN — BACLOFEN 10 MG: 10 TABLET ORAL at 09:05

## 2018-10-23 RX ADMIN — FAMOTIDINE 20 MG: 20 TABLET ORAL at 21:21

## 2018-10-23 RX ADMIN — HALOPERIDOL LACTATE 2 MG: 5 INJECTION, SOLUTION INTRAMUSCULAR at 12:45

## 2018-10-24 VITALS
HEART RATE: 116 BPM | BODY MASS INDEX: 30.7 KG/M2 | DIASTOLIC BLOOD PRESSURE: 72 MMHG | HEIGHT: 57 IN | TEMPERATURE: 98.2 F | OXYGEN SATURATION: 96 % | RESPIRATION RATE: 20 BRPM | SYSTOLIC BLOOD PRESSURE: 129 MMHG | WEIGHT: 142.31 LBS

## 2018-10-24 PROBLEM — R82.90 ABNORMAL URINALYSIS: Status: RESOLVED | Noted: 2018-08-26 | Resolved: 2018-10-24

## 2018-10-24 PROBLEM — A49.1 GROUP C STREPTOCOCCAL INFECTION: Status: RESOLVED | Noted: 2018-08-25 | Resolved: 2018-10-24

## 2018-10-24 PROBLEM — G25.3 MYOCLONUS: Status: ACTIVE | Noted: 2018-08-15

## 2018-10-24 PROBLEM — N39.0 UTI (URINARY TRACT INFECTION) DUE TO ENTEROCOCCUS: Status: RESOLVED | Noted: 2018-08-30 | Resolved: 2018-10-24

## 2018-10-24 PROBLEM — B95.2 UTI (URINARY TRACT INFECTION) DUE TO ENTEROCOCCUS: Status: RESOLVED | Noted: 2018-08-30 | Resolved: 2018-10-24

## 2018-10-24 PROBLEM — R50.9 PERSISTENT FEVER: Status: RESOLVED | Noted: 2018-08-15 | Resolved: 2018-10-24

## 2018-10-24 LAB — GLUCOSE BLDC GLUCOMTR-MCNC: 120 MG/DL (ref 70–130)

## 2018-10-24 PROCEDURE — 82962 GLUCOSE BLOOD TEST: CPT

## 2018-10-24 PROCEDURE — 25010000002 ENOXAPARIN PER 10 MG: Performed by: NURSE PRACTITIONER

## 2018-10-24 PROCEDURE — 99239 HOSP IP/OBS DSCHRG MGMT >30: CPT | Performed by: NURSE PRACTITIONER

## 2018-10-24 PROCEDURE — 25010000002 HALOPERIDOL LACTATE PER 5 MG: Performed by: INTERNAL MEDICINE

## 2018-10-24 PROCEDURE — 25010000002 LORAZEPAM PER 2 MG: Performed by: NURSE PRACTITIONER

## 2018-10-24 RX ORDER — LORAZEPAM 0.5 MG/1
0.5 TABLET ORAL EVERY 8 HOURS PRN
Qty: 9 TABLET | Refills: 0 | Status: SHIPPED | OUTPATIENT
Start: 2018-10-24

## 2018-10-24 RX ORDER — FLUOXETINE HYDROCHLORIDE 20 MG/5ML
20 LIQUID ORAL DAILY
Start: 2018-10-25

## 2018-10-24 RX ORDER — LORAZEPAM 2 MG/ML
0.5 INJECTION INTRAMUSCULAR ONCE
Status: COMPLETED | OUTPATIENT
Start: 2018-10-24 | End: 2018-10-24

## 2018-10-24 RX ORDER — ACETAMINOPHEN 650 MG/1
650 SUPPOSITORY RECTAL EVERY 4 HOURS PRN
Start: 2018-10-24

## 2018-10-24 RX ORDER — BACLOFEN 5 MG/1
10 TABLET ORAL 3 TIMES DAILY
Qty: 90 TABLET | Refills: 0
Start: 2018-10-24

## 2018-10-24 RX ORDER — DIAZEPAM 5 MG/1
5 TABLET ORAL 2 TIMES DAILY
Qty: 6 TABLET | Refills: 0 | Status: SHIPPED | OUTPATIENT
Start: 2018-10-24 | End: 2018-11-01

## 2018-10-24 RX ORDER — QUETIAPINE FUMARATE 50 MG/1
50 TABLET, FILM COATED ORAL EVERY 12 HOURS SCHEDULED
Start: 2018-10-24

## 2018-10-24 RX ADMIN — ENOXAPARIN SODIUM 40 MG: 40 INJECTION SUBCUTANEOUS at 08:05

## 2018-10-24 RX ADMIN — QUETIAPINE FUMARATE 50 MG: 25 TABLET ORAL at 08:05

## 2018-10-24 RX ADMIN — FLUOXETINE HYDROCHLORIDE 20 MG: 20 SOLUTION ORAL at 10:23

## 2018-10-24 RX ADMIN — HALOPERIDOL LACTATE 2 MG: 5 INJECTION, SOLUTION INTRAMUSCULAR at 08:05

## 2018-10-24 RX ADMIN — BACLOFEN 10 MG: 10 TABLET ORAL at 08:04

## 2018-10-24 RX ADMIN — FAMOTIDINE 20 MG: 20 TABLET ORAL at 08:04

## 2018-10-24 RX ADMIN — Medication 1 CAPSULE: at 08:04

## 2018-10-24 RX ADMIN — DIAZEPAM 5 MG: 5 TABLET ORAL at 08:04

## 2018-10-24 RX ADMIN — GLYCOPYRROLATE 0.1 MG: 0.2 INJECTION, SOLUTION INTRAMUSCULAR; INTRAVENOUS at 08:05

## 2018-10-24 RX ADMIN — THIAMINE HCL TAB 100 MG 100 MG: 100 TAB at 08:05

## 2018-10-24 RX ADMIN — ASPIRIN 81 MG CHEWABLE TABLET 81 MG: 81 TABLET CHEWABLE at 08:04

## 2018-10-24 RX ADMIN — LORAZEPAM 0.5 MG: 2 INJECTION INTRAMUSCULAR; INTRAVENOUS at 12:21

## 2018-10-24 RX ADMIN — LORAZEPAM 0.5 MG: 0.5 TABLET ORAL at 10:36

## 2018-10-24 RX ADMIN — METOPROLOL TARTRATE 25 MG: 25 TABLET ORAL at 08:25

## 2018-12-21 PROCEDURE — 80048 BASIC METABOLIC PNL TOTAL CA: CPT

## 2018-12-21 PROCEDURE — 85025 COMPLETE CBC W/AUTO DIFF WBC: CPT

## 2018-12-22 ENCOUNTER — LAB REQUISITION (OUTPATIENT)
Dept: LAB | Facility: HOSPITAL | Age: 38
End: 2018-12-22

## 2018-12-22 DIAGNOSIS — Z00.00 ROUTINE GENERAL MEDICAL EXAMINATION AT A HEALTH CARE FACILITY: ICD-10-CM

## 2018-12-22 LAB
ANION GAP SERPL CALCULATED.3IONS-SCNC: 14.1 MMOL/L
BASOPHILS # BLD AUTO: 0.04 10*3/MM3 (ref 0–0.2)
BASOPHILS NFR BLD AUTO: 0.3 % (ref 0–1.5)
BUN BLD-MCNC: 17 MG/DL (ref 6–20)
BUN/CREAT SERPL: 29.8 (ref 7–25)
CALCIUM SPEC-SCNC: 10.3 MG/DL (ref 8.6–10.5)
CHLORIDE SERPL-SCNC: 99 MMOL/L (ref 98–107)
CO2 SERPL-SCNC: 26.9 MMOL/L (ref 22–29)
CREAT BLD-MCNC: 0.57 MG/DL (ref 0.57–1)
DEPRECATED RDW RBC AUTO: 53.3 FL (ref 37–54)
EOSINOPHIL # BLD AUTO: 0.04 10*3/MM3 (ref 0–0.7)
EOSINOPHIL NFR BLD AUTO: 0.3 % (ref 0.3–6.2)
ERYTHROCYTE [DISTWIDTH] IN BLOOD BY AUTOMATED COUNT: 14.9 % (ref 11.7–13)
GFR SERPL CREATININE-BSD FRML MDRD: 118 ML/MIN/1.73
GLUCOSE BLD-MCNC: 86 MG/DL (ref 65–99)
HCT VFR BLD AUTO: 38.8 % (ref 35.6–45.5)
HGB BLD-MCNC: 12.6 G/DL (ref 11.9–15.5)
IMM GRANULOCYTES # BLD AUTO: 0.02 10*3/MM3 (ref 0–0.03)
IMM GRANULOCYTES NFR BLD AUTO: 0.2 % (ref 0–0.5)
LYMPHOCYTES # BLD AUTO: 3.41 10*3/MM3 (ref 0.9–4.8)
LYMPHOCYTES NFR BLD AUTO: 26.4 % (ref 19.6–45.3)
MCH RBC QN AUTO: 31.4 PG (ref 26.9–32)
MCHC RBC AUTO-ENTMCNC: 32.5 G/DL (ref 32.4–36.3)
MCV RBC AUTO: 96.8 FL (ref 80.5–98.2)
MONOCYTES # BLD AUTO: 1.24 10*3/MM3 (ref 0.2–1.2)
MONOCYTES NFR BLD AUTO: 9.6 % (ref 5–12)
NEUTROPHILS # BLD AUTO: 8.15 10*3/MM3 (ref 1.9–8.1)
NEUTROPHILS NFR BLD AUTO: 63.2 % (ref 42.7–76)
PLATELET # BLD AUTO: 363 10*3/MM3 (ref 140–500)
PMV BLD AUTO: 10.7 FL (ref 6–12)
POTASSIUM BLD-SCNC: 4.7 MMOL/L (ref 3.5–5.2)
RBC # BLD AUTO: 4.01 10*6/MM3 (ref 3.9–5.2)
SODIUM BLD-SCNC: 140 MMOL/L (ref 136–145)
WBC NRBC COR # BLD: 12.9 10*3/MM3 (ref 4.5–10.7)

## 2019-02-26 ENCOUNTER — DOCUMENTATION (OUTPATIENT)
Dept: SURGERY | Facility: HOSPITAL | Age: 39
End: 2019-02-26

## 2019-04-01 ENCOUNTER — LAB REQUISITION (OUTPATIENT)
Dept: LAB | Facility: HOSPITAL | Age: 39
End: 2019-04-01

## 2019-04-01 DIAGNOSIS — Z00.00 ROUTINE GENERAL MEDICAL EXAMINATION AT A HEALTH CARE FACILITY: ICD-10-CM

## 2019-04-01 LAB
ANION GAP SERPL CALCULATED.3IONS-SCNC: 11.4 MMOL/L
BASOPHILS # BLD AUTO: 0.09 10*3/MM3 (ref 0–0.2)
BASOPHILS NFR BLD AUTO: 0.7 % (ref 0–1.5)
BUN BLD-MCNC: 13 MG/DL (ref 6–20)
BUN/CREAT SERPL: 21.7 (ref 7–25)
CALCIUM SPEC-SCNC: 9 MG/DL (ref 8.6–10.5)
CHLORIDE SERPL-SCNC: 103 MMOL/L (ref 98–107)
CO2 SERPL-SCNC: 29.6 MMOL/L (ref 22–29)
CREAT BLD-MCNC: 0.6 MG/DL (ref 0.57–1)
DEPRECATED RDW RBC AUTO: 45.1 FL (ref 37–54)
EOSINOPHIL # BLD AUTO: 0.13 10*3/MM3 (ref 0–0.4)
EOSINOPHIL NFR BLD AUTO: 1.1 % (ref 0.3–6.2)
ERYTHROCYTE [DISTWIDTH] IN BLOOD BY AUTOMATED COUNT: 11.9 % (ref 12.3–15.4)
GFR SERPL CREATININE-BSD FRML MDRD: 111 ML/MIN/1.73
GLUCOSE BLD-MCNC: 79 MG/DL (ref 65–99)
HCT VFR BLD AUTO: 40.9 % (ref 34–46.6)
HGB BLD-MCNC: 12.6 G/DL (ref 12–15.9)
IMM GRANULOCYTES # BLD AUTO: 0.06 10*3/MM3 (ref 0–0.05)
IMM GRANULOCYTES NFR BLD AUTO: 0.5 % (ref 0–0.5)
LYMPHOCYTES # BLD AUTO: 3.36 10*3/MM3 (ref 0.7–3.1)
LYMPHOCYTES NFR BLD AUTO: 27.4 % (ref 19.6–45.3)
MCH RBC QN AUTO: 31.9 PG (ref 26.6–33)
MCHC RBC AUTO-ENTMCNC: 30.8 G/DL (ref 31.5–35.7)
MCV RBC AUTO: 103.5 FL (ref 79–97)
MONOCYTES # BLD AUTO: 0.82 10*3/MM3 (ref 0.1–0.9)
MONOCYTES NFR BLD AUTO: 6.7 % (ref 5–12)
NEUTROPHILS # BLD AUTO: 7.82 10*3/MM3 (ref 1.4–7)
NEUTROPHILS NFR BLD AUTO: 63.6 % (ref 42.7–76)
NRBC BLD AUTO-RTO: 0.2 /100 WBC (ref 0–0)
PLAT MORPH BLD: NORMAL
PLATELET # BLD AUTO: 331 10*3/MM3 (ref 140–450)
PMV BLD AUTO: 10.9 FL (ref 6–12)
POTASSIUM BLD-SCNC: 4.7 MMOL/L (ref 3.5–5.2)
RBC # BLD AUTO: 3.95 10*6/MM3 (ref 3.77–5.28)
RBC MORPH BLD: NORMAL
SODIUM BLD-SCNC: 144 MMOL/L (ref 136–145)
WBC MORPH BLD: NORMAL
WBC NRBC COR # BLD: 12.28 10*3/MM3 (ref 3.4–10.8)

## 2019-04-01 PROCEDURE — 85025 COMPLETE CBC W/AUTO DIFF WBC: CPT

## 2019-04-01 PROCEDURE — 85007 BL SMEAR W/DIFF WBC COUNT: CPT

## 2019-04-01 PROCEDURE — 80048 BASIC METABOLIC PNL TOTAL CA: CPT

## 2019-05-24 PROCEDURE — 81001 URINALYSIS AUTO W/SCOPE: CPT

## 2019-05-24 PROCEDURE — 80048 BASIC METABOLIC PNL TOTAL CA: CPT

## 2019-05-24 PROCEDURE — 85025 COMPLETE CBC W/AUTO DIFF WBC: CPT

## 2019-05-25 ENCOUNTER — LAB REQUISITION (OUTPATIENT)
Dept: LAB | Facility: HOSPITAL | Age: 39
End: 2019-05-25

## 2019-05-25 DIAGNOSIS — Z00.00 ROUTINE GENERAL MEDICAL EXAMINATION AT A HEALTH CARE FACILITY: ICD-10-CM

## 2019-05-25 LAB
ANION GAP SERPL CALCULATED.3IONS-SCNC: 11.1 MMOL/L
BACTERIA UR QL AUTO: ABNORMAL /HPF
BASOPHILS # BLD AUTO: 0.06 10*3/MM3 (ref 0–0.2)
BASOPHILS NFR BLD AUTO: 0.9 % (ref 0–1.5)
BILIRUB UR QL STRIP: NEGATIVE
BUN BLD-MCNC: 7 MG/DL (ref 6–20)
BUN/CREAT SERPL: 13 (ref 7–25)
CALCIUM SPEC-SCNC: 9.5 MG/DL (ref 8.6–10.5)
CHLORIDE SERPL-SCNC: 100 MMOL/L (ref 98–107)
CLARITY UR: ABNORMAL
CO2 SERPL-SCNC: 26.9 MMOL/L (ref 22–29)
COLOR UR: YELLOW
CREAT BLD-MCNC: 0.54 MG/DL (ref 0.57–1)
DEPRECATED RDW RBC AUTO: 45 FL (ref 37–54)
EOSINOPHIL # BLD AUTO: 0.21 10*3/MM3 (ref 0–0.4)
EOSINOPHIL NFR BLD AUTO: 3.1 % (ref 0.3–6.2)
ERYTHROCYTE [DISTWIDTH] IN BLOOD BY AUTOMATED COUNT: 12.5 % (ref 12.3–15.4)
GFR SERPL CREATININE-BSD FRML MDRD: 126 ML/MIN/1.73
GLUCOSE BLD-MCNC: 119 MG/DL (ref 65–99)
GLUCOSE UR STRIP-MCNC: NEGATIVE MG/DL
HCT VFR BLD AUTO: 34.6 % (ref 34–46.6)
HGB BLD-MCNC: 10.9 G/DL (ref 12–15.9)
HGB UR QL STRIP.AUTO: ABNORMAL
HYALINE CASTS UR QL AUTO: ABNORMAL /LPF
IMM GRANULOCYTES # BLD AUTO: 0.01 10*3/MM3 (ref 0–0.05)
IMM GRANULOCYTES NFR BLD AUTO: 0.1 % (ref 0–0.5)
KETONES UR QL STRIP: NEGATIVE
LEUKOCYTE ESTERASE UR QL STRIP.AUTO: ABNORMAL
LYMPHOCYTES # BLD AUTO: 3.15 10*3/MM3 (ref 0.7–3.1)
LYMPHOCYTES NFR BLD AUTO: 46.1 % (ref 19.6–45.3)
MCH RBC QN AUTO: 30.8 PG (ref 26.6–33)
MCHC RBC AUTO-ENTMCNC: 31.5 G/DL (ref 31.5–35.7)
MCV RBC AUTO: 97.7 FL (ref 79–97)
MONOCYTES # BLD AUTO: 0.49 10*3/MM3 (ref 0.1–0.9)
MONOCYTES NFR BLD AUTO: 7.2 % (ref 5–12)
NEUTROPHILS # BLD AUTO: 2.92 10*3/MM3 (ref 1.7–7)
NEUTROPHILS NFR BLD AUTO: 42.6 % (ref 42.7–76)
NITRITE UR QL STRIP: NEGATIVE
NRBC BLD AUTO-RTO: 0 /100 WBC (ref 0–0.2)
PH UR STRIP.AUTO: 5.5 [PH] (ref 5–8)
PLATELET # BLD AUTO: 334 10*3/MM3 (ref 140–450)
PMV BLD AUTO: 10.6 FL (ref 6–12)
POTASSIUM BLD-SCNC: 3.4 MMOL/L (ref 3.5–5.2)
PROT UR QL STRIP: ABNORMAL
RBC # BLD AUTO: 3.54 10*6/MM3 (ref 3.77–5.28)
RBC # UR: ABNORMAL /HPF
REF LAB TEST METHOD: ABNORMAL
SODIUM BLD-SCNC: 138 MMOL/L (ref 136–145)
SP GR UR STRIP: 1.02 (ref 1–1.03)
SQUAMOUS #/AREA URNS HPF: ABNORMAL /HPF
UROBILINOGEN UR QL STRIP: ABNORMAL
WBC NRBC COR # BLD: 6.84 10*3/MM3 (ref 3.4–10.8)
WBC UR QL AUTO: ABNORMAL /HPF

## 2019-07-05 ENCOUNTER — LAB REQUISITION (OUTPATIENT)
Dept: LAB | Facility: HOSPITAL | Age: 39
End: 2019-07-05

## 2019-07-05 DIAGNOSIS — Z00.00 ROUTINE GENERAL MEDICAL EXAMINATION AT A HEALTH CARE FACILITY: ICD-10-CM

## 2019-07-05 LAB
ALBUMIN SERPL-MCNC: 4 G/DL (ref 3.5–5.2)
ALBUMIN/GLOB SERPL: 1.3 G/DL
ALP SERPL-CCNC: 93 U/L (ref 39–117)
ALT SERPL W P-5'-P-CCNC: 34 U/L (ref 1–33)
ANION GAP SERPL CALCULATED.3IONS-SCNC: 11 MMOL/L (ref 5–15)
AST SERPL-CCNC: 32 U/L (ref 1–32)
BASOPHILS # BLD AUTO: 0.06 10*3/MM3 (ref 0–0.2)
BASOPHILS NFR BLD AUTO: 0.7 % (ref 0–1.5)
BILIRUB SERPL-MCNC: <0.2 MG/DL (ref 0.2–1.2)
BUN BLD-MCNC: 9 MG/DL (ref 6–20)
BUN/CREAT SERPL: 17.6 (ref 7–25)
CALCIUM SPEC-SCNC: 9.2 MG/DL (ref 8.6–10.5)
CHLORIDE SERPL-SCNC: 100 MMOL/L (ref 98–107)
CO2 SERPL-SCNC: 30 MMOL/L (ref 22–29)
CREAT BLD-MCNC: 0.51 MG/DL (ref 0.57–1)
DEPRECATED RDW RBC AUTO: 39.8 FL (ref 37–54)
EOSINOPHIL # BLD AUTO: 0.15 10*3/MM3 (ref 0–0.4)
EOSINOPHIL NFR BLD AUTO: 1.8 % (ref 0.3–6.2)
ERYTHROCYTE [DISTWIDTH] IN BLOOD BY AUTOMATED COUNT: 11.5 % (ref 12.3–15.4)
GFR SERPL CREATININE-BSD FRML MDRD: 134 ML/MIN/1.73
GLOBULIN UR ELPH-MCNC: 3 GM/DL
GLUCOSE BLD-MCNC: 135 MG/DL (ref 65–99)
HCT VFR BLD AUTO: 38.3 % (ref 34–46.6)
HGB BLD-MCNC: 12.3 G/DL (ref 12–15.9)
IMM GRANULOCYTES # BLD AUTO: 0.01 10*3/MM3 (ref 0–0.05)
IMM GRANULOCYTES NFR BLD AUTO: 0.1 % (ref 0–0.5)
LYMPHOCYTES # BLD AUTO: 3.11 10*3/MM3 (ref 0.7–3.1)
LYMPHOCYTES NFR BLD AUTO: 37.6 % (ref 19.6–45.3)
MCH RBC QN AUTO: 30.1 PG (ref 26.6–33)
MCHC RBC AUTO-ENTMCNC: 32.1 G/DL (ref 31.5–35.7)
MCV RBC AUTO: 93.9 FL (ref 79–97)
MONOCYTES # BLD AUTO: 0.47 10*3/MM3 (ref 0.1–0.9)
MONOCYTES NFR BLD AUTO: 5.7 % (ref 5–12)
NEUTROPHILS # BLD AUTO: 4.48 10*3/MM3 (ref 1.7–7)
NEUTROPHILS NFR BLD AUTO: 54.1 % (ref 42.7–76)
NRBC BLD AUTO-RTO: 0 /100 WBC (ref 0–0.2)
PLATELET # BLD AUTO: 352 10*3/MM3 (ref 140–450)
PMV BLD AUTO: 10.1 FL (ref 6–12)
POTASSIUM BLD-SCNC: 4.6 MMOL/L (ref 3.5–5.2)
PROT SERPL-MCNC: 7 G/DL (ref 6–8.5)
RBC # BLD AUTO: 4.08 10*6/MM3 (ref 3.77–5.28)
SODIUM BLD-SCNC: 141 MMOL/L (ref 136–145)
WBC NRBC COR # BLD: 8.28 10*3/MM3 (ref 3.4–10.8)

## 2019-07-05 PROCEDURE — 85025 COMPLETE CBC W/AUTO DIFF WBC: CPT

## 2019-07-05 PROCEDURE — 80053 COMPREHEN METABOLIC PANEL: CPT

## 2019-07-09 ENCOUNTER — LAB REQUISITION (OUTPATIENT)
Dept: LAB | Facility: HOSPITAL | Age: 39
End: 2019-07-09

## 2019-07-09 DIAGNOSIS — Z00.00 ROUTINE GENERAL MEDICAL EXAMINATION AT A HEALTH CARE FACILITY: ICD-10-CM

## 2019-07-09 LAB
BACTERIA UR QL AUTO: ABNORMAL /HPF
BILIRUB UR QL STRIP: NEGATIVE
CLARITY UR: ABNORMAL
COLOR UR: ABNORMAL
GLUCOSE UR STRIP-MCNC: NEGATIVE MG/DL
HGB UR QL STRIP.AUTO: ABNORMAL
HYALINE CASTS UR QL AUTO: ABNORMAL /LPF
KETONES UR QL STRIP: NEGATIVE
LEUKOCYTE ESTERASE UR QL STRIP.AUTO: ABNORMAL
NITRITE UR QL STRIP: POSITIVE
PH UR STRIP.AUTO: <=5 [PH] (ref 5–8)
PROT UR QL STRIP: ABNORMAL
RBC # UR: ABNORMAL /HPF
REF LAB TEST METHOD: ABNORMAL
SP GR UR STRIP: 1.02 (ref 1–1.03)
SQUAMOUS #/AREA URNS HPF: ABNORMAL /HPF
UROBILINOGEN UR QL STRIP: ABNORMAL
WBC UR QL AUTO: ABNORMAL /HPF

## 2019-07-09 PROCEDURE — 81001 URINALYSIS AUTO W/SCOPE: CPT

## 2020-03-31 ENCOUNTER — LAB REQUISITION (OUTPATIENT)
Dept: LAB | Facility: HOSPITAL | Age: 40
End: 2020-03-31

## 2020-03-31 DIAGNOSIS — Z00.00 ROUTINE GENERAL MEDICAL EXAMINATION AT A HEALTH CARE FACILITY: ICD-10-CM

## 2020-03-31 LAB
ANION GAP SERPL CALCULATED.3IONS-SCNC: 14 MMOL/L (ref 5–15)
BASOPHILS # BLD AUTO: 0.09 10*3/MM3 (ref 0–0.2)
BASOPHILS NFR BLD AUTO: 1.1 % (ref 0–1.5)
BUN BLD-MCNC: 19 MG/DL (ref 6–20)
BUN/CREAT SERPL: 24.4 (ref 7–25)
CALCIUM SPEC-SCNC: 9.6 MG/DL (ref 8.6–10.5)
CHLORIDE SERPL-SCNC: 105 MMOL/L (ref 98–107)
CO2 SERPL-SCNC: 21 MMOL/L (ref 22–29)
CREAT BLD-MCNC: 0.78 MG/DL (ref 0.57–1)
DEPRECATED RDW RBC AUTO: 40.2 FL (ref 37–54)
EOSINOPHIL # BLD AUTO: 0.17 10*3/MM3 (ref 0–0.4)
EOSINOPHIL NFR BLD AUTO: 2.1 % (ref 0.3–6.2)
ERYTHROCYTE [DISTWIDTH] IN BLOOD BY AUTOMATED COUNT: 12 % (ref 12.3–15.4)
GFR SERPL CREATININE-BSD FRML MDRD: 82 ML/MIN/1.73
GLUCOSE BLD-MCNC: 99 MG/DL (ref 65–99)
HCT VFR BLD AUTO: 40.1 % (ref 34–46.6)
HGB BLD-MCNC: 12.9 G/DL (ref 12–15.9)
IMM GRANULOCYTES # BLD AUTO: 0.02 10*3/MM3 (ref 0–0.05)
IMM GRANULOCYTES NFR BLD AUTO: 0.2 % (ref 0–0.5)
LYMPHOCYTES # BLD AUTO: 2.96 10*3/MM3 (ref 0.7–3.1)
LYMPHOCYTES NFR BLD AUTO: 36.5 % (ref 19.6–45.3)
MCH RBC QN AUTO: 29.6 PG (ref 26.6–33)
MCHC RBC AUTO-ENTMCNC: 32.2 G/DL (ref 31.5–35.7)
MCV RBC AUTO: 92 FL (ref 79–97)
MONOCYTES # BLD AUTO: 0.45 10*3/MM3 (ref 0.1–0.9)
MONOCYTES NFR BLD AUTO: 5.5 % (ref 5–12)
NEUTROPHILS # BLD AUTO: 4.43 10*3/MM3 (ref 1.7–7)
NEUTROPHILS NFR BLD AUTO: 54.6 % (ref 42.7–76)
NRBC BLD AUTO-RTO: 0 /100 WBC (ref 0–0.2)
PLATELET # BLD AUTO: 329 10*3/MM3 (ref 140–450)
PMV BLD AUTO: 10.6 FL (ref 6–12)
POTASSIUM BLD-SCNC: 4.3 MMOL/L (ref 3.5–5.2)
RBC # BLD AUTO: 4.36 10*6/MM3 (ref 3.77–5.28)
SODIUM BLD-SCNC: 140 MMOL/L (ref 136–145)
WBC NRBC COR # BLD: 8.12 10*3/MM3 (ref 3.4–10.8)

## 2020-03-31 PROCEDURE — 80048 BASIC METABOLIC PNL TOTAL CA: CPT

## 2020-03-31 PROCEDURE — 85025 COMPLETE CBC W/AUTO DIFF WBC: CPT

## 2020-06-27 ENCOUNTER — LAB REQUISITION (OUTPATIENT)
Dept: LAB | Facility: HOSPITAL | Age: 40
End: 2020-06-27

## 2020-06-27 DIAGNOSIS — Z00.00 ROUTINE GENERAL MEDICAL EXAMINATION AT A HEALTH CARE FACILITY: ICD-10-CM

## 2020-06-27 LAB
ANION GAP SERPL CALCULATED.3IONS-SCNC: 11.6 MMOL/L (ref 5–15)
BACTERIA UR QL AUTO: ABNORMAL /HPF
BASOPHILS # BLD AUTO: 0.07 10*3/MM3 (ref 0–0.2)
BASOPHILS NFR BLD AUTO: 1.1 % (ref 0–1.5)
BILIRUB UR QL STRIP: NEGATIVE
BUN BLD-MCNC: 15 MG/DL (ref 6–20)
BUN/CREAT SERPL: 18.8 (ref 7–25)
CALCIUM SPEC-SCNC: 8.9 MG/DL (ref 8.6–10.5)
CHLORIDE SERPL-SCNC: 107 MMOL/L (ref 98–107)
CLARITY UR: ABNORMAL
CO2 SERPL-SCNC: 21.4 MMOL/L (ref 22–29)
COLOR UR: YELLOW
CREAT BLD-MCNC: 0.8 MG/DL (ref 0.57–1)
DEPRECATED RDW RBC AUTO: 41.3 FL (ref 37–54)
EOSINOPHIL # BLD AUTO: 0.36 10*3/MM3 (ref 0–0.4)
EOSINOPHIL NFR BLD AUTO: 5.4 % (ref 0.3–6.2)
ERYTHROCYTE [DISTWIDTH] IN BLOOD BY AUTOMATED COUNT: 12.5 % (ref 12.3–15.4)
GFR SERPL CREATININE-BSD FRML MDRD: 79 ML/MIN/1.73
GLUCOSE BLD-MCNC: 103 MG/DL (ref 65–99)
GLUCOSE UR STRIP-MCNC: NEGATIVE MG/DL
HCT VFR BLD AUTO: 33.9 % (ref 34–46.6)
HGB BLD-MCNC: 11.4 G/DL (ref 12–15.9)
HGB UR QL STRIP.AUTO: ABNORMAL
HYALINE CASTS UR QL AUTO: ABNORMAL /LPF
IMM GRANULOCYTES # BLD AUTO: 0.01 10*3/MM3 (ref 0–0.05)
IMM GRANULOCYTES NFR BLD AUTO: 0.2 % (ref 0–0.5)
KETONES UR QL STRIP: NEGATIVE
LEUKOCYTE ESTERASE UR QL STRIP.AUTO: ABNORMAL
LYMPHOCYTES # BLD AUTO: 2.7 10*3/MM3 (ref 0.7–3.1)
LYMPHOCYTES NFR BLD AUTO: 40.7 % (ref 19.6–45.3)
MCH RBC QN AUTO: 30.2 PG (ref 26.6–33)
MCHC RBC AUTO-ENTMCNC: 33.6 G/DL (ref 31.5–35.7)
MCV RBC AUTO: 89.9 FL (ref 79–97)
MONOCYTES # BLD AUTO: 0.48 10*3/MM3 (ref 0.1–0.9)
MONOCYTES NFR BLD AUTO: 7.2 % (ref 5–12)
NEUTROPHILS # BLD AUTO: 3.01 10*3/MM3 (ref 1.7–7)
NEUTROPHILS NFR BLD AUTO: 45.4 % (ref 42.7–76)
NITRITE UR QL STRIP: NEGATIVE
NRBC BLD AUTO-RTO: 0 /100 WBC (ref 0–0.2)
PH UR STRIP.AUTO: 7 [PH] (ref 5–8)
PLATELET # BLD AUTO: 296 10*3/MM3 (ref 140–450)
PMV BLD AUTO: 10.1 FL (ref 6–12)
POTASSIUM BLD-SCNC: 4.3 MMOL/L (ref 3.5–5.2)
PROT UR QL STRIP: ABNORMAL
RBC # BLD AUTO: 3.77 10*6/MM3 (ref 3.77–5.28)
RBC # UR: ABNORMAL /HPF
REF LAB TEST METHOD: ABNORMAL
SODIUM BLD-SCNC: 140 MMOL/L (ref 136–145)
SP GR UR STRIP: 1.03 (ref 1–1.03)
SQUAMOUS #/AREA URNS HPF: ABNORMAL /HPF
UROBILINOGEN UR QL STRIP: ABNORMAL
WBC NRBC COR # BLD: 6.63 10*3/MM3 (ref 3.4–10.8)
WBC UR QL AUTO: ABNORMAL /HPF

## 2020-06-27 PROCEDURE — 80048 BASIC METABOLIC PNL TOTAL CA: CPT

## 2020-06-27 PROCEDURE — 85025 COMPLETE CBC W/AUTO DIFF WBC: CPT

## 2020-06-27 PROCEDURE — 81001 URINALYSIS AUTO W/SCOPE: CPT

## 2020-10-09 PROCEDURE — 80048 BASIC METABOLIC PNL TOTAL CA: CPT

## 2020-10-09 PROCEDURE — 81001 URINALYSIS AUTO W/SCOPE: CPT

## 2020-10-09 PROCEDURE — 85025 COMPLETE CBC W/AUTO DIFF WBC: CPT

## 2020-10-10 ENCOUNTER — LAB REQUISITION (OUTPATIENT)
Dept: LAB | Facility: HOSPITAL | Age: 40
End: 2020-10-10

## 2020-10-10 DIAGNOSIS — Z00.00 ROUTINE GENERAL MEDICAL EXAMINATION AT A HEALTH CARE FACILITY: ICD-10-CM

## 2020-10-10 LAB
ANION GAP SERPL CALCULATED.3IONS-SCNC: 9 MMOL/L (ref 5–15)
BACTERIA UR QL AUTO: ABNORMAL /HPF
BASOPHILS # BLD AUTO: 0.08 10*3/MM3 (ref 0–0.2)
BASOPHILS NFR BLD AUTO: 0.9 % (ref 0–1.5)
BILIRUB UR QL STRIP: NEGATIVE
BUN SERPL-MCNC: 15 MG/DL (ref 6–20)
BUN/CREAT SERPL: 24.6 (ref 7–25)
CALCIUM SPEC-SCNC: 8.9 MG/DL (ref 8.6–10.5)
CHLORIDE SERPL-SCNC: 108 MMOL/L (ref 98–107)
CLARITY UR: CLEAR
CO2 SERPL-SCNC: 24 MMOL/L (ref 22–29)
COLOR UR: YELLOW
CREAT SERPL-MCNC: 0.61 MG/DL (ref 0.57–1)
DEPRECATED RDW RBC AUTO: 42.5 FL (ref 37–54)
EOSINOPHIL # BLD AUTO: 0.33 10*3/MM3 (ref 0–0.4)
EOSINOPHIL NFR BLD AUTO: 3.8 % (ref 0.3–6.2)
ERYTHROCYTE [DISTWIDTH] IN BLOOD BY AUTOMATED COUNT: 12.2 % (ref 12.3–15.4)
GFR SERPL CREATININE-BSD FRML MDRD: 109 ML/MIN/1.73
GLUCOSE SERPL-MCNC: 109 MG/DL (ref 65–99)
GLUCOSE UR STRIP-MCNC: NEGATIVE MG/DL
HCT VFR BLD AUTO: 37.3 % (ref 34–46.6)
HGB BLD-MCNC: 12.2 G/DL (ref 12–15.9)
HGB UR QL STRIP.AUTO: NEGATIVE
HYALINE CASTS UR QL AUTO: ABNORMAL /LPF
IMM GRANULOCYTES # BLD AUTO: 0.02 10*3/MM3 (ref 0–0.05)
IMM GRANULOCYTES NFR BLD AUTO: 0.2 % (ref 0–0.5)
KETONES UR QL STRIP: NEGATIVE
LEUKOCYTE ESTERASE UR QL STRIP.AUTO: ABNORMAL
LYMPHOCYTES # BLD AUTO: 3.57 10*3/MM3 (ref 0.7–3.1)
LYMPHOCYTES NFR BLD AUTO: 40.8 % (ref 19.6–45.3)
MCH RBC QN AUTO: 31.1 PG (ref 26.6–33)
MCHC RBC AUTO-ENTMCNC: 32.7 G/DL (ref 31.5–35.7)
MCV RBC AUTO: 95.2 FL (ref 79–97)
MONOCYTES # BLD AUTO: 0.48 10*3/MM3 (ref 0.1–0.9)
MONOCYTES NFR BLD AUTO: 5.5 % (ref 5–12)
NEUTROPHILS NFR BLD AUTO: 4.27 10*3/MM3 (ref 1.7–7)
NEUTROPHILS NFR BLD AUTO: 48.8 % (ref 42.7–76)
NITRITE UR QL STRIP: POSITIVE
NRBC BLD AUTO-RTO: 0 /100 WBC (ref 0–0.2)
PH UR STRIP.AUTO: 6.5 [PH] (ref 5–8)
PLATELET # BLD AUTO: 290 10*3/MM3 (ref 140–450)
PMV BLD AUTO: 9.9 FL (ref 6–12)
POTASSIUM SERPL-SCNC: 3.8 MMOL/L (ref 3.5–5.2)
PROT UR QL STRIP: NEGATIVE
RBC # BLD AUTO: 3.92 10*6/MM3 (ref 3.77–5.28)
RBC # UR: ABNORMAL /HPF
REF LAB TEST METHOD: ABNORMAL
SODIUM SERPL-SCNC: 141 MMOL/L (ref 136–145)
SP GR UR STRIP: <=1.005 (ref 1–1.03)
SQUAMOUS #/AREA URNS HPF: ABNORMAL /HPF
UROBILINOGEN UR QL STRIP: ABNORMAL
WBC # BLD AUTO: 8.75 10*3/MM3 (ref 3.4–10.8)
WBC UR QL AUTO: ABNORMAL /HPF

## 2021-06-23 NOTE — PLAN OF CARE
OB?  No  Pharmacy Verified? Yes   Reason for Call: problem, Patient called. She went to her PCP for her BP issues and now she is scared about what she had to say. She wanted to speak to a nurse here and see what can she do and had some questions. Please call.  Best form of Contact:      Cell Phone:   Telephone Information:   Mobile [phone]     Okay to leave a detailed message regarding call? Yes     Have you or a member of your household experienced any of the following symptoms?  Fever greater than 100? no  New or worsening cough, runny nose, shortness of breath or sore throat? no  Headache, body aches or fatigue? no  Nausea, vomiting or diarrhea? no  New onset of loss of taste or smell? no  Have you or a member of your household had contact with a laboratory confirmed COVID-19 person within the past 14 days? no  Have you or a member of your household traveled outside of [place] within the last 14 days, if yes where? no           Problem: Patient Care Overview  Goal: Interprofessional Rounds/Family Conf  Outcome: Ongoing (interventions implemented as appropriate)  Palliative Team Attendance 1300. Stephanie BALDWIN, Yue MONTALVO,  Chris RN PN, Justina DUNBAR, Betsey Mercer MDIV Jenniffer Garcia RN PN, Norma Thompson RN , [facility]   08/20/18 1300   Interdisciplinary Rounds/Family Conf   Summary full code aggressive care. aprn will try to address goals with son Again.

## 2022-02-17 NOTE — PLAN OF CARE
Problem: Patient Care Overview  Goal: Plan of Care Review  Outcome: Ongoing (interventions implemented as appropriate)   08/05/18 7150   Coping/Psychosocial   Plan of Care Reviewed With patient   Plan of Care Review   Progress no change   OTHER   Outcome Summary Patient remains tachy. Appears to lessen rate when bladder is emptied by I&O cath./ I&O cathed X2. Trach site appears to have stage II ulcer under the left lower aspect of the trach. Patient continues to have a temp despite tylenol being given both rectally and per PEG. Placed on cooliing blanket. No episodes of nausea or vomitting today. Tolerating trach collar at 28 %. Suctioning frequently for small to moderate amounts of tan, blood streaked thick sputum. Does not respond to painful stimuli.       Problem: Skin Injury Risk (Adult)  Goal: Skin Health and Integrity  Outcome: Ongoing (interventions implemented as appropriate)         [Chaperone Present] : A chaperone was present in the examining room during all aspects of the physical examination [FreeTextEntry1] : MP Alonso [Appropriately responsive] : appropriately responsive [Alert] : alert [No Acute Distress] : no acute distress [No Lymphadenopathy] : no lymphadenopathy [Soft] : soft [Non-tender] : non-tender [Non-distended] : non-distended [No HSM] : No HSM [No Lesions] : no lesions [No Mass] : no mass [Oriented x3] : oriented x3 [Examination Of The Breasts] : a normal appearance [No Discharge] : no discharge [No Masses] : no breast masses were palpable [Labia Majora] : normal [Labia Minora] : normal [Normal] : normal [Normal Position] : in a normal position [Tenderness] : nontender [Enlarged ___ wks] : not enlarged [Mass ___ cm] : no uterine mass was palpated [Uterine Adnexae] : normal

## (undated) DEVICE — SUT ETHLN 2/0 PS 18IN 585H

## (undated) DEVICE — SUT SILK 3/0 SH CR8 18IN C013D

## (undated) DEVICE — SPNG GZ STRL 2S 4X4 12PLY

## (undated) DEVICE — MEDI-VAC NON-CONDUCTIVE SUCTION TUBING: Brand: CARDINAL HEALTH

## (undated) DEVICE — Device

## (undated) DEVICE — GLV SURG SENSICARE MICRO PF LF 7.5 STRL

## (undated) DEVICE — EVAC BLDR UROVAC W ADAPT

## (undated) DEVICE — ADAPT ST INFUS ADMIN SYR 70IN

## (undated) DEVICE — CATHETER,FOLEY,SILI-ELAST,LTX,20FR,10ML: Brand: MEDLINE

## (undated) DEVICE — ALLEVYN TRACHEOSTOMY 3.5X3.5" CTN 10: Brand: ALLEVYN TRACHEOSTOMY

## (undated) DEVICE — GLV SURG SENSICARE MICRO PF LF 7 STRL

## (undated) DEVICE — DRAINBAG,ANTI-REFLUX TOWER,L/F,2000ML,LL: Brand: MEDLINE

## (undated) DEVICE — MEDI-VAC YANKAUER SUCTION HANDLE W/BULBOUS TIP: Brand: CARDINAL HEALTH

## (undated) DEVICE — SUT PROLN 2/0 PC3 8833H

## (undated) DEVICE — PK CYSTO-TUR BASIC 10

## (undated) DEVICE — THE BITE BLOCK MAXI, LATEX FREE STRAP IS USED TO PROTECT THE ENDOSCOPE INSERTION TUBE FROM BEING BITTEN BY THE PATIENT.

## (undated) DEVICE — GOWN,PREVENTION PLUS,XXLARGE,STERILE: Brand: MEDLINE

## (undated) DEVICE — SYR LL TP 10ML STRL

## (undated) DEVICE — PK MINOR SPLT 10

## (undated) DEVICE — HDRST POSTIN FM CRDL TRACH SLOT 9X8X4IN

## (undated) DEVICE — DRESSING,OPTIFOAM,NON-ADHESIVE,4X4: Brand: MEDLINE

## (undated) DEVICE — COVER,LIGHT HANDLE,FLX,1/PK: Brand: MEDLINE INDUSTRIES, INC.

## (undated) DEVICE — APPL CHLORAPREP W/TINT 26ML BLU

## (undated) DEVICE — SOL LR 1000ML

## (undated) DEVICE — PERCUTANEOUS ENDOSCOPIC GASTROSTOMY KIT: Brand: ENDOVIVE SAFETY PEG KIT